# Patient Record
Sex: FEMALE | Race: WHITE | Employment: UNEMPLOYED | URBAN - METROPOLITAN AREA
[De-identification: names, ages, dates, MRNs, and addresses within clinical notes are randomized per-mention and may not be internally consistent; named-entity substitution may affect disease eponyms.]

---

## 2017-01-01 ENCOUNTER — ANESTHESIA EVENT (OUTPATIENT)
Dept: ENDOSCOPY | Age: 47
End: 2017-01-01

## 2017-01-01 ENCOUNTER — HOSPITAL ENCOUNTER (OUTPATIENT)
Dept: PET IMAGING | Age: 47
Discharge: HOME | DRG: 853 | End: 2017-02-09
Payer: MEDICARE

## 2017-01-01 ENCOUNTER — HOSPITAL ENCOUNTER (OUTPATIENT)
Dept: ULTRASOUND IMAGING | Age: 47
Discharge: HOME | End: 2017-08-25
Attending: INTERNAL MEDICINE
Payer: MEDICARE

## 2017-01-01 ENCOUNTER — PATIENT OUTREACH (OUTPATIENT)
Dept: CASE MANAGEMENT | Age: 47
End: 2017-01-01

## 2017-01-01 ENCOUNTER — ANESTHESIA EVENT (OUTPATIENT)
Dept: SURGERY | Age: 47
DRG: 853 | End: 2017-01-01
Payer: MEDICARE

## 2017-01-01 ENCOUNTER — HOME HEALTH ADMISSION (OUTPATIENT)
Dept: HOME HEALTH SERVICES | Facility: HOME HEALTH | Age: 47
End: 2017-01-01
Payer: MEDICARE

## 2017-01-01 ENCOUNTER — HOSPITAL ENCOUNTER (OUTPATIENT)
Dept: ULTRASOUND IMAGING | Age: 47
Discharge: HOME | End: 2017-09-25
Attending: INTERNAL MEDICINE
Payer: MEDICARE

## 2017-01-01 ENCOUNTER — ANESTHESIA (OUTPATIENT)
Dept: ENDOSCOPY | Age: 47
DRG: 073 | End: 2017-01-01
Payer: MEDICARE

## 2017-01-01 ENCOUNTER — HOSPITAL ENCOUNTER (OUTPATIENT)
Dept: WOUND CARE | Age: 47
Discharge: HOME | End: 2017-09-05
Attending: SURGERY
Payer: MEDICARE

## 2017-01-01 ENCOUNTER — HOSPITAL ENCOUNTER (OUTPATIENT)
Dept: ULTRASOUND IMAGING | Age: 47
Discharge: HOME | End: 2017-06-23
Attending: INTERNAL MEDICINE
Payer: MEDICARE

## 2017-01-01 ENCOUNTER — HOSPITAL ENCOUNTER (OUTPATIENT)
Dept: MEDSURG UNIT | Age: 47
End: 2017-01-01
Payer: MEDICARE

## 2017-01-01 ENCOUNTER — HOME CARE VISIT (OUTPATIENT)
Dept: HOME HEALTH SERVICES | Facility: HOME HEALTH | Age: 47
End: 2017-01-01
Payer: MEDICARE

## 2017-01-01 ENCOUNTER — HOSPITAL ENCOUNTER (EMERGENCY)
Age: 47
Discharge: OTHER INSTITUTION | DRG: 441 | End: 2017-05-08
Attending: EMERGENCY MEDICINE
Payer: MEDICARE

## 2017-01-01 ENCOUNTER — HOSPITAL ENCOUNTER (OUTPATIENT)
Dept: ULTRASOUND IMAGING | Age: 47
Discharge: HOME | DRG: 441 | End: 2017-01-03
Attending: INTERNAL MEDICINE
Payer: MEDICARE

## 2017-01-01 ENCOUNTER — HOSPITAL ENCOUNTER (OUTPATIENT)
Dept: ULTRASOUND IMAGING | Age: 47
Discharge: HOME | End: 2017-06-16
Attending: INTERNAL MEDICINE
Payer: MEDICARE

## 2017-01-01 ENCOUNTER — APPOINTMENT (OUTPATIENT)
Dept: ULTRASOUND IMAGING | Age: 47
DRG: 441 | End: 2017-01-01
Attending: INTERNAL MEDICINE
Payer: MEDICARE

## 2017-01-01 ENCOUNTER — HOSPITAL ENCOUNTER (OUTPATIENT)
Dept: LAB | Age: 47
Discharge: HOME | End: 2017-10-03

## 2017-01-01 ENCOUNTER — APPOINTMENT (OUTPATIENT)
Dept: ULTRASOUND IMAGING | Age: 47
DRG: 853 | End: 2017-01-01
Attending: INTERNAL MEDICINE
Payer: MEDICARE

## 2017-01-01 ENCOUNTER — HOSPITAL ENCOUNTER (OUTPATIENT)
Dept: ULTRASOUND IMAGING | Age: 47
Discharge: HOME | End: 2017-06-02
Attending: INTERNAL MEDICINE
Payer: MEDICARE

## 2017-01-01 ENCOUNTER — HOSPITAL ENCOUNTER (OUTPATIENT)
Dept: ULTRASOUND IMAGING | Age: 47
Discharge: HOME | End: 2017-02-27
Attending: INTERNAL MEDICINE
Payer: MEDICARE

## 2017-01-01 ENCOUNTER — HOME CARE VISIT (OUTPATIENT)
Dept: SCHEDULING | Facility: HOME HEALTH | Age: 47
End: 2017-01-01
Payer: MEDICARE

## 2017-01-01 ENCOUNTER — HOSPITAL ENCOUNTER (OUTPATIENT)
Dept: MEDSURG UNIT | Age: 47
Discharge: HOME | End: 2017-06-09
Payer: MEDICARE

## 2017-01-01 ENCOUNTER — HOSPITAL ENCOUNTER (OUTPATIENT)
Dept: WOUND CARE | Age: 47
Discharge: HOME | End: 2017-08-25
Attending: SURGERY
Payer: MEDICARE

## 2017-01-01 ENCOUNTER — HOSPITAL ENCOUNTER (OUTPATIENT)
Dept: ULTRASOUND IMAGING | Age: 47
Discharge: HOME | End: 2017-06-09
Attending: INTERNAL MEDICINE
Payer: MEDICARE

## 2017-01-01 ENCOUNTER — HOSPITAL ENCOUNTER (OUTPATIENT)
Dept: LAB | Age: 47
Discharge: HOME | End: 2017-10-06

## 2017-01-01 ENCOUNTER — APPOINTMENT (OUTPATIENT)
Dept: GENERAL RADIOLOGY | Age: 47
DRG: 073 | End: 2017-01-01
Attending: INTERNAL MEDICINE
Payer: MEDICARE

## 2017-01-01 ENCOUNTER — HOSPITAL ENCOUNTER (OUTPATIENT)
Dept: ULTRASOUND IMAGING | Age: 47
Discharge: HOME | End: 2017-01-10
Attending: INTERNAL MEDICINE
Payer: MEDICARE

## 2017-01-01 ENCOUNTER — HOSPITAL ENCOUNTER (OUTPATIENT)
Dept: MEDSURG UNIT | Age: 47
Discharge: HOME | End: 2017-01-24
Payer: MEDICARE

## 2017-01-01 ENCOUNTER — HOSPITAL ENCOUNTER (OUTPATIENT)
Dept: MEDSURG UNIT | Age: 47
Discharge: HOME | End: 2017-09-08
Payer: MEDICARE

## 2017-01-01 ENCOUNTER — HOSPITAL ENCOUNTER (OUTPATIENT)
Dept: ULTRASOUND IMAGING | Age: 47
Discharge: HOME | End: 2017-09-01
Attending: INTERNAL MEDICINE
Payer: MEDICARE

## 2017-01-01 ENCOUNTER — APPOINTMENT (OUTPATIENT)
Dept: ULTRASOUND IMAGING | Age: 47
DRG: 441 | End: 2017-01-01
Attending: NURSE PRACTITIONER
Payer: MEDICARE

## 2017-01-01 ENCOUNTER — HOSPITAL ENCOUNTER (OUTPATIENT)
Dept: MEDSURG UNIT | Age: 47
Discharge: HOME | End: 2017-02-06
Payer: MEDICARE

## 2017-01-01 ENCOUNTER — APPOINTMENT (OUTPATIENT)
Dept: GENERAL RADIOLOGY | Age: 47
DRG: 441 | End: 2017-01-01
Attending: INTERNAL MEDICINE
Payer: MEDICARE

## 2017-01-01 ENCOUNTER — HOSPITAL ENCOUNTER (OUTPATIENT)
Dept: ULTRASOUND IMAGING | Age: 47
Discharge: HOME | End: 2017-02-06
Attending: INTERNAL MEDICINE
Payer: MEDICARE

## 2017-01-01 ENCOUNTER — HOSPITAL ENCOUNTER (OUTPATIENT)
Dept: ULTRASOUND IMAGING | Age: 47
Discharge: HOME | End: 2017-07-07
Attending: INTERNAL MEDICINE
Payer: MEDICARE

## 2017-01-01 ENCOUNTER — HOSPICE ADMISSION (OUTPATIENT)
Dept: HOSPICE | Facility: HOSPICE | Age: 47
End: 2017-01-01
Payer: MEDICARE

## 2017-01-01 ENCOUNTER — HOSPITAL ENCOUNTER (OUTPATIENT)
Dept: ULTRASOUND IMAGING | Age: 47
Discharge: HOME | End: 2017-08-11
Attending: INTERNAL MEDICINE
Payer: MEDICARE

## 2017-01-01 ENCOUNTER — HOSPITAL ENCOUNTER (OUTPATIENT)
Dept: MEDSURG UNIT | Age: 47
Discharge: HOME | End: 2017-07-28
Payer: MEDICARE

## 2017-01-01 ENCOUNTER — APPOINTMENT (OUTPATIENT)
Dept: ULTRASOUND IMAGING | Age: 47
DRG: 441 | End: 2017-01-01
Attending: FAMILY MEDICINE
Payer: MEDICARE

## 2017-01-01 ENCOUNTER — HOSPITAL ENCOUNTER (OUTPATIENT)
Dept: ULTRASOUND IMAGING | Age: 47
Discharge: HOME | End: 2017-10-02
Attending: INTERNAL MEDICINE
Payer: MEDICARE

## 2017-01-01 ENCOUNTER — ANESTHESIA EVENT (OUTPATIENT)
Dept: ENDOSCOPY | Age: 47
DRG: 073 | End: 2017-01-01
Payer: MEDICARE

## 2017-01-01 ENCOUNTER — HOSPITAL ENCOUNTER (EMERGENCY)
Age: 47
Discharge: HOME | End: 2017-02-23
Attending: EMERGENCY MEDICINE
Payer: MEDICARE

## 2017-01-01 ENCOUNTER — APPOINTMENT (OUTPATIENT)
Dept: ULTRASOUND IMAGING | Age: 47
End: 2017-01-01
Payer: MEDICARE

## 2017-01-01 ENCOUNTER — HOSPITAL ENCOUNTER (OUTPATIENT)
Dept: ULTRASOUND IMAGING | Age: 47
Discharge: HOME | End: 2017-07-28
Attending: INTERNAL MEDICINE
Payer: MEDICARE

## 2017-01-01 ENCOUNTER — HOSPITAL ENCOUNTER (OUTPATIENT)
Dept: ULTRASOUND IMAGING | Age: 47
Discharge: HOME | End: 2017-05-22
Attending: INTERNAL MEDICINE
Payer: MEDICARE

## 2017-01-01 ENCOUNTER — HOSPITAL ENCOUNTER (OUTPATIENT)
Dept: MEDSURG UNIT | Age: 47
Discharge: HOME | End: 2017-04-06
Payer: MEDICARE

## 2017-01-01 ENCOUNTER — HOSPITAL ENCOUNTER (OUTPATIENT)
Dept: ULTRASOUND IMAGING | Age: 47
Discharge: HOME | End: 2017-01-24
Attending: INTERNAL MEDICINE
Payer: MEDICARE

## 2017-01-01 ENCOUNTER — HOSPITAL ENCOUNTER (INPATIENT)
Age: 47
LOS: 2 days | Discharge: HOME | DRG: 441 | End: 2017-01-05
Attending: EMERGENCY MEDICINE | Admitting: INTERNAL MEDICINE
Payer: MEDICARE

## 2017-01-01 ENCOUNTER — HOSPITAL ENCOUNTER (OUTPATIENT)
Dept: MEDSURG UNIT | Age: 47
Discharge: HOME | End: 2017-01-31
Payer: MEDICARE

## 2017-01-01 ENCOUNTER — APPOINTMENT (OUTPATIENT)
Dept: CT IMAGING | Age: 47
DRG: 441 | End: 2017-01-01
Attending: HOSPITALIST
Payer: MEDICARE

## 2017-01-01 ENCOUNTER — HOSPITAL ENCOUNTER (OUTPATIENT)
Dept: MEDSURG UNIT | Age: 47
Discharge: HOME | End: 2017-08-11
Payer: MEDICARE

## 2017-01-01 ENCOUNTER — HOSPITAL ENCOUNTER (OUTPATIENT)
Dept: MEDSURG UNIT | Age: 47
Discharge: HOME | End: 2017-06-30
Payer: MEDICARE

## 2017-01-01 ENCOUNTER — ANESTHESIA (OUTPATIENT)
Dept: SURGERY | Age: 47
DRG: 853 | End: 2017-01-01
Payer: MEDICARE

## 2017-01-01 ENCOUNTER — HOSPITAL ENCOUNTER (INPATIENT)
Age: 47
LOS: 9 days | Discharge: HOME | DRG: 441 | End: 2017-05-17
Attending: FAMILY MEDICINE | Admitting: INTERNAL MEDICINE
Payer: MEDICARE

## 2017-01-01 ENCOUNTER — HOSPITAL ENCOUNTER (OUTPATIENT)
Dept: MEDSURG UNIT | Age: 47
Discharge: HOME | End: 2017-07-14
Payer: MEDICARE

## 2017-01-01 ENCOUNTER — HOSPITAL ENCOUNTER (INPATIENT)
Age: 47
LOS: 11 days | End: 2017-10-29
Attending: INTERNAL MEDICINE | Admitting: INTERNAL MEDICINE

## 2017-01-01 ENCOUNTER — HOSPITAL ENCOUNTER (INPATIENT)
Age: 47
LOS: 10 days | Discharge: HOME | DRG: 853 | End: 2017-02-22
Attending: FAMILY MEDICINE | Admitting: INTERNAL MEDICINE
Payer: MEDICARE

## 2017-01-01 ENCOUNTER — APPOINTMENT (OUTPATIENT)
Dept: INTERVENTIONAL RADIOLOGY/VASCULAR | Age: 47
DRG: 441 | End: 2017-01-01
Attending: HOSPITALIST
Payer: MEDICARE

## 2017-01-01 ENCOUNTER — HOSPITAL ENCOUNTER (OUTPATIENT)
Dept: ULTRASOUND IMAGING | Age: 47
Discharge: HOME | End: 2017-07-14
Attending: INTERNAL MEDICINE
Payer: MEDICARE

## 2017-01-01 ENCOUNTER — HOSPITAL ENCOUNTER (OUTPATIENT)
Dept: ULTRASOUND IMAGING | Age: 47
Discharge: HOME | End: 2017-01-27
Attending: INTERNAL MEDICINE
Payer: MEDICARE

## 2017-01-01 ENCOUNTER — HOSPITAL ENCOUNTER (OUTPATIENT)
Dept: ULTRASOUND IMAGING | Age: 47
Discharge: HOME | DRG: 441 | End: 2017-09-08
Attending: INTERNAL MEDICINE
Payer: MEDICARE

## 2017-01-01 ENCOUNTER — APPOINTMENT (OUTPATIENT)
Dept: GENERAL RADIOLOGY | Age: 47
DRG: 073 | End: 2017-01-01
Payer: MEDICARE

## 2017-01-01 ENCOUNTER — HOSPITAL ENCOUNTER (OUTPATIENT)
Dept: MEDSURG UNIT | Age: 47
Discharge: HOME | End: 2017-03-06
Payer: MEDICARE

## 2017-01-01 ENCOUNTER — ANESTHESIA (OUTPATIENT)
Dept: ENDOSCOPY | Age: 47
DRG: 441 | End: 2017-01-01
Payer: MEDICARE

## 2017-01-01 ENCOUNTER — HOSPITAL ENCOUNTER (OUTPATIENT)
Dept: MEDSURG UNIT | Age: 47
Discharge: HOME | End: 2017-08-25
Payer: MEDICARE

## 2017-01-01 ENCOUNTER — HOSPITAL ENCOUNTER (OUTPATIENT)
Dept: ULTRASOUND IMAGING | Age: 47
Discharge: HOME | End: 2017-01-06
Attending: INTERNAL MEDICINE
Payer: MEDICARE

## 2017-01-01 ENCOUNTER — HOSPITAL ENCOUNTER (INPATIENT)
Age: 47
LOS: 4 days | Discharge: HOSPICE | DRG: 073 | End: 2017-10-18
Admitting: INTERNAL MEDICINE
Payer: MEDICARE

## 2017-01-01 ENCOUNTER — APPOINTMENT (OUTPATIENT)
Dept: GENERAL RADIOLOGY | Age: 47
DRG: 441 | End: 2017-01-01
Attending: EMERGENCY MEDICINE
Payer: MEDICARE

## 2017-01-01 ENCOUNTER — HOSPITAL ENCOUNTER (OUTPATIENT)
Dept: MEDSURG UNIT | Age: 47
Discharge: HOME | End: 2017-05-03

## 2017-01-01 ENCOUNTER — HOSPITAL ENCOUNTER (OUTPATIENT)
Dept: LAB | Age: 47
Discharge: HOME | End: 2017-10-11

## 2017-01-01 ENCOUNTER — HOSPITAL ENCOUNTER (OUTPATIENT)
Dept: MEDSURG UNIT | Age: 47
Discharge: HOME | End: 2017-09-01
Payer: MEDICARE

## 2017-01-01 ENCOUNTER — APPOINTMENT (OUTPATIENT)
Dept: MEDSURG UNIT | Age: 47
End: 2017-01-01

## 2017-01-01 ENCOUNTER — HOSPITAL ENCOUNTER (OUTPATIENT)
Dept: MEDSURG UNIT | Age: 47
Discharge: HOME | End: 2017-06-23
Payer: MEDICARE

## 2017-01-01 ENCOUNTER — HOSPITAL ENCOUNTER (OUTPATIENT)
Dept: ULTRASOUND IMAGING | Age: 47
Discharge: HOME | End: 2017-06-30
Attending: INTERNAL MEDICINE
Payer: MEDICARE

## 2017-01-01 ENCOUNTER — HOSPITAL ENCOUNTER (OUTPATIENT)
Dept: ULTRASOUND IMAGING | Age: 47
Discharge: HOME | End: 2017-07-21
Attending: INTERNAL MEDICINE
Payer: MEDICARE

## 2017-01-01 ENCOUNTER — HOSPITAL ENCOUNTER (OUTPATIENT)
Dept: MEDSURG UNIT | Age: 47
Discharge: HOME | End: 2017-03-23
Payer: MEDICARE

## 2017-01-01 ENCOUNTER — HOSPITAL ENCOUNTER (OUTPATIENT)
Dept: ULTRASOUND IMAGING | Age: 47
Discharge: HOME | End: 2017-03-06
Attending: INTERNAL MEDICINE
Payer: MEDICARE

## 2017-01-01 ENCOUNTER — HOSPITAL ENCOUNTER (OUTPATIENT)
Dept: MEDSURG UNIT | Age: 47
Discharge: HOME | End: 2017-08-04
Payer: MEDICARE

## 2017-01-01 ENCOUNTER — APPOINTMENT (OUTPATIENT)
Dept: ULTRASOUND IMAGING | Age: 47
DRG: 441 | End: 2017-01-01
Payer: MEDICARE

## 2017-01-01 ENCOUNTER — HOSPITAL ENCOUNTER (OUTPATIENT)
Dept: ULTRASOUND IMAGING | Age: 47
Discharge: HOME | End: 2017-01-17
Attending: INTERNAL MEDICINE
Payer: MEDICARE

## 2017-01-01 ENCOUNTER — HOSPITAL ENCOUNTER (OUTPATIENT)
Dept: LAB | Age: 47
Discharge: HOME | End: 2017-10-09

## 2017-01-01 ENCOUNTER — HOSPITAL ENCOUNTER (OUTPATIENT)
Dept: ULTRASOUND IMAGING | Age: 47
Discharge: HOME | DRG: 853 | End: 2017-02-10
Attending: INTERNAL MEDICINE
Payer: MEDICARE

## 2017-01-01 ENCOUNTER — APPOINTMENT (OUTPATIENT)
Dept: GENERAL RADIOLOGY | Age: 47
DRG: 853 | End: 2017-01-01
Attending: INTERNAL MEDICINE
Payer: MEDICARE

## 2017-01-01 ENCOUNTER — HOSPITAL ENCOUNTER (OUTPATIENT)
Dept: ULTRASOUND IMAGING | Age: 47
Discharge: HOME | End: 2017-08-18
Attending: INTERNAL MEDICINE
Payer: MEDICARE

## 2017-01-01 ENCOUNTER — APPOINTMENT (OUTPATIENT)
Dept: CT IMAGING | Age: 47
DRG: 853 | End: 2017-01-01
Attending: STUDENT IN AN ORGANIZED HEALTH CARE EDUCATION/TRAINING PROGRAM
Payer: MEDICARE

## 2017-01-01 ENCOUNTER — HOSPITAL ENCOUNTER (OUTPATIENT)
Dept: ULTRASOUND IMAGING | Age: 47
Discharge: HOME | DRG: 073 | End: 2017-10-11
Attending: INTERNAL MEDICINE
Payer: MEDICARE

## 2017-01-01 ENCOUNTER — HOSPITAL ENCOUNTER (OUTPATIENT)
Dept: MEDSURG UNIT | Age: 47
Discharge: HOME | End: 2017-06-16
Payer: MEDICARE

## 2017-01-01 ENCOUNTER — HOSPITAL ENCOUNTER (OUTPATIENT)
Dept: MEDSURG UNIT | Age: 47
Discharge: HOME | End: 2017-05-05
Payer: MEDICARE

## 2017-01-01 ENCOUNTER — HOSPITAL ENCOUNTER (OUTPATIENT)
Dept: MEDSURG UNIT | Age: 47
Discharge: HOME | End: 2017-01-02
Payer: MEDICARE

## 2017-01-01 ENCOUNTER — APPOINTMENT (OUTPATIENT)
Dept: INTERVENTIONAL RADIOLOGY/VASCULAR | Age: 47
DRG: 853 | End: 2017-01-01
Attending: NURSE PRACTITIONER
Payer: MEDICARE

## 2017-01-01 ENCOUNTER — HOME CARE VISIT (OUTPATIENT)
Dept: HOME HEALTH SERVICES | Facility: HOME HEALTH | Age: 47
End: 2017-01-01

## 2017-01-01 ENCOUNTER — HOSPITAL ENCOUNTER (OUTPATIENT)
Dept: MEDSURG UNIT | Age: 47
Discharge: HOME | End: 2017-06-02
Payer: MEDICARE

## 2017-01-01 ENCOUNTER — HOSPITAL ENCOUNTER (OUTPATIENT)
Dept: MEDSURG UNIT | Age: 47
Discharge: HOME | End: 2017-07-07
Payer: MEDICARE

## 2017-01-01 ENCOUNTER — HOME CARE VISIT (OUTPATIENT)
Dept: SCHEDULING | Facility: HOME HEALTH | Age: 47
End: 2017-01-01

## 2017-01-01 ENCOUNTER — HOSPITAL ENCOUNTER (OUTPATIENT)
Dept: LAB | Age: 47
Discharge: HOME | End: 2017-09-29

## 2017-01-01 ENCOUNTER — HOSPITAL ENCOUNTER (INPATIENT)
Age: 47
LOS: 10 days | Discharge: NURSING, REHAB OR LONG-TERM CARE | DRG: 441 | End: 2017-09-21
Attending: EMERGENCY MEDICINE | Admitting: INTERNAL MEDICINE
Payer: MEDICARE

## 2017-01-01 ENCOUNTER — HOSPITAL ENCOUNTER (OUTPATIENT)
Dept: MEDSURG UNIT | Age: 47
Discharge: HOME | End: 2017-01-10
Payer: MEDICARE

## 2017-01-01 ENCOUNTER — APPOINTMENT (OUTPATIENT)
Dept: ULTRASOUND IMAGING | Age: 47
DRG: 441 | End: 2017-01-01
Attending: HOSPITALIST
Payer: MEDICARE

## 2017-01-01 ENCOUNTER — HOSPITAL ENCOUNTER (OUTPATIENT)
Dept: MAMMOGRAPHY | Age: 47
Discharge: HOME | End: 2017-09-01
Attending: OBSTETRICS & GYNECOLOGY
Payer: MEDICARE

## 2017-01-01 ENCOUNTER — HOSPITAL ENCOUNTER (OUTPATIENT)
Dept: MEDSURG UNIT | Age: 47
Discharge: HOME | End: 2017-07-21
Payer: MEDICARE

## 2017-01-01 ENCOUNTER — APPOINTMENT (OUTPATIENT)
Dept: GENERAL RADIOLOGY | Age: 47
DRG: 853 | End: 2017-01-01
Attending: SURGERY
Payer: MEDICARE

## 2017-01-01 ENCOUNTER — TELEPHONE (OUTPATIENT)
Dept: GENERAL RADIOLOGY | Age: 47
End: 2017-01-01

## 2017-01-01 ENCOUNTER — HOSPITAL ENCOUNTER (OUTPATIENT)
Dept: ULTRASOUND IMAGING | Age: 47
Discharge: HOME | End: 2017-08-04
Attending: INTERNAL MEDICINE
Payer: MEDICARE

## 2017-01-01 ENCOUNTER — APPOINTMENT (OUTPATIENT)
Dept: ULTRASOUND IMAGING | Age: 47
DRG: 853 | End: 2017-01-01
Attending: FAMILY MEDICINE
Payer: MEDICARE

## 2017-01-01 ENCOUNTER — APPOINTMENT (OUTPATIENT)
Dept: INTERVENTIONAL RADIOLOGY/VASCULAR | Age: 47
DRG: 853 | End: 2017-01-01
Attending: SURGERY
Payer: MEDICARE

## 2017-01-01 ENCOUNTER — HOSPITAL ENCOUNTER (OUTPATIENT)
Dept: ULTRASOUND IMAGING | Age: 47
Discharge: HOME | End: 2017-01-31
Attending: INTERNAL MEDICINE
Payer: MEDICARE

## 2017-01-01 ENCOUNTER — ANESTHESIA EVENT (OUTPATIENT)
Dept: ENDOSCOPY | Age: 47
DRG: 441 | End: 2017-01-01
Payer: MEDICARE

## 2017-01-01 ENCOUNTER — HOSPITAL ENCOUNTER (OUTPATIENT)
Dept: MEDSURG UNIT | Age: 47
Discharge: HOME | End: 2017-02-27
Payer: MEDICARE

## 2017-01-01 ENCOUNTER — HOSPITAL ENCOUNTER (OUTPATIENT)
Dept: MEDSURG UNIT | Age: 47
Discharge: HOME | End: 2017-01-17
Payer: MEDICARE

## 2017-01-01 ENCOUNTER — HOSPITAL ENCOUNTER (OUTPATIENT)
Dept: MEDSURG UNIT | Age: 47
Discharge: HOME | End: 2017-08-18
Payer: MEDICARE

## 2017-01-01 ENCOUNTER — HOSPITAL ENCOUNTER (EMERGENCY)
Age: 47
Discharge: ANOTHER HOSPITAL | DRG: 853 | End: 2017-02-12
Attending: STUDENT IN AN ORGANIZED HEALTH CARE EDUCATION/TRAINING PROGRAM
Payer: MEDICARE

## 2017-01-01 ENCOUNTER — HOSPITAL ENCOUNTER (OUTPATIENT)
Dept: MAMMOGRAPHY | Age: 47
Discharge: HOME | End: 2017-08-31
Attending: OBSTETRICS & GYNECOLOGY

## 2017-01-01 ENCOUNTER — ANESTHESIA (OUTPATIENT)
Dept: ENDOSCOPY | Age: 47
End: 2017-01-01

## 2017-01-01 VITALS
DIASTOLIC BLOOD PRESSURE: 59 MMHG | BODY MASS INDEX: 16.9 KG/M2 | SYSTOLIC BLOOD PRESSURE: 102 MMHG | WEIGHT: 99 LBS | OXYGEN SATURATION: 98 % | HEIGHT: 64 IN | HEART RATE: 84 BPM | RESPIRATION RATE: 16 BRPM

## 2017-01-01 VITALS
RESPIRATION RATE: 19 BRPM | DIASTOLIC BLOOD PRESSURE: 61 MMHG | HEART RATE: 81 BPM | OXYGEN SATURATION: 95 % | SYSTOLIC BLOOD PRESSURE: 90 MMHG

## 2017-01-01 VITALS — HEART RATE: 70 BPM | SYSTOLIC BLOOD PRESSURE: 100 MMHG | DIASTOLIC BLOOD PRESSURE: 62 MMHG | RESPIRATION RATE: 17 BRPM

## 2017-01-01 VITALS
DIASTOLIC BLOOD PRESSURE: 60 MMHG | RESPIRATION RATE: 15 BRPM | SYSTOLIC BLOOD PRESSURE: 97 MMHG | OXYGEN SATURATION: 97 % | HEART RATE: 66 BPM

## 2017-01-01 VITALS
SYSTOLIC BLOOD PRESSURE: 95 MMHG | OXYGEN SATURATION: 96 % | DIASTOLIC BLOOD PRESSURE: 61 MMHG | TEMPERATURE: 97.8 F | RESPIRATION RATE: 16 BRPM | HEART RATE: 84 BPM

## 2017-01-01 VITALS
TEMPERATURE: 98.3 F | DIASTOLIC BLOOD PRESSURE: 56 MMHG | OXYGEN SATURATION: 97 % | WEIGHT: 104.2 LBS | HEART RATE: 85 BPM | SYSTOLIC BLOOD PRESSURE: 101 MMHG | RESPIRATION RATE: 19 BRPM | BODY MASS INDEX: 19.69 KG/M2

## 2017-01-01 VITALS
DIASTOLIC BLOOD PRESSURE: 52 MMHG | HEART RATE: 115 BPM | WEIGHT: 114 LBS | OXYGEN SATURATION: 92 % | TEMPERATURE: 97.8 F | SYSTOLIC BLOOD PRESSURE: 87 MMHG | HEIGHT: 61 IN | RESPIRATION RATE: 20 BRPM | BODY MASS INDEX: 21.52 KG/M2

## 2017-01-01 VITALS
TEMPERATURE: 98.2 F | BODY MASS INDEX: 20.22 KG/M2 | SYSTOLIC BLOOD PRESSURE: 106 MMHG | TEMPERATURE: 98.1 F | SYSTOLIC BLOOD PRESSURE: 99 MMHG | WEIGHT: 103 LBS | WEIGHT: 104.5 LBS | OXYGEN SATURATION: 97 % | BODY MASS INDEX: 20.41 KG/M2 | DIASTOLIC BLOOD PRESSURE: 63 MMHG | OXYGEN SATURATION: 100 % | DIASTOLIC BLOOD PRESSURE: 66 MMHG | HEART RATE: 86 BPM | HEIGHT: 60 IN | HEART RATE: 94 BPM | RESPIRATION RATE: 17 BRPM

## 2017-01-01 VITALS
TEMPERATURE: 98.1 F | HEART RATE: 78 BPM | RESPIRATION RATE: 20 BRPM | OXYGEN SATURATION: 94 % | DIASTOLIC BLOOD PRESSURE: 70 MMHG | SYSTOLIC BLOOD PRESSURE: 140 MMHG

## 2017-01-01 VITALS
RESPIRATION RATE: 16 BRPM | HEART RATE: 96 BPM | OXYGEN SATURATION: 96 % | DIASTOLIC BLOOD PRESSURE: 48 MMHG | SYSTOLIC BLOOD PRESSURE: 80 MMHG

## 2017-01-01 VITALS
BODY MASS INDEX: 16.99 KG/M2 | RESPIRATION RATE: 16 BRPM | HEIGHT: 61 IN | WEIGHT: 90 LBS | OXYGEN SATURATION: 97 % | HEART RATE: 75 BPM | SYSTOLIC BLOOD PRESSURE: 91 MMHG | DIASTOLIC BLOOD PRESSURE: 56 MMHG

## 2017-01-01 VITALS
RESPIRATION RATE: 18 BRPM | TEMPERATURE: 98.9 F | SYSTOLIC BLOOD PRESSURE: 94 MMHG | HEART RATE: 100 BPM | DIASTOLIC BLOOD PRESSURE: 53 MMHG

## 2017-01-01 VITALS
HEART RATE: 97 BPM | SYSTOLIC BLOOD PRESSURE: 103 MMHG | WEIGHT: 100 LBS | BODY MASS INDEX: 18.88 KG/M2 | HEIGHT: 61 IN | DIASTOLIC BLOOD PRESSURE: 62 MMHG | RESPIRATION RATE: 16 BRPM

## 2017-01-01 VITALS
SYSTOLIC BLOOD PRESSURE: 120 MMHG | DIASTOLIC BLOOD PRESSURE: 70 MMHG | RESPIRATION RATE: 19 BRPM | TEMPERATURE: 99.1 F | HEART RATE: 92 BPM

## 2017-01-01 VITALS
HEART RATE: 85 BPM | RESPIRATION RATE: 18 BRPM | SYSTOLIC BLOOD PRESSURE: 85 MMHG | OXYGEN SATURATION: 95 % | TEMPERATURE: 97.9 F | DIASTOLIC BLOOD PRESSURE: 56 MMHG

## 2017-01-01 VITALS
BODY MASS INDEX: 23.15 KG/M2 | RESPIRATION RATE: 17 BRPM | WEIGHT: 121.5 LBS | SYSTOLIC BLOOD PRESSURE: 96 MMHG | OXYGEN SATURATION: 97 % | HEART RATE: 106 BPM | TEMPERATURE: 98 F | DIASTOLIC BLOOD PRESSURE: 58 MMHG

## 2017-01-01 VITALS
TEMPERATURE: 98.9 F | SYSTOLIC BLOOD PRESSURE: 87 MMHG | OXYGEN SATURATION: 96 % | DIASTOLIC BLOOD PRESSURE: 63 MMHG | RESPIRATION RATE: 18 BRPM | HEART RATE: 96 BPM

## 2017-01-01 VITALS
HEART RATE: 61 BPM | SYSTOLIC BLOOD PRESSURE: 84 MMHG | RESPIRATION RATE: 17 BRPM | DIASTOLIC BLOOD PRESSURE: 55 MMHG | OXYGEN SATURATION: 96 %

## 2017-01-01 VITALS
SYSTOLIC BLOOD PRESSURE: 104 MMHG | HEART RATE: 98 BPM | RESPIRATION RATE: 22 BRPM | DIASTOLIC BLOOD PRESSURE: 60 MMHG | OXYGEN SATURATION: 95 % | TEMPERATURE: 97.9 F

## 2017-01-01 VITALS
HEART RATE: 86 BPM | SYSTOLIC BLOOD PRESSURE: 100 MMHG | RESPIRATION RATE: 16 BRPM | DIASTOLIC BLOOD PRESSURE: 62 MMHG | TEMPERATURE: 96 F | OXYGEN SATURATION: 98 %

## 2017-01-01 VITALS
BODY MASS INDEX: 17.52 KG/M2 | HEIGHT: 61 IN | OXYGEN SATURATION: 98 % | HEART RATE: 76 BPM | SYSTOLIC BLOOD PRESSURE: 86 MMHG | DIASTOLIC BLOOD PRESSURE: 59 MMHG | RESPIRATION RATE: 16 BRPM | WEIGHT: 92.8 LBS | TEMPERATURE: 97.4 F

## 2017-01-01 VITALS
OXYGEN SATURATION: 97 % | TEMPERATURE: 98.2 F | HEART RATE: 95 BPM | SYSTOLIC BLOOD PRESSURE: 90 MMHG | RESPIRATION RATE: 16 BRPM | DIASTOLIC BLOOD PRESSURE: 60 MMHG

## 2017-01-01 VITALS
OXYGEN SATURATION: 94 % | WEIGHT: 98.8 LBS | HEART RATE: 75 BPM | DIASTOLIC BLOOD PRESSURE: 57 MMHG | HEIGHT: 61 IN | TEMPERATURE: 97.9 F | SYSTOLIC BLOOD PRESSURE: 86 MMHG | BODY MASS INDEX: 18.65 KG/M2 | RESPIRATION RATE: 16 BRPM

## 2017-01-01 VITALS
RESPIRATION RATE: 10 BRPM | DIASTOLIC BLOOD PRESSURE: 50 MMHG | TEMPERATURE: 97.1 F | HEART RATE: 67 BPM | SYSTOLIC BLOOD PRESSURE: 68 MMHG

## 2017-01-01 VITALS
RESPIRATION RATE: 16 BRPM | HEIGHT: 60 IN | HEART RATE: 86 BPM | DIASTOLIC BLOOD PRESSURE: 63 MMHG | OXYGEN SATURATION: 100 % | TEMPERATURE: 98.2 F | SYSTOLIC BLOOD PRESSURE: 96 MMHG | WEIGHT: 103 LBS | BODY MASS INDEX: 20.22 KG/M2

## 2017-01-01 VITALS
DIASTOLIC BLOOD PRESSURE: 63 MMHG | OXYGEN SATURATION: 97 % | RESPIRATION RATE: 16 BRPM | SYSTOLIC BLOOD PRESSURE: 91 MMHG | HEART RATE: 77 BPM

## 2017-01-01 VITALS
OXYGEN SATURATION: 94 % | RESPIRATION RATE: 20 BRPM | SYSTOLIC BLOOD PRESSURE: 91 MMHG | DIASTOLIC BLOOD PRESSURE: 59 MMHG | HEART RATE: 95 BPM

## 2017-01-01 VITALS
BODY MASS INDEX: 18.12 KG/M2 | HEIGHT: 61 IN | DIASTOLIC BLOOD PRESSURE: 57 MMHG | HEART RATE: 83 BPM | OXYGEN SATURATION: 96 % | RESPIRATION RATE: 18 BRPM | SYSTOLIC BLOOD PRESSURE: 93 MMHG | WEIGHT: 96 LBS | TEMPERATURE: 97.6 F

## 2017-01-01 VITALS
SYSTOLIC BLOOD PRESSURE: 84 MMHG | RESPIRATION RATE: 19 BRPM | HEART RATE: 81 BPM | DIASTOLIC BLOOD PRESSURE: 52 MMHG | OXYGEN SATURATION: 96 %

## 2017-01-01 VITALS
DIASTOLIC BLOOD PRESSURE: 52 MMHG | HEART RATE: 78 BPM | TEMPERATURE: 96.9 F | SYSTOLIC BLOOD PRESSURE: 96 MMHG | OXYGEN SATURATION: 94 % | RESPIRATION RATE: 16 BRPM

## 2017-01-01 VITALS
DIASTOLIC BLOOD PRESSURE: 65 MMHG | HEART RATE: 99 BPM | SYSTOLIC BLOOD PRESSURE: 95 MMHG | BODY MASS INDEX: 19.63 KG/M2 | DIASTOLIC BLOOD PRESSURE: 64 MMHG | WEIGHT: 104 LBS | OXYGEN SATURATION: 98 % | HEIGHT: 61 IN | SYSTOLIC BLOOD PRESSURE: 106 MMHG | RESPIRATION RATE: 17 BRPM

## 2017-01-01 VITALS
HEART RATE: 80 BPM | SYSTOLIC BLOOD PRESSURE: 106 MMHG | RESPIRATION RATE: 16 BRPM | TEMPERATURE: 97.5 F | DIASTOLIC BLOOD PRESSURE: 68 MMHG

## 2017-01-01 VITALS
DIASTOLIC BLOOD PRESSURE: 55 MMHG | HEART RATE: 92 BPM | SYSTOLIC BLOOD PRESSURE: 85 MMHG | OXYGEN SATURATION: 95 % | RESPIRATION RATE: 20 BRPM

## 2017-01-01 VITALS
TEMPERATURE: 98 F | SYSTOLIC BLOOD PRESSURE: 100 MMHG | WEIGHT: 108 LBS | RESPIRATION RATE: 15 BRPM | DIASTOLIC BLOOD PRESSURE: 75 MMHG | HEIGHT: 60 IN | HEART RATE: 100 BPM | OXYGEN SATURATION: 97 % | BODY MASS INDEX: 21.2 KG/M2

## 2017-01-01 VITALS
RESPIRATION RATE: 16 BRPM | RESPIRATION RATE: 20 BRPM | HEART RATE: 98 BPM | SYSTOLIC BLOOD PRESSURE: 97 MMHG | SYSTOLIC BLOOD PRESSURE: 100 MMHG | OXYGEN SATURATION: 98 % | HEART RATE: 102 BPM | DIASTOLIC BLOOD PRESSURE: 57 MMHG | DIASTOLIC BLOOD PRESSURE: 56 MMHG | OXYGEN SATURATION: 100 %

## 2017-01-01 VITALS
SYSTOLIC BLOOD PRESSURE: 92 MMHG | WEIGHT: 92 LBS | DIASTOLIC BLOOD PRESSURE: 58 MMHG | OXYGEN SATURATION: 100 % | HEIGHT: 64 IN | TEMPERATURE: 97.8 F | RESPIRATION RATE: 16 BRPM | HEART RATE: 88 BPM | BODY MASS INDEX: 15.71 KG/M2

## 2017-01-01 VITALS
SYSTOLIC BLOOD PRESSURE: 97 MMHG | HEART RATE: 80 BPM | RESPIRATION RATE: 16 BRPM | DIASTOLIC BLOOD PRESSURE: 66 MMHG | OXYGEN SATURATION: 98 % | TEMPERATURE: 97.9 F

## 2017-01-01 VITALS
RESPIRATION RATE: 17 BRPM | OXYGEN SATURATION: 95 % | HEART RATE: 88 BPM | DIASTOLIC BLOOD PRESSURE: 65 MMHG | SYSTOLIC BLOOD PRESSURE: 101 MMHG

## 2017-01-01 VITALS
HEART RATE: 72 BPM | TEMPERATURE: 96.9 F | DIASTOLIC BLOOD PRESSURE: 60 MMHG | RESPIRATION RATE: 16 BRPM | SYSTOLIC BLOOD PRESSURE: 100 MMHG

## 2017-01-01 VITALS
TEMPERATURE: 98.1 F | RESPIRATION RATE: 18 BRPM | HEART RATE: 91 BPM | SYSTOLIC BLOOD PRESSURE: 88 MMHG | DIASTOLIC BLOOD PRESSURE: 58 MMHG | OXYGEN SATURATION: 96 %

## 2017-01-01 VITALS
DIASTOLIC BLOOD PRESSURE: 60 MMHG | HEART RATE: 118 BPM | SYSTOLIC BLOOD PRESSURE: 98 MMHG | TEMPERATURE: 100 F | RESPIRATION RATE: 19 BRPM

## 2017-01-01 VITALS
RESPIRATION RATE: 14 BRPM | HEART RATE: 100 BPM | OXYGEN SATURATION: 98 % | SYSTOLIC BLOOD PRESSURE: 106 MMHG | DIASTOLIC BLOOD PRESSURE: 62 MMHG

## 2017-01-01 VITALS — DIASTOLIC BLOOD PRESSURE: 68 MMHG | SYSTOLIC BLOOD PRESSURE: 106 MMHG

## 2017-01-01 DIAGNOSIS — R18.8 ASCITES: ICD-10-CM

## 2017-01-01 DIAGNOSIS — I95.9 HYPOTENSION, UNSPECIFIED HYPOTENSION TYPE: ICD-10-CM

## 2017-01-01 DIAGNOSIS — K76.9 LIVER DISEASE: ICD-10-CM

## 2017-01-01 DIAGNOSIS — I85.01 IDIOPATHIC ESOPHAGEAL VARICES WITH BLEEDING (HCC): ICD-10-CM

## 2017-01-01 DIAGNOSIS — N18.6 CKD (CHRONIC KIDNEY DISEASE) STAGE V REQUIRING CHRONIC DIALYSIS (HCC): Chronic | ICD-10-CM

## 2017-01-01 DIAGNOSIS — K76.82 HEPATIC ENCEPHALOPATHY: Primary | ICD-10-CM

## 2017-01-01 DIAGNOSIS — R18.8 CIRRHOSIS OF LIVER WITH ASCITES, UNSPECIFIED HEPATIC CIRRHOSIS TYPE (HCC): Chronic | ICD-10-CM

## 2017-01-01 DIAGNOSIS — K65.2 SBP (SPONTANEOUS BACTERIAL PERITONITIS) (HCC): Primary | ICD-10-CM

## 2017-01-01 DIAGNOSIS — D69.6 THROMBOCYTOPENIA (HCC): Chronic | ICD-10-CM

## 2017-01-01 DIAGNOSIS — K72.10 END STAGE LIVER DISEASE (HCC): ICD-10-CM

## 2017-01-01 DIAGNOSIS — Z94.4 HISTORY OF LIVER TRANSPLANT (HCC): ICD-10-CM

## 2017-01-01 DIAGNOSIS — K74.5 BILIARY CIRRHOSIS (HCC): ICD-10-CM

## 2017-01-01 DIAGNOSIS — K76.82 HEPATIC ENCEPHALOPATHY: ICD-10-CM

## 2017-01-01 DIAGNOSIS — Z99.2 ESRD ON DIALYSIS (HCC): ICD-10-CM

## 2017-01-01 DIAGNOSIS — G93.40 ENCEPHALOPATHY: ICD-10-CM

## 2017-01-01 DIAGNOSIS — R10.13 ABDOMINAL PAIN, EPIGASTRIC: ICD-10-CM

## 2017-01-01 DIAGNOSIS — D64.9 ANEMIA, UNSPECIFIED TYPE: ICD-10-CM

## 2017-01-01 DIAGNOSIS — R11.2 INTRACTABLE VOMITING WITH NAUSEA, UNSPECIFIED VOMITING TYPE: Primary | ICD-10-CM

## 2017-01-01 DIAGNOSIS — Z99.2 DEPENDENCE ON RENAL DIALYSIS (HCC): ICD-10-CM

## 2017-01-01 DIAGNOSIS — R18.8 OTHER ASCITES: ICD-10-CM

## 2017-01-01 DIAGNOSIS — K42.9 UMBILICAL HERNIA WITHOUT OBSTRUCTION AND WITHOUT GANGRENE: ICD-10-CM

## 2017-01-01 DIAGNOSIS — N18.6 ESRD ON DIALYSIS (HCC): ICD-10-CM

## 2017-01-01 DIAGNOSIS — Z12.31 VISIT FOR SCREENING MAMMOGRAM: ICD-10-CM

## 2017-01-01 DIAGNOSIS — Z94.4 LIVER TRANSPLANT RECIPIENT (HCC): Chronic | ICD-10-CM

## 2017-01-01 DIAGNOSIS — R63.6 UNDERWEIGHT: ICD-10-CM

## 2017-01-01 DIAGNOSIS — Z99.2 CKD (CHRONIC KIDNEY DISEASE) STAGE V REQUIRING CHRONIC DIALYSIS (HCC): Chronic | ICD-10-CM

## 2017-01-01 DIAGNOSIS — E16.2 HYPOGLYCEMIA: ICD-10-CM

## 2017-01-01 DIAGNOSIS — N18.5 CHRONIC RENAL FAILURE, STAGE 5 (HCC): ICD-10-CM

## 2017-01-01 DIAGNOSIS — E88.09 HYPOALBUMINEMIA: ICD-10-CM

## 2017-01-01 DIAGNOSIS — K72.90 LIVER FAILURE WITHOUT HEPATIC COMA, UNSPECIFIED CHRONICITY (HCC): ICD-10-CM

## 2017-01-01 DIAGNOSIS — Z22.39 VRE (VANCOMYCIN RESISTANT ENTEROCOCCUS) CULTURE POSITIVE: ICD-10-CM

## 2017-01-01 DIAGNOSIS — G90.9 AUTONOMIC DYSFUNCTION: ICD-10-CM

## 2017-01-01 DIAGNOSIS — R91.8 PULMONARY INFILTRATE: ICD-10-CM

## 2017-01-01 DIAGNOSIS — R11.2 NAUSEA AND VOMITING, INTRACTABILITY OF VOMITING NOT SPECIFIED, UNSPECIFIED VOMITING TYPE: Primary | ICD-10-CM

## 2017-01-01 DIAGNOSIS — K74.60 CIRRHOSIS OF LIVER WITH ASCITES, UNSPECIFIED HEPATIC CIRRHOSIS TYPE (HCC): Chronic | ICD-10-CM

## 2017-01-01 DIAGNOSIS — D68.9 COAGULOPATHY (HCC): Chronic | ICD-10-CM

## 2017-01-01 DIAGNOSIS — D61.818 PANCYTOPENIA (HCC): Chronic | ICD-10-CM

## 2017-01-01 DIAGNOSIS — R10.84 ABDOMINAL PAIN, GENERALIZED: Primary | ICD-10-CM

## 2017-01-01 DIAGNOSIS — J96.02 ACUTE RESPIRATORY FAILURE WITH HYPERCAPNIA (HCC): ICD-10-CM

## 2017-01-01 DIAGNOSIS — K22.10 ULCER OF ESOPHAGUS WITHOUT BLEEDING: ICD-10-CM

## 2017-01-01 DIAGNOSIS — D49.2 NEOPLASM OF UNSPECIFIED BEHAVIOR OF BONE, SOFT TISSUE, AND SKIN: ICD-10-CM

## 2017-01-01 LAB
ABO + RH BLD: NORMAL
AFP-TM SERPL-MCNC: 1.2 NG/ML
AFP-TM SERPL-MCNC: 1.3 NG/ML
AFP-TM SERPL-MCNC: 1.5 NG/ML
AFP-TM SERPL-MCNC: 1.5 NG/ML
AFP-TM SERPL-MCNC: 1.6 NG/ML
AFP-TM SERPL-MCNC: 1.6 NG/ML
ALBUMIN FLD-MCNC: 0.3 G/DL
ALBUMIN FLD-MCNC: 0.4 G/DL
ALBUMIN FLD-MCNC: 0.5 G/DL
ALBUMIN FLD-MCNC: 0.6 G/DL
ALBUMIN FLD-MCNC: 0.6 G/DL
ALBUMIN FLD-MCNC: 0.7 G/DL
ALBUMIN FLD-MCNC: <0.3 G/DL
ALBUMIN SERPL BCP-MCNC: 2 G/DL (ref 3.5–5)
ALBUMIN SERPL BCP-MCNC: 2.1 G/DL (ref 3.5–5)
ALBUMIN SERPL BCP-MCNC: 2.1 G/DL (ref 3.5–5)
ALBUMIN SERPL BCP-MCNC: 2.2 G/DL (ref 3.5–5)
ALBUMIN SERPL BCP-MCNC: 2.3 G/DL (ref 3.5–5)
ALBUMIN SERPL BCP-MCNC: 2.5 G/DL (ref 3.5–5)
ALBUMIN SERPL BCP-MCNC: 2.8 G/DL (ref 3.5–5)
ALBUMIN SERPL BCP-MCNC: 2.8 G/DL (ref 3.5–5)
ALBUMIN SERPL BCP-MCNC: 3.1 G/DL (ref 3.5–5)
ALBUMIN SERPL BCP-MCNC: 3.2 G/DL (ref 3.5–5)
ALBUMIN SERPL BCP-MCNC: 3.4 G/DL (ref 3.5–5)
ALBUMIN SERPL BCP-MCNC: 3.8 G/DL (ref 3.5–5)
ALBUMIN SERPL BCP-MCNC: 3.8 G/DL (ref 3.5–5)
ALBUMIN SERPL-MCNC: 1.6 G/DL (ref 3.5–5)
ALBUMIN SERPL-MCNC: 1.7 G/DL (ref 3.5–5)
ALBUMIN SERPL-MCNC: 2 G/DL (ref 3.5–5)
ALBUMIN SERPL-MCNC: 2.1 G/DL (ref 3.5–5)
ALBUMIN SERPL-MCNC: 2.3 G/DL (ref 3.5–5)
ALBUMIN SERPL-MCNC: 2.3 G/DL (ref 3.5–5)
ALBUMIN SERPL-MCNC: 2.4 G/DL (ref 3.5–5)
ALBUMIN SERPL-MCNC: 2.7 G/DL (ref 3.5–5)
ALBUMIN/GLOB SERPL: 0.4 {RATIO} (ref 1.2–3.5)
ALBUMIN/GLOB SERPL: 0.4 {RATIO} (ref 1.2–3.5)
ALBUMIN/GLOB SERPL: 0.5 {RATIO} (ref 1.2–3.5)
ALBUMIN/GLOB SERPL: 0.6 {RATIO} (ref 1.2–3.5)
ALBUMIN/GLOB SERPL: 0.7 {RATIO} (ref 1.2–3.5)
ALBUMIN/GLOB SERPL: 0.8 {RATIO} (ref 1.2–3.5)
ALBUMIN/GLOB SERPL: 0.9 {RATIO} (ref 1.2–3.5)
ALBUMIN/GLOB SERPL: 1 {RATIO} (ref 1.2–3.5)
ALBUMIN/GLOB SERPL: 1 {RATIO} (ref 1.2–3.5)
ALBUMIN/GLOB SERPL: 1.2 {RATIO} (ref 1.2–3.5)
ALBUMIN/GLOB SERPL: 1.2 {RATIO} (ref 1.2–3.5)
ALP SERPL-CCNC: 149 U/L (ref 50–136)
ALP SERPL-CCNC: 178 U/L (ref 50–136)
ALP SERPL-CCNC: 212 U/L (ref 50–136)
ALP SERPL-CCNC: 231 U/L (ref 50–136)
ALP SERPL-CCNC: 234 U/L (ref 50–136)
ALP SERPL-CCNC: 236 U/L (ref 50–136)
ALP SERPL-CCNC: 237 U/L (ref 50–136)
ALP SERPL-CCNC: 274 U/L (ref 50–136)
ALP SERPL-CCNC: 286 U/L (ref 50–136)
ALP SERPL-CCNC: 291 U/L (ref 50–136)
ALP SERPL-CCNC: 292 U/L (ref 50–136)
ALP SERPL-CCNC: 296 U/L (ref 50–136)
ALP SERPL-CCNC: 300 U/L (ref 50–136)
ALP SERPL-CCNC: 309 U/L (ref 50–136)
ALP SERPL-CCNC: 315 U/L (ref 50–136)
ALP SERPL-CCNC: 325 U/L (ref 50–136)
ALP SERPL-CCNC: 326 U/L (ref 50–136)
ALP SERPL-CCNC: 334 U/L (ref 50–136)
ALP SERPL-CCNC: 336 U/L (ref 50–136)
ALP SERPL-CCNC: 342 U/L (ref 50–136)
ALP SERPL-CCNC: 342 U/L (ref 50–136)
ALP SERPL-CCNC: 358 U/L (ref 50–136)
ALP SERPL-CCNC: 360 U/L (ref 50–136)
ALP SERPL-CCNC: 361 U/L (ref 50–136)
ALP SERPL-CCNC: 368 U/L (ref 50–136)
ALP SERPL-CCNC: 382 U/L (ref 50–136)
ALP SERPL-CCNC: 391 U/L (ref 50–136)
ALP SERPL-CCNC: 406 U/L (ref 50–136)
ALP SERPL-CCNC: 441 U/L (ref 50–136)
ALP SERPL-CCNC: 625 U/L (ref 50–136)
ALT SERPL-CCNC: 14 U/L (ref 12–65)
ALT SERPL-CCNC: 15 U/L (ref 12–65)
ALT SERPL-CCNC: 18 U/L (ref 12–65)
ALT SERPL-CCNC: 20 U/L (ref 12–65)
ALT SERPL-CCNC: 21 U/L (ref 12–65)
ALT SERPL-CCNC: 23 U/L (ref 12–65)
ALT SERPL-CCNC: 24 U/L (ref 12–65)
ALT SERPL-CCNC: 24 U/L (ref 12–65)
ALT SERPL-CCNC: 26 U/L (ref 12–65)
ALT SERPL-CCNC: 27 U/L (ref 12–65)
ALT SERPL-CCNC: 27 U/L (ref 12–65)
ALT SERPL-CCNC: 28 U/L (ref 12–65)
ALT SERPL-CCNC: 29 U/L (ref 12–65)
ALT SERPL-CCNC: 30 U/L (ref 12–65)
ALT SERPL-CCNC: 31 U/L (ref 12–65)
ALT SERPL-CCNC: 31 U/L (ref 12–65)
ALT SERPL-CCNC: 32 U/L (ref 12–65)
ALT SERPL-CCNC: 32 U/L (ref 12–65)
ALT SERPL-CCNC: 33 U/L (ref 12–65)
ALT SERPL-CCNC: 35 U/L (ref 12–65)
ALT SERPL-CCNC: 37 U/L (ref 12–65)
ALT SERPL-CCNC: 37 U/L (ref 12–65)
ALT SERPL-CCNC: 44 U/L (ref 12–65)
AMMONIA PLAS-SCNC: 10 UMOL/L (ref 11–32)
AMMONIA PLAS-SCNC: 101 UMOL/L (ref 11–32)
AMMONIA PLAS-SCNC: 105 UMOL/L (ref 11–32)
AMMONIA PLAS-SCNC: 12 UMOL/L (ref 11–32)
AMMONIA PLAS-SCNC: 121 UMOL/L (ref 11–32)
AMMONIA PLAS-SCNC: 13 UMOL/L (ref 11–32)
AMMONIA PLAS-SCNC: 130 UMOL/L (ref 11–32)
AMMONIA PLAS-SCNC: 162 UMOL/L (ref 11–32)
AMMONIA PLAS-SCNC: 21 UMOL/L (ref 11–32)
AMMONIA PLAS-SCNC: 257 UMOL/L (ref 11–32)
AMMONIA PLAS-SCNC: 30 UMOL/L (ref 11–32)
AMMONIA PLAS-SCNC: 34 UMOL/L (ref 11–32)
AMMONIA PLAS-SCNC: 38 UMOL/L (ref 11–32)
AMMONIA PLAS-SCNC: 39 UMOL/L (ref 11–32)
AMMONIA PLAS-SCNC: 40 UMOL/L (ref 11–32)
AMMONIA PLAS-SCNC: 46 UMOL/L (ref 11–32)
AMMONIA PLAS-SCNC: 50 UMOL/L (ref 11–32)
AMMONIA PLAS-SCNC: 51 UMOL/L (ref 11–32)
AMMONIA PLAS-SCNC: 53 UMOL/L (ref 11–32)
AMMONIA PLAS-SCNC: 54 UMOL/L (ref 11–32)
AMMONIA PLAS-SCNC: 54 UMOL/L (ref 11–32)
AMMONIA PLAS-SCNC: 55 UMOL/L (ref 11–32)
AMMONIA PLAS-SCNC: 69 UMOL/L (ref 11–32)
AMYLASE FLD-CCNC: 10 U/L
AMYLASE FLD-CCNC: 10 U/L
AMYLASE FLD-CCNC: 12 U/L
AMYLASE FLD-CCNC: 14 U/L
AMYLASE FLD-CCNC: 15 U/L
AMYLASE FLD-CCNC: 16 U/L
AMYLASE FLD-CCNC: 17 U/L
AMYLASE FLD-CCNC: 19 U/L
AMYLASE FLD-CCNC: 20 U/L
AMYLASE FLD-CCNC: 20 U/L
AMYLASE FLD-CCNC: 21 U/L
AMYLASE FLD-CCNC: 21 U/L
AMYLASE FLD-CCNC: 23 U/L
AMYLASE FLD-CCNC: 7 U/L
AMYLASE FLD-CCNC: 8 U/L
AMYLASE SERPL-CCNC: 47 U/L (ref 25–115)
ANION GAP BLD CALC-SCNC: 10 MMOL/L (ref 7–16)
ANION GAP BLD CALC-SCNC: 11 MMOL/L (ref 7–16)
ANION GAP BLD CALC-SCNC: 12 MMOL/L (ref 7–16)
ANION GAP BLD CALC-SCNC: 13 MMOL/L (ref 7–16)
ANION GAP BLD CALC-SCNC: 14 MMOL/L (ref 7–16)
ANION GAP BLD CALC-SCNC: 14 MMOL/L (ref 7–16)
ANION GAP BLD CALC-SCNC: 16 MMOL/L (ref 7–16)
ANION GAP BLD CALC-SCNC: 16 MMOL/L (ref 7–16)
ANION GAP BLD CALC-SCNC: 6 MMOL/L (ref 7–16)
ANION GAP BLD CALC-SCNC: 6 MMOL/L (ref 7–16)
ANION GAP BLD CALC-SCNC: 7 MMOL/L (ref 7–16)
ANION GAP BLD CALC-SCNC: 8 MMOL/L (ref 7–16)
ANION GAP BLD CALC-SCNC: 9 MMOL/L (ref 7–16)
ANION GAP SERPL CALC-SCNC: 10 MMOL/L (ref 7–16)
ANION GAP SERPL CALC-SCNC: 10 MMOL/L (ref 7–16)
ANION GAP SERPL CALC-SCNC: 12 MMOL/L (ref 7–16)
ANION GAP SERPL CALC-SCNC: 15 MMOL/L (ref 7–16)
ANION GAP SERPL CALC-SCNC: 16 MMOL/L (ref 7–16)
ANION GAP SERPL CALC-SCNC: 17 MMOL/L (ref 7–16)
ANION GAP SERPL CALC-SCNC: 17 MMOL/L (ref 7–16)
ANION GAP SERPL CALC-SCNC: 18 MMOL/L (ref 7–16)
ANION GAP SERPL CALC-SCNC: 19 MMOL/L (ref 7–16)
ANION GAP SERPL CALC-SCNC: 19 MMOL/L (ref 7–16)
ANION GAP SERPL CALC-SCNC: 21 MMOL/L (ref 7–16)
ANION GAP SERPL CALC-SCNC: 21 MMOL/L (ref 7–16)
ANION GAP SERPL CALC-SCNC: 22 MMOL/L (ref 7–16)
ANION GAP SERPL CALC-SCNC: 8 MMOL/L (ref 7–16)
ANION GAP SERPL CALC-SCNC: 8 MMOL/L (ref 7–16)
ANION GAP SERPL CALC-SCNC: 9 MMOL/L (ref 7–16)
ANION GAP SERPL CALC-SCNC: 9 MMOL/L (ref 7–16)
APPEARANCE FLD: CLEAR
APPEARANCE FLD: NORMAL
APPEARANCE FLD: YELLOW
APPEARANCE UR: ABNORMAL
APPEARANCE UR: ABNORMAL
APPEARANCE UR: CLEAR
APTT PPP: 28.3 SEC (ref 23.5–31.7)
APTT PPP: 30.4 SEC (ref 23.5–31.7)
APTT PPP: 37.5 SEC (ref 23.5–31.7)
APTT PPP: 42.9 SEC (ref 23.5–31.7)
ARTERIAL PATENCY WRIST A: ABNORMAL
AST SERPL W P-5'-P-CCNC: 12 U/L (ref 15–37)
AST SERPL W P-5'-P-CCNC: 13 U/L (ref 15–37)
AST SERPL W P-5'-P-CCNC: 16 U/L (ref 15–37)
AST SERPL W P-5'-P-CCNC: 17 U/L (ref 15–37)
AST SERPL W P-5'-P-CCNC: 22 U/L (ref 15–37)
AST SERPL W P-5'-P-CCNC: 23 U/L (ref 15–37)
AST SERPL W P-5'-P-CCNC: 24 U/L (ref 15–37)
AST SERPL W P-5'-P-CCNC: 24 U/L (ref 15–37)
AST SERPL W P-5'-P-CCNC: 26 U/L (ref 15–37)
AST SERPL W P-5'-P-CCNC: 26 U/L (ref 15–37)
AST SERPL W P-5'-P-CCNC: 27 U/L (ref 15–37)
AST SERPL W P-5'-P-CCNC: 28 U/L (ref 15–37)
AST SERPL W P-5'-P-CCNC: 28 U/L (ref 15–37)
AST SERPL W P-5'-P-CCNC: 29 U/L (ref 15–37)
AST SERPL W P-5'-P-CCNC: 35 U/L (ref 15–37)
AST SERPL W P-5'-P-CCNC: 37 U/L (ref 15–37)
AST SERPL W P-5'-P-CCNC: 41 U/L (ref 15–37)
AST SERPL W P-5'-P-CCNC: 44 U/L (ref 15–37)
AST SERPL-CCNC: 15 U/L (ref 15–37)
AST SERPL-CCNC: 16 U/L (ref 15–37)
AST SERPL-CCNC: 18 U/L (ref 15–37)
AST SERPL-CCNC: 20 U/L (ref 15–37)
AST SERPL-CCNC: 21 U/L (ref 15–37)
AST SERPL-CCNC: 22 U/L (ref 15–37)
AST SERPL-CCNC: 24 U/L (ref 15–37)
AST SERPL-CCNC: 25 U/L (ref 15–37)
AST SERPL-CCNC: 25 U/L (ref 15–37)
AST SERPL-CCNC: 31 U/L (ref 15–37)
AST SERPL-CCNC: 37 U/L (ref 15–37)
AST SERPL-CCNC: 41 U/L (ref 15–37)
ATRIAL RATE: 85 BPM
ATRIAL RATE: 87 BPM
ATRIAL RATE: 94 BPM
BACTERIA SPEC CULT: ABNORMAL
BACTERIA SPEC CULT: NORMAL
BACTERIA URNS QL MICRO: 0 /HPF
BASE DEFICIT BLDA-SCNC: 10.5 MMOL/L (ref 0–2)
BASE DEFICIT BLDA-SCNC: 3.3 MMOL/L (ref 0–2)
BASE DEFICIT BLDA-SCNC: 4.7 MMOL/L (ref 0–2)
BASE DEFICIT BLDA-SCNC: 6.4 MMOL/L (ref 0–2)
BASOPHILS # BLD AUTO: 0 K/UL (ref 0–0.2)
BASOPHILS # BLD: 0 % (ref 0–2)
BASOPHILS # BLD: 0 K/UL (ref 0–0.2)
BASOPHILS # BLD: 1 % (ref 0–2)
BASOPHILS NFR BLD: 0 % (ref 0–2)
BASOPHILS NFR BLD: 1 % (ref 0–2)
BDY SITE: ABNORMAL
BILIRUB SERPL-MCNC: 0.5 MG/DL (ref 0.2–1.1)
BILIRUB SERPL-MCNC: 0.6 MG/DL (ref 0.2–1.1)
BILIRUB SERPL-MCNC: 0.6 MG/DL (ref 0.2–1.1)
BILIRUB SERPL-MCNC: 0.7 MG/DL (ref 0.2–1.1)
BILIRUB SERPL-MCNC: 0.7 MG/DL (ref 0.2–1.1)
BILIRUB SERPL-MCNC: 0.8 MG/DL (ref 0.2–1.1)
BILIRUB SERPL-MCNC: 0.9 MG/DL (ref 0.2–1.1)
BILIRUB SERPL-MCNC: 1 MG/DL (ref 0.2–1.1)
BILIRUB SERPL-MCNC: 1.1 MG/DL (ref 0.2–1.1)
BILIRUB SERPL-MCNC: 1.1 MG/DL (ref 0.2–1.1)
BILIRUB SERPL-MCNC: 1.4 MG/DL (ref 0.2–1.1)
BILIRUB SERPL-MCNC: 1.4 MG/DL (ref 0.2–1.1)
BILIRUB SERPL-MCNC: 1.5 MG/DL (ref 0.2–1.1)
BILIRUB SERPL-MCNC: 2.4 MG/DL (ref 0.2–1.1)
BILIRUB UR QL: ABNORMAL
BILIRUB UR QL: NEGATIVE
BILIRUB UR QL: NEGATIVE
BLD PROD TYP BPU: NORMAL
BLOOD GROUP ANTIBODIES SERPL: NORMAL
BNP SERPL-MCNC: 500 PG/ML
BPU ID: NORMAL
BUN SERPL-MCNC: 11 MG/DL (ref 6–23)
BUN SERPL-MCNC: 14 MG/DL (ref 6–23)
BUN SERPL-MCNC: 14 MG/DL (ref 6–23)
BUN SERPL-MCNC: 15 MG/DL (ref 6–23)
BUN SERPL-MCNC: 15 MG/DL (ref 6–23)
BUN SERPL-MCNC: 16 MG/DL (ref 6–23)
BUN SERPL-MCNC: 16 MG/DL (ref 6–23)
BUN SERPL-MCNC: 17 MG/DL (ref 6–23)
BUN SERPL-MCNC: 18 MG/DL (ref 6–23)
BUN SERPL-MCNC: 18 MG/DL (ref 6–23)
BUN SERPL-MCNC: 20 MG/DL (ref 6–23)
BUN SERPL-MCNC: 20 MG/DL (ref 6–23)
BUN SERPL-MCNC: 21 MG/DL (ref 6–23)
BUN SERPL-MCNC: 24 MG/DL (ref 6–23)
BUN SERPL-MCNC: 25 MG/DL (ref 6–23)
BUN SERPL-MCNC: 26 MG/DL (ref 6–23)
BUN SERPL-MCNC: 27 MG/DL (ref 6–23)
BUN SERPL-MCNC: 28 MG/DL (ref 6–23)
BUN SERPL-MCNC: 29 MG/DL (ref 6–23)
BUN SERPL-MCNC: 29 MG/DL (ref 6–23)
BUN SERPL-MCNC: 31 MG/DL (ref 6–23)
BUN SERPL-MCNC: 32 MG/DL (ref 6–23)
BUN SERPL-MCNC: 33 MG/DL (ref 6–23)
BUN SERPL-MCNC: 34 MG/DL (ref 6–23)
BUN SERPL-MCNC: 35 MG/DL (ref 6–23)
BUN SERPL-MCNC: 36 MG/DL (ref 6–23)
BUN SERPL-MCNC: 37 MG/DL (ref 6–23)
BUN SERPL-MCNC: 39 MG/DL (ref 6–23)
BUN SERPL-MCNC: 40 MG/DL (ref 6–23)
BUN SERPL-MCNC: 43 MG/DL (ref 6–23)
BUN SERPL-MCNC: 45 MG/DL (ref 6–23)
BUN SERPL-MCNC: 46 MG/DL (ref 6–23)
BUN SERPL-MCNC: 52 MG/DL (ref 6–23)
BUN SERPL-MCNC: 54 MG/DL (ref 6–23)
BUN SERPL-MCNC: 55 MG/DL (ref 6–23)
BUN SERPL-MCNC: 68 MG/DL (ref 6–23)
BUN SERPL-MCNC: 71 MG/DL (ref 6–23)
BUN SERPL-MCNC: 71 MG/DL (ref 6–23)
BUN SERPL-MCNC: 73 MG/DL (ref 6–23)
BUN SERPL-MCNC: 73 MG/DL (ref 6–23)
BUN SERPL-MCNC: 74 MG/DL (ref 6–23)
BUN SERPL-MCNC: 75 MG/DL (ref 6–23)
BUN SERPL-MCNC: 81 MG/DL (ref 6–23)
BUN SERPL-MCNC: 81 MG/DL (ref 6–23)
BUN SERPL-MCNC: 9 MG/DL (ref 6–23)
CALCIUM SERPL-MCNC: 6.7 MG/DL (ref 8.3–10.4)
CALCIUM SERPL-MCNC: 6.7 MG/DL (ref 8.3–10.4)
CALCIUM SERPL-MCNC: 6.8 MG/DL (ref 8.3–10.4)
CALCIUM SERPL-MCNC: 6.9 MG/DL (ref 8.3–10.4)
CALCIUM SERPL-MCNC: 7 MG/DL (ref 8.3–10.4)
CALCIUM SERPL-MCNC: 7 MG/DL (ref 8.3–10.4)
CALCIUM SERPL-MCNC: 7.1 MG/DL (ref 8.3–10.4)
CALCIUM SERPL-MCNC: 7.1 MG/DL (ref 8.3–10.4)
CALCIUM SERPL-MCNC: 7.2 MG/DL (ref 8.3–10.4)
CALCIUM SERPL-MCNC: 7.3 MG/DL (ref 8.3–10.4)
CALCIUM SERPL-MCNC: 7.4 MG/DL (ref 8.3–10.4)
CALCIUM SERPL-MCNC: 7.4 MG/DL (ref 8.3–10.4)
CALCIUM SERPL-MCNC: 7.5 MG/DL (ref 8.3–10.4)
CALCIUM SERPL-MCNC: 7.6 MG/DL (ref 8.3–10.4)
CALCIUM SERPL-MCNC: 7.7 MG/DL (ref 8.3–10.4)
CALCIUM SERPL-MCNC: 7.8 MG/DL (ref 8.3–10.4)
CALCIUM SERPL-MCNC: 7.9 MG/DL (ref 8.3–10.4)
CALCIUM SERPL-MCNC: 7.9 MG/DL (ref 8.3–10.4)
CALCIUM SERPL-MCNC: 8.1 MG/DL (ref 8.3–10.4)
CALCIUM SERPL-MCNC: 8.2 MG/DL (ref 8.3–10.4)
CALCIUM SERPL-MCNC: 8.4 MG/DL (ref 8.3–10.4)
CALCIUM SERPL-MCNC: 8.5 MG/DL (ref 8.3–10.4)
CALCIUM SERPL-MCNC: 8.5 MG/DL (ref 8.3–10.4)
CALCIUM SERPL-MCNC: 8.6 MG/DL (ref 8.3–10.4)
CALCULATED P AXIS, ECG09: 20 DEGREES
CALCULATED P AXIS, ECG09: 80 DEGREES
CALCULATED P AXIS, ECG09: 83 DEGREES
CALCULATED R AXIS, ECG10: 13 DEGREES
CALCULATED R AXIS, ECG10: 37 DEGREES
CALCULATED R AXIS, ECG10: 47 DEGREES
CALCULATED T AXIS, ECG11: 47 DEGREES
CALCULATED T AXIS, ECG11: 68 DEGREES
CALCULATED T AXIS, ECG11: 72 DEGREES
CASTS URNS QL MICRO: ABNORMAL /LPF
CASTS URNS QL MICRO: NORMAL /LPF
CHLORIDE SERPL-SCNC: 100 MMOL/L (ref 98–107)
CHLORIDE SERPL-SCNC: 101 MMOL/L (ref 98–107)
CHLORIDE SERPL-SCNC: 101 MMOL/L (ref 98–107)
CHLORIDE SERPL-SCNC: 102 MMOL/L (ref 98–107)
CHLORIDE SERPL-SCNC: 103 MMOL/L (ref 98–107)
CHLORIDE SERPL-SCNC: 104 MMOL/L (ref 98–107)
CHLORIDE SERPL-SCNC: 105 MMOL/L (ref 98–107)
CHLORIDE SERPL-SCNC: 105 MMOL/L (ref 98–107)
CHLORIDE SERPL-SCNC: 106 MMOL/L (ref 98–107)
CHLORIDE SERPL-SCNC: 107 MMOL/L (ref 98–107)
CHLORIDE SERPL-SCNC: 107 MMOL/L (ref 98–107)
CHLORIDE SERPL-SCNC: 108 MMOL/L (ref 98–107)
CHLORIDE SERPL-SCNC: 109 MMOL/L (ref 98–107)
CHLORIDE SERPL-SCNC: 110 MMOL/L (ref 98–107)
CHLORIDE SERPL-SCNC: 113 MMOL/L (ref 98–107)
CHLORIDE SERPL-SCNC: 97 MMOL/L (ref 98–107)
CHLORIDE SERPL-SCNC: 99 MMOL/L (ref 98–107)
CHOLEST SERPL-MCNC: 126 MG/DL
CHOLEST SERPL-MCNC: 132 MG/DL
CK MB CFR SERPL CALC: 2.1 %
CK MB CFR SERPL CALC: 2.4 %
CK MB CFR SERPL CALC: ABNORMAL %
CK MB SERPL-MCNC: 0.6 NG/ML (ref 0.5–3.6)
CK MB SERPL-MCNC: 0.6 NG/ML (ref 0.5–3.6)
CK MB SERPL-MCNC: <0.5 NG/ML (ref 0.5–3.6)
CK SERPL-CCNC: 25 U/L (ref 21–215)
CK SERPL-CCNC: 28 U/L (ref 21–215)
CK SERPL-CCNC: 77 U/L (ref 21–215)
CMV DNA SERPL NAA+PROBE-ACNC: NEGATIVE IU/ML
CMV DNA SERPL NAA+PROBE-LOG IU: NORMAL LOG10 IU/ML
CMV IGG SERPL IA-ACNC: >10 U/ML (ref 0–0.59)
CO2 SERPL-SCNC: 12 MMOL/L (ref 21–32)
CO2 SERPL-SCNC: 13 MMOL/L (ref 21–32)
CO2 SERPL-SCNC: 15 MMOL/L (ref 21–32)
CO2 SERPL-SCNC: 16 MMOL/L (ref 21–32)
CO2 SERPL-SCNC: 17 MMOL/L (ref 21–32)
CO2 SERPL-SCNC: 17 MMOL/L (ref 21–32)
CO2 SERPL-SCNC: 18 MMOL/L (ref 21–32)
CO2 SERPL-SCNC: 19 MMOL/L (ref 21–32)
CO2 SERPL-SCNC: 20 MMOL/L (ref 21–32)
CO2 SERPL-SCNC: 21 MMOL/L (ref 21–32)
CO2 SERPL-SCNC: 23 MMOL/L (ref 21–32)
CO2 SERPL-SCNC: 23 MMOL/L (ref 21–32)
CO2 SERPL-SCNC: 24 MMOL/L (ref 21–32)
CO2 SERPL-SCNC: 25 MMOL/L (ref 21–32)
CO2 SERPL-SCNC: 25 MMOL/L (ref 21–32)
CO2 SERPL-SCNC: 26 MMOL/L (ref 21–32)
CO2 SERPL-SCNC: 27 MMOL/L (ref 21–32)
CO2 SERPL-SCNC: 28 MMOL/L (ref 21–32)
CO2 SERPL-SCNC: 29 MMOL/L (ref 21–32)
CO2 SERPL-SCNC: 30 MMOL/L (ref 21–32)
CO2 SERPL-SCNC: 31 MMOL/L (ref 21–32)
CO2 SERPL-SCNC: 32 MMOL/L (ref 21–32)
CO2 SERPL-SCNC: 32 MMOL/L (ref 21–32)
CO2 SERPL-SCNC: 33 MMOL/L (ref 21–32)
COHGB MFR BLD: 0.1 % (ref 0.5–1.5)
COHGB MFR BLD: 0.3 % (ref 0.5–1.5)
COLOR FLD: NORMAL
COLOR FLD: YELLOW
COLOR UR: ABNORMAL
COLOR UR: YELLOW
COLOR UR: YELLOW
CREAT SERPL-MCNC: 1.93 MG/DL (ref 0.6–1)
CREAT SERPL-MCNC: 2.38 MG/DL (ref 0.6–1)
CREAT SERPL-MCNC: 2.41 MG/DL (ref 0.6–1)
CREAT SERPL-MCNC: 2.51 MG/DL (ref 0.6–1)
CREAT SERPL-MCNC: 2.55 MG/DL (ref 0.6–1)
CREAT SERPL-MCNC: 2.63 MG/DL (ref 0.6–1)
CREAT SERPL-MCNC: 2.76 MG/DL (ref 0.6–1)
CREAT SERPL-MCNC: 2.89 MG/DL (ref 0.6–1)
CREAT SERPL-MCNC: 2.91 MG/DL (ref 0.6–1)
CREAT SERPL-MCNC: 3.15 MG/DL (ref 0.6–1)
CREAT SERPL-MCNC: 3.18 MG/DL (ref 0.6–1)
CREAT SERPL-MCNC: 3.23 MG/DL (ref 0.6–1)
CREAT SERPL-MCNC: 3.24 MG/DL (ref 0.6–1)
CREAT SERPL-MCNC: 3.27 MG/DL (ref 0.6–1)
CREAT SERPL-MCNC: 3.3 MG/DL (ref 0.6–1)
CREAT SERPL-MCNC: 3.42 MG/DL (ref 0.6–1)
CREAT SERPL-MCNC: 3.43 MG/DL (ref 0.6–1)
CREAT SERPL-MCNC: 3.46 MG/DL (ref 0.6–1)
CREAT SERPL-MCNC: 3.48 MG/DL (ref 0.6–1)
CREAT SERPL-MCNC: 3.5 MG/DL (ref 0.6–1)
CREAT SERPL-MCNC: 3.55 MG/DL (ref 0.6–1)
CREAT SERPL-MCNC: 3.76 MG/DL (ref 0.6–1)
CREAT SERPL-MCNC: 3.78 MG/DL (ref 0.6–1)
CREAT SERPL-MCNC: 3.88 MG/DL (ref 0.6–1)
CREAT SERPL-MCNC: 4 MG/DL (ref 0.6–1)
CREAT SERPL-MCNC: 4.04 MG/DL (ref 0.6–1)
CREAT SERPL-MCNC: 4.09 MG/DL (ref 0.6–1)
CREAT SERPL-MCNC: 4.16 MG/DL (ref 0.6–1)
CREAT SERPL-MCNC: 4.18 MG/DL (ref 0.6–1)
CREAT SERPL-MCNC: 4.19 MG/DL (ref 0.6–1)
CREAT SERPL-MCNC: 4.28 MG/DL (ref 0.6–1)
CREAT SERPL-MCNC: 4.29 MG/DL (ref 0.6–1)
CREAT SERPL-MCNC: 4.38 MG/DL (ref 0.6–1)
CREAT SERPL-MCNC: 4.48 MG/DL (ref 0.6–1)
CREAT SERPL-MCNC: 4.58 MG/DL (ref 0.6–1)
CREAT SERPL-MCNC: 5.25 MG/DL (ref 0.6–1)
CREAT SERPL-MCNC: 5.63 MG/DL (ref 0.6–1)
CREAT SERPL-MCNC: 5.8 MG/DL (ref 0.6–1)
CREAT SERPL-MCNC: 5.81 MG/DL (ref 0.6–1)
CREAT SERPL-MCNC: 5.91 MG/DL (ref 0.6–1)
CREAT SERPL-MCNC: 5.99 MG/DL (ref 0.6–1)
CREAT SERPL-MCNC: 6.04 MG/DL (ref 0.6–1)
CREAT SERPL-MCNC: 6.12 MG/DL (ref 0.6–1)
CREAT SERPL-MCNC: 6.37 MG/DL (ref 0.6–1)
CREAT SERPL-MCNC: 6.39 MG/DL (ref 0.6–1)
CREAT SERPL-MCNC: 6.54 MG/DL (ref 0.6–1)
CREAT SERPL-MCNC: 7.44 MG/DL (ref 0.6–1)
CROSSMATCH RESULT,%XM: NORMAL
CRYSTALS URNS QL MICRO: 0 /LPF
CYCLOSPORINE BLD-MCNC: 25 NG/ML
CYCLOSPORINE BLD-MCNC: <25 NG/ML
CYCLOSPORINE BLD-MCNC: <25 NG/ML
D DIMER PPP FEU-MCNC: 4.09 UG/ML(FEU)
D DIMER PPP FEU-MCNC: 7.05 UG/ML(FEU)
DIAGNOSIS, 93000: NORMAL
DIFFERENTIAL METHOD BLD: ABNORMAL
DO-HGB BLD-MCNC: 1 % (ref 0–5)
DO-HGB BLD-MCNC: 2 % (ref 0–5)
EOSINOPHIL # BLD: 0.1 K/UL (ref 0–0.8)
EOSINOPHIL # BLD: 0.2 K/UL (ref 0–0.8)
EOSINOPHIL # BLD: 0.3 K/UL (ref 0–0.8)
EOSINOPHIL # BLD: 0.4 K/UL (ref 0–0.8)
EOSINOPHIL # BLD: 0.5 K/UL (ref 0–0.8)
EOSINOPHIL # BLD: 0.6 K/UL (ref 0–0.8)
EOSINOPHIL # BLD: 0.7 K/UL (ref 0–0.8)
EOSINOPHIL NFR BLD: 1 % (ref 0.5–7.8)
EOSINOPHIL NFR BLD: 10 % (ref 0.5–7.8)
EOSINOPHIL NFR BLD: 12 % (ref 0.5–7.8)
EOSINOPHIL NFR BLD: 14 % (ref 0.5–7.8)
EOSINOPHIL NFR BLD: 15 % (ref 0.5–7.8)
EOSINOPHIL NFR BLD: 2 % (ref 0.5–7.8)
EOSINOPHIL NFR BLD: 3 % (ref 0.5–7.8)
EOSINOPHIL NFR BLD: 4 % (ref 0.5–7.8)
EOSINOPHIL NFR BLD: 4 % (ref 0.5–7.8)
EOSINOPHIL NFR BLD: 5 % (ref 0.5–7.8)
EOSINOPHIL NFR BLD: 5 % (ref 0.5–7.8)
EOSINOPHIL NFR BLD: 6 % (ref 0.5–7.8)
EOSINOPHIL NFR BLD: 6 % (ref 0.5–7.8)
EOSINOPHIL NFR BLD: 7 % (ref 0.5–7.8)
EOSINOPHIL NFR BLD: 7 % (ref 0.5–7.8)
EOSINOPHIL NFR BLD: 8 % (ref 0.5–7.8)
EOSINOPHIL NFR BLD: 9 % (ref 0.5–7.8)
EOSINOPHIL NFR BLD: 9 % (ref 0.5–7.8)
EPI CELLS #/AREA URNS HPF: ABNORMAL /HPF
EPI CELLS #/AREA URNS HPF: NORMAL /HPF
ERYTHROCYTE [DISTWIDTH] IN BLOOD BY AUTOMATED COUNT: 15.4 % (ref 11.9–14.6)
ERYTHROCYTE [DISTWIDTH] IN BLOOD BY AUTOMATED COUNT: 15.4 % (ref 11.9–14.6)
ERYTHROCYTE [DISTWIDTH] IN BLOOD BY AUTOMATED COUNT: 15.5 % (ref 11.9–14.6)
ERYTHROCYTE [DISTWIDTH] IN BLOOD BY AUTOMATED COUNT: 15.5 % (ref 11.9–14.6)
ERYTHROCYTE [DISTWIDTH] IN BLOOD BY AUTOMATED COUNT: 15.6 % (ref 11.9–14.6)
ERYTHROCYTE [DISTWIDTH] IN BLOOD BY AUTOMATED COUNT: 15.6 % (ref 11.9–14.6)
ERYTHROCYTE [DISTWIDTH] IN BLOOD BY AUTOMATED COUNT: 15.7 % (ref 11.9–14.6)
ERYTHROCYTE [DISTWIDTH] IN BLOOD BY AUTOMATED COUNT: 15.8 % (ref 11.9–14.6)
ERYTHROCYTE [DISTWIDTH] IN BLOOD BY AUTOMATED COUNT: 16 % (ref 11.9–14.6)
ERYTHROCYTE [DISTWIDTH] IN BLOOD BY AUTOMATED COUNT: 16.3 % (ref 11.9–14.6)
ERYTHROCYTE [DISTWIDTH] IN BLOOD BY AUTOMATED COUNT: 16.3 % (ref 11.9–14.6)
ERYTHROCYTE [DISTWIDTH] IN BLOOD BY AUTOMATED COUNT: 16.4 % (ref 11.9–14.6)
ERYTHROCYTE [DISTWIDTH] IN BLOOD BY AUTOMATED COUNT: 16.5 % (ref 11.9–14.6)
ERYTHROCYTE [DISTWIDTH] IN BLOOD BY AUTOMATED COUNT: 16.6 % (ref 11.9–14.6)
ERYTHROCYTE [DISTWIDTH] IN BLOOD BY AUTOMATED COUNT: 16.7 % (ref 11.9–14.6)
ERYTHROCYTE [DISTWIDTH] IN BLOOD BY AUTOMATED COUNT: 16.7 % (ref 11.9–14.6)
ERYTHROCYTE [DISTWIDTH] IN BLOOD BY AUTOMATED COUNT: 16.8 % (ref 11.9–14.6)
ERYTHROCYTE [DISTWIDTH] IN BLOOD BY AUTOMATED COUNT: 16.9 % (ref 11.9–14.6)
ERYTHROCYTE [DISTWIDTH] IN BLOOD BY AUTOMATED COUNT: 16.9 % (ref 11.9–14.6)
ERYTHROCYTE [DISTWIDTH] IN BLOOD BY AUTOMATED COUNT: 17.1 % (ref 11.9–14.6)
ERYTHROCYTE [DISTWIDTH] IN BLOOD BY AUTOMATED COUNT: 17.3 % (ref 11.9–14.6)
ERYTHROCYTE [DISTWIDTH] IN BLOOD BY AUTOMATED COUNT: 17.5 % (ref 11.9–14.6)
ERYTHROCYTE [DISTWIDTH] IN BLOOD BY AUTOMATED COUNT: 17.7 % (ref 11.9–14.6)
ERYTHROCYTE [DISTWIDTH] IN BLOOD BY AUTOMATED COUNT: 17.8 % (ref 11.9–14.6)
ERYTHROCYTE [DISTWIDTH] IN BLOOD BY AUTOMATED COUNT: 18.2 % (ref 11.9–14.6)
ERYTHROCYTE [DISTWIDTH] IN BLOOD BY AUTOMATED COUNT: 18.2 % (ref 11.9–14.6)
ERYTHROCYTE [DISTWIDTH] IN BLOOD BY AUTOMATED COUNT: 18.3 % (ref 11.9–14.6)
ERYTHROCYTE [DISTWIDTH] IN BLOOD BY AUTOMATED COUNT: 18.4 % (ref 11.9–14.6)
ERYTHROCYTE [DISTWIDTH] IN BLOOD BY AUTOMATED COUNT: 18.4 % (ref 11.9–14.6)
ERYTHROCYTE [DISTWIDTH] IN BLOOD BY AUTOMATED COUNT: 18.6 % (ref 11.9–14.6)
ERYTHROCYTE [DISTWIDTH] IN BLOOD BY AUTOMATED COUNT: 18.7 % (ref 11.9–14.6)
ERYTHROCYTE [DISTWIDTH] IN BLOOD BY AUTOMATED COUNT: 18.7 % (ref 11.9–14.6)
ERYTHROCYTE [DISTWIDTH] IN BLOOD BY AUTOMATED COUNT: 18.9 % (ref 11.9–14.6)
ERYTHROCYTE [DISTWIDTH] IN BLOOD BY AUTOMATED COUNT: 18.9 % (ref 11.9–14.6)
ERYTHROCYTE [DISTWIDTH] IN BLOOD BY AUTOMATED COUNT: 19 % (ref 11.9–14.6)
ERYTHROCYTE [DISTWIDTH] IN BLOOD BY AUTOMATED COUNT: 19.2 % (ref 11.9–14.6)
ERYTHROCYTE [DISTWIDTH] IN BLOOD BY AUTOMATED COUNT: 20.6 % (ref 11.9–14.6)
ERYTHROCYTE [DISTWIDTH] IN BLOOD BY AUTOMATED COUNT: 20.8 % (ref 11.9–14.6)
ERYTHROCYTE [DISTWIDTH] IN BLOOD BY AUTOMATED COUNT: 21.3 % (ref 11.9–14.6)
ERYTHROCYTE [DISTWIDTH] IN BLOOD BY AUTOMATED COUNT: 22 % (ref 11.9–14.6)
ERYTHROCYTE [DISTWIDTH] IN BLOOD BY AUTOMATED COUNT: 22.2 % (ref 11.9–14.6)
ERYTHROCYTE [DISTWIDTH] IN BLOOD BY AUTOMATED COUNT: 22.7 % (ref 11.9–14.6)
EST. AVERAGE GLUCOSE BLD GHB EST-MCNC: 126 MG/DL
EST. AVERAGE GLUCOSE BLD GHB EST-MCNC: ABNORMAL MG/DL
EST. AVERAGE GLUCOSE BLD GHB EST-MCNC: ABNORMAL MG/DL
FERRITIN SERPL-MCNC: 631 NG/ML (ref 8–388)
FIO2 ON VENT: 21 %
FLUID COMMENTS, FCOM: NORMAL
GGT SERPL-CCNC: 88 U/L (ref 5–55)
GGT SERPL-CCNC: 94 U/L (ref 5–55)
GLOBULIN SER CALC-MCNC: 3 G/DL (ref 2.3–3.5)
GLOBULIN SER CALC-MCNC: 3.1 G/DL (ref 2.3–3.5)
GLOBULIN SER CALC-MCNC: 3.1 G/DL (ref 2.3–3.5)
GLOBULIN SER CALC-MCNC: 3.2 G/DL (ref 2.3–3.5)
GLOBULIN SER CALC-MCNC: 3.3 G/DL (ref 2.3–3.5)
GLOBULIN SER CALC-MCNC: 3.3 G/DL (ref 2.3–3.5)
GLOBULIN SER CALC-MCNC: 3.4 G/DL (ref 2.3–3.5)
GLOBULIN SER CALC-MCNC: 3.4 G/DL (ref 2.3–3.5)
GLOBULIN SER CALC-MCNC: 3.5 G/DL (ref 2.3–3.5)
GLOBULIN SER CALC-MCNC: 3.6 G/DL (ref 2.3–3.5)
GLOBULIN SER CALC-MCNC: 3.7 G/DL (ref 2.3–3.5)
GLOBULIN SER CALC-MCNC: 3.8 G/DL (ref 2.3–3.5)
GLOBULIN SER CALC-MCNC: 3.8 G/DL (ref 2.3–3.5)
GLOBULIN SER CALC-MCNC: 3.9 G/DL (ref 2.3–3.5)
GLOBULIN SER CALC-MCNC: 4.1 G/DL (ref 2.3–3.5)
GLUCOSE BLD STRIP.AUTO-MCNC: 101 MG/DL (ref 65–100)
GLUCOSE BLD STRIP.AUTO-MCNC: 103 MG/DL (ref 65–100)
GLUCOSE BLD STRIP.AUTO-MCNC: 105 MG/DL (ref 65–100)
GLUCOSE BLD STRIP.AUTO-MCNC: 106 MG/DL (ref 65–100)
GLUCOSE BLD STRIP.AUTO-MCNC: 107 MG/DL (ref 65–100)
GLUCOSE BLD STRIP.AUTO-MCNC: 107 MG/DL (ref 65–100)
GLUCOSE BLD STRIP.AUTO-MCNC: 108 MG/DL (ref 65–100)
GLUCOSE BLD STRIP.AUTO-MCNC: 110 MG/DL (ref 65–100)
GLUCOSE BLD STRIP.AUTO-MCNC: 111 MG/DL (ref 65–100)
GLUCOSE BLD STRIP.AUTO-MCNC: 112 MG/DL (ref 65–100)
GLUCOSE BLD STRIP.AUTO-MCNC: 113 MG/DL (ref 65–100)
GLUCOSE BLD STRIP.AUTO-MCNC: 114 MG/DL (ref 65–100)
GLUCOSE BLD STRIP.AUTO-MCNC: 115 MG/DL (ref 65–100)
GLUCOSE BLD STRIP.AUTO-MCNC: 116 MG/DL (ref 65–100)
GLUCOSE BLD STRIP.AUTO-MCNC: 117 MG/DL (ref 65–100)
GLUCOSE BLD STRIP.AUTO-MCNC: 117 MG/DL (ref 65–100)
GLUCOSE BLD STRIP.AUTO-MCNC: 118 MG/DL (ref 65–100)
GLUCOSE BLD STRIP.AUTO-MCNC: 119 MG/DL (ref 65–100)
GLUCOSE BLD STRIP.AUTO-MCNC: 120 MG/DL (ref 65–100)
GLUCOSE BLD STRIP.AUTO-MCNC: 123 MG/DL (ref 65–100)
GLUCOSE BLD STRIP.AUTO-MCNC: 124 MG/DL (ref 65–100)
GLUCOSE BLD STRIP.AUTO-MCNC: 126 MG/DL (ref 65–100)
GLUCOSE BLD STRIP.AUTO-MCNC: 128 MG/DL (ref 65–100)
GLUCOSE BLD STRIP.AUTO-MCNC: 129 MG/DL (ref 65–100)
GLUCOSE BLD STRIP.AUTO-MCNC: 129 MG/DL (ref 65–100)
GLUCOSE BLD STRIP.AUTO-MCNC: 130 MG/DL (ref 65–100)
GLUCOSE BLD STRIP.AUTO-MCNC: 130 MG/DL (ref 65–100)
GLUCOSE BLD STRIP.AUTO-MCNC: 131 MG/DL (ref 65–100)
GLUCOSE BLD STRIP.AUTO-MCNC: 134 MG/DL (ref 65–100)
GLUCOSE BLD STRIP.AUTO-MCNC: 135 MG/DL (ref 65–100)
GLUCOSE BLD STRIP.AUTO-MCNC: 136 MG/DL (ref 65–100)
GLUCOSE BLD STRIP.AUTO-MCNC: 137 MG/DL (ref 65–100)
GLUCOSE BLD STRIP.AUTO-MCNC: 141 MG/DL (ref 65–100)
GLUCOSE BLD STRIP.AUTO-MCNC: 142 MG/DL (ref 65–100)
GLUCOSE BLD STRIP.AUTO-MCNC: 143 MG/DL (ref 65–100)
GLUCOSE BLD STRIP.AUTO-MCNC: 148 MG/DL (ref 65–100)
GLUCOSE BLD STRIP.AUTO-MCNC: 149 MG/DL (ref 65–100)
GLUCOSE BLD STRIP.AUTO-MCNC: 149 MG/DL (ref 65–100)
GLUCOSE BLD STRIP.AUTO-MCNC: 151 MG/DL (ref 65–100)
GLUCOSE BLD STRIP.AUTO-MCNC: 152 MG/DL (ref 65–100)
GLUCOSE BLD STRIP.AUTO-MCNC: 152 MG/DL (ref 65–100)
GLUCOSE BLD STRIP.AUTO-MCNC: 153 MG/DL (ref 65–100)
GLUCOSE BLD STRIP.AUTO-MCNC: 153 MG/DL (ref 65–100)
GLUCOSE BLD STRIP.AUTO-MCNC: 154 MG/DL (ref 65–100)
GLUCOSE BLD STRIP.AUTO-MCNC: 156 MG/DL (ref 65–100)
GLUCOSE BLD STRIP.AUTO-MCNC: 157 MG/DL (ref 65–100)
GLUCOSE BLD STRIP.AUTO-MCNC: 158 MG/DL (ref 65–100)
GLUCOSE BLD STRIP.AUTO-MCNC: 160 MG/DL (ref 65–100)
GLUCOSE BLD STRIP.AUTO-MCNC: 164 MG/DL (ref 65–100)
GLUCOSE BLD STRIP.AUTO-MCNC: 165 MG/DL (ref 65–100)
GLUCOSE BLD STRIP.AUTO-MCNC: 169 MG/DL (ref 65–100)
GLUCOSE BLD STRIP.AUTO-MCNC: 170 MG/DL (ref 65–100)
GLUCOSE BLD STRIP.AUTO-MCNC: 172 MG/DL (ref 65–100)
GLUCOSE BLD STRIP.AUTO-MCNC: 176 MG/DL (ref 65–100)
GLUCOSE BLD STRIP.AUTO-MCNC: 180 MG/DL (ref 65–100)
GLUCOSE BLD STRIP.AUTO-MCNC: 182 MG/DL (ref 65–100)
GLUCOSE BLD STRIP.AUTO-MCNC: 182 MG/DL (ref 65–100)
GLUCOSE BLD STRIP.AUTO-MCNC: 187 MG/DL (ref 65–100)
GLUCOSE BLD STRIP.AUTO-MCNC: 187 MG/DL (ref 65–100)
GLUCOSE BLD STRIP.AUTO-MCNC: 188 MG/DL (ref 65–100)
GLUCOSE BLD STRIP.AUTO-MCNC: 190 MG/DL (ref 65–100)
GLUCOSE BLD STRIP.AUTO-MCNC: 193 MG/DL (ref 65–100)
GLUCOSE BLD STRIP.AUTO-MCNC: 194 MG/DL (ref 65–100)
GLUCOSE BLD STRIP.AUTO-MCNC: 197 MG/DL (ref 65–100)
GLUCOSE BLD STRIP.AUTO-MCNC: 197 MG/DL (ref 65–100)
GLUCOSE BLD STRIP.AUTO-MCNC: 202 MG/DL (ref 65–100)
GLUCOSE BLD STRIP.AUTO-MCNC: 204 MG/DL (ref 65–100)
GLUCOSE BLD STRIP.AUTO-MCNC: 206 MG/DL (ref 65–100)
GLUCOSE BLD STRIP.AUTO-MCNC: 208 MG/DL (ref 65–100)
GLUCOSE BLD STRIP.AUTO-MCNC: 210 MG/DL (ref 65–100)
GLUCOSE BLD STRIP.AUTO-MCNC: 211 MG/DL (ref 65–100)
GLUCOSE BLD STRIP.AUTO-MCNC: 215 MG/DL (ref 65–100)
GLUCOSE BLD STRIP.AUTO-MCNC: 217 MG/DL (ref 65–100)
GLUCOSE BLD STRIP.AUTO-MCNC: 219 MG/DL (ref 65–100)
GLUCOSE BLD STRIP.AUTO-MCNC: 222 MG/DL (ref 65–100)
GLUCOSE BLD STRIP.AUTO-MCNC: 225 MG/DL (ref 65–100)
GLUCOSE BLD STRIP.AUTO-MCNC: 236 MG/DL (ref 65–100)
GLUCOSE BLD STRIP.AUTO-MCNC: 237 MG/DL (ref 65–100)
GLUCOSE BLD STRIP.AUTO-MCNC: 241 MG/DL (ref 65–100)
GLUCOSE BLD STRIP.AUTO-MCNC: 245 MG/DL (ref 65–100)
GLUCOSE BLD STRIP.AUTO-MCNC: 245 MG/DL (ref 65–100)
GLUCOSE BLD STRIP.AUTO-MCNC: 26 MG/DL (ref 65–100)
GLUCOSE BLD STRIP.AUTO-MCNC: 263 MG/DL (ref 65–100)
GLUCOSE BLD STRIP.AUTO-MCNC: 27 MG/DL (ref 65–100)
GLUCOSE BLD STRIP.AUTO-MCNC: 287 MG/DL (ref 65–100)
GLUCOSE BLD STRIP.AUTO-MCNC: 301 MG/DL (ref 65–100)
GLUCOSE BLD STRIP.AUTO-MCNC: 34 MG/DL (ref 65–100)
GLUCOSE BLD STRIP.AUTO-MCNC: 45 MG/DL (ref 65–100)
GLUCOSE BLD STRIP.AUTO-MCNC: 47 MG/DL (ref 65–100)
GLUCOSE BLD STRIP.AUTO-MCNC: 50 MG/DL (ref 65–100)
GLUCOSE BLD STRIP.AUTO-MCNC: 51 MG/DL (ref 65–100)
GLUCOSE BLD STRIP.AUTO-MCNC: 52 MG/DL (ref 65–100)
GLUCOSE BLD STRIP.AUTO-MCNC: 53 MG/DL (ref 65–100)
GLUCOSE BLD STRIP.AUTO-MCNC: 53 MG/DL (ref 65–100)
GLUCOSE BLD STRIP.AUTO-MCNC: 59 MG/DL (ref 65–100)
GLUCOSE BLD STRIP.AUTO-MCNC: 60 MG/DL (ref 65–100)
GLUCOSE BLD STRIP.AUTO-MCNC: 61 MG/DL (ref 65–100)
GLUCOSE BLD STRIP.AUTO-MCNC: 62 MG/DL (ref 65–100)
GLUCOSE BLD STRIP.AUTO-MCNC: 62 MG/DL (ref 65–100)
GLUCOSE BLD STRIP.AUTO-MCNC: 65 MG/DL (ref 65–100)
GLUCOSE BLD STRIP.AUTO-MCNC: 66 MG/DL (ref 65–100)
GLUCOSE BLD STRIP.AUTO-MCNC: 70 MG/DL (ref 65–100)
GLUCOSE BLD STRIP.AUTO-MCNC: 75 MG/DL (ref 65–100)
GLUCOSE BLD STRIP.AUTO-MCNC: 76 MG/DL (ref 65–100)
GLUCOSE BLD STRIP.AUTO-MCNC: 77 MG/DL (ref 65–100)
GLUCOSE BLD STRIP.AUTO-MCNC: 79 MG/DL (ref 65–100)
GLUCOSE BLD STRIP.AUTO-MCNC: 79 MG/DL (ref 65–100)
GLUCOSE BLD STRIP.AUTO-MCNC: 80 MG/DL (ref 65–100)
GLUCOSE BLD STRIP.AUTO-MCNC: 82 MG/DL (ref 65–100)
GLUCOSE BLD STRIP.AUTO-MCNC: 83 MG/DL (ref 65–100)
GLUCOSE BLD STRIP.AUTO-MCNC: 83 MG/DL (ref 65–100)
GLUCOSE BLD STRIP.AUTO-MCNC: 84 MG/DL (ref 65–100)
GLUCOSE BLD STRIP.AUTO-MCNC: 85 MG/DL (ref 65–100)
GLUCOSE BLD STRIP.AUTO-MCNC: 87 MG/DL (ref 65–100)
GLUCOSE BLD STRIP.AUTO-MCNC: 88 MG/DL (ref 65–100)
GLUCOSE BLD STRIP.AUTO-MCNC: 89 MG/DL (ref 65–100)
GLUCOSE BLD STRIP.AUTO-MCNC: 92 MG/DL (ref 65–100)
GLUCOSE BLD STRIP.AUTO-MCNC: 93 MG/DL (ref 65–100)
GLUCOSE BLD STRIP.AUTO-MCNC: 94 MG/DL (ref 65–100)
GLUCOSE BLD STRIP.AUTO-MCNC: 94 MG/DL (ref 65–100)
GLUCOSE BLD STRIP.AUTO-MCNC: 96 MG/DL (ref 65–100)
GLUCOSE BLD STRIP.AUTO-MCNC: 98 MG/DL (ref 65–100)
GLUCOSE FLD-MCNC: 110 MG/DL
GLUCOSE FLD-MCNC: 140 MG/DL
GLUCOSE FLD-MCNC: 156 MG/DL
GLUCOSE FLD-MCNC: 162 MG/DL
GLUCOSE SERPL-MCNC: 111 MG/DL (ref 65–100)
GLUCOSE SERPL-MCNC: 111 MG/DL (ref 65–100)
GLUCOSE SERPL-MCNC: 112 MG/DL (ref 65–100)
GLUCOSE SERPL-MCNC: 113 MG/DL (ref 65–100)
GLUCOSE SERPL-MCNC: 116 MG/DL (ref 65–100)
GLUCOSE SERPL-MCNC: 119 MG/DL (ref 65–100)
GLUCOSE SERPL-MCNC: 120 MG/DL (ref 65–100)
GLUCOSE SERPL-MCNC: 121 MG/DL (ref 65–100)
GLUCOSE SERPL-MCNC: 121 MG/DL (ref 65–100)
GLUCOSE SERPL-MCNC: 125 MG/DL (ref 65–100)
GLUCOSE SERPL-MCNC: 131 MG/DL (ref 65–100)
GLUCOSE SERPL-MCNC: 132 MG/DL (ref 65–100)
GLUCOSE SERPL-MCNC: 137 MG/DL (ref 65–100)
GLUCOSE SERPL-MCNC: 141 MG/DL (ref 65–100)
GLUCOSE SERPL-MCNC: 145 MG/DL (ref 65–100)
GLUCOSE SERPL-MCNC: 146 MG/DL (ref 65–100)
GLUCOSE SERPL-MCNC: 152 MG/DL (ref 65–100)
GLUCOSE SERPL-MCNC: 159 MG/DL (ref 65–100)
GLUCOSE SERPL-MCNC: 160 MG/DL (ref 65–100)
GLUCOSE SERPL-MCNC: 163 MG/DL (ref 65–100)
GLUCOSE SERPL-MCNC: 166 MG/DL (ref 65–100)
GLUCOSE SERPL-MCNC: 167 MG/DL (ref 65–100)
GLUCOSE SERPL-MCNC: 171 MG/DL (ref 65–100)
GLUCOSE SERPL-MCNC: 172 MG/DL (ref 65–100)
GLUCOSE SERPL-MCNC: 174 MG/DL (ref 65–100)
GLUCOSE SERPL-MCNC: 174 MG/DL (ref 65–100)
GLUCOSE SERPL-MCNC: 177 MG/DL (ref 65–100)
GLUCOSE SERPL-MCNC: 179 MG/DL (ref 65–100)
GLUCOSE SERPL-MCNC: 179 MG/DL (ref 65–100)
GLUCOSE SERPL-MCNC: 180 MG/DL (ref 65–100)
GLUCOSE SERPL-MCNC: 181 MG/DL (ref 65–100)
GLUCOSE SERPL-MCNC: 183 MG/DL (ref 65–100)
GLUCOSE SERPL-MCNC: 187 MG/DL (ref 65–100)
GLUCOSE SERPL-MCNC: 195 MG/DL (ref 65–100)
GLUCOSE SERPL-MCNC: 197 MG/DL (ref 65–100)
GLUCOSE SERPL-MCNC: 198 MG/DL (ref 65–100)
GLUCOSE SERPL-MCNC: 202 MG/DL (ref 65–100)
GLUCOSE SERPL-MCNC: 204 MG/DL (ref 65–100)
GLUCOSE SERPL-MCNC: 210 MG/DL (ref 65–100)
GLUCOSE SERPL-MCNC: 218 MG/DL (ref 65–100)
GLUCOSE SERPL-MCNC: 229 MG/DL (ref 65–100)
GLUCOSE SERPL-MCNC: 255 MG/DL (ref 65–100)
GLUCOSE SERPL-MCNC: 66 MG/DL (ref 65–100)
GLUCOSE SERPL-MCNC: 73 MG/DL (ref 65–100)
GLUCOSE SERPL-MCNC: 86 MG/DL (ref 65–100)
GLUCOSE SERPL-MCNC: 87 MG/DL (ref 65–100)
GLUCOSE SERPL-MCNC: 92 MG/DL (ref 65–100)
GLUCOSE SERPL-MCNC: 98 MG/DL (ref 65–100)
GLUCOSE UR STRIP.AUTO-MCNC: NEGATIVE MG/DL
GRAM STN SPEC: ABNORMAL
GRAM STN SPEC: NORMAL
HBA1C MFR BLD: 6 % (ref 4.8–6)
HBA1C MFR BLD: <3.5 % (ref 4.8–6)
HBA1C MFR BLD: <3.5 % (ref 4.8–6)
HBV SURFACE AG SERPL QL IA: NEGATIVE
HCO3 BLDA-SCNC: 13 MMOL/L (ref 22–26)
HCO3 BLDA-SCNC: 17 MMOL/L (ref 22–26)
HCO3 BLDA-SCNC: 17 MMOL/L (ref 22–26)
HCO3 BLDA-SCNC: 18 MMOL/L (ref 22–26)
HCT VFR BLD AUTO: 19.4 % (ref 35.8–46.3)
HCT VFR BLD AUTO: 20.8 % (ref 35.8–46.3)
HCT VFR BLD AUTO: 21.2 % (ref 35.8–46.3)
HCT VFR BLD AUTO: 22.2 % (ref 35.8–46.3)
HCT VFR BLD AUTO: 22.5 % (ref 35.8–46.3)
HCT VFR BLD AUTO: 22.6 % (ref 35.8–46.3)
HCT VFR BLD AUTO: 22.9 % (ref 35.8–46.3)
HCT VFR BLD AUTO: 23 % (ref 35.8–46.3)
HCT VFR BLD AUTO: 23.4 % (ref 35.8–46.3)
HCT VFR BLD AUTO: 24.3 % (ref 35.8–46.3)
HCT VFR BLD AUTO: 24.6 % (ref 35.8–46.3)
HCT VFR BLD AUTO: 24.6 % (ref 35.8–46.3)
HCT VFR BLD AUTO: 25 % (ref 35.8–46.3)
HCT VFR BLD AUTO: 25.3 % (ref 35.8–46.3)
HCT VFR BLD AUTO: 25.3 % (ref 35.8–46.3)
HCT VFR BLD AUTO: 25.4 % (ref 35.8–46.3)
HCT VFR BLD AUTO: 25.5 % (ref 35.8–46.3)
HCT VFR BLD AUTO: 26.2 % (ref 35.8–46.3)
HCT VFR BLD AUTO: 26.3 % (ref 35.8–46.3)
HCT VFR BLD AUTO: 26.4 % (ref 35.8–46.3)
HCT VFR BLD AUTO: 26.8 % (ref 35.8–46.3)
HCT VFR BLD AUTO: 27.1 % (ref 35.8–46.3)
HCT VFR BLD AUTO: 27.1 % (ref 35.8–46.3)
HCT VFR BLD AUTO: 27.5 % (ref 35.8–46.3)
HCT VFR BLD AUTO: 27.9 % (ref 35.8–46.3)
HCT VFR BLD AUTO: 28.2 % (ref 35.8–46.3)
HCT VFR BLD AUTO: 28.4 % (ref 35.8–46.3)
HCT VFR BLD AUTO: 28.6 % (ref 35.8–46.3)
HCT VFR BLD AUTO: 28.6 % (ref 35.8–46.3)
HCT VFR BLD AUTO: 28.7 % (ref 35.8–46.3)
HCT VFR BLD AUTO: 28.7 % (ref 35.8–46.3)
HCT VFR BLD AUTO: 30.1 % (ref 35.8–46.3)
HCT VFR BLD AUTO: 30.7 % (ref 35.8–46.3)
HCT VFR BLD AUTO: 31.7 % (ref 35.8–46.3)
HCT VFR BLD AUTO: 32 % (ref 35.8–46.3)
HCT VFR BLD AUTO: 32.3 % (ref 35.8–46.3)
HCT VFR BLD AUTO: 32.9 % (ref 35.8–46.3)
HCT VFR BLD AUTO: 33.1 % (ref 35.8–46.3)
HCT VFR BLD AUTO: 33.1 % (ref 35.8–46.3)
HCT VFR BLD AUTO: 35 % (ref 35.8–46.3)
HCT VFR BLD AUTO: 35.7 % (ref 35.8–46.3)
HCT VFR BLD AUTO: 35.8 % (ref 35.8–46.3)
HCT VFR BLD AUTO: 36.1 % (ref 35.8–46.3)
HCT VFR BLD AUTO: 36.1 % (ref 35.8–46.3)
HDLC SERPL-MCNC: 43 MG/DL (ref 40–60)
HDLC SERPL: 2.9 {RATIO}
HGB BLD-MCNC: 10.3 G/DL (ref 11.7–15.4)
HGB BLD-MCNC: 10.4 G/DL (ref 11.7–15.4)
HGB BLD-MCNC: 10.6 G/DL (ref 11.7–15.4)
HGB BLD-MCNC: 10.7 G/DL (ref 11.7–15.4)
HGB BLD-MCNC: 11.1 G/DL (ref 11.7–15.4)
HGB BLD-MCNC: 11.3 G/DL (ref 11.7–15.4)
HGB BLD-MCNC: 11.8 G/DL (ref 11.7–15.4)
HGB BLD-MCNC: 11.9 G/DL (ref 11.7–15.4)
HGB BLD-MCNC: 12.3 G/DL (ref 11.7–15.4)
HGB BLD-MCNC: 6.5 G/DL (ref 11.7–15.4)
HGB BLD-MCNC: 7 G/DL (ref 11.7–15.4)
HGB BLD-MCNC: 7.1 G/DL (ref 11.7–15.4)
HGB BLD-MCNC: 7.3 G/DL (ref 11.7–15.4)
HGB BLD-MCNC: 7.3 G/DL (ref 11.7–15.4)
HGB BLD-MCNC: 7.6 G/DL (ref 11.7–15.4)
HGB BLD-MCNC: 7.6 G/DL (ref 11.7–15.4)
HGB BLD-MCNC: 7.9 G/DL (ref 11.7–15.4)
HGB BLD-MCNC: 8.1 G/DL (ref 11.7–15.4)
HGB BLD-MCNC: 8.2 G/DL (ref 11.7–15.4)
HGB BLD-MCNC: 8.3 G/DL (ref 11.7–15.4)
HGB BLD-MCNC: 8.4 G/DL (ref 11.7–15.4)
HGB BLD-MCNC: 8.5 G/DL (ref 11.7–15.4)
HGB BLD-MCNC: 8.5 G/DL (ref 11.7–15.4)
HGB BLD-MCNC: 8.6 G/DL (ref 11.7–15.4)
HGB BLD-MCNC: 8.7 G/DL (ref 11.7–15.4)
HGB BLD-MCNC: 8.7 G/DL (ref 11.7–15.4)
HGB BLD-MCNC: 8.9 G/DL (ref 11.7–15.4)
HGB BLD-MCNC: 8.9 G/DL (ref 11.7–15.4)
HGB BLD-MCNC: 9 G/DL (ref 11.7–15.4)
HGB BLD-MCNC: 9.1 G/DL (ref 11.7–15.4)
HGB BLD-MCNC: 9.2 G/DL (ref 11.7–15.4)
HGB BLD-MCNC: 9.2 G/DL (ref 11.7–15.4)
HGB BLD-MCNC: 9.3 G/DL (ref 11.7–15.4)
HGB BLD-MCNC: 9.3 G/DL (ref 11.7–15.4)
HGB BLD-MCNC: 9.4 G/DL (ref 11.7–15.4)
HGB BLD-MCNC: 9.4 G/DL (ref 11.7–15.4)
HGB BLD-MCNC: 9.5 G/DL (ref 11.7–15.4)
HGB BLD-MCNC: 9.5 G/DL (ref 11.7–15.4)
HGB BLD-MCNC: 9.6 G/DL (ref 11.7–15.4)
HGB BLDMV-MCNC: 10.1 GM/DL (ref 11.7–15)
HGB BLDMV-MCNC: 10.1 GM/DL (ref 11.7–15)
HGB BLDMV-MCNC: 8.8 GM/DL (ref 11.7–15)
HGB BLDMV-MCNC: 9.8 GM/DL (ref 11.7–15)
HGB RETIC QN AUTO: 31 PG (ref 29–35)
HGB UR QL STRIP: ABNORMAL
HGB UR QL STRIP: NEGATIVE
HGB UR QL STRIP: NEGATIVE
HIV 1+2 AB+HIV1 P24 AG SERPL QL IA: NON REACTIVE
IMM GRANULOCYTES # BLD: 0 K/UL (ref 0–0.5)
IMM GRANULOCYTES # BLD: 0.1 K/UL (ref 0–0.5)
IMM GRANULOCYTES NFR BLD AUTO: 0 % (ref 0–5)
IMM GRANULOCYTES NFR BLD AUTO: 0.2 % (ref 0–5)
IMM GRANULOCYTES NFR BLD AUTO: 0.3 % (ref 0–5)
IMM GRANULOCYTES NFR BLD AUTO: 0.4 % (ref 0–5)
IMM GRANULOCYTES NFR BLD AUTO: 0.5 % (ref 0–5)
IMM GRANULOCYTES NFR BLD AUTO: 0.7 % (ref 0–5)
IMM GRANULOCYTES NFR BLD AUTO: 1 % (ref 0–5)
IMM GRANULOCYTES NFR BLD AUTO: 1.1 % (ref 0–5)
IMM GRANULOCYTES NFR BLD AUTO: 1.5 % (ref 0–5)
IMM GRANULOCYTES NFR BLD AUTO: 1.6 % (ref 0–5)
IMM GRANULOCYTES NFR BLD: 0 % (ref 0–5)
IMM GRANULOCYTES NFR BLD: 0.2 % (ref 0–5)
IMM GRANULOCYTES NFR BLD: 0.3 % (ref 0–5)
IMM GRANULOCYTES NFR BLD: 0.4 % (ref 0–5)
IMM GRANULOCYTES NFR BLD: 0.5 % (ref 0–5)
IMM GRANULOCYTES NFR BLD: 0.7 % (ref 0–5)
IMM RETICS NFR: 14.4 % (ref 3–15.9)
INR PPP: 1.1 (ref 0.9–1.2)
INR PPP: 1.2 (ref 0.9–1.2)
INR PPP: 1.3 (ref 0.9–1.2)
INR PPP: 1.4 (ref 0.9–1.2)
INR PPP: 1.5 (ref 0.9–1.2)
IRON SATN MFR SERPL: 63 %
IRON SERPL-MCNC: 44 UG/DL (ref 35–150)
IRON SERPL-MCNC: 65 UG/DL (ref 35–150)
KETONES UR QL STRIP.AUTO: ABNORMAL MG/DL
KETONES UR QL STRIP.AUTO: ABNORMAL MG/DL
KETONES UR QL STRIP.AUTO: NEGATIVE MG/DL
LACTATE BLD-SCNC: 1.5 MMOL/L (ref 0.5–1.9)
LACTATE BLD-SCNC: 1.7 MMOL/L (ref 0.5–1.9)
LACTATE BLD-SCNC: 2.8 MMOL/L (ref 0.5–1.9)
LACTATE SERPL-SCNC: 1.8 MMOL/L (ref 0.4–2)
LACTATE SERPL-SCNC: 2.7 MMOL/L (ref 0.4–2)
LACTATE SERPL-SCNC: 3.1 MMOL/L (ref 0.4–2)
LACTATE SERPL-SCNC: 3.5 MMOL/L (ref 0.4–2)
LDH FLD L TO P-CCNC: 181 U/L
LDH FLD L TO P-CCNC: 42 U/L
LDLC SERPL CALC-MCNC: 61 MG/DL
LEUKOCYTE ESTERASE UR QL STRIP.AUTO: ABNORMAL
LEUKOCYTE ESTERASE UR QL STRIP.AUTO: ABNORMAL
LEUKOCYTE ESTERASE UR QL STRIP.AUTO: NEGATIVE
LIPASE SERPL-CCNC: 146 U/L (ref 73–393)
LIPASE SERPL-CCNC: 57 U/L (ref 73–393)
LIPASE SERPL-CCNC: 69 U/L (ref 73–393)
LIPASE, LPSFLT: 6 U/L
LIPID PROFILE,FLP: NORMAL
LYMPHOCYTES # BLD AUTO: 10 % (ref 13–44)
LYMPHOCYTES # BLD AUTO: 11 % (ref 13–44)
LYMPHOCYTES # BLD AUTO: 15 % (ref 13–44)
LYMPHOCYTES # BLD AUTO: 18 % (ref 13–44)
LYMPHOCYTES # BLD AUTO: 22 % (ref 13–44)
LYMPHOCYTES # BLD AUTO: 22 % (ref 13–44)
LYMPHOCYTES # BLD AUTO: 24 % (ref 13–44)
LYMPHOCYTES # BLD AUTO: 25 % (ref 13–44)
LYMPHOCYTES # BLD AUTO: 26 % (ref 13–44)
LYMPHOCYTES # BLD AUTO: 28 % (ref 13–44)
LYMPHOCYTES # BLD AUTO: 28 % (ref 13–44)
LYMPHOCYTES # BLD AUTO: 32 % (ref 13–44)
LYMPHOCYTES # BLD AUTO: 39 % (ref 13–44)
LYMPHOCYTES # BLD AUTO: 7 % (ref 13–44)
LYMPHOCYTES # BLD: 0.5 K/UL (ref 0.5–4.6)
LYMPHOCYTES # BLD: 0.5 K/UL (ref 0.5–4.6)
LYMPHOCYTES # BLD: 0.6 K/UL (ref 0.5–4.6)
LYMPHOCYTES # BLD: 0.7 K/UL (ref 0.5–4.6)
LYMPHOCYTES # BLD: 0.8 K/UL (ref 0.5–4.6)
LYMPHOCYTES # BLD: 0.9 K/UL (ref 0.5–4.6)
LYMPHOCYTES # BLD: 1.1 K/UL (ref 0.5–4.6)
LYMPHOCYTES # BLD: 1.3 K/UL (ref 0.5–4.6)
LYMPHOCYTES NFR BLD: 11 % (ref 13–44)
LYMPHOCYTES NFR BLD: 13 % (ref 13–44)
LYMPHOCYTES NFR BLD: 14 % (ref 13–44)
LYMPHOCYTES NFR BLD: 15 % (ref 13–44)
LYMPHOCYTES NFR BLD: 16 % (ref 13–44)
LYMPHOCYTES NFR BLD: 19 % (ref 13–44)
LYMPHOCYTES NFR BLD: 21 % (ref 13–44)
LYMPHOCYTES NFR BLD: 6 % (ref 13–44)
LYMPHOCYTES NFR FLD: 3 %
LYMPHOCYTES NFR FLD: 5 %
LYMPHOCYTES NFR FLD: 85 %
LYMPHOCYTES NFR FLD: 95 %
MAGNESIUM SERPL-MCNC: 1.5 MG/DL (ref 1.8–2.4)
MAGNESIUM SERPL-MCNC: 1.6 MG/DL (ref 1.8–2.4)
MAGNESIUM SERPL-MCNC: 1.6 MG/DL (ref 1.8–2.4)
MAGNESIUM SERPL-MCNC: 1.7 MG/DL (ref 1.8–2.4)
MAGNESIUM SERPL-MCNC: 1.7 MG/DL (ref 1.8–2.4)
MAGNESIUM SERPL-MCNC: 1.8 MG/DL (ref 1.8–2.4)
MAGNESIUM SERPL-MCNC: 1.9 MG/DL (ref 1.8–2.4)
MAGNESIUM SERPL-MCNC: 1.9 MG/DL (ref 1.8–2.4)
MAGNESIUM SERPL-MCNC: 2.1 MG/DL (ref 1.8–2.4)
MAGNESIUM SERPL-MCNC: 2.3 MG/DL (ref 1.8–2.4)
MAGNESIUM SERPL-MCNC: 2.5 MG/DL (ref 1.8–2.4)
MAGNESIUM SERPL-MCNC: 2.7 MG/DL (ref 1.8–2.4)
MAGNESIUM SERPL-MCNC: 2.8 MG/DL (ref 1.8–2.4)
MAGNESIUM SERPL-MCNC: 2.8 MG/DL (ref 1.8–2.4)
MCH RBC QN AUTO: 28.8 PG (ref 26.1–32.9)
MCH RBC QN AUTO: 28.8 PG (ref 26.1–32.9)
MCH RBC QN AUTO: 29 PG (ref 26.1–32.9)
MCH RBC QN AUTO: 29.1 PG (ref 26.1–32.9)
MCH RBC QN AUTO: 29.2 PG (ref 26.1–32.9)
MCH RBC QN AUTO: 29.4 PG (ref 26.1–32.9)
MCH RBC QN AUTO: 29.5 PG (ref 26.1–32.9)
MCH RBC QN AUTO: 29.5 PG (ref 26.1–32.9)
MCH RBC QN AUTO: 29.6 PG (ref 26.1–32.9)
MCH RBC QN AUTO: 29.7 PG (ref 26.1–32.9)
MCH RBC QN AUTO: 29.7 PG (ref 26.1–32.9)
MCH RBC QN AUTO: 30 PG (ref 26.1–32.9)
MCH RBC QN AUTO: 30.2 PG (ref 26.1–32.9)
MCH RBC QN AUTO: 30.3 PG (ref 26.1–32.9)
MCH RBC QN AUTO: 30.4 PG (ref 26.1–32.9)
MCH RBC QN AUTO: 30.5 PG (ref 26.1–32.9)
MCH RBC QN AUTO: 30.6 PG (ref 26.1–32.9)
MCH RBC QN AUTO: 30.7 PG (ref 26.1–32.9)
MCH RBC QN AUTO: 30.8 PG (ref 26.1–32.9)
MCH RBC QN AUTO: 30.8 PG (ref 26.1–32.9)
MCH RBC QN AUTO: 30.9 PG (ref 26.1–32.9)
MCH RBC QN AUTO: 30.9 PG (ref 26.1–32.9)
MCH RBC QN AUTO: 31 PG (ref 26.1–32.9)
MCH RBC QN AUTO: 31 PG (ref 26.1–32.9)
MCH RBC QN AUTO: 31.4 PG (ref 26.1–32.9)
MCH RBC QN AUTO: 31.7 PG (ref 26.1–32.9)
MCH RBC QN AUTO: 31.9 PG (ref 26.1–32.9)
MCH RBC QN AUTO: 32.1 PG (ref 26.1–32.9)
MCH RBC QN AUTO: 32.1 PG (ref 26.1–32.9)
MCH RBC QN AUTO: 32.2 PG (ref 26.1–32.9)
MCH RBC QN AUTO: 32.2 PG (ref 26.1–32.9)
MCH RBC QN AUTO: 32.3 PG (ref 26.1–32.9)
MCH RBC QN AUTO: 32.4 PG (ref 26.1–32.9)
MCH RBC QN AUTO: 32.5 PG (ref 26.1–32.9)
MCH RBC QN AUTO: 32.7 PG (ref 26.1–32.9)
MCH RBC QN AUTO: 32.7 PG (ref 26.1–32.9)
MCH RBC QN AUTO: 32.9 PG (ref 26.1–32.9)
MCH RBC QN AUTO: 32.9 PG (ref 26.1–32.9)
MCH RBC QN AUTO: 33.2 PG (ref 26.1–32.9)
MCH RBC QN AUTO: 33.5 PG (ref 26.1–32.9)
MCH RBC QN AUTO: 34.1 PG (ref 26.1–32.9)
MCH RBC QN AUTO: 35.1 PG (ref 26.1–32.9)
MCHC RBC AUTO-ENTMCNC: 31.6 G/DL (ref 31.4–35)
MCHC RBC AUTO-ENTMCNC: 32 G/DL (ref 31.4–35)
MCHC RBC AUTO-ENTMCNC: 32.3 G/DL (ref 31.4–35)
MCHC RBC AUTO-ENTMCNC: 32.3 G/DL (ref 31.4–35)
MCHC RBC AUTO-ENTMCNC: 32.4 G/DL (ref 31.4–35)
MCHC RBC AUTO-ENTMCNC: 32.5 G/DL (ref 31.4–35)
MCHC RBC AUTO-ENTMCNC: 32.6 G/DL (ref 31.4–35)
MCHC RBC AUTO-ENTMCNC: 32.7 G/DL (ref 31.4–35)
MCHC RBC AUTO-ENTMCNC: 32.9 G/DL (ref 31.4–35)
MCHC RBC AUTO-ENTMCNC: 32.9 G/DL (ref 31.4–35)
MCHC RBC AUTO-ENTMCNC: 33 G/DL (ref 31.4–35)
MCHC RBC AUTO-ENTMCNC: 33.1 G/DL (ref 31.4–35)
MCHC RBC AUTO-ENTMCNC: 33.2 G/DL (ref 31.4–35)
MCHC RBC AUTO-ENTMCNC: 33.3 G/DL (ref 31.4–35)
MCHC RBC AUTO-ENTMCNC: 33.5 G/DL (ref 31.4–35)
MCHC RBC AUTO-ENTMCNC: 33.5 G/DL (ref 31.4–35)
MCHC RBC AUTO-ENTMCNC: 33.6 G/DL (ref 31.4–35)
MCHC RBC AUTO-ENTMCNC: 33.7 G/DL (ref 31.4–35)
MCHC RBC AUTO-ENTMCNC: 33.7 G/DL (ref 31.4–35)
MCHC RBC AUTO-ENTMCNC: 33.8 G/DL (ref 31.4–35)
MCHC RBC AUTO-ENTMCNC: 33.9 G/DL (ref 31.4–35)
MCHC RBC AUTO-ENTMCNC: 34 G/DL (ref 31.4–35)
MCHC RBC AUTO-ENTMCNC: 34 G/DL (ref 31.4–35)
MCHC RBC AUTO-ENTMCNC: 34.1 G/DL (ref 31.4–35)
MCHC RBC AUTO-ENTMCNC: 34.2 G/DL (ref 31.4–35)
MCHC RBC AUTO-ENTMCNC: 34.3 G/DL (ref 31.4–35)
MCHC RBC AUTO-ENTMCNC: 34.6 G/DL (ref 31.4–35)
MCHC RBC AUTO-ENTMCNC: 34.6 G/DL (ref 31.4–35)
MCHC RBC AUTO-ENTMCNC: 35.1 G/DL (ref 31.4–35)
MCV RBC AUTO: 103.2 FL (ref 79.6–97.8)
MCV RBC AUTO: 103.6 FL (ref 79.6–97.8)
MCV RBC AUTO: 108.7 FL (ref 79.6–97.8)
MCV RBC AUTO: 85.7 FL (ref 79.6–97.8)
MCV RBC AUTO: 86.7 FL (ref 79.6–97.8)
MCV RBC AUTO: 86.7 FL (ref 79.6–97.8)
MCV RBC AUTO: 87.1 FL (ref 79.6–97.8)
MCV RBC AUTO: 87.2 FL (ref 79.6–97.8)
MCV RBC AUTO: 87.2 FL (ref 79.6–97.8)
MCV RBC AUTO: 87.5 FL (ref 79.6–97.8)
MCV RBC AUTO: 87.8 FL (ref 79.6–97.8)
MCV RBC AUTO: 87.8 FL (ref 79.6–97.8)
MCV RBC AUTO: 87.9 FL (ref 79.6–97.8)
MCV RBC AUTO: 88.6 FL (ref 79.6–97.8)
MCV RBC AUTO: 88.8 FL (ref 79.6–97.8)
MCV RBC AUTO: 88.8 FL (ref 79.6–97.8)
MCV RBC AUTO: 89.2 FL (ref 79.6–97.8)
MCV RBC AUTO: 89.5 FL (ref 79.6–97.8)
MCV RBC AUTO: 89.8 FL (ref 79.6–97.8)
MCV RBC AUTO: 89.9 FL (ref 79.6–97.8)
MCV RBC AUTO: 90.7 FL (ref 79.6–97.8)
MCV RBC AUTO: 91.6 FL (ref 79.6–97.8)
MCV RBC AUTO: 91.8 FL (ref 79.6–97.8)
MCV RBC AUTO: 92.2 FL (ref 79.6–97.8)
MCV RBC AUTO: 92.8 FL (ref 79.6–97.8)
MCV RBC AUTO: 92.9 FL (ref 79.6–97.8)
MCV RBC AUTO: 93.1 FL (ref 79.6–97.8)
MCV RBC AUTO: 93.3 FL (ref 79.6–97.8)
MCV RBC AUTO: 93.4 FL (ref 79.6–97.8)
MCV RBC AUTO: 93.5 FL (ref 79.6–97.8)
MCV RBC AUTO: 93.5 FL (ref 79.6–97.8)
MCV RBC AUTO: 93.7 FL (ref 79.6–97.8)
MCV RBC AUTO: 93.9 FL (ref 79.6–97.8)
MCV RBC AUTO: 94 FL (ref 79.6–97.8)
MCV RBC AUTO: 94.6 FL (ref 79.6–97.8)
MCV RBC AUTO: 94.8 FL (ref 79.6–97.8)
MCV RBC AUTO: 94.9 FL (ref 79.6–97.8)
MCV RBC AUTO: 95.1 FL (ref 79.6–97.8)
MCV RBC AUTO: 95.8 FL (ref 79.6–97.8)
MCV RBC AUTO: 95.9 FL (ref 79.6–97.8)
MCV RBC AUTO: 95.9 FL (ref 79.6–97.8)
MCV RBC AUTO: 96 FL (ref 79.6–97.8)
MCV RBC AUTO: 96.1 FL (ref 79.6–97.8)
MCV RBC AUTO: 96.8 FL (ref 79.6–97.8)
MCV RBC AUTO: 97.9 FL (ref 79.6–97.8)
MCV RBC AUTO: 98.1 FL (ref 79.6–97.8)
MCV RBC AUTO: 98.9 FL (ref 79.6–97.8)
MCV RBC AUTO: 99.4 FL (ref 79.6–97.8)
METHGB MFR BLD: 0.6 % (ref 0–1.5)
METHGB MFR BLD: 0.8 % (ref 0–1.5)
METHGB MFR BLD: 0.9 % (ref 0–1.5)
METHGB MFR BLD: 1.2 % (ref 0–1.5)
MM INDURATION POC: 0 MM (ref 0–5)
MM INDURATION POC: NORMAL MM (ref 0–5)
MONOCYTES # BLD: 0 K/UL (ref 0.1–1.3)
MONOCYTES # BLD: 0.1 K/UL (ref 0.1–1.3)
MONOCYTES # BLD: 0.2 K/UL (ref 0.1–1.3)
MONOCYTES # BLD: 0.3 K/UL (ref 0.1–1.3)
MONOCYTES # BLD: 0.4 K/UL (ref 0.1–1.3)
MONOCYTES # BLD: 0.5 K/UL (ref 0.1–1.3)
MONOCYTES # BLD: 0.5 K/UL (ref 0.1–1.3)
MONOCYTES # BLD: 0.6 K/UL (ref 0.1–1.3)
MONOCYTES # BLD: 0.7 K/UL (ref 0.1–1.3)
MONOCYTES # BLD: 0.8 K/UL (ref 0.1–1.3)
MONOCYTES # BLD: 0.9 K/UL (ref 0.1–1.3)
MONOCYTES # BLD: 1 K/UL (ref 0.1–1.3)
MONOCYTES # BLD: 1.1 K/UL (ref 0.1–1.3)
MONOCYTES NFR BLD AUTO: 1 % (ref 4–12)
MONOCYTES NFR BLD AUTO: 10 % (ref 4–12)
MONOCYTES NFR BLD AUTO: 10 % (ref 4–12)
MONOCYTES NFR BLD AUTO: 11 % (ref 4–12)
MONOCYTES NFR BLD AUTO: 12 % (ref 4–12)
MONOCYTES NFR BLD AUTO: 15 % (ref 4–12)
MONOCYTES NFR BLD AUTO: 17 % (ref 4–12)
MONOCYTES NFR BLD AUTO: 17 % (ref 4–12)
MONOCYTES NFR BLD AUTO: 2 % (ref 4–12)
MONOCYTES NFR BLD AUTO: 22 % (ref 4–12)
MONOCYTES NFR BLD AUTO: 22 % (ref 4–12)
MONOCYTES NFR BLD AUTO: 4 % (ref 4–12)
MONOCYTES NFR BLD AUTO: 6 % (ref 4–12)
MONOCYTES NFR BLD AUTO: 7 % (ref 4–12)
MONOCYTES NFR BLD AUTO: 7 % (ref 4–12)
MONOCYTES NFR BLD AUTO: 8 % (ref 4–12)
MONOCYTES NFR BLD AUTO: 8 % (ref 4–12)
MONOCYTES NFR BLD AUTO: 9 % (ref 4–12)
MONOCYTES NFR BLD: 10 % (ref 4–12)
MONOCYTES NFR BLD: 11 % (ref 4–12)
MONOCYTES NFR BLD: 13 % (ref 4–12)
MONOCYTES NFR BLD: 16 % (ref 4–12)
MONOCYTES NFR BLD: 17 % (ref 4–12)
MONOCYTES NFR BLD: 6 % (ref 4–12)
MONOCYTES NFR BLD: 7 % (ref 4–12)
MONOCYTES NFR BLD: 7 % (ref 4–12)
MONOCYTES NFR BLD: 9 % (ref 4–12)
MONOCYTES NFR BLD: 9 % (ref 4–12)
MONOS+MACROS NFR FLD: 1 %
MONOS+MACROS NFR FLD: 15 %
MONOS+MACROS NFR FLD: 3 %
MONOS+MACROS NFR FLD: 3 %
MUCOUS THREADS URNS QL MICRO: 0 /LPF
NEUTROPHILS NFR FLD: 14 %
NEUTS SEG # BLD: 0.7 K/UL (ref 1.7–8.2)
NEUTS SEG # BLD: 1.3 K/UL (ref 1.7–8.2)
NEUTS SEG # BLD: 1.4 K/UL (ref 1.7–8.2)
NEUTS SEG # BLD: 1.5 K/UL (ref 1.7–8.2)
NEUTS SEG # BLD: 1.5 K/UL (ref 1.7–8.2)
NEUTS SEG # BLD: 1.7 K/UL (ref 1.7–8.2)
NEUTS SEG # BLD: 1.8 K/UL (ref 1.7–8.2)
NEUTS SEG # BLD: 1.8 K/UL (ref 1.7–8.2)
NEUTS SEG # BLD: 1.9 K/UL (ref 1.7–8.2)
NEUTS SEG # BLD: 10.8 K/UL (ref 1.7–8.2)
NEUTS SEG # BLD: 2 K/UL (ref 1.7–8.2)
NEUTS SEG # BLD: 2.1 K/UL (ref 1.7–8.2)
NEUTS SEG # BLD: 2.2 K/UL (ref 1.7–8.2)
NEUTS SEG # BLD: 2.4 K/UL (ref 1.7–8.2)
NEUTS SEG # BLD: 2.4 K/UL (ref 1.7–8.2)
NEUTS SEG # BLD: 2.6 K/UL (ref 1.7–8.2)
NEUTS SEG # BLD: 2.7 K/UL (ref 1.7–8.2)
NEUTS SEG # BLD: 3.2 K/UL (ref 1.7–8.2)
NEUTS SEG # BLD: 3.3 K/UL (ref 1.7–8.2)
NEUTS SEG # BLD: 3.4 K/UL (ref 1.7–8.2)
NEUTS SEG # BLD: 3.7 K/UL (ref 1.7–8.2)
NEUTS SEG # BLD: 3.9 K/UL (ref 1.7–8.2)
NEUTS SEG # BLD: 4.2 K/UL (ref 1.7–8.2)
NEUTS SEG # BLD: 4.5 K/UL (ref 1.7–8.2)
NEUTS SEG # BLD: 5 K/UL (ref 1.7–8.2)
NEUTS SEG # BLD: 5.7 K/UL (ref 1.7–8.2)
NEUTS SEG # BLD: 6.7 K/UL (ref 1.7–8.2)
NEUTS SEG # BLD: 9.5 K/UL (ref 1.7–8.2)
NEUTS SEG NFR BLD AUTO: 33 % (ref 43–78)
NEUTS SEG NFR BLD AUTO: 49 % (ref 43–78)
NEUTS SEG NFR BLD AUTO: 52 % (ref 43–78)
NEUTS SEG NFR BLD AUTO: 53 % (ref 43–78)
NEUTS SEG NFR BLD AUTO: 54 % (ref 43–78)
NEUTS SEG NFR BLD AUTO: 54 % (ref 43–78)
NEUTS SEG NFR BLD AUTO: 55 % (ref 43–78)
NEUTS SEG NFR BLD AUTO: 56 % (ref 43–78)
NEUTS SEG NFR BLD AUTO: 60 % (ref 43–78)
NEUTS SEG NFR BLD AUTO: 60 % (ref 43–78)
NEUTS SEG NFR BLD AUTO: 61 % (ref 43–78)
NEUTS SEG NFR BLD AUTO: 63 % (ref 43–78)
NEUTS SEG NFR BLD AUTO: 63 % (ref 43–78)
NEUTS SEG NFR BLD AUTO: 73 % (ref 43–78)
NEUTS SEG NFR BLD AUTO: 75 % (ref 43–78)
NEUTS SEG NFR BLD AUTO: 85 % (ref 43–78)
NEUTS SEG NFR BLD: 55 % (ref 43–78)
NEUTS SEG NFR BLD: 55 % (ref 43–78)
NEUTS SEG NFR BLD: 59 % (ref 43–78)
NEUTS SEG NFR BLD: 64 % (ref 43–78)
NEUTS SEG NFR BLD: 67 % (ref 43–78)
NEUTS SEG NFR BLD: 68 % (ref 43–78)
NEUTS SEG NFR BLD: 76 % (ref 43–78)
NEUTS SEG NFR BLD: 76 % (ref 43–78)
NEUTS SEG NFR BLD: 77 % (ref 43–78)
NEUTS SEG NFR BLD: 84 % (ref 43–78)
NEUTS SEG NFR FLD: 2 %
NEUTS SEG NFR FLD: 80 %
NEUTS SEG NFR FLD: 94 %
NITRITE UR QL STRIP.AUTO: NEGATIVE
NUC CELL # FLD: 10 /CU MM
NUC CELL # FLD: 11 /CU MM
NUC CELL # FLD: 19 /CU MM
NUC CELL # FLD: 22 /CU MM
NUC CELL # FLD: 22 /CU MM
NUC CELL # FLD: 34 /CU MM
NUC CELL # FLD: 3808 /CU MM
NUC CELL # FLD: 5 /CU MM
NUC CELL # FLD: 5 /CU MM
NUC CELL # FLD: 500 /CU MM
NUC CELL # FLD: 773 /CU MM
NUC CELL # FLD: 883 /CU MM
NUC CELL # FLD: <100 /CU MM
OTHER OBSERVATIONS,UCOM: NORMAL
OXYHGB MFR BLDA: 96.6 % (ref 94–97)
OXYHGB MFR BLDA: 97.2 % (ref 94–97)
OXYHGB MFR BLDA: 97.5 % (ref 94–97)
OXYHGB MFR BLDA: 98 % (ref 94–97)
P-R INTERVAL, ECG05: 124 MS
P-R INTERVAL, ECG05: 136 MS
P-R INTERVAL, ECG05: 140 MS
PCO2 BLDA: 21 MMHG (ref 35–45)
PCO2 BLDA: 22 MMHG (ref 35–45)
PCO2 BLDA: 23 MMHG (ref 35–45)
PCO2 BLDA: 25 MMHG (ref 35–45)
PEEP RESPIRATORY: 8 CM[H2O]
PH BLDA: 7.38 [PH] (ref 7.35–7.45)
PH BLDA: 7.44 [PH] (ref 7.35–7.45)
PH BLDA: 7.51 [PH] (ref 7.35–7.45)
PH BLDA: 7.55 [PH] (ref 7.35–7.45)
PH UR STRIP: 5 [PH] (ref 5–9)
PH UR STRIP: 5 [PH] (ref 5–9)
PH UR STRIP: 6.5 [PH] (ref 5–9)
PHOSPHATE SERPL-MCNC: 2 MG/DL (ref 2.5–4.5)
PHOSPHATE SERPL-MCNC: 2.1 MG/DL (ref 2.5–4.5)
PHOSPHATE SERPL-MCNC: 2.8 MG/DL (ref 2.5–4.5)
PHOSPHATE SERPL-MCNC: 3.3 MG/DL (ref 2.5–4.5)
PHOSPHATE SERPL-MCNC: 3.7 MG/DL (ref 2.5–4.5)
PHOSPHATE SERPL-MCNC: 5.2 MG/DL (ref 2.5–4.5)
PHOSPHATE SERPL-MCNC: 5.4 MG/DL (ref 2.5–4.5)
PHOSPHATE SERPL-MCNC: 6.1 MG/DL (ref 2.5–4.5)
PHOSPHATE SERPL-MCNC: 6.3 MG/DL (ref 2.5–4.5)
PHOSPHATE SERPL-MCNC: 7.2 MG/DL (ref 2.5–4.5)
PLATELET # BLD AUTO: 103 K/UL (ref 150–450)
PLATELET # BLD AUTO: 34 K/UL (ref 150–450)
PLATELET # BLD AUTO: 37 K/UL (ref 150–450)
PLATELET # BLD AUTO: 39 K/UL (ref 150–450)
PLATELET # BLD AUTO: 43 K/UL (ref 150–450)
PLATELET # BLD AUTO: 45 K/UL (ref 150–450)
PLATELET # BLD AUTO: 46 K/UL (ref 150–450)
PLATELET # BLD AUTO: 46 K/UL (ref 150–450)
PLATELET # BLD AUTO: 48 K/UL (ref 150–450)
PLATELET # BLD AUTO: 49 K/UL (ref 150–450)
PLATELET # BLD AUTO: 50 K/UL (ref 150–450)
PLATELET # BLD AUTO: 50 K/UL (ref 150–450)
PLATELET # BLD AUTO: 51 K/UL (ref 150–450)
PLATELET # BLD AUTO: 52 K/UL (ref 150–450)
PLATELET # BLD AUTO: 53 K/UL (ref 150–450)
PLATELET # BLD AUTO: 54 K/UL (ref 150–450)
PLATELET # BLD AUTO: 56 K/UL (ref 150–450)
PLATELET # BLD AUTO: 57 K/UL (ref 150–450)
PLATELET # BLD AUTO: 58 K/UL (ref 150–450)
PLATELET # BLD AUTO: 59 K/UL (ref 150–450)
PLATELET # BLD AUTO: 61 K/UL (ref 150–450)
PLATELET # BLD AUTO: 62 K/UL (ref 150–450)
PLATELET # BLD AUTO: 64 K/UL (ref 150–450)
PLATELET # BLD AUTO: 66 K/UL (ref 150–450)
PLATELET # BLD AUTO: 71 K/UL (ref 150–450)
PLATELET # BLD AUTO: 81 K/UL (ref 150–450)
PLATELET # BLD AUTO: 82 K/UL (ref 150–450)
PLATELET # BLD AUTO: 83 K/UL (ref 150–450)
PLATELET # BLD AUTO: 85 K/UL (ref 150–450)
PLATELET # BLD AUTO: 86 K/UL (ref 150–450)
PLATELET # BLD AUTO: 89 K/UL (ref 150–450)
PLATELET # BLD AUTO: 93 K/UL (ref 150–450)
PLATELET # BLD AUTO: 94 K/UL (ref 150–450)
PLATELET # BLD AUTO: 95 K/UL (ref 150–450)
PLATELET # BLD AUTO: 97 K/UL (ref 150–450)
PMV BLD AUTO: 10 FL (ref 10.8–14.1)
PMV BLD AUTO: 10.1 FL (ref 10.8–14.1)
PMV BLD AUTO: 10.4 FL (ref 10.8–14.1)
PMV BLD AUTO: 10.6 FL (ref 10.8–14.1)
PMV BLD AUTO: 9 FL (ref 10.8–14.1)
PMV BLD AUTO: 9.3 FL (ref 10.8–14.1)
PMV BLD AUTO: 9.4 FL (ref 10.8–14.1)
PMV BLD AUTO: 9.4 FL (ref 10.8–14.1)
PMV BLD AUTO: 9.5 FL (ref 10.8–14.1)
PMV BLD AUTO: 9.6 FL (ref 10.8–14.1)
PMV BLD AUTO: 9.6 FL (ref 10.8–14.1)
PMV BLD AUTO: 9.7 FL (ref 10.8–14.1)
PMV BLD AUTO: 9.8 FL (ref 10.8–14.1)
PMV BLD AUTO: 9.9 FL (ref 10.8–14.1)
PO2 BLDA: 116 MMHG (ref 80–105)
PO2 BLDA: 190 MMHG (ref 80–105)
PO2 BLDA: 204 MMHG (ref 80–105)
PO2 BLDA: 220 MMHG (ref 80–105)
POTASSIUM BLD-SCNC: 3.5 MMOL/L (ref 3.5–5.1)
POTASSIUM BLD-SCNC: 3.7 MMOL/L (ref 3.5–5.1)
POTASSIUM SERPL-SCNC: 2.7 MMOL/L (ref 3.5–5.1)
POTASSIUM SERPL-SCNC: 2.7 MMOL/L (ref 3.5–5.1)
POTASSIUM SERPL-SCNC: 2.9 MMOL/L (ref 3.5–5.1)
POTASSIUM SERPL-SCNC: 3 MMOL/L (ref 3.5–5.1)
POTASSIUM SERPL-SCNC: 3 MMOL/L (ref 3.5–5.1)
POTASSIUM SERPL-SCNC: 3.1 MMOL/L (ref 3.5–5.1)
POTASSIUM SERPL-SCNC: 3.2 MMOL/L (ref 3.5–5.1)
POTASSIUM SERPL-SCNC: 3.2 MMOL/L (ref 3.5–5.1)
POTASSIUM SERPL-SCNC: 3.3 MMOL/L (ref 3.5–5.1)
POTASSIUM SERPL-SCNC: 3.4 MMOL/L (ref 3.5–5.1)
POTASSIUM SERPL-SCNC: 3.5 MMOL/L (ref 3.5–5.1)
POTASSIUM SERPL-SCNC: 3.6 MMOL/L (ref 3.5–5.1)
POTASSIUM SERPL-SCNC: 3.7 MMOL/L (ref 3.5–5.1)
POTASSIUM SERPL-SCNC: 3.8 MMOL/L (ref 3.5–5.1)
POTASSIUM SERPL-SCNC: 3.9 MMOL/L (ref 3.5–5.1)
POTASSIUM SERPL-SCNC: 4 MMOL/L (ref 3.5–5.1)
POTASSIUM SERPL-SCNC: 4.1 MMOL/L (ref 3.5–5.1)
POTASSIUM SERPL-SCNC: 4.2 MMOL/L (ref 3.5–5.1)
POTASSIUM SERPL-SCNC: 4.4 MMOL/L (ref 3.5–5.1)
POTASSIUM SERPL-SCNC: 4.9 MMOL/L (ref 3.5–5.1)
PPD POC: NORMAL NEGATIVE
PPD POC: NORMAL NEGATIVE
PROCALCITONIN SERPL-MCNC: 1.6 NG/ML
PROCALCITONIN SERPL-MCNC: 1.7 NG/ML
PROT FLD-MCNC: 0.8 G/DL
PROT FLD-MCNC: 0.9 G/DL
PROT FLD-MCNC: 1 G/DL
PROT FLD-MCNC: 1.1 G/DL
PROT FLD-MCNC: 1.3 G/DL
PROT FLD-MCNC: <0.8 G/DL
PROT SERPL-MCNC: 5.2 G/DL (ref 6.3–8.2)
PROT SERPL-MCNC: 5.3 G/DL (ref 6.3–8.2)
PROT SERPL-MCNC: 5.3 G/DL (ref 6.3–8.2)
PROT SERPL-MCNC: 5.4 G/DL (ref 6.3–8.2)
PROT SERPL-MCNC: 5.5 G/DL (ref 6.3–8.2)
PROT SERPL-MCNC: 5.6 G/DL (ref 6.3–8.2)
PROT SERPL-MCNC: 5.6 G/DL (ref 6.3–8.2)
PROT SERPL-MCNC: 5.7 G/DL (ref 6.3–8.2)
PROT SERPL-MCNC: 5.7 G/DL (ref 6.3–8.2)
PROT SERPL-MCNC: 5.8 G/DL (ref 6.3–8.2)
PROT SERPL-MCNC: 5.9 G/DL (ref 6.3–8.2)
PROT SERPL-MCNC: 6 G/DL (ref 6.3–8.2)
PROT SERPL-MCNC: 6.1 G/DL (ref 6.3–8.2)
PROT SERPL-MCNC: 6.1 G/DL (ref 6.3–8.2)
PROT SERPL-MCNC: 6.3 G/DL (ref 6.3–8.2)
PROT SERPL-MCNC: 6.4 G/DL (ref 6.3–8.2)
PROT SERPL-MCNC: 6.7 G/DL (ref 6.3–8.2)
PROT SERPL-MCNC: 6.9 G/DL (ref 6.3–8.2)
PROT SERPL-MCNC: 7 G/DL (ref 6.3–8.2)
PROT SERPL-MCNC: 7.1 G/DL (ref 6.3–8.2)
PROT UR STRIP-MCNC: 100 MG/DL
PROT UR STRIP-MCNC: 30 MG/DL
PROT UR STRIP-MCNC: NEGATIVE MG/DL
PROTHROMBIN TIME: 12.4 SEC (ref 9.6–12)
PROTHROMBIN TIME: 12.7 SEC (ref 9.6–12)
PROTHROMBIN TIME: 12.9 SEC (ref 9.6–12)
PROTHROMBIN TIME: 13 SEC (ref 9.6–12)
PROTHROMBIN TIME: 13.7 SEC (ref 9.6–12)
PROTHROMBIN TIME: 14 SEC (ref 9.6–12)
PROTHROMBIN TIME: 14.1 SEC (ref 9.6–12)
PROTHROMBIN TIME: 14.6 SEC (ref 9.6–12)
PROTHROMBIN TIME: 14.6 SEC (ref 9.6–12)
PROTHROMBIN TIME: 14.8 SEC (ref 9.6–12)
PROTHROMBIN TIME: 15 SEC (ref 9.6–12)
PROTHROMBIN TIME: 15.2 SEC (ref 9.6–12)
PROTHROMBIN TIME: 16.1 SEC (ref 9.6–12)
PROTHROMBIN TIME: 16.1 SEC (ref 9.6–12)
PROTHROMBIN TIME: 16.4 SEC (ref 9.6–12)
Q-T INTERVAL, ECG07: 346 MS
Q-T INTERVAL, ECG07: 374 MS
Q-T INTERVAL, ECG07: 382 MS
QRS DURATION, ECG06: 72 MS
QRS DURATION, ECG06: 78 MS
QRS DURATION, ECG06: 84 MS
QTC CALCULATION (BEZET), ECG08: 432 MS
QTC CALCULATION (BEZET), ECG08: 445 MS
QTC CALCULATION (BEZET), ECG08: 459 MS
RBC # BLD AUTO: 2.08 M/UL (ref 4.05–5.25)
RBC # BLD AUTO: 2.14 M/UL (ref 4.05–5.25)
RBC # BLD AUTO: 2.27 M/UL (ref 4.05–5.25)
RBC # BLD AUTO: 2.31 M/UL (ref 4.05–5.25)
RBC # BLD AUTO: 2.44 M/UL (ref 4.05–5.25)
RBC # BLD AUTO: 2.44 M/UL (ref 4.05–5.25)
RBC # BLD AUTO: 2.45 M/UL (ref 4.05–5.25)
RBC # BLD AUTO: 2.49 M/UL (ref 4.05–5.25)
RBC # BLD AUTO: 2.56 M/UL (ref 4.05–5.25)
RBC # BLD AUTO: 2.61 M/UL (ref 4.05–5.25)
RBC # BLD AUTO: 2.65 M/UL (ref 4.05–5.25)
RBC # BLD AUTO: 2.66 M/UL (ref 4.05–5.25)
RBC # BLD AUTO: 2.71 M/UL (ref 4.05–5.25)
RBC # BLD AUTO: 2.75 M/UL (ref 4.05–5.25)
RBC # BLD AUTO: 2.76 M/UL (ref 4.05–5.25)
RBC # BLD AUTO: 2.76 M/UL (ref 4.05–5.25)
RBC # BLD AUTO: 2.78 M/UL (ref 4.05–5.25)
RBC # BLD AUTO: 2.79 M/UL (ref 4.05–5.25)
RBC # BLD AUTO: 2.81 M/UL (ref 4.05–5.25)
RBC # BLD AUTO: 2.82 M/UL (ref 4.05–5.25)
RBC # BLD AUTO: 2.83 M/UL (ref 4.05–5.25)
RBC # BLD AUTO: 2.86 M/UL (ref 4.05–5.25)
RBC # BLD AUTO: 2.86 M/UL (ref 4.05–5.25)
RBC # BLD AUTO: 2.9 M/UL (ref 4.05–5.25)
RBC # BLD AUTO: 2.9 M/UL (ref 4.05–5.25)
RBC # BLD AUTO: 2.93 M/UL (ref 4.05–5.25)
RBC # BLD AUTO: 2.98 M/UL (ref 4.05–5.25)
RBC # BLD AUTO: 3.06 M/UL (ref 4.05–5.25)
RBC # BLD AUTO: 3.25 M/UL (ref 4.05–5.25)
RBC # BLD AUTO: 3.27 M/UL (ref 4.05–5.25)
RBC # BLD AUTO: 3.29 M/UL (ref 4.05–5.25)
RBC # BLD AUTO: 3.3 M/UL (ref 4.05–5.25)
RBC # BLD AUTO: 3.42 M/UL (ref 4.05–5.25)
RBC # BLD AUTO: 3.47 M/UL (ref 4.05–5.25)
RBC # BLD AUTO: 3.52 M/UL (ref 4.05–5.25)
RBC # BLD AUTO: 3.54 M/UL (ref 4.05–5.25)
RBC # BLD AUTO: 3.54 M/UL (ref 4.05–5.25)
RBC # BLD AUTO: 3.64 M/UL (ref 4.05–5.25)
RBC # BLD AUTO: 3.64 M/UL (ref 4.05–5.25)
RBC # BLD AUTO: 3.77 M/UL (ref 4.05–5.25)
RBC # BLD AUTO: 3.86 M/UL (ref 4.05–5.25)
RBC # BLD AUTO: 4.02 M/UL (ref 4.05–5.25)
RBC # BLD AUTO: 4.03 M/UL (ref 4.05–5.25)
RBC # BLD AUTO: 4.14 M/UL (ref 4.05–5.25)
RBC # FLD: NORMAL /CU MM
RBC #/AREA URNS HPF: ABNORMAL /HPF
RBC #/AREA URNS HPF: NORMAL /HPF
RESP RATE: 12
RESP RATE: 13
RESP RATE: 16
RETICS # AUTO: 0.07 M/UL (ref 0.02–0.08)
RETICS/RBC NFR AUTO: 1.7 % (ref 0.3–2)
SAO2 % BLD: 98 % (ref 92–98.5)
SAO2 % BLD: 99 % (ref 92–98.5)
SERVICE CMNT-IMP: ABNORMAL
SERVICE CMNT-IMP: NORMAL
SODIUM SERPL-SCNC: 135 MMOL/L (ref 136–145)
SODIUM SERPL-SCNC: 136 MMOL/L (ref 136–145)
SODIUM SERPL-SCNC: 137 MMOL/L (ref 136–145)
SODIUM SERPL-SCNC: 138 MMOL/L (ref 136–145)
SODIUM SERPL-SCNC: 138 MMOL/L (ref 136–145)
SODIUM SERPL-SCNC: 139 MMOL/L (ref 136–145)
SODIUM SERPL-SCNC: 140 MMOL/L (ref 136–145)
SODIUM SERPL-SCNC: 141 MMOL/L (ref 136–145)
SODIUM SERPL-SCNC: 142 MMOL/L (ref 136–145)
SODIUM SERPL-SCNC: 143 MMOL/L (ref 136–145)
SODIUM SERPL-SCNC: 144 MMOL/L (ref 136–145)
SODIUM SERPL-SCNC: 145 MMOL/L (ref 136–145)
SODIUM SERPL-SCNC: 146 MMOL/L (ref 136–145)
SODIUM SERPL-SCNC: 149 MMOL/L (ref 136–145)
SP GR UR REFRACTOMETRY: 1.01
SP GR UR REFRACTOMETRY: 1.01 (ref 1–1.02)
SP GR UR REFRACTOMETRY: 1.02 (ref 1–1.02)
SPECIMEN EXP DATE BLD: NORMAL
SPECIMEN SOURCE FLD: NORMAL
SPECIMEN SOURCE: NORMAL
STATUS OF UNIT,%ST: NORMAL
TIBC SERPL-MCNC: 70 UG/DL (ref 250–450)
TRIGL SERPL-MCNC: 110 MG/DL (ref 35–150)
TROPONIN I BLD-MCNC: 0 NG/ML (ref 0.02–0.05)
TROPONIN I SERPL-MCNC: <0.02 NG/ML (ref 0.02–0.05)
TSH SERPL DL<=0.005 MIU/L-ACNC: 0.77 UIU/ML (ref 0.36–3.74)
UNIT DIVISION, %UDIV: 0
UROBILINOGEN UR QL STRIP.AUTO: 0.2 EU/DL (ref 0.2–1)
VANCOMYCIN SERPL-MCNC: 11 UG/ML
VANCOMYCIN SERPL-MCNC: 11.7 UG/ML
VANCOMYCIN SERPL-MCNC: 16.6 UG/ML
VANCOMYCIN SERPL-MCNC: 20.4 UG/ML
VANCOMYCIN SERPL-MCNC: 21.3 UG/ML
VANCOMYCIN SERPL-MCNC: 31.5 UG/ML
VENTILATION MODE VENT: ABNORMAL
VENTRICULAR RATE, ECG03: 85 BPM
VENTRICULAR RATE, ECG03: 87 BPM
VENTRICULAR RATE, ECG03: 94 BPM
VLDLC SERPL CALC-MCNC: 22 MG/DL (ref 6–23)
VT SETTING VENT: 384 ML
VT SETTING VENT: 400 ML
VT SETTING VENT: 400 ML
WBC # BLD AUTO: 1.6 K/UL (ref 4.3–11.1)
WBC # BLD AUTO: 10.1 K/UL (ref 4.3–11.1)
WBC # BLD AUTO: 11.3 K/UL (ref 4.3–11.1)
WBC # BLD AUTO: 12.8 K/UL (ref 4.3–11.1)
WBC # BLD AUTO: 2.1 K/UL (ref 4.3–11.1)
WBC # BLD AUTO: 2.1 K/UL (ref 4.3–11.1)
WBC # BLD AUTO: 2.2 K/UL (ref 4.3–11.1)
WBC # BLD AUTO: 2.3 K/UL (ref 4.3–11.1)
WBC # BLD AUTO: 2.8 K/UL (ref 4.3–11.1)
WBC # BLD AUTO: 2.9 K/UL (ref 4.3–11.1)
WBC # BLD AUTO: 3.1 K/UL (ref 4.3–11.1)
WBC # BLD AUTO: 3.2 K/UL (ref 4.3–11.1)
WBC # BLD AUTO: 3.4 K/UL (ref 4.3–11.1)
WBC # BLD AUTO: 3.5 K/UL (ref 4.3–11.1)
WBC # BLD AUTO: 3.5 K/UL (ref 4.3–11.1)
WBC # BLD AUTO: 3.6 K/UL (ref 4.3–11.1)
WBC # BLD AUTO: 3.7 K/UL (ref 4.3–11.1)
WBC # BLD AUTO: 3.7 K/UL (ref 4.3–11.1)
WBC # BLD AUTO: 3.8 K/UL (ref 4.3–11.1)
WBC # BLD AUTO: 4.1 K/UL (ref 4.3–11.1)
WBC # BLD AUTO: 4.2 K/UL (ref 4.3–11.1)
WBC # BLD AUTO: 4.3 K/UL (ref 4.3–11.1)
WBC # BLD AUTO: 4.4 K/UL (ref 4.3–11.1)
WBC # BLD AUTO: 4.5 K/UL (ref 4.3–11.1)
WBC # BLD AUTO: 4.9 K/UL (ref 4.3–11.1)
WBC # BLD AUTO: 5.5 K/UL (ref 4.3–11.1)
WBC # BLD AUTO: 5.8 K/UL (ref 4.3–11.1)
WBC # BLD AUTO: 6 K/UL (ref 4.3–11.1)
WBC # BLD AUTO: 6.1 K/UL (ref 4.3–11.1)
WBC # BLD AUTO: 6.3 K/UL (ref 4.3–11.1)
WBC # BLD AUTO: 6.5 K/UL (ref 4.3–11.1)
WBC # BLD AUTO: 6.5 K/UL (ref 4.3–11.1)
WBC # BLD AUTO: 6.7 K/UL (ref 4.3–11.1)
WBC # BLD AUTO: 6.7 K/UL (ref 4.3–11.1)
WBC # BLD AUTO: 7.5 K/UL (ref 4.3–11.1)
WBC # BLD AUTO: 7.6 K/UL (ref 4.3–11.1)
WBC # BLD AUTO: 7.7 K/UL (ref 4.3–11.1)
WBC # BLD AUTO: 8.3 K/UL (ref 4.3–11.1)
WBC # BLD AUTO: 9 K/UL (ref 4.3–11.1)
WBC # BLD AUTO: 9.1 K/UL (ref 4.3–11.1)
WBC # BLD AUTO: 9.6 K/UL (ref 4.3–11.1)
WBC URNS QL MICRO: 0 /HPF
WBC URNS QL MICRO: ABNORMAL /HPF
WBC URNS QL MICRO: ABNORMAL /HPF
YEAST URNS QL MICRO: ABNORMAL

## 2017-01-01 PROCEDURE — 74011250637 HC RX REV CODE- 250/637: Performed by: FAMILY MEDICINE

## 2017-01-01 PROCEDURE — 3331090002 HH PPS REVENUE DEBIT

## 2017-01-01 PROCEDURE — 77030002916 HC SUT ETHLN J&J -A

## 2017-01-01 PROCEDURE — 77030011256 HC DRSG MEPILEX <16IN NO BORD MOLN -A

## 2017-01-01 PROCEDURE — 3331090001 HH PPS REVENUE CREDIT

## 2017-01-01 PROCEDURE — 74011250637 HC RX REV CODE- 250/637: Performed by: INTERNAL MEDICINE

## 2017-01-01 PROCEDURE — 36591 DRAW BLOOD OFF VENOUS DEVICE: CPT

## 2017-01-01 PROCEDURE — 80053 COMPREHEN METABOLIC PANEL: CPT | Performed by: INTERNAL MEDICINE

## 2017-01-01 PROCEDURE — 49083 ABD PARACENTESIS W/IMAGING: CPT

## 2017-01-01 PROCEDURE — 0656 HSPC GENERAL INPATIENT

## 2017-01-01 PROCEDURE — 85025 COMPLETE CBC W/AUTO DIFF WBC: CPT | Performed by: INTERNAL MEDICINE

## 2017-01-01 PROCEDURE — 74011250636 HC RX REV CODE- 250/636: Performed by: INTERNAL MEDICINE

## 2017-01-01 PROCEDURE — 84132 ASSAY OF SERUM POTASSIUM: CPT

## 2017-01-01 PROCEDURE — 74011250637 HC RX REV CODE- 250/637: Performed by: HOSPITALIST

## 2017-01-01 PROCEDURE — C9113 INJ PANTOPRAZOLE SODIUM, VIA: HCPCS | Performed by: INTERNAL MEDICINE

## 2017-01-01 PROCEDURE — 85027 COMPLETE CBC AUTOMATED: CPT | Performed by: INTERNAL MEDICINE

## 2017-01-01 PROCEDURE — 82962 GLUCOSE BLOOD TEST: CPT

## 2017-01-01 PROCEDURE — 85610 PROTHROMBIN TIME: CPT | Performed by: EMERGENCY MEDICINE

## 2017-01-01 PROCEDURE — 82140 ASSAY OF AMMONIA: CPT | Performed by: INTERNAL MEDICINE

## 2017-01-01 PROCEDURE — 65660000000 HC RM CCU STEPDOWN

## 2017-01-01 PROCEDURE — 76060000031 HC ANESTHESIA FIRST 0.5 HR: Performed by: INTERNAL MEDICINE

## 2017-01-01 PROCEDURE — 74011250636 HC RX REV CODE- 250/636: Performed by: FAMILY MEDICINE

## 2017-01-01 PROCEDURE — 82042 OTHER SOURCE ALBUMIN QUAN EA: CPT | Performed by: INTERNAL MEDICINE

## 2017-01-01 PROCEDURE — 94003 VENT MGMT INPAT SUBQ DAY: CPT

## 2017-01-01 PROCEDURE — 96523 IRRIG DRUG DELIVERY DEVICE: CPT

## 2017-01-01 PROCEDURE — 74011250637 HC RX REV CODE- 250/637: Performed by: PHYSICIAN ASSISTANT

## 2017-01-01 PROCEDURE — 85025 COMPLETE CBC W/AUTO DIFF WBC: CPT | Performed by: FAMILY MEDICINE

## 2017-01-01 PROCEDURE — 99213 OFFICE O/P EST LOW 20 MIN: CPT

## 2017-01-01 PROCEDURE — 89050 BODY FLUID CELL COUNT: CPT | Performed by: FAMILY MEDICINE

## 2017-01-01 PROCEDURE — 80053 COMPREHEN METABOLIC PANEL: CPT | Performed by: EMERGENCY MEDICINE

## 2017-01-01 PROCEDURE — 74011000250 HC RX REV CODE- 250: Performed by: NURSE PRACTITIONER

## 2017-01-01 PROCEDURE — 80158 DRUG ASSAY CYCLOSPORINE: CPT | Performed by: INTERNAL MEDICINE

## 2017-01-01 PROCEDURE — 74011250636 HC RX REV CODE- 250/636: Performed by: EMERGENCY MEDICINE

## 2017-01-01 PROCEDURE — 89050 BODY FLUID CELL COUNT: CPT | Performed by: INTERNAL MEDICINE

## 2017-01-01 PROCEDURE — 99231 SBSQ HOSP IP/OBS SF/LOW 25: CPT | Performed by: INTERNAL MEDICINE

## 2017-01-01 PROCEDURE — 82105 ALPHA-FETOPROTEIN SERUM: CPT | Performed by: INTERNAL MEDICINE

## 2017-01-01 PROCEDURE — 77030031131 HC SUT MXN P COVD -B

## 2017-01-01 PROCEDURE — 77030013131 HC IV BLD ST ICUM -A

## 2017-01-01 PROCEDURE — 400013 HH SOC

## 2017-01-01 PROCEDURE — 96375 TX/PRO/DX INJ NEW DRUG ADDON: CPT | Performed by: EMERGENCY MEDICINE

## 2017-01-01 PROCEDURE — 93306 TTE W/DOPPLER COMPLETE: CPT

## 2017-01-01 PROCEDURE — 90935 HEMODIALYSIS ONE EVALUATION: CPT

## 2017-01-01 PROCEDURE — 0DJ08ZZ INSPECTION OF UPPER INTESTINAL TRACT, VIA NATURAL OR ARTIFICIAL OPENING ENDOSCOPIC: ICD-10-PCS | Performed by: INTERNAL MEDICINE

## 2017-01-01 PROCEDURE — 85610 PROTHROMBIN TIME: CPT | Performed by: INTERNAL MEDICINE

## 2017-01-01 PROCEDURE — 74011250636 HC RX REV CODE- 250/636

## 2017-01-01 PROCEDURE — 02H633Z INSERTION OF INFUSION DEVICE INTO RIGHT ATRIUM, PERCUTANEOUS APPROACH: ICD-10-PCS | Performed by: RADIOLOGY

## 2017-01-01 PROCEDURE — 74011250636 HC RX REV CODE- 250/636: Performed by: HOSPITALIST

## 2017-01-01 PROCEDURE — 85730 THROMBOPLASTIN TIME PARTIAL: CPT | Performed by: HOSPITALIST

## 2017-01-01 PROCEDURE — 80048 BASIC METABOLIC PNL TOTAL CA: CPT | Performed by: INTERNAL MEDICINE

## 2017-01-01 PROCEDURE — 70450 CT HEAD/BRAIN W/O DYE: CPT

## 2017-01-01 PROCEDURE — 36592 COLLECT BLOOD FROM PICC: CPT

## 2017-01-01 PROCEDURE — 74011250636 HC RX REV CODE- 250/636: Performed by: NURSE PRACTITIONER

## 2017-01-01 PROCEDURE — 65270000029 HC RM PRIVATE

## 2017-01-01 PROCEDURE — 77030018719 HC DRSG PTCH ANTIMIC J&J -A

## 2017-01-01 PROCEDURE — 74011000250 HC RX REV CODE- 250: Performed by: EMERGENCY MEDICINE

## 2017-01-01 PROCEDURE — 74011250636 HC RX REV CODE- 250/636: Performed by: SURGERY

## 2017-01-01 PROCEDURE — P9047 ALBUMIN (HUMAN), 25%, 50ML: HCPCS | Performed by: PHYSICIAN ASSISTANT

## 2017-01-01 PROCEDURE — 83605 ASSAY OF LACTIC ACID: CPT | Performed by: HOSPITALIST

## 2017-01-01 PROCEDURE — 88184 FLOWCYTOMETRY/ TC 1 MARKER: CPT | Performed by: FAMILY MEDICINE

## 2017-01-01 PROCEDURE — 77030020291 HC FLEXSEAL FMS BMS -C

## 2017-01-01 PROCEDURE — 74011000250 HC RX REV CODE- 250

## 2017-01-01 PROCEDURE — 82150 ASSAY OF AMYLASE: CPT | Performed by: FAMILY MEDICINE

## 2017-01-01 PROCEDURE — P9047 ALBUMIN (HUMAN), 25%, 50ML: HCPCS | Performed by: INTERNAL MEDICINE

## 2017-01-01 PROCEDURE — 82140 ASSAY OF AMMONIA: CPT | Performed by: HOSPITALIST

## 2017-01-01 PROCEDURE — 85610 PROTHROMBIN TIME: CPT

## 2017-01-01 PROCEDURE — 36430 TRANSFUSION BLD/BLD COMPNT: CPT

## 2017-01-01 PROCEDURE — 74011250637 HC RX REV CODE- 250/637: Performed by: NURSE PRACTITIONER

## 2017-01-01 PROCEDURE — C1894 INTRO/SHEATH, NON-LASER: HCPCS

## 2017-01-01 PROCEDURE — 80158 DRUG ASSAY CYCLOSPORINE: CPT | Performed by: HOSPITALIST

## 2017-01-01 PROCEDURE — 87641 MR-STAPH DNA AMP PROBE: CPT | Performed by: INTERNAL MEDICINE

## 2017-01-01 PROCEDURE — 84100 ASSAY OF PHOSPHORUS: CPT | Performed by: INTERNAL MEDICINE

## 2017-01-01 PROCEDURE — 86923 COMPATIBILITY TEST ELECTRIC: CPT | Performed by: INTERNAL MEDICINE

## 2017-01-01 PROCEDURE — 74011000250 HC RX REV CODE- 250: Performed by: FAMILY MEDICINE

## 2017-01-01 PROCEDURE — 74011000250 HC RX REV CODE- 250: Performed by: INTERNAL MEDICINE

## 2017-01-01 PROCEDURE — G0299 HHS/HOSPICE OF RN EA 15 MIN: HCPCS

## 2017-01-01 PROCEDURE — 87205 SMEAR GRAM STAIN: CPT | Performed by: INTERNAL MEDICINE

## 2017-01-01 PROCEDURE — 82150 ASSAY OF AMYLASE: CPT | Performed by: INTERNAL MEDICINE

## 2017-01-01 PROCEDURE — 067Y0DZ DILATION OF LOWER VEIN WITH INTRALUMINAL DEVICE, OPEN APPROACH: ICD-10-PCS | Performed by: SURGERY

## 2017-01-01 PROCEDURE — 84100 ASSAY OF PHOSPHORUS: CPT | Performed by: EMERGENCY MEDICINE

## 2017-01-01 PROCEDURE — 85610 PROTHROMBIN TIME: CPT | Performed by: FAMILY MEDICINE

## 2017-01-01 PROCEDURE — 86900 BLOOD TYPING SEROLOGIC ABO: CPT | Performed by: INTERNAL MEDICINE

## 2017-01-01 PROCEDURE — 74011000302 HC RX REV CODE- 302: Performed by: PHYSICIAN ASSISTANT

## 2017-01-01 PROCEDURE — 83605 ASSAY OF LACTIC ACID: CPT | Performed by: FAMILY MEDICINE

## 2017-01-01 PROCEDURE — 77030010507 HC ADH SKN DERMBND J&J -B

## 2017-01-01 PROCEDURE — 74000 XR ABD (KUB): CPT

## 2017-01-01 PROCEDURE — 74011636320 HC RX REV CODE- 636/320: Performed by: SURGERY

## 2017-01-01 PROCEDURE — 84484 ASSAY OF TROPONIN QUANT: CPT

## 2017-01-01 PROCEDURE — 77030032490 HC SLV COMPR SCD KNE COVD -B

## 2017-01-01 PROCEDURE — 84157 ASSAY OF PROTEIN OTHER: CPT | Performed by: INTERNAL MEDICINE

## 2017-01-01 PROCEDURE — 3E04317 INTRODUCTION OF OTHER THROMBOLYTIC INTO CENTRAL VEIN, PERCUTANEOUS APPROACH: ICD-10-PCS | Performed by: INTERNAL MEDICINE

## 2017-01-01 PROCEDURE — 84157 ASSAY OF PROTEIN OTHER: CPT | Performed by: PHYSICIAN ASSISTANT

## 2017-01-01 PROCEDURE — 71010 XR CHEST PORT: CPT

## 2017-01-01 PROCEDURE — 87186 SC STD MICRODIL/AGAR DIL: CPT | Performed by: INTERNAL MEDICINE

## 2017-01-01 PROCEDURE — 83690 ASSAY OF LIPASE: CPT | Performed by: INTERNAL MEDICINE

## 2017-01-01 PROCEDURE — 74011000258 HC RX REV CODE- 258: Performed by: INTERNAL MEDICINE

## 2017-01-01 PROCEDURE — 84100 ASSAY OF PHOSPHORUS: CPT | Performed by: FAMILY MEDICINE

## 2017-01-01 PROCEDURE — 77030019605

## 2017-01-01 PROCEDURE — 36415 COLL VENOUS BLD VENIPUNCTURE: CPT | Performed by: EMERGENCY MEDICINE

## 2017-01-01 PROCEDURE — 86923 COMPATIBILITY TEST ELECTRIC: CPT | Performed by: FAMILY MEDICINE

## 2017-01-01 PROCEDURE — 85379 FIBRIN DEGRADATION QUANT: CPT | Performed by: FAMILY MEDICINE

## 2017-01-01 PROCEDURE — C1751 CATH, INF, PER/CENT/MIDLINE: HCPCS

## 2017-01-01 PROCEDURE — 99203 OFFICE O/P NEW LOW 30 MIN: CPT

## 2017-01-01 PROCEDURE — 76857 US EXAM PELVIC LIMITED: CPT

## 2017-01-01 PROCEDURE — C1769 GUIDE WIRE: HCPCS

## 2017-01-01 PROCEDURE — 74011000258 HC RX REV CODE- 258: Performed by: HOSPITALIST

## 2017-01-01 PROCEDURE — C1894 INTRO/SHEATH, NON-LASER: HCPCS | Performed by: SURGERY

## 2017-01-01 PROCEDURE — 0BH18EZ INSERTION OF ENDOTRACHEAL AIRWAY INTO TRACHEA, VIA NATURAL OR ARTIFICIAL OPENING ENDOSCOPIC: ICD-10-PCS | Performed by: INTERNAL MEDICINE

## 2017-01-01 PROCEDURE — 74011636637 HC RX REV CODE- 636/637: Performed by: INTERNAL MEDICINE

## 2017-01-01 PROCEDURE — G0157 HHC PT ASSISTANT EA 15: HCPCS

## 2017-01-01 PROCEDURE — P9016 RBC LEUKOCYTES REDUCED: HCPCS | Performed by: FAMILY MEDICINE

## 2017-01-01 PROCEDURE — 5A1945Z RESPIRATORY VENTILATION, 24-96 CONSECUTIVE HOURS: ICD-10-PCS | Performed by: INTERNAL MEDICINE

## 2017-01-01 PROCEDURE — 76040000025: Performed by: INTERNAL MEDICINE

## 2017-01-01 PROCEDURE — 87040 BLOOD CULTURE FOR BACTERIA: CPT | Performed by: EMERGENCY MEDICINE

## 2017-01-01 PROCEDURE — 5A1D60Z PERFORMANCE OF URINARY FILTRATION, MULTIPLE: ICD-10-PCS | Performed by: INTERNAL MEDICINE

## 2017-01-01 PROCEDURE — 77001 FLUOROGUIDE FOR VEIN DEVICE: CPT

## 2017-01-01 PROCEDURE — 77030002916 HC SUT ETHLN J&J -A: Performed by: SURGERY

## 2017-01-01 PROCEDURE — 93005 ELECTROCARDIOGRAM TRACING: CPT | Performed by: EMERGENCY MEDICINE

## 2017-01-01 PROCEDURE — 77030031476 HC EXCH HEAT MOISTW FLTR HALY -A

## 2017-01-01 PROCEDURE — 97166 OT EVAL MOD COMPLEX 45 MIN: CPT

## 2017-01-01 PROCEDURE — 99233 SBSQ HOSP IP/OBS HIGH 50: CPT | Performed by: INTERNAL MEDICINE

## 2017-01-01 PROCEDURE — 83735 ASSAY OF MAGNESIUM: CPT | Performed by: NURSE PRACTITIONER

## 2017-01-01 PROCEDURE — 83735 ASSAY OF MAGNESIUM: CPT | Performed by: INTERNAL MEDICINE

## 2017-01-01 PROCEDURE — 82947 ASSAY GLUCOSE BLOOD QUANT: CPT

## 2017-01-01 PROCEDURE — 85025 COMPLETE CBC W/AUTO DIFF WBC: CPT | Performed by: HOSPITALIST

## 2017-01-01 PROCEDURE — 31500 INSERT EMERGENCY AIRWAY: CPT | Performed by: INTERNAL MEDICINE

## 2017-01-01 PROCEDURE — 74011250636 HC RX REV CODE- 250/636: Performed by: ANESTHESIOLOGY

## 2017-01-01 PROCEDURE — 65620000000 HC RM CCU GENERAL

## 2017-01-01 PROCEDURE — 94002 VENT MGMT INPAT INIT DAY: CPT

## 2017-01-01 PROCEDURE — 76705 ECHO EXAM OF ABDOMEN: CPT

## 2017-01-01 PROCEDURE — 76001 XR FLUOROSCOPY OVER 60 MINUTES: CPT

## 2017-01-01 PROCEDURE — 77030013058 HC DEV INFL ANGIO BSC -B: Performed by: SURGERY

## 2017-01-01 PROCEDURE — 88305 TISSUE EXAM BY PATHOLOGIST: CPT | Performed by: INTERNAL MEDICINE

## 2017-01-01 PROCEDURE — 97530 THERAPEUTIC ACTIVITIES: CPT

## 2017-01-01 PROCEDURE — 83735 ASSAY OF MAGNESIUM: CPT

## 2017-01-01 PROCEDURE — 06CY0ZZ EXTIRPATION OF MATTER FROM LOWER VEIN, OPEN APPROACH: ICD-10-PCS | Performed by: SURGERY

## 2017-01-01 PROCEDURE — 81015 MICROSCOPIC EXAM OF URINE: CPT | Performed by: EMERGENCY MEDICINE

## 2017-01-01 PROCEDURE — 77030004950 HC CATH ENTRL NG COVD -A

## 2017-01-01 PROCEDURE — 85025 COMPLETE CBC W/AUTO DIFF WBC: CPT | Performed by: PHYSICIAN ASSISTANT

## 2017-01-01 PROCEDURE — 88112 CYTOPATH CELL ENHANCE TECH: CPT | Performed by: INTERNAL MEDICINE

## 2017-01-01 PROCEDURE — 74011250636 HC RX REV CODE- 250/636: Performed by: PHYSICIAN ASSISTANT

## 2017-01-01 PROCEDURE — 93005 ELECTROCARDIOGRAM TRACING: CPT

## 2017-01-01 PROCEDURE — 85025 COMPLETE CBC W/AUTO DIFF WBC: CPT | Performed by: NURSE PRACTITIONER

## 2017-01-01 PROCEDURE — 82150 ASSAY OF AMYLASE: CPT | Performed by: PHYSICIAN ASSISTANT

## 2017-01-01 PROCEDURE — 83540 ASSAY OF IRON: CPT | Performed by: INTERNAL MEDICINE

## 2017-01-01 PROCEDURE — 85379 FIBRIN DEGRADATION QUANT: CPT

## 2017-01-01 PROCEDURE — 85025 COMPLETE CBC W/AUTO DIFF WBC: CPT

## 2017-01-01 PROCEDURE — 0W9G3ZZ DRAINAGE OF PERITONEAL CAVITY, PERCUTANEOUS APPROACH: ICD-10-PCS | Performed by: RADIOLOGY

## 2017-01-01 PROCEDURE — 87641 MR-STAPH DNA AMP PROBE: CPT | Performed by: HOSPITALIST

## 2017-01-01 PROCEDURE — 87040 BLOOD CULTURE FOR BACTERIA: CPT | Performed by: HOSPITALIST

## 2017-01-01 PROCEDURE — 76210000063 HC OR PH I REC FIRST 0.5 HR: Performed by: SURGERY

## 2017-01-01 PROCEDURE — 84100 ASSAY OF PHOSPHORUS: CPT

## 2017-01-01 PROCEDURE — 30233R1 TRANSFUSION OF NONAUTOLOGOUS PLATELETS INTO PERIPHERAL VEIN, PERCUTANEOUS APPROACH: ICD-10-PCS | Performed by: HOSPITALIST

## 2017-01-01 PROCEDURE — 80202 ASSAY OF VANCOMYCIN: CPT | Performed by: INTERNAL MEDICINE

## 2017-01-01 PROCEDURE — 77030020782 HC GWN BAIR PAWS FLX 3M -B: Performed by: ANESTHESIOLOGY

## 2017-01-01 PROCEDURE — 87340 HEPATITIS B SURFACE AG IA: CPT | Performed by: NURSE PRACTITIONER

## 2017-01-01 PROCEDURE — 80053 COMPREHEN METABOLIC PANEL: CPT | Performed by: HOSPITALIST

## 2017-01-01 PROCEDURE — 3336500001 HSPC ELECTION

## 2017-01-01 PROCEDURE — 36415 COLL VENOUS BLD VENIPUNCTURE: CPT | Performed by: INTERNAL MEDICINE

## 2017-01-01 PROCEDURE — 83036 HEMOGLOBIN GLYCOSYLATED A1C: CPT | Performed by: INTERNAL MEDICINE

## 2017-01-01 PROCEDURE — 85025 COMPLETE CBC W/AUTO DIFF WBC: CPT | Performed by: EMERGENCY MEDICINE

## 2017-01-01 PROCEDURE — 77030002987 HC SUT PROL J&J -B: Performed by: SURGERY

## 2017-01-01 PROCEDURE — P9035 PLATELET PHERES LEUKOREDUCED: HCPCS | Performed by: HOSPITALIST

## 2017-01-01 PROCEDURE — 77030027138 HC INCENT SPIROMETER -A

## 2017-01-01 PROCEDURE — 82042 OTHER SOURCE ALBUMIN QUAN EA: CPT | Performed by: FAMILY MEDICINE

## 2017-01-01 PROCEDURE — 36600 WITHDRAWAL OF ARTERIAL BLOOD: CPT

## 2017-01-01 PROCEDURE — 99232 SBSQ HOSP IP/OBS MODERATE 35: CPT | Performed by: INTERNAL MEDICINE

## 2017-01-01 PROCEDURE — 74011250636 HC RX REV CODE- 250/636: Performed by: RADIOLOGY

## 2017-01-01 PROCEDURE — 87040 BLOOD CULTURE FOR BACTERIA: CPT | Performed by: INTERNAL MEDICINE

## 2017-01-01 PROCEDURE — 81001 URINALYSIS AUTO W/SCOPE: CPT | Performed by: EMERGENCY MEDICINE

## 2017-01-01 PROCEDURE — C1729 CATH, DRAINAGE: HCPCS

## 2017-01-01 PROCEDURE — 82150 ASSAY OF AMYLASE: CPT | Performed by: NURSE PRACTITIONER

## 2017-01-01 PROCEDURE — C1876 STENT, NON-COA/NON-COV W/DEL: HCPCS | Performed by: SURGERY

## 2017-01-01 PROCEDURE — 86580 TB INTRADERMAL TEST: CPT | Performed by: PHYSICIAN ASSISTANT

## 2017-01-01 PROCEDURE — 71010 XR CHEST SNGL V: CPT

## 2017-01-01 PROCEDURE — 36415 COLL VENOUS BLD VENIPUNCTURE: CPT | Performed by: FAMILY MEDICINE

## 2017-01-01 PROCEDURE — 77030011640 HC PAD GRND REM COVD -A: Performed by: SURGERY

## 2017-01-01 PROCEDURE — 86580 TB INTRADERMAL TEST: CPT | Performed by: HOSPITALIST

## 2017-01-01 PROCEDURE — 99153 MOD SED SAME PHYS/QHP EA: CPT

## 2017-01-01 PROCEDURE — 86644 CMV ANTIBODY: CPT

## 2017-01-01 PROCEDURE — 82140 ASSAY OF AMMONIA: CPT | Performed by: EMERGENCY MEDICINE

## 2017-01-01 PROCEDURE — 82042 OTHER SOURCE ALBUMIN QUAN EA: CPT | Performed by: PHYSICIAN ASSISTANT

## 2017-01-01 PROCEDURE — 82977 ASSAY OF GGT: CPT

## 2017-01-01 PROCEDURE — 85730 THROMBOPLASTIN TIME PARTIAL: CPT | Performed by: FAMILY MEDICINE

## 2017-01-01 PROCEDURE — 99285 EMERGENCY DEPT VISIT HI MDM: CPT

## 2017-01-01 PROCEDURE — 99283 EMERGENCY DEPT VISIT LOW MDM: CPT | Performed by: EMERGENCY MEDICINE

## 2017-01-01 PROCEDURE — 87205 SMEAR GRAM STAIN: CPT | Performed by: PHYSICIAN ASSISTANT

## 2017-01-01 PROCEDURE — P9047 ALBUMIN (HUMAN), 25%, 50ML: HCPCS | Performed by: FAMILY MEDICINE

## 2017-01-01 PROCEDURE — 83880 ASSAY OF NATRIURETIC PEPTIDE: CPT

## 2017-01-01 PROCEDURE — 85730 THROMBOPLASTIN TIME PARTIAL: CPT

## 2017-01-01 PROCEDURE — 83735 ASSAY OF MAGNESIUM: CPT | Performed by: FAMILY MEDICINE

## 2017-01-01 PROCEDURE — 77030018798 HC PMP KT ENTRL FED COVD -A

## 2017-01-01 PROCEDURE — 77030034850

## 2017-01-01 PROCEDURE — 74011000250 HC RX REV CODE- 250: Performed by: PHYSICIAN ASSISTANT

## 2017-01-01 PROCEDURE — 85610 PROTHROMBIN TIME: CPT | Performed by: HOSPITALIST

## 2017-01-01 PROCEDURE — 89050 BODY FLUID CELL COUNT: CPT | Performed by: PHYSICIAN ASSISTANT

## 2017-01-01 PROCEDURE — 87077 CULTURE AEROBIC IDENTIFY: CPT | Performed by: HOSPITALIST

## 2017-01-01 PROCEDURE — 5A1D70Z PERFORMANCE OF URINARY FILTRATION, INTERMITTENT, LESS THAN 6 HOURS PER DAY: ICD-10-PCS | Performed by: INTERNAL MEDICINE

## 2017-01-01 PROCEDURE — 30233N1 TRANSFUSION OF NONAUTOLOGOUS RED BLOOD CELLS INTO PERIPHERAL VEIN, PERCUTANEOUS APPROACH: ICD-10-PCS | Performed by: HOSPITALIST

## 2017-01-01 PROCEDURE — 77030031139 HC SUT VCRL2 J&J -A: Performed by: SURGERY

## 2017-01-01 PROCEDURE — 80061 LIPID PANEL: CPT

## 2017-01-01 PROCEDURE — 77030008477 HC STYL SATN SLP COVD -A: Performed by: ANESTHESIOLOGY

## 2017-01-01 PROCEDURE — 80053 COMPREHEN METABOLIC PANEL: CPT

## 2017-01-01 PROCEDURE — 82140 ASSAY OF AMMONIA: CPT

## 2017-01-01 PROCEDURE — 80053 COMPREHEN METABOLIC PANEL: CPT | Performed by: FAMILY MEDICINE

## 2017-01-01 PROCEDURE — 87184 SC STD DISK METHOD PER PLATE: CPT | Performed by: INTERNAL MEDICINE

## 2017-01-01 PROCEDURE — P9035 PLATELET PHERES LEUKOREDUCED: HCPCS | Performed by: RADIOLOGY

## 2017-01-01 PROCEDURE — 83036 HEMOGLOBIN GLYCOSYLATED A1C: CPT | Performed by: FAMILY MEDICINE

## 2017-01-01 PROCEDURE — C1757 CATH, THROMBECTOMY/EMBOLECT: HCPCS | Performed by: SURGERY

## 2017-01-01 PROCEDURE — 82803 BLOOD GASES ANY COMBINATION: CPT

## 2017-01-01 PROCEDURE — 77030010507 HC ADH SKN DERMBND J&J -B: Performed by: SURGERY

## 2017-01-01 PROCEDURE — 85027 COMPLETE CBC AUTOMATED: CPT | Performed by: FAMILY MEDICINE

## 2017-01-01 PROCEDURE — 80048 BASIC METABOLIC PNL TOTAL CA: CPT | Performed by: NURSE PRACTITIONER

## 2017-01-01 PROCEDURE — 87205 SMEAR GRAM STAIN: CPT | Performed by: HOSPITALIST

## 2017-01-01 PROCEDURE — 86900 BLOOD TYPING SEROLOGIC ABO: CPT | Performed by: FAMILY MEDICINE

## 2017-01-01 PROCEDURE — 83605 ASSAY OF LACTIC ACID: CPT | Performed by: INTERNAL MEDICINE

## 2017-01-01 PROCEDURE — 99152 MOD SED SAME PHYS/QHP 5/>YRS: CPT

## 2017-01-01 PROCEDURE — 83605 ASSAY OF LACTIC ACID: CPT

## 2017-01-01 PROCEDURE — 80048 BASIC METABOLIC PNL TOTAL CA: CPT | Performed by: FAMILY MEDICINE

## 2017-01-01 PROCEDURE — G0151 HHCP-SERV OF PT,EA 15 MIN: HCPCS

## 2017-01-01 PROCEDURE — 84443 ASSAY THYROID STIM HORMONE: CPT | Performed by: HOSPITALIST

## 2017-01-01 PROCEDURE — P9016 RBC LEUKOCYTES REDUCED: HCPCS | Performed by: INTERNAL MEDICINE

## 2017-01-01 PROCEDURE — 30243N1 TRANSFUSION OF NONAUTOLOGOUS RED BLOOD CELLS INTO CENTRAL VEIN, PERCUTANEOUS APPROACH: ICD-10-PCS | Performed by: INTERNAL MEDICINE

## 2017-01-01 PROCEDURE — 76060000033 HC ANESTHESIA 1 TO 1.5 HR: Performed by: SURGERY

## 2017-01-01 PROCEDURE — 84157 ASSAY OF PROTEIN OTHER: CPT | Performed by: FAMILY MEDICINE

## 2017-01-01 PROCEDURE — 87070 CULTURE OTHR SPECIMN AEROBIC: CPT | Performed by: RADIOLOGY

## 2017-01-01 PROCEDURE — 77030032994 HC SOL INJ SOD CL 0.9% LFCR 500ML: Performed by: SURGERY

## 2017-01-01 PROCEDURE — 82465 ASSAY BLD/SERUM CHOLESTEROL: CPT | Performed by: INTERNAL MEDICINE

## 2017-01-01 PROCEDURE — B244ZZZ ULTRASONOGRAPHY OF RIGHT HEART: ICD-10-PCS | Performed by: RADIOLOGY

## 2017-01-01 PROCEDURE — 74011000258 HC RX REV CODE- 258: Performed by: FAMILY MEDICINE

## 2017-01-01 PROCEDURE — 82140 ASSAY OF AMMONIA: CPT | Performed by: NURSE PRACTITIONER

## 2017-01-01 PROCEDURE — 81003 URINALYSIS AUTO W/O SCOPE: CPT | Performed by: EMERGENCY MEDICINE

## 2017-01-01 PROCEDURE — 80158 DRUG ASSAY CYCLOSPORINE: CPT

## 2017-01-01 PROCEDURE — 84100 ASSAY OF PHOSPHORUS: CPT | Performed by: HOSPITALIST

## 2017-01-01 PROCEDURE — 99223 1ST HOSP IP/OBS HIGH 75: CPT | Performed by: INTERNAL MEDICINE

## 2017-01-01 PROCEDURE — 3331090003 HH PPS REVENUE ADJ

## 2017-01-01 PROCEDURE — 96374 THER/PROPH/DIAG INJ IV PUSH: CPT | Performed by: EMERGENCY MEDICINE

## 2017-01-01 PROCEDURE — 83735 ASSAY OF MAGNESIUM: CPT | Performed by: HOSPITALIST

## 2017-01-01 PROCEDURE — 96361 HYDRATE IV INFUSION ADD-ON: CPT | Performed by: EMERGENCY MEDICINE

## 2017-01-01 PROCEDURE — 82728 ASSAY OF FERRITIN: CPT | Performed by: INTERNAL MEDICINE

## 2017-01-01 PROCEDURE — 77067 SCR MAMMO BI INCL CAD: CPT

## 2017-01-01 PROCEDURE — 74011000250 HC RX REV CODE- 250: Performed by: RADIOLOGY

## 2017-01-01 PROCEDURE — 87205 SMEAR GRAM STAIN: CPT | Performed by: FAMILY MEDICINE

## 2017-01-01 PROCEDURE — C1750 CATH, HEMODIALYSIS,LONG-TERM: HCPCS

## 2017-01-01 PROCEDURE — 76010000149 HC OR TIME 1 TO 1.5 HR: Performed by: SURGERY

## 2017-01-01 PROCEDURE — 97162 PT EVAL MOD COMPLEX 30 MIN: CPT

## 2017-01-01 PROCEDURE — 82977 ASSAY OF GGT: CPT | Performed by: INTERNAL MEDICINE

## 2017-01-01 PROCEDURE — 74011250637 HC RX REV CODE- 250/637: Performed by: EMERGENCY MEDICINE

## 2017-01-01 PROCEDURE — 83690 ASSAY OF LIPASE: CPT | Performed by: NURSE PRACTITIONER

## 2017-01-01 PROCEDURE — 84145 PROCALCITONIN (PCT): CPT | Performed by: INTERNAL MEDICINE

## 2017-01-01 PROCEDURE — P9040 RBC LEUKOREDUCED IRRADIATED: HCPCS | Performed by: INTERNAL MEDICINE

## 2017-01-01 PROCEDURE — 85046 RETICYTE/HGB CONCENTRATE: CPT | Performed by: INTERNAL MEDICINE

## 2017-01-01 PROCEDURE — 74011000302 HC RX REV CODE- 302: Performed by: HOSPITALIST

## 2017-01-01 PROCEDURE — 87077 CULTURE AEROBIC IDENTIFY: CPT | Performed by: INTERNAL MEDICINE

## 2017-01-01 PROCEDURE — 85610 PROTHROMBIN TIME: CPT | Performed by: PHYSICIAN ASSISTANT

## 2017-01-01 PROCEDURE — 77030014007 HC SPNG HEMSTAT J&J -B

## 2017-01-01 PROCEDURE — 80202 ASSAY OF VANCOMYCIN: CPT | Performed by: SURGERY

## 2017-01-01 PROCEDURE — 77030034888 HC SUT PROL 2 J&J -B: Performed by: SURGERY

## 2017-01-01 PROCEDURE — 36415 COLL VENOUS BLD VENIPUNCTURE: CPT | Performed by: HOSPITALIST

## 2017-01-01 PROCEDURE — 80053 COMPREHEN METABOLIC PANEL: CPT | Performed by: PHYSICIAN ASSISTANT

## 2017-01-01 PROCEDURE — 84484 ASSAY OF TROPONIN QUANT: CPT | Performed by: FAMILY MEDICINE

## 2017-01-01 PROCEDURE — 76450000000

## 2017-01-01 PROCEDURE — 87389 HIV-1 AG W/HIV-1&-2 AB AG IA: CPT | Performed by: INTERNAL MEDICINE

## 2017-01-01 PROCEDURE — 83735 ASSAY OF MAGNESIUM: CPT | Performed by: EMERGENCY MEDICINE

## 2017-01-01 PROCEDURE — C1725 CATH, TRANSLUMIN NON-LASER: HCPCS | Performed by: SURGERY

## 2017-01-01 PROCEDURE — 85730 THROMBOPLASTIN TIME PARTIAL: CPT | Performed by: EMERGENCY MEDICINE

## 2017-01-01 PROCEDURE — 77030008703 HC TU ET UNCUF COVD -A: Performed by: ANESTHESIOLOGY

## 2017-01-01 PROCEDURE — 83615 LACTATE (LD) (LDH) ENZYME: CPT | Performed by: FAMILY MEDICINE

## 2017-01-01 PROCEDURE — 77030018673: Performed by: SURGERY

## 2017-01-01 PROCEDURE — 84145 PROCALCITONIN (PCT): CPT | Performed by: EMERGENCY MEDICINE

## 2017-01-01 PROCEDURE — 82550 ASSAY OF CK (CPK): CPT | Performed by: FAMILY MEDICINE

## 2017-01-01 PROCEDURE — C1769 GUIDE WIRE: HCPCS | Performed by: SURGERY

## 2017-01-01 PROCEDURE — 0W9G3ZZ DRAINAGE OF PERITONEAL CAVITY, PERCUTANEOUS APPROACH: ICD-10-PCS | Performed by: STUDENT IN AN ORGANIZED HEALTH CARE EDUCATION/TRAINING PROGRAM

## 2017-01-01 PROCEDURE — P9035 PLATELET PHERES LEUKOREDUCED: HCPCS | Performed by: INTERNAL MEDICINE

## 2017-01-01 PROCEDURE — 36581 REPLACE TUNNELED CV CATH: CPT

## 2017-01-01 PROCEDURE — 06PY33Z REMOVAL OF INFUSION DEVICE FROM LOWER VEIN, PERCUTANEOUS APPROACH: ICD-10-PCS | Performed by: RADIOLOGY

## 2017-01-01 PROCEDURE — 88185 FLOWCYTOMETRY/TC ADD-ON: CPT | Performed by: FAMILY MEDICINE

## 2017-01-01 PROCEDURE — 99284 EMERGENCY DEPT VISIT MOD MDM: CPT | Performed by: EMERGENCY MEDICINE

## 2017-01-01 PROCEDURE — 81001 URINALYSIS AUTO W/SCOPE: CPT | Performed by: INTERNAL MEDICINE

## 2017-01-01 PROCEDURE — 85027 COMPLETE CBC AUTOMATED: CPT | Performed by: NURSE PRACTITIONER

## 2017-01-01 DEVICE — IMPLANTABLE DEVICE: Type: IMPLANTABLE DEVICE | Site: LEG | Status: FUNCTIONAL

## 2017-01-01 RX ORDER — HEPARIN 100 UNIT/ML
500 SYRINGE INTRAVENOUS ONCE
Status: COMPLETED | OUTPATIENT
Start: 2017-01-01 | End: 2017-01-01

## 2017-01-01 RX ORDER — SODIUM CHLORIDE 0.9 % (FLUSH) 0.9 %
20 SYRINGE (ML) INJECTION AS NEEDED
Status: DISCONTINUED | OUTPATIENT
Start: 2017-01-01 | End: 2017-01-01 | Stop reason: HOSPADM

## 2017-01-01 RX ORDER — SODIUM CHLORIDE 0.9 % (FLUSH) 0.9 %
10 SYRINGE (ML) INJECTION EVERY 12 HOURS
Status: DISCONTINUED | OUTPATIENT
Start: 2017-01-01 | End: 2017-10-30 | Stop reason: HOSPADM

## 2017-01-01 RX ORDER — HEPARIN 100 UNIT/ML
300 SYRINGE INTRAVENOUS ONCE
Status: COMPLETED | OUTPATIENT
Start: 2017-01-01 | End: 2017-01-01

## 2017-01-01 RX ORDER — KETOROLAC TROMETHAMINE 30 MG/ML
30 INJECTION, SOLUTION INTRAMUSCULAR; INTRAVENOUS
Status: DISCONTINUED | OUTPATIENT
Start: 2017-01-01 | End: 2017-01-01

## 2017-01-01 RX ORDER — HEPARIN 100 UNIT/ML
500 SYRINGE INTRAVENOUS EVERY 8 HOURS
Status: DISCONTINUED | OUTPATIENT
Start: 2017-01-01 | End: 2017-01-01 | Stop reason: HOSPADM

## 2017-01-01 RX ORDER — DEXTROSE 50 % IN WATER (D50W) INTRAVENOUS SYRINGE
25 AS NEEDED
Status: DISCONTINUED | OUTPATIENT
Start: 2017-01-01 | End: 2017-01-01 | Stop reason: HOSPADM

## 2017-01-01 RX ORDER — PANTOPRAZOLE SODIUM 40 MG/1
40 TABLET, DELAYED RELEASE ORAL DAILY
COMMUNITY

## 2017-01-01 RX ORDER — ALBUMIN HUMAN 250 G/1000ML
25 SOLUTION INTRAVENOUS ONCE
Status: COMPLETED | OUTPATIENT
Start: 2017-01-01 | End: 2017-01-01

## 2017-01-01 RX ORDER — LIDOCAINE HYDROCHLORIDE 20 MG/ML
40-120 INJECTION, SOLUTION INFILTRATION; PERINEURAL ONCE
Status: COMPLETED | OUTPATIENT
Start: 2017-01-01 | End: 2017-01-01

## 2017-01-01 RX ORDER — PROTAMINE SULFATE 10 MG/ML
INJECTION, SOLUTION INTRAVENOUS AS NEEDED
Status: DISCONTINUED | OUTPATIENT
Start: 2017-01-01 | End: 2017-01-01 | Stop reason: HOSPADM

## 2017-01-01 RX ORDER — SODIUM CHLORIDE 0.9 % (FLUSH) 0.9 %
10 SYRINGE (ML) INJECTION AS NEEDED
Status: DISCONTINUED | OUTPATIENT
Start: 2017-01-01 | End: 2017-01-01 | Stop reason: HOSPADM

## 2017-01-01 RX ORDER — METOCLOPRAMIDE 10 MG/1
10 TABLET ORAL
COMMUNITY
End: 2017-01-01

## 2017-01-01 RX ORDER — SODIUM CHLORIDE 9 MG/ML
100 INJECTION, SOLUTION INTRAVENOUS CONTINUOUS
Status: DISCONTINUED | OUTPATIENT
Start: 2017-01-01 | End: 2017-01-01

## 2017-01-01 RX ORDER — SODIUM CHLORIDE 0.9 % (FLUSH) 0.9 %
5-10 SYRINGE (ML) INJECTION EVERY 8 HOURS
Status: DISCONTINUED | OUTPATIENT
Start: 2017-01-01 | End: 2017-01-01 | Stop reason: HOSPADM

## 2017-01-01 RX ORDER — NALOXONE HYDROCHLORIDE 0.4 MG/ML
0.1 INJECTION, SOLUTION INTRAMUSCULAR; INTRAVENOUS; SUBCUTANEOUS AS NEEDED
Status: DISCONTINUED | OUTPATIENT
Start: 2017-01-01 | End: 2017-01-01 | Stop reason: HOSPADM

## 2017-01-01 RX ORDER — SODIUM CHLORIDE 0.9 % (FLUSH) 0.9 %
20 SYRINGE (ML) INJECTION EVERY 8 HOURS
Status: DISCONTINUED | OUTPATIENT
Start: 2017-01-01 | End: 2017-01-01 | Stop reason: HOSPADM

## 2017-01-01 RX ORDER — ALBUMIN HUMAN 250 G/1000ML
12.5 SOLUTION INTRAVENOUS ONCE
Status: COMPLETED | OUTPATIENT
Start: 2017-01-01 | End: 2017-01-01

## 2017-01-01 RX ORDER — ONDANSETRON 8 MG/1
8 TABLET, ORALLY DISINTEGRATING ORAL 3 TIMES DAILY
Qty: 90 TAB | Refills: 1 | Status: SHIPPED | OUTPATIENT
Start: 2017-01-01 | End: 2017-01-01 | Stop reason: CLARIF

## 2017-01-01 RX ORDER — SODIUM CHLORIDE 9 MG/ML
1000 INJECTION, SOLUTION INTRAVENOUS CONTINUOUS
Status: DISCONTINUED | OUTPATIENT
Start: 2017-01-01 | End: 2017-01-01 | Stop reason: HOSPADM

## 2017-01-01 RX ORDER — CEFAZOLIN SODIUM IN 0.9 % NACL 2 G/50 ML
2 INTRAVENOUS SOLUTION, PIGGYBACK (ML) INTRAVENOUS
Status: COMPLETED | OUTPATIENT
Start: 2017-01-01 | End: 2017-01-01

## 2017-01-01 RX ORDER — MIDODRINE HYDROCHLORIDE 5 MG/1
5 TABLET ORAL EVERY 8 HOURS
Status: DISCONTINUED | OUTPATIENT
Start: 2017-01-01 | End: 2017-01-01 | Stop reason: HOSPADM

## 2017-01-01 RX ORDER — ONDANSETRON 2 MG/ML
INJECTION INTRAMUSCULAR; INTRAVENOUS AS NEEDED
Status: DISCONTINUED | OUTPATIENT
Start: 2017-01-01 | End: 2017-01-01 | Stop reason: HOSPADM

## 2017-01-01 RX ORDER — HEPARIN SODIUM 5000 [USP'U]/ML
5000 INJECTION, SOLUTION INTRAVENOUS; SUBCUTANEOUS EVERY 12 HOURS
Status: DISCONTINUED | OUTPATIENT
Start: 2017-01-01 | End: 2017-01-01 | Stop reason: HOSPADM

## 2017-01-01 RX ORDER — SODIUM CHLORIDE 0.9 % (FLUSH) 0.9 %
10 SYRINGE (ML) INJECTION EVERY 8 HOURS
Status: DISCONTINUED | OUTPATIENT
Start: 2017-01-01 | End: 2017-01-01 | Stop reason: HOSPADM

## 2017-01-01 RX ORDER — SODIUM CHLORIDE 9 MG/ML
500 INJECTION, SOLUTION INTRAVENOUS ONCE
Status: COMPLETED | OUTPATIENT
Start: 2017-01-01 | End: 2017-01-01

## 2017-01-01 RX ORDER — MORPHINE SULFATE 2 MG/ML
2 INJECTION, SOLUTION INTRAMUSCULAR; INTRAVENOUS
Status: DISCONTINUED | OUTPATIENT
Start: 2017-01-01 | End: 2017-01-01 | Stop reason: HOSPADM

## 2017-01-01 RX ORDER — LIDOCAINE HYDROCHLORIDE AND EPINEPHRINE 20; 10 MG/ML; UG/ML
20-200 INJECTION, SOLUTION INFILTRATION; PERINEURAL ONCE
Status: ACTIVE | OUTPATIENT
Start: 2017-01-01 | End: 2017-01-01

## 2017-01-01 RX ORDER — FAMOTIDINE 20 MG/1
20 TABLET, FILM COATED ORAL ONCE
Status: DISCONTINUED | OUTPATIENT
Start: 2017-01-01 | End: 2017-01-01 | Stop reason: HOSPADM

## 2017-01-01 RX ORDER — HEPARIN 100 UNIT/ML
300 SYRINGE INTRAVENOUS EVERY 8 HOURS
Status: DISCONTINUED | OUTPATIENT
Start: 2017-01-01 | End: 2017-01-01 | Stop reason: HOSPADM

## 2017-01-01 RX ORDER — FENTANYL 25 UG/1
1 PATCH TRANSDERMAL
Status: DISCONTINUED | OUTPATIENT
Start: 2017-01-01 | End: 2017-10-30 | Stop reason: HOSPADM

## 2017-01-01 RX ORDER — HEPARIN SODIUM (PORCINE) LOCK FLUSH IV SOLN 100 UNIT/ML 100 UNIT/ML
500 SOLUTION INTRAVENOUS ONCE
Status: ACTIVE | OUTPATIENT
Start: 2017-01-01 | End: 2017-01-01

## 2017-01-01 RX ORDER — METOCLOPRAMIDE 10 MG/1
10 TABLET ORAL
COMMUNITY

## 2017-01-01 RX ORDER — SODIUM,POTASSIUM PHOSPHATES 280-250MG
1 POWDER IN PACKET (EA) ORAL AS NEEDED
Status: DISCONTINUED | OUTPATIENT
Start: 2017-01-01 | End: 2017-01-01

## 2017-01-01 RX ORDER — POTASSIUM CHLORIDE 14.9 MG/ML
20 INJECTION INTRAVENOUS ONCE
Status: COMPLETED | OUTPATIENT
Start: 2017-01-01 | End: 2017-01-01

## 2017-01-01 RX ORDER — HALOPERIDOL 5 MG/ML
2 INJECTION INTRAMUSCULAR
Status: DISCONTINUED | OUTPATIENT
Start: 2017-01-01 | End: 2017-10-30 | Stop reason: HOSPADM

## 2017-01-01 RX ORDER — POTASSIUM CHLORIDE 14.9 MG/ML
20 INJECTION INTRAVENOUS
Status: COMPLETED | OUTPATIENT
Start: 2017-01-01 | End: 2017-01-01

## 2017-01-01 RX ORDER — METOCLOPRAMIDE 10 MG/1
10 TABLET ORAL
Status: DISCONTINUED | OUTPATIENT
Start: 2017-01-01 | End: 2017-01-01

## 2017-01-01 RX ORDER — MORPHINE SULFATE 15 MG/1
15 TABLET, FILM COATED, EXTENDED RELEASE ORAL EVERY 12 HOURS
Status: DISCONTINUED | OUTPATIENT
Start: 2017-01-01 | End: 2017-01-01 | Stop reason: HOSPADM

## 2017-01-01 RX ORDER — HEPARIN 100 UNIT/ML
600 SYRINGE INTRAVENOUS AS NEEDED
Status: DISCONTINUED | OUTPATIENT
Start: 2017-01-01 | End: 2017-01-01 | Stop reason: HOSPADM

## 2017-01-01 RX ORDER — ETOMIDATE 2 MG/ML
20 INJECTION INTRAVENOUS ONCE
Status: COMPLETED | OUTPATIENT
Start: 2017-01-01 | End: 2017-01-01

## 2017-01-01 RX ORDER — SODIUM CHLORIDE 0.9 % (FLUSH) 0.9 %
10 SYRINGE (ML) INJECTION
Status: COMPLETED | OUTPATIENT
Start: 2017-01-01 | End: 2017-01-01

## 2017-01-01 RX ORDER — HYDROMORPHONE HYDROCHLORIDE 1 MG/ML
0.5 INJECTION, SOLUTION INTRAMUSCULAR; INTRAVENOUS; SUBCUTANEOUS EVERY 12 HOURS
Status: DISCONTINUED | OUTPATIENT
Start: 2017-01-01 | End: 2017-01-01

## 2017-01-01 RX ORDER — HYDROMORPHONE HYDROCHLORIDE 1 MG/ML
0.5 INJECTION, SOLUTION INTRAMUSCULAR; INTRAVENOUS; SUBCUTANEOUS EVERY 6 HOURS
Status: DISCONTINUED | OUTPATIENT
Start: 2017-01-01 | End: 2017-01-01

## 2017-01-01 RX ORDER — METOCLOPRAMIDE HYDROCHLORIDE 5 MG/ML
5 INJECTION INTRAMUSCULAR; INTRAVENOUS
Status: DISCONTINUED | OUTPATIENT
Start: 2017-01-01 | End: 2017-01-01

## 2017-01-01 RX ORDER — HEPARIN 100 UNIT/ML
300 SYRINGE INTRAVENOUS AS NEEDED
Status: DISPENSED | OUTPATIENT
Start: 2017-01-01 | End: 2017-01-01

## 2017-01-01 RX ORDER — LORAZEPAM 2 MG/ML
0.5 INJECTION INTRAMUSCULAR
Status: DISCONTINUED | OUTPATIENT
Start: 2017-01-01 | End: 2017-01-01

## 2017-01-01 RX ORDER — ACETAMINOPHEN 650 MG/1
650 SUPPOSITORY RECTAL
Status: DISCONTINUED | OUTPATIENT
Start: 2017-01-01 | End: 2017-10-30 | Stop reason: HOSPADM

## 2017-01-01 RX ORDER — SODIUM CHLORIDE 9 MG/ML
25 INJECTION, SOLUTION INTRAVENOUS ONCE
Status: ACTIVE | OUTPATIENT
Start: 2017-01-01 | End: 2017-01-01

## 2017-01-01 RX ORDER — POTASSIUM CHLORIDE 20 MEQ/1
20 TABLET, EXTENDED RELEASE ORAL
Status: COMPLETED | OUTPATIENT
Start: 2017-01-01 | End: 2017-01-01

## 2017-01-01 RX ORDER — PROPOFOL 10 MG/ML
INJECTION, EMULSION INTRAVENOUS AS NEEDED
Status: DISCONTINUED | OUTPATIENT
Start: 2017-01-01 | End: 2017-01-01 | Stop reason: HOSPADM

## 2017-01-01 RX ORDER — METOCLOPRAMIDE 10 MG/1
10 TABLET ORAL
Status: DISCONTINUED | OUTPATIENT
Start: 2017-01-01 | End: 2017-01-01 | Stop reason: SDUPTHER

## 2017-01-01 RX ORDER — DEXTROSE 40 %
1 GEL (GRAM) ORAL AS NEEDED
Status: DISCONTINUED | OUTPATIENT
Start: 2017-01-01 | End: 2017-01-01 | Stop reason: HOSPADM

## 2017-01-01 RX ORDER — LIDOCAINE HYDROCHLORIDE AND EPINEPHRINE 20; 10 MG/ML; UG/ML
2-20 INJECTION, SOLUTION INFILTRATION; PERINEURAL ONCE
Status: ACTIVE | OUTPATIENT
Start: 2017-01-01 | End: 2017-01-01

## 2017-01-01 RX ORDER — MAGNESIUM SULFATE HEPTAHYDRATE 40 MG/ML
4 INJECTION, SOLUTION INTRAVENOUS AS NEEDED
Status: DISCONTINUED | OUTPATIENT
Start: 2017-01-01 | End: 2017-01-01

## 2017-01-01 RX ORDER — SODIUM CHLORIDE, SODIUM LACTATE, POTASSIUM CHLORIDE, CALCIUM CHLORIDE 600; 310; 30; 20 MG/100ML; MG/100ML; MG/100ML; MG/100ML
100 INJECTION, SOLUTION INTRAVENOUS CONTINUOUS
Status: CANCELLED | OUTPATIENT
Start: 2017-01-01

## 2017-01-01 RX ORDER — LACTULOSE 10 G/15ML
200 SOLUTION ORAL; RECTAL EVERY 6 HOURS
Status: DISCONTINUED | OUTPATIENT
Start: 2017-01-01 | End: 2017-01-01

## 2017-01-01 RX ORDER — MUPIROCIN 20 MG/G
1 OINTMENT TOPICAL DAILY
COMMUNITY

## 2017-01-01 RX ORDER — SODIUM CHLORIDE 0.9 % (FLUSH) 0.9 %
5-10 SYRINGE (ML) INJECTION EVERY 8 HOURS
Status: DISCONTINUED | OUTPATIENT
Start: 2017-01-01 | End: 2017-01-01

## 2017-01-01 RX ORDER — SODIUM CHLORIDE 9 MG/ML
250 INJECTION, SOLUTION INTRAVENOUS AS NEEDED
Status: DISCONTINUED | OUTPATIENT
Start: 2017-01-01 | End: 2017-01-01 | Stop reason: SDUPTHER

## 2017-01-01 RX ORDER — LIDOCAINE HYDROCHLORIDE 20 MG/ML
2-20 INJECTION, SOLUTION INFILTRATION; PERINEURAL
Status: DISCONTINUED | OUTPATIENT
Start: 2017-01-01 | End: 2017-01-01

## 2017-01-01 RX ORDER — INSULIN GLARGINE 100 [IU]/ML
25 INJECTION, SOLUTION SUBCUTANEOUS
Status: DISCONTINUED | OUTPATIENT
Start: 2017-01-01 | End: 2017-01-01

## 2017-01-01 RX ORDER — MIDODRINE HYDROCHLORIDE 5 MG/1
10 TABLET ORAL EVERY 8 HOURS
Status: DISCONTINUED | OUTPATIENT
Start: 2017-01-01 | End: 2017-01-01 | Stop reason: HOSPADM

## 2017-01-01 RX ORDER — INSULIN LISPRO 100 [IU]/ML
INJECTION, SOLUTION INTRAVENOUS; SUBCUTANEOUS
Status: DISCONTINUED | OUTPATIENT
Start: 2017-01-01 | End: 2017-01-01 | Stop reason: HOSPADM

## 2017-01-01 RX ORDER — HEPARIN 100 UNIT/ML
500 SYRINGE INTRAVENOUS AS NEEDED
Status: DISCONTINUED | OUTPATIENT
Start: 2017-01-01 | End: 2017-01-01 | Stop reason: HOSPADM

## 2017-01-01 RX ORDER — LACTULOSE 10 G/15ML
30 SOLUTION ORAL; RECTAL 3 TIMES DAILY
Status: DISCONTINUED | OUTPATIENT
Start: 2017-01-01 | End: 2017-01-01 | Stop reason: SDUPTHER

## 2017-01-01 RX ORDER — MORPHINE SULFATE 15 MG/1
15 TABLET, FILM COATED, EXTENDED RELEASE ORAL EVERY 12 HOURS
Status: DISCONTINUED | OUTPATIENT
Start: 2017-01-01 | End: 2017-01-01

## 2017-01-01 RX ORDER — FENTANYL CITRATE 50 UG/ML
50 INJECTION, SOLUTION INTRAMUSCULAR; INTRAVENOUS
Status: COMPLETED | OUTPATIENT
Start: 2017-01-01 | End: 2017-01-01

## 2017-01-01 RX ORDER — CIPROFLOXACIN 2 MG/ML
400 INJECTION, SOLUTION INTRAVENOUS EVERY 24 HOURS
Status: DISCONTINUED | OUTPATIENT
Start: 2017-01-01 | End: 2017-01-01

## 2017-01-01 RX ORDER — LIDOCAINE HYDROCHLORIDE 20 MG/ML
INJECTION, SOLUTION EPIDURAL; INFILTRATION; INTRACAUDAL; PERINEURAL AS NEEDED
Status: DISCONTINUED | OUTPATIENT
Start: 2017-01-01 | End: 2017-01-01 | Stop reason: HOSPADM

## 2017-01-01 RX ORDER — LIDOCAINE AND PRILOCAINE 25; 25 MG/G; MG/G
CREAM TOPICAL AS NEEDED
Status: DISCONTINUED | OUTPATIENT
Start: 2017-01-01 | End: 2017-01-01 | Stop reason: HOSPADM

## 2017-01-01 RX ORDER — HEPARIN 100 UNIT/ML
600 SYRINGE INTRAVENOUS EVERY 8 HOURS
Status: DISCONTINUED | OUTPATIENT
Start: 2017-01-01 | End: 2017-01-01 | Stop reason: HOSPADM

## 2017-01-01 RX ORDER — ONDANSETRON 2 MG/ML
4 INJECTION INTRAMUSCULAR; INTRAVENOUS
Status: DISCONTINUED | OUTPATIENT
Start: 2017-01-01 | End: 2017-01-01 | Stop reason: HOSPADM

## 2017-01-01 RX ORDER — HEPARIN 100 UNIT/ML
300 SYRINGE INTRAVENOUS AS NEEDED
Status: DISCONTINUED | OUTPATIENT
Start: 2017-01-01 | End: 2017-01-01 | Stop reason: HOSPADM

## 2017-01-01 RX ORDER — NALOXONE HYDROCHLORIDE 0.4 MG/ML
0.4 INJECTION, SOLUTION INTRAMUSCULAR; INTRAVENOUS; SUBCUTANEOUS AS NEEDED
Status: DISCONTINUED | OUTPATIENT
Start: 2017-01-01 | End: 2017-01-01 | Stop reason: HOSPADM

## 2017-01-01 RX ORDER — NALOXONE HYDROCHLORIDE 0.4 MG/ML
0.4 INJECTION, SOLUTION INTRAMUSCULAR; INTRAVENOUS; SUBCUTANEOUS ONCE
Status: COMPLETED | OUTPATIENT
Start: 2017-01-01 | End: 2017-01-01

## 2017-01-01 RX ORDER — SODIUM CHLORIDE 9 MG/ML
250 INJECTION, SOLUTION INTRAVENOUS AS NEEDED
Status: DISCONTINUED | OUTPATIENT
Start: 2017-01-01 | End: 2017-01-01 | Stop reason: HOSPADM

## 2017-01-01 RX ORDER — SODIUM CHLORIDE 9 MG/ML
250 INJECTION, SOLUTION INTRAVENOUS AS NEEDED
Status: DISCONTINUED | OUTPATIENT
Start: 2017-01-01 | End: 2017-01-01

## 2017-01-01 RX ORDER — LIDOCAINE HYDROCHLORIDE AND EPINEPHRINE 20; 10 MG/ML; UG/ML
2-20 INJECTION, SOLUTION INFILTRATION; PERINEURAL ONCE
Status: COMPLETED | OUTPATIENT
Start: 2017-01-01 | End: 2017-01-01

## 2017-01-01 RX ORDER — CIPROFLOXACIN 2 MG/ML
400 INJECTION, SOLUTION INTRAVENOUS EVERY 12 HOURS
Status: DISCONTINUED | OUTPATIENT
Start: 2017-01-01 | End: 2017-01-01 | Stop reason: SDUPTHER

## 2017-01-01 RX ORDER — MIDODRINE HYDROCHLORIDE 5 MG/1
5 TABLET ORAL ONCE
Status: COMPLETED | OUTPATIENT
Start: 2017-01-01 | End: 2017-01-01

## 2017-01-01 RX ORDER — LIDOCAINE HYDROCHLORIDE 20 MG/ML
15 SOLUTION OROPHARYNGEAL EVERY 12 HOURS
Status: DISCONTINUED | OUTPATIENT
Start: 2017-01-01 | End: 2017-10-30 | Stop reason: HOSPADM

## 2017-01-01 RX ORDER — ACETAMINOPHEN 325 MG/1
650 TABLET ORAL
Status: DISCONTINUED | OUTPATIENT
Start: 2017-01-01 | End: 2017-01-01 | Stop reason: HOSPADM

## 2017-01-01 RX ORDER — FENTANYL CITRATE 50 UG/ML
INJECTION, SOLUTION INTRAMUSCULAR; INTRAVENOUS AS NEEDED
Status: DISCONTINUED | OUTPATIENT
Start: 2017-01-01 | End: 2017-01-01 | Stop reason: HOSPADM

## 2017-01-01 RX ORDER — ROCURONIUM BROMIDE 10 MG/ML
INJECTION, SOLUTION INTRAVENOUS AS NEEDED
Status: DISCONTINUED | OUTPATIENT
Start: 2017-01-01 | End: 2017-01-01 | Stop reason: HOSPADM

## 2017-01-01 RX ORDER — SODIUM CHLORIDE 0.9 % (FLUSH) 0.9 %
5-10 SYRINGE (ML) INJECTION AS NEEDED
Status: DISCONTINUED | OUTPATIENT
Start: 2017-01-01 | End: 2017-01-01

## 2017-01-01 RX ORDER — MORPHINE SULFATE 4 MG/ML
4 INJECTION, SOLUTION INTRAMUSCULAR; INTRAVENOUS
Status: COMPLETED | OUTPATIENT
Start: 2017-01-01 | End: 2017-01-01

## 2017-01-01 RX ORDER — HYDROMORPHONE HYDROCHLORIDE 1 MG/ML
0.5 INJECTION, SOLUTION INTRAMUSCULAR; INTRAVENOUS; SUBCUTANEOUS
Status: DISCONTINUED | OUTPATIENT
Start: 2017-01-01 | End: 2017-01-01

## 2017-01-01 RX ORDER — LIDOCAINE HYDROCHLORIDE 20 MG/ML
20-400 INJECTION, SOLUTION INFILTRATION; PERINEURAL ONCE
Status: DISCONTINUED | OUTPATIENT
Start: 2017-01-01 | End: 2017-01-01 | Stop reason: HOSPADM

## 2017-01-01 RX ORDER — MORPHINE SULFATE 15 MG/1
15 TABLET, FILM COATED, EXTENDED RELEASE ORAL EVERY 12 HOURS
COMMUNITY

## 2017-01-01 RX ORDER — HEPARIN 100 UNIT/ML
500 SYRINGE INTRAVENOUS AS NEEDED
Status: COMPLETED | OUTPATIENT
Start: 2017-01-01 | End: 2017-01-01

## 2017-01-01 RX ORDER — MIDODRINE HYDROCHLORIDE 5 MG/1
10 TABLET ORAL
Status: DISCONTINUED | OUTPATIENT
Start: 2017-01-01 | End: 2017-01-01 | Stop reason: HOSPADM

## 2017-01-01 RX ORDER — DEXTROSE 50 % IN WATER (D50W) INTRAVENOUS SYRINGE
Status: COMPLETED
Start: 2017-01-01 | End: 2017-01-01

## 2017-01-01 RX ORDER — RIFAXIMIN 550 MG/1
550 TABLET ORAL 2 TIMES DAILY
Qty: 60 TAB | Refills: 2 | Status: SHIPPED | OUTPATIENT
Start: 2017-01-01 | End: 2017-01-01

## 2017-01-01 RX ORDER — HEPARIN SODIUM 1000 [USP'U]/ML
1000-10000 INJECTION, SOLUTION INTRAVENOUS; SUBCUTANEOUS
Status: ACTIVE | OUTPATIENT
Start: 2017-01-01 | End: 2017-01-01

## 2017-01-01 RX ORDER — FENTANYL CITRATE 50 UG/ML
100 INJECTION, SOLUTION INTRAMUSCULAR; INTRAVENOUS ONCE
Status: DISCONTINUED | OUTPATIENT
Start: 2017-01-01 | End: 2017-01-01 | Stop reason: HOSPADM

## 2017-01-01 RX ORDER — HEPARIN SODIUM 5000 [USP'U]/ML
2000 INJECTION, SOLUTION INTRAVENOUS; SUBCUTANEOUS ONCE
Status: ACTIVE | OUTPATIENT
Start: 2017-01-01 | End: 2017-01-01

## 2017-01-01 RX ORDER — MAGNESIUM SULFATE HEPTAHYDRATE 40 MG/ML
2 INJECTION, SOLUTION INTRAVENOUS ONCE
Status: COMPLETED | OUTPATIENT
Start: 2017-01-01 | End: 2017-01-01

## 2017-01-01 RX ORDER — PANTOPRAZOLE SODIUM 40 MG/1
40 TABLET, DELAYED RELEASE ORAL
Status: DISCONTINUED | OUTPATIENT
Start: 2017-01-01 | End: 2017-01-01 | Stop reason: HOSPADM

## 2017-01-01 RX ORDER — FENTANYL 12.5 UG/1
1 PATCH TRANSDERMAL
Status: DISCONTINUED | OUTPATIENT
Start: 2017-01-01 | End: 2017-01-01

## 2017-01-01 RX ORDER — DIPHENHYDRAMINE HYDROCHLORIDE 50 MG/ML
25-50 INJECTION, SOLUTION INTRAMUSCULAR; INTRAVENOUS ONCE
Status: ACTIVE | OUTPATIENT
Start: 2017-01-01 | End: 2017-01-01

## 2017-01-01 RX ORDER — AZITHROMYCIN 250 MG/1
500 TABLET, FILM COATED ORAL ONCE
Status: COMPLETED | OUTPATIENT
Start: 2017-01-01 | End: 2017-01-01

## 2017-01-01 RX ORDER — ALBUMIN HUMAN 250 G/1000ML
25 SOLUTION INTRAVENOUS ONCE
Status: ACTIVE | OUTPATIENT
Start: 2017-01-01 | End: 2017-01-01

## 2017-01-01 RX ORDER — DEXTROSE 50 % IN WATER (D50W) INTRAVENOUS SYRINGE
Status: ACTIVE
Start: 2017-01-01 | End: 2017-01-01

## 2017-01-01 RX ORDER — MORPHINE SULFATE 15 MG/1
15 TABLET, FILM COATED, EXTENDED RELEASE ORAL EVERY 12 HOURS
Qty: 20 TAB | Refills: 0 | Status: ON HOLD | OUTPATIENT
Start: 2017-01-01 | End: 2017-01-01

## 2017-01-01 RX ORDER — HYDROMORPHONE HYDROCHLORIDE 1 MG/ML
0.25 INJECTION, SOLUTION INTRAMUSCULAR; INTRAVENOUS; SUBCUTANEOUS
Status: DISCONTINUED | OUTPATIENT
Start: 2017-01-01 | End: 2017-10-30 | Stop reason: HOSPADM

## 2017-01-01 RX ORDER — LIDOCAINE HYDROCHLORIDE 20 MG/ML
2-20 INJECTION, SOLUTION INFILTRATION; PERINEURAL
Status: COMPLETED | OUTPATIENT
Start: 2017-01-01 | End: 2017-01-01

## 2017-01-01 RX ORDER — AMOXICILLIN 500 MG/1
500 CAPSULE ORAL EVERY 24 HOURS
Status: COMPLETED | OUTPATIENT
Start: 2017-01-01 | End: 2017-01-01

## 2017-01-01 RX ORDER — CIPROFLOXACIN 2 MG/ML
400 INJECTION, SOLUTION INTRAVENOUS EVERY 12 HOURS
Status: DISCONTINUED | OUTPATIENT
Start: 2017-01-01 | End: 2017-01-01

## 2017-01-01 RX ORDER — METOCLOPRAMIDE 10 MG/1
10 TABLET ORAL
Qty: 40 TAB | Refills: 1 | Status: SHIPPED | OUTPATIENT
Start: 2017-01-01 | End: 2017-01-01

## 2017-01-01 RX ORDER — MIDODRINE HYDROCHLORIDE 5 MG/1
5 TABLET ORAL EVERY 8 HOURS
Status: DISCONTINUED | OUTPATIENT
Start: 2017-01-01 | End: 2017-01-01

## 2017-01-01 RX ORDER — LIDOCAINE HYDROCHLORIDE 10 MG/ML
0.1 INJECTION INFILTRATION; PERINEURAL AS NEEDED
Status: DISCONTINUED | OUTPATIENT
Start: 2017-01-01 | End: 2017-01-01 | Stop reason: HOSPADM

## 2017-01-01 RX ORDER — SODIUM CHLORIDE 0.9 % (FLUSH) 0.9 %
20 SYRINGE (ML) INJECTION ONCE
Status: COMPLETED | OUTPATIENT
Start: 2017-01-01 | End: 2017-01-01

## 2017-01-01 RX ORDER — CYCLOSPORINE 25 MG/1
50 CAPSULE, LIQUID FILLED ORAL EVERY 12 HOURS
Status: DISCONTINUED | OUTPATIENT
Start: 2017-01-01 | End: 2017-01-01 | Stop reason: HOSPADM

## 2017-01-01 RX ORDER — SODIUM CHLORIDE 0.9 % (FLUSH) 0.9 %
5-10 SYRINGE (ML) INJECTION AS NEEDED
Status: DISCONTINUED | OUTPATIENT
Start: 2017-01-01 | End: 2017-01-01 | Stop reason: HOSPADM

## 2017-01-01 RX ORDER — POTASSIUM CHLORIDE 14.9 MG/ML
20 INJECTION INTRAVENOUS AS NEEDED
Status: DISCONTINUED | OUTPATIENT
Start: 2017-01-01 | End: 2017-01-01

## 2017-01-01 RX ORDER — SODIUM CHLORIDE 0.9 % (FLUSH) 0.9 %
10 SYRINGE (ML) INJECTION AS NEEDED
Status: DISCONTINUED | OUTPATIENT
Start: 2017-01-01 | End: 2017-10-30 | Stop reason: HOSPADM

## 2017-01-01 RX ORDER — FENTANYL CITRATE 50 UG/ML
12.5-1 INJECTION, SOLUTION INTRAMUSCULAR; INTRAVENOUS
Status: DISCONTINUED | OUTPATIENT
Start: 2017-01-01 | End: 2017-01-01 | Stop reason: HOSPADM

## 2017-01-01 RX ORDER — INSULIN GLARGINE 100 [IU]/ML
8 INJECTION, SOLUTION SUBCUTANEOUS
Status: DISCONTINUED | OUTPATIENT
Start: 2017-01-01 | End: 2017-01-01

## 2017-01-01 RX ORDER — LACTULOSE 10 G/15ML
200 SOLUTION ORAL; RECTAL 4 TIMES DAILY
Status: DISCONTINUED | OUTPATIENT
Start: 2017-01-01 | End: 2017-01-01

## 2017-01-01 RX ORDER — LACTULOSE 10 G/15ML
30 SOLUTION ORAL; RECTAL 3 TIMES DAILY
Qty: 480 ML | Refills: 0 | Status: ON HOLD
Start: 2017-01-01 | End: 2017-01-01

## 2017-01-01 RX ORDER — FENTANYL CITRATE 50 UG/ML
50 INJECTION, SOLUTION INTRAMUSCULAR; INTRAVENOUS ONCE
Status: COMPLETED | OUTPATIENT
Start: 2017-01-01 | End: 2017-01-01

## 2017-01-01 RX ORDER — LANOLIN ALCOHOL/MO/W.PET/CERES
400 CREAM (GRAM) TOPICAL 2 TIMES DAILY
Status: DISCONTINUED | OUTPATIENT
Start: 2017-01-01 | End: 2017-01-01 | Stop reason: HOSPADM

## 2017-01-01 RX ORDER — METOCLOPRAMIDE HYDROCHLORIDE 5 MG/ML
5 INJECTION INTRAMUSCULAR; INTRAVENOUS EVERY 6 HOURS
Status: DISCONTINUED | OUTPATIENT
Start: 2017-01-01 | End: 2017-01-01

## 2017-01-01 RX ORDER — LIDOCAINE HYDROCHLORIDE 20 MG/ML
15 SOLUTION OROPHARYNGEAL
Status: DISCONTINUED | OUTPATIENT
Start: 2017-01-01 | End: 2017-01-01

## 2017-01-01 RX ORDER — LORAZEPAM 2 MG/ML
0.5 INJECTION INTRAMUSCULAR
Status: DISCONTINUED | OUTPATIENT
Start: 2017-01-01 | End: 2017-10-30 | Stop reason: HOSPADM

## 2017-01-01 RX ORDER — DIPHENHYDRAMINE HYDROCHLORIDE 50 MG/ML
12.5 INJECTION, SOLUTION INTRAMUSCULAR; INTRAVENOUS
Status: DISCONTINUED | OUTPATIENT
Start: 2017-01-01 | End: 2017-01-01 | Stop reason: HOSPADM

## 2017-01-01 RX ORDER — MAGNESIUM SULFATE HEPTAHYDRATE 40 MG/ML
2 INJECTION, SOLUTION INTRAVENOUS AS NEEDED
Status: DISCONTINUED | OUTPATIENT
Start: 2017-01-01 | End: 2017-01-01

## 2017-01-01 RX ORDER — INSULIN GLARGINE 100 [IU]/ML
12 INJECTION, SOLUTION SUBCUTANEOUS
Status: DISCONTINUED | OUTPATIENT
Start: 2017-01-01 | End: 2017-01-01

## 2017-01-01 RX ORDER — PROPOFOL 10 MG/ML
INJECTION, EMULSION INTRAVENOUS
Status: DISCONTINUED | OUTPATIENT
Start: 2017-01-01 | End: 2017-01-01 | Stop reason: HOSPADM

## 2017-01-01 RX ORDER — OXYCODONE HYDROCHLORIDE 5 MG/1
5 TABLET ORAL
Status: DISCONTINUED | OUTPATIENT
Start: 2017-01-01 | End: 2017-01-01

## 2017-01-01 RX ORDER — SODIUM CHLORIDE 0.9 % (FLUSH) 0.9 %
10 SYRINGE (ML) INJECTION AS NEEDED
Status: DISCONTINUED | OUTPATIENT
Start: 2017-01-01 | End: 2017-01-01

## 2017-01-01 RX ORDER — HYDROMORPHONE HYDROCHLORIDE 2 MG/ML
0.5 INJECTION, SOLUTION INTRAMUSCULAR; INTRAVENOUS; SUBCUTANEOUS
Status: DISCONTINUED | OUTPATIENT
Start: 2017-01-01 | End: 2017-01-01 | Stop reason: HOSPADM

## 2017-01-01 RX ORDER — CYCLOSPORINE 25 MG/1
25 CAPSULE, GELATIN COATED ORAL 2 TIMES DAILY
Status: DISCONTINUED | OUTPATIENT
Start: 2017-01-01 | End: 2017-01-01 | Stop reason: HOSPADM

## 2017-01-01 RX ORDER — SODIUM CHLORIDE 9 MG/ML
25 INJECTION, SOLUTION INTRAVENOUS CONTINUOUS
Status: DISCONTINUED | OUTPATIENT
Start: 2017-01-01 | End: 2017-01-01 | Stop reason: HOSPADM

## 2017-01-01 RX ORDER — COLCHICINE 0.6 MG/1
0.6 TABLET ORAL DAILY
Status: DISCONTINUED | OUTPATIENT
Start: 2017-01-01 | End: 2017-01-01

## 2017-01-01 RX ORDER — DEXTROSE 50 % IN WATER (D50W) INTRAVENOUS SYRINGE
25
Status: COMPLETED | OUTPATIENT
Start: 2017-01-01 | End: 2017-01-01

## 2017-01-01 RX ORDER — HEPARIN SODIUM 5000 [USP'U]/ML
5000 INJECTION, SOLUTION INTRAVENOUS; SUBCUTANEOUS EVERY 8 HOURS
Status: DISCONTINUED | OUTPATIENT
Start: 2017-01-01 | End: 2017-01-01

## 2017-01-01 RX ORDER — HEPARIN 100 UNIT/ML
300 SYRINGE INTRAVENOUS EVERY 12 HOURS
Status: DISCONTINUED | OUTPATIENT
Start: 2017-01-01 | End: 2017-10-30 | Stop reason: HOSPADM

## 2017-01-01 RX ORDER — KETOROLAC TROMETHAMINE 30 MG/ML
30 INJECTION, SOLUTION INTRAMUSCULAR; INTRAVENOUS
Status: DISCONTINUED | OUTPATIENT
Start: 2017-01-01 | End: 2017-01-01 | Stop reason: SDUPTHER

## 2017-01-01 RX ORDER — FENTANYL CITRATE 50 UG/ML
12.5-1 INJECTION, SOLUTION INTRAMUSCULAR; INTRAVENOUS
Status: DISCONTINUED | OUTPATIENT
Start: 2017-01-01 | End: 2017-01-01

## 2017-01-01 RX ORDER — HYDROMORPHONE HYDROCHLORIDE 1 MG/ML
0.5 INJECTION, SOLUTION INTRAMUSCULAR; INTRAVENOUS; SUBCUTANEOUS
Status: COMPLETED | OUTPATIENT
Start: 2017-01-01 | End: 2017-01-01

## 2017-01-01 RX ORDER — ONDANSETRON 8 MG/1
8 TABLET, ORALLY DISINTEGRATING ORAL 3 TIMES DAILY
Status: DISCONTINUED | OUTPATIENT
Start: 2017-01-01 | End: 2017-01-01 | Stop reason: HOSPADM

## 2017-01-01 RX ORDER — INSULIN GLARGINE 100 [IU]/ML
18 INJECTION, SOLUTION SUBCUTANEOUS
Status: DISCONTINUED | OUTPATIENT
Start: 2017-01-01 | End: 2017-01-01

## 2017-01-01 RX ORDER — HEPARIN SODIUM 5000 [USP'U]/ML
INJECTION, SOLUTION INTRAVENOUS; SUBCUTANEOUS AS NEEDED
Status: DISCONTINUED | OUTPATIENT
Start: 2017-01-01 | End: 2017-01-01 | Stop reason: HOSPADM

## 2017-01-01 RX ORDER — PROMETHAZINE HYDROCHLORIDE 25 MG/1
25 TABLET ORAL
COMMUNITY

## 2017-01-01 RX ORDER — NALOXONE HYDROCHLORIDE 0.4 MG/ML
INJECTION, SOLUTION INTRAMUSCULAR; INTRAVENOUS; SUBCUTANEOUS
Status: COMPLETED
Start: 2017-01-01 | End: 2017-01-01

## 2017-01-01 RX ORDER — MORPHINE SULFATE 2 MG/ML
4 INJECTION, SOLUTION INTRAMUSCULAR; INTRAVENOUS
Status: DISCONTINUED | OUTPATIENT
Start: 2017-01-01 | End: 2017-01-01

## 2017-01-01 RX ORDER — ONDANSETRON 2 MG/ML
4 INJECTION INTRAMUSCULAR; INTRAVENOUS
Status: COMPLETED | OUTPATIENT
Start: 2017-01-01 | End: 2017-01-01

## 2017-01-01 RX ORDER — HEPARIN 100 UNIT/ML
500 SYRINGE INTRAVENOUS ONCE
Status: ACTIVE | OUTPATIENT
Start: 2017-01-01 | End: 2017-01-01

## 2017-01-01 RX ORDER — METOCLOPRAMIDE HYDROCHLORIDE 5 MG/ML
10 INJECTION INTRAMUSCULAR; INTRAVENOUS EVERY 6 HOURS
Status: DISCONTINUED | OUTPATIENT
Start: 2017-01-01 | End: 2017-01-01 | Stop reason: SDUPTHER

## 2017-01-01 RX ORDER — POTASSIUM CHLORIDE 20MEQ/15ML
40 LIQUID (ML) ORAL AS NEEDED
Status: DISCONTINUED | OUTPATIENT
Start: 2017-01-01 | End: 2017-01-01

## 2017-01-01 RX ORDER — CYCLOSPORINE 25 MG/1
25 CAPSULE, LIQUID FILLED ORAL 2 TIMES DAILY
Status: DISCONTINUED | OUTPATIENT
Start: 2017-01-01 | End: 2017-01-01

## 2017-01-01 RX ORDER — MIDAZOLAM HYDROCHLORIDE 1 MG/ML
.5-2 INJECTION, SOLUTION INTRAMUSCULAR; INTRAVENOUS
Status: DISCONTINUED | OUTPATIENT
Start: 2017-01-01 | End: 2017-01-01

## 2017-01-01 RX ORDER — LACTULOSE 10 G/15ML
200 SOLUTION ORAL; RECTAL 3 TIMES DAILY
Status: DISCONTINUED | OUTPATIENT
Start: 2017-01-01 | End: 2017-01-01

## 2017-01-01 RX ORDER — HEPARIN 100 UNIT/ML
300 SYRINGE INTRAVENOUS AS NEEDED
Status: DISCONTINUED | OUTPATIENT
Start: 2017-01-01 | End: 2017-10-30 | Stop reason: HOSPADM

## 2017-01-01 RX ORDER — MIDODRINE HYDROCHLORIDE 10 MG/1
10 TABLET ORAL EVERY 8 HOURS
Qty: 90 TAB | Refills: 1 | Status: SHIPPED | OUTPATIENT
Start: 2017-01-01 | End: 2017-01-01

## 2017-01-01 RX ORDER — HEPARIN SODIUM (PORCINE) LOCK FLUSH IV SOLN 100 UNIT/ML 100 UNIT/ML
300 SOLUTION INTRAVENOUS ONCE
Status: COMPLETED | OUTPATIENT
Start: 2017-01-01 | End: 2017-01-01

## 2017-01-01 RX ORDER — HEPARIN 100 UNIT/ML
600 SYRINGE INTRAVENOUS AS NEEDED
Status: DISCONTINUED | OUTPATIENT
Start: 2017-01-01 | End: 2017-01-01

## 2017-01-01 RX ORDER — OXYCODONE HYDROCHLORIDE 5 MG/1
5 TABLET ORAL
Status: DISCONTINUED | OUTPATIENT
Start: 2017-01-01 | End: 2017-01-01 | Stop reason: HOSPADM

## 2017-01-01 RX ORDER — GLYCOPYRROLATE 0.2 MG/ML
0.2 INJECTION INTRAMUSCULAR; INTRAVENOUS
Status: DISCONTINUED | OUTPATIENT
Start: 2017-01-01 | End: 2017-10-30 | Stop reason: HOSPADM

## 2017-01-01 RX ORDER — INSULIN LISPRO 100 [IU]/ML
INJECTION, SOLUTION INTRAVENOUS; SUBCUTANEOUS EVERY 6 HOURS
Status: DISCONTINUED | OUTPATIENT
Start: 2017-01-01 | End: 2017-01-01 | Stop reason: HOSPADM

## 2017-01-01 RX ORDER — INSULIN GLARGINE 100 [IU]/ML
10 INJECTION, SOLUTION SUBCUTANEOUS
Status: DISCONTINUED | OUTPATIENT
Start: 2017-01-01 | End: 2017-01-01 | Stop reason: HOSPADM

## 2017-01-01 RX ORDER — HYDROMORPHONE HCL IN 0.9% NACL 50 MG/50ML
1 PLASTIC BAG, INJECTION (ML) INJECTION
Status: COMPLETED | OUTPATIENT
Start: 2017-01-01 | End: 2017-01-01

## 2017-01-01 RX ORDER — MIDAZOLAM HYDROCHLORIDE 1 MG/ML
2 INJECTION, SOLUTION INTRAMUSCULAR; INTRAVENOUS
Status: DISCONTINUED | OUTPATIENT
Start: 2017-01-01 | End: 2017-01-01 | Stop reason: HOSPADM

## 2017-01-01 RX ORDER — SODIUM CHLORIDE 9 MG/ML
50 INJECTION, SOLUTION INTRAVENOUS CONTINUOUS
Status: DISPENSED | OUTPATIENT
Start: 2017-01-01 | End: 2017-01-01

## 2017-01-01 RX ORDER — MORPHINE SULFATE 2 MG/ML
1 INJECTION, SOLUTION INTRAMUSCULAR; INTRAVENOUS
Status: DISCONTINUED | OUTPATIENT
Start: 2017-01-01 | End: 2017-01-01 | Stop reason: HOSPADM

## 2017-01-01 RX ORDER — MAGNESIUM SULFATE 1 G/100ML
1 INJECTION INTRAVENOUS
Status: DISCONTINUED | OUTPATIENT
Start: 2017-01-01 | End: 2017-01-01 | Stop reason: CLARIF

## 2017-01-01 RX ORDER — SODIUM CHLORIDE 9 MG/ML
50 INJECTION, SOLUTION INTRAVENOUS CONTINUOUS
Status: DISCONTINUED | OUTPATIENT
Start: 2017-01-01 | End: 2017-01-01

## 2017-01-01 RX ORDER — MORPHINE SULFATE 2 MG/ML
1-2 INJECTION, SOLUTION INTRAMUSCULAR; INTRAVENOUS
Status: DISCONTINUED | OUTPATIENT
Start: 2017-01-01 | End: 2017-01-01 | Stop reason: SDUPTHER

## 2017-01-01 RX ORDER — MIDAZOLAM HYDROCHLORIDE 1 MG/ML
.25-2 INJECTION, SOLUTION INTRAMUSCULAR; INTRAVENOUS
Status: DISCONTINUED | OUTPATIENT
Start: 2017-01-01 | End: 2017-01-01 | Stop reason: HOSPADM

## 2017-01-01 RX ORDER — AMOXICILLIN 500 MG/1
500 CAPSULE ORAL EVERY 24 HOURS
Status: DISCONTINUED | OUTPATIENT
Start: 2017-01-01 | End: 2017-01-01

## 2017-01-01 RX ORDER — MORPHINE SULFATE 2 MG/ML
2 INJECTION, SOLUTION INTRAMUSCULAR; INTRAVENOUS
Status: DISCONTINUED | OUTPATIENT
Start: 2017-01-01 | End: 2017-01-01

## 2017-01-01 RX ORDER — MIDODRINE HYDROCHLORIDE 5 MG/1
10 TABLET ORAL EVERY 8 HOURS
Status: DISCONTINUED | OUTPATIENT
Start: 2017-01-01 | End: 2017-01-01

## 2017-01-01 RX ORDER — MORPHINE SULFATE 15 MG/1
15 TABLET, FILM COATED, EXTENDED RELEASE ORAL EVERY 12 HOURS
Qty: 10 TAB | Refills: 0 | Status: SHIPPED | OUTPATIENT
Start: 2017-01-01 | End: 2017-01-01

## 2017-01-01 RX ORDER — HYDROMORPHONE HYDROCHLORIDE 1 MG/ML
0.2 INJECTION, SOLUTION INTRAMUSCULAR; INTRAVENOUS; SUBCUTANEOUS
Status: DISCONTINUED | OUTPATIENT
Start: 2017-01-01 | End: 2017-01-01 | Stop reason: HOSPADM

## 2017-01-01 RX ORDER — METOCLOPRAMIDE 10 MG/1
10 TABLET ORAL EVERY 6 HOURS
Status: DISCONTINUED | OUTPATIENT
Start: 2017-01-01 | End: 2017-01-01 | Stop reason: HOSPADM

## 2017-01-01 RX ORDER — ONDANSETRON 2 MG/ML
4 INJECTION INTRAMUSCULAR; INTRAVENOUS
Status: DISCONTINUED | OUTPATIENT
Start: 2017-01-01 | End: 2017-01-01

## 2017-01-01 RX ORDER — HEPARIN SODIUM 1000 [USP'U]/ML
INJECTION, SOLUTION INTRAVENOUS; SUBCUTANEOUS AS NEEDED
Status: DISCONTINUED | OUTPATIENT
Start: 2017-01-01 | End: 2017-01-01 | Stop reason: HOSPADM

## 2017-01-01 RX ORDER — HEPARIN SODIUM (PORCINE) LOCK FLUSH IV SOLN 100 UNIT/ML 100 UNIT/ML
300 SOLUTION INTRAVENOUS AS NEEDED
Status: DISCONTINUED | OUTPATIENT
Start: 2017-01-01 | End: 2017-01-01 | Stop reason: HOSPADM

## 2017-01-01 RX ORDER — SODIUM CHLORIDE, SODIUM LACTATE, POTASSIUM CHLORIDE, CALCIUM CHLORIDE 600; 310; 30; 20 MG/100ML; MG/100ML; MG/100ML; MG/100ML
100 INJECTION, SOLUTION INTRAVENOUS CONTINUOUS
Status: DISCONTINUED | OUTPATIENT
Start: 2017-01-01 | End: 2017-01-01

## 2017-01-01 RX ORDER — SODIUM CHLORIDE 0.9 % (FLUSH) 0.9 %
5-10 SYRINGE (ML) INJECTION AS NEEDED
Status: CANCELLED | OUTPATIENT
Start: 2017-01-01

## 2017-01-01 RX ORDER — FENTANYL CITRATE 50 UG/ML
50 INJECTION, SOLUTION INTRAMUSCULAR; INTRAVENOUS
Status: DISCONTINUED | OUTPATIENT
Start: 2017-01-01 | End: 2017-01-01 | Stop reason: HOSPADM

## 2017-01-01 RX ORDER — POTASSIUM CHLORIDE 20MEQ/15ML
40 LIQUID (ML) ORAL DAILY
Status: COMPLETED | OUTPATIENT
Start: 2017-01-01 | End: 2017-01-01

## 2017-01-01 RX ORDER — MORPHINE SULFATE 10 MG/ML
10 INJECTION, SOLUTION INTRAMUSCULAR; INTRAVENOUS
Status: DISCONTINUED | OUTPATIENT
Start: 2017-01-01 | End: 2017-01-01

## 2017-01-01 RX ORDER — SUCCINYLCHOLINE CHLORIDE 20 MG/ML
INJECTION INTRAMUSCULAR; INTRAVENOUS AS NEEDED
Status: DISCONTINUED | OUTPATIENT
Start: 2017-01-01 | End: 2017-01-01 | Stop reason: HOSPADM

## 2017-01-01 RX ORDER — SODIUM CHLORIDE 9 MG/ML
10 INJECTION, SOLUTION INTRAVENOUS ONCE
Status: DISCONTINUED | OUTPATIENT
Start: 2017-01-01 | End: 2017-01-01 | Stop reason: ALTCHOICE

## 2017-01-01 RX ORDER — ETOMIDATE 2 MG/ML
INJECTION INTRAVENOUS
Status: COMPLETED
Start: 2017-01-01 | End: 2017-01-01

## 2017-01-01 RX ORDER — DEXTROSE 50 % IN WATER (D50W) INTRAVENOUS SYRINGE
25 ONCE
Status: ACTIVE | OUTPATIENT
Start: 2017-01-01 | End: 2017-01-01

## 2017-01-01 RX ORDER — LORAZEPAM 2 MG/ML
1 INJECTION INTRAMUSCULAR
Status: DISCONTINUED | OUTPATIENT
Start: 2017-01-01 | End: 2017-01-01

## 2017-01-01 RX ORDER — MORPHINE SULFATE 15 MG/1
30 TABLET, FILM COATED, EXTENDED RELEASE ORAL EVERY 12 HOURS
Status: DISCONTINUED | OUTPATIENT
Start: 2017-01-01 | End: 2017-01-01

## 2017-01-01 RX ORDER — MIDODRINE HYDROCHLORIDE 5 MG/1
5 TABLET ORAL
Status: DISCONTINUED | OUTPATIENT
Start: 2017-01-01 | End: 2017-01-01

## 2017-01-01 RX ORDER — LIDOCAINE HYDROCHLORIDE 20 MG/ML
20-200 INJECTION, SOLUTION INFILTRATION; PERINEURAL
Status: DISCONTINUED | OUTPATIENT
Start: 2017-01-01 | End: 2017-01-01 | Stop reason: HOSPADM

## 2017-01-01 RX ORDER — SODIUM CHLORIDE 9 MG/ML
25 INJECTION, SOLUTION INTRAVENOUS
Status: DISCONTINUED | OUTPATIENT
Start: 2017-01-01 | End: 2017-01-01

## 2017-01-01 RX ORDER — CYCLOSPORINE 25 MG/1
2.5 CAPSULE, LIQUID FILLED ORAL
Status: DISCONTINUED | OUTPATIENT
Start: 2017-01-01 | End: 2017-01-01 | Stop reason: HOSPADM

## 2017-01-01 RX ORDER — LIDOCAINE HYDROCHLORIDE 20 MG/ML
1-20 INJECTION, SOLUTION INFILTRATION; PERINEURAL ONCE
Status: COMPLETED | OUTPATIENT
Start: 2017-01-01 | End: 2017-01-01

## 2017-01-01 RX ORDER — POTASSIUM CHLORIDE 750 MG/1
40 TABLET, EXTENDED RELEASE ORAL EVERY 4 HOURS
Status: COMPLETED | OUTPATIENT
Start: 2017-01-01 | End: 2017-01-01

## 2017-01-01 RX ORDER — INSULIN LISPRO 100 [IU]/ML
INJECTION, SOLUTION INTRAVENOUS; SUBCUTANEOUS
Status: DISCONTINUED | OUTPATIENT
Start: 2017-01-01 | End: 2017-01-01

## 2017-01-01 RX ORDER — MORPHINE SULFATE 15 MG/1
15 TABLET, FILM COATED, EXTENDED RELEASE ORAL EVERY 8 HOURS
Status: DISCONTINUED | OUTPATIENT
Start: 2017-01-01 | End: 2017-01-01

## 2017-01-01 RX ORDER — LIDOCAINE HYDROCHLORIDE 20 MG/ML
.5-2 INJECTION, SOLUTION INFILTRATION; PERINEURAL ONCE
Status: DISCONTINUED | OUTPATIENT
Start: 2017-01-01 | End: 2017-01-01

## 2017-01-01 RX ORDER — HEPARIN 100 UNIT/ML
500 SYRINGE INTRAVENOUS AS NEEDED
Status: DISCONTINUED | OUTPATIENT
Start: 2017-01-01 | End: 2017-01-01

## 2017-01-01 RX ORDER — HALOPERIDOL 5 MG/ML
2 INJECTION INTRAMUSCULAR EVERY 12 HOURS
Status: DISCONTINUED | OUTPATIENT
Start: 2017-01-01 | End: 2017-01-01

## 2017-01-01 RX ORDER — PROMETHAZINE HYDROCHLORIDE 25 MG/ML
12.5 INJECTION, SOLUTION INTRAMUSCULAR; INTRAVENOUS
Status: DISCONTINUED | OUTPATIENT
Start: 2017-01-01 | End: 2017-01-01

## 2017-01-01 RX ORDER — POTASSIUM CHLORIDE 20 MEQ/1
20 TABLET, EXTENDED RELEASE ORAL 2 TIMES DAILY
Status: DISCONTINUED | OUTPATIENT
Start: 2017-01-01 | End: 2017-01-01 | Stop reason: HOSPADM

## 2017-01-01 RX ORDER — SODIUM CHLORIDE, SODIUM LACTATE, POTASSIUM CHLORIDE, CALCIUM CHLORIDE 600; 310; 30; 20 MG/100ML; MG/100ML; MG/100ML; MG/100ML
100 INJECTION, SOLUTION INTRAVENOUS CONTINUOUS
Status: DISCONTINUED | OUTPATIENT
Start: 2017-01-01 | End: 2017-01-01 | Stop reason: HOSPADM

## 2017-01-01 RX ORDER — HEPARIN SODIUM 200 [USP'U]/100ML
1000 INJECTION, SOLUTION INTRAVENOUS
Status: DISCONTINUED | OUTPATIENT
Start: 2017-01-01 | End: 2017-01-01 | Stop reason: HOSPADM

## 2017-01-01 RX ORDER — POTASSIUM CHLORIDE 20 MEQ/1
20 TABLET, EXTENDED RELEASE ORAL DAILY
Qty: 20 TAB | Refills: 0 | Status: SHIPPED | OUTPATIENT
Start: 2017-01-01 | End: 2017-01-01

## 2017-01-01 RX ORDER — METOCLOPRAMIDE 10 MG/1
10 TABLET ORAL
Status: DISCONTINUED | OUTPATIENT
Start: 2017-01-01 | End: 2017-01-01 | Stop reason: HOSPADM

## 2017-01-01 RX ORDER — CALCIUM ACETATE 667 MG/1
1 CAPSULE ORAL
Status: DISCONTINUED | OUTPATIENT
Start: 2017-01-01 | End: 2017-01-01

## 2017-01-01 RX ORDER — MIDODRINE HYDROCHLORIDE 5 MG/1
5 TABLET ORAL EVERY 8 HOURS
COMMUNITY

## 2017-01-01 RX ORDER — LORAZEPAM 2 MG/ML
1 INJECTION INTRAMUSCULAR
Status: DISCONTINUED | OUTPATIENT
Start: 2017-01-01 | End: 2017-01-01 | Stop reason: HOSPADM

## 2017-01-01 RX ORDER — HYDROCODONE BITARTRATE AND ACETAMINOPHEN 7.5; 325 MG/1; MG/1
1 TABLET ORAL AS NEEDED
Status: DISCONTINUED | OUTPATIENT
Start: 2017-01-01 | End: 2017-01-01 | Stop reason: HOSPADM

## 2017-01-01 RX ORDER — DIPHENHYDRAMINE HYDROCHLORIDE 50 MG/ML
25 INJECTION, SOLUTION INTRAMUSCULAR; INTRAVENOUS
Status: DISCONTINUED | OUTPATIENT
Start: 2017-01-01 | End: 2017-10-30 | Stop reason: HOSPADM

## 2017-01-01 RX ORDER — ACETAMINOPHEN 650 MG/1
650 SUPPOSITORY RECTAL
Status: DISCONTINUED | OUTPATIENT
Start: 2017-01-01 | End: 2017-01-01 | Stop reason: HOSPADM

## 2017-01-01 RX ORDER — LIDOCAINE HYDROCHLORIDE 20 MG/ML
40-120 INJECTION, SOLUTION INFILTRATION; PERINEURAL ONCE
Status: ACTIVE | OUTPATIENT
Start: 2017-01-01 | End: 2017-01-01

## 2017-01-01 RX ORDER — FACIAL-BODY WIPES
10 EACH TOPICAL AS NEEDED
Status: DISCONTINUED | OUTPATIENT
Start: 2017-01-01 | End: 2017-10-30 | Stop reason: HOSPADM

## 2017-01-01 RX ORDER — FENTANYL CITRATE 50 UG/ML
INJECTION, SOLUTION INTRAMUSCULAR; INTRAVENOUS
Status: COMPLETED
Start: 2017-01-01 | End: 2017-01-01

## 2017-01-01 RX ORDER — CHLORHEXIDINE GLUCONATE 1.2 MG/ML
15 RINSE ORAL 2 TIMES DAILY
Status: DISCONTINUED | OUTPATIENT
Start: 2017-01-01 | End: 2017-01-01

## 2017-01-01 RX ORDER — ONDANSETRON 8 MG/1
8 TABLET, ORALLY DISINTEGRATING ORAL
Status: DISCONTINUED | OUTPATIENT
Start: 2017-01-01 | End: 2017-01-01 | Stop reason: HOSPADM

## 2017-01-01 RX ORDER — LACTULOSE 10 G/15ML
20 SOLUTION ORAL; RECTAL 3 TIMES DAILY
Qty: 480 ML | Refills: 0 | Status: SHIPPED | OUTPATIENT
Start: 2017-01-01

## 2017-01-01 RX ORDER — LIDOCAINE HYDROCHLORIDE 20 MG/ML
15 SOLUTION OROPHARYNGEAL AS NEEDED
Status: DISCONTINUED | OUTPATIENT
Start: 2017-01-01 | End: 2017-10-30 | Stop reason: HOSPADM

## 2017-01-01 RX ADMIN — LACTULOSE 20 G: 20 SOLUTION ORAL at 15:18

## 2017-01-01 RX ADMIN — CYCLOSPORINE 125 MG: 25 CAPSULE, LIQUID FILLED ORAL at 06:02

## 2017-01-01 RX ADMIN — Medication 10 ML: at 09:23

## 2017-01-01 RX ADMIN — POTASSIUM CHLORIDE 40 MEQ: 750 TABLET, EXTENDED RELEASE ORAL at 10:25

## 2017-01-01 RX ADMIN — ALBUMIN (HUMAN) 25 G: 0.25 INJECTION, SOLUTION INTRAVENOUS at 12:10

## 2017-01-01 RX ADMIN — Medication 10 ML: at 05:49

## 2017-01-01 RX ADMIN — SODIUM CHLORIDE: 900 INJECTION, SOLUTION INTRAVENOUS at 16:36

## 2017-01-01 RX ADMIN — MIDODRINE HYDROCHLORIDE 5 MG: 5 TABLET ORAL at 16:00

## 2017-01-01 RX ADMIN — INSULIN LISPRO 2 UNITS: 100 INJECTION, SOLUTION INTRAVENOUS; SUBCUTANEOUS at 17:59

## 2017-01-01 RX ADMIN — METOCLOPRAMIDE HYDROCHLORIDE 10 MG: 10 TABLET ORAL at 22:31

## 2017-01-01 RX ADMIN — Medication 300 UNITS: at 08:29

## 2017-01-01 RX ADMIN — Medication 10 ML: at 14:40

## 2017-01-01 RX ADMIN — POTASSIUM CHLORIDE 20 MEQ: 1500 TABLET, EXTENDED RELEASE ORAL at 01:00

## 2017-01-01 RX ADMIN — INSULIN LISPRO 4 UNITS: 100 INJECTION, SOLUTION INTRAVENOUS; SUBCUTANEOUS at 16:57

## 2017-01-01 RX ADMIN — CYCLOSPORINE 25 MG: 25 CAPSULE, LIQUID FILLED ORAL at 15:18

## 2017-01-01 RX ADMIN — POTASSIUM CHLORIDE 20 MEQ: 20 TABLET, EXTENDED RELEASE ORAL at 18:18

## 2017-01-01 RX ADMIN — PANTOPRAZOLE SODIUM 40 MG: 40 TABLET, DELAYED RELEASE ORAL at 07:14

## 2017-01-01 RX ADMIN — POTASSIUM CHLORIDE 40 MEQ: 20 SOLUTION ORAL at 11:25

## 2017-01-01 RX ADMIN — Medication 10 ML: at 12:41

## 2017-01-01 RX ADMIN — MORPHINE SULFATE 15 MG: 15 TABLET, EXTENDED RELEASE ORAL at 22:51

## 2017-01-01 RX ADMIN — LIDOCAINE HYDROCHLORIDE 60 MG: 20 INJECTION, SOLUTION INFILTRATION; PERINEURAL at 08:58

## 2017-01-01 RX ADMIN — RIFAXIMIN 550 MG: 550 TABLET ORAL at 17:28

## 2017-01-01 RX ADMIN — METOCLOPRAMIDE HYDROCHLORIDE 10 MG: 10 TABLET ORAL at 16:52

## 2017-01-01 RX ADMIN — PANTOPRAZOLE SODIUM 40 MG: 40 TABLET, DELAYED RELEASE ORAL at 18:52

## 2017-01-01 RX ADMIN — Medication 10 ML: at 02:44

## 2017-01-01 RX ADMIN — Medication 5 ML: at 22:14

## 2017-01-01 RX ADMIN — OXYCODONE HYDROCHLORIDE 5 MG: 5 TABLET ORAL at 09:36

## 2017-01-01 RX ADMIN — Medication 10 ML: at 17:29

## 2017-01-01 RX ADMIN — SODIUM CHLORIDE, PRESERVATIVE FREE 10 ML: 5 INJECTION INTRAVENOUS at 16:56

## 2017-01-01 RX ADMIN — POTASSIUM CHLORIDE 20 MEQ: 14.9 INJECTION, SOLUTION INTRAVENOUS at 15:34

## 2017-01-01 RX ADMIN — SODIUM CHLORIDE, PRESERVATIVE FREE 500 UNITS: 5 INJECTION INTRAVENOUS at 10:06

## 2017-01-01 RX ADMIN — MIDODRINE HYDROCHLORIDE 5 MG: 5 TABLET ORAL at 21:29

## 2017-01-01 RX ADMIN — HYDROMORPHONE HYDROCHLORIDE 0.25 MG: 1 INJECTION, SOLUTION INTRAMUSCULAR; INTRAVENOUS; SUBCUTANEOUS at 18:22

## 2017-01-01 RX ADMIN — CHLORASEPTIC 1 SPRAY: 1.5 LIQUID ORAL at 16:48

## 2017-01-01 RX ADMIN — MORPHINE SULFATE 10 MG: 10 INJECTION INTRAMUSCULAR; INTRAVENOUS; SUBCUTANEOUS at 17:50

## 2017-01-01 RX ADMIN — MIDODRINE HYDROCHLORIDE 10 MG: 5 TABLET ORAL at 16:53

## 2017-01-01 RX ADMIN — MIDODRINE HYDROCHLORIDE 5 MG: 5 TABLET ORAL at 22:51

## 2017-01-01 RX ADMIN — Medication 600 UNITS: at 07:13

## 2017-01-01 RX ADMIN — MORPHINE SULFATE 1 MG: 2 INJECTION, SOLUTION INTRAMUSCULAR; INTRAVENOUS at 09:35

## 2017-01-01 RX ADMIN — ALTEPLASE 2 MG: 2.2 INJECTION, POWDER, LYOPHILIZED, FOR SOLUTION INTRAVENOUS at 14:00

## 2017-01-01 RX ADMIN — LACTULOSE 20 G: 20 SOLUTION ORAL at 16:54

## 2017-01-01 RX ADMIN — HYDROMORPHONE HYDROCHLORIDE 0.25 MG: 1 INJECTION, SOLUTION INTRAMUSCULAR; INTRAVENOUS; SUBCUTANEOUS at 11:47

## 2017-01-01 RX ADMIN — HYDROMORPHONE HYDROCHLORIDE 0.25 MG: 1 INJECTION, SOLUTION INTRAMUSCULAR; INTRAVENOUS; SUBCUTANEOUS at 22:53

## 2017-01-01 RX ADMIN — Medication 10 ML: at 06:03

## 2017-01-01 RX ADMIN — MIDODRINE HYDROCHLORIDE 5 MG: 5 TABLET ORAL at 11:25

## 2017-01-01 RX ADMIN — Medication 400 MG: at 09:05

## 2017-01-01 RX ADMIN — Medication 400 MG: at 23:36

## 2017-01-01 RX ADMIN — HYDROMORPHONE HYDROCHLORIDE 0.25 MG: 1 INJECTION, SOLUTION INTRAMUSCULAR; INTRAVENOUS; SUBCUTANEOUS at 22:58

## 2017-01-01 RX ADMIN — LACTULOSE 200 G: 10 SOLUTION ORAL; RECTAL at 11:12

## 2017-01-01 RX ADMIN — LIDOCAINE HYDROCHLORIDE 15 ML: 20 SOLUTION OROPHARYNGEAL at 09:00

## 2017-01-01 RX ADMIN — LACTULOSE 20 G: 20 SOLUTION ORAL at 15:35

## 2017-01-01 RX ADMIN — HYDROMORPHONE HYDROCHLORIDE 0.5 MG: 1 INJECTION, SOLUTION INTRAMUSCULAR; INTRAVENOUS; SUBCUTANEOUS at 14:40

## 2017-01-01 RX ADMIN — MIDAZOLAM HYDROCHLORIDE 1 MG: 1 INJECTION, SOLUTION INTRAMUSCULAR; INTRAVENOUS at 16:10

## 2017-01-01 RX ADMIN — METOCLOPRAMIDE HYDROCHLORIDE 10 MG: 10 TABLET ORAL at 12:09

## 2017-01-01 RX ADMIN — MIDODRINE HYDROCHLORIDE 5 MG: 5 TABLET ORAL at 15:00

## 2017-01-01 RX ADMIN — Medication 600 UNITS: at 12:02

## 2017-01-01 RX ADMIN — HALOPERIDOL LACTATE 2 MG: 5 INJECTION INTRAMUSCULAR at 16:59

## 2017-01-01 RX ADMIN — ONDANSETRON 8 MG: 8 TABLET, ORALLY DISINTEGRATING ORAL at 20:30

## 2017-01-01 RX ADMIN — SODIUM CHLORIDE, PRESERVATIVE FREE 300 UNITS: 5 INJECTION INTRAVENOUS at 00:02

## 2017-01-01 RX ADMIN — SODIUM CHLORIDE 100 ML/HR: 900 INJECTION, SOLUTION INTRAVENOUS at 22:48

## 2017-01-01 RX ADMIN — SODIUM CHLORIDE 25 MG: 9 INJECTION INTRAMUSCULAR; INTRAVENOUS; SUBCUTANEOUS at 08:23

## 2017-01-01 RX ADMIN — LACTULOSE 20 G: 20 SOLUTION ORAL at 06:46

## 2017-01-01 RX ADMIN — RIFAXIMIN 550 MG: 550 TABLET ORAL at 08:07

## 2017-01-01 RX ADMIN — HYDROMORPHONE HYDROCHLORIDE 0.25 MG: 1 INJECTION, SOLUTION INTRAMUSCULAR; INTRAVENOUS; SUBCUTANEOUS at 02:12

## 2017-01-01 RX ADMIN — LIDOCAINE HYDROCHLORIDE 60 MG: 20 INJECTION, SOLUTION EPIDURAL; INFILTRATION; INTRACAUDAL; PERINEURAL at 09:53

## 2017-01-01 RX ADMIN — SODIUM CHLORIDE 50 ML/HR: 900 INJECTION, SOLUTION INTRAVENOUS at 13:45

## 2017-01-01 RX ADMIN — Medication 5 ML: at 05:35

## 2017-01-01 RX ADMIN — RIFAXIMIN 550 MG: 550 TABLET ORAL at 18:35

## 2017-01-01 RX ADMIN — PANTOPRAZOLE SODIUM 40 MG: 40 TABLET, DELAYED RELEASE ORAL at 06:46

## 2017-01-01 RX ADMIN — ONDANSETRON 4 MG: 2 INJECTION, SOLUTION INTRAMUSCULAR; INTRAVENOUS at 16:13

## 2017-01-01 RX ADMIN — PIPERACILLIN SODIUM,TAZOBACTAM SODIUM 4.5 G: 4; .5 INJECTION, POWDER, FOR SOLUTION INTRAVENOUS at 12:55

## 2017-01-01 RX ADMIN — HYDROMORPHONE HYDROCHLORIDE 0.25 MG: 1 INJECTION, SOLUTION INTRAMUSCULAR; INTRAVENOUS; SUBCUTANEOUS at 09:31

## 2017-01-01 RX ADMIN — INSULIN LISPRO 4 UNITS: 100 INJECTION, SOLUTION INTRAVENOUS; SUBCUTANEOUS at 22:52

## 2017-01-01 RX ADMIN — LACTULOSE 30 G: 10 SOLUTION ORAL at 08:23

## 2017-01-01 RX ADMIN — Medication 10 ML: at 08:18

## 2017-01-01 RX ADMIN — LIDOCAINE HYDROCHLORIDE 15 ML: 20 SOLUTION OROPHARYNGEAL at 20:57

## 2017-01-01 RX ADMIN — Medication 600 UNITS: at 12:08

## 2017-01-01 RX ADMIN — LACTULOSE 20 G: 20 SOLUTION ORAL at 17:16

## 2017-01-01 RX ADMIN — CYCLOSPORINE 25 MG: 25 CAPSULE, LIQUID FILLED ORAL at 17:28

## 2017-01-01 RX ADMIN — POTASSIUM CHLORIDE 20 MEQ: 20 TABLET, EXTENDED RELEASE ORAL at 08:23

## 2017-01-01 RX ADMIN — Medication 5 ML: at 06:48

## 2017-01-01 RX ADMIN — HYDROMORPHONE HYDROCHLORIDE 0.5 MG: 1 INJECTION, SOLUTION INTRAMUSCULAR; INTRAVENOUS; SUBCUTANEOUS at 20:43

## 2017-01-01 RX ADMIN — PANTOPRAZOLE SODIUM 40 MG: 40 TABLET, DELAYED RELEASE ORAL at 15:21

## 2017-01-01 RX ADMIN — ONDANSETRON 8 MG: 8 TABLET, ORALLY DISINTEGRATING ORAL at 15:36

## 2017-01-01 RX ADMIN — METOCLOPRAMIDE HYDROCHLORIDE 10 MG: 10 TABLET ORAL at 22:28

## 2017-01-01 RX ADMIN — ALBUMIN (HUMAN) 25 G: 0.25 INJECTION, SOLUTION INTRAVENOUS at 09:02

## 2017-01-01 RX ADMIN — HYDROMORPHONE HYDROCHLORIDE 0.25 MG: 1 INJECTION, SOLUTION INTRAMUSCULAR; INTRAVENOUS; SUBCUTANEOUS at 12:33

## 2017-01-01 RX ADMIN — Medication 10 ML: at 21:03

## 2017-01-01 RX ADMIN — RIFAXIMIN 550 MG: 550 TABLET ORAL at 16:06

## 2017-01-01 RX ADMIN — Medication 10 ML: at 09:52

## 2017-01-01 RX ADMIN — Medication 10 ML: at 20:59

## 2017-01-01 RX ADMIN — SUCCINYLCHOLINE CHLORIDE 160 MG: 20 INJECTION INTRAMUSCULAR; INTRAVENOUS at 17:02

## 2017-01-01 RX ADMIN — LACTULOSE 20 G: 10 SOLUTION ORAL at 08:23

## 2017-01-01 RX ADMIN — DEXTROSE MONOHYDRATE 25 G: 25 INJECTION, SOLUTION INTRAVENOUS at 09:13

## 2017-01-01 RX ADMIN — INSULIN GLARGINE 25 UNITS: 100 INJECTION, SOLUTION SUBCUTANEOUS at 22:00

## 2017-01-01 RX ADMIN — MIDODRINE HYDROCHLORIDE 5 MG: 5 TABLET ORAL at 07:05

## 2017-01-01 RX ADMIN — PANTOPRAZOLE SODIUM 40 MG: 40 TABLET, DELAYED RELEASE ORAL at 06:13

## 2017-01-01 RX ADMIN — ALBUMIN (HUMAN) 25 G: 0.25 INJECTION, SOLUTION INTRAVENOUS at 09:07

## 2017-01-01 RX ADMIN — TUBERCULIN PURIFIED PROTEIN DERIVATIVE 5 UNITS: 5 INJECTION INTRADERMAL at 16:46

## 2017-01-01 RX ADMIN — ONDANSETRON 8 MG: 8 TABLET, ORALLY DISINTEGRATING ORAL at 21:41

## 2017-01-01 RX ADMIN — Medication 10 ML: at 17:16

## 2017-01-01 RX ADMIN — POTASSIUM CHLORIDE 20 MEQ: 14.9 INJECTION, SOLUTION INTRAVENOUS at 17:52

## 2017-01-01 RX ADMIN — RIFAXIMIN 550 MG: 550 TABLET ORAL at 18:00

## 2017-01-01 RX ADMIN — HALOPERIDOL LACTATE 2 MG: 5 INJECTION INTRAMUSCULAR at 13:17

## 2017-01-01 RX ADMIN — SODIUM CHLORIDE, PRESERVATIVE FREE 10 ML: 5 INJECTION INTRAVENOUS at 15:10

## 2017-01-01 RX ADMIN — HALOPERIDOL LACTATE 2 MG: 5 INJECTION INTRAMUSCULAR at 08:37

## 2017-01-01 RX ADMIN — RIFAXIMIN 550 MG: 550 TABLET ORAL at 08:40

## 2017-01-01 RX ADMIN — SODIUM CHLORIDE 25 MG: 9 INJECTION INTRAMUSCULAR; INTRAVENOUS; SUBCUTANEOUS at 10:43

## 2017-01-01 RX ADMIN — HYDROMORPHONE HYDROCHLORIDE 0.5 MG: 1 INJECTION, SOLUTION INTRAMUSCULAR; INTRAVENOUS; SUBCUTANEOUS at 22:17

## 2017-01-01 RX ADMIN — MIDODRINE HYDROCHLORIDE 5 MG: 5 TABLET ORAL at 07:36

## 2017-01-01 RX ADMIN — SODIUM BICARBONATE 1 ML: 0.2 INJECTION, SOLUTION INTRAVENOUS at 10:14

## 2017-01-01 RX ADMIN — SODIUM CHLORIDE, PRESERVATIVE FREE 600 UNITS: 5 INJECTION INTRAVENOUS at 09:45

## 2017-01-01 RX ADMIN — HALOPERIDOL LACTATE 2 MG: 5 INJECTION INTRAMUSCULAR at 18:22

## 2017-01-01 RX ADMIN — MORPHINE SULFATE 10 MG: 10 INJECTION INTRAMUSCULAR; INTRAVENOUS; SUBCUTANEOUS at 22:48

## 2017-01-01 RX ADMIN — MORPHINE SULFATE 15 MG: 15 TABLET, EXTENDED RELEASE ORAL at 09:39

## 2017-01-01 RX ADMIN — MIDODRINE HYDROCHLORIDE 5 MG: 5 TABLET ORAL at 05:11

## 2017-01-01 RX ADMIN — MORPHINE SULFATE 15 MG: 15 TABLET, EXTENDED RELEASE ORAL at 06:42

## 2017-01-01 RX ADMIN — ONDANSETRON 4 MG: 2 INJECTION INTRAMUSCULAR; INTRAVENOUS at 00:21

## 2017-01-01 RX ADMIN — ONDANSETRON 4 MG: 2 INJECTION INTRAMUSCULAR; INTRAVENOUS at 11:41

## 2017-01-01 RX ADMIN — INSULIN LISPRO 2 UNITS: 100 INJECTION, SOLUTION INTRAVENOUS; SUBCUTANEOUS at 00:40

## 2017-01-01 RX ADMIN — Medication 5 ML: at 21:29

## 2017-01-01 RX ADMIN — HALOPERIDOL LACTATE 2 MG: 5 INJECTION INTRAMUSCULAR at 17:05

## 2017-01-01 RX ADMIN — LACTULOSE 20 G: 10 SOLUTION ORAL at 11:14

## 2017-01-01 RX ADMIN — CYCLOSPORINE 50 MG: 25 CAPSULE, LIQUID FILLED ORAL at 09:59

## 2017-01-01 RX ADMIN — Medication 10 ML: at 09:32

## 2017-01-01 RX ADMIN — Medication 300 UNITS: at 21:19

## 2017-01-01 RX ADMIN — CIPROFLOXACIN 400 MG: 2 INJECTION, SOLUTION INTRAVENOUS at 12:00

## 2017-01-01 RX ADMIN — PIPERACILLIN SODIUM,TAZOBACTAM SODIUM 4.5 G: 4; .5 INJECTION, POWDER, FOR SOLUTION INTRAVENOUS at 09:39

## 2017-01-01 RX ADMIN — SODIUM CHLORIDE 40 MG: 9 INJECTION INTRAMUSCULAR; INTRAVENOUS; SUBCUTANEOUS at 09:45

## 2017-01-01 RX ADMIN — LACTULOSE 20 G: 10 SOLUTION ORAL at 23:14

## 2017-01-01 RX ADMIN — ALBUMIN (HUMAN) 25 G: 0.25 INJECTION, SOLUTION INTRAVENOUS at 11:17

## 2017-01-01 RX ADMIN — LIDOCAINE HYDROCHLORIDE 15 ML: 20 SOLUTION OROPHARYNGEAL at 11:02

## 2017-01-01 RX ADMIN — Medication 10 ML: at 22:02

## 2017-01-01 RX ADMIN — Medication 10 ML: at 22:05

## 2017-01-01 RX ADMIN — CYCLOSPORINE 25 MG: 25 CAPSULE, LIQUID FILLED ORAL at 18:32

## 2017-01-01 RX ADMIN — RIFAXIMIN 550 MG: 550 TABLET ORAL at 09:11

## 2017-01-01 RX ADMIN — CYCLOSPORINE 50 MG: 25 CAPSULE, LIQUID FILLED ORAL at 21:00

## 2017-01-01 RX ADMIN — HYDROMORPHONE HYDROCHLORIDE 0.25 MG: 1 INJECTION, SOLUTION INTRAMUSCULAR; INTRAVENOUS; SUBCUTANEOUS at 09:12

## 2017-01-01 RX ADMIN — MIDODRINE HYDROCHLORIDE 10 MG: 5 TABLET ORAL at 07:16

## 2017-01-01 RX ADMIN — RIFAXIMIN 550 MG: 550 TABLET ORAL at 22:17

## 2017-01-01 RX ADMIN — Medication 20 ML: at 09:40

## 2017-01-01 RX ADMIN — PROMETHAZINE HYDROCHLORIDE 12.5 MG: 25 INJECTION INTRAMUSCULAR; INTRAVENOUS at 21:17

## 2017-01-01 RX ADMIN — CYCLOSPORINE 25 MG: 100 SOLUTION ORAL at 17:41

## 2017-01-01 RX ADMIN — Medication 300 UNITS: at 08:38

## 2017-01-01 RX ADMIN — SODIUM CHLORIDE, PRESERVATIVE FREE 600 UNITS: 5 INJECTION INTRAVENOUS at 11:20

## 2017-01-01 RX ADMIN — ONDANSETRON 4 MG: 2 INJECTION INTRAMUSCULAR; INTRAVENOUS at 07:20

## 2017-01-01 RX ADMIN — OXYCODONE HYDROCHLORIDE 5 MG: 5 TABLET ORAL at 19:45

## 2017-01-01 RX ADMIN — POTASSIUM CHLORIDE 40 MEQ: 750 TABLET, EXTENDED RELEASE ORAL at 22:17

## 2017-01-01 RX ADMIN — LIDOCAINE HYDROCHLORIDE 15 ML: 20 SOLUTION OROPHARYNGEAL at 20:32

## 2017-01-01 RX ADMIN — Medication 300 UNITS: at 23:12

## 2017-01-01 RX ADMIN — ONDANSETRON 4 MG: 2 INJECTION INTRAMUSCULAR; INTRAVENOUS at 21:13

## 2017-01-01 RX ADMIN — FENTANYL CITRATE 50 MCG: 50 INJECTION, SOLUTION INTRAMUSCULAR; INTRAVENOUS at 15:55

## 2017-01-01 RX ADMIN — PANTOPRAZOLE SODIUM 40 MG: 40 TABLET, DELAYED RELEASE ORAL at 05:22

## 2017-01-01 RX ADMIN — SODIUM CHLORIDE, PRESERVATIVE FREE 300 UNITS: 5 INJECTION INTRAVENOUS at 15:55

## 2017-01-01 RX ADMIN — HYDROMORPHONE HYDROCHLORIDE 0.25 MG: 1 INJECTION, SOLUTION INTRAMUSCULAR; INTRAVENOUS; SUBCUTANEOUS at 14:14

## 2017-01-01 RX ADMIN — CYCLOSPORINE 125 MG: 25 CAPSULE, LIQUID FILLED ORAL at 08:35

## 2017-01-01 RX ADMIN — RIFAXIMIN 550 MG: 550 TABLET ORAL at 09:35

## 2017-01-01 RX ADMIN — HYDROMORPHONE HYDROCHLORIDE 0.25 MG: 1 INJECTION, SOLUTION INTRAMUSCULAR; INTRAVENOUS; SUBCUTANEOUS at 16:55

## 2017-01-01 RX ADMIN — ACETAMINOPHEN 650 MG: 325 TABLET, FILM COATED ORAL at 01:03

## 2017-01-01 RX ADMIN — Medication 10 ML: at 08:38

## 2017-01-01 RX ADMIN — RIFAXIMIN 550 MG: 550 TABLET ORAL at 08:32

## 2017-01-01 RX ADMIN — Medication 10 ML: at 21:30

## 2017-01-01 RX ADMIN — MORPHINE SULFATE 15 MG: 15 TABLET, FILM COATED, EXTENDED RELEASE ORAL at 09:31

## 2017-01-01 RX ADMIN — Medication 10 ML: at 23:24

## 2017-01-01 RX ADMIN — Medication 10 ML: at 06:29

## 2017-01-01 RX ADMIN — Medication 10 ML: at 20:49

## 2017-01-01 RX ADMIN — LACTULOSE 20 G: 20 SOLUTION ORAL at 22:33

## 2017-01-01 RX ADMIN — LACTULOSE 20 G: 10 SOLUTION ORAL at 13:23

## 2017-01-01 RX ADMIN — RIFAXIMIN 550 MG: 550 TABLET ORAL at 18:19

## 2017-01-01 RX ADMIN — HALOPERIDOL LACTATE 2 MG: 5 INJECTION INTRAMUSCULAR at 13:21

## 2017-01-01 RX ADMIN — INSULIN LISPRO 2 UNITS: 100 INJECTION, SOLUTION INTRAVENOUS; SUBCUTANEOUS at 08:23

## 2017-01-01 RX ADMIN — ONDANSETRON 4 MG: 2 INJECTION, SOLUTION INTRAMUSCULAR; INTRAVENOUS at 04:50

## 2017-01-01 RX ADMIN — MIDODRINE HYDROCHLORIDE 5 MG: 5 TABLET ORAL at 07:44

## 2017-01-01 RX ADMIN — INSULIN LISPRO 2 UNITS: 100 INJECTION, SOLUTION INTRAVENOUS; SUBCUTANEOUS at 21:13

## 2017-01-01 RX ADMIN — Medication 20 ML: at 09:56

## 2017-01-01 RX ADMIN — Medication 10 ML: at 21:25

## 2017-01-01 RX ADMIN — METOCLOPRAMIDE HYDROCHLORIDE 10 MG: 10 TABLET ORAL at 07:44

## 2017-01-01 RX ADMIN — LACTULOSE 20 G: 10 SOLUTION ORAL at 08:40

## 2017-01-01 RX ADMIN — LACTULOSE 30 G: 20 SOLUTION ORAL at 04:40

## 2017-01-01 RX ADMIN — Medication 10 ML: at 14:00

## 2017-01-01 RX ADMIN — HALOPERIDOL LACTATE 2 MG: 5 INJECTION INTRAMUSCULAR at 19:49

## 2017-01-01 RX ADMIN — MIDODRINE HYDROCHLORIDE 10 MG: 5 TABLET ORAL at 09:16

## 2017-01-01 RX ADMIN — HALOPERIDOL LACTATE 2 MG: 5 INJECTION INTRAMUSCULAR at 10:07

## 2017-01-01 RX ADMIN — CYCLOSPORINE 50 MG: 25 CAPSULE, LIQUID FILLED ORAL at 10:34

## 2017-01-01 RX ADMIN — SODIUM CHLORIDE 500 ML: 900 INJECTION, SOLUTION INTRAVENOUS at 15:56

## 2017-01-01 RX ADMIN — PANTOPRAZOLE SODIUM 40 MG: 40 TABLET, DELAYED RELEASE ORAL at 05:49

## 2017-01-01 RX ADMIN — LACTULOSE 20 G: 20 SOLUTION ORAL at 04:34

## 2017-01-01 RX ADMIN — SODIUM CHLORIDE, PRESERVATIVE FREE 600 UNITS: 5 INJECTION INTRAVENOUS at 10:28

## 2017-01-01 RX ADMIN — Medication 10 ML: at 05:24

## 2017-01-01 RX ADMIN — HYDROMORPHONE HYDROCHLORIDE 0.25 MG: 1 INJECTION, SOLUTION INTRAMUSCULAR; INTRAVENOUS; SUBCUTANEOUS at 04:31

## 2017-01-01 RX ADMIN — HALOPERIDOL LACTATE 2 MG: 5 INJECTION INTRAMUSCULAR at 05:55

## 2017-01-01 RX ADMIN — HYDROMORPHONE HYDROCHLORIDE 0.25 MG: 1 INJECTION, SOLUTION INTRAMUSCULAR; INTRAVENOUS; SUBCUTANEOUS at 07:45

## 2017-01-01 RX ADMIN — POTASSIUM CHLORIDE 20 MEQ: 20 TABLET, EXTENDED RELEASE ORAL at 15:36

## 2017-01-01 RX ADMIN — Medication 20 ML: at 14:32

## 2017-01-01 RX ADMIN — LACTULOSE 20 G: 10 SOLUTION ORAL at 21:03

## 2017-01-01 RX ADMIN — HALOPERIDOL LACTATE 2 MG: 5 INJECTION INTRAMUSCULAR at 12:36

## 2017-01-01 RX ADMIN — HYDROMORPHONE HYDROCHLORIDE 0.25 MG: 1 INJECTION, SOLUTION INTRAMUSCULAR; INTRAVENOUS; SUBCUTANEOUS at 19:39

## 2017-01-01 RX ADMIN — LIDOCAINE HYDROCHLORIDE 15 ML: 20 SOLUTION OROPHARYNGEAL at 09:44

## 2017-01-01 RX ADMIN — MIDODRINE HYDROCHLORIDE 5 MG: 5 TABLET ORAL at 05:24

## 2017-01-01 RX ADMIN — Medication 300 UNITS: at 09:00

## 2017-01-01 RX ADMIN — SODIUM CHLORIDE, PRESERVATIVE FREE 300 UNITS: 5 INJECTION INTRAVENOUS at 07:10

## 2017-01-01 RX ADMIN — Medication 20 ML: at 11:44

## 2017-01-01 RX ADMIN — PIPERACILLIN SODIUM,TAZOBACTAM SODIUM 4.5 G: 4; .5 INJECTION, POWDER, FOR SOLUTION INTRAVENOUS at 22:28

## 2017-01-01 RX ADMIN — METOCLOPRAMIDE HYDROCHLORIDE 10 MG: 10 TABLET ORAL at 16:57

## 2017-01-01 RX ADMIN — PANTOPRAZOLE SODIUM 40 MG: 40 TABLET, DELAYED RELEASE ORAL at 16:54

## 2017-01-01 RX ADMIN — HALOPERIDOL LACTATE 2 MG: 5 INJECTION INTRAMUSCULAR at 04:32

## 2017-01-01 RX ADMIN — Medication 10 ML: at 21:19

## 2017-01-01 RX ADMIN — SODIUM CHLORIDE, PRESERVATIVE FREE 300 UNITS: 5 INJECTION INTRAVENOUS at 09:23

## 2017-01-01 RX ADMIN — SODIUM CHLORIDE, PRESERVATIVE FREE 300 UNITS: 5 INJECTION INTRAVENOUS at 12:58

## 2017-01-01 RX ADMIN — Medication 10 ML: at 20:44

## 2017-01-01 RX ADMIN — Medication 300 UNITS: at 16:05

## 2017-01-01 RX ADMIN — Medication 10 ML: at 21:43

## 2017-01-01 RX ADMIN — SODIUM CHLORIDE 12.5 MG: 9 INJECTION INTRAMUSCULAR; INTRAVENOUS; SUBCUTANEOUS at 01:40

## 2017-01-01 RX ADMIN — SODIUM CHLORIDE, PRESERVATIVE FREE 600 UNITS: 5 INJECTION INTRAVENOUS at 14:32

## 2017-01-01 RX ADMIN — LACTULOSE 20 G: 10 SOLUTION ORAL at 17:30

## 2017-01-01 RX ADMIN — PANTOPRAZOLE SODIUM 40 MG: 40 TABLET, DELAYED RELEASE ORAL at 06:07

## 2017-01-01 RX ADMIN — Medication 5 ML: at 13:02

## 2017-01-01 RX ADMIN — LIDOCAINE HYDROCHLORIDE 15 ML: 20 SOLUTION OROPHARYNGEAL at 21:19

## 2017-01-01 RX ADMIN — ALBUMIN (HUMAN) 25 G: 0.25 INJECTION, SOLUTION INTRAVENOUS at 10:47

## 2017-01-01 RX ADMIN — LACTULOSE 20 G: 20 SOLUTION ORAL at 21:41

## 2017-01-01 RX ADMIN — ONDANSETRON 4 MG: 2 INJECTION INTRAMUSCULAR; INTRAVENOUS at 11:37

## 2017-01-01 RX ADMIN — Medication 5 ML: at 05:41

## 2017-01-01 RX ADMIN — LIDOCAINE HYDROCHLORIDE 15 ML: 20 SOLUTION ORAL; TOPICAL at 18:12

## 2017-01-01 RX ADMIN — DEXTROSE MONOHYDRATE 25 G: 500 INJECTION PARENTERAL at 18:00

## 2017-01-01 RX ADMIN — Medication 5 ML: at 21:24

## 2017-01-01 RX ADMIN — ALBUMIN (HUMAN) 25 G: 0.25 INJECTION, SOLUTION INTRAVENOUS at 10:01

## 2017-01-01 RX ADMIN — HYDROMORPHONE HYDROCHLORIDE 0.25 MG: 1 INJECTION, SOLUTION INTRAMUSCULAR; INTRAVENOUS; SUBCUTANEOUS at 07:09

## 2017-01-01 RX ADMIN — HYDROMORPHONE HYDROCHLORIDE 0.25 MG: 1 INJECTION, SOLUTION INTRAMUSCULAR; INTRAVENOUS; SUBCUTANEOUS at 00:41

## 2017-01-01 RX ADMIN — DEXTROSE MONOHYDRATE 12.5 G: 25 INJECTION, SOLUTION INTRAVENOUS at 05:41

## 2017-01-01 RX ADMIN — LACTULOSE 10 G: 20 SOLUTION ORAL at 09:12

## 2017-01-01 RX ADMIN — DEXTROSE MONOHYDRATE 25 G: 25 INJECTION, SOLUTION INTRAVENOUS at 13:05

## 2017-01-01 RX ADMIN — ALBUMIN (HUMAN) 25 G: 0.25 INJECTION, SOLUTION INTRAVENOUS at 15:32

## 2017-01-01 RX ADMIN — HYDROMORPHONE HYDROCHLORIDE 0.25 MG: 1 INJECTION, SOLUTION INTRAMUSCULAR; INTRAVENOUS; SUBCUTANEOUS at 14:58

## 2017-01-01 RX ADMIN — Medication 300 UNITS: at 09:33

## 2017-01-01 RX ADMIN — HALOPERIDOL LACTATE 2 MG: 5 INJECTION INTRAMUSCULAR at 20:30

## 2017-01-01 RX ADMIN — MIDODRINE HYDROCHLORIDE 10 MG: 5 TABLET ORAL at 23:11

## 2017-01-01 RX ADMIN — LACTULOSE 20 G: 10 SOLUTION ORAL at 17:28

## 2017-01-01 RX ADMIN — MIDODRINE HYDROCHLORIDE 5 MG: 5 TABLET ORAL at 17:15

## 2017-01-01 RX ADMIN — MIDODRINE HYDROCHLORIDE 5 MG: 5 TABLET ORAL at 08:32

## 2017-01-01 RX ADMIN — PROMETHAZINE HYDROCHLORIDE 12.5 MG: 25 INJECTION INTRAMUSCULAR; INTRAVENOUS at 06:17

## 2017-01-01 RX ADMIN — LACTULOSE 20 G: 20 SOLUTION ORAL at 06:07

## 2017-01-01 RX ADMIN — MIDODRINE HYDROCHLORIDE 5 MG: 5 TABLET ORAL at 17:32

## 2017-01-01 RX ADMIN — SODIUM CHLORIDE 250 ML: 900 INJECTION, SOLUTION INTRAVENOUS at 20:00

## 2017-01-01 RX ADMIN — CYCLOSPORINE 25 MG: 25 CAPSULE, LIQUID FILLED ORAL at 11:12

## 2017-01-01 RX ADMIN — INSULIN GLARGINE 25 UNITS: 100 INJECTION, SOLUTION SUBCUTANEOUS at 00:41

## 2017-01-01 RX ADMIN — ONDANSETRON 8 MG: 8 TABLET, ORALLY DISINTEGRATING ORAL at 08:40

## 2017-01-01 RX ADMIN — LACTULOSE 20 G: 20 SOLUTION ORAL at 05:22

## 2017-01-01 RX ADMIN — POTASSIUM CHLORIDE 40 MEQ: 750 TABLET, EXTENDED RELEASE ORAL at 16:41

## 2017-01-01 RX ADMIN — Medication 300 UNITS: at 22:13

## 2017-01-01 RX ADMIN — INSULIN LISPRO 4 UNITS: 100 INJECTION, SOLUTION INTRAVENOUS; SUBCUTANEOUS at 23:59

## 2017-01-01 RX ADMIN — LACTULOSE 200 G: 10 SOLUTION ORAL; RECTAL at 14:02

## 2017-01-01 RX ADMIN — METOCLOPRAMIDE HYDROCHLORIDE 10 MG: 10 TABLET ORAL at 12:25

## 2017-01-01 RX ADMIN — MIDODRINE HYDROCHLORIDE 5 MG: 5 TABLET ORAL at 06:32

## 2017-01-01 RX ADMIN — SODIUM CHLORIDE 12.5 MG: 9 INJECTION INTRAMUSCULAR; INTRAVENOUS; SUBCUTANEOUS at 17:14

## 2017-01-01 RX ADMIN — LIDOCAINE HYDROCHLORIDE 15 ML: 20 SOLUTION OROPHARYNGEAL at 09:35

## 2017-01-01 RX ADMIN — METOCLOPRAMIDE HYDROCHLORIDE 10 MG: 10 TABLET ORAL at 10:30

## 2017-01-01 RX ADMIN — MIDODRINE HYDROCHLORIDE 5 MG: 5 TABLET ORAL at 21:11

## 2017-01-01 RX ADMIN — Medication 10 ML: at 13:44

## 2017-01-01 RX ADMIN — SODIUM CHLORIDE, PRESERVATIVE FREE 600 UNITS: 5 INJECTION INTRAVENOUS at 09:37

## 2017-01-01 RX ADMIN — SODIUM CHLORIDE 40 MG: 9 INJECTION INTRAMUSCULAR; INTRAVENOUS; SUBCUTANEOUS at 21:23

## 2017-01-01 RX ADMIN — MIDODRINE HYDROCHLORIDE 5 MG: 5 TABLET ORAL at 12:26

## 2017-01-01 RX ADMIN — Medication 5 ML: at 14:05

## 2017-01-01 RX ADMIN — ONDANSETRON 4 MG: 2 INJECTION INTRAMUSCULAR; INTRAVENOUS at 00:33

## 2017-01-01 RX ADMIN — POTASSIUM CHLORIDE 20 MEQ: 14.9 INJECTION, SOLUTION INTRAVENOUS at 22:25

## 2017-01-01 RX ADMIN — SODIUM CHLORIDE, PRESERVATIVE FREE 300 UNITS: 5 INJECTION INTRAVENOUS at 02:25

## 2017-01-01 RX ADMIN — Medication 10 ML: at 06:35

## 2017-01-01 RX ADMIN — Medication 400 MG: at 08:23

## 2017-01-01 RX ADMIN — Medication 400 MG: at 17:00

## 2017-01-01 RX ADMIN — Medication 10 ML: at 17:03

## 2017-01-01 RX ADMIN — LACTULOSE 20 G: 10 SOLUTION ORAL at 07:15

## 2017-01-01 RX ADMIN — MIDODRINE HYDROCHLORIDE 10 MG: 5 TABLET ORAL at 07:33

## 2017-01-01 RX ADMIN — SODIUM CHLORIDE 25 MG: 9 INJECTION INTRAMUSCULAR; INTRAVENOUS; SUBCUTANEOUS at 13:43

## 2017-01-01 RX ADMIN — Medication 600 UNITS: at 04:00

## 2017-01-01 RX ADMIN — Medication 10 ML: at 19:05

## 2017-01-01 RX ADMIN — DIPHENHYDRAMINE HYDROCHLORIDE 12.5 MG: 50 INJECTION, SOLUTION INTRAMUSCULAR; INTRAVENOUS at 22:36

## 2017-01-01 RX ADMIN — PIPERACILLIN SODIUM,TAZOBACTAM SODIUM 4.5 G: 4; .5 INJECTION, POWDER, FOR SOLUTION INTRAVENOUS at 21:24

## 2017-01-01 RX ADMIN — SODIUM CHLORIDE 500 ML: 900 INJECTION, SOLUTION INTRAVENOUS at 13:31

## 2017-01-01 RX ADMIN — HYDROMORPHONE HYDROCHLORIDE 0.25 MG: 1 INJECTION, SOLUTION INTRAMUSCULAR; INTRAVENOUS; SUBCUTANEOUS at 11:15

## 2017-01-01 RX ADMIN — HYDROMORPHONE HYDROCHLORIDE 0.25 MG: 1 INJECTION, SOLUTION INTRAMUSCULAR; INTRAVENOUS; SUBCUTANEOUS at 15:08

## 2017-01-01 RX ADMIN — RIFAXIMIN 550 MG: 550 TABLET ORAL at 15:17

## 2017-01-01 RX ADMIN — SODIUM CHLORIDE 40 MG: 9 INJECTION INTRAMUSCULAR; INTRAVENOUS; SUBCUTANEOUS at 09:05

## 2017-01-01 RX ADMIN — HYDROMORPHONE HYDROCHLORIDE 0.25 MG: 1 INJECTION, SOLUTION INTRAMUSCULAR; INTRAVENOUS; SUBCUTANEOUS at 11:02

## 2017-01-01 RX ADMIN — CYCLOSPORINE 25 MG: 25 CAPSULE, LIQUID FILLED ORAL at 16:26

## 2017-01-01 RX ADMIN — CYCLOSPORINE 50 MG: 25 CAPSULE, LIQUID FILLED ORAL at 09:00

## 2017-01-01 RX ADMIN — HYDROMORPHONE HYDROCHLORIDE 0.25 MG: 1 INJECTION, SOLUTION INTRAMUSCULAR; INTRAVENOUS; SUBCUTANEOUS at 21:35

## 2017-01-01 RX ADMIN — SODIUM CHLORIDE 12.5 MG: 9 INJECTION INTRAMUSCULAR; INTRAVENOUS; SUBCUTANEOUS at 21:16

## 2017-01-01 RX ADMIN — MORPHINE SULFATE 2 MG: 2 INJECTION, SOLUTION INTRAMUSCULAR; INTRAVENOUS at 02:30

## 2017-01-01 RX ADMIN — CYCLOSPORINE 25 MG: 25 CAPSULE, LIQUID FILLED ORAL at 07:33

## 2017-01-01 RX ADMIN — Medication 10 ML: at 20:41

## 2017-01-01 RX ADMIN — Medication 10 ML: at 13:49

## 2017-01-01 RX ADMIN — SODIUM CHLORIDE, PRESERVATIVE FREE 600 UNITS: 5 INJECTION INTRAVENOUS at 10:10

## 2017-01-01 RX ADMIN — Medication 1 TUBE: at 08:02

## 2017-01-01 RX ADMIN — HYDROMORPHONE HYDROCHLORIDE 0.25 MG: 1 INJECTION, SOLUTION INTRAMUSCULAR; INTRAVENOUS; SUBCUTANEOUS at 05:51

## 2017-01-01 RX ADMIN — MIDODRINE HYDROCHLORIDE 10 MG: 5 TABLET ORAL at 00:33

## 2017-01-01 RX ADMIN — ALBUMIN (HUMAN) 25 G: 0.25 INJECTION, SOLUTION INTRAVENOUS at 08:59

## 2017-01-01 RX ADMIN — ALBUMIN (HUMAN) 25 G: 0.25 INJECTION, SOLUTION INTRAVENOUS at 09:28

## 2017-01-01 RX ADMIN — HYDROMORPHONE HYDROCHLORIDE 0.25 MG: 1 INJECTION, SOLUTION INTRAMUSCULAR; INTRAVENOUS; SUBCUTANEOUS at 04:29

## 2017-01-01 RX ADMIN — Medication 1 TUBE: at 08:25

## 2017-01-01 RX ADMIN — HALOPERIDOL LACTATE 2 MG: 5 INJECTION INTRAMUSCULAR at 13:53

## 2017-01-01 RX ADMIN — PIPERACILLIN SODIUM,TAZOBACTAM SODIUM 3.38 G: 3; .375 INJECTION, POWDER, FOR SOLUTION INTRAVENOUS at 18:11

## 2017-01-01 RX ADMIN — HYDROMORPHONE HYDROCHLORIDE 0.5 MG: 1 INJECTION, SOLUTION INTRAMUSCULAR; INTRAVENOUS; SUBCUTANEOUS at 18:25

## 2017-01-01 RX ADMIN — Medication 10 ML: at 21:56

## 2017-01-01 RX ADMIN — MORPHINE SULFATE 15 MG: 15 TABLET, EXTENDED RELEASE ORAL at 05:49

## 2017-01-01 RX ADMIN — SODIUM CHLORIDE 40 MG: 9 INJECTION INTRAMUSCULAR; INTRAVENOUS; SUBCUTANEOUS at 10:20

## 2017-01-01 RX ADMIN — LIDOCAINE HYDROCHLORIDE 100 MG: 20 INJECTION, SOLUTION INFILTRATION; PERINEURAL at 16:11

## 2017-01-01 RX ADMIN — Medication 10 ML: at 23:00

## 2017-01-01 RX ADMIN — MORPHINE SULFATE 15 MG: 15 TABLET, FILM COATED, EXTENDED RELEASE ORAL at 21:29

## 2017-01-01 RX ADMIN — SODIUM CHLORIDE 75 ML/HR: 900 INJECTION, SOLUTION INTRAVENOUS at 05:50

## 2017-01-01 RX ADMIN — Medication 10 ML: at 23:09

## 2017-01-01 RX ADMIN — MIDODRINE HYDROCHLORIDE 5 MG: 5 TABLET ORAL at 23:14

## 2017-01-01 RX ADMIN — SODIUM CHLORIDE, PRESERVATIVE FREE 10 ML: 5 INJECTION INTRAVENOUS at 07:10

## 2017-01-01 RX ADMIN — PANTOPRAZOLE SODIUM 40 MG: 40 TABLET, DELAYED RELEASE ORAL at 18:20

## 2017-01-01 RX ADMIN — SODIUM CHLORIDE 25 MG: 9 INJECTION INTRAMUSCULAR; INTRAVENOUS; SUBCUTANEOUS at 18:03

## 2017-01-01 RX ADMIN — Medication 10 ML: at 12:13

## 2017-01-01 RX ADMIN — RIFAXIMIN 550 MG: 550 TABLET ORAL at 17:07

## 2017-01-01 RX ADMIN — Medication 10 ML: at 21:36

## 2017-01-01 RX ADMIN — LACTULOSE 20 G: 20 SOLUTION ORAL at 23:59

## 2017-01-01 RX ADMIN — RIFAXIMIN 550 MG: 550 TABLET ORAL at 08:23

## 2017-01-01 RX ADMIN — ALBUMIN (HUMAN) 25 G: 0.25 INJECTION, SOLUTION INTRAVENOUS at 14:50

## 2017-01-01 RX ADMIN — RIFAXIMIN 550 MG: 550 TABLET ORAL at 09:05

## 2017-01-01 RX ADMIN — Medication 10 ML: at 17:52

## 2017-01-01 RX ADMIN — LACTULOSE 200 G: 10 SOLUTION ORAL; RECTAL at 23:14

## 2017-01-01 RX ADMIN — ALBUMIN (HUMAN) 25 G: 0.25 INJECTION, SOLUTION INTRAVENOUS at 16:05

## 2017-01-01 RX ADMIN — Medication 10 ML: at 15:34

## 2017-01-01 RX ADMIN — LACTULOSE 10 G: 20 SOLUTION ORAL at 16:50

## 2017-01-01 RX ADMIN — HALOPERIDOL LACTATE 2 MG: 5 INJECTION INTRAMUSCULAR at 18:02

## 2017-01-01 RX ADMIN — Medication 300 UNITS: at 06:29

## 2017-01-01 RX ADMIN — LACTULOSE 30 G: 10 SOLUTION ORAL at 21:05

## 2017-01-01 RX ADMIN — SODIUM CHLORIDE 12.5 MG: 9 INJECTION INTRAMUSCULAR; INTRAVENOUS; SUBCUTANEOUS at 05:25

## 2017-01-01 RX ADMIN — HYDROMORPHONE HYDROCHLORIDE 0.25 MG: 1 INJECTION, SOLUTION INTRAMUSCULAR; INTRAVENOUS; SUBCUTANEOUS at 08:37

## 2017-01-01 RX ADMIN — SODIUM CHLORIDE 40 MG: 9 INJECTION INTRAMUSCULAR; INTRAVENOUS; SUBCUTANEOUS at 15:35

## 2017-01-01 RX ADMIN — METOCLOPRAMIDE 5 MG: 5 INJECTION, SOLUTION INTRAMUSCULAR; INTRAVENOUS at 16:55

## 2017-01-01 RX ADMIN — MIDODRINE HYDROCHLORIDE 10 MG: 5 TABLET ORAL at 00:10

## 2017-01-01 RX ADMIN — HALOPERIDOL LACTATE 2 MG: 5 INJECTION INTRAMUSCULAR at 02:11

## 2017-01-01 RX ADMIN — Medication 20 ML: at 09:01

## 2017-01-01 RX ADMIN — LACTULOSE 20 G: 10 SOLUTION ORAL at 22:31

## 2017-01-01 RX ADMIN — METOCLOPRAMIDE HYDROCHLORIDE 10 MG: 10 TABLET ORAL at 07:36

## 2017-01-01 RX ADMIN — ONDANSETRON 8 MG: 8 TABLET, ORALLY DISINTEGRATING ORAL at 10:20

## 2017-01-01 RX ADMIN — LACTULOSE 20 G: 10 SOLUTION ORAL at 12:54

## 2017-01-01 RX ADMIN — MIDODRINE HYDROCHLORIDE 5 MG: 5 TABLET ORAL at 06:35

## 2017-01-01 RX ADMIN — HEPARIN SODIUM 5000 UNITS: 5000 INJECTION, SOLUTION INTRAVENOUS; SUBCUTANEOUS at 05:11

## 2017-01-01 RX ADMIN — DEXTROSE MONOHYDRATE 25 G: 25 INJECTION, SOLUTION INTRAVENOUS at 15:52

## 2017-01-01 RX ADMIN — HYDROMORPHONE HYDROCHLORIDE 0.5 MG: 1 INJECTION, SOLUTION INTRAMUSCULAR; INTRAVENOUS; SUBCUTANEOUS at 01:03

## 2017-01-01 RX ADMIN — Medication 10 ML: at 06:13

## 2017-01-01 RX ADMIN — HYDROMORPHONE HYDROCHLORIDE 0.25 MG: 1 INJECTION, SOLUTION INTRAMUSCULAR; INTRAVENOUS; SUBCUTANEOUS at 11:19

## 2017-01-01 RX ADMIN — METOCLOPRAMIDE HYDROCHLORIDE 10 MG: 10 TABLET ORAL at 06:07

## 2017-01-01 RX ADMIN — CYCLOSPORINE 25 MG: 25 CAPSULE, LIQUID FILLED ORAL at 22:50

## 2017-01-01 RX ADMIN — LACTULOSE 20 G: 20 SOLUTION ORAL at 06:09

## 2017-01-01 RX ADMIN — HALOPERIDOL LACTATE 2 MG: 5 INJECTION INTRAMUSCULAR at 23:10

## 2017-01-01 RX ADMIN — MIDAZOLAM HYDROCHLORIDE 1 MG: 1 INJECTION, SOLUTION INTRAMUSCULAR; INTRAVENOUS at 16:14

## 2017-01-01 RX ADMIN — Medication 600 UNITS: at 21:08

## 2017-01-01 RX ADMIN — PROPOFOL 50 MG: 10 INJECTION, EMULSION INTRAVENOUS at 16:49

## 2017-01-01 RX ADMIN — RIFAXIMIN 550 MG: 550 TABLET ORAL at 09:39

## 2017-01-01 RX ADMIN — MORPHINE SULFATE 15 MG: 15 TABLET, EXTENDED RELEASE ORAL at 07:16

## 2017-01-01 RX ADMIN — ALBUMIN (HUMAN) 25 G: 0.25 INJECTION, SOLUTION INTRAVENOUS at 10:57

## 2017-01-01 RX ADMIN — HYDROMORPHONE HYDROCHLORIDE 0.5 MG: 1 INJECTION, SOLUTION INTRAMUSCULAR; INTRAVENOUS; SUBCUTANEOUS at 11:34

## 2017-01-01 RX ADMIN — ONDANSETRON 4 MG: 2 INJECTION INTRAMUSCULAR; INTRAVENOUS at 08:51

## 2017-01-01 RX ADMIN — HYDROMORPHONE HYDROCHLORIDE 0.25 MG: 1 INJECTION, SOLUTION INTRAMUSCULAR; INTRAVENOUS; SUBCUTANEOUS at 23:58

## 2017-01-01 RX ADMIN — MORPHINE SULFATE 15 MG: 15 TABLET, EXTENDED RELEASE ORAL at 16:04

## 2017-01-01 RX ADMIN — HYDROMORPHONE HYDROCHLORIDE 0.25 MG: 1 INJECTION, SOLUTION INTRAMUSCULAR; INTRAVENOUS; SUBCUTANEOUS at 22:10

## 2017-01-01 RX ADMIN — ALBUMIN (HUMAN) 25 G: 0.25 INJECTION, SOLUTION INTRAVENOUS at 16:08

## 2017-01-01 RX ADMIN — DIATRIZOATE MEGLUMINE AND DIATRIZOATE SODIUM 10 ML: 660; 100 LIQUID ORAL; RECTAL at 16:01

## 2017-01-01 RX ADMIN — MORPHINE SULFATE 15 MG: 15 TABLET, EXTENDED RELEASE ORAL at 18:06

## 2017-01-01 RX ADMIN — ONDANSETRON 4 MG: 2 INJECTION INTRAMUSCULAR; INTRAVENOUS at 17:15

## 2017-01-01 RX ADMIN — MAGNESIUM SULFATE HEPTAHYDRATE 2 G: 40 INJECTION, SOLUTION INTRAVENOUS at 08:35

## 2017-01-01 RX ADMIN — MIDODRINE HYDROCHLORIDE 5 MG: 5 TABLET ORAL at 12:54

## 2017-01-01 RX ADMIN — Medication 300 UNITS: at 20:44

## 2017-01-01 RX ADMIN — LACTULOSE 20 G: 20 SOLUTION ORAL at 06:42

## 2017-01-01 RX ADMIN — FENTANYL CITRATE 50 MCG: 50 INJECTION, SOLUTION INTRAMUSCULAR; INTRAVENOUS at 06:46

## 2017-01-01 RX ADMIN — ETOMIDATE: 2 INJECTION, SOLUTION INTRAVENOUS at 21:58

## 2017-01-01 RX ADMIN — MIDODRINE HYDROCHLORIDE 5 MG: 5 TABLET ORAL at 22:21

## 2017-01-01 RX ADMIN — MIDODRINE HYDROCHLORIDE 5 MG: 5 TABLET ORAL at 13:49

## 2017-01-01 RX ADMIN — METOCLOPRAMIDE HYDROCHLORIDE 10 MG: 10 TABLET ORAL at 10:43

## 2017-01-01 RX ADMIN — AMOXICILLIN 500 MG: 500 CAPSULE ORAL at 17:58

## 2017-01-01 RX ADMIN — MIDODRINE HYDROCHLORIDE 10 MG: 5 TABLET ORAL at 18:19

## 2017-01-01 RX ADMIN — SODIUM CHLORIDE 12.5 MG: 9 INJECTION INTRAMUSCULAR; INTRAVENOUS; SUBCUTANEOUS at 03:49

## 2017-01-01 RX ADMIN — Medication 10 ML: at 10:00

## 2017-01-01 RX ADMIN — PIPERACILLIN SODIUM,TAZOBACTAM SODIUM 4.5 G: 4; .5 INJECTION, POWDER, FOR SOLUTION INTRAVENOUS at 22:32

## 2017-01-01 RX ADMIN — Medication 10 ML: at 22:34

## 2017-01-01 RX ADMIN — LACTULOSE 20 G: 20 SOLUTION ORAL at 18:20

## 2017-01-01 RX ADMIN — BENZOCAINE AND MENTHOL 1 LOZENGE: 15; 2.6 LOZENGE ORAL at 16:47

## 2017-01-01 RX ADMIN — SODIUM CHLORIDE 40 MG: 9 INJECTION INTRAMUSCULAR; INTRAVENOUS; SUBCUTANEOUS at 23:30

## 2017-01-01 RX ADMIN — LORAZEPAM 1 MG: 2 INJECTION INTRAMUSCULAR; INTRAVENOUS at 00:29

## 2017-01-01 RX ADMIN — PANTOPRAZOLE SODIUM 40 MG: 40 TABLET, DELAYED RELEASE ORAL at 06:42

## 2017-01-01 RX ADMIN — SODIUM CHLORIDE, PRESERVATIVE FREE 300 UNITS: 5 INJECTION INTRAVENOUS at 13:47

## 2017-01-01 RX ADMIN — PANTOPRAZOLE SODIUM 40 MG: 40 TABLET, DELAYED RELEASE ORAL at 09:59

## 2017-01-01 RX ADMIN — Medication 300 UNITS: at 09:07

## 2017-01-01 RX ADMIN — PIPERACILLIN SODIUM,TAZOBACTAM SODIUM 4.5 G: 4; .5 INJECTION, POWDER, FOR SOLUTION INTRAVENOUS at 10:43

## 2017-01-01 RX ADMIN — Medication 10 ML: at 22:00

## 2017-01-01 RX ADMIN — CYCLOSPORINE 25 MG: 25 CAPSULE, LIQUID FILLED ORAL at 10:03

## 2017-01-01 RX ADMIN — MIDODRINE HYDROCHLORIDE 10 MG: 5 TABLET ORAL at 16:26

## 2017-01-01 RX ADMIN — MIDODRINE HYDROCHLORIDE 10 MG: 5 TABLET ORAL at 10:20

## 2017-01-01 RX ADMIN — LACTULOSE 30 G: 10 SOLUTION ORAL at 20:23

## 2017-01-01 RX ADMIN — MIDODRINE HYDROCHLORIDE 10 MG: 5 TABLET ORAL at 08:23

## 2017-01-01 RX ADMIN — SODIUM CHLORIDE 40 MG: 9 INJECTION INTRAMUSCULAR; INTRAVENOUS; SUBCUTANEOUS at 21:55

## 2017-01-01 RX ADMIN — PANTOPRAZOLE SODIUM 40 MG: 40 TABLET, DELAYED RELEASE ORAL at 17:27

## 2017-01-01 RX ADMIN — HALOPERIDOL LACTATE 2 MG: 5 INJECTION INTRAMUSCULAR at 15:36

## 2017-01-01 RX ADMIN — MORPHINE SULFATE 15 MG: 15 TABLET, EXTENDED RELEASE ORAL at 03:43

## 2017-01-01 RX ADMIN — Medication 400 MG: at 17:54

## 2017-01-01 RX ADMIN — Medication 600 UNITS: at 10:40

## 2017-01-01 RX ADMIN — LIDOCAINE HYDROCHLORIDE 15 ML: 20 SOLUTION OROPHARYNGEAL at 08:32

## 2017-01-01 RX ADMIN — LACTULOSE 20 G: 10 SOLUTION ORAL at 09:04

## 2017-01-01 RX ADMIN — CIPROFLOXACIN 400 MG: 2 INJECTION, SOLUTION INTRAVENOUS at 11:35

## 2017-01-01 RX ADMIN — LACTULOSE 30 G: 20 SOLUTION ORAL at 19:53

## 2017-01-01 RX ADMIN — ALBUMIN (HUMAN) 25 G: 0.25 INJECTION, SOLUTION INTRAVENOUS at 14:33

## 2017-01-01 RX ADMIN — HALOPERIDOL LACTATE 2 MG: 5 INJECTION INTRAMUSCULAR at 14:13

## 2017-01-01 RX ADMIN — MORPHINE SULFATE 15 MG: 15 TABLET, EXTENDED RELEASE ORAL at 13:16

## 2017-01-01 RX ADMIN — NALOXONE HYDROCHLORIDE 0.4 MG: 0.4 INJECTION, SOLUTION INTRAMUSCULAR; INTRAVENOUS; SUBCUTANEOUS at 19:00

## 2017-01-01 RX ADMIN — HYDROMORPHONE HYDROCHLORIDE 0.25 MG: 1 INJECTION, SOLUTION INTRAMUSCULAR; INTRAVENOUS; SUBCUTANEOUS at 15:36

## 2017-01-01 RX ADMIN — LACTULOSE 30 G: 10 SOLUTION ORAL at 19:20

## 2017-01-01 RX ADMIN — ONDANSETRON 4 MG: 2 INJECTION INTRAMUSCULAR; INTRAVENOUS at 12:54

## 2017-01-01 RX ADMIN — Medication 10 ML: at 15:14

## 2017-01-01 RX ADMIN — Medication 10 ML: at 08:29

## 2017-01-01 RX ADMIN — LACTULOSE 200 G: 10 SOLUTION ORAL; RECTAL at 15:01

## 2017-01-01 RX ADMIN — HYDROMORPHONE HYDROCHLORIDE 0.25 MG: 1 INJECTION, SOLUTION INTRAMUSCULAR; INTRAVENOUS; SUBCUTANEOUS at 05:34

## 2017-01-01 RX ADMIN — RIFAXIMIN 550 MG: 550 TABLET ORAL at 08:35

## 2017-01-01 RX ADMIN — MIDODRINE HYDROCHLORIDE 10 MG: 5 TABLET ORAL at 17:23

## 2017-01-01 RX ADMIN — LORAZEPAM 0.5 MG: 2 INJECTION INTRAMUSCULAR; INTRAVENOUS at 15:22

## 2017-01-01 RX ADMIN — ACETAMINOPHEN 650 MG: 325 TABLET, FILM COATED ORAL at 15:34

## 2017-01-01 RX ADMIN — SODIUM CHLORIDE, PRESERVATIVE FREE 10 ML: 5 INJECTION INTRAVENOUS at 13:47

## 2017-01-01 RX ADMIN — LIDOCAINE HYDROCHLORIDE 15 ML: 20 SOLUTION OROPHARYNGEAL at 20:43

## 2017-01-01 RX ADMIN — MIDODRINE HYDROCHLORIDE 5 MG: 5 TABLET ORAL at 05:48

## 2017-01-01 RX ADMIN — PANTOPRAZOLE SODIUM 40 MG: 40 TABLET, DELAYED RELEASE ORAL at 08:07

## 2017-01-01 RX ADMIN — MIDODRINE HYDROCHLORIDE 5 MG: 5 TABLET ORAL at 16:57

## 2017-01-01 RX ADMIN — SODIUM CHLORIDE, PRESERVATIVE FREE 300 UNITS: 5 INJECTION INTRAVENOUS at 09:29

## 2017-01-01 RX ADMIN — RIFAXIMIN 550 MG: 550 TABLET ORAL at 09:31

## 2017-01-01 RX ADMIN — SODIUM CHLORIDE, PRESERVATIVE FREE 300 UNITS: 5 INJECTION INTRAVENOUS at 11:45

## 2017-01-01 RX ADMIN — MIDODRINE HYDROCHLORIDE 5 MG: 5 TABLET ORAL at 16:06

## 2017-01-01 RX ADMIN — RIFAXIMIN 550 MG: 550 TABLET ORAL at 17:02

## 2017-01-01 RX ADMIN — Medication 10 ML: at 11:04

## 2017-01-01 RX ADMIN — METOCLOPRAMIDE 5 MG: 5 INJECTION, SOLUTION INTRAMUSCULAR; INTRAVENOUS at 23:51

## 2017-01-01 RX ADMIN — HYDROMORPHONE HYDROCHLORIDE 0.25 MG: 1 INJECTION, SOLUTION INTRAMUSCULAR; INTRAVENOUS; SUBCUTANEOUS at 05:32

## 2017-01-01 RX ADMIN — RIFAXIMIN 550 MG: 550 TABLET ORAL at 18:20

## 2017-01-01 RX ADMIN — Medication 400 MG: at 16:49

## 2017-01-01 RX ADMIN — HALOPERIDOL LACTATE 2 MG: 5 INJECTION INTRAMUSCULAR at 23:58

## 2017-01-01 RX ADMIN — PIPERACILLIN SODIUM,TAZOBACTAM SODIUM 4.5 G: 4; .5 INJECTION, POWDER, FOR SOLUTION INTRAVENOUS at 21:39

## 2017-01-01 RX ADMIN — VANCOMYCIN HYDROCHLORIDE 1000 MG: 1 INJECTION, POWDER, LYOPHILIZED, FOR SOLUTION INTRAVENOUS at 20:00

## 2017-01-01 RX ADMIN — OXYCODONE HYDROCHLORIDE 5 MG: 5 TABLET ORAL at 05:01

## 2017-01-01 RX ADMIN — HALOPERIDOL LACTATE 2 MG: 5 INJECTION INTRAMUSCULAR at 13:46

## 2017-01-01 RX ADMIN — SODIUM CHLORIDE 500 ML: 900 INJECTION, SOLUTION INTRAVENOUS at 12:00

## 2017-01-01 RX ADMIN — LACTULOSE 20 G: 10 SOLUTION ORAL at 16:48

## 2017-01-01 RX ADMIN — METOCLOPRAMIDE HYDROCHLORIDE 10 MG: 10 TABLET ORAL at 02:00

## 2017-01-01 RX ADMIN — MORPHINE SULFATE 1 MG: 2 INJECTION, SOLUTION INTRAMUSCULAR; INTRAVENOUS at 14:31

## 2017-01-01 RX ADMIN — LACTULOSE 30 G: 20 SOLUTION ORAL at 07:05

## 2017-01-01 RX ADMIN — HYDROMORPHONE HYDROCHLORIDE 0.25 MG: 1 INJECTION, SOLUTION INTRAMUSCULAR; INTRAVENOUS; SUBCUTANEOUS at 00:36

## 2017-01-01 RX ADMIN — RIFAXIMIN 550 MG: 550 TABLET ORAL at 17:33

## 2017-01-01 RX ADMIN — Medication 300 UNITS: at 21:43

## 2017-01-01 RX ADMIN — Medication 10 ML: at 07:30

## 2017-01-01 RX ADMIN — MIDODRINE HYDROCHLORIDE 5 MG: 5 TABLET ORAL at 11:10

## 2017-01-01 RX ADMIN — HALOPERIDOL LACTATE 2 MG: 5 INJECTION INTRAMUSCULAR at 07:09

## 2017-01-01 RX ADMIN — LACTULOSE 30 G: 10 SOLUTION ORAL at 22:20

## 2017-01-01 RX ADMIN — Medication 10 ML: at 22:42

## 2017-01-01 RX ADMIN — VANCOMYCIN HYDROCHLORIDE 1000 MG: 1 INJECTION, POWDER, LYOPHILIZED, FOR SOLUTION INTRAVENOUS at 16:57

## 2017-01-01 RX ADMIN — MIDODRINE HYDROCHLORIDE 5 MG: 5 TABLET ORAL at 15:13

## 2017-01-01 RX ADMIN — Medication 300 UNITS: at 10:15

## 2017-01-01 RX ADMIN — HYDROMORPHONE HYDROCHLORIDE 0.5 MG: 1 INJECTION, SOLUTION INTRAMUSCULAR; INTRAVENOUS; SUBCUTANEOUS at 23:37

## 2017-01-01 RX ADMIN — VANCOMYCIN HYDROCHLORIDE 1000 MG: 1 INJECTION, POWDER, LYOPHILIZED, FOR SOLUTION INTRAVENOUS at 10:13

## 2017-01-01 RX ADMIN — METOCLOPRAMIDE HYDROCHLORIDE 10 MG: 10 TABLET ORAL at 21:12

## 2017-01-01 RX ADMIN — CYCLOSPORINE 50 MG: 25 CAPSULE, LIQUID FILLED ORAL at 09:17

## 2017-01-01 RX ADMIN — SODIUM CHLORIDE, PRESERVATIVE FREE 500 UNITS: 5 INJECTION INTRAVENOUS at 17:05

## 2017-01-01 RX ADMIN — RIFAXIMIN 550 MG: 550 TABLET ORAL at 11:12

## 2017-01-01 RX ADMIN — Medication 10 ML: at 09:07

## 2017-01-01 RX ADMIN — LIDOCAINE HYDROCHLORIDE 15 ML: 20 SOLUTION OROPHARYNGEAL at 09:53

## 2017-01-01 RX ADMIN — LACTULOSE 20 G: 10 SOLUTION ORAL at 18:18

## 2017-01-01 RX ADMIN — CYCLOSPORINE 125 MG: 25 CAPSULE, LIQUID FILLED ORAL at 06:07

## 2017-01-01 RX ADMIN — INSULIN LISPRO 2 UNITS: 100 INJECTION, SOLUTION INTRAVENOUS; SUBCUTANEOUS at 17:04

## 2017-01-01 RX ADMIN — MIDODRINE HYDROCHLORIDE 10 MG: 5 TABLET ORAL at 22:08

## 2017-01-01 RX ADMIN — SODIUM CHLORIDE 12.5 MG: 9 INJECTION INTRAMUSCULAR; INTRAVENOUS; SUBCUTANEOUS at 19:37

## 2017-01-01 RX ADMIN — Medication 600 UNITS: at 11:12

## 2017-01-01 RX ADMIN — Medication 5 ML: at 14:00

## 2017-01-01 RX ADMIN — SODIUM CHLORIDE, PRESERVATIVE FREE 10 ML: 5 INJECTION INTRAVENOUS at 07:46

## 2017-01-01 RX ADMIN — LIDOCAINE HYDROCHLORIDE 15 ML: 20 SOLUTION OROPHARYNGEAL at 20:49

## 2017-01-01 RX ADMIN — CYCLOSPORINE 25 MG: 100 SOLUTION ORAL at 17:50

## 2017-01-01 RX ADMIN — HALOPERIDOL LACTATE 2 MG: 5 INJECTION INTRAMUSCULAR at 23:13

## 2017-01-01 RX ADMIN — MIDODRINE HYDROCHLORIDE 10 MG: 5 TABLET ORAL at 15:13

## 2017-01-01 RX ADMIN — ONDANSETRON 4 MG: 2 INJECTION INTRAMUSCULAR; INTRAVENOUS at 13:21

## 2017-01-01 RX ADMIN — Medication 300 UNITS: at 11:00

## 2017-01-01 RX ADMIN — SODIUM CHLORIDE, PRESERVATIVE FREE 600 UNITS: 5 INJECTION INTRAVENOUS at 09:39

## 2017-01-01 RX ADMIN — SODIUM CHLORIDE, PRESERVATIVE FREE 300 UNITS: 5 INJECTION INTRAVENOUS at 15:09

## 2017-01-01 RX ADMIN — Medication 10 ML: at 15:55

## 2017-01-01 RX ADMIN — FENTANYL CITRATE 50 MCG: 50 INJECTION, SOLUTION INTRAMUSCULAR; INTRAVENOUS at 02:04

## 2017-01-01 RX ADMIN — ALBUMIN (HUMAN) 25 G: 0.25 INJECTION, SOLUTION INTRAVENOUS at 11:27

## 2017-01-01 RX ADMIN — METOCLOPRAMIDE 5 MG: 5 INJECTION, SOLUTION INTRAMUSCULAR; INTRAVENOUS at 11:20

## 2017-01-01 RX ADMIN — Medication 10 ML: at 21:52

## 2017-01-01 RX ADMIN — PROPOFOL 50 MG: 10 INJECTION, EMULSION INTRAVENOUS at 16:41

## 2017-01-01 RX ADMIN — SODIUM CHLORIDE 12.5 MG: 9 INJECTION INTRAMUSCULAR; INTRAVENOUS; SUBCUTANEOUS at 16:35

## 2017-01-01 RX ADMIN — METOCLOPRAMIDE HYDROCHLORIDE 10 MG: 10 TABLET ORAL at 06:02

## 2017-01-01 RX ADMIN — LACTULOSE 20 G: 20 SOLUTION ORAL at 11:25

## 2017-01-01 RX ADMIN — HEPARIN SODIUM 2000 UNITS: 200 INJECTION, SOLUTION INTRAVENOUS at 16:11

## 2017-01-01 RX ADMIN — MORPHINE SULFATE 30 MG: 15 TABLET, EXTENDED RELEASE ORAL at 21:28

## 2017-01-01 RX ADMIN — SODIUM BICARBONATE 1 ML: 0.2 INJECTION, SOLUTION INTRAVENOUS at 08:58

## 2017-01-01 RX ADMIN — HALOPERIDOL LACTATE 2 MG: 5 INJECTION INTRAMUSCULAR at 00:37

## 2017-01-01 RX ADMIN — RIFAXIMIN 550 MG: 550 TABLET ORAL at 17:15

## 2017-01-01 RX ADMIN — HALOPERIDOL LACTATE 2 MG: 5 INJECTION INTRAMUSCULAR at 14:58

## 2017-01-01 RX ADMIN — Medication 300 UNITS: at 20:16

## 2017-01-01 RX ADMIN — MORPHINE SULFATE 15 MG: 15 TABLET, FILM COATED, EXTENDED RELEASE ORAL at 21:13

## 2017-01-01 RX ADMIN — HEPARIN SODIUM 5000 UNITS: 5000 INJECTION, SOLUTION INTRAVENOUS; SUBCUTANEOUS at 05:47

## 2017-01-01 RX ADMIN — FENTANYL CITRATE 25 MCG: 50 INJECTION, SOLUTION INTRAMUSCULAR; INTRAVENOUS at 17:38

## 2017-01-01 RX ADMIN — Medication 600 UNITS: at 19:34

## 2017-01-01 RX ADMIN — Medication 10 ML: at 06:49

## 2017-01-01 RX ADMIN — SODIUM CHLORIDE 50 ML/HR: 900 INJECTION, SOLUTION INTRAVENOUS at 09:08

## 2017-01-01 RX ADMIN — SODIUM CHLORIDE 1000 ML: 900 INJECTION, SOLUTION INTRAVENOUS at 16:08

## 2017-01-01 RX ADMIN — LIDOCAINE HYDROCHLORIDE 50 MG: 20 INJECTION, SOLUTION INFILTRATION; PERINEURAL at 10:52

## 2017-01-01 RX ADMIN — Medication 10 ML: at 04:18

## 2017-01-01 RX ADMIN — Medication 10 ML: at 17:09

## 2017-01-01 RX ADMIN — Medication 10 ML: at 23:15

## 2017-01-01 RX ADMIN — SODIUM CHLORIDE 250 ML: 900 INJECTION, SOLUTION INTRAVENOUS at 17:31

## 2017-01-01 RX ADMIN — LIDOCAINE HYDROCHLORIDE,EPINEPHRINE BITARTRATE 200 MG: 20; .01 INJECTION, SOLUTION INFILTRATION; PERINEURAL at 17:10

## 2017-01-01 RX ADMIN — Medication 600 UNITS: at 04:18

## 2017-01-01 RX ADMIN — ONDANSETRON 4 MG: 2 INJECTION INTRAMUSCULAR; INTRAVENOUS at 19:34

## 2017-01-01 RX ADMIN — HYDROMORPHONE HYDROCHLORIDE 0.25 MG: 1 INJECTION, SOLUTION INTRAMUSCULAR; INTRAVENOUS; SUBCUTANEOUS at 20:58

## 2017-01-01 RX ADMIN — ONDANSETRON 8 MG: 8 TABLET, ORALLY DISINTEGRATING ORAL at 22:14

## 2017-01-01 RX ADMIN — DAPTOMYCIN 300 MG: 500 INJECTION, POWDER, LYOPHILIZED, FOR SOLUTION INTRAVENOUS at 19:00

## 2017-01-01 RX ADMIN — AMOXICILLIN 500 MG: 500 CAPSULE ORAL at 21:02

## 2017-01-01 RX ADMIN — SODIUM CHLORIDE 250 ML: 900 INJECTION, SOLUTION INTRAVENOUS at 16:10

## 2017-01-01 RX ADMIN — FENTANYL CITRATE 50 MCG: 50 INJECTION, SOLUTION INTRAMUSCULAR; INTRAVENOUS at 16:14

## 2017-01-01 RX ADMIN — Medication 400 MG: at 18:19

## 2017-01-01 RX ADMIN — METOCLOPRAMIDE HYDROCHLORIDE 10 MG: 10 TABLET ORAL at 08:12

## 2017-01-01 RX ADMIN — Medication 600 UNITS: at 21:03

## 2017-01-01 RX ADMIN — PANTOPRAZOLE SODIUM 40 MG: 40 TABLET, DELAYED RELEASE ORAL at 06:09

## 2017-01-01 RX ADMIN — Medication 5 ML: at 21:41

## 2017-01-01 RX ADMIN — METOCLOPRAMIDE HYDROCHLORIDE 10 MG: 10 TABLET ORAL at 05:24

## 2017-01-01 RX ADMIN — HALOPERIDOL LACTATE 2 MG: 5 INJECTION INTRAMUSCULAR at 16:52

## 2017-01-01 RX ADMIN — Medication 10 ML: at 12:44

## 2017-01-01 RX ADMIN — SODIUM CHLORIDE 40 MG: 9 INJECTION INTRAMUSCULAR; INTRAVENOUS; SUBCUTANEOUS at 22:14

## 2017-01-01 RX ADMIN — HYDROMORPHONE HYDROCHLORIDE 0.25 MG: 1 INJECTION, SOLUTION INTRAMUSCULAR; INTRAVENOUS; SUBCUTANEOUS at 18:25

## 2017-01-01 RX ADMIN — Medication 300 UNITS: at 09:44

## 2017-01-01 RX ADMIN — SODIUM CHLORIDE, PRESERVATIVE FREE 300 UNITS: 5 INJECTION INTRAVENOUS at 16:43

## 2017-01-01 RX ADMIN — Medication 10 ML: at 06:00

## 2017-01-01 RX ADMIN — SODIUM CHLORIDE 12.5 MG: 9 INJECTION INTRAMUSCULAR; INTRAVENOUS; SUBCUTANEOUS at 10:44

## 2017-01-01 RX ADMIN — RIFAXIMIN 550 MG: 550 TABLET ORAL at 17:09

## 2017-01-01 RX ADMIN — LIDOCAINE HYDROCHLORIDE 15 ML: 20 SOLUTION ORAL; TOPICAL at 11:47

## 2017-01-01 RX ADMIN — FENTANYL CITRATE 50 MCG: 50 INJECTION, SOLUTION INTRAMUSCULAR; INTRAVENOUS at 16:12

## 2017-01-01 RX ADMIN — HALOPERIDOL LACTATE 2 MG: 5 INJECTION INTRAMUSCULAR at 08:10

## 2017-01-01 RX ADMIN — Medication 400 MG: at 06:32

## 2017-01-01 RX ADMIN — DEXTROSE MONOHYDRATE 25 G: 25 INJECTION, SOLUTION INTRAVENOUS at 23:21

## 2017-01-01 RX ADMIN — LIDOCAINE HYDROCHLORIDE 200 MG: 20 INJECTION, SOLUTION INFILTRATION; PERINEURAL at 17:08

## 2017-01-01 RX ADMIN — AZITHROMYCIN 500 MG: 250 TABLET, FILM COATED ORAL at 21:02

## 2017-01-01 RX ADMIN — ALBUMIN (HUMAN) 25 G: 0.25 INJECTION, SOLUTION INTRAVENOUS at 09:34

## 2017-01-01 RX ADMIN — METOCLOPRAMIDE 5 MG: 5 INJECTION, SOLUTION INTRAMUSCULAR; INTRAVENOUS at 22:33

## 2017-01-01 RX ADMIN — HALOPERIDOL LACTATE 2 MG: 5 INJECTION INTRAMUSCULAR at 08:59

## 2017-01-01 RX ADMIN — HYDROMORPHONE HYDROCHLORIDE 0.25 MG: 1 INJECTION, SOLUTION INTRAMUSCULAR; INTRAVENOUS; SUBCUTANEOUS at 16:42

## 2017-01-01 RX ADMIN — MIDODRINE HYDROCHLORIDE 5 MG: 5 TABLET ORAL at 06:07

## 2017-01-01 RX ADMIN — PANTOPRAZOLE SODIUM 40 MG: 40 TABLET, DELAYED RELEASE ORAL at 05:24

## 2017-01-01 RX ADMIN — SODIUM CHLORIDE 12.5 MG: 9 INJECTION INTRAMUSCULAR; INTRAVENOUS; SUBCUTANEOUS at 06:32

## 2017-01-01 RX ADMIN — HALOPERIDOL LACTATE 2 MG: 5 INJECTION INTRAMUSCULAR at 01:24

## 2017-01-01 RX ADMIN — HALOPERIDOL LACTATE 2 MG: 5 INJECTION INTRAMUSCULAR at 00:49

## 2017-01-01 RX ADMIN — LACTULOSE 200 G: 10 SOLUTION ORAL; RECTAL at 00:26

## 2017-01-01 RX ADMIN — MIDODRINE HYDROCHLORIDE 5 MG: 5 TABLET ORAL at 16:52

## 2017-01-01 RX ADMIN — MIDODRINE HYDROCHLORIDE 5 MG: 5 TABLET ORAL at 08:12

## 2017-01-01 RX ADMIN — Medication 10 ML: at 06:09

## 2017-01-01 RX ADMIN — LACTULOSE 20 G: 10 SOLUTION ORAL at 18:33

## 2017-01-01 RX ADMIN — SODIUM CHLORIDE 12.5 MG: 9 INJECTION INTRAMUSCULAR; INTRAVENOUS; SUBCUTANEOUS at 00:25

## 2017-01-01 RX ADMIN — METOCLOPRAMIDE 5 MG: 5 INJECTION, SOLUTION INTRAMUSCULAR; INTRAVENOUS at 02:19

## 2017-01-01 RX ADMIN — Medication 600 UNITS: at 20:56

## 2017-01-01 RX ADMIN — Medication 10 ML: at 21:29

## 2017-01-01 RX ADMIN — SODIUM CHLORIDE, PRESERVATIVE FREE 500 UNITS: 5 INJECTION INTRAVENOUS at 17:07

## 2017-01-01 RX ADMIN — Medication 10 ML: at 05:53

## 2017-01-01 RX ADMIN — MIDODRINE HYDROCHLORIDE 10 MG: 5 TABLET ORAL at 23:36

## 2017-01-01 RX ADMIN — HYDROMORPHONE HYDROCHLORIDE 0.25 MG: 1 INJECTION, SOLUTION INTRAMUSCULAR; INTRAVENOUS; SUBCUTANEOUS at 16:59

## 2017-01-01 RX ADMIN — RIFAXIMIN 550 MG: 550 TABLET ORAL at 09:59

## 2017-01-01 RX ADMIN — SODIUM CHLORIDE, PRESERVATIVE FREE 10 ML: 5 INJECTION INTRAVENOUS at 02:25

## 2017-01-01 RX ADMIN — MIDODRINE HYDROCHLORIDE 10 MG: 5 TABLET ORAL at 11:13

## 2017-01-01 RX ADMIN — Medication 10 ML: at 23:20

## 2017-01-01 RX ADMIN — Medication 5 ML: at 05:48

## 2017-01-01 RX ADMIN — Medication 300 UNITS: at 21:22

## 2017-01-01 RX ADMIN — SODIUM CHLORIDE, PRESERVATIVE FREE 300 UNITS: 5 INJECTION INTRAVENOUS at 16:44

## 2017-01-01 RX ADMIN — MIDODRINE HYDROCHLORIDE 5 MG: 5 TABLET ORAL at 22:24

## 2017-01-01 RX ADMIN — MORPHINE SULFATE 15 MG: 15 TABLET, FILM COATED, EXTENDED RELEASE ORAL at 08:36

## 2017-01-01 RX ADMIN — RIFAXIMIN 550 MG: 550 TABLET ORAL at 07:33

## 2017-01-01 RX ADMIN — Medication 600 UNITS: at 19:53

## 2017-01-01 RX ADMIN — CEFTRIAXONE 2 G: 2 INJECTION, POWDER, FOR SOLUTION INTRAMUSCULAR; INTRAVENOUS at 17:59

## 2017-01-01 RX ADMIN — LACTULOSE 20 G: 20 SOLUTION ORAL at 10:12

## 2017-01-01 RX ADMIN — MIDODRINE HYDROCHLORIDE 10 MG: 5 TABLET ORAL at 15:36

## 2017-01-01 RX ADMIN — VANCOMYCIN HYDROCHLORIDE 750 MG: 10 INJECTION, POWDER, LYOPHILIZED, FOR SOLUTION INTRAVENOUS at 20:25

## 2017-01-01 RX ADMIN — MORPHINE SULFATE 15 MG: 15 TABLET, EXTENDED RELEASE ORAL at 11:47

## 2017-01-01 RX ADMIN — LIDOCAINE HYDROCHLORIDE 15 ML: 20 SOLUTION OROPHARYNGEAL at 21:25

## 2017-01-01 RX ADMIN — SODIUM CHLORIDE 12.5 MG: 9 INJECTION INTRAMUSCULAR; INTRAVENOUS; SUBCUTANEOUS at 11:11

## 2017-01-01 RX ADMIN — PANTOPRAZOLE SODIUM 40 MG: 40 TABLET, DELAYED RELEASE ORAL at 18:55

## 2017-01-01 RX ADMIN — HYDROMORPHONE HYDROCHLORIDE 0.5 MG: 1 INJECTION, SOLUTION INTRAMUSCULAR; INTRAVENOUS; SUBCUTANEOUS at 04:02

## 2017-01-01 RX ADMIN — Medication 20 ML: at 09:46

## 2017-01-01 RX ADMIN — Medication 10 ML: at 06:07

## 2017-01-01 RX ADMIN — LIDOCAINE HYDROCHLORIDE 15 ML: 20 SOLUTION OROPHARYNGEAL at 08:38

## 2017-01-01 RX ADMIN — RIFAXIMIN 550 MG: 550 TABLET ORAL at 17:03

## 2017-01-01 RX ADMIN — Medication 10 ML: at 09:45

## 2017-01-01 RX ADMIN — Medication 10 ML: at 21:46

## 2017-01-01 RX ADMIN — Medication 600 UNITS: at 12:29

## 2017-01-01 RX ADMIN — HYDROMORPHONE HYDROCHLORIDE 0.25 MG: 1 INJECTION, SOLUTION INTRAMUSCULAR; INTRAVENOUS; SUBCUTANEOUS at 13:19

## 2017-01-01 RX ADMIN — LACTULOSE 20 G: 10 SOLUTION ORAL at 21:25

## 2017-01-01 RX ADMIN — FENTANYL CITRATE 50 MCG: 50 INJECTION, SOLUTION INTRAMUSCULAR; INTRAVENOUS at 16:35

## 2017-01-01 RX ADMIN — SODIUM CHLORIDE, PRESERVATIVE FREE 300 UNITS: 5 INJECTION INTRAVENOUS at 09:40

## 2017-01-01 RX ADMIN — Medication 300 UNITS: at 22:31

## 2017-01-01 RX ADMIN — OXYCODONE HYDROCHLORIDE 5 MG: 5 TABLET ORAL at 00:24

## 2017-01-01 RX ADMIN — LACTULOSE 30 G: 10 SOLUTION ORAL at 16:43

## 2017-01-01 RX ADMIN — RIFAXIMIN 550 MG: 550 TABLET ORAL at 17:54

## 2017-01-01 RX ADMIN — Medication 10 ML: at 15:13

## 2017-01-01 RX ADMIN — SODIUM CHLORIDE 12.5 MG: 9 INJECTION INTRAMUSCULAR; INTRAVENOUS; SUBCUTANEOUS at 09:36

## 2017-01-01 RX ADMIN — LACTULOSE 20 G: 20 SOLUTION ORAL at 10:29

## 2017-01-01 RX ADMIN — HYDROMORPHONE HYDROCHLORIDE 0.5 MG: 1 INJECTION, SOLUTION INTRAMUSCULAR; INTRAVENOUS; SUBCUTANEOUS at 07:04

## 2017-01-01 RX ADMIN — METOCLOPRAMIDE HYDROCHLORIDE 10 MG: 10 TABLET ORAL at 15:31

## 2017-01-01 RX ADMIN — MORPHINE SULFATE 15 MG: 15 TABLET, EXTENDED RELEASE ORAL at 12:41

## 2017-01-01 RX ADMIN — RIFAXIMIN 550 MG: 550 TABLET ORAL at 08:12

## 2017-01-01 RX ADMIN — LACTULOSE 30 G: 20 SOLUTION ORAL at 17:00

## 2017-01-01 RX ADMIN — SODIUM CHLORIDE 40 MG: 9 INJECTION INTRAMUSCULAR; INTRAVENOUS; SUBCUTANEOUS at 23:16

## 2017-01-01 RX ADMIN — Medication 10 ML: at 20:57

## 2017-01-01 RX ADMIN — INSULIN GLARGINE 25 UNITS: 100 INJECTION, SOLUTION SUBCUTANEOUS at 22:35

## 2017-01-01 RX ADMIN — VANCOMYCIN HYDROCHLORIDE 1000 MG: 1 INJECTION, POWDER, LYOPHILIZED, FOR SOLUTION INTRAVENOUS at 00:00

## 2017-01-01 RX ADMIN — SODIUM CHLORIDE, PRESERVATIVE FREE 300 UNITS: 5 INJECTION INTRAVENOUS at 11:40

## 2017-01-01 RX ADMIN — LACTULOSE 20 G: 10 SOLUTION ORAL at 21:23

## 2017-01-01 RX ADMIN — ALBUMIN (HUMAN) 25 G: 0.25 INJECTION, SOLUTION INTRAVENOUS at 13:07

## 2017-01-01 RX ADMIN — ALBUMIN (HUMAN) 25 G: 0.25 INJECTION, SOLUTION INTRAVENOUS at 13:55

## 2017-01-01 RX ADMIN — Medication 600 UNITS: at 04:40

## 2017-01-01 RX ADMIN — HALOPERIDOL LACTATE 2 MG: 5 INJECTION INTRAMUSCULAR at 16:41

## 2017-01-01 RX ADMIN — IOVERSOL 100 ML: 741 INJECTION INTRA-ARTERIAL; INTRAVENOUS at 17:09

## 2017-01-01 RX ADMIN — INSULIN GLARGINE 25 UNITS: 100 INJECTION, SOLUTION SUBCUTANEOUS at 22:32

## 2017-01-01 RX ADMIN — PROPOFOL 100 MCG/KG/MIN: 10 INJECTION, EMULSION INTRAVENOUS at 09:53

## 2017-01-01 RX ADMIN — HYDROMORPHONE HYDROCHLORIDE 0.25 MG: 1 INJECTION, SOLUTION INTRAMUSCULAR; INTRAVENOUS; SUBCUTANEOUS at 08:10

## 2017-01-01 RX ADMIN — HYDROMORPHONE HYDROCHLORIDE 0.25 MG: 1 INJECTION, SOLUTION INTRAMUSCULAR; INTRAVENOUS; SUBCUTANEOUS at 00:30

## 2017-01-01 RX ADMIN — RIFAXIMIN 550 MG: 550 TABLET ORAL at 22:51

## 2017-01-01 RX ADMIN — Medication 5 ML: at 17:56

## 2017-01-01 RX ADMIN — RIFAXIMIN 550 MG: 550 TABLET ORAL at 09:16

## 2017-01-01 RX ADMIN — ALBUMIN (HUMAN) 25 G: 0.25 INJECTION, SOLUTION INTRAVENOUS at 13:35

## 2017-01-01 RX ADMIN — SODIUM CHLORIDE, PRESERVATIVE FREE 600 UNITS: 5 INJECTION INTRAVENOUS at 09:01

## 2017-01-01 RX ADMIN — SODIUM CHLORIDE, PRESERVATIVE FREE 600 UNITS: 5 INJECTION INTRAVENOUS at 08:55

## 2017-01-01 RX ADMIN — HEPARIN SODIUM 5000 UNITS: 5000 INJECTION, SOLUTION INTRAVENOUS; SUBCUTANEOUS at 23:14

## 2017-01-01 RX ADMIN — MORPHINE SULFATE 30 MG: 15 TABLET, EXTENDED RELEASE ORAL at 09:59

## 2017-01-01 RX ADMIN — MIDODRINE HYDROCHLORIDE 10 MG: 5 TABLET ORAL at 02:39

## 2017-01-01 RX ADMIN — ONDANSETRON 4 MG: 2 INJECTION INTRAMUSCULAR; INTRAVENOUS at 21:28

## 2017-01-01 RX ADMIN — Medication 300 UNITS: at 09:52

## 2017-01-01 RX ADMIN — OXYCODONE HYDROCHLORIDE 5 MG: 5 TABLET ORAL at 06:07

## 2017-01-01 RX ADMIN — LACTULOSE 20 G: 20 SOLUTION ORAL at 17:27

## 2017-01-01 RX ADMIN — Medication 300 UNITS: at 11:03

## 2017-01-01 RX ADMIN — MIDODRINE HYDROCHLORIDE 10 MG: 5 TABLET ORAL at 11:11

## 2017-01-01 RX ADMIN — SODIUM CHLORIDE, PRESERVATIVE FREE 10 ML: 5 INJECTION INTRAVENOUS at 14:15

## 2017-01-01 RX ADMIN — LACTULOSE 20 G: 10 SOLUTION ORAL at 09:44

## 2017-01-01 RX ADMIN — DEXTROSE MONOHYDRATE: 5 INJECTION, SOLUTION INTRAVENOUS at 13:16

## 2017-01-01 RX ADMIN — PANTOPRAZOLE SODIUM 40 MG: 40 TABLET, DELAYED RELEASE ORAL at 07:30

## 2017-01-01 RX ADMIN — POTASSIUM CHLORIDE 20 MEQ: 14.9 INJECTION, SOLUTION INTRAVENOUS at 09:45

## 2017-01-01 RX ADMIN — HEPARIN SODIUM 3000 UNITS: 1000 INJECTION, SOLUTION INTRAVENOUS; SUBCUTANEOUS at 17:14

## 2017-01-01 RX ADMIN — SODIUM CHLORIDE 12.5 MG: 9 INJECTION INTRAMUSCULAR; INTRAVENOUS; SUBCUTANEOUS at 19:46

## 2017-01-01 RX ADMIN — METOCLOPRAMIDE HYDROCHLORIDE 10 MG: 10 TABLET ORAL at 14:59

## 2017-01-01 RX ADMIN — Medication 10 ML: at 23:16

## 2017-01-01 RX ADMIN — Medication 10 ML: at 11:40

## 2017-01-01 RX ADMIN — PIPERACILLIN SODIUM,TAZOBACTAM SODIUM 3.38 G: 3; .375 INJECTION, POWDER, FOR SOLUTION INTRAVENOUS at 05:41

## 2017-01-01 RX ADMIN — ALBUMIN (HUMAN) 25 G: 0.25 INJECTION, SOLUTION INTRAVENOUS at 16:01

## 2017-01-01 RX ADMIN — ALBUMIN (HUMAN) 25 G: 0.25 INJECTION, SOLUTION INTRAVENOUS at 11:44

## 2017-01-01 RX ADMIN — Medication 400 MG: at 10:20

## 2017-01-01 RX ADMIN — Medication 600 UNITS: at 05:00

## 2017-01-01 RX ADMIN — MIDODRINE HYDROCHLORIDE 5 MG: 5 TABLET ORAL at 06:02

## 2017-01-01 RX ADMIN — FENTANYL CITRATE 50 MCG: 50 INJECTION, SOLUTION INTRAMUSCULAR; INTRAVENOUS at 05:45

## 2017-01-01 RX ADMIN — RIFAXIMIN 550 MG: 550 TABLET ORAL at 18:33

## 2017-01-01 RX ADMIN — HEPARIN SODIUM (PORCINE) LOCK FLUSH IV SOLN 100 UNIT/ML 300 UNITS: 100 SOLUTION at 12:18

## 2017-01-01 RX ADMIN — HYDROMORPHONE HYDROCHLORIDE 0.25 MG: 1 INJECTION, SOLUTION INTRAMUSCULAR; INTRAVENOUS; SUBCUTANEOUS at 06:24

## 2017-01-01 RX ADMIN — LACTULOSE 20 G: 10 SOLUTION ORAL at 17:23

## 2017-01-01 RX ADMIN — LACTULOSE 10 G: 20 SOLUTION ORAL at 08:52

## 2017-01-01 RX ADMIN — LACTULOSE 10 G: 20 SOLUTION ORAL at 17:09

## 2017-01-01 RX ADMIN — Medication 20 ML: at 09:58

## 2017-01-01 RX ADMIN — CYCLOSPORINE 25 MG: 100 SOLUTION ORAL at 11:11

## 2017-01-01 RX ADMIN — LACTULOSE 30 G: 20 SOLUTION ORAL at 11:11

## 2017-01-01 RX ADMIN — VANCOMYCIN HYDROCHLORIDE 1000 MG: 1 INJECTION, POWDER, LYOPHILIZED, FOR SOLUTION INTRAVENOUS at 22:34

## 2017-01-01 RX ADMIN — ALBUMIN (HUMAN) 25 G: 0.25 INJECTION, SOLUTION INTRAVENOUS at 11:25

## 2017-01-01 RX ADMIN — Medication 10 ML: at 13:11

## 2017-01-01 RX ADMIN — Medication 20 ML: at 10:14

## 2017-01-01 RX ADMIN — Medication 400 MG: at 17:50

## 2017-01-01 RX ADMIN — LORAZEPAM 0.5 MG: 2 INJECTION INTRAMUSCULAR; INTRAVENOUS at 09:44

## 2017-01-01 RX ADMIN — SODIUM CHLORIDE, PRESERVATIVE FREE 600 UNITS: 5 INJECTION INTRAVENOUS at 10:14

## 2017-01-01 RX ADMIN — Medication 5 ML: at 22:11

## 2017-01-01 RX ADMIN — Medication 5 ML: at 06:33

## 2017-01-01 RX ADMIN — CEFTRIAXONE SODIUM 1 G: 1 INJECTION, POWDER, FOR SOLUTION INTRAMUSCULAR; INTRAVENOUS at 16:12

## 2017-01-01 RX ADMIN — HALOPERIDOL LACTATE 2 MG: 5 INJECTION INTRAMUSCULAR at 05:34

## 2017-01-01 RX ADMIN — ONDANSETRON 8 MG: 8 TABLET, ORALLY DISINTEGRATING ORAL at 15:13

## 2017-01-01 RX ADMIN — ERYTHROPOIETIN 10000 UNITS: 10000 INJECTION, SOLUTION INTRAVENOUS; SUBCUTANEOUS at 11:14

## 2017-01-01 RX ADMIN — RIFAXIMIN 550 MG: 550 TABLET ORAL at 10:20

## 2017-01-01 RX ADMIN — SODIUM CHLORIDE, PRESERVATIVE FREE 10 ML: 5 INJECTION INTRAVENOUS at 00:43

## 2017-01-01 RX ADMIN — ROCURONIUM BROMIDE 5 MG: 10 INJECTION, SOLUTION INTRAVENOUS at 17:02

## 2017-01-01 RX ADMIN — PANTOPRAZOLE SODIUM 40 MG: 40 TABLET, DELAYED RELEASE ORAL at 06:02

## 2017-01-01 RX ADMIN — HYDROMORPHONE HYDROCHLORIDE 0.25 MG: 1 INJECTION, SOLUTION INTRAMUSCULAR; INTRAVENOUS; SUBCUTANEOUS at 17:58

## 2017-01-01 RX ADMIN — METOCLOPRAMIDE HYDROCHLORIDE 10 MG: 10 TABLET ORAL at 22:51

## 2017-01-01 RX ADMIN — CYCLOSPORINE 25 MG: 100 SOLUTION ORAL at 09:31

## 2017-01-01 RX ADMIN — CHLORHEXIDINE GLUCONATE 15 ML: 1.2 RINSE ORAL at 08:31

## 2017-01-01 RX ADMIN — MIDODRINE HYDROCHLORIDE 10 MG: 5 TABLET ORAL at 10:40

## 2017-01-01 RX ADMIN — SODIUM CHLORIDE, PRESERVATIVE FREE 600 UNITS: 5 INJECTION INTRAVENOUS at 08:33

## 2017-01-01 RX ADMIN — Medication 10 ML: at 16:26

## 2017-01-01 RX ADMIN — SODIUM CHLORIDE, PRESERVATIVE FREE 300 UNITS: 5 INJECTION INTRAVENOUS at 09:59

## 2017-01-01 RX ADMIN — ALBUMIN (HUMAN) 25 G: 0.25 INJECTION, SOLUTION INTRAVENOUS at 08:51

## 2017-01-01 RX ADMIN — ERYTHROPOIETIN 20000 UNITS: 20000 INJECTION, SOLUTION INTRAVENOUS; SUBCUTANEOUS at 17:02

## 2017-01-01 RX ADMIN — METOCLOPRAMIDE HYDROCHLORIDE 10 MG: 10 TABLET ORAL at 11:25

## 2017-01-01 RX ADMIN — HYDROMORPHONE HYDROCHLORIDE 0.25 MG: 1 INJECTION, SOLUTION INTRAMUSCULAR; INTRAVENOUS; SUBCUTANEOUS at 00:50

## 2017-01-01 RX ADMIN — HYDROMORPHONE HYDROCHLORIDE 0.25 MG: 1 INJECTION, SOLUTION INTRAMUSCULAR; INTRAVENOUS; SUBCUTANEOUS at 17:05

## 2017-01-01 RX ADMIN — MORPHINE SULFATE 15 MG: 15 TABLET, EXTENDED RELEASE ORAL at 20:03

## 2017-01-01 RX ADMIN — METOCLOPRAMIDE HYDROCHLORIDE 10 MG: 10 TABLET ORAL at 17:09

## 2017-01-01 RX ADMIN — NALOXONE HYDROCHLORIDE 0.4 MG: 0.4 INJECTION, SOLUTION INTRAMUSCULAR; INTRAVENOUS; SUBCUTANEOUS at 18:55

## 2017-01-01 RX ADMIN — RIFAXIMIN 550 MG: 550 TABLET ORAL at 16:54

## 2017-01-01 RX ADMIN — SODIUM CHLORIDE 40 MG: 9 INJECTION INTRAMUSCULAR; INTRAVENOUS; SUBCUTANEOUS at 10:43

## 2017-01-01 RX ADMIN — SODIUM CHLORIDE, PRESERVATIVE FREE 600 UNITS: 5 INJECTION INTRAVENOUS at 09:58

## 2017-01-01 RX ADMIN — SODIUM CHLORIDE, PRESERVATIVE FREE 300 UNITS: 5 INJECTION INTRAVENOUS at 07:50

## 2017-01-01 RX ADMIN — INSULIN GLARGINE 25 UNITS: 100 INJECTION, SOLUTION SUBCUTANEOUS at 01:22

## 2017-01-01 RX ADMIN — Medication 10 ML: at 09:29

## 2017-01-01 RX ADMIN — SODIUM CHLORIDE 12.5 MG: 9 INJECTION INTRAMUSCULAR; INTRAVENOUS; SUBCUTANEOUS at 00:42

## 2017-01-01 RX ADMIN — CYCLOSPORINE 25 MG: 25 CAPSULE, LIQUID FILLED ORAL at 08:32

## 2017-01-01 RX ADMIN — METOCLOPRAMIDE HYDROCHLORIDE 10 MG: 10 TABLET ORAL at 17:15

## 2017-01-01 RX ADMIN — SODIUM CHLORIDE 12.5 MG: 9 INJECTION INTRAMUSCULAR; INTRAVENOUS; SUBCUTANEOUS at 02:30

## 2017-01-01 RX ADMIN — Medication 10 ML: at 22:28

## 2017-01-01 RX ADMIN — ALBUMIN (HUMAN) 25 G: 0.25 INJECTION, SOLUTION INTRAVENOUS at 13:00

## 2017-01-01 RX ADMIN — HYDROMORPHONE HYDROCHLORIDE 0.25 MG: 1 INJECTION, SOLUTION INTRAMUSCULAR; INTRAVENOUS; SUBCUTANEOUS at 10:00

## 2017-01-01 RX ADMIN — Medication 10 ML: at 22:22

## 2017-01-01 RX ADMIN — HALOPERIDOL LACTATE 2 MG: 5 INJECTION INTRAMUSCULAR at 15:31

## 2017-01-01 RX ADMIN — POTASSIUM CHLORIDE 20 MEQ: 14.9 INJECTION, SOLUTION INTRAVENOUS at 23:23

## 2017-01-01 RX ADMIN — Medication 10 ML: at 10:06

## 2017-01-01 RX ADMIN — Medication 600 UNITS: at 04:14

## 2017-01-01 RX ADMIN — LIDOCAINE HYDROCHLORIDE 60 MG: 20 INJECTION, SOLUTION EPIDURAL; INFILTRATION; INTRACAUDAL; PERINEURAL at 17:02

## 2017-01-01 RX ADMIN — LACTULOSE 30 G: 20 SOLUTION ORAL at 05:39

## 2017-01-01 RX ADMIN — Medication 10 ML: at 10:15

## 2017-01-01 RX ADMIN — LACTULOSE 200 G: 10 SOLUTION ORAL; RECTAL at 06:28

## 2017-01-01 RX ADMIN — PANTOPRAZOLE SODIUM 40 MG: 40 TABLET, DELAYED RELEASE ORAL at 06:24

## 2017-01-01 RX ADMIN — RIFAXIMIN 550 MG: 550 TABLET ORAL at 09:17

## 2017-01-01 RX ADMIN — ALBUMIN (HUMAN) 25 G: 0.25 INJECTION, SOLUTION INTRAVENOUS at 16:10

## 2017-01-01 RX ADMIN — LACTULOSE 20 G: 10 SOLUTION ORAL at 17:50

## 2017-01-01 RX ADMIN — LIDOCAINE HYDROCHLORIDE 15 ML: 20 SOLUTION OROPHARYNGEAL at 21:11

## 2017-01-01 RX ADMIN — RIFAXIMIN 550 MG: 550 TABLET ORAL at 18:55

## 2017-01-01 RX ADMIN — Medication 10 ML: at 21:45

## 2017-01-01 RX ADMIN — SODIUM CHLORIDE, PRESERVATIVE FREE 300 UNITS: 5 INJECTION INTRAVENOUS at 10:52

## 2017-01-01 RX ADMIN — Medication 400 MG: at 08:40

## 2017-01-01 RX ADMIN — POTASSIUM CHLORIDE 20 MEQ: 14.9 INJECTION, SOLUTION INTRAVENOUS at 07:05

## 2017-01-01 RX ADMIN — Medication 1 TUBE: at 11:21

## 2017-01-01 RX ADMIN — SODIUM CHLORIDE, PRESERVATIVE FREE 300 UNITS: 5 INJECTION INTRAVENOUS at 13:19

## 2017-01-01 RX ADMIN — Medication 10 ML: at 04:35

## 2017-01-01 RX ADMIN — SODIUM CHLORIDE 12.5 MG: 9 INJECTION INTRAMUSCULAR; INTRAVENOUS; SUBCUTANEOUS at 10:09

## 2017-01-01 RX ADMIN — SODIUM CHLORIDE 25 MG: 9 INJECTION INTRAMUSCULAR; INTRAVENOUS; SUBCUTANEOUS at 22:23

## 2017-01-01 RX ADMIN — Medication 600 UNITS: at 13:48

## 2017-01-01 RX ADMIN — LACTULOSE 30 G: 10 SOLUTION ORAL at 23:17

## 2017-01-01 RX ADMIN — CYCLOSPORINE 125 MG: 25 CAPSULE, LIQUID FILLED ORAL at 06:31

## 2017-01-01 RX ADMIN — KETOROLAC TROMETHAMINE 30 MG: 30 INJECTION, SOLUTION INTRAMUSCULAR at 00:32

## 2017-01-01 RX ADMIN — SODIUM CHLORIDE, PRESERVATIVE FREE 300 UNITS: 5 INJECTION INTRAVENOUS at 14:00

## 2017-01-01 RX ADMIN — ALBUMIN (HUMAN) 25 G: 0.25 INJECTION, SOLUTION INTRAVENOUS at 12:58

## 2017-01-01 RX ADMIN — ALBUMIN (HUMAN) 25 G: 0.25 INJECTION, SOLUTION INTRAVENOUS at 10:56

## 2017-01-01 RX ADMIN — INSULIN LISPRO 6 UNITS: 100 INJECTION, SOLUTION INTRAVENOUS; SUBCUTANEOUS at 08:07

## 2017-01-01 RX ADMIN — METOCLOPRAMIDE HYDROCHLORIDE 10 MG: 10 TABLET ORAL at 21:29

## 2017-01-01 RX ADMIN — MORPHINE SULFATE 4 MG: 4 INJECTION, SOLUTION INTRAMUSCULAR; INTRAVENOUS at 04:56

## 2017-01-01 RX ADMIN — SODIUM CHLORIDE, PRESERVATIVE FREE 300 UNITS: 5 INJECTION INTRAVENOUS at 11:15

## 2017-01-01 RX ADMIN — MIDODRINE HYDROCHLORIDE 5 MG: 5 TABLET ORAL at 21:23

## 2017-01-01 RX ADMIN — CYCLOSPORINE 25 MG: 25 CAPSULE, LIQUID FILLED ORAL at 17:32

## 2017-01-01 RX ADMIN — SODIUM CHLORIDE 40 MG: 9 INJECTION INTRAMUSCULAR; INTRAVENOUS; SUBCUTANEOUS at 20:37

## 2017-01-01 RX ADMIN — FENTANYL CITRATE 50 MCG: 50 INJECTION, SOLUTION INTRAMUSCULAR; INTRAVENOUS at 17:02

## 2017-01-01 RX ADMIN — MIDODRINE HYDROCHLORIDE 5 MG: 5 TABLET ORAL at 12:09

## 2017-01-01 RX ADMIN — HALOPERIDOL LACTATE 2 MG: 5 INJECTION INTRAMUSCULAR at 11:18

## 2017-01-01 RX ADMIN — SODIUM CHLORIDE 40 MG: 9 INJECTION INTRAMUSCULAR; INTRAVENOUS; SUBCUTANEOUS at 22:11

## 2017-01-01 RX ADMIN — DEXTROSE 50 % IN WATER (D50W) INTRAVENOUS SYRINGE 25 G: at 18:00

## 2017-01-01 RX ADMIN — SODIUM CHLORIDE 40 MG: 9 INJECTION INTRAMUSCULAR; INTRAVENOUS; SUBCUTANEOUS at 22:24

## 2017-01-01 RX ADMIN — SODIUM CHLORIDE, PRESERVATIVE FREE 300 UNITS: 5 INJECTION INTRAVENOUS at 15:13

## 2017-01-01 RX ADMIN — SODIUM CHLORIDE, PRESERVATIVE FREE 10 ML: 5 INJECTION INTRAVENOUS at 05:35

## 2017-01-01 RX ADMIN — HALOPERIDOL LACTATE 2 MG: 5 INJECTION INTRAMUSCULAR at 11:20

## 2017-01-01 RX ADMIN — Medication 10 ML: at 20:32

## 2017-01-01 RX ADMIN — LACTULOSE 20 G: 10 SOLUTION ORAL at 13:10

## 2017-01-01 RX ADMIN — CYCLOSPORINE 25 MG: 25 CAPSULE, LIQUID FILLED ORAL at 08:34

## 2017-01-01 RX ADMIN — MAGNESIUM SULFATE HEPTAHYDRATE 2 G: 40 INJECTION, SOLUTION INTRAVENOUS at 23:24

## 2017-01-01 RX ADMIN — RIFAXIMIN 550 MG: 550 TABLET ORAL at 16:26

## 2017-01-01 RX ADMIN — SODIUM CHLORIDE, PRESERVATIVE FREE 10 ML: 5 INJECTION INTRAVENOUS at 00:36

## 2017-01-01 RX ADMIN — RIFAXIMIN 550 MG: 550 TABLET ORAL at 08:51

## 2017-01-01 RX ADMIN — LACTULOSE 30 G: 20 SOLUTION ORAL at 23:50

## 2017-01-01 RX ADMIN — HYDROMORPHONE HYDROCHLORIDE 0.5 MG: 1 INJECTION, SOLUTION INTRAMUSCULAR; INTRAVENOUS; SUBCUTANEOUS at 18:54

## 2017-01-01 RX ADMIN — LIDOCAINE HYDROCHLORIDE 15 ML: 20 SOLUTION OROPHARYNGEAL at 08:58

## 2017-01-01 RX ADMIN — ONDANSETRON 8 MG: 8 TABLET, ORALLY DISINTEGRATING ORAL at 21:23

## 2017-01-01 RX ADMIN — DIPHENHYDRAMINE HYDROCHLORIDE 25 MG: 50 INJECTION, SOLUTION INTRAMUSCULAR; INTRAVENOUS at 18:26

## 2017-01-01 RX ADMIN — LACTULOSE 20 G: 20 SOLUTION ORAL at 18:52

## 2017-01-01 RX ADMIN — Medication 10 ML: at 20:16

## 2017-01-01 RX ADMIN — Medication 20 ML: at 10:27

## 2017-01-01 RX ADMIN — SODIUM BICARBONATE 2 ML: 0.2 INJECTION, SOLUTION INTRAVENOUS at 17:08

## 2017-01-01 RX ADMIN — HALOPERIDOL LACTATE 2 MG: 5 INJECTION INTRAMUSCULAR at 21:36

## 2017-01-01 RX ADMIN — LORAZEPAM 0.5 MG: 2 INJECTION INTRAMUSCULAR; INTRAVENOUS at 15:08

## 2017-01-01 RX ADMIN — SODIUM CHLORIDE 1000 ML: 900 INJECTION, SOLUTION INTRAVENOUS at 16:14

## 2017-01-01 RX ADMIN — HALOPERIDOL LACTATE 2 MG: 5 INJECTION INTRAMUSCULAR at 00:45

## 2017-01-01 RX ADMIN — Medication 20 ML: at 08:33

## 2017-01-01 RX ADMIN — SODIUM CHLORIDE 500 ML: 900 INJECTION, SOLUTION INTRAVENOUS at 07:37

## 2017-01-01 RX ADMIN — ONDANSETRON 8 MG: 8 TABLET, ORALLY DISINTEGRATING ORAL at 09:05

## 2017-01-01 RX ADMIN — LACTULOSE 30 G: 10 SOLUTION ORAL at 05:11

## 2017-01-01 RX ADMIN — CYCLOSPORINE 25 MG: 25 CAPSULE, LIQUID FILLED ORAL at 18:01

## 2017-01-01 RX ADMIN — MORPHINE SULFATE 2 MG: 2 INJECTION, SOLUTION INTRAMUSCULAR; INTRAVENOUS at 22:17

## 2017-01-01 RX ADMIN — SODIUM CHLORIDE, PRESERVATIVE FREE 600 UNITS: 5 INJECTION INTRAVENOUS at 09:56

## 2017-01-01 RX ADMIN — SODIUM CHLORIDE 40 MG: 9 INJECTION INTRAMUSCULAR; INTRAVENOUS; SUBCUTANEOUS at 08:23

## 2017-01-01 RX ADMIN — ONDANSETRON 4 MG: 2 INJECTION INTRAMUSCULAR; INTRAVENOUS at 08:42

## 2017-01-01 RX ADMIN — FENTANYL CITRATE 25 MCG: 50 INJECTION, SOLUTION INTRAMUSCULAR; INTRAVENOUS at 22:00

## 2017-01-01 RX ADMIN — LIDOCAINE HYDROCHLORIDE 15 ML: 20 SOLUTION OROPHARYNGEAL at 22:27

## 2017-01-01 RX ADMIN — SODIUM CHLORIDE, PRESERVATIVE FREE 300 UNITS: 5 INJECTION INTRAVENOUS at 00:36

## 2017-01-01 RX ADMIN — INSULIN GLARGINE 12 UNITS: 100 INJECTION, SOLUTION SUBCUTANEOUS at 22:52

## 2017-01-01 RX ADMIN — LACTULOSE 30 G: 20 SOLUTION ORAL at 00:26

## 2017-01-01 RX ADMIN — HYDROMORPHONE HYDROCHLORIDE 0.5 MG: 1 INJECTION, SOLUTION INTRAMUSCULAR; INTRAVENOUS; SUBCUTANEOUS at 16:53

## 2017-01-01 RX ADMIN — LACTULOSE 20 G: 20 SOLUTION ORAL at 09:16

## 2017-01-01 RX ADMIN — MIDODRINE HYDROCHLORIDE 5 MG: 5 TABLET ORAL at 21:45

## 2017-01-01 RX ADMIN — INSULIN GLARGINE 25 UNITS: 100 INJECTION, SOLUTION SUBCUTANEOUS at 00:01

## 2017-01-01 RX ADMIN — HYDROMORPHONE HYDROCHLORIDE 0.25 MG: 1 INJECTION, SOLUTION INTRAMUSCULAR; INTRAVENOUS; SUBCUTANEOUS at 12:36

## 2017-01-01 RX ADMIN — POTASSIUM CHLORIDE 20 MEQ: 20 TABLET, EXTENDED RELEASE ORAL at 17:23

## 2017-01-01 RX ADMIN — HYDROMORPHONE HYDROCHLORIDE 0.25 MG: 1 INJECTION, SOLUTION INTRAMUSCULAR; INTRAVENOUS; SUBCUTANEOUS at 11:44

## 2017-01-01 RX ADMIN — LIDOCAINE HYDROCHLORIDE 100 MG: 20 INJECTION, SOLUTION INFILTRATION; PERINEURAL at 09:42

## 2017-01-01 RX ADMIN — METOCLOPRAMIDE HYDROCHLORIDE 10 MG: 10 TABLET ORAL at 23:50

## 2017-01-01 RX ADMIN — SODIUM CHLORIDE, PRESERVATIVE FREE 300 UNITS: 5 INJECTION INTRAVENOUS at 16:56

## 2017-01-01 RX ADMIN — Medication 2 MG: at 21:29

## 2017-01-01 RX ADMIN — HALOPERIDOL LACTATE 2 MG: 5 INJECTION INTRAMUSCULAR at 09:31

## 2017-01-01 RX ADMIN — HYDROMORPHONE HYDROCHLORIDE 0.5 MG: 1 INJECTION, SOLUTION INTRAMUSCULAR; INTRAVENOUS; SUBCUTANEOUS at 04:14

## 2017-01-01 RX ADMIN — LACTULOSE 20 G: 10 SOLUTION ORAL at 17:54

## 2017-01-01 RX ADMIN — POTASSIUM CHLORIDE 20 MEQ: 14.9 INJECTION, SOLUTION INTRAVENOUS at 11:45

## 2017-01-01 RX ADMIN — INSULIN GLARGINE 18 UNITS: 100 INJECTION, SOLUTION SUBCUTANEOUS at 21:34

## 2017-01-01 RX ADMIN — SODIUM CHLORIDE, PRESERVATIVE FREE 300 UNITS: 5 INJECTION INTRAVENOUS at 00:43

## 2017-01-01 RX ADMIN — RIFAXIMIN 550 MG: 550 TABLET ORAL at 10:12

## 2017-01-01 RX ADMIN — LIDOCAINE HYDROCHLORIDE 15 ML: 20 SOLUTION OROPHARYNGEAL at 09:29

## 2017-01-01 RX ADMIN — Medication 10 ML: at 13:23

## 2017-01-01 RX ADMIN — CYCLOSPORINE 50 MG: 25 CAPSULE, LIQUID FILLED ORAL at 10:35

## 2017-01-01 RX ADMIN — SODIUM CHLORIDE 12.5 MG: 9 INJECTION INTRAMUSCULAR; INTRAVENOUS; SUBCUTANEOUS at 23:09

## 2017-01-01 RX ADMIN — LACTULOSE 20 G: 20 SOLUTION ORAL at 19:05

## 2017-01-01 RX ADMIN — HYDROMORPHONE HYDROCHLORIDE 0.25 MG: 1 INJECTION, SOLUTION INTRAMUSCULAR; INTRAVENOUS; SUBCUTANEOUS at 01:23

## 2017-01-01 RX ADMIN — ONDANSETRON 4 MG: 2 INJECTION INTRAMUSCULAR; INTRAVENOUS at 17:56

## 2017-01-01 RX ADMIN — HYDROMORPHONE HYDROCHLORIDE 0.25 MG: 1 INJECTION, SOLUTION INTRAMUSCULAR; INTRAVENOUS; SUBCUTANEOUS at 04:56

## 2017-01-01 RX ADMIN — CEFAZOLIN 2 G: 1 INJECTION, POWDER, FOR SOLUTION INTRAMUSCULAR; INTRAVENOUS; PARENTERAL at 17:03

## 2017-01-01 RX ADMIN — Medication 600 UNITS: at 20:26

## 2017-01-01 RX ADMIN — FENTANYL CITRATE 50 MCG: 50 INJECTION, SOLUTION INTRAMUSCULAR; INTRAVENOUS at 21:58

## 2017-01-01 RX ADMIN — Medication 600 UNITS: at 04:29

## 2017-01-01 RX ADMIN — Medication 10 ML: at 21:22

## 2017-01-01 RX ADMIN — Medication 10 ML: at 04:30

## 2017-01-01 RX ADMIN — MIDODRINE HYDROCHLORIDE 10 MG: 5 TABLET ORAL at 00:19

## 2017-01-01 RX ADMIN — SODIUM CHLORIDE 40 MG: 9 INJECTION INTRAMUSCULAR; INTRAVENOUS; SUBCUTANEOUS at 09:16

## 2017-01-01 RX ADMIN — SODIUM CHLORIDE, PRESERVATIVE FREE 300 UNITS: 5 INJECTION INTRAVENOUS at 11:37

## 2017-01-01 RX ADMIN — LIDOCAINE HYDROCHLORIDE 15 ML: 20 SOLUTION OROPHARYNGEAL at 20:15

## 2017-01-01 RX ADMIN — HYDROMORPHONE HYDROCHLORIDE 0.25 MG: 1 INJECTION, SOLUTION INTRAMUSCULAR; INTRAVENOUS; SUBCUTANEOUS at 09:20

## 2017-01-01 RX ADMIN — HALOPERIDOL LACTATE 2 MG: 5 INJECTION INTRAMUSCULAR at 05:33

## 2017-01-01 RX ADMIN — METOCLOPRAMIDE HYDROCHLORIDE 10 MG: 10 TABLET ORAL at 17:32

## 2017-01-01 RX ADMIN — LACTULOSE 20 G: 10 SOLUTION ORAL at 13:48

## 2017-01-01 RX ADMIN — LIDOCAINE HYDROCHLORIDE 15 ML: 20 SOLUTION OROPHARYNGEAL at 22:14

## 2017-01-01 RX ADMIN — FENTANYL CITRATE 50 MCG: 50 INJECTION, SOLUTION INTRAMUSCULAR; INTRAVENOUS at 09:56

## 2017-01-01 RX ADMIN — Medication 600 UNITS: at 04:10

## 2017-01-01 RX ADMIN — MIDODRINE HYDROCHLORIDE 5 MG: 5 TABLET ORAL at 05:28

## 2017-01-01 RX ADMIN — FENTANYL CITRATE 50 MCG: 50 INJECTION, SOLUTION INTRAMUSCULAR; INTRAVENOUS at 00:21

## 2017-01-01 RX ADMIN — Medication 300 UNITS: at 08:18

## 2017-01-01 RX ADMIN — Medication 400 MG: at 17:23

## 2017-01-01 RX ADMIN — CEFTRIAXONE 2 G: 2 INJECTION, POWDER, FOR SOLUTION INTRAMUSCULAR; INTRAVENOUS at 18:55

## 2017-01-01 RX ADMIN — SODIUM CHLORIDE, PRESERVATIVE FREE 300 UNITS: 5 INJECTION INTRAVENOUS at 11:32

## 2017-01-01 RX ADMIN — LIDOCAINE HYDROCHLORIDE 15 ML: 20 SOLUTION OROPHARYNGEAL at 21:53

## 2017-01-01 RX ADMIN — HYDROMORPHONE HYDROCHLORIDE 0.25 MG: 1 INJECTION, SOLUTION INTRAMUSCULAR; INTRAVENOUS; SUBCUTANEOUS at 11:10

## 2017-01-01 RX ADMIN — LIDOCAINE HYDROCHLORIDE 15 ML: 20 SOLUTION OROPHARYNGEAL at 08:11

## 2017-01-01 RX ADMIN — HYDROMORPHONE HYDROCHLORIDE 0.25 MG: 1 INJECTION, SOLUTION INTRAMUSCULAR; INTRAVENOUS; SUBCUTANEOUS at 15:31

## 2017-01-01 RX ADMIN — Medication 20 ML: at 10:50

## 2017-01-01 RX ADMIN — HYDROMORPHONE HYDROCHLORIDE 0.5 MG: 1 INJECTION, SOLUTION INTRAMUSCULAR; INTRAVENOUS; SUBCUTANEOUS at 22:37

## 2017-01-01 RX ADMIN — METOCLOPRAMIDE HYDROCHLORIDE 10 MG: 10 TABLET ORAL at 07:05

## 2017-01-01 RX ADMIN — INSULIN GLARGINE 25 UNITS: 100 INJECTION, SOLUTION SUBCUTANEOUS at 21:20

## 2017-01-01 RX ADMIN — HYDROMORPHONE HYDROCHLORIDE 0.25 MG: 1 INJECTION, SOLUTION INTRAMUSCULAR; INTRAVENOUS; SUBCUTANEOUS at 13:47

## 2017-01-01 RX ADMIN — SODIUM CHLORIDE 100 ML/HR: 900 INJECTION, SOLUTION INTRAVENOUS at 09:15

## 2017-01-01 RX ADMIN — MIDODRINE HYDROCHLORIDE 5 MG: 5 TABLET ORAL at 17:50

## 2017-01-01 RX ADMIN — SODIUM CHLORIDE, PRESERVATIVE FREE 10 ML: 5 INJECTION INTRAVENOUS at 18:03

## 2017-01-01 RX ADMIN — CYCLOSPORINE 25 MG: 25 CAPSULE, LIQUID FILLED ORAL at 17:30

## 2017-01-01 RX ADMIN — LACTULOSE 200 G: 10 SOLUTION ORAL; RECTAL at 09:16

## 2017-01-01 RX ADMIN — SODIUM CHLORIDE 50 ML/HR: 900 INJECTION, SOLUTION INTRAVENOUS at 00:22

## 2017-01-01 RX ADMIN — HYDROMORPHONE HYDROCHLORIDE 0.5 MG: 1 INJECTION, SOLUTION INTRAMUSCULAR; INTRAVENOUS; SUBCUTANEOUS at 13:20

## 2017-01-01 RX ADMIN — MORPHINE SULFATE 15 MG: 15 TABLET, EXTENDED RELEASE ORAL at 21:08

## 2017-01-01 RX ADMIN — HYDROMORPHONE HYDROCHLORIDE 0.5 MG: 1 INJECTION, SOLUTION INTRAMUSCULAR; INTRAVENOUS; SUBCUTANEOUS at 19:31

## 2017-01-01 RX ADMIN — CHLORHEXIDINE GLUCONATE 15 ML: 1.2 RINSE ORAL at 17:16

## 2017-01-01 RX ADMIN — CHLORHEXIDINE GLUCONATE 15 ML: 1.2 RINSE ORAL at 09:08

## 2017-01-01 RX ADMIN — ALBUMIN (HUMAN) 25 G: 0.25 INJECTION, SOLUTION INTRAVENOUS at 08:13

## 2017-01-01 RX ADMIN — RIFAXIMIN 550 MG: 550 TABLET ORAL at 17:41

## 2017-01-01 RX ADMIN — MIDODRINE HYDROCHLORIDE 10 MG: 5 TABLET ORAL at 17:54

## 2017-01-01 RX ADMIN — ETOMIDATE: 2 INJECTION INTRAVENOUS at 21:58

## 2017-01-01 RX ADMIN — Medication 400 MG: at 09:16

## 2017-01-01 RX ADMIN — SODIUM CHLORIDE, PRESERVATIVE FREE 300 UNITS: 5 INJECTION INTRAVENOUS at 13:49

## 2017-01-01 RX ADMIN — LACTULOSE 20 G: 10 SOLUTION ORAL at 08:33

## 2017-01-01 RX ADMIN — HYDROMORPHONE HYDROCHLORIDE 0.25 MG: 1 INJECTION, SOLUTION INTRAMUSCULAR; INTRAVENOUS; SUBCUTANEOUS at 07:01

## 2017-01-01 RX ADMIN — Medication 10 ML: at 05:29

## 2017-01-01 RX ADMIN — SODIUM CHLORIDE 40 MG: 9 INJECTION INTRAMUSCULAR; INTRAVENOUS; SUBCUTANEOUS at 22:33

## 2017-01-01 RX ADMIN — Medication 400 MG: at 15:36

## 2017-01-01 RX ADMIN — HYDROMORPHONE HYDROCHLORIDE 0.25 MG: 1 INJECTION, SOLUTION INTRAMUSCULAR; INTRAVENOUS; SUBCUTANEOUS at 11:21

## 2017-01-01 RX ADMIN — SODIUM CHLORIDE, PRESERVATIVE FREE 300 UNITS: 5 INJECTION INTRAVENOUS at 13:25

## 2017-01-01 RX ADMIN — CEFTRIAXONE 2 G: 2 INJECTION, POWDER, FOR SOLUTION INTRAMUSCULAR; INTRAVENOUS at 22:17

## 2017-01-01 RX ADMIN — Medication 10 ML: at 11:00

## 2017-01-01 RX ADMIN — SODIUM CHLORIDE 40 MG: 9 INJECTION INTRAMUSCULAR; INTRAVENOUS; SUBCUTANEOUS at 08:39

## 2017-01-01 RX ADMIN — RIFAXIMIN 550 MG: 550 TABLET ORAL at 07:16

## 2017-01-01 RX ADMIN — Medication 5 ML: at 22:25

## 2017-01-01 RX ADMIN — SODIUM CHLORIDE 100 ML: 900 INJECTION, SOLUTION INTRAVENOUS at 17:09

## 2017-01-01 RX ADMIN — CYCLOSPORINE 125 MG: 25 CAPSULE, LIQUID FILLED ORAL at 08:07

## 2017-01-01 RX ADMIN — HYDROMORPHONE HYDROCHLORIDE 0.25 MG: 1 INJECTION, SOLUTION INTRAMUSCULAR; INTRAVENOUS; SUBCUTANEOUS at 14:18

## 2017-01-01 RX ADMIN — SODIUM CHLORIDE, PRESERVATIVE FREE 600 UNITS: 5 INJECTION INTRAVENOUS at 11:43

## 2017-01-01 RX ADMIN — LIDOCAINE HYDROCHLORIDE 200 MG: 20 INJECTION, SOLUTION INFILTRATION; PERINEURAL at 12:50

## 2017-01-01 RX ADMIN — Medication 300 UNITS: at 20:50

## 2017-01-01 RX ADMIN — HYDROMORPHONE HYDROCHLORIDE 0.25 MG: 1 INJECTION, SOLUTION INTRAMUSCULAR; INTRAVENOUS; SUBCUTANEOUS at 00:00

## 2017-01-01 RX ADMIN — HALOPERIDOL LACTATE 2 MG: 5 INJECTION INTRAMUSCULAR at 04:59

## 2017-01-01 RX ADMIN — RIFAXIMIN 550 MG: 550 TABLET ORAL at 21:46

## 2017-01-01 RX ADMIN — PROPOFOL 50 MG: 10 INJECTION, EMULSION INTRAVENOUS at 16:45

## 2017-01-01 RX ADMIN — HEPARIN SODIUM 5000 UNITS: 5000 INJECTION, SOLUTION INTRAVENOUS; SUBCUTANEOUS at 17:09

## 2017-01-01 RX ADMIN — Medication 300 UNITS: at 21:25

## 2017-01-01 RX ADMIN — Medication 10 ML: at 16:56

## 2017-01-01 RX ADMIN — RIFAXIMIN 550 MG: 550 TABLET ORAL at 16:49

## 2017-01-01 RX ADMIN — FENTANYL CITRATE 25 MCG: 50 INJECTION, SOLUTION INTRAMUSCULAR; INTRAVENOUS at 17:00

## 2017-01-01 RX ADMIN — Medication 10 ML: at 17:12

## 2017-01-01 RX ADMIN — LACTULOSE 30 G: 10 SOLUTION ORAL at 08:06

## 2017-01-01 RX ADMIN — OXYCODONE HYDROCHLORIDE 5 MG: 5 TABLET ORAL at 00:42

## 2017-01-01 RX ADMIN — ALBUMIN (HUMAN) 25 G: 0.25 INJECTION, SOLUTION INTRAVENOUS at 13:08

## 2017-01-01 RX ADMIN — POTASSIUM CHLORIDE 20 MEQ: 20 TABLET, EXTENDED RELEASE ORAL at 17:54

## 2017-01-01 RX ADMIN — FENTANYL CITRATE 50 MCG: 50 INJECTION, SOLUTION INTRAMUSCULAR; INTRAVENOUS at 16:10

## 2017-01-01 RX ADMIN — RIFAXIMIN 550 MG: 550 TABLET ORAL at 08:34

## 2017-01-01 RX ADMIN — INSULIN LISPRO 2 UNITS: 100 INJECTION, SOLUTION INTRAVENOUS; SUBCUTANEOUS at 00:00

## 2017-01-01 RX ADMIN — CEFTRIAXONE 1 G: 1 INJECTION, POWDER, FOR SOLUTION INTRAMUSCULAR; INTRAVENOUS at 00:53

## 2017-01-01 RX ADMIN — PIPERACILLIN SODIUM,TAZOBACTAM SODIUM 4.5 G: 4; .5 INJECTION, POWDER, FOR SOLUTION INTRAVENOUS at 09:31

## 2017-01-01 RX ADMIN — MORPHINE SULFATE 10 MG: 10 INJECTION INTRAMUSCULAR; INTRAVENOUS; SUBCUTANEOUS at 06:35

## 2017-01-01 RX ADMIN — DIPHENHYDRAMINE HYDROCHLORIDE 12.5 MG: 50 INJECTION, SOLUTION INTRAMUSCULAR; INTRAVENOUS at 03:45

## 2017-01-01 RX ADMIN — MORPHINE SULFATE 15 MG: 15 TABLET, EXTENDED RELEASE ORAL at 21:19

## 2017-01-01 RX ADMIN — SODIUM CHLORIDE, PRESERVATIVE FREE 500 UNITS: 5 INJECTION INTRAVENOUS at 14:12

## 2017-01-01 RX ADMIN — Medication 300 UNITS: at 09:20

## 2017-01-01 RX ADMIN — TUBERCULIN PURIFIED PROTEIN DERIVATIVE 5 UNITS: 5 INJECTION INTRADERMAL at 15:02

## 2017-01-01 RX ADMIN — SODIUM CHLORIDE 1000 ML: 900 INJECTION, SOLUTION INTRAVENOUS at 04:50

## 2017-01-01 RX ADMIN — SODIUM CHLORIDE 75 ML/HR: 900 INJECTION, SOLUTION INTRAVENOUS at 16:21

## 2017-01-01 RX ADMIN — VANCOMYCIN HYDROCHLORIDE 500 MG: 1 INJECTION, POWDER, LYOPHILIZED, FOR SOLUTION INTRAVENOUS at 10:47

## 2017-01-01 RX ADMIN — RIFAXIMIN 550 MG: 550 TABLET ORAL at 18:52

## 2017-01-01 RX ADMIN — SODIUM CHLORIDE: 900 INJECTION, SOLUTION INTRAVENOUS at 16:57

## 2017-01-01 RX ADMIN — HALOPERIDOL LACTATE 2 MG: 5 INJECTION INTRAMUSCULAR at 07:00

## 2017-01-01 RX ADMIN — Medication 1 MG: at 03:07

## 2017-01-01 RX ADMIN — LACTULOSE 20 G: 20 SOLUTION ORAL at 10:33

## 2017-01-01 RX ADMIN — LACTULOSE 10 G: 20 SOLUTION ORAL at 08:36

## 2017-01-01 RX ADMIN — SODIUM CHLORIDE 12.5 MG: 9 INJECTION INTRAMUSCULAR; INTRAVENOUS; SUBCUTANEOUS at 12:58

## 2017-01-01 RX ADMIN — ERYTHROPOIETIN 10000 UNITS: 10000 INJECTION, SOLUTION INTRAVENOUS; SUBCUTANEOUS at 09:12

## 2017-01-01 RX ADMIN — SODIUM CHLORIDE 25 MG: 9 INJECTION INTRAMUSCULAR; INTRAVENOUS; SUBCUTANEOUS at 05:54

## 2017-01-01 RX ADMIN — HALOPERIDOL LACTATE 2 MG: 5 INJECTION INTRAMUSCULAR at 09:18

## 2017-01-01 RX ADMIN — Medication 10 ML: at 13:08

## 2017-01-01 RX ADMIN — LACTULOSE 20 G: 10 SOLUTION ORAL at 10:39

## 2017-01-01 RX ADMIN — Medication 5 ML: at 05:39

## 2017-01-01 RX ADMIN — SODIUM CHLORIDE 12.5 MG: 9 INJECTION INTRAMUSCULAR; INTRAVENOUS; SUBCUTANEOUS at 04:55

## 2017-01-01 RX ADMIN — RIFAXIMIN 550 MG: 550 TABLET ORAL at 22:22

## 2017-01-01 RX ADMIN — HYDROMORPHONE HYDROCHLORIDE 0.5 MG: 1 INJECTION, SOLUTION INTRAMUSCULAR; INTRAVENOUS; SUBCUTANEOUS at 08:57

## 2017-01-01 RX ADMIN — Medication 10 ML: at 23:12

## 2017-01-01 RX ADMIN — OXYCODONE HYDROCHLORIDE 5 MG: 5 TABLET ORAL at 02:30

## 2017-01-01 RX ADMIN — RIFAXIMIN 550 MG: 550 TABLET ORAL at 16:52

## 2017-01-01 RX ADMIN — HALOPERIDOL LACTATE 2 MG: 5 INJECTION INTRAMUSCULAR at 22:12

## 2017-01-01 RX ADMIN — SODIUM CHLORIDE, PRESERVATIVE FREE 300 UNITS: 5 INJECTION INTRAVENOUS at 11:55

## 2017-01-01 RX ADMIN — INSULIN LISPRO 2 UNITS: 100 INJECTION, SOLUTION INTRAVENOUS; SUBCUTANEOUS at 05:54

## 2017-01-01 RX ADMIN — ONDANSETRON 8 MG: 8 TABLET, ORALLY DISINTEGRATING ORAL at 21:56

## 2017-01-01 RX ADMIN — PIPERACILLIN SODIUM,TAZOBACTAM SODIUM 4.5 G: 4; .5 INJECTION, POWDER, FOR SOLUTION INTRAVENOUS at 10:13

## 2017-01-01 RX ADMIN — SODIUM CHLORIDE 12.5 MG: 9 INJECTION INTRAMUSCULAR; INTRAVENOUS; SUBCUTANEOUS at 16:55

## 2017-01-01 RX ADMIN — RIFAXIMIN 550 MG: 550 TABLET ORAL at 08:57

## 2017-01-01 RX ADMIN — Medication 10 ML: at 15:03

## 2017-01-01 RX ADMIN — SODIUM CHLORIDE 12.5 MG: 9 INJECTION INTRAMUSCULAR; INTRAVENOUS; SUBCUTANEOUS at 23:21

## 2017-01-01 RX ADMIN — ONDANSETRON 4 MG: 2 INJECTION INTRAMUSCULAR; INTRAVENOUS at 11:28

## 2017-01-01 RX ADMIN — SODIUM CHLORIDE 12.5 MG: 9 INJECTION INTRAMUSCULAR; INTRAVENOUS; SUBCUTANEOUS at 22:49

## 2017-01-01 RX ADMIN — HYDROMORPHONE HYDROCHLORIDE 0.25 MG: 1 INJECTION, SOLUTION INTRAMUSCULAR; INTRAVENOUS; SUBCUTANEOUS at 02:25

## 2017-01-01 RX ADMIN — SODIUM CHLORIDE, PRESERVATIVE FREE 300 UNITS: 5 INJECTION INTRAVENOUS at 12:14

## 2017-01-01 RX ADMIN — SODIUM CHLORIDE 1000 ML: 900 INJECTION, SOLUTION INTRAVENOUS at 14:02

## 2017-01-01 RX ADMIN — LACTULOSE 20 G: 20 SOLUTION ORAL at 05:49

## 2017-01-01 RX ADMIN — HYDROMORPHONE HYDROCHLORIDE 0.25 MG: 1 INJECTION, SOLUTION INTRAMUSCULAR; INTRAVENOUS; SUBCUTANEOUS at 13:17

## 2017-01-01 RX ADMIN — POTASSIUM CHLORIDE 20 MEQ: 20 TABLET, EXTENDED RELEASE ORAL at 10:20

## 2017-01-01 RX ADMIN — MIDODRINE HYDROCHLORIDE 10 MG: 5 TABLET ORAL at 18:00

## 2017-01-01 RX ADMIN — DEXTROSE MONOHYDRATE 25 G: 25 INJECTION, SOLUTION INTRAVENOUS at 11:37

## 2017-01-01 RX ADMIN — LORAZEPAM 1 MG: 2 INJECTION, SOLUTION INTRAMUSCULAR; INTRAVENOUS at 03:10

## 2017-01-01 RX ADMIN — RIFAXIMIN 550 MG: 550 TABLET ORAL at 18:16

## 2017-01-01 RX ADMIN — METOCLOPRAMIDE 5 MG: 5 INJECTION, SOLUTION INTRAMUSCULAR; INTRAVENOUS at 10:20

## 2017-01-01 RX ADMIN — METOCLOPRAMIDE HYDROCHLORIDE 10 MG: 10 TABLET ORAL at 23:14

## 2017-01-01 RX ADMIN — SODIUM CHLORIDE 25 MG: 9 INJECTION INTRAMUSCULAR; INTRAVENOUS; SUBCUTANEOUS at 02:38

## 2017-01-01 RX ADMIN — HALOPERIDOL LACTATE 2 MG: 5 INJECTION INTRAMUSCULAR at 11:01

## 2017-01-01 RX ADMIN — LACTULOSE 200 G: 10 SOLUTION ORAL; RECTAL at 09:00

## 2017-01-01 RX ADMIN — RIFAXIMIN 550 MG: 550 TABLET ORAL at 10:33

## 2017-01-01 RX ADMIN — ONDANSETRON 4 MG: 2 INJECTION INTRAMUSCULAR; INTRAVENOUS at 08:39

## 2017-01-01 RX ADMIN — ACETAMINOPHEN 650 MG: 325 TABLET, FILM COATED ORAL at 23:26

## 2017-01-01 RX ADMIN — SODIUM CHLORIDE, PRESERVATIVE FREE 600 UNITS: 5 INJECTION INTRAVENOUS at 10:50

## 2017-01-01 RX ADMIN — Medication 400 MG: at 17:33

## 2017-01-01 RX ADMIN — ALBUMIN (HUMAN) 25 G: 0.25 INJECTION, SOLUTION INTRAVENOUS at 11:08

## 2017-01-01 RX ADMIN — ONDANSETRON 4 MG: 2 INJECTION INTRAMUSCULAR; INTRAVENOUS at 17:54

## 2017-01-01 RX ADMIN — Medication 400 MG: at 09:46

## 2017-01-01 RX ADMIN — LORAZEPAM 0.5 MG: 2 INJECTION INTRAMUSCULAR; INTRAVENOUS at 22:53

## 2017-01-01 RX ADMIN — Medication 300 UNITS: at 20:32

## 2017-01-01 RX ADMIN — SODIUM CHLORIDE, PRESERVATIVE FREE 300 UNITS: 5 INJECTION INTRAVENOUS at 14:14

## 2017-01-01 RX ADMIN — SODIUM CHLORIDE, PRESERVATIVE FREE 10 ML: 5 INJECTION INTRAVENOUS at 17:59

## 2017-01-01 RX ADMIN — RIFAXIMIN 550 MG: 550 TABLET ORAL at 15:39

## 2017-01-01 RX ADMIN — HYDROMORPHONE HYDROCHLORIDE 0.25 MG: 1 INJECTION, SOLUTION INTRAMUSCULAR; INTRAVENOUS; SUBCUTANEOUS at 22:11

## 2017-01-01 RX ADMIN — LACTULOSE 20 G: 10 SOLUTION ORAL at 16:26

## 2017-01-01 RX ADMIN — Medication 20 ML: at 09:38

## 2017-01-01 RX ADMIN — PROTAMINE SULFATE 20 MG: 10 INJECTION, SOLUTION INTRAVENOUS at 18:01

## 2017-01-01 RX ADMIN — LACTULOSE 20 G: 10 SOLUTION ORAL at 17:33

## 2017-01-01 RX ADMIN — SODIUM CHLORIDE, PRESERVATIVE FREE 300 UNITS: 5 INJECTION INTRAVENOUS at 14:13

## 2017-01-01 RX ADMIN — Medication 20 ML: at 11:20

## 2017-01-01 RX ADMIN — ALBUMIN (HUMAN) 25 G: 0.25 INJECTION, SOLUTION INTRAVENOUS at 13:23

## 2017-01-01 RX ADMIN — MIDODRINE HYDROCHLORIDE 10 MG: 5 TABLET ORAL at 12:29

## 2017-01-01 RX ADMIN — VANCOMYCIN HYDROCHLORIDE 1000 MG: 1 INJECTION, POWDER, LYOPHILIZED, FOR SOLUTION INTRAVENOUS at 16:48

## 2017-01-01 RX ADMIN — LACTULOSE 30 G: 20 SOLUTION ORAL at 11:47

## 2017-01-01 RX ADMIN — Medication 10 ML: at 09:20

## 2017-01-01 RX ADMIN — PANTOPRAZOLE SODIUM 40 MG: 40 TABLET, DELAYED RELEASE ORAL at 16:41

## 2017-01-01 RX ADMIN — Medication 600 UNITS: at 12:44

## 2017-01-01 RX ADMIN — Medication 10 ML: at 09:00

## 2017-01-01 RX ADMIN — LACTULOSE 30 G: 10 SOLUTION ORAL at 16:52

## 2017-01-01 RX ADMIN — MIDODRINE HYDROCHLORIDE 5 MG: 5 TABLET ORAL at 17:28

## 2017-01-01 RX ADMIN — HYDROMORPHONE HYDROCHLORIDE 0.5 MG: 1 INJECTION, SOLUTION INTRAMUSCULAR; INTRAVENOUS; SUBCUTANEOUS at 02:36

## 2017-01-01 RX ADMIN — CYCLOSPORINE 25 MG: 25 CAPSULE, LIQUID FILLED ORAL at 07:15

## 2017-01-01 RX ADMIN — HEPARIN SODIUM 5000 UNITS: 5000 INJECTION, SOLUTION INTRAVENOUS; SUBCUTANEOUS at 05:50

## 2017-01-01 RX ADMIN — PANTOPRAZOLE SODIUM 40 MG: 40 TABLET, DELAYED RELEASE ORAL at 09:17

## 2017-01-01 RX ADMIN — SODIUM CHLORIDE 25 MG: 9 INJECTION INTRAMUSCULAR; INTRAVENOUS; SUBCUTANEOUS at 20:49

## 2017-01-01 RX ADMIN — PANTOPRAZOLE SODIUM 40 MG: 40 TABLET, DELAYED RELEASE ORAL at 17:33

## 2017-01-01 RX ADMIN — HYDROMORPHONE HYDROCHLORIDE 0.25 MG: 1 INJECTION, SOLUTION INTRAMUSCULAR; INTRAVENOUS; SUBCUTANEOUS at 09:42

## 2017-01-01 RX ADMIN — Medication 20 ML: at 10:10

## 2017-01-01 RX ADMIN — HYDROMORPHONE HYDROCHLORIDE 0.5 MG: 1 INJECTION, SOLUTION INTRAMUSCULAR; INTRAVENOUS; SUBCUTANEOUS at 03:10

## 2017-01-01 RX ADMIN — DIPHENHYDRAMINE HYDROCHLORIDE 12.5 MG: 50 INJECTION, SOLUTION INTRAMUSCULAR; INTRAVENOUS at 21:18

## 2017-01-01 RX ADMIN — SODIUM CHLORIDE, PRESERVATIVE FREE 10 ML: 5 INJECTION INTRAVENOUS at 16:43

## 2017-01-01 RX ADMIN — INSULIN LISPRO 4 UNITS: 100 INJECTION, SOLUTION INTRAVENOUS; SUBCUTANEOUS at 22:00

## 2017-01-01 RX ADMIN — Medication 10 ML: at 12:07

## 2017-01-01 RX ADMIN — SODIUM CHLORIDE 25 ML/HR: 900 INJECTION, SOLUTION INTRAVENOUS at 09:36

## 2017-01-01 RX ADMIN — CHLORHEXIDINE GLUCONATE 15 ML: 1.2 RINSE ORAL at 17:41

## 2017-01-01 RX ADMIN — SODIUM CHLORIDE, PRESERVATIVE FREE 300 UNITS: 5 INJECTION INTRAVENOUS at 17:29

## 2017-01-01 RX ADMIN — MORPHINE SULFATE 15 MG: 15 TABLET, EXTENDED RELEASE ORAL at 15:54

## 2017-01-01 RX ADMIN — CEFTRIAXONE 2 G: 2 INJECTION, POWDER, FOR SOLUTION INTRAMUSCULAR; INTRAVENOUS at 18:16

## 2017-01-01 RX ADMIN — SODIUM CHLORIDE 50 ML/HR: 900 INJECTION, SOLUTION INTRAVENOUS at 21:05

## 2017-01-01 RX ADMIN — SODIUM CHLORIDE 25 MG: 9 INJECTION INTRAMUSCULAR; INTRAVENOUS; SUBCUTANEOUS at 01:22

## 2017-01-01 RX ADMIN — ONDANSETRON 4 MG: 2 INJECTION INTRAMUSCULAR; INTRAVENOUS at 07:36

## 2017-01-01 RX ADMIN — Medication 20 ML: at 08:55

## 2017-01-01 RX ADMIN — Medication 5 ML: at 05:20

## 2017-01-01 RX ADMIN — PANTOPRAZOLE SODIUM 40 MG: 40 TABLET, DELAYED RELEASE ORAL at 05:53

## 2017-01-01 RX ADMIN — Medication 600 UNITS: at 21:29

## 2017-01-01 RX ADMIN — POTASSIUM CHLORIDE 20 MEQ: 14.9 INJECTION, SOLUTION INTRAVENOUS at 05:47

## 2017-01-01 RX ADMIN — LACTULOSE 20 G: 10 SOLUTION ORAL at 18:00

## 2017-01-01 RX ADMIN — HYDROMORPHONE HYDROCHLORIDE 0.25 MG: 1 INJECTION, SOLUTION INTRAMUSCULAR; INTRAVENOUS; SUBCUTANEOUS at 18:03

## 2017-01-01 RX ADMIN — PROPOFOL 120 MG: 10 INJECTION, EMULSION INTRAVENOUS at 17:02

## 2017-01-01 RX ADMIN — Medication 10 ML: at 10:40

## 2017-01-01 RX ADMIN — Medication 10 ML: at 06:24

## 2017-01-01 RX ADMIN — METOCLOPRAMIDE 5 MG: 5 INJECTION, SOLUTION INTRAMUSCULAR; INTRAVENOUS at 17:31

## 2017-01-01 RX ADMIN — Medication 10 ML: at 05:18

## 2017-01-01 RX ADMIN — Medication 10 ML: at 21:24

## 2017-01-01 RX ADMIN — Medication 300 UNITS: at 21:53

## 2017-01-01 RX ADMIN — HALOPERIDOL LACTATE 2 MG: 5 INJECTION INTRAMUSCULAR at 09:42

## 2017-01-01 RX ADMIN — PANTOPRAZOLE SODIUM 40 MG: 40 TABLET, DELAYED RELEASE ORAL at 08:23

## 2017-01-01 RX ADMIN — ONDANSETRON 8 MG: 8 TABLET, ORALLY DISINTEGRATING ORAL at 09:16

## 2017-01-01 RX ADMIN — HALOPERIDOL LACTATE 2 MG: 5 INJECTION INTRAMUSCULAR at 12:34

## 2017-01-01 RX ADMIN — HALOPERIDOL LACTATE 2 MG: 5 INJECTION INTRAMUSCULAR at 17:01

## 2017-01-01 RX ADMIN — Medication 300 UNITS: at 20:58

## 2017-01-01 RX ADMIN — LACTULOSE 20 G: 20 SOLUTION ORAL at 08:57

## 2017-01-01 RX ADMIN — RIFAXIMIN 550 MG: 550 TABLET ORAL at 22:33

## 2017-01-01 RX ADMIN — HYDROMORPHONE HYDROCHLORIDE 0.25 MG: 1 INJECTION, SOLUTION INTRAMUSCULAR; INTRAVENOUS; SUBCUTANEOUS at 23:10

## 2017-01-01 RX ADMIN — Medication 10 ML: at 21:12

## 2017-01-01 RX ADMIN — PANTOPRAZOLE SODIUM 40 MG: 40 TABLET, DELAYED RELEASE ORAL at 16:30

## 2017-01-01 RX ADMIN — HYDROMORPHONE HYDROCHLORIDE 0.5 MG: 1 INJECTION, SOLUTION INTRAMUSCULAR; INTRAVENOUS; SUBCUTANEOUS at 15:01

## 2017-01-01 RX ADMIN — RIFAXIMIN 550 MG: 550 TABLET ORAL at 17:23

## 2017-01-01 RX ADMIN — METOCLOPRAMIDE HYDROCHLORIDE 10 MG: 10 TABLET ORAL at 21:40

## 2017-01-01 RX ADMIN — AMOXICILLIN 500 MG: 500 CAPSULE ORAL at 16:26

## 2017-01-01 RX ADMIN — HYDROMORPHONE HYDROCHLORIDE 0.25 MG: 1 INJECTION, SOLUTION INTRAMUSCULAR; INTRAVENOUS; SUBCUTANEOUS at 00:42

## 2017-01-01 RX ADMIN — HYDROMORPHONE HYDROCHLORIDE 0.25 MG: 1 INJECTION, SOLUTION INTRAMUSCULAR; INTRAVENOUS; SUBCUTANEOUS at 23:14

## 2017-01-03 NOTE — ED TRIAGE NOTES
Pt had a paracentesis this am, was called to return for further treatment. Per family, the pt has increased confusion.

## 2017-01-03 NOTE — PROCEDURES
Interventional Radiology Brief Procedure Note    Patient: Yudith Ramos MRN: [id number]  SSN: [id number]    YOB: 1970  Age: 55 y.o. Sex: female      Date of Procedure: 1/3/2017    Pre-Procedure Diagnosis: Recurrent ascites. Presumably occluded TIPS.       Post-Procedure Diagnosis: SAME    Procedure(s): Paracentesis    Brief Description of Procedure: as above    Performed By: Meet Jiménez MD     Assistants: None    Anesthesia: None    Estimated Blood Loss: Less than 10ml    Specimens: ascites    Implants: None    Findings: ascites    Complications: None    Recommendations: none     Follow Up: none    Signed By: Meet Jiménez MD     January 3, 2017

## 2017-01-03 NOTE — ED PROVIDER NOTES
HPI Comments: 80-year-old lady with a history of liver disease status post 2 liver transplants who is also on dialysis who presents with concerns about encephalopathy and a paracentesis culture result that was positive for VRE. Patient's sister says that she has become progressively more altered as the day has gone on. The sister says that the patient is supposed to be on 2 different medicines for her ammonia level. One is lactulose and one is a pill that is supposed to help the lactulose. The sister says that pill costs $2000 a month and the family has not been able to afford it. Elements of this note were created using speech recognition software. As such, errors of speech recognition may be present. Patient is a 55 y.o. female presenting with altered mental status. The history is provided by a relative.    Altered mental status           Past Medical History:   Diagnosis Date    SEAN (acute kidney injury) ([facility] [address] 6/26/2016    Arthritis      kath feet    Ascites     Benign neoplasm of skin of trunk, except scrotum      pt denies    Cellulitis and abscess of unspecified digit      hx of    Chronic kidney disease      [facility] on Mon/Wed/Fri    Chronic pain      abd area    Congenital hepatic fibrosis      Liver transplant X2    Esophageal varices (HCC)     GERD (gastroesophageal reflux disease)     Hemodialysis access, AV graft ([facility] [address] 11/22/2016    Hepatic encephalopathy ([facility] [address] 10/2016     recently hospitalized for hepatic encephalopathy    Hepatitis C      hx of hepatitis C-- dx after first liver transplant from a transfusion   (first transplant age 13)   Mar History of gastric ulcer     Insomnia 07/13/2015     no current meds    Liver transplant status ([address] 1986 and 1999     X2- congential hepatic fibrosis    Pain in joint, ankle and foot     PICC (peripherally inserted central catheter) in place     PONV (postoperative nausea and vomiting)      some N/V, pt states she does well with Phenergan    Port-a-cath in place      R chest for HD    Raynaud disease     Spleen enlarged     Tachycardia     Thrombocytopenia (HCC)     Type 2 diabetes mellitus (HCC)      insulin only/AVG /s.s of hypoglycemia 30's/Last A1c 5.6    Vasculitis ([address]      resolved       Past Surgical History:   Procedure Laterality Date    Pr lap,tubal cautery      Hx colonoscopy      Hx transplant  5648,92     2 liver - first one for congenital hepatic fibrosis, 2nd for Hep C cirrhosis    Hx cholecystectomy  1984    Hx tubal ligation      Hx other surgical       biliary reconstructive surgery    Hx other surgical  2013     EGD    Hx other surgical  03/2016     tips procedure    Hx other surgical       paracentesis    Vascular surgery procedure unlist Left 11/02/2016     thigh AVG insertion in thigh         Family History:   Problem Relation Age of Onset    Diabetes Mother     Heart Disease Mother     Hypertension Mother     Heart Disease Father     Stroke Father     Cancer Maternal Grandfather      liver cancer, alcoholism    No Known Problems Sister     Cancer Maternal Aunt      cervical cancer    Breast Cancer Paternal Grandmother      early 45s    Colon Cancer Paternal Grandmother      late 76s    Cancer Other      maternal niece- cervical cancer    Bipolar Disorder Brother     Heart Disease Brother        Social History     Social History    Marital status:      Spouse name: N/A    Number of children: N/A    Years of education: N/A     Occupational History    Not on file. Social History Main Topics    Smoking status: Never Smoker    Smokeless tobacco: Never Used    Alcohol use No    Drug use: No    Sexual activity: Yes     Partners: Male     Birth control/ protection: Surgical      Comment: Tubal Ligation     Other Topics Concern    Not on file     Social History Narrative         ALLERGIES: Asa-acetaminophen-caff-potass; Cephalosporins; Codeine;  Compazine [prochlorperazine edisylate]; and Pcn [penicillins]    Review of Systems   Unable to perform ROS: Mental status change       Vitals:    01/03/17 1649   BP: 109/65   Pulse: (!) 118   Resp: 16   Temp: 97.7 °F (36.5 °C)   SpO2: 100%            Physical Exam   Constitutional:   Frail lady   HENT:   Head: Normocephalic and atraumatic. Eyes: Scleral icterus is present. Neck: No JVD present. No tracheal deviation present. Cardiovascular: Normal rate and normal heart sounds. Pulmonary/Chest: No respiratory distress. She has no wheezes. She has no rales. Abdominal: She exhibits distension. There is no tenderness. There is no rebound. Distended with ascites   Neurological:   Patient is alert but she is confused. She does not know where she has her what time it is. Skin:   Significant jaundice   Nursing note and vitals reviewed. MDM  Number of Diagnoses or Management Options  Diagnosis management comments: I spoke with Dr. Hasmukh Olivia from infectious disease who advised 6 mg/kg of daptomycin given every 48 hours.     ED Course       Procedures

## 2017-01-03 NOTE — DISCHARGE INSTRUCTIONS
[address]  Department of Interventional Radiology  [address] [place] [address] [address] [facility]  [phone] Office  [phone] Fax    PARACENTESIS DISCHARGE INSTRUCTIONS    General Information:  During this procedure, the doctor will insert a needle into the abdomen to drain fluid. After the procedure, you will be able to take a deep breath much easier. The site of the puncture may ooze the first day. This will decrease and eventually stop. Paracentesis (draining fluid from the abdomen) sometimes makes patients hypotensive (low blood pressure). Your doctor may order for you to receive fluids or albumin (a volume booster) during the procedure through an IV site. Home Care Instructions:  Keep the puncture site clean and dry. No tub baths or swimming until puncture site heals. Showering is acceptable. Resume your normal diet, and resume your normal activity slowly and as you tolerate. If you are short of breath, rest. If shortness of breath does not ease, please call your ordering doctor. Fluid can re-accumulate in the chest and/or in the abdomen. If this should occur, your doctor needs to know as you may need to have the procedure done again. Call If:     You should call your Physician and/or the Radiology Nurse if you notice any signs of infection, like pus draining, or if it is swollen or reddened. Also call if you have a fever, or if you are bleeding from the puncture site more than a small amount on the dressing. Call if the puncture site keeps draining fluid. Some oozing is to be expected, but should slow and then stop. Call if you feel like you have pressure in your abdomen. SEEK IMMEDIATE CARE OR CALL 911 IF YOU SUDDENLY HAVE TROUBLE BREATHING, OR IF YOUR LIPS TURN BLUE, OR IF YOU NOTICE BLOOD IN YOUR SPUTUM. Follow-Up Instructions: Please see your ordering doctor as he/she has requested. To Reach Us:       If you have any questions about your procedure, please call the Interventional Radiology department at [phone]. After business hours (5pm) and weekends, call the answering service at [phone] and ask for the Radiologist on call to be paged. dolores Epps. Interventional Radiology General Nurse Discharge    After general anesthesia or intravenous sedation, for 24 hours or while taking prescription Narcotics:  · Limit your activities  · Do not drive and operate hazardous machinery  · Do not make important personal or business decisions  · Do  not drink alcoholic beverages  · If you have not urinated within 8 hours after discharge, please contact your surgeon on call. * Please give a list of your current medications to your Primary Care Provider. * Please update this list whenever your medications are discontinued, doses are     changed, or new medications (including over-the-counter products) are added. * Please carry medication information at all times in case of emergency situations. These are general instructions for a healthy lifestyle:    No smoking/ No tobacco products/ Avoid exposure to second hand smoke  Surgeon General's Warning:  Quitting smoking now greatly reduces serious risk to your health. Obesity, smoking, and sedentary lifestyle greatly increases your risk for illness  A healthy diet, regular physical exercise & weight monitoring are important for maintaining a healthy lifestyle    You may be retaining fluid if you have a history of heart failure or if you experience any of the following symptoms:  Weight gain of 3 pounds or more overnight or 5 pounds in a week, increased swelling in our hands or feet or shortness of breath while lying flat in bed. Please call your doctor as soon as you notice any of these symptoms; do not wait until your next office visit.     Recognize signs and symptoms of STROKE:  F-face looks uneven    A-arms unable to move or move unevenly    S-speech slurred or non-existent    T-time-call 911 as soon as signs and symptoms begin-DO NOT go       Back to bed or wait to see if you get better-TIME IS BRAIN. Date: 1/3/2017  Discharging Nurse:  Michael Walker RN

## 2017-01-03 NOTE — IP AVS SNAPSHOT
Demian Shed 
 
 
 [address] [facility] 
[phone] Patient: Samia Crews MRN: [id number] HAD:[id number] You are allergic to the following Allergen Reactions Asa-Acetaminophen-Caff-Potass Other (comments) Only aspirin----\" not to take due to stomach issues Cephalosporins Unknown (comments) Pt denies allergy, states she has taken cephalosporins and did well. Codeine Nausea Only Compazine (Prochlorperazine Edisylate) Unknown (comments) Causes Lock Jaw Pcn (Penicillins) Other (comments) \"drops wbc\" Recent Documentation Breastfeeding? OB Status Smoking Status No Postmenopausal Never Smoker Emergency Contacts Name Discharge Info Relation Home Work Mobile SheylaTriptrottinglucie [address] CAREGIVER [3] Spouse [3] [phone] About your hospitalization You were admitted on:  January 3, 2017 You last received care in the:  [facility] RADIOLOGY ULTRASOUND You were discharged on:  January 3, 2017 Unit phone number:  [phone] Why you were hospitalized Your primary diagnosis was:  Not on File Providers Seen During Your Hospitalizations Provider Role Specialty Primary office phone Mark Daniel MD Attending Provider Gastroenterology [phone] Your Primary Care Physician (PCP) Primary Care Physician Office Phone Office Fax [facility] [address] [phone] Follow-up Information None Your Appointments Friday January 06, 2017  2:30 PM EST  
US GUIDED PARACENTISIS INIT with [facility] US LOGIC 7 UNIT 2, [facility] NURSING, [facility] IR RADIOLOGIST RESOURCE  
[facility] RADIOLOGY ULTRASOUND ([address] [place] [address]) [address] [facility]  
[phone]  IODINE/CONTRAST ALLERGY - Must be Pre-medicated - Fax Allergy protocol to MD office(not patient) - Schedule appointment at least 48 hours after call - All medication holds must be approved (cleared) by the physician who ordered the medication. This medication hold is recommended for all invasive procedures except vascular arteriograms - It is recommended to hold all blood thinners (antiplatelet) medication such as Aspirin, Plavix, Brilinta, and Coumadin for 5 days prior to procedure - Effient  should be held for 7 days prior to the procedure - Hold Metformin the day of the procedure and 48 hours post procedure if receiving contrast - Insulin dosage: If Patient NPO, they should administer only 1/2 of their normal dose the morning of the procedure  H&P - Fax current H&P (within 30 days) to IR Scheduling  Protocols - For patients requiring Sedation: Patient must be NPO - Lab work: To be completed within 14 days of procedure for all Invasive procedures o CBC o BMP o PTT o PT/INR Tuesday January 10, 2017  9:30 AM EST  
US GUIDED PARACENTISIS INIT with [facility] US LOGIC 7 UNIT 2, SFD NURSING, D IR RADIOLOGIST RESOURCE  
D RADIOLOGY ULTRASOUND ([address] [place] [address]) [address] [facility] 322 [facility]  
[phone] IODINE/CONTRAST ALLERGY - Must be Pre-medicated - Fax Allergy protocol to MD office(not patient) - Schedule appointment at least 48 hours after call - All medication holds must be approved (cleared) by the physician who ordered the medication.  This medication hold is recommended for all invasive procedures except vascular arteriograms - It is recommended to hold all blood thinners (antiplatelet) medication such as Aspirin, Plavix, Brilinta, and Coumadin for 5 days prior to procedure - Effient  should be held for 7 days prior to the procedure - Hold Metformin the day of the procedure and 48 hours post procedure if receiving contrast - Insulin dosage: If Patient NPO, they should administer only 1/2 of their normal dose the morning of the procedure  H&P - Fax current H&P (within 30 days) to IR Scheduling  Protocols - For patients requiring Sedation: Patient must be NPO - Lab work: To be completed within 14 days of procedure for all Invasive procedures o CBC o BMP o PTT o PT/INR Friday January 13, 2017  1:00 PM EST  
US GUIDED PARACENTISIS INIT with [facility] [employer] LOGIC 7 UNIT 2, D NURSING, D IR RADIOLOGIST RESOURCE  
[facility] RADIOLOGY ULTRASOUND ([address] [place] [address]  
[phone] IODINE/CONTRAST ALLERGY - Must be Pre-medicated - Fax Allergy protocol to MD office(not patient) - Schedule appointment at least 48 hours after call - All medication holds must be approved (cleared) by the physician who ordered the medication. This medication hold is recommended for all invasive procedures except vascular arteriograms - It is recommended to hold all blood thinners (antiplatelet) medication such as Aspirin, Plavix, Brilinta, and Coumadin for 5 days prior to procedure - Effient  should be held for 7 days prior to the procedure - Hold Metformin the day of the procedure and 48 hours post procedure if receiving contrast - Insulin dosage: If Patient NPO, they should administer only 1/2 of their normal dose the morning of the procedure  H&P - Fax current H&P (within 30 days) to IR Scheduling  Protocols - For patients requiring Sedation: Patient must be NPO - Lab work: To be completed within 14 days of procedure for all Invasive procedures o CBC o BMP o PTT o PT/INR Tuesday January 17, 2017  8:30 AM EST  
US GUIDED PARACENTISIS INIT with [facility] [employer] 7 UNIT 2, D NURSING, D IR RADIOLOGIST RESOURCE  
[facility] RADIOLOGY ULTRASOUND ([address] [place] [address]) [address] [place] [address]  
[phone]  IODINE/CONTRAST ALLERGY - Must be Pre-medicated - Fax Allergy protocol to MD office(not patient) - Schedule appointment at least 48 hours after call - All medication holds must be approved (cleared) by the physician who ordered the medication. This medication hold is recommended for all invasive procedures except vascular arteriograms - It is recommended to hold all blood thinners (antiplatelet) medication such as Aspirin, Plavix, Brilinta, and Coumadin for 5 days prior to procedure - Effient  should be held for 7 days prior to the procedure - Hold Metformin the day of the procedure and 48 hours post procedure if receiving contrast - Insulin dosage: If Patient NPO, they should administer only 1/2 of their normal dose the morning of the procedure  H&P - Fax current H&P (within 30 days) to IR Scheduling  Protocols - For patients requiring Sedation: Patient must be NPO - Lab work: To be completed within 14 days of procedure for all Invasive procedures o CBC o BMP o PTT o PT/INR Friday January 20, 2017  8:30 AM EST  
US GUIDED PARACENTISIS INIT with [facility] US LOGIC 7 UNIT 2, [facility] NURSING, D IR RADIOLOGIST RESOURCE  
D RADIOLOGY ULTRASOUND ([address] [place] [address]) [address] [facility] [address] [facility]  
[phone] IODINE/CONTRAST ALLERGY - Must be Pre-medicated - Fax Allergy protocol to MD office(not patient) - Schedule appointment at least 48 hours after call - All medication holds must be approved (cleared) by the physician who ordered the medication.  This medication hold is recommended for all invasive procedures except vascular arteriograms - It is recommended to hold all blood thinners (antiplatelet) medication such as Aspirin, Plavix, Brilinta, and Coumadin for 5 days prior to procedure - Effient  should be held for 7 days prior to the procedure - Hold Metformin the day of the procedure and 48 hours post procedure if receiving contrast - Insulin dosage: If Patient NPO, they should administer only 1/2 of their normal dose the morning of the procedure  H&P - Fax current H&P (within 30 days) to IR Scheduling  Protocols - For patients requiring Sedation: Patient must be NPO - Lab work: To be completed within 14 days of procedure for all Invasive procedures o CBC o BMP o PTT o PT/INR Tuesday January 24, 2017  8:30 AM EST  
US GUIDED PARACENTISIS INIT with [facility] [employer] LOGIC 7 UNIT 2, D NURSING, D IR RADIOLOGIST RESOURCE  
[facility] RADIOLOGY ULTRASOUND ([address] [place] [address] [address]  
[phone] IODINE/CONTRAST ALLERGY - Must be Pre-medicated - Fax Allergy protocol to MD office(not patient) - Schedule appointment at least 48 hours after call - All medication holds must be approved (cleared) by the physician who ordered the medication. This medication hold is recommended for all invasive procedures except vascular arteriograms - It is recommended to hold all blood thinners (antiplatelet) medication such as Aspirin, Plavix, Brilinta, and Coumadin for 5 days prior to procedure - Effient  should be held for 7 days prior to the procedure - Hold Metformin the day of the procedure and 48 hours post procedure if receiving contrast - Insulin dosage: If Patient NPO, they should administer only 1/2 of their normal dose the morning of the procedure  H&P - Fax current H&P (within 30 days) to IR Scheduling  Protocols - For patients requiring Sedation: Patient must be NPO - Lab work: To be completed within 14 days of procedure for all Invasive procedures o CBC o BMP o PTT o PT/INR Friday January 27, 2017  8:30 AM EST  
US GUIDED PARACENTISIS INIT with [facility] [employer] 7 UNIT 2, D NURSING, D IR RADIOLOGIST RESOURCE  
[facility] RADIOLOGY ULTRASOUND ([address] [place] [address]) [address]  
[phone]  IODINE/CONTRAST ALLERGY - Must be Pre-medicated - Fax Allergy protocol to MD office(not patient) - Schedule appointment at least 48 hours after call - All medication holds must be approved (cleared) by the physician who ordered the medication. This medication hold is recommended for all invasive procedures except vascular arteriograms - It is recommended to hold all blood thinners (antiplatelet) medication such as Aspirin, Plavix, Brilinta, and Coumadin for 5 days prior to procedure - Effient  should be held for 7 days prior to the procedure - Hold Metformin the day of the procedure and 48 hours post procedure if receiving contrast - Insulin dosage: If Patient NPO, they should administer only 1/2 of their normal dose the morning of the procedure  H&P - Fax current H&P (within 30 days) to IR Scheduling  Protocols - For patients requiring Sedation: Patient must be NPO - Lab work: To be completed within 14 days of procedure for all Invasive procedures o CBC o BMP o PTT o PT/INR Tuesday January 31, 2017  8:30 AM EST  
US GUIDED PARACENTISIS INIT with [facility] US LOGIC 7 UNIT 2, [facility] NURSING, D IR RADIOLOGIST RESOURCE  
D RADIOLOGY ULTRASOUND ([address] [place] [address]) [address] [place] [address]  
[phone] IODINE/CONTRAST ALLERGY - Must be Pre-medicated - Fax Allergy protocol to MD office(not patient) - Schedule appointment at least 48 hours after call - All medication holds must be approved (cleared) by the physician who ordered the medication.  This medication hold is recommended for all invasive procedures except vascular arteriograms - It is recommended to hold all blood thinners (antiplatelet) medication such as Aspirin, Plavix, Brilinta, and Coumadin for 5 days prior to procedure - Effient  should be held for 7 days prior to the procedure - Hold Metformin the day of the procedure and 48 hours post procedure if receiving contrast - Insulin dosage: If Patient NPO, they should administer only 1/2 of their normal dose the morning of the procedure  H&P - Fax current H&P (within 30 days) to IR Scheduling  Protocols - For patients requiring Sedation: Patient must be NPO - Lab work: To be completed within 14 days of procedure for all Invasive procedures o CBC o BMP o PTT o PT/INR Current Discharge Medication List  
  
ASK your doctor about these medications Dose & Instructions Dispensing Information Comments Morning Noon Evening Bedtime  
 baclofen 10 mg tablet Commonly known as:  LIORESAL Your next dose is: Today, Tomorrow Other:  _________ Dose:  10 mg Take 10 mg by mouth as needed. Refills:  0  
     
   
   
   
  
 CIPRO 250 mg tablet Generic drug:  ciprofloxacin HCl Your next dose is: Today, Tomorrow Other:  _________ Dose:  250 mg Take 250 mg by mouth every morning. Refills:  0 GENGRAF 25 mg capsule Generic drug:  cycloSPORINE modified Your next dose is: Today, Tomorrow Other:  _________ Dose:  2.5 mg/kg/day Take 2.5 mg/kg/day by mouth every morning. Indications: Takes in the AM  
 Refills:  0 LACTULOSE PO Your next dose is: Today, Tomorrow Other:  _________ Dose:  30 mL Take 30 mL by mouth two (2) times a day. Refills:  0  
     
   
   
   
  
 LANTUS SOLOSTAR 100 unit/mL (3 mL) pen Generic drug:  insulin glargine Your next dose is: Today, Tomorrow Other:  _________ Dose:  25 Units 25 Units by SubCUTAneous route nightly. Refills:  0  
     
   
   
   
  
 magnesium oxide 400 mg tablet Commonly known as:  MAG-OX Your next dose is: Today, Tomorrow Other:  _________ Dose:  400 mg Take 400 mg by mouth two (2) times a day. Refills:  0  
     
   
   
   
  
 midodrine 5 mg tablet Commonly known as:  Pao Lim Your next dose is: Today, Tomorrow Other:  _________ Dose:  5 mg Take 5 mg by mouth every eight (8) hours. 2 q8h Refills:  0 NovoLOG 100 unit/mL injection Generic drug:  insulin aspart Your next dose is: Today, Tomorrow Other:  _________  
   
   
 by SubCUTAneous route. Sliding scale Refills:  0  
     
   
   
   
  
 ondansetron hcl 4 mg tablet Commonly known as:  Lurlean Precise Your next dose is: Today, Tomorrow Other:  _________ Dose:  4 mg Take 1 Tab by mouth every eight (8) hours as needed for Nausea. Quantity:  30 Tab Refills:  0 PROTONIX 40 mg tablet Generic drug:  pantoprazole Your next dose is: Today, Tomorrow Other:  _________ Dose:  40 mg Take 40 mg by mouth four (4) times daily. 2tabs bid Refills:  0  
     
   
   
   
  
 traMADol 50 mg tablet Commonly known as:  ULTRAM  
   
Your next dose is: Today, Tomorrow Other:  _________ Dose:  50 mg Take 1 Tab by mouth every six (6) hours as needed. Max Daily Amount: 200 mg. Indications: PAIN Quantity:  20 Tab Refills:  0  
     
   
   
   
  
 XIFAXAN 550 mg tablet Generic drug:  rifAXIMin Your next dose is: Today, Tomorrow Other:  _________ Dose:  550 mg Take 550 mg by mouth three (3) times daily. Refills:  0  
     
   
   
   
  
 zinc sulfate 220 (50) mg capsule Commonly known as:  ZINCATE Your next dose is: Today, Tomorrow Other:  _________ Dose:  220 mg Take 220 mg by mouth every morning. Refills:  5 Discharge Instructions 110[address] [facility] [address] Department of Interventional Radiology [facility] 
[phone] Office 
[phone] Fax PARACENTESIS DISCHARGE INSTRUCTIONS General Information: During this procedure, the doctor will insert a needle into the abdomen to drain fluid. After the procedure, you will be able to take a deep breath much easier. The site of the puncture may ooze the first day. This will decrease and eventually stop. Paracentesis (draining fluid from the abdomen) sometimes makes patients hypotensive (low blood pressure). Your doctor may order for you to receive fluids or albumin (a volume booster) during the procedure through an IV site. Home Care Instructions: 
Keep the puncture site clean and dry. No tub baths or swimming until puncture site heals. Showering is acceptable. Resume your normal diet, and resume your normal activity slowly and as you tolerate. If you are short of breath, rest. If shortness of breath does not ease, please call your ordering doctor. Fluid can re-accumulate in the chest and/or in the abdomen. If this should occur, your doctor needs to know as you may need to have the procedure done again. Call If: 
   You should call your Physician and/or the Radiology Nurse if you notice any signs of infection, like pus draining, or if it is swollen or reddened. Also call if you have a fever, or if you are bleeding from the puncture site more than a small amount on the dressing. Call if the puncture site keeps draining fluid. Some oozing is to be expected, but should slow and then stop. Call if you feel like you have pressure in your abdomen. SEEK IMMEDIATE CARE OR CALL 911 IF YOU SUDDENLY HAVE TROUBLE BREATHING, OR IF YOUR LIPS TURN BLUE, OR IF YOU NOTICE BLOOD IN YOUR SPUTUM. Follow-Up Instructions: Please see your ordering doctor as he/she has requested. To Reach Us: If you have any questions about your procedure, please call the Interventional Radiology department at [phone]. After business hours (5pm) and weekends, call the answering service at [phone] and ask for the Radiologist on call to be paged. go ernesto Lunsford. Interventional Radiology General Nurse Discharge After general anesthesia or intravenous sedation, for 24 hours or while taking prescription Narcotics: · Limit your activities · Do not drive and operate hazardous machinery · Do not make important personal or business decisions · Do  not drink alcoholic beverages · If you have not urinated within 8 hours after discharge, please contact your surgeon on call. * Please give a list of your current medications to your Primary Care Provider. * Please update this list whenever your medications are discontinued, doses are 
   changed, or new medications (including over-the-counter products) are added. * Please carry medication information at all times in case of emergency situations. These are general instructions for a healthy lifestyle: No smoking/ No tobacco products/ Avoid exposure to second hand smoke Surgeon General's Warning:  Quitting smoking now greatly reduces serious risk to your health. Obesity, smoking, and sedentary lifestyle greatly increases your risk for illness A healthy diet, regular physical exercise & weight monitoring are important for maintaining a healthy lifestyle You may be retaining fluid if you have a history of heart failure or if you experience any of the following symptoms:  Weight gain of 3 pounds or more overnight or 5 pounds in a week, increased swelling in our hands or feet or shortness of breath while lying flat in bed. Please call your doctor as soon as you notice any of these symptoms; do not wait until your next office visit. Recognize signs and symptoms of STROKE: 
F-face looks uneven A-arms unable to move or move unevenly S-speech slurred or non-existent T-time-call 911 as soon as signs and symptoms begin-DO NOT go Back to bed or wait to see if you get better-TIME IS BRAIN. Date: 1/3/2017 Discharging Nurse: Penny Warren RN Discharge Orders None Introducing [facility]! Dear Fiordaliaz Offer: 
Thank you for requesting a [employer] account. Our records indicate that you already have an active [employer] account. You can access your account anytime at [email or web address] Did you know that you can access your hospital and ER discharge instructions at any time in [employer]? You can also review all of your test results from your hospital stay or ER visit. Additional Information If you have questions, please visit the Frequently Asked Questions section of the [employer] website at [email or web address] Remember, [employer] is NOT to be used for urgent needs. For medical emergencies, dial 911. Now available from your iPhone and Android! General Information Please provide this summary of care documentation to your next provider. Patient Signature:  ____________________________________________________________ Date:  ____________________________________________________________  
  
[facility] Gene Provider Signature:  ____________________________________________________________ Date:  ____________________________________________________________

## 2017-01-03 NOTE — IP AVS SNAPSHOT
Danita James 
 
 
 [address] [facility] [address] [facility] 
[phone] Patient: James Zapien MRN: [id number] You are allergic to the following Allergen Reactions Asa-Acetaminophen-Caff-Potass Other (comments) Only aspirin----\" not to take due to stomach issues Cephalosporins Unknown (comments) Pt denies allergy, states she has taken cephalosporins and did well. Codeine Nausea Only Compazine (Prochlorperazine Edisylate) Unknown (comments) Causes Lock Jaw Pcn (Penicillins) Other (comments) \"drops wbc\" Recent Documentation Weight BMI OB Status Smoking Status 47.3 kg 19.69 kg/m2 Postmenopausal Never Smoker Unresulted Labs Order Current Status CULTURE, BLOOD Preliminary result CULTURE, BLOOD Preliminary result Emergency Contacts Name Discharge Info Relation Home Work Mobile [address] CAREGIVER [3] Spouse [3] [phone] About your hospitalization You were admitted on:  January 3, 2017 You last received care in the:  [facility] 2 SURGICAL You were discharged on:  January 5, 2017 Unit phone number:  [phone] Why you were hospitalized Your primary diagnosis was:  Hepatic Encephalopathy (Hcc) Your diagnoses also included:  Dm (Diabetes Mellitus) (Hcc), Hepatitis C Providers Seen During Your Hospitalizations Provider Role Specialty Primary office phone June Yin MD Attending Provider Emergency Medicine [phone] Angus Cleary DO Attending Provider Internal Medicine [phone] Your Primary Care Physician (PCP) Primary Care Physician Office Phone Office Fax 6852 [address] [phone] Follow-up Information Follow up With Details Comments Contact Info Lyndon Murray MD   [address] [facility] [address] [phone] Bret Leos MD On 1/5/2017 Office will call home for appointment [address] [facility] [address] [phone] Your Appointments Friday January 06, 2017  2:30 PM EST  
US GUIDED PARACENTISIS INIT with [facility] US LOGIC 7 UNIT 2, SFD NURSING, SFD IR RADIOLOGIST RESOURCE  
[facility] RADIOLOGY ULTRASOUND ([address] [place] [address]) [address]  
[phone] IODINE/CONTRAST ALLERGY - Must be Pre-medicated - Fax Allergy protocol to MD office(not patient) - Schedule appointment at least 48 hours after call - All medication holds must be approved (cleared) by the physician who ordered the medication. This medication hold is recommended for all invasive procedures except vascular arteriograms - It is recommended to hold all blood thinners (antiplatelet) medication such as Aspirin, Plavix, Brilinta, and Coumadin for 5 days prior to procedure - Effient  should be held for 7 days prior to the procedure - Hold Metformin the day of the procedure and 48 hours post procedure if receiving contrast - Insulin dosage: If Patient NPO, they should administer only 1/2 of their normal dose the morning of the procedure  H&P - Fax current H&P (within 30 days) to IR Scheduling  Protocols - For patients requiring Sedation: Patient must be NPO - Lab work: To be completed within 14 days of procedure for all Invasive procedures o CBC o BMP o PTT o PT/INR Tuesday January 10, 2017  9:30 AM EST  
US GUIDED PARACENTISIS INIT with D US LOGIC 7 UNIT 2, SFD NURSING, SFD IR RADIOLOGIST RESOURCE  
[facility] RADIOLOGY ULTRASOUND ([address] [place] [address]) [address]  
[phone]  IODINE/CONTRAST ALLERGY - Must be Pre-medicated - Fax Allergy protocol to MD office(not patient) - Schedule appointment at least 48 hours after call - All medication holds must be approved (cleared) by the physician who ordered the medication. This medication hold is recommended for all invasive procedures except vascular arteriograms - It is recommended to hold all blood thinners (antiplatelet) medication such as Aspirin, Plavix, Brilinta, and Coumadin for 5 days prior to procedure - Effient  should be held for 7 days prior to the procedure - Hold Metformin the day of the procedure and 48 hours post procedure if receiving contrast - Insulin dosage: If Patient NPO, they should administer only 1/2 of their normal dose the morning of the procedure  H&P - Fax current H&P (within 30 days) to IR Scheduling  Protocols - For patients requiring Sedation: Patient must be NPO - Lab work: To be completed within 14 days of procedure for all Invasive procedures o CBC o BMP o PTT o PT/INR Friday January 13, 2017  1:00 PM EST  
US GUIDED PARACENTISIS INIT with [facility] US LOGIC 7 UNIT 2, SFD NURSING, SFD IR RADIOLOGIST RESOURCE  
D RADIOLOGY ULTRASOUND ([address] [place] [address]) [address] [facility] [address] [facility]  
[phone] IODINE/CONTRAST ALLERGY - Must be Pre-medicated - Fax Allergy protocol to MD office(not patient) - Schedule appointment at least 48 hours after call - All medication holds must be approved (cleared) by the physician who ordered the medication.  This medication hold is recommended for all invasive procedures except vascular arteriograms - It is recommended to hold all blood thinners (antiplatelet) medication such as Aspirin, Plavix, Brilinta, and Coumadin for 5 days prior to procedure - Effient  should be held for 7 days prior to the procedure - Hold Metformin the day of the procedure and 48 hours post procedure if receiving contrast - Insulin dosage: If Patient NPO, they should administer only 1/2 of their normal dose the morning of the procedure  H&P - Fax current H&P (within 30 days) to IR Scheduling  Protocols - For patients requiring Sedation: Patient must be NPO - Lab work: To be completed within 14 days of procedure for all Invasive procedures o CBC o BMP o PTT o PT/INR Tuesday January 17, 2017  8:30 AM EST  
US GUIDED PARACENTISIS INIT with [facility] US LOGIC 7 UNIT 2, SFD NURSING, SFD IR RADIOLOGIST RESOURCE  
[facility] RADIOLOGY ULTRASOUND ([address] [place] [address]) [address] [place] [address]  
[phone] IODINE/CONTRAST ALLERGY - Must be Pre-medicated - Fax Allergy protocol to MD office(not patient) - Schedule appointment at least 48 hours after call - All medication holds must be approved (cleared) by the physician who ordered the medication. This medication hold is recommended for all invasive procedures except vascular arteriograms - It is recommended to hold all blood thinners (antiplatelet) medication such as Aspirin, Plavix, Brilinta, and Coumadin for 5 days prior to procedure - Effient  should be held for 7 days prior to the procedure - Hold Metformin the day of the procedure and 48 hours post procedure if receiving contrast - Insulin dosage: If Patient NPO, they should administer only 1/2 of their normal dose the morning of the procedure  H&P - Fax current H&P (within 30 days) to IR Scheduling  Protocols - For patients requiring Sedation: Patient must be NPO - Lab work: To be completed within 14 days of procedure for all Invasive procedures o CBC o BMP o PTT o PT/INR Friday January 20, 2017  8:30 AM EST  
US GUIDED PARACENTISIS INIT with [facility] US LOGIC 7 UNIT 2, SFD NURSING, D IR RADIOLOGIST RESOURCE  
[facility] RADIOLOGY ULTRASOUND ([address] [place], [place] [address] [address]  
[phone]  IODINE/CONTRAST ALLERGY - Must be Pre-medicated - Fax Allergy protocol to MD office(not patient) - Schedule appointment at least 48 hours after call - All medication holds must be approved (cleared) by the physician who ordered the medication. This medication hold is recommended for all invasive procedures except vascular arteriograms - It is recommended to hold all blood thinners (antiplatelet) medication such as Aspirin, Plavix, Brilinta, and Coumadin for 5 days prior to procedure - Effient  should be held for 7 days prior to the procedure - Hold Metformin the day of the procedure and 48 hours post procedure if receiving contrast - Insulin dosage: If Patient NPO, they should administer only 1/2 of their normal dose the morning of the procedure  H&P - Fax current H&P (within 30 days) to IR Scheduling  Protocols - For patients requiring Sedation: Patient must be NPO - Lab work: To be completed within 14 days of procedure for all Invasive procedures o CBC o BMP o PTT o PT/INR Tuesday January 24, 2017  8:30 AM EST  
US GUIDED PARACENTISIS INIT with D US LOGIC 7 UNIT 2, SFD NURSING, SFD IR RADIOLOGIST RESOURCE  
D RADIOLOGY ULTRASOUND ([address] [place] [address]) [address] [facility] [address] [facility]  
[phone] IODINE/CONTRAST ALLERGY - Must be Pre-medicated - Fax Allergy protocol to MD office(not patient) - Schedule appointment at least 48 hours after call - All medication holds must be approved (cleared) by the physician who ordered the medication.  This medication hold is recommended for all invasive procedures except vascular arteriograms - It is recommended to hold all blood thinners (antiplatelet) medication such as Aspirin, Plavix, Brilinta, and Coumadin for 5 days prior to procedure - Effient  should be held for 7 days prior to the procedure - Hold Metformin the day of the procedure and 48 hours post procedure if receiving contrast - Insulin dosage: If Patient NPO, they should administer only 1/2 of their normal dose the morning of the procedure  H&P - Fax current H&P (within 30 days) to IR Scheduling  Protocols - For patients requiring Sedation: Patient must be NPO - Lab work: To be completed within 14 days of procedure for all Invasive procedures o CBC o BMP o PTT o PT/INR Friday January 27, 2017  8:30 AM EST  
US GUIDED PARACENTISIS INIT with [facility] US LOGIC 7 UNIT 2, SFD NURSING, D IR RADIOLOGIST RESOURCE  
[facility] RADIOLOGY ULTRASOUND ([address] [place] [address]) [address]  
[phone] IODINE/CONTRAST ALLERGY - Must be Pre-medicated - Fax Allergy protocol to MD office(not patient) - Schedule appointment at least 48 hours after call - All medication holds must be approved (cleared) by the physician who ordered the medication. This medication hold is recommended for all invasive procedures except vascular arteriograms - It is recommended to hold all blood thinners (antiplatelet) medication such as Aspirin, Plavix, Brilinta, and Coumadin for 5 days prior to procedure - Effient  should be held for 7 days prior to the procedure - Hold Metformin the day of the procedure and 48 hours post procedure if receiving contrast - Insulin dosage: If Patient NPO, they should administer only 1/2 of their normal dose the morning of the procedure  H&P - Fax current H&P (within 30 days) to IR Scheduling  Protocols - For patients requiring Sedation: Patient must be NPO - Lab work: To be completed within 14 days of procedure for all Invasive procedures o CBC o BMP o PTT o PT/INR Tuesday January 31, 2017  8:30 AM EST  
US GUIDED PARACENTISIS INIT with [facility] US LOGIC 7 UNIT 2, D NURSING, D IR RADIOLOGIST RESOURCE  
[facility] RADIOLOGY ULTRASOUND ([address] [place] [address]) [address]  
[phone]  IODINE/CONTRAST ALLERGY - Must be Pre-medicated - Fax Allergy protocol to MD office(not patient) - Schedule appointment at least 48 hours after call - All medication holds must be approved (cleared) by the physician who ordered the medication. This medication hold is recommended for all invasive procedures except vascular arteriograms - It is recommended to hold all blood thinners (antiplatelet) medication such as Aspirin, Plavix, Brilinta, and Coumadin for 5 days prior to procedure - Effient  should be held for 7 days prior to the procedure - Hold Metformin the day of the procedure and 48 hours post procedure if receiving contrast - Insulin dosage: If Patient NPO, they should administer only 1/2 of their normal dose the morning of the procedure  H&P - Fax current H&P (within 30 days) to IR Scheduling  Protocols - For patients requiring Sedation: Patient must be NPO - Lab work: To be completed within 14 days of procedure for all Invasive procedures o CBC o BMP o PTT o PT/INR Current Discharge Medication List  
  
CONTINUE these medications which have CHANGED Dose & Instructions Dispensing Information Comments Morning Noon Evening Bedtime  
 lactulose 10 gram/15 mL solution Commonly known as:  Tianna Hart What changed:   
- medication strength 
- how much to take - when to take this Your next dose is: Today, Tomorrow Other:  _________ Dose:  30 g Take 45 mL by mouth three (3) times daily. Quantity:  480 mL Refills:  0 CONTINUE these medications which have NOT CHANGED Dose & Instructions Dispensing Information Comments Morning Noon Evening Bedtime  
 baclofen 10 mg tablet Commonly known as:  LIORESAL Your next dose is: Today, Tomorrow Other:  _________ Dose:  10 mg Take 10 mg by mouth as needed. Refills:  0 GENGRAF 25 mg capsule Generic drug:  cycloSPORINE modified Your next dose is: Today, Tomorrow Other:  _________ Dose:  2.5 mg/kg/day Take 2.5 mg/kg/day by mouth every morning. Indications: Takes in the AM  
 Refills:  0  
     
   
   
   
  
 LANTUS SOLOSTAR 100 unit/mL (3 mL) pen Generic drug:  insulin glargine Your next dose is: Today, Tomorrow Other:  _________ Dose:  25 Units 25 Units by SubCUTAneous route nightly. Refills:  0  
     
   
   
   
  
 magnesium oxide 400 mg tablet Commonly known as:  MAG-OX Your next dose is: Today, Tomorrow Other:  _________ Dose:  400 mg Take 400 mg by mouth two (2) times a day. Refills:  0  
     
   
   
   
  
 midodrine 5 mg tablet Commonly known as:  Tonna Fergusson Your next dose is: Today, Tomorrow Other:  _________ Dose:  5 mg Take 5 mg by mouth every eight (8) hours. 2 q8h Refills:  0 NovoLOG 100 unit/mL injection Generic drug:  insulin aspart Your next dose is: Today, Tomorrow Other:  _________  
   
   
 by SubCUTAneous route. Sliding scale Refills:  0  
     
   
   
   
  
 ondansetron hcl 4 mg tablet Commonly known as:  [place] Your next dose is: Today, Tomorrow Other:  _________ Dose:  4 mg Take 1 Tab by mouth every eight (8) hours as needed for Nausea. Quantity:  30 Tab Refills:  0 PROTONIX 40 mg tablet Generic drug:  pantoprazole Your next dose is: Today, Tomorrow Other:  _________ Dose:  40 mg Take 40 mg by mouth four (4) times daily. 2tabs bid Refills:  0  
     
   
   
   
  
 traMADol 50 mg tablet Commonly known as:  ULTRAM  
   
Your next dose is: Today, Tomorrow Other:  _________ Dose:  50 mg Take 1 Tab by mouth every six (6) hours as needed. Max Daily Amount: 200 mg. Indications: PAIN Quantity:  20 Tab Refills:  0  
     
   
   
   
  
 XIFAXAN 550 mg tablet Generic drug:  rifAXIMin Your next dose is: Today, Tomorrow Other:  _________ Dose:  550 mg Take 550 mg by mouth three (3) times daily. Refills:  0  
     
   
   
   
  
 zinc sulfate 220 (50) mg capsule Commonly known as:  ZINCATE Your next dose is: Today, Tomorrow Other:  _________ Dose:  220 mg Take 220 mg by mouth every morning. Refills:  5 STOP taking these medications CIPRO 250 mg tablet Generic drug:  ciprofloxacin HCl Where to Get Your Medications Information on where to get these meds will be given to you by the nurse or doctor. ! Ask your nurse or doctor about these medications  
  lactulose 10 gram/15 mL solution Discharge Instructions DISCHARGE SUMMARY from Nurse The following personal items are in your possession at time of discharge: 
 
Dental Appliances: None Home Medications: None Jewelry: None Clothing: With patient, Undergarments, Socks, Shirt, Pants, Footwear Other Valuables: Cell Phone PATIENT INSTRUCTIONS: 
 
After general anesthesia or intravenous sedation, for 24 hours or while taking prescription Narcotics: · Limit your activities · Do not drive and operate hazardous machinery · Do not make important personal or business decisions · Do  not drink alcoholic beverages · If you have not urinated within 8 hours after discharge, please contact your surgeon on call. Report the following to your surgeon: 
· Excessive pain, swelling, redness or odor of or around the surgical area · Temperature over 100.5 · Nausea and vomiting lasting longer than 4 hours or if unable to take medications · Any signs of decreased circulation or nerve impairment to extremity: change in color, persistent  numbness, tingling, coldness or increase pain · Any questions What to do at Home: 
Recommended activity: Activity as tolerated and per MD instructions If you experience any of the following symptoms fever > 101, nausea, vomiting, abdominal pain, chest pain, shortness of breath please follow up with MD. 
 
 
*  Please give a list of your current medications to your Primary Care Provider. *  Please update this list whenever your medications are discontinued, doses are 
    changed, or new medications (including over-the-counter products) are added. *  Please carry medication information at all times in case of emergency situations. These are general instructions for a healthy lifestyle: No smoking/ No tobacco products/ Avoid exposure to second hand smoke Surgeon General's Warning:  Quitting smoking now greatly reduces serious risk to your health. Obesity, smoking, and sedentary lifestyle greatly increases your risk for illness A healthy diet, regular physical exercise & weight monitoring are important for maintaining a healthy lifestyle You may be retaining fluid if you have a history of heart failure or if you experience any of the following symptoms:  Weight gain of 3 pounds or more overnight or 5 pounds in a week, increased swelling in our hands or feet or shortness of breath while lying flat in bed. Please call your doctor as soon as you notice any of these symptoms; do not wait until your next office visit. Recognize signs and symptoms of STROKE: 
 
F-face looks uneven A-arms unable to move or move unevenly S-speech slurred or non-existent T-time-call 911 as soon as signs and symptoms begin-DO NOT go Back to bed or wait to see if you get better-TIME IS BRAIN. Warning Signs of HEART ATTACK Call 911 if you have these symptoms: 
? Chest discomfort. Most heart attacks involve discomfort in the center of the chest that lasts more than a few minutes, or that goes away and comes back. It can feel like uncomfortable pressure, squeezing, fullness, or pain. ? Discomfort in other areas of the upper body. Symptoms can include pain or discomfort in one or both arms, the back, neck, jaw, or stomach. ? Shortness of breath with or without chest discomfort. ? Other signs may include breaking out in a cold sweat, nausea, or lightheadedness. Don't wait more than five minutes to call [address]! Fast action can save your life. Calling 911 is almost always the fastest way to get lifesaving treatment. Emergency Medical Services staff can begin treatment when they arrive  up to an hour sooner than if someone gets to the hospital by car. The discharge information has been reviewed with the patient. The patient verbalized understanding. Discharge medications reviewed with the patient and appropriate educational materials and side effects teaching were provided. Learning About Metabolic Encephalopathy What is metabolic encephalopathy? Metabolic encephalopathy is a problem in the brain. It is caused by a chemical imbalance in the blood. The imbalance is caused by an illness or organs that are not working as well as they should. It is not caused by a head injury. When the imbalance affects the brain, it can lead to personality changes. It can also make it harder to think clearly and remember things. The problems may only last a short time if you get treatment right away. But this depends on the cause. If the imbalance has been building up because you've been sick for a long time, the mental changes may be more severe. They may also last longer. What happens when you have this problem? When things are working right, your body has many ways to keep the chemicals in your blood in balance. For example, your liver and kidneys remove waste from your blood. The kidneys also help keep fluids and sodium in balance. And your pancreas makes insulin. It is a hormone that helps control the amount of sugar in your blood. But the chemicals in your blood can get out of balance and damage parts of your body because of a medical problem. This may be kidney or liver failure. Or it could be diabetes that isn't controlled well. When the imbalance affects the brain, normal thinking and behavior can change. What are the symptoms? Symptoms may include: 
· Confusion. · Problems with thinking and remembering. · Being grouchy and depressed. · Feeling drowsy. · Not being able to sleep. · Passing out (fainting) now and then. How is it treated? The doctor will try to find the illness that's causing the problem. He or she may ask questions about your past health. The doctor will also do tests to find what is causing the chemical imbalance and to see how severe it is. The doctor may treat the organ system that's causing the problem. For example, if it's a kidney problem, you may have treatment to help your kidneys work better. If you have an infection, you may need antibiotics. If the doctor can't treat the cause of the problem, he or she will treat the symptoms. The doctor will carefully watch your blood chemicals to make sure that your treatment is being done safely. Follow-up care is a key part of your treatment and safety. Be sure to make and go to all appointments, and call your doctor if you are having problems. It's also a good idea to know your test results and keep a list of the medicines you take. Where can you learn more? Go to http://julia-raimundo.info/. Enter C644 in the search box to learn more about \"Learning About Metabolic Encephalopathy. \" Current as of: August 9, 2016 Content Version: 11.1 © 6054-7977 [employer]. Care instructions adapted under license by [employer] (which disclaims liability or warranty for this information).  If you have questions about a medical condition or this instruction, always ask your healthcare professional. [employer] disclaims any warranty or liability for your use of this information. Discharge Orders None Nextinit Announcement We are excited to announce that we are making your provider's discharge notes available to you in Nextinit. You will see these notes when they are completed and signed by the physician that discharged you from your recent hospital stay. If you have any questions or concerns about any information you see in Nextinit, please call the Health Information Department where you were seen or reach out to your Primary Care Provider for more information about your plan of care. Introducing [facility]! Dear [facility]: 
Thank you for requesting a Nextinit account. Our records indicate that you already have an active Nextinit account. You can access your account anytime at [email or web address] Did you know that you can access your hospital and ER discharge instructions at any time in Nextinit? You can also review all of your test results from your hospital stay or ER visit. Additional Information If you have questions, please visit the Frequently Asked Questions section of the Nextinit website at [email or web address] Remember, Nextinit is NOT to be used for urgent needs. For medical emergencies, dial 911. Now available from your iPhone and Android! General Information Please provide this summary of care documentation to your next provider. Patient Signature:  ____________________________________________________________ Date:  ____________________________________________________________  
  
Giovanny Stahl Provider Signature:  ____________________________________________________________ Date:  ____________________________________________________________

## 2017-01-03 NOTE — PROGRESS NOTES
Paracentesis fluid collected by Dr Tanmay Youssef and sent to lab for culture, albumin, cytology, cell count with diff, and amylase

## 2017-01-04 NOTE — PROGRESS NOTES
Patient arrived from the ED via stretcher. The patient is drowsy,but oriented to name. Duel skin assessment was performed with another nurse, Jamee Neri. The patient has an old transverse abdominal scar. The patient has a perturbing umbilical hernia. She has a port on her left upper chest. She has a fistula on her left inner thigh for dialysis. The patient's heels and sacral area are free from an break down. The patient was oriented to the room, placed on a bed alarm, and the call light is within reach.

## 2017-01-04 NOTE — CONSULTS
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 In addition repeat paracentesis done 12/30 has no growth and ascitic fluid from 01/03 tap is negative to date as well  · Hepatic Encephalopathy  · S/p Orthotopic liver transplantation x 2 (1985, 1999): followed at [facility]. · Congenital LIver Fibrosis  · Hepatitis C: per previous notes acquired from blood transfusion after first transplant; however all HCV PCRs done here since 2014 have been negative  · ESRD on HD: Left thigh AV graft placed 11/02/16    Plan:   ·  Would discontinue Daptomycin. No indication for any antimicrobial therapy. · Please call again if there are further questions or concerns. Anti-infectives:     Inpat Anti-Infectives     Start     Ordered Stop    01/04/17 1600  DAPTOmycin (CUBICIN) 300 mg in sterile water (preservative free) 6 mL IV syringe RF formulation  6 mg/kg,   IntraVENous,   3 TIMES A WEEK (MON,WED & FRI)      01/04/17 1102 --    01/03/17 2200  rifAXIMin (XIFAXAN) tablet 550 mg  550 mg,   Oral,   3 TIMES DAILY      01/03/17 2021 --          Subjective:   Date of Consultation:  January 4, 2017  Referring Physician: Niya Kwong    Patient is a 55 y.o. female with end stage cirrhosis, s/p liver transplantation 1985 and 1999, HCV (per chart), ESRD on HD, recurrent encephalopathy and refractory ascites requiring paracentesis q3-4 days admitted 01/03 with 1-2 day history of increasing confusion c/w recurrent hepatic encephalopathy. Patient had a paracentesis done 12/27 and culture had grown VRE and a Coag neg Staph in broth only and so patient placed on Daptomycin and ID consulted. Patient seen on HD, is oriented x 2, history obtained primarily from chart review.   Patient with end stage cirrhosis, recurrent encephalopathy and refractory ascites due to possibly occluded TIPS shunt. She has repeatedly been admitted in past for encephalopathy related to nonadherence with her lactulose and rifaximin. She has repeated paracentesis done every 3-4 days and had one done 12/27; Cell count was <100WBC however culture grew VRE and CNS in broth only. Patient had a subsequent paracentesis on 12/30 and cultures from this fluid is negative; another paracentesis was done yesterday, 01/03, and cultures are NGTD.      Patient Active Problem List   Diagnosis Code    DM (diabetes mellitus) ([facility] E11.9    Insomnia G47.00    Hepatic encephalopathy ([facility] K72.90    Anemia D64.9    Thrombocytopenia ([facility] D69.6    Ascites R18.8    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    Raynaud's disease I73.00    Hepatitis C B19.20    Chronic kidney disease N18.9    GERD (gastroesophageal reflux disease) K21.9    Congenital hepatic fibrosis P78.81    Acute on chronic renal failure (HCC) N17.9, N18.9    Bacteremia due to Staphylococcus R78.81    Hypotension I95.9    Esophageal varices (HCC) I85.00    Encounter for weaning from ventilator ([facility] Z99.11    Upper GI bleed K92.2    SBO (small bowel obstruction) (HCC) K56.69    ESRD (end stage renal disease) (HCC) N18.6    End-stage renal disease (HCC) N18.6    ESRD (end stage renal disease) on dialysis ([facility] N18.6, Z99.2    Hemodialysis access, AV graft ([facility] Z99.2     Past Medical History   Diagnosis Date    SEAN (acute kidney injury) ([facility] 6/26/2016    Arthritis      kath feet    Ascites     Benign neoplasm of skin of trunk, except scrotum      pt denies    Cellulitis and abscess of unspecified digit      hx of    Chronic kidney disease      [facility] on Mon/Wed/Fri    Chronic pain      abd area    Congenital hepatic fibrosis      Liver transplant X2    Esophageal varices (HCC)     GERD (gastroesophageal reflux disease)     Hemodialysis access, AV graft ([facility] 11/22/2016    Hepatic encephalopathy ([facility] 10/2016     recently hospitalized for hepatic encephalopathy    Hepatitis C      hx of hepatitis C-- dx after first liver transplant from a transfusion   (first transplant age 13)   [facility] History of gastric ulcer     Insomnia 07/13/2015     no current meds    Liver transplant status ([facility] 1986 and 1999     X2- congential hepatic fibrosis    Pain in joint, ankle and foot     PICC (peripherally inserted central catheter) in place     PONV (postoperative nausea and vomiting)      some N/V, pt states she does well with Phenergan    Port-a-cath in place      R chest for HD    Raynaud disease     Spleen enlarged     Tachycardia     Thrombocytopenia (HCC)     Type 2 diabetes mellitus (HCC)      insulin only/AVG /s.s of hypoglycemia 30's/Last A1c 5.6    Vasculitis ([facility]      resolved      Family History   Problem Relation Age of Onset    Diabetes Mother     Heart Disease Mother     Hypertension Mother     Heart Disease Father     Stroke Father     Cancer Maternal Grandfather      liver cancer, alcoholism    No Known Problems Sister     Cancer Maternal Aunt      cervical cancer    Breast Cancer Paternal Grandmother      early 45s    Colon Cancer Paternal Grandmother      late 76s    Cancer Other      maternal niece- cervical cancer    Bipolar Disorder Brother     Heart Disease Brother       Social History   Substance Use Topics    Smoking status: Never Smoker    Smokeless tobacco: Never Used    Alcohol use No     Past Surgical History   Procedure Laterality Date    Pr lap,tubal cautery      Hx colonoscopy      Hx transplant  [id number]     2 liver - first one for congenital hepatic fibrosis, 2nd for Hep C cirrhosis    Hx cholecystectomy  1984    Hx tubal ligation      Hx other surgical       biliary reconstructive surgery    Hx other surgical  2013     EGD    Hx other surgical  03/2016     tips procedure    Hx other surgical paracentesis    Vascular surgery procedure unlist Left 11/02/2016     thigh AVG insertion in thigh      Prior to Admission medications    Medication Sig Start Date End Date Taking? Authorizing Provider   traMADol (ULTRAM) 50 mg tablet Take 1 Tab by mouth every six (6) hours as needed. Max Daily Amount: 200 mg. Indications: PAIN 11/3/16   Rosi Segura MD   ciprofloxacin HCl (CIPRO) 250 mg tablet Take 250 mg by mouth every morning. Historical Provider   insulin glargine (LANTUS SOLOSTAR) 100 unit/mL (3 mL) pen 25 Units by SubCUTAneous route nightly. Historical Provider   LACTULOSE PO Take 30 mL by mouth two (2) times a day. Historical Provider   magnesium oxide (MAG-OX) 400 mg tablet Take 400 mg by mouth two (2) times a day. Historical Provider   cycloSPORINE modified (GENGRAF) 25 mg capsule Take 2.5 mg/kg/day by mouth every morning. Indications: Takes in the AM    Historical Provider   midodrine (PROAMITINE) 5 mg tablet Take 5 mg by mouth every eight (8) hours. 2 q8h     Historical Provider   baclofen (LIORESAL) 10 mg tablet Take 10 mg by mouth as needed. Historical Provider   insulin aspart (NOVOLOG) 100 unit/mL injection by SubCUTAneous route. Sliding scale     Historical Provider   ondansetron hcl (ZOFRAN) 4 mg tablet Take 1 Tab by mouth every eight (8) hours as needed for Nausea. 6/28/16   Marvel Beaulieu MD   zinc sulfate (ZINCATE) 220 (50) mg capsule Take 220 mg by mouth every morning. 4/7/16   Historical Provider   XIFAXAN 550 mg tablet Take 550 mg by mouth three (3) times daily. 3/22/16   Historical Provider   pantoprazole (PROTONIX) 40 mg tablet Take 40 mg by mouth four (4) times daily. 2tabs bid    Historical Provider       Allergies   Allergen Reactions    Asa-Acetaminophen-Caff-Potass Other (comments)     Only aspirin----\" not to take due to stomach issues    Cephalosporins Unknown (comments)     Pt denies allergy, states she has taken cephalosporins and did well.     Codeine Nausea Only    Compazine [Prochlorperazine Edisylate] Unknown (comments)     Causes Lock Jaw    Pcn [Penicillins] Other (comments)     \"drops wbc\"        Review of Systems:  A comprehensive review of systems was negative except for that written in the History of Present Illness. Objective:     Visit Vitals    BP 94/56    Pulse 86    Temp 97.9 °F (36.6 °C)    Resp 16    Wt 50 kg (110 lb 3.7 oz)    SpO2 100%    BMI 20.83 kg/m2     Temp (24hrs), Av.8 °F (36.6 °C), Min:97.7 °F (36.5 °C), Max:98 °F (36.7 °C)       Lines:  Peripheral IV:            Physical Exam:    General:  Lethargic, but arousable, oriented x 2, following simple commands   Eyes:  Sclera anicteric. Pupils equally round and reactive to light.    Mouth/Throat: Mucous membranes dry, oral pharynx clear   Neck: Supple   Lungs:   Clear to auscultation bilaterally, good effort   CV:  Regular rate and rhythm,no murmur, click, rub or gallop   Abdomen:   Umbilical hernia freely reducable, distended, soft, fluid wave, mild diffuse tenderness, no rebound or guarding   Extremities: LLE AV graft with thrill, bruit   Skin: Skin color, texture, turgor normal. no acute rash or lesions   Lymph nodes: Cervical and supraclavicular normal   Musculoskeletal: No swelling or deformity   Lines/Devices:  Intact, no erythema, drainage or tenderness   Psych: Lethargic, oriented x 2       Data Review:     CBC:  Recent Labs      17   0555  17   1715   WBC  3.4*  4.5   GRANS   --   75   MONOS   --   1*   EOS   --   6   ANEU   --   3.4   ABL   --   0.8   HGB  8.1*  8.7*   HCT  25.0*  26.3*   PLT  59*  57*       BMP:  Recent Labs      17   0555  17   1715   CREA  6.12*  5.80*   BUN  71*  71*   NA  143  143   K  3.7  3.7   CL  103  103   CO2  24  24   AGAP  16  16   GLU  177*  179*       LFTS:  Recent Labs      17   1715   TBILI  1.0   ALT  30   SGOT  23   AP  237*   TP  7.1   ALB  3.8       Microbiology:     All Micro Results     Procedure Component Value Units Date/Time    CULTURE, BLOOD [[id number]] Collected:  01/03/17 1730    Order Status:  Completed Specimen:  Whole Blood from Blood Updated:  01/04/17 1103     Special Requests: PICC        Culture result: NO GROWTH AFTER 16 HOURS       CULTURE, BLOOD [[id number]] Collected:  01/03/17 1715    Order Status:  Completed Specimen:  Whole Blood from Blood Updated:  01/04/17 1103     Special Requests: PICC        Culture result: NO GROWTH AFTER 16 HOURS             Imaging:   No recent imaging    Signed By: Skylar Em MD     January 4, 2017

## 2017-01-04 NOTE — DIALYSIS
HD treatment completed at 2.5 hours due to patient behaving unsafely. VS stable, NAD. Needles removed and pressure dressing applied to bilateral site. Transport contacted for return to room.

## 2017-01-04 NOTE — PROGRESS NOTES
Pt's family at bedside and expressing multiple concerns regarding coordination of pt's care. Family is asking to speak with a physician at this time and requesting that GI take over as primary. Dr Chilo Chiang paged and notified of family's concerns.   Will page GI as suggested by Dr Chilo Chiang

## 2017-01-04 NOTE — CONSULTS
[facility] Consult Note       Primary GI Physician: Dr. Obinna Pearl    Referring Physician:  Dr. Haim Cooper    Consult Date:  1/4/2017    Admit Date:  1/3/2017    Chief Complaint:  cirrhosis    Subjective:     History of Present Illness:  Patient is a 55 y.o. female with PMH of , who is seen in consultation at the request of Dr. Donaldo Quiñones for cirrhosis with HE. Pt presented to the ER with acute encephalopathy x 1-2 days. Pt has Hep C cirrhosis and follows with Dr. Obinna Pearl, requiring weekly paracentesis. She is s/p liver transplantation x2 and is on the list for 3rd transplantation according to office note from 9/2016. She has dialysis dependent CKD thought to be related to HRS and is dialyzing today. She has recurrent HE and currently has ammonia level 121 with asterixis present. Pt cannot provide a reliable hx but denies any abdominal pain, diarrhea, N/V. She appears groggy and is very confused to place, time,  And situation. Can follow commands loosely. She had IR paracentesis 1/3 with 4200 ml fluid removed, <100 PMN and culture neg to date. Peritoneal fluid culture from 12/29 resulted with VRE growth although 12/30 culture without any growth so abx initiated in ER. Ammonia found to be 121. According to H&P, family reports non-compliance to Rifaximin due to cost and sometimes does not take Lactulose as directed. She lives at home with her  who has had a stroke and is likely a poor caregiver. She remains Hemodynamically stable, but is currently refusing dialysis due to pain of access. Labs appear relatively stable for her with stable anemia, thrombocytopenia, normal potassium, elevated BUN/Creat. Has hx of PHG and esophageal varices. EGD 7/2016, Laureen Leija: Findings: OROPHARYNX: The oropharynx was briefly viewed on entry and withdrawal withvery brief evaluation of the cords, arytenoids and pyriform sinuses.  No abnormalities found.  ESOPHAGUS: The esophagus was had a small, proximal variceal column that flattened and a larger distal column that did not andhad some red spots but no stigmata of recent bleeding. After 30 minutes clearing old and fresh blood and clots as well as food from the stomach the site of bleeding was located at the [facility] on the gastric side of the Z-line. Itstopped briefly and there was a definite fibrin clot in place. I placed a band directly over the site and a second band on the larger of the two variceal columns int he esophagus.  STOMACH: The gastric pouch inflated and deflatednormally. PHG present and a large amount of blood. The pylorus was patent to passage of the endoscope without difficulty.  DUODENUM: The endoscope was easily passed into the third section of the duodenum. Thevillous mucosa was normal without blunting or scalloped folds.  There were no mucosal erosions, ulcerations, raised lesions or vascular ectasias.          PMH:  Past Medical History   Diagnosis Date    SEAN (acute kidney injury) ([facility] 6/26/2016    Arthritis      kath feet    Ascites     Benign neoplasm of skin of trunk, except scrotum      pt denies    Cellulitis and abscess of unspecified digit      hx of    Chronic kidney disease      [facility] on Mon/Wed/Fri    Chronic pain      abd area    Congenital hepatic fibrosis      Liver transplant X2    Esophageal varices (HCC)     GERD (gastroesophageal reflux disease)     Hemodialysis access, AV graft ([address] 11/22/2016    Hepatic encephalopathy ([address] 10/2016     recently hospitalized for hepatic encephalopathy    Hepatitis C      hx of hepatitis C-- dx after first liver transplant from a transfusion   (first transplant age 13)   [employer] History of gastric ulcer     Insomnia 07/13/2015     no current meds    Liver transplant status ([address] 1986 and 1999     X2- congential hepatic fibrosis    Pain in joint, ankle and foot     PICC (peripherally inserted central catheter) in place     PONV (postoperative nausea and vomiting)      some N/V, pt states she does well with Phenergan    Port-a-cath in place      R chest for HD    Raynaud disease     Spleen enlarged     Tachycardia     Thrombocytopenia (HCC)     Type 2 diabetes mellitus (HCC)      insulin only/AVG /s.s of hypoglycemia 30's/Last A1c 5.6    Vasculitis ([address]      resolved       PSH:  Past Surgical History   Procedure Laterality Date    Pr lap,tubal cautery      Hx colonoscopy      Hx transplant  0698,0204     2 liver - first one for congenital hepatic fibrosis, 2nd for Hep C cirrhosis    Hx cholecystectomy  1984    Hx tubal ligation      Hx other surgical       biliary reconstructive surgery    Hx other surgical  2013     EGD    Hx other surgical  03/2016     tips procedure    Hx other surgical       paracentesis    Vascular surgery procedure unlist Left 11/02/2016     thigh AVG insertion in thigh       Allergies: Allergies   Allergen Reactions    Asa-Acetaminophen-Caff-Potass Other (comments)     Only aspirin----\" not to take due to stomach issues    Cephalosporins Unknown (comments)     Pt denies allergy, states she has taken cephalosporins and did well.  Codeine Nausea Only    Compazine [Prochlorperazine Edisylate] Unknown (comments)     Causes Lock Jaw    Pcn [Penicillins] Other (comments)     \"drops wbc\"       Home Medications:  Prior to Admission medications    Medication Sig Start Date End Date Taking? Authorizing Provider   traMADol (ULTRAM) 50 mg tablet Take 1 Tab by mouth every six (6) hours as needed. Max Daily Amount: 200 mg. Indications: PAIN 11/3/16   Balod Angel MD   ciprofloxacin HCl (CIPRO) 250 mg tablet Take 250 mg by mouth every morning. Historical Provider   insulin glargine (LANTUS SOLOSTAR) 100 unit/mL (3 mL) pen 25 Units by SubCUTAneous route nightly. Historical Provider   LACTULOSE PO Take 30 mL by mouth two (2) times a day. Historical Provider   magnesium oxide (MAG-OX) 400 mg tablet Take 400 mg by mouth two (2) times a day. Historical Provider   cycloSPORINE modified (GENGRAF) 25 mg capsule Take 2.5 mg/kg/day by mouth every morning. Indications: Takes in the AM    Historical Provider   midodrine (PROAMITINE) 5 mg tablet Take 5 mg by mouth every eight (8) hours. 2 q8h     Historical Provider   baclofen (LIORESAL) 10 mg tablet Take 10 mg by mouth as needed. Historical Provider   insulin aspart (NOVOLOG) 100 unit/mL injection by SubCUTAneous route. Sliding scale     Historical Provider   ondansetron hcl (ZOFRAN) 4 mg tablet Take 1 Tab by mouth every eight (8) hours as needed for Nausea. 6/28/16   Gerardo Art MD   zinc sulfate (ZINCATE) 220 (50) mg capsule Take 220 mg by mouth every morning. 4/7/16   Historical Provider   XIFAXAN 550 mg tablet Take 550 mg by mouth three (3) times daily. 3/22/16   Historical Provider   pantoprazole (PROTONIX) 40 mg tablet Take 40 mg by mouth four (4) times daily.  2tabs bid    Historical Provider       Hospital Medications:  Current Facility-Administered Medications   Medication Dose Route Frequency    sodium chloride (NS) flush 5-10 mL  5-10 mL IntraVENous Q8H    sodium chloride (NS) flush 5-10 mL  5-10 mL IntraVENous PRN    DAPTOmycin (CUBICIN) 300 mg in sterile water (preservative free) 6 mL IV syringe RF formulation  6 mg/kg IntraVENous Q48H    cycloSPORINE modified (GENGRAF, NEORAL) capsule 125 mg  2.5 mg/kg/day Oral 7am    lactulose (CHRONULAC) solution 30 g  30 g Oral BID    midodrine (PROAMITINE) tablet 5 mg  5 mg Oral Q8H    pantoprazole (PROTONIX) tablet 40 mg  40 mg Oral ACB    rifAXIMin (XIFAXAN) tablet 550 mg  550 mg Oral TID    sodium chloride (NS) flush 5-10 mL  5-10 mL IntraVENous Q8H    sodium chloride (NS) flush 5-10 mL  5-10 mL IntraVENous PRN    heparin (porcine) injection 5,000 Units  5,000 Units SubCUTAneous Q12H    insulin lispro (HUMALOG) injection   SubCUTAneous TIDAC     Facility-Administered Medications Ordered in Other Encounters   Medication Dose Route Frequency  heparin (porcine) pf 500 Units  500 Units InterCATHeter PRN    sodium chloride (NS) flush 10 mL  10 mL InterCATHeter Q8H    heparin (porcine) pf 500 Units  500 Units InterCATHeter Q8H    sodium chloride (NS) flush 10 mL  10 mL InterCATHeter PRN    heparin (porcine) pf 500 Units  500 Units InterCATHeter PRN       Social History:  Social History   Substance Use Topics    Smoking status: Never Smoker    Smokeless tobacco: Never Used    Alcohol use No           Family History:  Family History   Problem Relation Age of Onset    Diabetes Mother     Heart Disease Mother     Hypertension Mother     Heart Disease Father     Stroke Father     Cancer Maternal Grandfather      liver cancer, alcoholism    No Known Problems Sister     Cancer Maternal Aunt      cervical cancer    Breast Cancer Paternal Grandmother      early 45s    Colon Cancer Paternal Grandmother      late 76s    Cancer Other      maternal niece- cervical cancer    Bipolar Disorder Brother     Heart Disease Brother        Review of Systems:  A detailed 10 system ROS is NOT obtained due to AMS    Diet:  Regular diet    Objective:     Physical Exam:  Vitals:  Visit Vitals    /52    Pulse (!) 112    Temp 97.9 °F (36.6 °C)    Resp 16    Wt 50 kg (110 lb 3.7 oz)    LMP 01/02/2016    SpO2 100%    BMI 20.83 kg/m2     Gen:  Pt is alert, but very confused to place, time, situation  Skin:  Extremities and face reveal no rashes. HEENT: Sclerae anicteric. Extra-occular muscles are intact. No oral ulcers. No abnormal pigmentation of the lips. The neck is supple. Cardiovascular: Regular rate and rhythm. No murmurs, gallops, or rubs. Respiratory:  Comfortable breathing with no accessory muscle use. Clear breath sounds anteriorly with no wheezes, rales, or rhonchi. GI:  Abdomen nondistended, soft, and nontender. Normal active bowel sounds. No enlargement of the liver or spleen. No masses palpable.  Large easily reducible umbilical hernia -nontender  Rectal:  Deferred  Musculoskeletal:  No pitting edema of the lower legs. Neurological:   very confused to place, time, situation. + asterixis  . Laboratory:    Recent Labs      01/04/17   0555  01/03/17   1715   WBC  3.4*  4.5   HGB  8.1*  8.7*   HCT  25.0*  26.3*   PLT  59*  57*   MCV  94.0  93.3   NA  143  143   K  3.7  3.7   CL  103  103   CO2  24  24   BUN  71*  71*   CREA  6.12*  5.80*   CA  7.9*  8.4   MG  2.7*  2.8*   GLU  177*  179*   AP   --   237*   SGOT   --   23   ALT   --   30   TBILI   --   1.0   ALB   --   3.8   TP   --   7.1   PTP   --   12.7*   INR   --   1.2   APTT   --   28.3          Assessment:     Principal Problem:    Hepatic encephalopathy (HCC) (7/4/2016)    Active Problems:    DM (diabetes mellitus) ([facility] (7/4/2016)      Hepatitis C (7/4/2016)      End-stage renal disease ([facility] (11/2/2016)        Plan:     56 yo female with Hep C cirrhosis, hx EV and PHG, recurrent ascites with weekly dialysis, and dialysis dependent CKD admitted with HE likely due to medication non-compliance. MELD os of 1/3/17 is 22. According to office note from September she is on the waiting list for repeat transplantation. - CKD: HD today (if pt does not refuse  -  HE: continue treatment with both lactulose 30g BID (titrate to maintain 2-4 stools daily) and xifaxan 550mg Bid as inpatient. - ?VRE: continue precautionary treatment  - Ascites: LVP prn but currently abd soft and non-tender.  - Continue to monitor labs     OWEN Brooks      Patient is seen and examined in collaboration with Dr. Keshawn Leija. Assessment and plan as per Dr. Keshawn Leija. I have seen and examined. Admitted for encephalopathy in the setting of ESRD and not dialyzing. BUN elevated.  Will treat for HE and she is getting dialysis now

## 2017-01-04 NOTE — ROUTINE PROCESS
TRANSFER - OUT REPORT:    Verbal report given to [facility] RN (name) on Marc Gonzalez  being transferred to 203 (unit) for progression of care. Report consisted of patients Situation, Background, Assessment and   Recommendations(SBAR). Information from the following report(s) ED summary  was reviewed with the receiving nurse. Opportunity for questions and clarification was provided. Patient transported with:   IV and hospital transporter.

## 2017-01-04 NOTE — H&P
HOSPITALIST H&P/CONSULT  NAME:  Robyn Ho   Age:  55 y.o.  :   1970   MRN:   [id number]  PCP: Tadeo Childs MD  Consulting MD:  Treatment Team: Attending Provider: Henri Olivarez DO; Primary Nurse: Peter Delgado RN  HPI:     Pt is a 54 yo female who presented to the ER this evening with acute encephalopathy x 1-2 days. Pt has Hep C cirrhosis and follows with Dr. Nabor Garcia, requiring weekly paracentesis. She was seen today in IR had 4200 ml fluid removed. Peritoneal fluid culture from  resulted with VRE growth although  culture without any growth so abx initiated in ER. Ammonia found to be 121. Pt is drowsy and confused, does not give reliable HPI or ROS during my assessment. Carrie Solomon, pt's sister, states pt has been increasingly confused since yesterday. She admits that pt does not take Rifaximin due to cost and sometimes does not take Lactulose as directed. She lives at home with her  who has had a stroke and is likely a poor caregiver. On my exam pt has some scleral ictus, is drowsy and somewhat cooperative. She is not able to answer my questions regarding daily BM and meds. No abdominal tenderness on exam. Hemodynamically stable.       Emergency contact Guerita Fuchs (sister) [phone], [phone]    Complete ROS done and is as stated in HPI or otherwise negative  Past Medical History   Diagnosis Date    SEAN (acute kidney injury) ([facility] 2016    Arthritis      kath feet    Ascites     Benign neoplasm of skin of trunk, except scrotum      pt denies    Cellulitis and abscess of unspecified digit      hx of    Chronic kidney disease      [facility] on Mon/Wed/Fri    Chronic pain      abd area    Congenital hepatic fibrosis      Liver transplant X2    Esophageal varices (HCC)     GERD (gastroesophageal reflux disease)     Hemodialysis access, AV graft ([facility] 2016    Hepatic encephalopathy ([facility] [facility] 10/2016     recently hospitalized for hepatic encephalopathy    Hepatitis C      hx of hepatitis C-- dx after first liver transplant from a transfusion   (first transplant age 13)   [facility] History of gastric ulcer     Insomnia 07/13/2015     no current meds    Liver transplant status ([facility] [facility] 1986 and 1999     X2- congential hepatic fibrosis    Pain in joint, ankle and foot     PICC (peripherally inserted central catheter) in place     PONV (postoperative nausea and vomiting)      some N/V, pt states she does well with Phenergan    Port-a-cath in place      R chest for HD    Raynaud disease     Spleen enlarged     Tachycardia     Thrombocytopenia (HCC)     Type 2 diabetes mellitus (HCC)      insulin only/AVG /s.s of hypoglycemia 30's/Last A1c 5.6    Vasculitis ([facility] [facility]      resolved      Past Surgical History   Procedure Laterality Date    Pr lap,tubal cautery      Hx colonoscopy      Hx transplant  [id number]     2 liver - first one for congenital hepatic fibrosis, 2nd for Hep C cirrhosis    Hx cholecystectomy  1984    Hx tubal ligation      Hx other surgical       biliary reconstructive surgery    Hx other surgical  2013     EGD    Hx other surgical  03/2016     tips procedure    Hx other surgical       paracentesis    Vascular surgery procedure unlist Left 11/02/2016     thigh AVG insertion in thigh      Prior to Admission Medications   Prescriptions Last Dose Informant Patient Reported? Taking? LACTULOSE PO   Yes No   Sig: Take 30 mL by mouth two (2) times a day. XIFAXAN 550 mg tablet   Yes No   Sig: Take 550 mg by mouth three (3) times daily. baclofen (LIORESAL) 10 mg tablet   Yes No   Sig: Take 10 mg by mouth as needed. ciprofloxacin HCl (CIPRO) 250 mg tablet   Yes No   Sig: Take 250 mg by mouth every morning. cycloSPORINE modified (GENGRAF) 25 mg capsule   Yes No   Sig: Take 2.5 mg/kg/day by mouth every morning.  Indications: Takes in the AM   insulin aspart (NOVOLOG) 100 unit/mL injection   Yes No Sig: by SubCUTAneous route. Sliding scale    insulin glargine (LANTUS SOLOSTAR) 100 unit/mL (3 mL) pen   Yes No   Si Units by SubCUTAneous route nightly.   magnesium oxide (MAG-OX) 400 mg tablet   Yes No   Sig: Take 400 mg by mouth two (2) times a day. midodrine (PROAMITINE) 5 mg tablet   Yes No   Sig: Take 5 mg by mouth every eight (8) hours. 2 q8h    ondansetron hcl (ZOFRAN) 4 mg tablet   No No   Sig: Take 1 Tab by mouth every eight (8) hours as needed for Nausea. pantoprazole (PROTONIX) 40 mg tablet   Yes No   Sig: Take 40 mg by mouth four (4) times daily. 2tabs bid   traMADol (ULTRAM) 50 mg tablet   No No   Sig: Take 1 Tab by mouth every six (6) hours as needed. Max Daily Amount: 200 mg. Indications: PAIN   zinc sulfate (ZINCATE) 220 (50) mg capsule   Yes No   Sig: Take 220 mg by mouth every morning. Facility-Administered Medications: None     Allergies   Allergen Reactions    Asa-Acetaminophen-Caff-Potass Other (comments)     Only aspirin----\" not to take due to stomach issues    Cephalosporins Unknown (comments)     Pt denies allergy, states she has taken cephalosporins and did well.     Codeine Nausea Only    Compazine [Prochlorperazine Edisylate] Unknown (comments)     Causes Lock Jaw    Pcn [Penicillins] Other (comments)     \"drops wbc\"      Social History   Substance Use Topics    Smoking status: Never Smoker    Smokeless tobacco: Never Used    Alcohol use No      Family History   Problem Relation Age of Onset    Diabetes Mother     Heart Disease Mother     Hypertension Mother     Heart Disease Father     Stroke Father     Cancer Maternal Grandfather      liver cancer, alcoholism    No Known Problems Sister     Cancer Maternal Aunt      cervical cancer    Breast Cancer Paternal Grandmother      early 45s    Colon Cancer Paternal Grandmother      late 76s    Cancer Other      maternal niece- cervical cancer    Bipolar Disorder Brother     Heart Disease Brother Objective:     Visit Vitals    /65    Pulse (!) 118    Temp 97.7 °F (36.5 °C)    Resp 16    Wt 50 kg (110 lb 3.7 oz)    LMP 2016    SpO2 100%    BMI 20.83 kg/m2      Temp (24hrs), Av.7 °F (36.5 °C), Min:97.7 °F (36.5 °C), Max:97.7 °F (36.5 °C)    Oxygen Therapy  O2 Sat (%): 100 % (17)  O2 Device: Room air (17)    Physical Exam:  General:    Drowsy, minimally cooperative, no distress, appears stated age. Head:   Normocephalic, without obvious abnormality, atraumatic. Nose:  Nares normal. No drainage or sinus tenderness. Lungs:   Clear to auscultation bilaterally. No Wheezing or Rhonchi. No rales. Heart:   Regular rate and rhythm,  no murmur, rub or gallop. Abdomen:   Soft, non-tender. Not distended. Bowel sounds normal.   Extremities: No cyanosis. No edema. No clubbing  Skin:     Texture, turgor normal. No rashes or lesions. Not Jaundiced  Neurologic: Drowsy, oriented to self and some situation, no focal deficits     Data Review:   Recent Results (from the past 24 hour(s))   CELL COUNT, BODY FLUID    Collection Time: 17  1:45 PM   Result Value Ref Range    BODY FLUID TYPE ASCITIC FLUID       FLUID COLOR YELLOW      FLUID APPEARANCE CLEAR      FLUID RBC COUNT <62633 /cu mm    FLD NUCLEATED CELLS <100 /cu mm   CBC WITH AUTOMATED DIFF    Collection Time: 17  5:15 PM   Result Value Ref Range    WBC 4.5 4.3 - 11.1 K/uL    RBC 2.82 (L) 4.05 - 5.25 M/uL    HGB 8.7 (L) 11.7 - 15.4 g/dL    HCT 26.3 (L) 35.8 - 46.3 %    MCV 93.3 79.6 - 97.8 FL    MCH 30.9 26.1 - 32.9 PG    MCHC 33.1 31.4 - 35.0 g/dL    RDW 15.5 (H) 11.9 - 14.6 %    PLATELET 57 (L) 843 - 450 K/uL    MPV 10.1 (L) 10.8 - 14.1 FL    DF AUTOMATED      NEUTROPHILS 75 43 - 78 %    LYMPHOCYTES 18 13 - 44 %    MONOCYTES 1 (L) 4.0 - 12.0 %    EOSINOPHILS 6 0.5 - 7.8 %    BASOPHILS 0 0.0 - 2.0 %    IMMATURE GRANULOCYTES 0.2 0.0 - 5.0 %    ABS. NEUTROPHILS 3.4 1.7 - 8.2 K/UL    ABS.  LYMPHOCYTES 0.8 0.5 - 4.6 K/UL    ABS. MONOCYTES 0.0 (L) 0.1 - 1.3 K/UL    ABS. EOSINOPHILS 0.3 0.0 - 0.8 K/UL    ABS. BASOPHILS 0.0 0.0 - 0.2 K/UL    ABS. IMM. GRANS. 0.0 0.0 - 0.5 K/UL   METABOLIC PANEL, COMPREHENSIVE    Collection Time: 01/03/17  5:15 PM   Result Value Ref Range    Sodium 143 136 - 145 mmol/L    Potassium 3.7 3.5 - 5.1 mmol/L    Chloride 103 98 - 107 mmol/L    CO2 24 21 - 32 mmol/L    Anion gap 16 7 - 16 mmol/L    Glucose 179 (H) 65 - 100 mg/dL    BUN 71 (H) 6 - 23 MG/DL    Creatinine 5.80 (H) 0.6 - 1.0 MG/DL    GFR est AA 10 (L) >60 ml/min/1.73m2    GFR est non-AA 8 (L) >60 ml/min/1.73m2    Calcium 8.4 8.3 - 10.4 MG/DL    Bilirubin, total 1.0 0.2 - 1.1 MG/DL    ALT 30 12 - 65 U/L    AST 23 15 - 37 U/L    Alk.  phosphatase 237 (H) 50 - 136 U/L    Protein, total 7.1 6.3 - 8.2 g/dL    Albumin 3.8 3.5 - 5.0 g/dL    Globulin 3.3 2.3 - 3.5 g/dL    A-G Ratio 1.2 1.2 - 3.5     PROTHROMBIN TIME + INR    Collection Time: 01/03/17  5:15 PM   Result Value Ref Range    Prothrombin time 12.7 (H) 9.6 - 12.0 sec    INR 1.2 0.9 - 1.2     PTT    Collection Time: 01/03/17  5:15 PM   Result Value Ref Range    aPTT 28.3 23.5 - 31.7 SEC   MAGNESIUM    Collection Time: 01/03/17  5:15 PM   Result Value Ref Range    Magnesium 2.8 (H) 1.8 - 2.4 mg/dL   PHOSPHORUS    Collection Time: 01/03/17  5:15 PM   Result Value Ref Range    Phosphorus 6.3 (H) 2.5 - 4.5 MG/DL   CULTURE, BLOOD    Collection Time: 01/03/17  5:15 PM   Result Value Ref Range    Special Requests: PICC      Culture result: PENDING    AMMONIA    Collection Time: 01/03/17  5:30 PM   Result Value Ref Range    Ammonia 121 (H) 11 - 32 UMOL/L   URINALYSIS W/ RFLX MICROSCOPIC    Collection Time: 01/03/17  5:30 PM   Result Value Ref Range    Color YELLOW      Appearance CLEAR      Specific gravity 1.010      pH (UA) 6.5 5.0 - 9.0      Protein 30 (A) NEG mg/dL    Glucose NEGATIVE  mg/dL    Ketone TRACE (A) NEG mg/dL    Bilirubin NEGATIVE  NEG      Blood TRACE (A) NEG Urobilinogen 0.2 0.2 - 1.0 EU/dL    Nitrites NEGATIVE  NEG      Leukocyte Esterase NEGATIVE  NEG     CULTURE, BLOOD    Collection Time: 01/03/17  5:30 PM   Result Value Ref Range    Special Requests: PICC      Culture result: PENDING    URINE MICROSCOPIC    Collection Time: 01/03/17  5:30 PM   Result Value Ref Range    WBC 0 0 /hpf    RBC 0-3 0 /hpf    Epithelial cells 0-3 0 /hpf    Bacteria 0 0 /hpf    Casts 0-3 0 /lpf    Crystals 0 0 /LPF    Mucus 0 0 /lpf    Other observations MICROSCOPIC PERFORMED ON UNSPUN URINE SAMPLE. Imaging /Procedures /Studies     Assessment and Plan: Active Hospital Problems    Diagnosis Date Noted    Hepatic encephalopathy ([facility] [address] 07/04/2016       A/P:    Hepatic encephalopathy- Ammonia 120, trend daily. Resume prescribed regimen Lactulose 30 BID and Rifaximin 550 mg TID. Pt non compliant with both meds at home. Consult GI    Bacterial peritonitis- Follow repeat cultures and cont Daptomycin q 48 hours. ESRD- Consult nephrology for HD MWF. DC planning- [facility] in 2-3 days. Consult SW as pt is high risk for non compliance.      DVT Prophylaxis:  Heparin  Code Status: Full code  Anticipated discharge: 48-72 hours    Signed By: Payton Barnes NP     January 3, 2017

## 2017-01-04 NOTE — PROGRESS NOTES
On HD via L thigh AVG by BARBARA Broussard Rn using 16 ga needles x2. Vitals signs prior to beginning treatment as follows: 100/62, hr 89. Pt noted alert and oriented x person. Will continue to monitor throughout treatment.

## 2017-01-04 NOTE — PROGRESS NOTES
Hospitalist Progress Note    2017  Admit Date: 1/3/2017  4:51 PM   NAME: James Zapien   :  1970   MRN:  [id number]   Attending: Angus Cleary DO  PCP:  Lyndon Murray MD    SUBJECTIVE:   Pt is a 54 yo F w/ pmhx of congenital liver fibrosis s/p 2 liver transplants with contraction of hep C from first liver tx at age 13 with recurrent hepatic encephalopathy and ?compliance with meds presents with increasing AMS x 1-2 days. Ammonia on admission 121. Requires q 3-4d paracentesis. Last para on 1-3 with IR - removed 4200cc. Placed on IV daptomycin for  peritoneal cx's grew VRE thought to be contaminant per ID and atbx dc'd. She  Pt with hx of She was seen today in IR had 4200 ml fluid removed. Peritoneal fluid culture from  resulted with VRE growth although  culture without any growth so abx initiated in ER. Ammonia found to be 121. Lives at home with  who has had a stroke. Hx of ESRD HD dependent (d/t hx of hepatorenal syndrome). Of note, hospitalization in July for HE requiring intubation/ICU stay. GI, nephrology following. - pt can follow commands. Slow to answer but in no distress. Oriented to person only. Review of Systems negative with exception of pertinent positives noted above  PHYSICAL EXAM     Visit Vitals    BP 94/61    Pulse 85    Temp 97.8 °F (36.6 °C)    Resp 16    Wt 50 kg (110 lb 3.7 oz)    LMP 2016    SpO2 92%    BMI 20.83 kg/m2      Temp (24hrs), Av.8 °F (36.6 °C), Min:97.7 °F (36.5 °C), Max:98 °F (36.7 °C)    Oxygen Therapy  O2 Sat (%): 92 % (17 1625)  Pulse via Oximetry: 109 beats per minute (17 1950)  O2 Device: Room air (17 1649)    Intake/Output Summary (Last 24 hours) at 17 1700  Last data filed at 17 1350   Gross per 24 hour   Intake                0 ml   Output              400 ml   Net             -400 ml      General: No acute distress    Lungs:  CTA Bilaterally.    Heart:  Regular rate and rhythm,  No murmur, rub, or gallop  Abdomen: Soft, Non distended, Non tender, Positive bowel sounds  Extremities: No cyanosis, clubbing or edema  Neurologic:  No focal deficits    ASSESSMENT      Active Hospital Problems    Diagnosis Date Noted    End-stage renal disease ([facility] 11/02/2016    [facility] 07/04/2016    DM (diabetes mellitus) ([facility] 07/04/2016    Hepatitis C 07/04/2016   A/P  - Hepatic Encephalopathy - increase scheduled lactulose (no stool yet today). Cont rifaximin. Will request CM look into [employer] for rifaximin cost (likely reason for non compliance)  - ESRD - HD per nephro- last run 1-4  - DM2 - a1c, start SSI. On lantus 25units qhs at home - resume when reasonable.   - Hep C- GI following    DVT Prophylaxis: hep sq    Signed By: Belinda Gutierrez DO     January 4, 2017

## 2017-01-04 NOTE — CONSULTS
[address]       Name:  Jez Wasserman   MR#:  [id number]   :  1970   Account #:  [de-identified]   Date of Adm:  2017       REASON FOR CONSULTATION: End-stage renal disease. The patient   was admitted with confusion, altered mental status. HISTORY: This patient is a 44-year-old female with history of   end-stage renal disease, history of liver cirrhosis, and   hepatitis C. She was admitted because of the altered mental   status type confusion. The patient had weekly paracentesis,   followed by Dr. Angi Bravo. She had paracentesis done yesterday   with 4200 mL of fluid removal. According to the note, the   patient had a culture of her peritoneal fluid on , which   grew out VRE. The one from  had no growth. Her ammonia   level was 121. The patient was not taking her meds due to the cost.   She was started on this medication here in the hospital.     It is not clear when the patient had her last hemodialysis. She   goes to [facility] REVIEW OF SYSTEMS: The patient is very confused. She does not   answer questions currently. The review of systems is limited. PREVIOUS MEDICAL HISTORY   1. History of end-stage renal disease, on hemodialysis. 2. History of hepatitis C.   3. Cirrhosis. 4. History of congenital hepatic fibrosis. 5. Liver transplant x2.   6. Esophageal varices. 7. GERD. 8. Hepatitis C.   9. Gastric ulcer. 10. Raynaud disease. 11. Type 2 diabetes mellitus. SURGICAL HISTORY: History of a liver transplant x2. Hepatitis C   cirrhosis. Cholecystectomy, tubal ligation. SOCIAL HISTORY: The patient never smoked. Alcohol: None. FAMILY HISTORY: Diabetes, heart disease, hypertension in her   mother, cancer in her mother and father and grandmother and   grandfather. CURRENT MEDICATIONS   1. Cyclosporine 125 mg daily. 2. She was started on Cubicin 300 mg IV.    3. Heparin 5000 units subcutaneous q.12.   4. Humalog with sliding scale.   5. Lactulose. 6. Midodrine 5 mg q.8 hours. 7. Protonix 40 mg once a day. 8. Xifaxan 550 mg 3 times a day. PHYSICAL EXAMINATION   VITAL SIGNS: Temperature is 97.9, heart rate 112, blood pressure   is 101/52. GENERAL: The patient is very confused. She is very obtunded. NECK: No lymph nodes. No thyromegaly. LUNGS: There are some markedly decreased breath sounds   bilaterally due to poor inspiration. HEART, systolic murmur 2/6 at left sternal border. No   rub. The patient has a PermCath on the left side of the chest.   No discharge. ABDOMEN: Distended. No rebound. EXTREMITIES: There is trace edema. IMPRESSION   1. Encephalopathy. 2. Liver Disease. May be infection. 3. End-stage renal disease. 4. Thrombocytopenia. 5. Type 2 diabetes mellitus. 6. Anemia. Her hemoglobin today is 8.1. It has been chronically   low. RECOMMENDATIONS: Continue current treatment. The patient will be   dialyzed as scheduled.         MD JUWAN Malik / ARIES   D:  01/04/2017   09:44   T:  01/04/2017   10:13   Job #:  [id number]

## 2017-01-04 NOTE — PROGRESS NOTES
END OF SHIFT NOTE:    INTAKE/OUTPUT  01/03 0701 - 01/04 0700  In: 0   Out: 300 [Urine:300]  Voiding: YES  Catheter: NO  Drain:              Flatus: Patient does have flatus present. Stool:  2 occurrences. Characteristics:  Stool Assessment  Stool Color: Green  Stool Appearance: Loose  Stool Amount: Small  Stool Source/Status: Rectum    Emesis: 0 occurrences. Characteristics:        VITAL SIGNS  Patient Vitals for the past 12 hrs:   Temp Pulse Resp BP SpO2   01/04/17 0300 98 °F (36.7 °C) (!) 110 16 97/58 98 %   01/03/17 2300 97.8 °F (36.6 °C) (!) 114 17 92/53 99 %   01/03/17 2031 97.7 °F (36.5 °C) (!) 112 16 97/65 100 %   01/03/17 1950 - (!) 109 - - 99 %   01/03/17 1908 - (!) 108 - - 98 %   01/03/17 1901 - (!) 111 - - 98 %       Pain Assessment  Pain Intensity 1: 0 (01/04/17 0200)             Ambulating  YES    Shift report given to oncoming nurse at the bedside.     Gayle Holt, RN

## 2017-01-04 NOTE — CDMP QUERY
Please clarify if this patient is being treated/managed for:    **Pancytopenia in the setting of Hepatic Failure and Encephalopathy, on chronic Cyclosporin, ESRD and chronic HD and CBC on 1/04/16 with WBC-3.4, RBC-2.66, Hgb-8.1, and Platelets-59     =>Other Explanation of clinical findings  =>Unable to Determine (no explanation of clinical findings)    The medical record reflects the following:    Risk Factors:  Chronic liver failure (s/p transplant times two), ESRD    Clinical Indicators: WBC-3.4, RBC-2.66, Hgb-8.1, Platelets-59    Treatment: Daily CBC monitoring    Please clarify and document your clinical opinion in the progress notes and discharge summary including the definitive and/or presumptive diagnosis, (suspected or probable), related to the above clinical findings. Please include clinical findings supporting your diagnosis.     Thanks  Verónica Lebron RN, CDS  Compliant Documentation Management Program  [phone]

## 2017-01-05 NOTE — PROGRESS NOTES
[facility]        Subjective: Feels better    Review of Systems -   General ROS: negative for - chills, fatigue or fever  Respiratory ROS: no cough, shortness of breath, or wheezing  Cardiovascular ROS: no chest pain or dyspnea on exertion  Gastrointestinal ROS: no abdominal pain, change in bowel habits, or black or bloody stools  Genito-Urinary ROS: no dysuria, trouble voiding, or hematuria  Neurological ROS: no TIA or stroke symptoms        Objective:    Vitals:    01/04/17 2300 01/05/17 0300 01/05/17 0516 01/05/17 0723   BP: 90/56 99/57  95/58   Pulse: 85 98  88   Resp: 16 17 19   Temp: 98 °F (36.7 °C) 98.1 °F (36.7 °C)  98.3 °F (36.8 °C)   SpO2: 93% 96%  97%   Weight:   47.3 kg (104 lb 3.2 oz)        PE  Gen: in no acute distress  CV: reg rate  Chest: clear  Abd: soft  Ext/Access: left thigh av graft ok       . LAB  Recent Labs      01/05/17 0452 01/04/17   0555  01/03/17   1715   WBC  2.8*  3.4*  4.5   HGB  8.5*  8.1*  8.7*   HCT  26.2*  25.0*  26.3*   PLT  57*  59*  57*   INR   --    --   1.2     Recent Labs      01/05/17   0453  01/05/17   0452  01/04/17   0555  01/03/17   1715   NA   --   145  143  143   K   --   3.1*  3.7  3.7   CL   --   103  103  103   CO2   --   29  24  24   GLU   --   210*  177*  179*   BUN   --   35*  71*  71*   CREA   --   4.00*  6.12*  5.80*   MG  2.5*   --   2.7*  2.8*   PHOS   --    --    --   6.3*   CA   --   8.2*  7.9*  8.4   ALB   --   3.2*   --   3.8   TBILI   --   0.7   --   1.0   ALT   --   27   --   30   SGOT   --   22   --   23           Radiology    A/P:   Patient Active Problem List   Diagnosis Code    DM (diabetes mellitus) ([facility] E11.9    Insomnia G47.00    Hepatic encephalopathy (HCC) K72.90    Anemia D64.9    Thrombocytopenia (HCC) D69.6    Ascites R18.8    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    Raynaud's disease I73.00    Hepatitis C B19.20    Chronic kidney disease N18.9    GERD (gastroesophageal reflux disease) K21.9    Congenital hepatic fibrosis P78.81    Acute on chronic renal failure (HCC) N17.9, N18.9    Bacteremia due to Staphylococcus R78.81    Hypotension I95.9    Esophageal varices (HCC) I85.00    Encounter for weaning from ventilator ([address] Z99.11    Upper GI bleed K92.2    SBO (small bowel obstruction) ([address] K56.69    ESRD (end stage renal disease) ([address] N18.6    End-stage renal disease (HCC) N18.6    ESRD (end stage renal disease) on dialysis ([address] N18.6, Z99.2    Hemodialysis access, AV graft ([facility] Z99.2       She would like to run today as she has a paracentesis tomorrow as an outpatient. For dialysis later today, before dishcharge. Pt seen on dialysis at 2:12pm  On dialysis for clearance.     Nathaniel Nair MD

## 2017-01-05 NOTE — PROGRESS NOTES
Late entry for yesterday afternoon:  Spoke to Ms. Ortiz's mother (Ms. Georgiana Shrestha, cell [phone]) and father in room 203. Ms. Karly Hughes asleep the entire conversation. All information form mother. Ms. Karly Hughes lives with her , and typically drives herself to HD at [facility] and for paracentesis TT; however, she can, per mother, have sudden onset \"encephalopathy\", including when she is driving. Mother states that her daughter Ms. Karly Hughes will be \"fine\", then suddenly become confused. I pointed out this is not safe, needs to be addressed, and that she needs to be encouraged not to drive if that is truly the case. Mother upset that physicians here are only giving Lactulose bid, and that it should be given q 1 hour per mother. Mother sates that at [facility], Lactulose would be given as requested. Called Dr. Nakita Marcos and Lactulose changed to q 4 hours. Also inquired as to why Ms. Karly Hughes brought to [facility] (5 admissions in last 6 months), and not to [facility]. Mother states that they called GI, and was instructed to bring her to [facility] ED. \"  Otherwise, they go to [facility] about every month to continue assessment for kidney and liver transplant list.    Also discussed non-compliance with Xifaxan (currenlty being given 550 mg tid here at [facility]). Mother states it is very expensive, and that her daughter lost her job in October 2016, and now has Medicare Part A insurance, but no medication coverage. Provided pharmaceutical company application paperwork to mother for her to complete. Overall, plan is medical stability, return home with . Again, Ms. Karly Hughes needs to be encouraged to be as independent as possible, but not drive is she has episodes of sudden onset confusion.

## 2017-01-05 NOTE — PROGRESS NOTES
END OF SHIFT NOTE:    INTAKE/OUTPUT  01/04 0701 - 01/05 0700  In: 0   Out: 100 [Urine:100]  Voiding: YES  Catheter: NO  Drain:              Flatus: Patient does have flatus present. Stool:  6 occurrences. Not seen by this nurse;she is self-reporting the stool occurrences. Characteristics:      Emesis: 0 occurrences. Characteristics:        VITAL SIGNS  Patient Vitals for the past 12 hrs:   Temp Pulse Resp BP SpO2   01/04/17 2300 98 °F (36.7 °C) 85 16 90/56 93 %   01/04/17 1900 97.6 °F (36.4 °C) 76 16 (!) 86/51 92 %   01/04/17 1625 - - - - 92 %       Pain Assessment  Pain Intensity 1: 0 (01/04/17 0732)        Patient Stated Pain Goal: 0    Ambulating  YES  Much more alert/conversant. Shift report given to oncoming nurse at the bedside.     Maverick Hernández RN

## 2017-01-05 NOTE — PROGRESS NOTES
GI DAILY PROGRESS NOTE    Admit Date:  1/3/2017    Today's Date:  1/5/2017    CC:  HE and ascites with cirrhosis    Subjective:     Patient is awake, alert and orient to time and place today. She reports nausea after taking lactulose which is currently being administered q4. Also c/o frequent loose stools. She has no other complaints. Anemia is stable without overt bleed. Creatinine improved to 4 after HD on 1/4. Medications:   Current Facility-Administered Medications   Medication Dose Route Frequency    lactulose (CHRONULAC) solution 30 g  30 g Oral Q4H    cycloSPORINE modified (GENGRAF, NEORAL) capsule 125 mg  2.5 mg/kg/day Oral 7am    midodrine (PROAMITINE) tablet 5 mg  5 mg Oral Q8H    pantoprazole (PROTONIX) tablet 40 mg  40 mg Oral ACB    rifAXIMin (XIFAXAN) tablet 550 mg  550 mg Oral TID    sodium chloride (NS) flush 5-10 mL  5-10 mL IntraVENous Q8H    sodium chloride (NS) flush 5-10 mL  5-10 mL IntraVENous PRN    heparin (porcine) injection 5,000 Units  5,000 Units SubCUTAneous Q12H    insulin lispro (HUMALOG) injection   SubCUTAneous TIDAC     Facility-Administered Medications Ordered in Other Encounters   Medication Dose Route Frequency    heparin (porcine) pf 500 Units  500 Units InterCATHeter PRN    sodium chloride (NS) flush 10 mL  10 mL InterCATHeter Q8H    heparin (porcine) pf 500 Units  500 Units InterCATHeter Q8H    sodium chloride (NS) flush 10 mL  10 mL InterCATHeter PRN    heparin (porcine) pf 500 Units  500 Units InterCATHeter PRN       Review of Systems:  A review of system was obtained, with pertinent positives as listed above. All others are negative.     Objective:   Vitals:  Visit Vitals    BP 95/58    Pulse 88    Temp 98.3 °F (36.8 °C)    Resp 19    Wt 47.3 kg (104 lb 3.2 oz)    LMP 01/02/2016    SpO2 97%    BMI 19.69 kg/m2     Intake/Output:  01/05 0701 - 01/05 1900  In: 120 [P.O.:120]  Out: -   01/03 1901 - 01/05 0700  In: 0   Out: 1300 [Urine:400]  Exam:  General appearance: alert, cooperative, no distress  Lungs: clear to auscultation bilaterally anteriorly  Heart: regular rate and rhythm  Abdomen: soft, mildly distended with ascites, non-tender. Large umbilical hernia. Bowel sounds normal. No masses,  no organomegaly  Extremities: extremities normal, atraumatic, no cyanosis or edema  Neuro:  Alert and oriented without obvious neurological deficits. Data Review (Labs):    Recent Labs      01/05/17   0452  01/04/17   0555  01/03/17   1715   WBC  2.8*  3.4*  4.5   HGB  8.5*  8.1*  8.7*   HCT  26.2*  25.0*  26.3*   PLT  57*  59*  57*   MCV  94.9  94.0  93.3   NA  145  143  143   K  3.1*  3.7  3.7   CL  103  103  103   CO2  29  24  24   BUN  35*  71*  71*   CREA  4.00*  6.12*  5.80*   CA  8.2*  7.9*  8.4   GLU  210*  177*  179*   AP  231*   --   237*   SGOT  22   --   23   ALT  27   --   30   TBILI  0.7   --   1.0   PTP   --    --   12.7*   INR   --    --   1.2   APTT   --    --   28.3       Assessment:     Principal Problem:    Hepatic encephalopathy (HCC) (7/4/2016)    Active Problems:    DM (diabetes mellitus) ([facility] (7/4/2016)      Hepatitis C (7/4/2016)      End-stage renal disease ([facility] (11/2/2016)        Plan:     56 yo female with Hep C cirrhosis, hx EV and PHG, recurrent ascites with weekly dialysis, and dialysis dependent CKD admitted with HE likely due to medication non-compliance. MELD os of 1/3/17 is 22. According to office note from September she is on the waiting list for repeat transplantation. - CKD: HD per nephrology  - HE: continue treatment with both lactulose and xifaxan as inpatient. Hospitalist attempting to get pt voucher for xifaxan as home noncompliance was related to cost of medication. Lactulose can be decreased to TID (was QID) as pt is now alert and oriented- titrate to maintain 2-3 stools per day  - ? VRE: detected in ascites 12/27 but no further detection and cultures have been negative.   - Ascites: LVP prn but currently abd soft and non-tender. Slightly more distended than yesterday. - Continue to monitor labs   - Discharge once stable is appropriate as pt can take HE meds and dialyze as outpt.  Also scheduled for weekly LVP through [facility]

## 2017-01-05 NOTE — PROGRESS NOTES
Pt's  called floor requesting to speak with hospitalists. 2030 Call to Dr. Delores Dakins and given 's number. They spoke per pt.

## 2017-01-05 NOTE — DISCHARGE SUMMARY
Hospitalist Discharge Summary     Patient ID:  Felicia Willson  [id number]  49 y.o.  1970  Admit date: 1/3/2017  4:51 PM  Discharge date and time: 1/5/2017  Attending: Gaby Higgins DO  PCP:  Veronica Ambriz MD  Treatment Team: Attending Provider: Gaby Higgins DO; Consulting Provider: Brandon Delgadillo MD; Consulting Provider: Yasmine Pulido MD; Care Manager: Zelda Varela RN    Principal Diagnosis Hepatic encephalopathy [facility])   Principal Problem:    Hepatic encephalopathy ([facility] (7/4/2016)    Active Problems:    DM (diabetes mellitus) ([facility] (7/4/2016)      Hepatitis C (7/4/2016)      End-stage renal disease ([facility] (11/2/2016)             Hospital Course:  Please refer to the admission H&P for details of presentation. 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 The patient stated that the last thing she remembered was driving then she was in a hospital room. She was given higher doses of lactulose and had a session of dialysis and she quickly improved with her ammonia level going from 121 to 53. She was told to increase her dose of Lactulose to 30g three times daily. She was given a form to help trying to get her Rifaximin since she cannot afford it at this time. It was discussed by multiple people, including physicians and the  that she should not be driving since she has been admitted 5 times in the past 6 months for encephalopathy, including 2 while she was driving. She was discharged in stable condition with her mental status back at baseline. She will follow up at dialysis as scheduled and with Dr. Misty Alejandro in 3-5 days. She is a high risk for readmission due to medical non-compliance.     Significant Diagnostic Studies:       Labs: Results:       Chemistry Recent Labs      01/05/17   0452  01/04/17   0555  01/03/17   1715   GLU 210*  177*  179*   NA  145  143  143   K  3.1*  3.7  3.7   CL  103  103  103   CO2  29  24  24   BUN  35*  71*  71*   CREA  4.00*  6.12*  5.80*   CA  8.2*  7.9*  8.4   AGAP  13  16  16   AP  231*   --   237*   TP  6.3   --   7.1   ALB  3.2*   --   3.8   GLOB  3.1   --   3.3   AGRAT  1.0*   --   1.2      CBC w/Diff Recent Labs      01/05/17   0452  01/04/17   0555  01/03/17   1715   WBC  2.8*  3.4*  4.5   RBC  2.76*  2.66*  2.82*   HGB  8.5*  8.1*  8.7*   HCT  26.2*  25.0*  26.3*   PLT  57*  59*  57*   GRANS   --    --   75   LYMPH   --    --   18   EOS   --    --   6      Cardiac Enzymes No results for input(s): CPK, CKND1, RADHA in the last 72 hours. No lab exists for component: CKRMB, TROIP   Coagulation Recent Labs      01/03/17   1715   PTP  12.7*   INR  1.2   APTT  28.3       Lipid Panel Lab Results   Component Value Date/Time    Cholesterol, total 133 12/12/2016 10:10 AM    HDL Cholesterol 34 11/28/2016 10:18 AM    LDL, calculated 78.6 11/28/2016 10:18 AM    VLDL, calculated 26.4 11/28/2016 10:18 AM    Triglyceride 132 11/28/2016 10:18 AM    CHOL/HDL Ratio 4.1 11/28/2016 10:18 AM      BNP No results for input(s): BNPP in the last 72 hours. Liver Enzymes Recent Labs      01/05/17   0452   TP  6.3   ALB  3.2*   AP  231*   SGOT  22      Thyroid Studies No results found for: T4, T3U, TSH, TSHEXT         Discharge Exam:  Visit Vitals    BP 95/58    Pulse 88    Temp 98.3 °F (36.8 °C)    Resp 19    Wt 47.3 kg (104 lb 3.2 oz)    LMP 01/02/2016    SpO2 97%    BMI 19.69 kg/m2     General appearance: alert, cooperative, no distress, appears stated age  Lungs: clear to auscultation bilaterally  Heart: regular rate and rhythm, S1, S2 normal, no murmur, click, rub or gallop  Abdomen: soft, non-tender.  Bowel sounds normal. No masses,  no organomegaly  Extremities: no cyanosis or edema  Neurologic: Grossly normal    Disposition: home  Discharge Condition: stable  Patient Instructions:   Current Discharge Medication List      CONTINUE these medications which have CHANGED    Details   lactulose (CHRONULAC) 10 gram/15 mL solution Take 45 mL by mouth three (3) times daily. Qty: 480 mL, Refills: 0         CONTINUE these medications which have NOT CHANGED    Details   traMADol (ULTRAM) 50 mg tablet Take 1 Tab by mouth every six (6) hours as needed. Max Daily Amount: 200 mg. Indications: PAIN  Qty: 20 Tab, Refills: 0      insulin glargine (LANTUS SOLOSTAR) 100 unit/mL (3 mL) pen 25 Units by SubCUTAneous route nightly.      magnesium oxide (MAG-OX) 400 mg tablet Take 400 mg by mouth two (2) times a day. cycloSPORINE modified (GENGRAF) 25 mg capsule Take 2.5 mg/kg/day by mouth every morning. Indications: Takes in the AM      midodrine (PROAMITINE) 5 mg tablet Take 5 mg by mouth every eight (8) hours. 2 q8h       baclofen (LIORESAL) 10 mg tablet Take 10 mg by mouth as needed. insulin aspart (NOVOLOG) 100 unit/mL injection by SubCUTAneous route. Sliding scale       ondansetron hcl (ZOFRAN) 4 mg tablet Take 1 Tab by mouth every eight (8) hours as needed for Nausea. Qty: 30 Tab, Refills: 0      zinc sulfate (ZINCATE) 220 (50) mg capsule Take 220 mg by mouth every morning. Refills: 5      XIFAXAN 550 mg tablet Take 550 mg by mouth three (3) times daily. Refills: 0      pantoprazole (PROTONIX) 40 mg tablet Take 40 mg by mouth four (4) times daily.  2tabs bid         STOP taking these medications       ciprofloxacin HCl (CIPRO) 250 mg tablet Comments:   Reason for Stopping:               Activity: Activity as tolerated  Diet: Regular Diet  Wound Care: None needed    Follow-up  ·   Dr. Ly Cfiuentes in 3-5 days  · [place] within the next month  · Dr. Jolene Crabtree in one week  Time spent to discharge patient 35 minutes  Signed:  Shamar Jones DO  1/5/2017  9:29 AM

## 2017-01-05 NOTE — DIALYSIS
Dialysis treatment completed at [phone]. Pt tolerated well. No fluid removed. Two 15 ga needles removed from access and manual pressure held until hemostasis complete and pressure dressing applied. Pt noted alert and oriented; Vital signs HR=85, VE=749/56. Pt to room after dialysis completed.

## 2017-01-05 NOTE — PROGRESS NOTES
Discharge instructions and prescriptions given and reviewed with pt, verbalizes understanding, medication side effect sheet reviewed with pt, pt discharged home with family. Patient to have dialysis before discharge.

## 2017-01-05 NOTE — DISCHARGE INSTRUCTIONS
DISCHARGE SUMMARY from Nurse    The following personal items are in your possession at time of discharge:    Dental Appliances: None        Home Medications: None  Jewelry: None  Clothing: With patient, Undergarments, Socks, Shirt, Pants, Footwear  Other Valuables: Cell Phone             PATIENT INSTRUCTIONS:    After general anesthesia or intravenous sedation, for 24 hours or while taking prescription Narcotics:  · Limit your activities  · Do not drive and operate hazardous machinery  · Do not make important personal or business decisions  · Do  not drink alcoholic beverages  · If you have not urinated within 8 hours after discharge, please contact your surgeon on call. Report the following to your surgeon:  · Excessive pain, swelling, redness or odor of or around the surgical area  · Temperature over 100.5  · Nausea and vomiting lasting longer than 4 hours or if unable to take medications  · Any signs of decreased circulation or nerve impairment to extremity: change in color, persistent  numbness, tingling, coldness or increase pain  · Any questions        What to do at Home:  Recommended activity: Activity as tolerated and per MD instructions    If you experience any of the following symptoms fever > 101, nausea, vomiting, abdominal pain, chest pain, shortness of breath please follow up with MD.      *  Please give a list of your current medications to your Primary Care Provider. *  Please update this list whenever your medications are discontinued, doses are      changed, or new medications (including over-the-counter products) are added. *  Please carry medication information at all times in case of emergency situations. These are general instructions for a healthy lifestyle:    No smoking/ No tobacco products/ Avoid exposure to second hand smoke    Surgeon General's Warning:  Quitting smoking now greatly reduces serious risk to your health.     Obesity, smoking, and sedentary lifestyle greatly increases your risk for illness    A healthy diet, regular physical exercise & weight monitoring are important for maintaining a healthy lifestyle    You may be retaining fluid if you have a history of heart failure or if you experience any of the following symptoms:  Weight gain of 3 pounds or more overnight or 5 pounds in a week, increased swelling in our hands or feet or shortness of breath while lying flat in bed. Please call your doctor as soon as you notice any of these symptoms; do not wait until your next office visit. Recognize signs and symptoms of STROKE:    F-face looks uneven    A-arms unable to move or move unevenly    S-speech slurred or non-existent    T-time-call 911 as soon as signs and symptoms begin-DO NOT go       Back to bed or wait to see if you get better-TIME IS BRAIN. Warning Signs of HEART ATTACK     Call 911 if you have these symptoms:   Chest discomfort. Most heart attacks involve discomfort in the center of the chest that lasts more than a few minutes, or that goes away and comes back. It can feel like uncomfortable pressure, squeezing, fullness, or pain.  Discomfort in other areas of the upper body. Symptoms can include pain or discomfort in one or both arms, the back, neck, jaw, or stomach.  Shortness of breath with or without chest discomfort.  Other signs may include breaking out in a cold sweat, nausea, or lightheadedness. Don't wait more than five minutes to call 911 - MINUTES MATTER! Fast action can save your life. Calling 911 is almost always the fastest way to get lifesaving treatment. Emergency Medical Services staff can begin treatment when they arrive -- up to an hour sooner than if someone gets to the hospital by car. The discharge information has been reviewed with the patient. The patient verbalized understanding.     Discharge medications reviewed with the patient and appropriate educational materials and side effects teaching were provided. Learning About Metabolic Encephalopathy  What is metabolic encephalopathy? Metabolic encephalopathy is a problem in the brain. It is caused by a chemical imbalance in the blood. The imbalance is caused by an illness or organs that are not working as well as they should. It is not caused by a head injury. When the imbalance affects the brain, it can lead to personality changes. It can also make it harder to think clearly and remember things. The problems may only last a short time if you get treatment right away. But this depends on the cause. If the imbalance has been building up because you've been sick for a long time, the mental changes may be more severe. They may also last longer. What happens when you have this problem? When things are working right, your body has many ways to keep the chemicals in your blood in balance. For example, your liver and kidneys remove waste from your blood. The kidneys also help keep fluids and sodium in balance. And your pancreas makes insulin. It is a hormone that helps control the amount of sugar in your blood. But the chemicals in your blood can get out of balance and damage parts of your body because of a medical problem. This may be kidney or liver failure. Or it could be diabetes that isn't controlled well. When the imbalance affects the brain, normal thinking and behavior can change. What are the symptoms? Symptoms may include:  · Confusion. · Problems with thinking and remembering. · Being grouchy and depressed. · Feeling drowsy. · Not being able to sleep. · Passing out (fainting) now and then. How is it treated? The doctor will try to find the illness that's causing the problem. He or she may ask questions about your past health. The doctor will also do tests to find what is causing the chemical imbalance and to see how severe it is. The doctor may treat the organ system that's causing the problem.  For example, if it's a kidney problem, you may have treatment to help your kidneys work better. If you have an infection, you may need antibiotics. If the doctor can't treat the cause of the problem, he or she will treat the symptoms. The doctor will carefully watch your blood chemicals to make sure that your treatment is being done safely. Follow-up care is a key part of your treatment and safety. Be sure to make and go to all appointments, and call your doctor if you are having problems. It's also a good idea to know your test results and keep a list of the medicines you take. Where can you learn more? Go to http://julia-raimundo.info/. Enter G537 in the search box to learn more about \"Learning About Metabolic Encephalopathy. \"  Current as of: August 9, 2016  Content Version: 11.1  © 1706-5353 [employer]. Care instructions adapted under license by [employer] (which disclaims liability or warranty for this information). If you have questions about a medical condition or this instruction, always ask your healthcare professional. [address] any warranty or liability for your use of this information.

## 2017-01-10 NOTE — PROGRESS NOTES
picc line dressing, end caps, stat lock, and bio patch changed using sterile procedure. Blood work sent to lab. Ports flush well with good blood return. No s/s of infection.

## 2017-01-10 NOTE — DISCHARGE INSTRUCTIONS
[address]  Department of Interventional Radiology  [facility]  [phone] Office  [phone] Fax    PARACENTESIS DISCHARGE INSTRUCTIONS    General Information:  During this procedure, the doctor will insert a needle into the abdomen to drain fluid. After the procedure, you will be able to take a deep breath much easier. The site of the puncture may ooze the first day. This will decrease and eventually stop. Paracentesis (draining fluid from the abdomen) sometimes makes patients hypotensive (low blood pressure). Your doctor may order for you to receive fluids or albumin (a volume booster) during the procedure through an IV site. Home Care Instructions:  Keep the puncture site clean and dry. No tub baths or swimming until puncture site heals. Showering is acceptable. Resume your normal diet, and resume your normal activity slowly and as you tolerate. If you are short of breath, rest. If shortness of breath does not ease, please call your ordering doctor. Fluid can re-accumulate in the chest and/or in the abdomen. If this should occur, your doctor needs to know as you may need to have the procedure done again. Call If:     You should call your Physician and/or the Radiology Nurse if you notice any signs of infection, like pus draining, or if it is swollen or reddened. Also call if you have a fever, or if you are bleeding from the puncture site more than a small amount on the dressing. Call if the puncture site keeps draining fluid. Some oozing is to be expected, but should slow and then stop. Call if you feel like you have pressure in your abdomen. SEEK IMMEDIATE CARE OR CALL 911 IF YOU SUDDENLY HAVE TROUBLE BREATHING, OR IF YOUR LIPS TURN BLUE, OR IF YOU NOTICE BLOOD IN YOUR SPUTUM. Follow-Up Instructions: Please see your ordering doctor as he/she has requested.      To Reach Us:        Date: 1/10/2017  If you have any questions about your procedure, please call the Interventional Radiology department at [phone]. After business hours (5pm) and weekends, call the answering service at [phone] and ask for the Radiologist on call to be paged. Si tiene Preguntas acerca del procedimiento, por favor llame al departamento de Radiología Intervencional al [phone]. Después de horas de oficina (5 pm) y los fines de [place], llamar al Ramos Bathe de llamadas al [phone] y pregunte por el Radiologo de Sushma Vallejohijessica. Interventional Radiology General Nurse Discharge    After general anesthesia or intravenous sedation, for 24 hours or while taking prescription Narcotics:  · Limit your activities  · Do not drive and operate hazardous machinery  · Do not make important personal or business decisions  · Do  not drink alcoholic beverages  · If you have not urinated within 8 hours after discharge, please contact your surgeon on call. * Please give a list of your current medications to your Primary Care Provider. * Please update this list whenever your medications are discontinued, doses are     changed, or new medications (including over-the-counter products) are added. * Please carry medication information at all times in case of emergency situations. These are general instructions for a healthy lifestyle:    No smoking/ No tobacco products/ Avoid exposure to second hand smoke  Surgeon General's Warning:  Quitting smoking now greatly reduces serious risk to your health. Obesity, smoking, and sedentary lifestyle greatly increases your risk for illness  A healthy diet, regular physical exercise & weight monitoring are important for maintaining a healthy lifestyle    You may be retaining fluid if you have a history of heart failure or if you experience any of the following symptoms:  Weight gain of 3 pounds or more overnight or 5 pounds in a week, increased swelling in our hands or feet or shortness of breath while lying flat in bed.   Please call your doctor as soon as you notice any of these symptoms; do not wait until your next office visit. Recognize signs and symptoms of STROKE:  F-face looks uneven    A-arms unable to move or move unevenly    S-speech slurred or non-existent    T-time-call 911 as soon as signs and symptoms begin-DO NOT go       Back to bed or wait to see if you get better-TIME IS BRAIN.

## 2017-01-10 NOTE — PROGRESS NOTES
Interventional Radiology Post Paracentesis/Thoracentesis Note    1/10/2017    Procedure(s): Ultrasound guided Diagnostic Paracentesis Performed with 8 Vietnamese catheter total volume 4500 ml. Samples sent to lab. Preliminary Findings: moderate clear and Tea. Complications: None    Plan:  Observation, check labs if drawn.           Chest X-Ray:  no    Full dictated report to follow    Signed By: Mike Celestin RN

## 2017-01-10 NOTE — IP AVS SNAPSHOT
[address] [facility] 
 
 
 [address] [place] [address] 
[phone] Patient: Ben Cadet MRN: [id number] ZVE:[id number] You are allergic to the following Allergen Reactions Asa-Acetaminophen-Caff-Potass Other (comments) Only aspirin----\" not to take due to stomach issues Cephalosporins Unknown (comments) Pt denies allergy, states she has taken cephalosporins and did well. Codeine Nausea Only Compazine (Prochlorperazine Edisylate) Unknown (comments) Causes Lock Jaw Pcn (Penicillins) Other (comments) \"drops wbc\" Recent Documentation Breastfeeding? OB Status Smoking Status No Postmenopausal Never Smoker Emergency Contacts Name Discharge Info Relation Home Work Mobile [employer] CAREGIVER [3] Spouse [3] [phone] About your hospitalization You were admitted on:  January 10, 2017 You last received care in the:  [facility] You were discharged on:  January 10, 2017 Unit phone number:  [phone] Why you were hospitalized Your primary diagnosis was:  Not on File Providers Seen During Your Hospitalizations Provider Role Specialty Primary office phone Rin Meehan MD Attending Provider Gastroenterology [phone] Your Primary Care Physician (PCP) Primary Care Physician Office Phone Office Fax [address] [phone] Follow-up Information None Your Appointments Friday January 13, 2017  1:00 PM EST  
US GUIDED PARACENTISIS INIT with SFD US LOGIC 7 UNIT 2, SFD NURSING, SFD IR RADIOLOGIST RESOURCE  
D RADIOLOGY ULTRASOUND ([address] [place] [address]) [address] [place] [address]  
[phone]  IODINE/CONTRAST ALLERGY - Must be Pre-medicated - Fax Allergy protocol to MD office(not patient) - Schedule appointment at least 48 hours after call - All medication holds must be approved (cleared) by the physician who ordered the medication. This medication hold is recommended for all invasive procedures except vascular arteriograms - It is recommended to hold all blood thinners (antiplatelet) medication such as Aspirin, Plavix, Brilinta, and Coumadin for 5 days prior to procedure - Effient  should be held for 7 days prior to the procedure - Hold Metformin the day of the procedure and 48 hours post procedure if receiving contrast - Insulin dosage: If Patient NPO, they should administer only 1/2 of their normal dose the morning of the procedure  H&P - Fax current H&P (within 30 days) to IR Scheduling  Protocols - For patients requiring Sedation: Patient must be NPO - Lab work: To be completed within 14 days of procedure for all Invasive procedures o CBC o BMP o PTT o PT/INR Tuesday January 17, 2017  8:30 AM EST  
US GUIDED PARACENTISIS INIT with [facility] US LOGIC 7 UNIT 2, SFD NURSING, D IR RADIOLOGIST RESOURCE  
D RADIOLOGY ULTRASOUND ([address] [place] [address]) [address]  
[phone] IODINE/CONTRAST ALLERGY - Must be Pre-medicated - Fax Allergy protocol to MD office(not patient) - Schedule appointment at least 48 hours after call - All medication holds must be approved (cleared) by the physician who ordered the medication.  This medication hold is recommended for all invasive procedures except vascular arteriograms - It is recommended to hold all blood thinners (antiplatelet) medication such as Aspirin, Plavix, Brilinta, and Coumadin for 5 days prior to procedure - Effient  should be held for 7 days prior to the procedure - Hold Metformin the day of the procedure and 48 hours post procedure if receiving contrast - Insulin dosage: If Patient NPO, they should administer only 1/2 of their normal dose the morning of the procedure  H&P - Fax current H&P (within 30 days) to IR Scheduling  Protocols - For patients requiring Sedation: Patient must be NPO - Lab work: To be completed within 14 days of procedure for all Invasive procedures o CBC o BMP o PTT o PT/INR Friday January 20, 2017  8:30 AM EST  
US GUIDED PARACENTISIS INIT with [facility] US LOGIC 7 UNIT 2, D NURSING, D IR RADIOLOGIST RESOURCE  
[facility] RADIOLOGY ULTRASOUND ([address] [place] [address]  
[phone] IODINE/CONTRAST ALLERGY - Must be Pre-medicated - Fax Allergy protocol to MD office(not patient) - Schedule appointment at least 48 hours after call - All medication holds must be approved (cleared) by the physician who ordered the medication. This medication hold is recommended for all invasive procedures except vascular arteriograms - It is recommended to hold all blood thinners (antiplatelet) medication such as Aspirin, Plavix, Brilinta, and Coumadin for 5 days prior to procedure - Effient  should be held for 7 days prior to the procedure - Hold Metformin the day of the procedure and 48 hours post procedure if receiving contrast - Insulin dosage: If Patient NPO, they should administer only 1/2 of their normal dose the morning of the procedure  H&P - Fax current H&P (within 30 days) to IR Scheduling  Protocols - For patients requiring Sedation: Patient must be NPO - Lab work: To be completed within 14 days of procedure for all Invasive procedures o CBC o BMP o PTT o PT/INR Tuesday January 24, 2017  8:30 AM EST  
US GUIDED PARACENTISIS INIT with [facility] 7 UNIT 2, D NURSING, D IR RADIOLOGIST RESOURCE  
[facility] RADI[facility]Y ULTR[facility]OUND ([address] [place] [address]) [address] [place] [address]  
[phone]  IODINE/CONTRAST ALLERGY - Must be Pre-medicated - Fax Allergy protocol to MD office(not patient) - Schedule appointment at least 48 hours after call - All medication holds must be approved (cleared) by the physician who ordered the medication. This medication hold is recommended for all invasive procedures except vascular arteriograms - It is recommended to hold all blood thinners (antiplatelet) medication such as Aspirin, Plavix, Brilinta, and Coumadin for 5 days prior to procedure - Effient  should be held for 7 days prior to the procedure - Hold Metformin the day of the procedure and 48 hours post procedure if receiving contrast - Insulin dosage: If Patient NPO, they should administer only 1/2 of their normal dose the morning of the procedure  H&P - Fax current H&P (within 30 days) to IR Scheduling  Protocols - For patients requiring Sedation: Patient must be NPO - Lab work: To be completed within 14 days of procedure for all Invasive procedures o CBC o BMP o PTT o PT/INR Friday January 27, 2017  8:30 AM EST  
US GUIDED PARACENTISIS INIT with [facility] US LOGIC 7 UNIT 2, SFD NURSING, D IR RADIOLOGIST RESOURCE  
D RADIOLOGY ULTRASOUND ([address] [place] [address]) [address] [facility] [address] [facility]  
[phone] IODINE/CONTRAST ALLERGY - Must be Pre-medicated - Fax Allergy protocol to MD office(not patient) - Schedule appointment at least 48 hours after call - All medication holds must be approved (cleared) by the physician who ordered the medication.  This medication hold is recommended for all invasive procedures except vascular arteriograms - It is recommended to hold all blood thinners (antiplatelet) medication such as Aspirin, Plavix, Brilinta, and Coumadin for 5 days prior to procedure - Effient  should be held for 7 days prior to the procedure - Hold Metformin the day of the procedure and 48 hours post procedure if receiving contrast - Insulin dosage: If Patient NPO, they should administer only 1/2 of their normal dose the morning of the procedure  H&P - Fax current H&P (within 30 days) to IR Scheduling  Protocols - For patients requiring Sedation: Patient must be NPO - Lab work: To be completed within 14 days of procedure for all Invasive procedures o CBC o BMP o PTT o PT/INR Tuesday January 31, 2017  8:30 AM EST  
US GUIDED PARACENTISIS INIT with [facility] US LOGIC 7 UNIT 2, SFD NURSING, SFD IR RADIOLOGIST RESOURCE  
D RADIOLOGY ULTRASOUND ([address] [place] [address]) [address] [place] [address]  
[phone] IODINE/CONTRAST ALLERGY - Must be Pre-medicated - Fax Allergy protocol to MD office(not patient) - Schedule appointment at least 48 hours after call - All medication holds must be approved (cleared) by the physician who ordered the medication. This medication hold is recommended for all invasive procedures except vascular arteriograms - It is recommended to hold all blood thinners (antiplatelet) medication such as Aspirin, Plavix, Brilinta, and Coumadin for 5 days prior to procedure - Effient  should be held for 7 days prior to the procedure - Hold Metformin the day of the procedure and 48 hours post procedure if receiving contrast - Insulin dosage: If Patient NPO, they should administer only 1/2 of their normal dose the morning of the procedure  H&P - Fax current H&P (within 30 days) to IR Scheduling  Protocols - For patients requiring Sedation: Patient must be NPO - Lab work: To be completed within 14 days of procedure for all Invasive procedures o CBC o BMP o PTT o PT/INR Current Discharge Medication List  
  
ASK your doctor about these medications Dose & Instructions Dispensing Information Comments Morning Noon Evening Bedtime  
 baclofen 10 mg tablet Commonly known as:  LIORESAL Your next dose is: Today, Tomorrow Other:  _________ Dose:  10 mg Take 10 mg by mouth as needed. Refills:  0 GENGRAF 25 mg capsule Generic drug:  cycloSPORINE modified Your next dose is: Today, Tomorrow Other:  _________ Dose:  2.5 mg/kg/day Take 2.5 mg/kg/day by mouth every morning. Indications: Takes in the AM  
 Refills:  0  
     
   
   
   
  
 lactulose 10 gram/15 mL solution Commonly known as:  Aloma Parent Your next dose is: Today, Tomorrow Other:  _________ Dose:  30 g Take 45 mL by mouth three (3) times daily. Quantity:  480 mL Refills:  0  
     
   
   
   
  
 LANTUS SOLOSTAR 100 unit/mL (3 mL) pen Generic drug:  insulin glargine Your next dose is: Today, Tomorrow Other:  _________ Dose:  25 Units 25 Units by SubCUTAneous route nightly. Refills:  0  
     
   
   
   
  
 magnesium oxide 400 mg tablet Commonly known as:  MAG-OX Your next dose is: Today, Tomorrow Other:  _________ Dose:  400 mg Take 400 mg by mouth two (2) times a day. Refills:  0  
     
   
   
   
  
 midodrine 5 mg tablet Commonly known as:  Keagan Croon Your next dose is: Today, Tomorrow Other:  _________ Dose:  5 mg Take 5 mg by mouth every eight (8) hours. 2 q8h Refills:  0 NovoLOG 100 unit/mL injection Generic drug:  insulin aspart Your next dose is: Today, Tomorrow Other:  _________  
   
   
 by SubCUTAneous route. Sliding scale Refills:  0  
     
   
   
   
  
 ondansetron hcl 4 mg tablet Commonly known as:  Allegra [place] Your next dose is: Today, Tomorrow Other:  _________ Dose:  4 mg Take 1 Tab by mouth every eight (8) hours as needed for Nausea. Quantity:  30 Tab Refills:  0 PROTONIX 40 mg tablet Generic drug:  pantoprazole Your next dose is: Today, Tomorrow Other:  _________ Dose:  40 mg Take 40 mg by mouth four (4) times daily. 2tabs bid Refills:  0 traMADol 50 mg tablet Commonly known as:  ULTRAM  
   
Your next dose is: Today, Tomorrow Other:  _________ Dose:  50 mg Take 1 Tab by mouth every six (6) hours as needed. Max Daily Amount: 200 mg. Indications: PAIN Quantity:  20 Tab Refills:  0  
     
   
   
   
  
 XIFAXAN 550 mg tablet Generic drug:  rifAXIMin Your next dose is: Today, Tomorrow Other:  _________ Dose:  550 mg Take 550 mg by mouth three (3) times daily. Refills:  0  
     
   
   
   
  
 zinc sulfate 220 (50) mg capsule Commonly known as:  ZINCATE Your next dose is: Today, Tomorrow Other:  _________ Dose:  220 mg Take 220 mg by mouth every morning. Refills:  5 Discharge Instructions [address] Department of Interventional Radiology [facility] 
[phone] Office 
[phone] Fax PARACENTESIS DISCHARGE INSTRUCTIONS General Information: 
During this procedure, the doctor will insert a needle into the abdomen to drain fluid. After the procedure, you will be able to take a deep breath much easier. The site of the puncture may ooze the first day. This will decrease and eventually stop. Paracentesis (draining fluid from the abdomen) sometimes makes patients hypotensive (low blood pressure). Your doctor may order for you to receive fluids or albumin (a volume booster) during the procedure through an IV site. Home Care Instructions: 
Keep the puncture site clean and dry. No tub baths or swimming until puncture site heals. Showering is acceptable. Resume your normal diet, and resume your normal activity slowly and as you tolerate. If you are short of breath, rest. If shortness of breath does not ease, please call your ordering doctor.  Fluid can re-accumulate in the chest and/or in the abdomen. If this should occur, your doctor needs to know as you may need to have the procedure done again. Call If: 
   You should call your Physician and/or the Radiology Nurse if you notice any signs of infection, like pus draining, or if it is swollen or reddened. Also call if you have a fever, or if you are bleeding from the puncture site more than a small amount on the dressing. Call if the puncture site keeps draining fluid. Some oozing is to be expected, but should slow and then stop. Call if you feel like you have pressure in your abdomen. SEEK IMMEDIATE CARE OR CALL 911 IF YOU SUDDENLY HAVE TROUBLE BREATHING, OR IF YOUR LIPS TURN BLUE, OR IF YOU NOTICE BLOOD IN YOUR SPUTUM. Follow-Up Instructions: Please see your ordering doctor as he/she has requested. To Reach Us:   
 
 
Date: 1/10/2017 If you have any questions about your procedure, please call the Interventional Radiology department at [phone]. After business hours (5pm) and weekends, call the answering service at [phone] and ask for the Radiologist on call to be paged. Si tiene Preguntas acerca del procedimiento, por favor llame al departamento de Radiología Intervencional al [phone]. Después de horas de oficina (5 pm) y los fines de [place], llamar al Wonda Mage de llamadas al [phone] y pregunte por el Radiologo de [facility]. Interventional Radiology General Nurse Discharge After general anesthesia or intravenous sedation, for 24 hours or while taking prescription Narcotics: · Limit your activities · Do not drive and operate hazardous machinery · Do not make important personal or business decisions · Do  not drink alcoholic beverages · If you have not urinated within 8 hours after discharge, please contact your surgeon on call. * Please give a list of your current medications to your Primary Care Provider.  
* Please update this list whenever your medications are discontinued, doses are 
 changed, or new medications (including over-the-counter products) are added. * Please carry medication information at all times in case of emergency situations. These are general instructions for a healthy lifestyle: No smoking/ No tobacco products/ Avoid exposure to second hand smoke Surgeon General's Warning:  Quitting smoking now greatly reduces serious risk to your health. Obesity, smoking, and sedentary lifestyle greatly increases your risk for illness A healthy diet, regular physical exercise & weight monitoring are important for maintaining a healthy lifestyle You may be retaining fluid if you have a history of heart failure or if you experience any of the following symptoms:  Weight gain of 3 pounds or more overnight or 5 pounds in a week, increased swelling in our hands or feet or shortness of breath while lying flat in bed. Please call your doctor as soon as you notice any of these symptoms; do not wait until your next office visit. Recognize signs and symptoms of STROKE: 
F-face looks uneven A-arms unable to move or move unevenly S-speech slurred or non-existent T-time-call 911 as soon as signs and symptoms begin-DO NOT go Back to bed or wait to see if you get better-TIME IS BRAIN. Discharge Orders None Introducing [facility] SERVICES! Dear Jaison Stevenson: 
Thank you for requesting a [employer] account. Our records indicate that you already have an active [employer] account. You can access your account anytime at [email or web address] Did you know that you can access your hospital and ER discharge instructions at any time in [employer]? You can also review all of your test results from your hospital stay or ER visit. Additional Information If you have questions, please visit the Frequently Asked Questions section of the [employer] website at [email or web address] Remember, MyChart is NOT to be used for urgent needs. For medical emergencies, dial 911. Now available from your iPhone and Android! General Information Please provide this summary of care documentation to your next provider. Patient Signature:  ____________________________________________________________ Date:  ____________________________________________________________  
  
Simran Judy Provider Signature:  ____________________________________________________________ Date:  ____________________________________________________________

## 2017-01-17 NOTE — PROGRESS NOTES
Patient to [address] [facility] room for procedure. Patient awake and alert, verbalized name, , and procedure to be performed. Patient moved to procedure table independently.

## 2017-01-17 NOTE — PROGRESS NOTES
Recovery period without difficulty. Pt alert and oriented and denies pain. Dressing is clean, dry, and intact. Reviewed discharge instructions with patient verbalized understanding. Pt escorted to lobby discharge area via wheelchair. Vital signs and Shakira score completed.

## 2017-01-17 NOTE — DISCHARGE INSTRUCTIONS
[address]  Department of Interventional Radiology  [facility]  [phone] Office  [phone] Fax    PARACENTESIS DISCHARGE INSTRUCTIONS    General Information:  During this procedure, the doctor will insert a needle into the abdomen to drain fluid. After the procedure, you will be able to take a deep breath much easier. The site of the puncture may ooze the first day. This will decrease and eventually stop. Paracentesis (draining fluid from the abdomen) sometimes makes patients hypotensive (low blood pressure). Your doctor may order for you to receive fluids or albumin (a volume booster) during the procedure through an IV site. Home Care Instructions:  Keep the puncture site clean and dry. No tub baths or swimming until puncture site heals. Showering is acceptable. Resume your normal diet, and resume your normal activity slowly and as you tolerate. If you are short of breath, rest. If shortness of breath does not ease, please call your ordering doctor. Fluid can re-accumulate in the chest and/or in the abdomen. If this should occur, your doctor needs to know as you may need to have the procedure done again. Call If:     You should call your Physician and/or the Radiology Nurse if you notice any signs of infection, like pus draining, or if it is swollen or reddened. Also call if you have a fever, or if you are bleeding from the puncture site more than a small amount on the dressing. Call if the puncture site keeps draining fluid. Some oozing is to be expected, but should slow and then stop. Call if you feel like you have pressure in your abdomen. SEEK IMMEDIATE CARE OR CALL 911 IF YOU SUDDENLY HAVE TROUBLE BREATHING, OR IF YOUR LIPS TURN BLUE, OR IF YOU NOTICE BLOOD IN YOUR SPUTUM. Follow-Up Instructions: Please see your ordering doctor as he/she has requested. To Reach Us:        Date: 1/17/2017  Discharging Nurse:  Paxton Farooq Candis Majors

## 2017-01-17 NOTE — PROGRESS NOTES
Patient here for routine CVC dressing change to tunnelled catheter by Eligio Rendon RN. Site pink and healthy. Flushed and aspirated without difficulty. Packed with Heparin. Redressed. Appt made for next week.   Lynnette Mancilla RN, VAT

## 2017-01-17 NOTE — PROGRESS NOTES
5,150ml yellow ascitic fluid drawn off during paracentesis. Pt tolerated well. Specimen sent to lab via [phone].

## 2017-01-24 NOTE — IP AVS SNAPSHOT
Inder Ego 
 
 
 [address] [facility] 
[phone] Patient: Robyn Ho MRN: [id number] OBF:[id number] You are allergic to the following Allergen Reactions Asa-Acetaminophen-Caff-Potass Other (comments) Only aspirin----\" not to take due to stomach issues Cephalosporins Unknown (comments) Pt denies allergy, states she has taken cephalosporins and did well. Codeine Nausea Only Compazine (Prochlorperazine Edisylate) Unknown (comments) Causes Lock Jaw Pcn (Penicillins) Other (comments) \"drops wbc\" Recent Documentation OB Status Smoking Status Postmenopausal Never Smoker Emergency Contacts Name Discharge Info Relation Home Work Mobile Reed [address] CAREGIVER [3] Spouse [3] [phone] About your hospitalization You were admitted on:  January 24, 2017 You last received care in the:  [facility]TRASOUND You were discharged on:  January 24, 2017 Unit phone number:  [phone] Why you were hospitalized Your primary diagnosis was:  Not on File Providers Seen During Your Hospitalizations Provider Role Specialty Primary office phone Kaitlyn Morfin MD Attending Provider Gastroenterology [phone] Your Primary Care Physician (PCP) Primary Care Physician Office Phone Office Fax [address] [phone] Follow-up Information None Your Appointments Tuesday January 24, 2017 10:30 AM EST  
CVC DRESSING/FLUSH with PICC ROOM  
D VASCULAR ACCESS ([address] [place] [address]) [address] [place] [address]  
7th Floor [facility] [address]  
[phone] Tuesday January 24, 2017  2:15 PM EST  
ESTABLISHED PATIENT with Francine Or Edita, NP  
[facility] ([facility]) 2069 \[facility] [place] [facility]\"" [address] [place] [id number]  
[phone] Friday January 27, 2017  8:30 AM EST  
US GUIDED PARACENTISIS INIT with [facility] US LOGIC 7 UNIT 2, SFD NURSING, SFD IR RADIOLOGIST RESOURCE  
[facility] RADIOLOGY ULTRASOUND ([address] [place] [address]) [address] [place] [address]  
[phone] IODINE/CONTRAST ALLERGY - Must be Pre-medicated - Fax Allergy protocol to MD office(not patient) - Schedule appointment at least 48 hours after call - All medication holds must be approved (cleared) by the physician who ordered the medication. This medication hold is recommended for all invasive procedures except vascular arteriograms - It is recommended to hold all blood thinners (antiplatelet) medication such as Aspirin, Plavix, Brilinta, and Coumadin for 5 days prior to procedure - Effient  should be held for 7 days prior to the procedure - Hold Metformin the day of the procedure and 48 hours post procedure if receiving contrast - Insulin dosage: If Patient NPO, they should administer only 1/2 of their normal dose the morning of the procedure  H&P - Fax current H&P (within 30 days) to IR Scheduling  Protocols - For patients requiring Sedation: Patient must be NPO - Lab work: To be completed within 14 days of procedure for all Invasive procedures o CBC o BMP o PTT o PT/INR Tuesday January 31, 2017  8:30 AM EST  
US GUIDED PARACENTISIS INIT with [facility] US LOGIC 7 UNIT 2, SFD NURSING, SFD IR RADIOLOGIST RESOURCE  
[facility] RADIOLOGY ULTRASOUND ([address] [place] [address]) [address] [place] [address]  
[phone] IODINE/CONTRAST ALLERGY - Must be Pre-medicated - Fax Allergy protocol to MD office(not patient) - Schedule appointment at least 48 hours after call - All medication holds must be approved (cleared) by the physician who ordered the medication.  This medication hold is recommended for all invasive procedures except vascular arteriograms - It is recommended to hold all blood thinners (antiplatelet) medication such as Aspirin, Plavix, Brilinta, and Coumadin for 5 days prior to procedure - Effient  should be held for 7 days prior to the procedure - Hold Metformin the day of the procedure and 48 hours post procedure if receiving contrast - Insulin dosage: If Patient NPO, they should administer only 1/2 of their normal dose the morning of the procedure  H&P - Fax current H&P (within 30 days) to IR Scheduling  Protocols - For patients requiring Sedation: Patient must be NPO - Lab work: To be completed within 14 days of procedure for all Invasive procedures o CBC o BMP o PTT o PT/INR Current Discharge Medication List  
  
ASK your doctor about these medications Dose & Instructions Dispensing Information Comments Morning Noon Evening Bedtime  
 baclofen 10 mg tablet Commonly known as:  LIORESAL Your next dose is: Today, Tomorrow Other:  _________ Dose:  10 mg Take 10 mg by mouth as needed. Refills:  0 GENGRAF 25 mg capsule Generic drug:  cycloSPORINE modified Your next dose is: Today, Tomorrow Other:  _________ Dose:  2.5 mg/kg/day Take 2.5 mg/kg/day by mouth every morning. Indications: Takes in the AM  
 Refills:  0  
     
   
   
   
  
 lactulose 10 gram/15 mL solution Commonly known as:  Nyssa Barrio Your next dose is: Today, Tomorrow Other:  _________ Dose:  30 g Take 45 mL by mouth three (3) times daily. Quantity:  480 mL Refills:  0  
     
   
   
   
  
 LANTUS SOLOSTAR 100 unit/mL (3 mL) pen Generic drug:  insulin glargine Your next dose is: Today, Tomorrow Other:  _________ Dose:  25 Units 25 Units by SubCUTAneous route nightly. Refills:  0  
     
   
   
   
  
 magnesium oxide 400 mg tablet Commonly known as:  MAG-OX Your next dose is: Today, Tomorrow Other:  _________ Dose:  400 mg Take 400 mg by mouth two (2) times a day. Refills:  0  
     
   
   
   
  
 midodrine 5 mg tablet Commonly known as:  Serena Handy Your next dose is: Today, Tomorrow Other:  _________ Dose:  5 mg Take 5 mg by mouth every eight (8) hours. 2 q8h Refills:  0 NovoLOG 100 unit/mL injection Generic drug:  insulin aspart Your next dose is: Today, Tomorrow Other:  _________  
   
   
 by SubCUTAneous route. Sliding scale Refills:  0  
     
   
   
   
  
 ondansetron hcl 4 mg tablet Commonly known as:  Catrina Jules Your next dose is: Today, Tomorrow Other:  _________ Dose:  4 mg Take 1 Tab by mouth every eight (8) hours as needed for Nausea. Quantity:  30 Tab Refills:  0 PROTONIX 40 mg tablet Generic drug:  pantoprazole Your next dose is: Today, Tomorrow Other:  _________ Dose:  40 mg Take 40 mg by mouth four (4) times daily. 2tabs bid Refills:  0  
     
   
   
   
  
 traMADol 50 mg tablet Commonly known as:  ULTRAM  
   
Your next dose is: Today, Tomorrow Other:  _________ Dose:  50 mg Take 1 Tab by mouth every six (6) hours as needed. Max Daily Amount: 200 mg. Indications: PAIN Quantity:  20 Tab Refills:  0  
     
   
   
   
  
 XIFAXAN 550 mg tablet Generic drug:  rifAXIMin Your next dose is: Today, Tomorrow Other:  _________ Dose:  550 mg Take 550 mg by mouth three (3) times daily. Refills:  0  
     
   
   
   
  
 zinc sulfate 220 (50) mg capsule Commonly known as:  ZINCATE Your next dose is: Today, Tomorrow Other:  _________ Dose:  220 mg Take 220 mg by mouth every morning. Refills:  5 Discharge Instructions [address] [address] Department of Interventional Radiology [facility] 
[phone] Office 
[phone] Fax PARACENTESIS DISCHARGE INSTRUCTIONS General Information: 
During this procedure, the doctor will insert a needle into the abdomen to drain fluid. After the procedure, you will be able to take a deep breath much easier. The site of the puncture may ooze the first day. This will decrease and eventually stop. Paracentesis (draining fluid from the abdomen) sometimes makes patients hypotensive (low blood pressure). Your doctor may order for you to receive fluids or albumin (a volume booster) during the procedure through an IV site. Home Care Instructions: 
Keep the puncture site clean and dry. No tub baths or swimming until puncture site heals. Showering is acceptable. Resume your normal diet, and resume your normal activity slowly and as you tolerate. If you are short of breath, rest. If shortness of breath does not ease, please call your ordering doctor. Fluid can re-accumulate in the chest and/or in the abdomen. If this should occur, your doctor needs to know as you may need to have the procedure done again. Call If: 
   You should call your Physician and/or the Radiology Nurse if you notice any signs of infection, like pus draining, or if it is swollen or reddened. Also call if you have a fever, or if you are bleeding from the puncture site more than a small amount on the dressing. Call if the puncture site keeps draining fluid. Some oozing is to be expected, but should slow and then stop. Call if you feel like you have pressure in your abdomen. SEEK IMMEDIATE CARE OR CALL 911 IF YOU SUDDENLY HAVE TROUBLE BREATHING, OR IF YOUR LIPS TURN BLUE, OR IF YOU NOTICE BLOOD IN YOUR SPUTUM. Follow-Up Instructions: Please see your ordering doctor as he/she has requested. To Reach Us:   If you have any questions about your procedure, please call the Interventional Radiology department at [phone]. After business hours (5pm) and weekends, call the answering service at [phone] and ask for the Radiologist on call to be paged. Date: 1/24/2017 Discharging Nurse: Nelida Davies RN Interventional Radiology General Nurse Discharge After general anesthesia or intravenous sedation, for 24 hours or while taking prescription Narcotics: · Limit your activities · Do not drive and operate hazardous machinery · Do not make important personal or business decisions · Do  not drink alcoholic beverages · If you have not urinated within 8 hours after discharge, please contact your surgeon on call. * Please give a list of your current medications to your Primary Care Provider. * Please update this list whenever your medications are discontinued, doses are 
   changed, or new medications (including over-the-counter products) are added. * Please carry medication information at all times in case of emergency situations. These are general instructions for a healthy lifestyle: No smoking/ No tobacco products/ Avoid exposure to second hand smoke Surgeon General's Warning:  Quitting smoking now greatly reduces serious risk to your health. Obesity, smoking, and sedentary lifestyle greatly increases your risk for illness A healthy diet, regular physical exercise & weight monitoring are important for maintaining a healthy lifestyle You may be retaining fluid if you have a history of heart failure or if you experience any of the following symptoms:  Weight gain of 3 pounds or more overnight or 5 pounds in a week, increased swelling in our hands or feet or shortness of breath while lying flat in bed. Please call your doctor as soon as you notice any of these symptoms; do not wait until your next office visit. Recognize signs and symptoms of STROKE: 
F-face looks uneven A-arms unable to move or move unevenly S-speech slurred or non-existent T-time-call 911 as soon as signs and symptoms begin-DO NOT go Back to bed or wait to see if you get better-TIME IS BRAIN. Discharge Orders None Introducing [facility]! Dear Sole Mari: 
Thank you for requesting a Service[employer] account. Our records indicate that you already have an active ServiceMax account. You can access your account anytime at [email or web address] Did you know that you can access your hospital and ER discharge instructions at any time in ServiceMax? You can also review all of your test results from your hospital stay or ER visit. Additional Information If you have questions, please visit the Frequently Asked Questions section of the ServiceMax website at [email or web address] Remember, ServiceMax is NOT to be used for urgent needs. For medical emergencies, dial 911. Now available from your iPhone and Android! General Information Please provide this summary of care documentation to your next provider. Patient Signature:  ____________________________________________________________ Date:  ____________________________________________________________  
  
Evon Main Provider Signature:  ____________________________________________________________ Date:  ____________________________________________________________

## 2017-01-24 NOTE — PROGRESS NOTES
Interventional Radiology Post Paracentesis/Thoracentesis Note    1/24/2017    Procedure(s): Ultrasound guided Diagnostic Paracentesis Performed with 8 Kyrgyz catheter total volume 5600 ml. Samples sent to lab. Preliminary Findings: medium clear. Complications: None    Plan:  Observation, check labs if drawn.           Chest X-Ray:  no    Full dictated report to follow    Signed By: Teofilo Canela RN

## 2017-01-24 NOTE — DISCHARGE INSTRUCTIONS
[address]  Department of Interventional Radiology  [facility]  [phone] Office  [phone] Fax    PARACENTESIS DISCHARGE INSTRUCTIONS    General Information:  During this procedure, the doctor will insert a needle into the abdomen to drain fluid. After the procedure, you will be able to take a deep breath much easier. The site of the puncture may ooze the first day. This will decrease and eventually stop. Paracentesis (draining fluid from the abdomen) sometimes makes patients hypotensive (low blood pressure). Your doctor may order for you to receive fluids or albumin (a volume booster) during the procedure through an IV site. Home Care Instructions:  Keep the puncture site clean and dry. No tub baths or swimming until puncture site heals. Showering is acceptable. Resume your normal diet, and resume your normal activity slowly and as you tolerate. If you are short of breath, rest. If shortness of breath does not ease, please call your ordering doctor. Fluid can re-accumulate in the chest and/or in the abdomen. If this should occur, your doctor needs to know as you may need to have the procedure done again. Call If:     You should call your Physician and/or the Radiology Nurse if you notice any signs of infection, like pus draining, or if it is swollen or reddened. Also call if you have a fever, or if you are bleeding from the puncture site more than a small amount on the dressing. Call if the puncture site keeps draining fluid. Some oozing is to be expected, but should slow and then stop. Call if you feel like you have pressure in your abdomen. SEEK IMMEDIATE CARE OR CALL 911 IF YOU SUDDENLY HAVE TROUBLE BREATHING, OR IF YOUR LIPS TURN BLUE, OR IF YOU NOTICE BLOOD IN YOUR SPUTUM. Follow-Up Instructions: Please see your ordering doctor as he/she has requested. To Reach Us:   If you have any questions about your procedure, please call the Interventional Radiology department at [phone]. After business hours (5pm) and weekends, call the answering service at [phone] and ask for the Radiologist on call to be paged. Date: 1/24/2017  Discharging Nurse: Chavez Rincon RN      Interventional Radiology General Nurse Discharge    After general anesthesia or intravenous sedation, for 24 hours or while taking prescription Narcotics:  · Limit your activities  · Do not drive and operate hazardous machinery  · Do not make important personal or business decisions  · Do  not drink alcoholic beverages  · If you have not urinated within 8 hours after discharge, please contact your surgeon on call. * Please give a list of your current medications to your Primary Care Provider. * Please update this list whenever your medications are discontinued, doses are     changed, or new medications (including over-the-counter products) are added. * Please carry medication information at all times in case of emergency situations. These are general instructions for a healthy lifestyle:    No smoking/ No tobacco products/ Avoid exposure to second hand smoke  Surgeon General's Warning:  Quitting smoking now greatly reduces serious risk to your health. Obesity, smoking, and sedentary lifestyle greatly increases your risk for illness  A healthy diet, regular physical exercise & weight monitoring are important for maintaining a healthy lifestyle    You may be retaining fluid if you have a history of heart failure or if you experience any of the following symptoms:  Weight gain of 3 pounds or more overnight or 5 pounds in a week, increased swelling in our hands or feet or shortness of breath while lying flat in bed. Please call your doctor as soon as you notice any of these symptoms; do not wait until your next office visit.     Recognize signs and symptoms of STROKE:  F-face looks uneven    A-arms unable to move or move unevenly    S-speech slurred or non-existent    T-time-call 911 as soon as signs and symptoms begin-DO NOT go       Back to bed or wait to see if you get better-TIME IS BRAIN.

## 2017-01-24 NOTE — IP AVS SNAPSHOT
Current Discharge Medication List  
  
ASK your doctor about these medications Dose & Instructions Dispensing Information Comments Morning Noon Evening Bedtime  
 baclofen 10 mg tablet Commonly known as:  LIORESAL Your next dose is: Today, Tomorrow Other:  ____________ Dose:  10 mg Take 10 mg by mouth as needed. Refills:  0 GENGRAF 25 mg capsule Generic drug:  cycloSPORINE modified Your next dose is: Today, Tomorrow Other:  ____________ Dose:  2.5 mg/kg/day Take 2.5 mg/kg/day by mouth every morning. Indications: Takes in the AM  
 Refills:  0  
     
   
   
   
  
 lactulose 10 gram/15 mL solution Commonly known as:  Sherkolby Fonder Your next dose is: Today, Tomorrow Other:  ____________ Dose:  30 g Take 45 mL by mouth three (3) times daily. Quantity:  480 mL Refills:  0  
     
   
   
   
  
 LANTUS SOLOSTAR 100 unit/mL (3 mL) pen Generic drug:  insulin glargine Your next dose is: Today, Tomorrow Other:  ____________ Dose:  25 Units 25 Units by SubCUTAneous route nightly. Refills:  0  
     
   
   
   
  
 magnesium oxide 400 mg tablet Commonly known as:  MAG-OX Your next dose is: Today, Tomorrow Other:  ____________ Dose:  400 mg Take 400 mg by mouth two (2) times a day. Refills:  0  
     
   
   
   
  
 midodrine 5 mg tablet Commonly known as:  Thereasa Rile Your next dose is: Today, Tomorrow Other:  ____________ Dose:  5 mg Take 5 mg by mouth every eight (8) hours. 2 q8h Refills:  0 NovoLOG 100 unit/mL injection Generic drug:  insulin aspart Your next dose is: Today, Tomorrow Other:  ____________  
   
   
 by SubCUTAneous route. Sliding scale Refills:  0  
     
   
   
   
  
 ondansetron hcl 4 mg tablet Commonly known as:  Anatoly Irvin  
   
 Your next dose is: Today, Tomorrow Other:  ____________ Dose:  4 mg Take 1 Tab by mouth every eight (8) hours as needed for Nausea. Quantity:  30 Tab Refills:  0 PROTONIX 40 mg tablet Generic drug:  pantoprazole Your next dose is: Today, Tomorrow Other:  ____________ Dose:  40 mg Take 40 mg by mouth four (4) times daily. 2tabs bid Refills:  0  
     
   
   
   
  
 traMADol 50 mg tablet Commonly known as:  ULTRAM  
   
Your next dose is: Today, Tomorrow Other:  ____________ Dose:  50 mg Take 1 Tab by mouth every six (6) hours as needed. Max Daily Amount: 200 mg. Indications: PAIN Quantity:  20 Tab Refills:  0  
     
   
   
   
  
 XIFAXAN 550 mg tablet Generic drug:  rifAXIMin Your next dose is: Today, Tomorrow Other:  ____________ Dose:  550 mg Take 550 mg by mouth three (3) times daily. Refills:  0  
     
   
   
   
  
 zinc sulfate 220 (50) mg capsule Commonly known as:  ZINCATE Your next dose is: Today, Tomorrow Other:  ____________ Dose:  220 mg Take 220 mg by mouth every morning. Refills:  5

## 2017-01-24 NOTE — PROGRESS NOTES
Recovery period without difficulty. Pt alert and oriented and denies pain. Dressing is clean, dry, and intact. Reviewed discharge instructions with patient  verbalized understanding. Pt escorted to lobby discharge area. Vital signs   completed.

## 2017-01-27 NOTE — PROGRESS NOTES
Interventional Radiology Post Paracentesis Note    1/27/2017    Procedure(s): Ultrasound guided Diagnostic Paracentesis Performed with 8 Tuvaluan catheter total volume 4200 ml. Samples sent to lab. Preliminary Findings: moderate clear and yellow.     Complications: None    Plan:  Observation, discharge home          Chest X-Ray:  no    Full dictated report to follow    Signed By: Sybil Manning RN

## 2017-01-27 NOTE — PROGRESS NOTES
Procedure complete. Pt tolerated well. Pt denies need for discharge instructions. Pt dressed and discharged to home.

## 2017-01-31 NOTE — PROGRESS NOTES
Interventional Radiology Post Paracentesis/Thoracentesis Note    1/31/2017    Procedure(s): Ultrasound guided Diagnostic Paracentesis Performed with 8 Azeri catheter total volume 5100 ml. Samples sent to lab. Preliminary Findings: moderate clear and yellow. Complications: None    Plan:  Observation, check labs if drawn.           Chest X-Ray:  no    Full dictated report to follow    Signed By: Kelly Bowling RN

## 2017-01-31 NOTE — DISCHARGE INSTRUCTIONS
Tiigi [address]  Department of Interventional Radiology  [facility]  [phone] Office  [phone] Fax    PARACENTESIS DISCHARGE INSTRUCTIONS    General Information:  During this procedure, the doctor will insert a needle into the abdomen to drain fluid. After the procedure, you will be able to take a deep breath much easier. The site of the puncture may ooze the first day. This will decrease and eventually stop. Paracentesis (draining fluid from the abdomen) sometimes makes patients hypotensive (low blood pressure). Your doctor may order for you to receive fluids or albumin (a volume booster) during the procedure through an IV site. Home Care Instructions:  Keep the puncture site clean and dry. No tub baths or swimming until puncture site heals. Showering is acceptable. Resume your normal diet, and resume your normal activity slowly and as you tolerate. If you are short of breath, rest. If shortness of breath does not ease, please call your ordering doctor. Fluid can re-accumulate in the chest and/or in the abdomen. If this should occur, your doctor needs to know as you may need to have the procedure done again. Call If:     You should call your Physician and/or the Radiology Nurse if you notice any signs of infection, like pus draining, or if it is swollen or reddened. Also call if you have a fever, or if you are bleeding from the puncture site more than a small amount on the dressing. Call if the puncture site keeps draining fluid. Some oozing is to be expected, but should slow and then stop. Call if you feel like you have pressure in your abdomen. SEEK IMMEDIATE CARE OR CALL 911 IF YOU SUDDENLY HAVE TROUBLE BREATHING, OR IF YOUR LIPS TURN BLUE, OR IF YOU NOTICE BLOOD IN YOUR SPUTUM. Follow-Up Instructions: Please see your ordering doctor as he/she has requested. To Reach Us:  If you have any questions about your procedure, please call the Interventional Radiology department at [phone]. After business hours (5pm) and weekends, call the answering service at [phone] and ask for the Radiologist on call to be paged. Si tiene Preguntas acerca del procedimiento, por favor llame al departamento de Radiología Intervencional al [phone]. Después de horas de oficina (5 pm) y los fines de [place], llamar al Geetha Eagles de llamadas al [phone] y pregunte por el Radiologo de Sushma Lunsford. Interventional Radiology General Nurse Discharge    After general anesthesia or intravenous sedation, for 24 hours or while taking prescription Narcotics:  · Limit your activities  · Do not drive and operate hazardous machinery  · Do not make important personal or business decisions  · Do  not drink alcoholic beverages  · If you have not urinated within 8 hours after discharge, please contact your surgeon on call. * Please give a list of your current medications to your Primary Care Provider. * Please update this list whenever your medications are discontinued, doses are     changed, or new medications (including over-the-counter products) are added. * Please carry medication information at all times in case of emergency situations. These are general instructions for a healthy lifestyle:    No smoking/ No tobacco products/ Avoid exposure to second hand smoke  Surgeon General's Warning:  Quitting smoking now greatly reduces serious risk to your health. Obesity, smoking, and sedentary lifestyle greatly increases your risk for illness  A healthy diet, regular physical exercise & weight monitoring are important for maintaining a healthy lifestyle    You may be retaining fluid if you have a history of heart failure or if you experience any of the following symptoms:  Weight gain of 3 pounds or more overnight or 5 pounds in a week, increased swelling in our hands or feet or shortness of breath while lying flat in bed.   Please call your doctor as soon as you notice any of these symptoms; do not wait until your next office visit. Recognize signs and symptoms of STROKE:  F-face looks uneven    A-arms unable to move or move unevenly    S-speech slurred or non-existent    T-time-call 911 as soon as signs and symptoms begin-DO NOT go       Back to bed or wait to see if you get better-TIME IS BRAIN.       Date: 1/31/2017  Discharging Nurse: Tulio Kee RN

## 2017-02-06 NOTE — PROGRESS NOTES
Patient here for routine CVC dressing change, flush and labs. Site pink and healthy. BB0162 reapplied after cleaning area per policy, by Luc Rodríguez RN. Blood drawn and sent to lab. CVC packed with heparin and pt discharged. Appt made for next week.   Emma Crystal RN, VAT

## 2017-02-06 NOTE — DISCHARGE INSTRUCTIONS
Julio Césarigi 34 [address]  Department of Interventional Radiology  [facility]  [phone] Office  [phone] Fax    PARACENTESIS DISCHARGE INSTRUCTIONS    General Information:  During this procedure, the doctor will insert a needle into the abdomen to drain fluid. After the procedure, you will be able to take a deep breath much easier. The site of the puncture may ooze the first day. This will decrease and eventually stop. Paracentesis (draining fluid from the abdomen) sometimes makes patients hypotensive (low blood pressure). Your doctor may order for you to receive fluids or albumin (a volume booster) during the procedure through an IV site. Home Care Instructions:  Keep the puncture site clean and dry. No tub baths or swimming until puncture site heals. Showering is acceptable. Resume your normal diet, and resume your normal activity slowly and as you tolerate. If you are short of breath, rest. If shortness of breath does not ease, please call your ordering doctor. Fluid can re-accumulate in the chest and/or in the abdomen. If this should occur, your doctor needs to know as you may need to have the procedure done again. Call If:     You should call your Physician and/or the Radiology Nurse if you notice any signs of infection, like pus draining, or if it is swollen or reddened. Also call if you have a fever, or if you are bleeding from the puncture site more than a small amount on the dressing. Call if the puncture site keeps draining fluid. Some oozing is to be expected, but should slow and then stop. Call if you feel like you have pressure in your abdomen. SEEK IMMEDIATE CARE OR CALL 911 IF YOU SUDDENLY HAVE TROUBLE BREATHING, OR IF YOUR LIPS TURN BLUE, OR IF YOU NOTICE BLOOD IN YOUR SPUTUM. Follow-Up Instructions: Please see your ordering doctor as he/she has requested. To Reach Us:   If you have any questions about your procedure, please call the Interventional Radiology department at [phone]. After business hours (5pm) and weekends, call the answering service at [phone] and ask for the Radiologist on call to be paged. Interventional Radiology General Nurse Discharge    After general anesthesia or intravenous sedation, for 24 hours or while taking prescription Narcotics:  · Limit your activities  · Do not drive and operate hazardous machinery  · Do not make important personal or business decisions  · Do  not drink alcoholic beverages  · If you have not urinated within 8 hours after discharge, please contact your surgeon on call. * Please give a list of your current medications to your Primary Care Provider. * Please update this list whenever your medications are discontinued, doses are     changed, or new medications (including over-the-counter products) are added. * Please carry medication information at all times in case of emergency situations. These are general instructions for a healthy lifestyle:    No smoking/ No tobacco products/ Avoid exposure to second hand smoke  Surgeon General's Warning:  Quitting smoking now greatly reduces serious risk to your health. Obesity, smoking, and sedentary lifestyle greatly increases your risk for illness  A healthy diet, regular physical exercise & weight monitoring are important for maintaining a healthy lifestyle    You may be retaining fluid if you have a history of heart failure or if you experience any of the following symptoms:  Weight gain of 3 pounds or more overnight or 5 pounds in a week, increased swelling in our hands or feet or shortness of breath while lying flat in bed. Please call your doctor as soon as you notice any of these symptoms; do not wait until your next office visit.     Recognize signs and symptoms of STROKE:  F-face looks uneven    A-arms unable to move or move unevenly    S-speech slurred or non-existent    T-time-call 911 as soon as signs and symptoms begin-DO NOT go       Back to bed or wait to see if you get better-TIME IS BRAIN.         Date: 2/6/2017  Discharging Nurse: Osmany Edmondson RN

## 2017-02-06 NOTE — PROGRESS NOTES
Interventional Radiology Post Paracentesis/Thoracentesis Note    2/6/2017    Procedure(s): Ultrasound guided Diagnostic Paracentesis Performed with 8 Romanian catheter total volume 5400 ml. Samples sent to lab. Preliminary Findings: large clear and Tea colored. Complications: None    Plan:  Observation, check labs if drawn.           Chest X-Ray:  no    Full dictated report to follow    Signed By: Osmany Edmondson RN

## 2017-02-10 NOTE — DISCHARGE INSTRUCTIONS
Siobhani [address]  Department of Interventional Radiology  [address] [place] [address] [address] [facility]  [phone] Office  [phone] Fax    PARACENTESIS DISCHARGE INSTRUCTIONS    General Information:  During this procedure, the doctor will insert a needle into the abdomen to drain fluid. After the procedure, you will be able to take a deep breath much easier. The site of the puncture may ooze the first day. This will decrease and eventually stop. Paracentesis (draining fluid from the abdomen) sometimes makes patients hypotensive (low blood pressure). Your doctor may order for you to receive fluids or albumin (a volume booster) during the procedure through an IV site. Home Care Instructions:  Keep the puncture site clean and dry. No tub baths or swimming until puncture site heals. Showering is acceptable. Resume your normal diet, and resume your normal activity slowly and as you tolerate. If you are short of breath, rest. If shortness of breath does not ease, please call your ordering doctor. Fluid can re-accumulate in the chest and/or in the abdomen. If this should occur, your doctor needs to know as you may need to have the procedure done again. Call If:     You should call your Physician and/or the Radiology Nurse if you notice any signs of infection, like pus draining, or if it is swollen or reddened. Also call if you have a fever, or if you are bleeding from the puncture site more than a small amount on the dressing. Call if the puncture site keeps draining fluid. Some oozing is to be expected, but should slow and then stop. Call if you feel like you have pressure in your abdomen. SEEK IMMEDIATE CARE OR CALL 911 IF YOU SUDDENLY HAVE TROUBLE BREATHING, OR IF YOUR LIPS TURN BLUE, OR IF YOU NOTICE BLOOD IN YOUR SPUTUM. Follow-Up Instructions: Please see your ordering doctor as he/she has requested. To Reach Us: If you have any questions about your procedure, please call the Interventional Radiology department at [phone]. After business hours (5pm) and weekends, call the answering service at [phone] and ask for the Radiologist on call to be paged.              Date: 2/10/2017  Discharging Nurse: Maico De La Rosa

## 2017-02-10 NOTE — IP AVS SNAPSHOT
[address] 
[phone] Patient: Lamin Lemon MRN: [id number] UNR:[id number] You are allergic to the following Allergen Reactions Asa-Acetaminophen-Caff-Potass Other (comments) Only aspirin----\" not to take due to stomach issues Cephalosporins Unknown (comments) Pt denies allergy, states she has taken cephalosporins and did well. Codeine Nausea Only Compazine (Prochlorperazine Edisylate) Unknown (comments) Causes Lock Jaw Pcn (Penicillins) Other (comments) \"drops wbc\" Recent Documentation OB Status Smoking Status Postmenopausal Never Smoker Emergency Contacts Name Discharge Info Relation Home Work Mobile Saimawe 89 CAREGIVER [3] Spouse [3] [phone] About your hospitalization You were admitted on:  February 10, 2017 You last received care in the:  [facility] You were discharged on:  February 10, 2017 Unit phone number:  [phone] Why you were hospitalized Your primary diagnosis was:  Not on File Providers Seen During Your Hospitalizations Provider Role Specialty Primary office phone Davon Burnham MD Attending Provider Gastroenterology [phone] Your Primary Care Physician (PCP) Primary Care Physician Office Phone Office Fax [address] [phone] Follow-up Information None Your Appointments Tuesday February 14, 2017  1:30 PM EST New Patient with Irineo Ormond, MD  
[facility] ([facility]) [address] [place] [address]  
[phone] Wednesday February 15, 2017  8:00 AM EST  
US GUIDED PARACENTISIS INIT with D US LOGIC 7 UNIT 1, SFD NURSING, SFD IR RADIOLOGIST RESOURCE  
D RADIOLOGY ULTRASOUND ([address] [place] [address]) [address]  
[phone] IODINE/CONTRAST ALLERGY - Must be Pre-medicated - Fax Allergy protocol to MD office(not patient) - Schedule appointment at least 48 hours after call - All medication holds must be approved (cleared) by the physician who ordered the medication. This medication hold is recommended for all invasive procedures except vascular arteriograms - It is recommended to hold all blood thinners (antiplatelet) medication such as Aspirin, Plavix, Brilinta, and Coumadin for 5 days prior to procedure - Effient  should be held for 7 days prior to the procedure - Hold Metformin the day of the procedure and 48 hours post procedure if receiving contrast - Insulin dosage: If Patient NPO, they should administer only 1/2 of their normal dose the morning of the procedure  H&P - Fax current H&P (within 30 days) to IR Scheduling  Protocols - For patients requiring Sedation: Patient must be NPO - Lab work: To be completed within 14 days of procedure for all Invasive procedures o CBC o BMP o PTT o PT/INR Monday February 20, 2017  8:00 AM EST  
US GUIDED PARACENTISIS INIT with SFD US LOGIC 7 UNIT 2, SFD NURSING, SFD IR RADIOLOGIST RESOURCE  
SFD RADIOLOGY ULTRASOUND ([facility]) [facility]  
[phone] IODINE/CONTRAST ALLERGY - Must be Pre-medicated - Fax Allergy protocol to MD office(not patient) - Schedule appointment at least 48 hours after call - All medication holds must be approved (cleared) by the physician who ordered the medication.  This medication hold is recommended for all invasive procedures except vascular arteriograms - It is recommended to hold all blood thinners (antiplatelet) medication such as Aspirin, Plavix, Brilinta, and Coumadin for 5 days prior to procedure - Effient  should be held for 7 days prior to the procedure - Hold Metformin the day of the procedure and 48 hours post procedure if receiving contrast - Insulin dosage: If Patient NPO, they should administer only 1/2 of their normal dose the morning of the procedure  H&P - Fax current H&P (within 30 days) to IR Scheduling  Protocols - For patients requiring Sedation: Patient must be NPO - Lab work: To be completed within 14 days of procedure for all Invasive procedures o CBC o BMP o PTT o PT/INR Friday February 24, 2017  8:00 AM EST  
US GUIDED PARACENTISIS INIT with [facility] US LOGIC 7 UNIT 2, D NURSING, D IR RADIOLOGIST RESOURCE  
[facility] RADIOLOGY [facility]TR[facility]OUND ([address] [place] [address]) [address] [facility] [address] [facility]  
[phone] IODINE/CONTRAST ALLERGY - Must be Pre-medicated - Fax Allergy protocol to MD office(not patient) - Schedule appointment at least 48 hours after call - All medication holds must be approved (cleared) by the physician who ordered the medication. This medication hold is recommended for all invasive procedures except vascular arteriograms - It is recommended to hold all blood thinners (antiplatelet) medication such as Aspirin, Plavix, Brilinta, and Coumadin for 5 days prior to procedure - Effient  should be held for 7 days prior to the procedure - Hold Metformin the day of the procedure and 48 hours post procedure if receiving contrast - Insulin dosage: If Patient NPO, they should administer only 1/2 of their normal dose the morning of the procedure  H&P - Fax current H&P (within 30 days) to IR Scheduling  Protocols - For patients requiring Sedation: Patient must be NPO - Lab work: To be completed within 14 days of procedure for all Invasive procedures o CBC o BMP o PTT o PT/INR Current Discharge Medication List  
  
ASK your doctor about these medications Dose & Instructions Dispensing Information Comments Morning Noon Evening Bedtime  
 baclofen 10 mg tablet Commonly known as:  LIORESAL  
   
 Your next dose is: Today, Tomorrow Other:  _________ Dose:  10 mg Take 10 mg by mouth as needed. Refills:  0 GENGRAF 25 mg capsule Generic drug:  cycloSPORINE modified Your next dose is: Today, Tomorrow Other:  _________ Dose:  2.5 mg/kg/day Take 2.5 mg/kg/day by mouth every morning. Indications: Takes in the AM  
 Refills:  0  
     
   
   
   
  
 lactulose 10 gram/15 mL solution Commonly known as:  Eual Alice Your next dose is: Today, Tomorrow Other:  _________ Dose:  30 g Take 45 mL by mouth three (3) times daily. Quantity:  480 mL Refills:  0  
     
   
   
   
  
 LANTUS SOLOSTAR 100 unit/mL (3 mL) pen Generic drug:  insulin glargine Your next dose is: Today, Tomorrow Other:  _________ Dose:  25 Units 25 Units by SubCUTAneous route nightly. Refills:  0  
     
   
   
   
  
 magnesium oxide 400 mg tablet Commonly known as:  MAG-OX Your next dose is: Today, Tomorrow Other:  _________ Dose:  400 mg Take 400 mg by mouth two (2) times a day. Refills:  0  
     
   
   
   
  
 midodrine 5 mg tablet Commonly known as:  [place] Your next dose is: Today, Tomorrow Other:  _________ Dose:  5 mg Take 5 mg by mouth every eight (8) hours. 2 q8h Refills:  0 MITIGARE 0.6 mg capsule Generic drug:  colchicine Your next dose is: Today, Tomorrow Other:  _________ Dose:  0.6 mg Take 0.6 mg by mouth daily. Refills:  0 NovoLOG 100 unit/mL injection Generic drug:  insulin aspart Your next dose is: Today, Tomorrow Other:  _________  
   
   
 by SubCUTAneous route. Sliding scale Refills:  0  
     
   
   
   
  
 ondansetron hcl 4 mg tablet Commonly known as:  Chidi Carlos Your next dose is: Today, Tomorrow Other:  _________ Dose:  4 mg Take 1 Tab by mouth every eight (8) hours as needed for Nausea. Quantity:  30 Tab Refills:  0 PROTONIX 40 mg tablet Generic drug:  pantoprazole Your next dose is: Today, Tomorrow Other:  _________ Dose:  40 mg Take 40 mg by mouth four (4) times daily. 2tabs bid Refills:  0  
     
   
   
   
  
 traMADol 50 mg tablet Commonly known as:  ULTRAM  
   
Your next dose is: Today, Tomorrow Other:  _________ Dose:  50 mg Take 1 Tab by mouth every six (6) hours as needed. Max Daily Amount: 200 mg. Indications: PAIN Quantity:  20 Tab Refills:  0  
     
   
   
   
  
 XIFAXAN 550 mg tablet Generic drug:  rifAXIMin Your next dose is: Today, Tomorrow Other:  _________ Dose:  550 mg Take 550 mg by mouth three (3) times daily. Refills:  0  
     
   
   
   
  
 zinc sulfate 220 (50) mg capsule Commonly known as:  ZINCATE Your next dose is: Today, Tomorrow Other:  _________ Dose:  220 mg Take 220 mg by mouth every morning. Refills:  5 Discharge Instructions [facility] 34 [address] Department of Interventional Radiology [facility] 
[phone] Office 
[phone] Fax PARACENTESIS DISCHARGE INSTRUCTIONS General Information: 
During this procedure, the doctor will insert a needle into the abdomen to drain fluid. After the procedure, you will be able to take a deep breath much easier. The site of the puncture may ooze the first day. This will decrease and eventually stop. Paracentesis (draining fluid from the abdomen) sometimes makes patients hypotensive (low blood pressure). Your doctor may order for you to receive fluids or albumin (a volume booster) during the procedure through an IV site. Home Care Instructions: Keep the puncture site clean and dry. No tub baths or swimming until puncture site heals. Showering is acceptable. Resume your normal diet, and resume your normal activity slowly and as you tolerate. If you are short of breath, rest. If shortness of breath does not ease, please call your ordering doctor. Fluid can re-accumulate in the chest and/or in the abdomen. If this should occur, your doctor needs to know as you may need to have the procedure done again. Call If: 
   You should call your Physician and/or the Radiology Nurse if you notice any signs of infection, like pus draining, or if it is swollen or reddened. Also call if you have a fever, or if you are bleeding from the puncture site more than a small amount on the dressing. Call if the puncture site keeps draining fluid. Some oozing is to be expected, but should slow and then stop. Call if you feel like you have pressure in your abdomen. SEEK IMMEDIATE CARE OR CALL 911 IF YOU SUDDENLY HAVE TROUBLE BREATHING, OR IF YOUR LIPS TURN BLUE, OR IF YOU NOTICE BLOOD IN YOUR SPUTUM. Follow-Up Instructions: Please see your ordering doctor as he/she has requested. To Reach Us: If you have any questions about your procedure, please call the Interventional Radiology department at [phone]. After business hours (5pm) and weekends, call the answering service at [phone] and ask for the Radiologist on call to be paged. Date: 2/10/2017 Discharging Nurse: Robert Del Cid Discharge Orders None Introducing [facility]! Dear Torsten Garcia: 
Thank you for requesting a [employer] account. Our records indicate that you already have an active [employer] account. You can access your account anytime at [email or web address] Did you know that you can access your hospital and ER discharge instructions at any time in [employer]? You can also review all of your test results from your hospital stay or ER visit. Additional Information If you have questions, please visit the Frequently Asked Questions section of the MyChart website at [email or web address] Remember, MyChart is NOT to be used for urgent needs. For medical emergencies, dial 911. Now available from your iPhone and Android! General Information Please provide this summary of care documentation to your next provider. Patient Signature:  ____________________________________________________________ Date:  ____________________________________________________________  
  
Chantale Joao Provider Signature:  ____________________________________________________________ Date:  ____________________________________________________________

## 2017-02-10 NOTE — IP AVS SNAPSHOT
Current Discharge Medication List  
  
ASK your doctor about these medications Dose & Instructions Dispensing Information Comments Morning Noon Evening Bedtime  
 baclofen 10 mg tablet Commonly known as:  LIORESAL Your next dose is: Today, Tomorrow Other:  ____________ Dose:  10 mg Take 10 mg by mouth as needed. Refills:  0 GENGRAF 25 mg capsule Generic drug:  cycloSPORINE modified Your next dose is: Today, Tomorrow Other:  ____________ Dose:  2.5 mg/kg/day Take 2.5 mg/kg/day by mouth every morning. Indications: Takes in the AM  
 Refills:  0  
     
   
   
   
  
 lactulose 10 gram/15 mL solution Commonly known as:  Michelle Sandoval Your next dose is: Today, Tomorrow Other:  ____________ Dose:  30 g Take 45 mL by mouth three (3) times daily. Quantity:  480 mL Refills:  0  
     
   
   
   
  
 LANTUS SOLOSTAR 100 unit/mL (3 mL) pen Generic drug:  insulin glargine Your next dose is: Today, Tomorrow Other:  ____________ Dose:  25 Units 25 Units by SubCUTAneous route nightly. Refills:  0  
     
   
   
   
  
 magnesium oxide 400 mg tablet Commonly known as:  MAG-OX Your next dose is: Today, Tomorrow Other:  ____________ Dose:  400 mg Take 400 mg by mouth two (2) times a day. Refills:  0  
     
   
   
   
  
 midodrine 5 mg tablet Commonly known as:  Urvashi Tara Your next dose is: Today, Tomorrow Other:  ____________ Dose:  5 mg Take 5 mg by mouth every eight (8) hours. 2 q8h Refills:  0 MITIGARE 0.6 mg capsule Generic drug:  colchicine Your next dose is: Today, Tomorrow Other:  ____________ Dose:  0.6 mg Take 0.6 mg by mouth daily. Refills:  0 NovoLOG 100 unit/mL injection Generic drug:  insulin aspart Your next dose is: Today, Tomorrow Other:  ____________  
   
   
 by SubCUTAneous route. Sliding scale Refills:  0  
     
   
   
   
  
 ondansetron hcl 4 mg tablet Commonly known as:  Lurlean Precise Your next dose is: Today, Tomorrow Other:  ____________ Dose:  4 mg Take 1 Tab by mouth every eight (8) hours as needed for Nausea. Quantity:  30 Tab Refills:  0 PROTONIX 40 mg tablet Generic drug:  pantoprazole Your next dose is: Today, Tomorrow Other:  ____________ Dose:  40 mg Take 40 mg by mouth four (4) times daily. 2tabs bid Refills:  0  
     
   
   
   
  
 traMADol 50 mg tablet Commonly known as:  ULTRAM  
   
Your next dose is: Today, Tomorrow Other:  ____________ Dose:  50 mg Take 1 Tab by mouth every six (6) hours as needed. Max Daily Amount: 200 mg. Indications: PAIN Quantity:  20 Tab Refills:  0  
     
   
   
   
  
 XIFAXAN 550 mg tablet Generic drug:  rifAXIMin Your next dose is: Today, Tomorrow Other:  ____________ Dose:  550 mg Take 550 mg by mouth three (3) times daily. Refills:  0  
     
   
   
   
  
 zinc sulfate 220 (50) mg capsule Commonly known as:  ZINCATE Your next dose is: Today, Tomorrow Other:  ____________ Dose:  220 mg Take 220 mg by mouth every morning. Refills:  5

## 2017-02-10 NOTE — PROGRESS NOTES
Recovery period without difficulty. Pt alert and oriented and denies pain. Dressing is clean, dry, and intact. Reviewed discharge instructions with patient, verbalized understanding. Pt escorted to lobby discharge area. Vital signs and Shakira score completed.

## 2017-02-10 NOTE — PROGRESS NOTES
Patient tolerated paracentesis well. 5800 mL's removed of thin yellow fluid. Bottle sent to lab for testing.

## 2017-02-12 PROBLEM — K65.2 SBP (SPONTANEOUS BACTERIAL PERITONITIS) (HCC): Status: ACTIVE | Noted: 2017-01-01

## 2017-02-12 NOTE — IP AVS SNAPSHOT
Current Discharge Medication List  
  
Take these medications at their scheduled times Dose & Instructions Dispensing Information Comments Morning Noon Evening Bedtime GENGRAF 25 mg capsule Generic drug:  cycloSPORINE modified Your next dose is:  Tomorrow Dose:  2.5 mg/kg/day Take 2.5 mg/kg/day by mouth every morning. Indications: Takes in the AM  
 Refills:  0  
     
  
   
   
   
  
 lactulose 10 gram/15 mL solution Commonly known as:  Eryn Joshua Dose:  20 g Take 30 mL by mouth three (3) times daily. Quantity:  480 mL Refills:  0  
     
   
   
   
  
 LANTUS SOLOSTAR 100 unit/mL (3 mL) pen Generic drug:  insulin glargine Your next dose is: Today Dose:  25 Units 25 Units by SubCUTAneous route nightly. Refills:  0  
     
   
   
   
  
  
 magnesium oxide 400 mg tablet Commonly known as:  MAG-OX Your next dose is:  Tomorrow Dose:  400 mg Take 400 mg by mouth two (2) times a day. Refills:  0  
     
  
   
   
   
  
 midodrine 5 mg tablet Commonly known as:  Pao Lim Notes to Patient:  Per home routine. Dose:  5 mg Take 5 mg by mouth every eight (8) hours. 2 q8h Refills:  0 MITIGARE 0.6 mg capsule Generic drug:  colchicine Your next dose is:  Tomorrow Dose:  0.6 mg Take 0.6 mg by mouth daily. Refills:  0  
     
  
   
   
   
  
 morphine CR 15 mg CR tablet Commonly known as:  MS CONTIN Dose:  15 mg Take 1 Tab by mouth every twelve (12) hours. Max Daily Amount: 30 mg.  
 Quantity:  20 Tab Refills:  0 PROTONIX 40 mg tablet Generic drug:  pantoprazole Your next dose is:  Tomorrow Dose:  40 mg Take 40 mg by mouth four (4) times daily. 2tabs bid Refills:  0  
     
  
   
   
   
  
 vancomycin 500 mg 500 mg IVPB Dose:  500 mg  
500 mg by IntraVENous route DIALYSIS MON, WED & FRI for 2 days. Quantity:  1 Dose Refills:  0  
     
   
   
   
  
 XIFAXAN 550 mg tablet Generic drug:  rifAXIMin Your next dose is:  Tomorrow Dose:  550 mg Take 550 mg by mouth three (3) times daily. Refills:  0  
     
  
   
   
   
  
 zinc sulfate 220 (50) mg capsule Commonly known as:  ZINCATE Your next dose is:  Tomorrow Dose:  220 mg Take 220 mg by mouth every morning. Refills:  5 Take these medications as needed Dose & Instructions Dispensing Information Comments Morning Noon Evening Bedtime  
 baclofen 10 mg tablet Commonly known as:  LIORESAL Your next dose is:  Tomorrow Dose:  10 mg Take 10 mg by mouth as needed. Refills:  0  
     
  
   
   
   
  
 ondansetron hcl 4 mg tablet Commonly known as:  Baldemar Rodríguez Notes to Patient:  Per home routine Dose:  4 mg Take 1 Tab by mouth every eight (8) hours as needed for Nausea. Quantity:  30 Tab Refills:  0  
     
   
   
   
  
 traMADol 50 mg tablet Commonly known as:  ULTRAM  
   
 Dose:  50 mg Take 1 Tab by mouth every six (6) hours as needed. Max Daily Amount: 200 mg. Indications: PAIN Quantity:  20 Tab Refills:  0 Take these medications as directed Dose & Instructions Dispensing Information Comments Morning Noon Evening Bedtime NovoLOG 100 unit/mL injection Generic drug:  insulin aspart  
   
 by SubCUTAneous route. Sliding scale Refills:  0 Where to Get Your Medications These medications were sent to [facility], [address] [place] [address]  [address] Phone:  [phone]  
  lactulose 10 gram/15 mL solution Information about where to get these medications is not yet available ! Ask your nurse or doctor about these medications  
  morphine CR 15 mg CR tablet vancomycin 500 mg 500 mg IVPB

## 2017-02-12 NOTE — ED PROVIDER NOTES
HPI Comments: 45-year-old female patient presents to emergency department with severe diffuse abdominal pain worsening over the past 2 days. Patient is a history of liver cirrhosis and is undergoing liver transplant ×2 in the past.  She has biweekly paracentesis as well for accumulating ascites. Patient states she had her last paracentesis on Friday after which time she notices her pain developed. She denies any nausea, vomiting but reports some yellowish-appearing diarrhea at home. She states this is normal for her as she is on chronic lactulose. Patient does report intermittent fevers but states the highest fever recorded at home was 100.3. Patient denies any chest pain, shortness of breath or changes in her urinary habits. She has a History of renal failure for which she undergoes hemodialysis on Monday Wednesday and Friday. She last had her dialysis 2 days prior and underwent a normal appointment at that time. Patient is a 55 y.o. female presenting with abdominal pain. The history is provided by the patient. No  was used. Abdominal Pain    This is a new problem. The current episode started yesterday. The problem occurs constantly. The problem has been gradually worsening. The pain is located in the generalized abdominal region. The quality of the pain is sharp and cramping. The pain is at a severity of 10/10. The pain is severe. Associated symptoms include diarrhea and nausea. Pertinent negatives include no anorexia, no fever, no belching, no flatus, no hematochezia, no melena, no vomiting, no constipation, no dysuria, no frequency, no hematuria, no headaches, no arthralgias, no myalgias, no trauma, no chest pain, no testicular pain and no back pain. The pain is worsened by palpation and certain positions. The pain is relieved by nothing. Past workup includes CT scan, ultrasound, surgery.   Past workup includes no esophagogastroduodenoscopy, no UGI, no colonoscopy and no barium enema. Her past medical history is significant for small bowel obstruction. Her past medical history does not include PUD, gallstones, GERD, ulcerative colitis, Crohn's disease, irritable bowel syndrome, cancer, UTI, pancreatitis, ectopic pregnancy, ovarian cysts, diverticulitis, atrial fibrillation, DM or kidney stones.         Past Medical History:   Diagnosis Date    SEAN (acute kidney injury) ([address] 6/26/2016    Arthritis      kath feet    Ascites     Benign neoplasm of skin of trunk, except scrotum      pt denies    Cellulitis and abscess of unspecified digit      hx of    Chronic kidney disease      [facility] on Mon/Wed/Fri    Chronic pain      abd area    Congenital hepatic fibrosis      Liver transplant X2    Esophageal varices (HCC)     GERD (gastroesophageal reflux disease)     Hemodialysis access, AV graft ([address] 11/22/2016    Hepatic encephalopathy ([address] 10/2016     recently hospitalized for hepatic encephalopathy    Hepatitis C      hx of hepatitis C-- dx after first liver transplant from a transfusion   (first transplant age 13)   [facility] History of gastric ulcer     Insomnia 07/13/2015     no current meds    Liver transplant status ([address] 1986 and 1999     X2- congential hepatic fibrosis    Pain in joint, ankle and foot     PICC (peripherally inserted central catheter) in place     PONV (postoperative nausea and vomiting)      some N/V, pt states she does well with Phenergan    Port-a-cath in place      R chest for HD    Raynaud disease     Spleen enlarged     Tachycardia     Thrombocytopenia (HCC)     Type 2 diabetes mellitus (HCC)      insulin only/AVG /s.s of hypoglycemia 30's/Last A1c 5.6    Vasculitis ([address]      resolved       Past Surgical History:   Procedure Laterality Date    Pr lap,tubal cautery      Hx colonoscopy      Hx cholecystectomy  1984    Hx tubal ligation      Hx other surgical       biliary reconstructive surgery    Hx other surgical  2013 EGD    Hx other surgical  03/2016     tips procedure    Hx other surgical       paracentesis    Vascular surgery procedure unlist Left 11/02/2016     thigh AVG insertion in thigh    Hx transplant  [id number]     2 liver - first one for congenital hepatic fibrosis, 2nd for Hep C cirrhosis         Family History:   Problem Relation Age of Onset    Diabetes Mother     Heart Disease Mother     Hypertension Mother     Heart Disease Father     Stroke Father     Cancer Maternal Grandfather      liver cancer, alcoholism    No Known Problems Sister     Cancer Maternal Aunt      cervical cancer    Breast Cancer Paternal Grandmother      early 45s    Colon Cancer Paternal Grandmother      late 76s    Cancer Other      maternal niece- cervical cancer    Bipolar Disorder Brother     Heart Disease Brother        Social History     Social History    Marital status:      Spouse name: N/A    Number of children: N/A    Years of education: N/A     Occupational History    Not on file. Social History Main Topics    Smoking status: Never Smoker    Smokeless tobacco: Never Used    Alcohol use No    Drug use: No    Sexual activity: Yes     Partners: Male     Birth control/ protection: Surgical      Comment: Tubal Ligation     Other Topics Concern    Not on file     Social History Narrative         ALLERGIES: Asa-acetaminophen-caff-potass; Cephalosporins; Codeine; Compazine [prochlorperazine edisylate]; and Pcn [penicillins]    Review of Systems   Constitutional: Negative for chills, diaphoresis and fever. HENT: Negative for congestion, sneezing and sore throat. Eyes: Negative for visual disturbance. Respiratory: Negative for cough, chest tightness, shortness of breath and wheezing. Cardiovascular: Negative for chest pain and leg swelling. Gastrointestinal: Positive for abdominal pain, diarrhea and nausea.  Negative for anorexia, blood in stool, constipation, flatus, hematochezia, melena and vomiting. Endocrine: Negative for polyuria. Genitourinary: Negative for difficulty urinating, dysuria, flank pain, frequency, hematuria, testicular pain and urgency. Musculoskeletal: Negative for arthralgias, back pain, myalgias, neck pain and neck stiffness. Skin: Negative for color change and rash. Neurological: Negative for dizziness, syncope, speech difficulty, weakness, light-headedness, numbness and headaches. Psychiatric/Behavioral: Negative for behavioral problems. All other systems reviewed and are negative. Vitals:    02/12/17 1600 02/12/17 1605 02/12/17 1611 02/12/17 1615   BP: 93/56  95/57 96/59   Pulse:       Resp:       Temp:       SpO2: 96% 98% 97% 99%   Weight:       Height:                Physical Exam   Constitutional: She is oriented to person, place, and time. She appears well-developed and well-nourished. No distress. Alert and oriented ×3. Moderate distress. Patient appears uncomfortable. HENT:   Head: Normocephalic and atraumatic. Right Ear: External ear normal.   Left Ear: External ear normal.   Nose: Nose normal.   Mouth/Throat: Oropharynx is clear and moist.   Eyes: Conjunctivae and EOM are normal. Pupils are equal, round, and reactive to light. Neck: Normal range of motion. Neck supple. No JVD present. No rigidity. No tracheal deviation, no edema, no erythema and normal range of motion present. Cardiovascular: Normal rate, regular rhythm, S1 normal, S2 normal, normal heart sounds and intact distal pulses. Exam reveals no gallop, no distant heart sounds and no friction rub. No murmur heard. Pulmonary/Chest: Effort normal and breath sounds normal. No accessory muscle usage or stridor. No tachypnea and no bradypnea. No respiratory distress. She has no decreased breath sounds. She has no wheezes. She has no rhonchi. She has no rales. She exhibits no tenderness. Abdominal: Soft. She exhibits ascites. She exhibits no distension and no mass.  There is no hepatosplenomegaly, splenomegaly or hepatomegaly. There is generalized tenderness. There is guarding. There is no rigidity, no rebound, no CVA tenderness, no tenderness at McBurney's point and negative Moran's sign. Large periumbilical hernia. Patient states is his normal appearing for her. There is obvious ascites present. Patient states this is her normal amount of swelling in her abdomen. She is diffusely, severely tender with light palpation of the abdomen in all quadrants. Gauze in place to the right lower quadrant secondary to reported paracentesis performed on Friday. No other findings on abdominal exam.   Musculoskeletal: Normal range of motion. She exhibits no edema, tenderness or deformity. Neurological: She is alert and oriented to person, place, and time. No cranial nerve deficit. Skin: Skin is warm and dry. No rash noted. She is not diaphoretic. Psychiatric: She has a normal mood and affect. Her behavior is normal.   Nursing note and vitals reviewed. MDM  Number of Diagnoses or Management Options  ESRD on dialysis [facility]): new and requires workup  Liver failure without hepatic coma, unspecified chronicity ([address]): new and requires workup  SBP (spontaneous bacterial peritonitis) [facility]): new and requires workup  Diagnosis management comments: Ascites fluid withdrawn under ultrasound guidance. This fluid will be sent to the lab for further evaluation to out SBP. Given patient's recent paracentesis and history of cirrhosis and ascites fluid will treat prophylactically for SBP while we await the results of fluid analysis. Rocephin hanging at this time. Swollen bowel visible on ultrasound imaging at bedside. CT abdomen and pelvis ordered with IV contrast as patient is currently a hemodialysis patient. Pt reports much improve abdominal pain. CT shows non specific dilation of the bowels, no definite obstruction.    Per , fluid analysis conducted at downtown facility; currently running with approx. 40 mins left until resulted. 7:12 PM    Spoke with on-call hospitalist who agrees to evaluate the patient for admission. Due to lack of beds patient will need to be transferred to downtown facility. Discussed plan of care with patient and family at bedside voice understanding and agreement. Amount and/or Complexity of Data Reviewed  Clinical lab tests: ordered and reviewed  Tests in the radiology section of CPT®: ordered and reviewed  Tests in the medicine section of CPT®: ordered and reviewed  Independent visualization of images, tracings, or specimens: yes    Risk of Complications, Morbidity, and/or Mortality  Presenting problems: moderate  Diagnostic procedures: low  Management options: moderate    Critical Care  Total time providing critical care: 30-74 minutes    Patient Progress  Patient progress: stable    ED Course       Other Procedure  Date/Time: 2/21/2017 10:47 PM  Performed by: Bozena Hopkins  Authorized by: Bozena RAGSDALE     Consent:     Consent obtained:  Verbal    Consent given by:  Patient    Risks discussed:  Bleeding, infection, pain and incomplete drainage    Alternatives discussed:  No treatment and delayed treatment  Indications:     Indications:  Ascites and abdominal pain, r/o SBP  Pre-procedure details:     Skin preparation:  Betadine    Preparation: Patient was prepped and draped in the usual sterile fashion    Anesthesia (see MAR for exact dosages): Anesthesia method:  Local infiltration    Local anesthetic:  Lidocaine 1% w/o epi  Post-procedure details:     Patient tolerance of procedure: Tolerated well, no immediate complications  Comments:      Ascites fluid sample collected with bedside For laboratory evaluation. Patient tolerated this procedure well. 60 cc of , clear fluid obtained during the procedure and sent to lab for evaluation.

## 2017-02-12 NOTE — IP AVS SNAPSHOT
[address] 
[phone] Patient: Augustine Lora MRN: [id number] BAV:[id number] You are allergic to the following Allergen Reactions Aspirin Nausea Only Codeine Nausea Only Compazine (Prochlorperazine Edisylate) Unknown (comments) Causes Lock Jaw Pcn (Penicillins) Other (comments) \"drops wbc\" Recent Documentation Weight 55.1 kg Emergency Contacts Name Discharge Info Relation Home Work Mobile Reed Gauthier CAREGIVER [3] Spouse [3] [phone] About your hospitalization You were admitted on:  February 12, 2017 You last received care in the:  [facility] 6 MED SURG You were discharged on:  February 22, 2017 Unit phone number:  [phone] Why you were hospitalized Your primary diagnosis was:  Sbp (Spontaneous Bacterial Peritonitis) (Hcc) Your diagnoses also included:  Ascites, Type 2 Diabetes Mellitus With Hyperglycemia (Hcc), Thrombocytopenia (Hcc), Esrd (End Stage Renal Disease) On Dialysis (Hcc), Liver Transplant Recipient (Hcc), Sepsis (Hcc) Providers Seen During Your Hospitalizations Provider Role Specialty Primary office phone Dejan Fernández MD Attending Provider [facility] [phone] Rob Iqbal MD Attending Provider Internal Medicine [phone] Your Primary Care Physician (PCP) Primary Care Physician Office Phone Office Fax 8262 [address] [phone] Follow-up Information Follow up With Details Comments Contact Info Jamari Wilcox MD   [address] [facility] [address] 
[phone] Emil Jimenez MD In 2 weeks For wound re-check-post-op declot of left thigh AVG [place] [address] [facility] [address] 
[phone] [address] [facility] will call you to schedule a visit time. [address] [facility] [address] [facility] [address] 
[phone] Your Appointments Monday February 27, 2017  3:00 PM EST  
US GUIDED PARACENTISIS INIT with [facility] US LOGIC 7 UNIT 2, [facility] NURSING, [facility] IR PA RESOURCE 2  
[facility] RADIOLOGY ULTRASOUND ([address] [place] [address]) [address] [facility]  
[phone] IODINE/CONTRAST ALLERGY - Must be Pre-medicated - Fax Allergy protocol to MD office(not patient) - Schedule appointment at least 48 hours after call - All medication holds must be approved (cleared) by the physician who ordered the medication. This medication hold is recommended for all invasive procedures except vascular arteriograms - It is recommended to hold all blood thinners (antiplatelet) medication such as Aspirin, Plavix, Brilinta, and Coumadin for 5 days prior to procedure - Effient  should be held for 7 days prior to the procedure - Hold Metformin the day of the procedure and 48 hours post procedure if receiving contrast - Insulin dosage: If Patient NPO, they should administer only 1/2 of their normal dose the morning of the procedure  H&P - Fax current H&P (within 30 days) to IR Scheduling  Protocols - For patients requiring Sedation: Patient must be NPO - Lab work: To be completed within 14 days of procedure for all Invasive procedures o CBC o BMP o PTT o PT/INR Thursday March 02, 2017  2:00 PM EST Office Visit with Isabela Byers MD  
[address] [facility] ([address] [facility]) [address] [place] [id number]  
[phone] Current Discharge Medication List  
  
START taking these medications Dose & Instructions Dispensing Information Comments Morning Noon Evening Bedtime  
 morphine CR 15 mg CR tablet Commonly known as:  MS CONTIN  Dose:  15 mg  
 Take 1 Tab by mouth every twelve (12) hours. Max Daily Amount: 30 mg.  
 Quantity:  20 Tab Refills:  0  
     
   
   
   
  
 vancomycin 500 mg 500 mg IVPB Dose:  500 mg  
500 mg by IntraVENous route DIALYSIS MON, WED & FRI for 2 days. Quantity:  1 Dose Refills:  0 CONTINUE these medications which have CHANGED Dose & Instructions Dispensing Information Comments Morning Noon Evening Bedtime  
 lactulose 10 gram/15 mL solution Commonly known as:  Tianna Hart What changed:  how much to take Dose:  20 g Take 30 mL by mouth three (3) times daily. Quantity:  480 mL Refills:  0 CONTINUE these medications which have NOT CHANGED Dose & Instructions Dispensing Information Comments Morning Noon Evening Bedtime  
 baclofen 10 mg tablet Commonly known as:  LIORESAL Your next dose is:  Tomorrow Dose:  10 mg Take 10 mg by mouth as needed. Refills:  0 GENGRAF 25 mg capsule Generic drug:  cycloSPORINE modified Your next dose is:  Tomorrow Dose:  2.5 mg/kg/day Take 2.5 mg/kg/day by mouth every morning. Indications: Takes in the AM  
 Refills:  0  
     
  
   
   
   
  
 LANTUS SOLOSTAR 100 unit/mL (3 mL) pen Generic drug:  insulin glargine Your next dose is: Today Dose:  25 Units 25 Units by SubCUTAneous route nightly. Refills:  0  
     
   
   
   
  
  
 magnesium oxide 400 mg tablet Commonly known as:  MAG-OX Your next dose is:  Tomorrow Dose:  400 mg Take 400 mg by mouth two (2) times a day. Refills:  0  
     
  
   
   
   
  
 midodrine 5 mg tablet Commonly known as:  Flaca Dillon Notes to Patient:  Per home routine. Dose:  5 mg Take 5 mg by mouth every eight (8) hours. 2 q8h Refills:  0 MITIGARE 0.6 mg capsule Generic drug:  colchicine Your next dose is:  Tomorrow  Dose:  0.6 mg  
 Take 0.6 mg by mouth daily. Refills:  0 NovoLOG 100 unit/mL injection Generic drug:  insulin aspart  
   
 by SubCUTAneous route. Sliding scale Refills:  0  
     
   
   
   
  
 ondansetron hcl 4 mg tablet Commonly known as:  Baldemar Rodríguez Notes to Patient:  Per home routine Dose:  4 mg Take 1 Tab by mouth every eight (8) hours as needed for Nausea. Quantity:  30 Tab Refills:  0 PROTONIX 40 mg tablet Generic drug:  pantoprazole Your next dose is:  Tomorrow Dose:  40 mg Take 40 mg by mouth four (4) times daily. 2tabs bid Refills:  0  
     
  
   
   
   
  
 traMADol 50 mg tablet Commonly known as:  ULTRAM  
   
 Dose:  50 mg Take 1 Tab by mouth every six (6) hours as needed. Max Daily Amount: 200 mg. Indications: PAIN Quantity:  20 Tab Refills:  0  
     
   
   
   
  
 XIFAXAN 550 mg tablet Generic drug:  rifAXIMin Your next dose is:  Tomorrow Dose:  550 mg Take 550 mg by mouth three (3) times daily. Refills:  0  
     
  
   
   
   
  
 zinc sulfate 220 (50) mg capsule Commonly known as:  ZINCATE Your next dose is:  Tomorrow Dose:  220 mg Take 220 mg by mouth every morning. Refills:  5 Where to Get Your Medications These medications were sent to [address] [place] [address] Phone:  [phone]  
  lactulose 10 gram/15 mL solution Information on where to get these meds will be given to you by the nurse or doctor. ! Ask your nurse or doctor about these medications  
  morphine CR 15 mg CR tablet  
 vancomycin 500 mg 500 mg IVPB Discharge Instructions DISCHARGE SUMMARY from Nurse The following personal items are in your possession at time of discharge: 
 
Dental Appliances: None Visual Aid: Glasses, With patient Home Medications: None Jewelry: Ring, Watch (given to sister) Clothing: None Other Valuables: None PATIENT INSTRUCTIONS: 
 
After general anesthesia or intravenous sedation, for 24 hours or while taking prescription Narcotics: · Limit your activities · Do not drive and operate hazardous machinery · Do not make important personal or business decisions · Do  not drink alcoholic beverages · If you have not urinated within 8 hours after discharge, please contact your surgeon on call. Report the following to your surgeon: 
· Excessive pain, swelling, redness or odor of or around the surgical area · Temperature over 100.5 · Nausea and vomiting lasting longer than 4 hours or if unable to take medications · Any signs of decreased circulation or nerve impairment to extremity: change in color, persistent  numbness, tingling, coldness or increase pain · Any questions What to do at Home: 
Recommended activity: Activity as tolerated, If you experience any of the following symptoms fever greater than 100.5, persistent nausea or vomiting, new or unrelieved pain, dizziness, chest pain or shortness of breath, please follow up with MD. 
 
 
*  Please give a list of your current medications to your Primary Care Provider. *  Please update this list whenever your medications are discontinued, doses are 
    changed, or new medications (including over-the-counter products) are added. *  Please carry medication information at all times in case of emergency situations. These are general instructions for a healthy lifestyle: No smoking/ No tobacco products/ Avoid exposure to second hand smoke Surgeon General's Warning:  Quitting smoking now greatly reduces serious risk to your health. Obesity, smoking, and sedentary lifestyle greatly increases your risk for illness A healthy diet, regular physical exercise & weight monitoring are important for maintaining a healthy lifestyle You may be retaining fluid if you have a history of heart failure or if you experience any of the following symptoms:  Weight gain of 3 pounds or more overnight or 5 pounds in a week, increased swelling in our hands or feet or shortness of breath while lying flat in bed. Please call your doctor as soon as you notice any of these symptoms; do not wait until your next office visit. Recognize signs and symptoms of STROKE: 
 
F-face looks uneven A-arms unable to move or move unevenly S-speech slurred or non-existent T-time-call 911 as soon as signs and symptoms begin-DO NOT go Back to bed or wait to see if you get better-TIME IS BRAIN. Warning Signs of HEART ATTACK Call 911 if you have these symptoms: 
? Chest discomfort. Most heart attacks involve discomfort in the center of the chest that lasts more than a few minutes, or that goes away and comes back. It can feel like uncomfortable pressure, squeezing, fullness, or pain. ? Discomfort in other areas of the upper body. Symptoms can include pain or discomfort in one or both arms, the back, neck, jaw, or stomach. ? Shortness of breath with or without chest discomfort. ? Other signs may include breaking out in a cold sweat, nausea, or lightheadedness. Don't wait more than five minutes to call [address]! Fast action can save your life. Calling 911 is almost always the fastest way to get lifesaving treatment. Emergency Medical Services staff can begin treatment when they arrive  up to an hour sooner than if someone gets to the hospital by car. The discharge information has been reviewed with the patient. The patient verbalized understanding. Discharge medications reviewed with the patient and appropriate educational materials and side effects teaching were provided. Discharge Orders None [facility] Announcement We are excited to announce that we are making your provider's discharge notes available to you in [facility]. You will see these notes when they are completed and signed by the physician that discharged you from your recent hospital stay. If you have any questions or concerns about any information you see in [facility], please call the Health Information Department where you were seen or reach out to your Primary Care Provider for more information about your plan of care. Introducing [facility]! Dear Jose Angel Kennedy: 
Thank you for requesting a [facility] account. Our records indicate that you already have an active [employer] account. You can access your account anytime at [email or web address] Did you know that you can access your hospital and ER discharge instructions at any time in [facility]? You can also review all of your test results from your hospital stay or ER visit. Additional Information If you have questions, please visit the Frequently Asked Questions section of the [employer] website at [email or web address] Remember, [facility] is NOT to be used for urgent needs. For medical emergencies, dial 911. Now available from your iPhone and Android! General Information Please provide this summary of care documentation to your next provider. Patient Signature:  ____________________________________________________________ Date:  ____________________________________________________________  
  
Chantale Shepherd Provider Signature:  ____________________________________________________________ Date:  ____________________________________________________________

## 2017-02-13 PROBLEM — Z94.4 LIVER TRANSPLANT RECIPIENT (HCC): Status: ACTIVE | Noted: 2017-01-01

## 2017-02-13 NOTE — ROUTINE PROCESS
Report called to Samia Fitzpatrick on 6th floor [facility] DT. Left upper chest permacath intact without redness or swelling.  Transported via [place] [facility] to Rm 604 via stretcher

## 2017-02-13 NOTE — DIALYSIS
HD treatment initiated via left upper thigh AVG without difficulty. Cannulated by Rosenda Urena RN using 15 gauge needles. VS stable, will continue to monitor during tx. No Heparin. Machine tests passed and alarms intact. NAD.

## 2017-02-13 NOTE — PROGRESS NOTES
Talked with nurse in dialysis about pt coming there. Weighed pt for 118.0 lb with standing scale. Pt has put her own emla cream to left upper thigh access.

## 2017-02-13 NOTE — CONSULTS
Nephrology consult    Admission Date:  2/12/2017    Admission Diagnosis  Bacterial peritonitis / Dialysis   SBP (spontaneous bacterial peritonitis) ([facility]    We are asked by Hospitalist    History of Present Illness:this pt is well known to us and has had congenital liver disease requiring liver transplants. She developed severe abdominal pain Saturday pm and it got progressively worst. She was admitted with SBP and is on broad spectrum antibiotics. She had a left thigh AVG declotted Friday 2/10/17. She is seen on HD and is c/o severe abdominal pain.      Past Medical History   Diagnosis Date    SEAN (acute kidney injury) ([facility] 6/26/2016    Arthritis      kath feet    Ascites     Benign neoplasm of skin of trunk, except scrotum      pt denies    Cellulitis and abscess of unspecified digit      hx of    Chronic kidney disease      [facility] on Mon/Wed/Fri    Chronic pain      abd area    Congenital hepatic fibrosis      Liver transplant X2    Esophageal varices (HCC)     GERD (gastroesophageal reflux disease)     Hemodialysis access, AV graft ([facility] 11/22/2016    Hepatic encephalopathy ([facility] 10/2016     recently hospitalized for hepatic encephalopathy    Hepatitis C      hx of hepatitis C-- dx after first liver transplant from a transfusion   (first transplant age 13)   [facility] History of gastric ulcer     Insomnia 07/13/2015     no current meds    Liver transplant status ([facility] 1986 and 1999     X2- congential hepatic fibrosis    Pain in joint, ankle and foot     PICC (peripherally inserted central catheter) in place     PONV (postoperative nausea and vomiting)      some N/V, pt states she does well with Phenergan    Port-a-cath in place      R chest for HD    Raynaud disease     Spleen enlarged     Tachycardia     Thrombocytopenia (HCC)     Type 2 diabetes mellitus (HCC)      insulin only/AVG /s.s of hypoglycemia 30's/Last A1c 5.6    Vasculitis ([address]      resolved      Past Surgical History   Procedure Laterality Date    Pr lap,tubal cautery      Hx colonoscopy      Hx cholecystectomy  1984    Hx tubal ligation      Hx other surgical       biliary reconstructive surgery    Hx other surgical  2013     EGD    Hx other surgical  03/2016     tips procedure    Hx other surgical       paracentesis    Vascular surgery procedure unlist Left 11/02/2016     thigh AVG insertion in thigh    Hx transplant  4739,8113     2 liver - first one for congenital hepatic fibrosis, 2nd for Hep C cirrhosis      Current Facility-Administered Medications   Medication Dose Route Frequency    sodium chloride (NS) flush 5-10 mL  5-10 mL IntraVENous Q8H    sodium chloride (NS) flush 5-10 mL  5-10 mL IntraVENous PRN    oxyCODONE IR (ROXICODONE) tablet 5 mg  5 mg Oral Q4H PRN    HYDROmorphone (PF) (DILAUDID) injection 0.5 mg  0.5 mg IntraVENous Q3H PRN    naloxone (NARCAN) injection 0.4 mg  0.4 mg IntraVENous PRN    diphenhydrAMINE (BENADRYL) injection 12.5 mg  12.5 mg IntraVENous Q4H PRN    promethazine (PHENERGAN) with saline injection 12.5 mg  12.5 mg IntraVENous Q6H PRN    cefTRIAXone (ROCEPHIN) 2 g in 0.9% sodium chloride (MBP/ADV) 50 mL  2 g IntraVENous Q24H    cycloSPORINE modified (GENGRAF, NEORAL) capsule 50 mg (patient supplied)  50 mg Oral Q12H    pantoprazole (PROTONIX) tablet 40 mg  40 mg Oral ACB&D    rifAXIMin (XIFAXAN) tablet 550 mg  550 mg Oral BID    insulin glargine (LANTUS) injection 25 Units  25 Units SubCUTAneous QHS    lactulose (CHRONULAC) solution 20 g  30 mL Oral TID    insulin lispro (HUMALOG) injection   SubCUTAneous AC&HS     Allergies   Allergen Reactions    Asa-Acetaminophen-Caff-Potass Other (comments)     Only aspirin----\" not to take due to stomach issues    Codeine Nausea Only    Compazine [Prochlorperazine Edisylate] Unknown (comments)     Causes Lock Jaw    Pcn [Penicillins] Other (comments)     \"drops wbc\"      Social History   Substance Use Topics    Smoking status: Never Smoker    Smokeless tobacco: Never Used    Alcohol use No      Family History   Problem Relation Age of Onset    Diabetes Mother     Heart Disease Mother     Hypertension Mother     Heart Disease Father     Stroke Father     Cancer Maternal Grandfather      liver cancer, alcoholism    No Known Problems Sister     Cancer Maternal Aunt      cervical cancer    Breast Cancer Paternal Grandmother      early 45s    Colon Cancer Paternal Grandmother      late 76s    Cancer Other      maternal niece- cervical cancer    Bipolar Disorder Brother     Heart Disease Brother         Review of Systems  Gen - no fever, no chills, appetite okay  HEENT - no sore throat, no decreased vision, no hearing loss  Neck - no neck mass  CV - no chest pain, no palpitation, no orthopnea  Lung - no shortness of breath, no cough, no hemoptysis  Abd - no tenderness, no nausea/vomiting, no bloody stool  Ext - no edema, no clubbing, no cyanosis  Musculoskeletal - no joint pain, no back pain  Neurologic - no headaches, no dizziness, no seizures  Psychiatric - no anxiety, no depression  Skin - no rashes, no pupura  Genitourinary - no decreased urine output, no hematuria, no foamy urine    Objective:     Vitals:    02/12/17 2322 02/13/17 0434 02/13/17 0711   BP: 98/60 102/67 96/54   Pulse: (!) 109 (!) 105 (!) 109   Resp: 16 16 16   Temp: 98.7 °F (37.1 °C) 98.5 °F (36.9 °C) 98.9 °F (37.2 °C)   SpO2: 98% 97% 92%     No intake or output data in the 24 hours ending 02/13/17 0901    Physical Exam  GEN :in no distress, alert and orientedx4  HEENT: anicteric sclerae, eomi. Oropharynx without lesions. Mucous membranes are moist.  Neck - supple without JVD, no thyromegaly. No lymphadenopathy.   CV - regular rate and rhythm, no murmur, no rub  Lung - clear bilaterally, lungs expand symmetrically  Chest wall - normal appearance  Abd - soft, diffuse tenderness, bowel sounds present, no hepatosplenomegaly, large umbilical hernia  Ext - no clubbing, no cyanosis, no edema  Neurologic - nonfocal  Genitourinary - bladder nonpalpable  Skin - no rashes, no purpura, no ecchymoses  Psychiatric: Normal mood and affect. Data Review:   Recent Labs      02/13/17   0715  02/12/17   1333   WBC  7.7  9.6   HGB  8.1*  8.9*   HCT  25.3*  27.5*   PLT  97*  86*     Recent Labs      02/13/17   0715  02/12/17   1333   NA  139  137   K  3.6  3.6   CL  100  99   CO2  27  31   BUN  39*  31*   CREA  4.58*  3.88*   GLU  120*  195*   CA  7.5*  8.5     No results for input(s): PH, PCO2, PO2, PCO2 in the last 72 hours. Problem List:     Patient Active Problem List    Diagnosis Date Noted    SBP (spontaneous bacterial peritonitis) ([facility] 02/12/2017    Hemodialysis access, AV graft ([facility] 11/22/2016    ESRD (end stage renal disease) on dialysis ([facility] 11/02/2016    Esophageal varices ([facility] 07/27/2016    Hypotension 07/06/2016    Chronic kidney disease     GERD (gastroesophageal reflux disease)     Congenital hepatic fibrosis     Type II diabetes mellitus ([address] 07/04/2016    Hepatic encephalopathy ([facility] 07/04/2016    Anemia 07/04/2016    Thrombocytopenia ([address] 07/04/2016    Ascites 07/04/2016    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 ESRD HD MWF  2. Liver transplant  3. SBP abdominal pain  4. Dialysis Access LT thigh AVG  5.  Chronic hypotension  Pt seen and examined on HD  Thank you for allowing me toevaluate this pt

## 2017-02-13 NOTE — PROGRESS NOTES
TRANSFER - IN REPORT:    Verbal report received from Martina Marroquin RN(name) on Tio Hernandez  being received from [facility](unit) for routine progression of care      Report consisted of patients Situation, Background, Assessment and   Recommendations(SBAR). Information from the following report(s) SBAR, Kardex, ED Summary and MAR was reviewed with the receiving nurse. Opportunity for questions and clarification was provided. Assessment completed upon patients arrival to unit and care assumed.

## 2017-02-13 NOTE — PROGRESS NOTES
Pt arrived to room, pt is alert and oriented x4. SCDs connected. Hospitalist notified that pt is here. Report given to NEO Weber.

## 2017-02-13 NOTE — PROGRESS NOTES
Pt resting in bed. Reports generalized abdominal pain sharp \"10\". Dilaudid 0.5mg IV given per pt request. Lungs clear but diminished. Left chest picc line flushes easily.

## 2017-02-13 NOTE — PROGRESS NOTES
Hospitalist Progress Note     Admit Date:  2017 10:42 PM   Name:  Annika Jurado   Age:  55 y.o.  :  1970   MRN:  [id number]   PCP:  Massimo Arora MD  Treatment Team: Attending Provider: Adebayo Garcia MD; Consulting Provider: Carson Foote MD; Utilization Review: Anne Marie Mcdowell RN    Subjective:   Patient is a 56 y/o F with h/o liver transplants with chronic ascites and ESRD on HD who presented with progressive abdominal pain and fever suspicious for SBP.     - pt says pain about the same as yesterday but meds helping. abd pain still diffuse, worse with palpation. No vomiting. Objective:     Patient Vitals for the past 24 hrs:   Temp Pulse Resp BP SpO2   17 1234 - (!) 103 - 104/64 -   17 1156 - 96 - 96/69 -   17 1133 - (!) 103 - 125/77 -   17 1059 - (!) 101 - 114/68 -   17 1031 - (!) 103 - 105/67 -   17 0903 - (!) 106 20 - -   17 0711 98.9 °F (37.2 °C) (!) 109 16 96/54 92 %   17 0434 98.5 °F (36.9 °C) (!) 105 16 102/67 97 %   17 2322 98.7 °F (37.1 °C) (!) 109 16 98/60 98 %     Oxygen Therapy  O2 Sat (%): 92 % (17 0711)  O2 Device: Room air (17 0903)  No intake or output data in the 24 hours ending 17 1242    General:    Well nourished. Alert. CV:   Tachy, regular. No murmur, rub, or gallop. Lungs:   Clear to auscultation bilaterally. No wheezing, rhonchi, or rales. Abdomen:   Soft, mildly distended. TTP diffusely even with 1-2 inch depression  Extremities: Warm and dry. No cyanosis or edema. Skin:     No rashes or jaundice.      Current Meds:  Current Facility-Administered Medications   Medication Dose Route Frequency    heparin (porcine) injection 2,000 Units  2,000 Units IntraVENous ONCE    sodium chloride (NS) flush 5-10 mL  5-10 mL IntraVENous Q8H    sodium chloride (NS) flush 5-10 mL  5-10 mL IntraVENous PRN    oxyCODONE IR (ROXICODONE) tablet 5 mg  5 mg Oral Q4H PRN  HYDROmorphone (PF) (DILAUDID) injection 0.5 mg  0.5 mg IntraVENous Q3H PRN    naloxone (NARCAN) injection 0.4 mg  0.4 mg IntraVENous PRN    diphenhydrAMINE (BENADRYL) injection 12.5 mg  12.5 mg IntraVENous Q4H PRN    promethazine (PHENERGAN) with saline injection 12.5 mg  12.5 mg IntraVENous Q6H PRN    cefTRIAXone (ROCEPHIN) 2 g in 0.9% sodium chloride (MBP/ADV) 50 mL  2 g IntraVENous Q24H    cycloSPORINE modified (GENGRAF, NEORAL) capsule 50 mg (patient supplied)  50 mg Oral Q12H    pantoprazole (PROTONIX) tablet 40 mg  40 mg Oral ACB&D    rifAXIMin (XIFAXAN) tablet 550 mg  550 mg Oral BID    insulin glargine (LANTUS) injection 25 Units  25 Units SubCUTAneous QHS    lactulose (CHRONULAC) solution 20 g  30 mL Oral TID    insulin lispro (HUMALOG) injection   SubCUTAneous AC&HS       Labs and Studies:  I have reviewed all labs, meds, telemetry events, and studies from the last 24 hours. Recent Results (from the past 24 hour(s))   CBC W/O DIFF    Collection Time: 02/12/17  1:33 PM   Result Value Ref Range    WBC 9.6 4.3 - 11.1 K/uL    RBC 2.90 (L) 4.05 - 5.25 M/uL    HGB 8.9 (L) 11.7 - 15.4 g/dL    HCT 27.5 (L) 35.8 - 46.3 %    MCV 94.8 79.6 - 97.8 FL    MCH 30.7 26.1 - 32.9 PG    MCHC 32.4 31.4 - 35.0 g/dL    RDW 16.7 (H) 11.9 - 14.6 %    PLATELET 86 (L) 835 - 450 K/uL    MPV 10.4 (L) 10.8 - 73.7 FL   METABOLIC PANEL, COMPREHENSIVE    Collection Time: 02/12/17  1:33 PM   Result Value Ref Range    Sodium 137 136 - 145 mmol/L    Potassium 3.6 3.5 - 5.1 mmol/L    Chloride 99 98 - 107 mmol/L    CO2 31 21 - 32 mmol/L    Anion gap 7 7 - 16 mmol/L    Glucose 195 (H) 65 - 100 mg/dL    BUN 31 (H) 6 - 23 MG/DL    Creatinine 3.88 (H) 0.6 - 1.0 MG/DL    GFR est AA 16 (L) >60 ml/min/1.73m2    GFR est non-AA 13 (L) >60 ml/min/1.73m2    Calcium 8.5 8.3 - 10.4 MG/DL    Bilirubin, total 1.4 (H) 0.2 - 1.1 MG/DL    ALT (SGPT) 23 12 - 65 U/L    AST (SGOT) 17 15 - 37 U/L    Alk.  phosphatase 234 (H) 50 - 136 U/L    Protein, total 6.3 6.3 - 8.2 g/dL    Albumin 2.8 (L) 3.5 - 5.0 g/dL    Globulin 3.5 2.3 - 3.5 g/dL    A-G Ratio 0.8 (L) 1.2 - 3.5     LIPASE    Collection Time: 02/12/17  1:33 PM   Result Value Ref Range    Lipase 69 (L) 73 - 393 U/L   PROTHROMBIN TIME + INR    Collection Time: 02/12/17  1:33 PM   Result Value Ref Range    Prothrombin time 14.1 (H) 9.6 - 12.0 sec    INR 1.3 (H) 0.9 - 1.2     AMMONIA    Collection Time: 02/12/17  1:57 PM   Result Value Ref Range    Ammonia 30 11 - 32 UMOL/L   CULTURE, BLOOD    Collection Time: 02/12/17  3:40 PM   Result Value Ref Range    Special Requests: PORT  RED        GRAM STAIN GRAM POSITIVE COCCI      GRAM STAIN ANAEROBIC BOTTLE POSITIVE      GRAM STAIN Pinkie Copping RN AT 6268 ON 2/13/17 ZF       Culture result: CULTURE IN PROGRESS,FURTHER UPDATES TO FOLLOW     CULTURE, BLOOD    Collection Time: 02/12/17  4:00 PM   Result Value Ref Range    Special Requests: PORT      Culture result: NO GROWTH AFTER 17 HOURS     CELL COUNT, BODY FLUID    Collection Time: 02/12/17  4:00 PM   Result Value Ref Range    BODY FLUID TYPE ASCITIC FLUID       FLUID COLOR YELLOW      FLUID APPEARANCE TURBID      FLUID RBC COUNT <42608 /cu mm    FLD NUCLEATED CELLS 3808 /cu mm    FLD SEGS 94 %    FLD LYMPHS 3 %    FLD MONO/MACROPHAGE 3 %   CULTURE, BODY FLUID W GRAM STAIN    Collection Time: 02/12/17  4:00 PM   Result Value Ref Range    Special Requests: NO SPECIAL REQUESTS      GRAM STAIN NO DEFINITE ORGANISM SEEN      GRAM STAIN 10 TO 30  WBCS SEEN  PER OIF        Culture result:        NO GROWTH AFTER SHORT PERIOD OF INCUBATION. FURTHER RESULTS TO FOLLOW AFTER OVERNIGHT INCUBATION. GLUCOSE, FLUID    Collection Time: 02/12/17  4:00 PM   Result Value Ref Range    Fluid Type: ASCITIC FLUID       Glucose, body fld. 162 MG/DL   PROTEIN TOTAL, FLUID    Collection Time: 02/12/17  4:00 PM   Result Value Ref Range    Fluid Type: ASCITIC FLUID       Protein total, body fld.  1.3 g/dL   LD, FLUID    Collection Time: 02/12/17  4:00 PM   Result Value Ref Range    Fluid Type: ASCITIC FLUID       LD, body fld. 181 U/L   POC LACTIC ACID    Collection Time: 02/12/17  6:35 PM   Result Value Ref Range    Lactic Acid (POC) 1.5 0.5 - 1.9 mmol/L   GLUCOSE, POC    Collection Time: 02/12/17 11:02 PM   Result Value Ref Range    Glucose (POC) 120 (H) 65 - 100 mg/dL   GLUCOSE, POC    Collection Time: 02/13/17  7:08 AM   Result Value Ref Range    Glucose (POC) 130 (H) 65 - 100 mg/dL   CBC WITH AUTOMATED DIFF    Collection Time: 02/13/17  7:15 AM   Result Value Ref Range    WBC 7.7 4.3 - 11.1 K/uL    RBC 2.66 (L) 4.05 - 5.25 M/uL    HGB 8.1 (L) 11.7 - 15.4 g/dL    HCT 25.3 (L) 35.8 - 46.3 %    MCV 95.1 79.6 - 97.8 FL    MCH 30.5 26.1 - 32.9 PG    MCHC 32.0 31.4 - 35.0 g/dL    RDW 16.4 (H) 11.9 - 14.6 %    PLATELET 97 (L) 856 - 450 K/uL    MPV 9.9 (L) 10.8 - 14.1 FL    DF AUTOMATED      NEUTROPHILS 75 43 - 78 %    LYMPHOCYTES 11 (L) 13 - 44 %    MONOCYTES 9 4.0 - 12.0 %    EOSINOPHILS 5 0.5 - 7.8 %    BASOPHILS 0 0.0 - 2.0 %    IMMATURE GRANULOCYTES 0.5 0.0 - 5.0 %    ABS. NEUTROPHILS 5.7 1.7 - 8.2 K/UL    ABS. LYMPHOCYTES 0.9 0.5 - 4.6 K/UL    ABS. MONOCYTES 0.7 0.1 - 1.3 K/UL    ABS. EOSINOPHILS 0.4 0.0 - 0.8 K/UL    ABS. BASOPHILS 0.0 0.0 - 0.2 K/UL    ABS. IMM. GRANS. 0.0 0.0 - 0.5 K/UL   METABOLIC PANEL, COMPREHENSIVE    Collection Time: 02/13/17  7:15 AM   Result Value Ref Range    Sodium 139 136 - 145 mmol/L    Potassium 3.6 3.5 - 5.1 mmol/L    Chloride 100 98 - 107 mmol/L    CO2 27 21 - 32 mmol/L    Anion gap 12 7 - 16 mmol/L    Glucose 120 (H) 65 - 100 mg/dL    BUN 39 (H) 6 - 23 MG/DL    Creatinine 4.58 (H) 0.6 - 1.0 MG/DL    GFR est AA 13 (L) >60 ml/min/1.73m2    GFR est non-AA 11 (L) >60 ml/min/1.73m2    Calcium 7.5 (L) 8.3 - 10.4 MG/DL    Bilirubin, total 0.6 0.2 - 1.1 MG/DL    ALT (SGPT) 21 12 - 65 U/L    AST (SGOT) 12 (L) 15 - 37 U/L    Alk.  phosphatase 212 (H) 50 - 136 U/L    Protein, total 5.6 (L) 6.3 - 8.2 g/dL    Albumin 2.3 (L) 3.5 - 5.0 g/dL    Globulin 3.3 2.3 - 3.5 g/dL    A-G Ratio 0.7 (L) 1.2 - 3.5     GLUCOSE, POC    Collection Time: 02/13/17 10:49 AM   Result Value Ref Range    Glucose (POC) 165 (H) 65 - 100 mg/dL        All Micro Results     None          Recent Imaging:  CXR Results  (Last 48 hours)    None        CT Results  (Last 48 hours)               02/12/17 1718  CT ABD PELV W CONT Final result    Impression:  IMPRESSION:     1. Extensive ascites. 2. Nonspecific small bowel dilatation, overall bowel gas pattern nonobstructive. 2. Evidence of portal hypertension, ascites, and occluded appearing TIPS similar   to the comparison study. Narrative:  CT ABDOMEN AND PELVIS WITH IV CONTRAST 2/12/2017       HISTORY:  Diffuse abdominal pain and diarrhea. Chronic renal dialysis patient       COMPARISON:  CT abdomen 12/27/2016       TECHNIQUE:  Helical scans through the abdomen and pelvis following only IV   contrast, Optiray 3 5100 cc  IV to improve conspicuity of infection and   neoplasia. Radiation dose reduction techniques were used for this study: All   CT scans performed at this facility use one or all of the following: Automated   exposure control, adjustment of the mA and/or kVp according to patient's size,   iterative reconstruction. CT ABDOMEN:  Compressive atelectasis left lung base. Extensive ascites. Nonspecific dilatation of small bowel. Gas and stool throughout the colon. TIPS   noted and it be occluded. Partial fusion of the portal vein again seen. Herberth Broach Splenomegaly with multiple upper abdominal varicosities. Atrophic right left   kidney. Right kidney negative urinary structure several cysts. Retroperitoneal   soft tissues appear unchanged. Pancreas appears unremarkable. Ascitic fluid   within a small ventral hernia. CT PELVIS:  Extensive ascites. Multiple hemorrhoids.                  Assessment and Plan:     Hospital Problems as of 2/13/2017  Date Reviewed: 1/24/2017 Codes Class Noted - Resolved POA    Liver transplant recipient [facility]) ICD-10-CM: Z94.4  ICD-9-CM: V42.7  2/13/2017 - Present Yes        * (Principal)SBP (spontaneous bacterial peritonitis) ([facility] ICD-10-CM: P33.4  ICD-9-CM: 567.23  2/12/2017 - Present Yes        ESRD (end stage renal disease) on dialysis [facility]) (Chronic) ICD-10-CM: N18.6, Z99.2  ICD-9-CM: 585.6, V45.11  11/2/2016 - Present Yes        Type 2 diabetes mellitus with hyperglycemia ([facility] ICD-10-CM: E11.65  ICD-9-CM: 250.00  7/4/2016 - Present Yes        Thrombocytopenia (HCC) (Chronic) ICD-10-CM: D69.6  ICD-9-CM: 287.5  7/4/2016 - Present Yes        Ascites ICD-10-CM: R18.8  ICD-9-CM: 789.59  7/4/2016 - Present Yes                PLAN:    · Continue rocephin. Follow cultures. Cont pain control  · Cont lactulose, rifaximin  · DM controlled; cont current. · Cont cyclosporine for transplant    DC planning:  Pending.   Likely home when ready  DVT ppx:  SCDs  Discussed plan with:  pt    Signed:  Scott Alba MD

## 2017-02-13 NOTE — PROGRESS NOTES
Primary Nurse Adriana Meneses and Zane Dennis RN performed a dual skin assessment on this patient No impairment noted  Terrance score is 20. Three small incisions on left upper leg. Stiches well approximated   LLQ hernia with two abrasions.

## 2017-02-13 NOTE — ED NOTES
(LE) pt demanding something for pain. Friends at the bedside and state that the pt is in extreme pain. Has been evaluated and assessed per the previous shift RN.   Pt texting and talking on the phone at the present time

## 2017-02-13 NOTE — PROGRESS NOTES
Patient completed HD without problems. removed . 9 kgs. Needles pulled, pressure held, and clean dressing applied. VS stable. Patient sent back to room.

## 2017-02-13 NOTE — H&P
HOSPITALIST H&P    NAME:  Augustine Parent   Age:  55 y.o.  :   1970   MRN:   [id number]  PCP: Jamari Wilcox MD  Chief complaint: abdominal pain    Subjective:     Patient is a 55 y.o. female who presents with abdominal pain. Pt has h/o congential hepatic fibrosis and had her first liver transplant complicated by hepatitis C, which she got from the transplant. She had another transplant due to cirrhosis from the hepatitis C. She takes lactulose at home for h/o hepatic encephalopathy. Pt had paracentesis about 2 days ago and states she's had worsening abdominal pain since that time. Pain is diffuse and worse with movement or palpation. She had a temperature of 100.3F at home. She denies chest pain, dyspnea, cough, N/V, sore throat, or skin rashes, but admits to chronic loose stools. She denies h/o prior peritonitis.     Past Medical History   Diagnosis Date    SEAN (acute kidney injury) ([facility] 2016    Arthritis      kath feet    Ascites     Benign neoplasm of skin of trunk, except scrotum      pt denies    Cellulitis and abscess of unspecified digit      hx of    Chronic kidney disease      [facility] on Mon/Wed/Fri    Chronic pain      abd area    Congenital hepatic fibrosis      Liver transplant X2    Esophageal varices (HCC)     GERD (gastroesophageal reflux disease)     Hemodialysis access, AV graft ([facility] 2016    Hepatic encephalopathy ([facility] 10/2016     recently hospitalized for hepatic encephalopathy    Hepatitis C      hx of hepatitis C-- dx after first liver transplant from a transfusion   (first transplant age 13)   [employer] History of gastric ulcer     Insomnia 2015     no current meds    Liver transplant status ([address]  and 1999     X2- congential hepatic fibrosis    Pain in joint, ankle and foot     PICC (peripherally inserted central catheter) in place     PONV (postoperative nausea and vomiting)      some N/V, pt states she does well with Phenergan    Port-a-cath in place      R chest for HD    Raynaud disease     Spleen enlarged     Tachycardia     Thrombocytopenia (HCC)     Type 2 diabetes mellitus (HCC)      insulin only/AVG /s.s of hypoglycemia 30's/Last A1c 5.6    Vasculitis ([address]      resolved       Past Surgical History   Procedure Laterality Date    Pr lap,tubal cautery      Hx colonoscopy      Hx cholecystectomy  1984    Hx tubal ligation      Hx other surgical       biliary reconstructive surgery    Hx other surgical  2013     EGD    Hx other surgical  03/2016     tips procedure    Hx other surgical       paracentesis    Vascular surgery procedure unlist Left 11/02/2016     thigh AVG insertion in thigh    Hx transplant  [id number],4242     2 liver - first one for congenital hepatic fibrosis, 2nd for Hep C cirrhosis       Current Facility-Administered Medications on File Prior to Encounter   Medication Dose Route Frequency Provider Last Rate Last Dose    [COMPLETED] sodium chloride 0.9 % bolus infusion 1,000 mL  1,000 mL IntraVENous Kam Cervantes MD   Stopped at 02/12/17 1613    [COMPLETED] fentaNYL citrate (PF) injection 50 mcg  50 mcg IntraVENous ONCE Puma Jarrell, DO   50 mcg at 02/12/17 1555    [COMPLETED] ondansetron (ZOFRAN) injection 4 mg  4 mg IntraVENous NOW Louana Section Rosebrock, DO   4 mg at 02/12/17 1613    [COMPLETED] sodium chloride 0.9 % bolus infusion 1,000 mL  1,000 mL IntraVENous ONCE Louana Section Lisack, DO   Stopped at 02/12/17 1840    [COMPLETED] cefTRIAXone (ROCEPHIN) 1 g in 0.9% sodium chloride (MBP/ADV) 50 mL  1 g IntraVENous NOW Louana Section Samanthabrock, DO   Stopped at 02/12/17 1840    diatrizoate meglumine-d.sodium (MD-GASTROVIEW,GASTROGRAFIN) 66-10 % contrast solution 30 mL  30 mL Oral RAD ONCE Puma Jarrell DO        [COMPLETED] fentaNYL citrate (PF) injection 50 mcg  50 mcg IntraVENous NOW Louana Section Rosebrock, DO   50 mcg at 02/12/17 1612    [COMPLETED] saline peripheral flush soln 10 mL  10 mL InterCATHeter RAD ONCE Puma RAGSDALE Wesly, DO   10 mL at 02/12/17 1709    [COMPLETED] sodium chloride 0.9 % bolus infusion 100 mL  100 mL IntraVENous RAD ONCE Puma RAGSDALE Wesly, DO   Stopped at 02/12/17 1840    [COMPLETED] ioversol (OPTIRAY) 350 mg iodine/mL contrast solution 100 mL  100 mL IntraVENous RAD ONCE Puma RAGSDALE Wesly, DO   100 mL at 02/12/17 1709    [COMPLETED] HYDROmorphone (PF) (DILAUDID) injection 0.5 mg  0.5 mg IntraVENous NOW ArHenry [facility] Hospitalbertha Singho[place], DO   0.5 mg at 02/12/17 1931     Current Outpatient Prescriptions on File Prior to Encounter   Medication Sig Dispense Refill    colchicine (MITIGARE) 0.6 mg capsule Take 0.6 mg by mouth daily.  lactulose (CHRONULAC) 10 gram/15 mL solution Take 45 mL by mouth three (3) times daily. 480 mL 0    traMADol (ULTRAM) 50 mg tablet Take 1 Tab by mouth every six (6) hours as needed. Max Daily Amount: 200 mg. Indications: PAIN 20 Tab 0    insulin glargine (LANTUS SOLOSTAR) 100 unit/mL (3 mL) pen 25 Units by SubCUTAneous route nightly.  magnesium oxide (MAG-OX) 400 mg tablet Take 400 mg by mouth two (2) times a day.  cycloSPORINE modified (GENGRAF) 25 mg capsule Take 2.5 mg/kg/day by mouth every morning. Indications: Takes in the AM      midodrine (PROAMITINE) 5 mg tablet Take 5 mg by mouth every eight (8) hours. 2 q8h       baclofen (LIORESAL) 10 mg tablet Take 10 mg by mouth as needed.  insulin aspart (NOVOLOG) 100 unit/mL injection by SubCUTAneous route. Sliding scale       ondansetron hcl (ZOFRAN) 4 mg tablet Take 1 Tab by mouth every eight (8) hours as needed for Nausea. 30 Tab 0    zinc sulfate (ZINCATE) 220 (50) mg capsule Take 220 mg by mouth every morning. 5    XIFAXAN 550 mg tablet Take 550 mg by mouth three (3) times daily. 0    pantoprazole (PROTONIX) 40 mg tablet Take 40 mg by mouth four (4) times daily.  2tabs bid         Allergies   Allergen Reactions    Asa-Acetaminophen-Caff-Potass Other (comments)     Only aspirin----\" not to take due to stomach issues    Cephalosporins Unknown (comments)     Pt denies allergy, states she has taken cephalosporins and did well.  Codeine Nausea Only    Compazine [Prochlorperazine Edisylate] Unknown (comments)     Causes Lock Jaw    Pcn [Penicillins] Other (comments)     \"drops wbc\"       Social History   Substance Use Topics    Smoking status: Never Smoker    Smokeless tobacco: Never Used    Alcohol use No       Family History   Problem Relation Age of Onset    Diabetes Mother     Heart Disease Mother     Hypertension Mother     Heart Disease Father     Stroke Father     Cancer Maternal Grandfather      liver cancer, alcoholism    No Known Problems Sister     Cancer Maternal Aunt      cervical cancer    Breast Cancer Paternal Grandmother      early 45s    Colon Cancer Paternal Grandmother      late 76s    Cancer Other      maternal niece- cervical cancer    Bipolar Disorder Brother     Heart Disease Brother        I have personally reviewed and reconciled patients history. Review of Systems  A comprehensive 12 point review of systems is negative other than what is listed above. Objective:     Visit Vitals    BP 96/59    Pulse (!) 115    Temp 97.8 °F (36.6 °C)    Resp 16    Ht 5' 0.75\" (1.543 m)    Wt 51.7 kg (114 lb)    SpO2 99%    BMI 21.72 kg/m2       Exam:  General: awake, alert, no apparent distress  Eyes: PERRL, anicteric  Neck: Supple, trachea midline  Lungs: Clear to auscultation bilaterally. No rales, wheezes, or rhonchi  Heart: Regular rate and rhythm. No appreciable murmur. Abdomen: Soft, diffuse severe discomfort to light palpation, mildly distended. Bowel sounds normal.  Large periumbilical hernia.  + rebound tenderness. + ascites. Extremities:  No LE edema. Skin: Warm/dry. No rashes or lesions. Neurologic: CN II-XII grossly intact bilaterally. Psych: AOx3.   Normal mood and affect. Data Review (Labs):   Recent Results (from the past 24 hour(s))   CBC W/O DIFF    Collection Time: 02/12/17  1:33 PM   Result Value Ref Range    WBC 9.6 4.3 - 11.1 K/uL    RBC 2.90 (L) 4.05 - 5.25 M/uL    HGB 8.9 (L) 11.7 - 15.4 g/dL    HCT 27.5 (L) 35.8 - 46.3 %    MCV 94.8 79.6 - 97.8 FL    MCH 30.7 26.1 - 32.9 PG    MCHC 32.4 31.4 - 35.0 g/dL    RDW 16.7 (H) 11.9 - 14.6 %    PLATELET 86 (L) 905 - 450 K/uL    MPV 10.4 (L) 10.8 - 29.8 FL   METABOLIC PANEL, COMPREHENSIVE    Collection Time: 02/12/17  1:33 PM   Result Value Ref Range    Sodium 137 136 - 145 mmol/L    Potassium 3.6 3.5 - 5.1 mmol/L    Chloride 99 98 - 107 mmol/L    CO2 31 21 - 32 mmol/L    Anion gap 7 7 - 16 mmol/L    Glucose 195 (H) 65 - 100 mg/dL    BUN 31 (H) 6 - 23 MG/DL    Creatinine 3.88 (H) 0.6 - 1.0 MG/DL    GFR est AA 16 (L) >60 ml/min/1.73m2    GFR est non-AA 13 (L) >60 ml/min/1.73m2    Calcium 8.5 8.3 - 10.4 MG/DL    Bilirubin, total 1.4 (H) 0.2 - 1.1 MG/DL    ALT (SGPT) 23 12 - 65 U/L    AST (SGOT) 17 15 - 37 U/L    Alk.  phosphatase 234 (H) 50 - 136 U/L    Protein, total 6.3 6.3 - 8.2 g/dL    Albumin 2.8 (L) 3.5 - 5.0 g/dL    Globulin 3.5 2.3 - 3.5 g/dL    A-G Ratio 0.8 (L) 1.2 - 3.5     LIPASE    Collection Time: 02/12/17  1:33 PM   Result Value Ref Range    Lipase 69 (L) 73 - 393 U/L   PROTHROMBIN TIME + INR    Collection Time: 02/12/17  1:33 PM   Result Value Ref Range    Prothrombin time 14.1 (H) 9.6 - 12.0 sec    INR 1.3 (H) 0.9 - 1.2     AMMONIA    Collection Time: 02/12/17  1:57 PM   Result Value Ref Range    Ammonia 30 11 - 32 UMOL/L   CELL COUNT, BODY FLUID    Collection Time: 02/12/17  4:00 PM   Result Value Ref Range    BODY FLUID TYPE ASCITIC FLUID       FLUID COLOR YELLOW      FLUID APPEARANCE TURBID      FLUID RBC COUNT <24489 /cu mm    FLD NUCLEATED CELLS 3808 /cu mm    FLD SEGS 94 %    FLD LYMPHS 3 %    FLD MONO/MACROPHAGE 3 %   GLUCOSE, FLUID    Collection Time: 02/12/17  4:00 PM   Result Value Ref Range Fluid Type: ASCITIC FLUID       Glucose, body fld. 162 MG/DL   PROTEIN TOTAL, FLUID    Collection Time: 02/12/17  4:00 PM   Result Value Ref Range    Fluid Type: ASCITIC FLUID       Protein total, body fld.  1.3 g/dL   POC LACTIC ACID    Collection Time: 02/12/17  6:35 PM   Result Value Ref Range    Lactic Acid (POC) 1.5 0.5 - 1.9 mmol/L       Assessment:   Principal Problem:    SBP (spontaneous bacterial peritonitis) ([facility] (2/12/2017)    Active Problems:    Type II diabetes mellitus ([facility] (7/4/2016)      Thrombocytopenia ([facility] (7/4/2016)      Ascites (7/4/2016)      ESRD (end stage renal disease) on dialysis [facility]) (11/2/2016)        Plan:     Admit to medical floor SFDT  Empiric Rocephin  Blood cultures x 2  Ascitic fluid culture  Consult Nephrology for dialysis    DVT prophylaxis: SCD's  Code Status: FULL    Plan of care discussed with: patient/family  Time spent on patient care: 30 minutes  Anticipated date of discharge: 3 days    Myles Walton DO

## 2017-02-14 NOTE — CONSULTS
Gastroenterology Consultation Note          Date of consultation:  2/14/2017     Consulting physician:  Laisha Smith. Dondra Canavan, M.D. Chief complaint:  Abdominal pain    History of Present Illness:  Patient is a 55 y.o. female patient of Dr. 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 Four days ago she underwent large-volume paracentesis with 5.5 L of fluid removed. Since that time she has had progressively worsening diffuse aching pain throughout her abdomen with radiation around to the back. This is worse with positional changes. There is been no associated nausea, vomiting, visible GI bleeding, recent change in mental status or fevers. Laboratory analysis reveals gram-positive cocci in blood cultures. Of note, she has history of esophageal varices, hepatic encephalopathy and renal failure for which she undergoes dialysis.       PMH:  Past Medical History   Diagnosis Date    SEAN (acute kidney injury) ([address] 6/26/2016    Arthritis      kath feet    Ascites     Benign neoplasm of skin of trunk, except scrotum      pt denies    Cellulitis and abscess of unspecified digit      hx of    Chronic kidney disease      [facility] in [place] on Mon/Wed/Fri    Chronic pain      abd area    Congenital hepatic fibrosis      Liver transplant X2    Esophageal varices (HCC)     GERD (gastroesophageal reflux disease)     Hemodialysis access, AV graft ([address] 11/22/2016    Hepatic encephalopathy ([address] 10/2016     recently hospitalized for hepatic encephalopathy    Hepatitis C      hx of hepatitis C-- dx after first liver transplant from a transfusion   (first transplant age 13)    History of gastric ulcer     Insomnia 07/13/2015     no current meds    Liver transplant status ([address] 1986 and 1999     X2- congential hepatic fibrosis    Pain in joint, ankle and foot     PICC (peripherally inserted central catheter) in place     PONV (postoperative nausea and vomiting)      some N/V, pt states she does well with Phenergan    Port-a-cath in place      R chest for HD    Raynaud disease     Spleen enlarged     Tachycardia     Thrombocytopenia (HCC)     Type 2 diabetes mellitus (HCC)      insulin only/AVG /s.s of hypoglycemia 30's/Last A1c 5.6    Vasculitis ([address]      resolved       PSH:  Past Surgical History   Procedure Laterality Date    Pr lap,tubal cautery      Hx colonoscopy      Hx cholecystectomy  1984    Hx tubal ligation      Hx other surgical       biliary reconstructive surgery    Hx other surgical  2013     EGD    Hx other surgical  03/2016     tips procedure    Hx other surgical       paracentesis    Vascular surgery procedure unlist Left 11/02/2016     thigh AVG insertion in thigh    Hx transplant  4337,8275     2 liver - first one for congenital hepatic fibrosis, 2nd for Hep C cirrhosis       Allergies: Allergies   Allergen Reactions    Codeine Nausea Only    Compazine [Prochlorperazine Edisylate] Unknown (comments)     Causes Lock Jaw    Pcn [Penicillins] Other (comments)     \"drops wbc\"       Home Medications:  Prior to Admission medications    Medication Sig Start Date End Date Taking? Authorizing Provider   colchicine (MITIGARE) 0.6 mg capsule Take 0.6 mg by mouth daily. Historical Provider   lactulose (CHRONULAC) 10 gram/15 mL solution Take 45 mL by mouth three (3) times daily. 1/5/17   Darell Bose DO   traMADol (ULTRAM) 50 mg tablet Take 1 Tab by mouth every six (6) hours as needed. Max Daily Amount: 200 mg. Indications: PAIN 11/3/16   Army Sanjana MD   insulin glargine (LANTUS SOLOSTAR) 100 unit/mL (3 mL) pen 25 Units by SubCUTAneous route nightly. Historical Provider   magnesium oxide (MAG-OX) 400 mg tablet Take 400 mg by mouth two (2) times a day.     Historical Provider   cycloSPORINE modified (GENGRAF) 25 mg capsule Take 2.5 mg/kg/day by mouth every morning. Indications: Takes in the AM    Historical Provider   midodrine (PROAMITINE) 5 mg tablet Take 5 mg by mouth every eight (8) hours. 2 q8h     Historical Provider   baclofen (LIORESAL) 10 mg tablet Take 10 mg by mouth as needed. Historical Provider   insulin aspart (NOVOLOG) 100 unit/mL injection by SubCUTAneous route. Sliding scale     Historical Provider   ondansetron hcl (ZOFRAN) 4 mg tablet Take 1 Tab by mouth every eight (8) hours as needed for Nausea. 6/28/16   Dedrick Garay MD   zinc sulfate (ZINCATE) 220 (50) mg capsule Take 220 mg by mouth every morning. 4/7/16   Historical Provider   XIFAXAN 550 mg tablet Take 550 mg by mouth three (3) times daily. 3/22/16   Historical Provider   pantoprazole (PROTONIX) 40 mg tablet Take 40 mg by mouth four (4) times daily.  2tabs bid    Historical Provider       Hospital Medications:  Current Facility-Administered Medications   Medication Dose Route Frequency    vancomycin (VANCOCIN) - pharmacy dosing  500 mg IntraVENous Rx Dosing/Monitoring    dextrose 40% (GLUTOSE) oral gel 1 Tube  1 Tube Oral PRN    dextrose (D50W) injection syrg 25 g  25 g IntraVENous PRN    sodium chloride (NS) flush 5-10 mL  5-10 mL IntraVENous Q8H    sodium chloride (NS) flush 5-10 mL  5-10 mL IntraVENous PRN    oxyCODONE IR (ROXICODONE) tablet 5 mg  5 mg Oral Q4H PRN    HYDROmorphone (PF) (DILAUDID) injection 0.5 mg  0.5 mg IntraVENous Q3H PRN    naloxone (NARCAN) injection 0.4 mg  0.4 mg IntraVENous PRN    diphenhydrAMINE (BENADRYL) injection 12.5 mg  12.5 mg IntraVENous Q4H PRN    promethazine (PHENERGAN) with saline injection 12.5 mg  12.5 mg IntraVENous Q6H PRN    cefTRIAXone (ROCEPHIN) 2 g in 0.9% sodium chloride (MBP/ADV) 50 mL  2 g IntraVENous Q24H    cycloSPORINE modified (GENGRAF, NEORAL) capsule 50 mg (patient supplied)  50 mg Oral Q12H    pantoprazole (PROTONIX) tablet 40 mg  40 mg Oral ACB&D    rifAXIMin (XIFAXAN) tablet 550 mg  550 mg Oral BID    insulin glargine (LANTUS) injection 25 Units  25 Units SubCUTAneous QHS    lactulose (CHRONULAC) solution 20 g  30 mL Oral TID    insulin lispro (HUMALOG) injection   SubCUTAneous AC&HS       Social History:  Social History   Substance Use Topics    Smoking status: Never Smoker    Smokeless tobacco: Never Used    Alcohol use No     Pt denies any history of IV drug use, blood transfusions, tattoos, prophylactic antibiotics prior to dental work, cardiac stents, pacemaker placement, or vaccinations for Hep A or B. Family History:  Family History   Problem Relation Age of Onset    Diabetes Mother     Heart Disease Mother     Hypertension Mother     Heart Disease Father     Stroke Father     Cancer Maternal Grandfather      liver cancer, alcoholism    No Known Problems Sister     Cancer Maternal Aunt      cervical cancer    Breast Cancer Paternal Grandmother      early 45s    Colon Cancer Paternal Grandmother      late 76s    Cancer Other      maternal niece- cervical cancer    Bipolar Disorder Brother     Heart Disease Brother        Review of Systems:  A detailed 10 system ROS is obtained, with pertinent positives as listed above. All others are negative.     Physical Exam:   Vitals:  Visit Vitals    /66    Pulse (!) 105    Temp 98.2 °F (36.8 °C)    Resp 20    Wt 53.5 kg (118 lb)    LMP 01/02/2016    SpO2 94%    Breastfeeding No    BMI 22.48 kg/m2       HEENT:  Mild scleral icterus, no oral ulcers    Heart:  Regular rate and rhythm, no murmurs  Lungs:  Clear to auscultation bilaterally, no accessory muscle use  Abdomen:  Distended, tense, diffuse mild tenderness, large umbilical hernia  Rectal:  Deferred  Extremities:  Raynaud's involving several upper extremity digits, no lower extremity edema  Skin:  No rashes, no spider angiomas over the anterior chest wall  Neuro:  Awake, alert and oriented to person, place, and time; no asterixis  Lymphatic: No cervical or supraclavicular adenopathy    Data:    Recent Results (from the past 24 hour(s))   GLUCOSE, POC    Collection Time: 02/13/17 10:49 AM   Result Value Ref Range    Glucose (POC) 165 (H) 65 - 100 mg/dL   GLUCOSE, POC    Collection Time: 02/13/17  4:17 PM   Result Value Ref Range    Glucose (POC) 60 (L) 65 - 100 mg/dL   GLUCOSE, POC    Collection Time: 02/13/17  6:10 PM   Result Value Ref Range    Glucose (POC) 131 (H) 65 - 729 mg/dL   METABOLIC PANEL, BASIC    Collection Time: 02/13/17 11:59 PM   Result Value Ref Range    Sodium 136 136 - 145 mmol/L    Potassium 3.4 (L) 3.5 - 5.1 mmol/L    Chloride 97 (L) 98 - 107 mmol/L    CO2 28 21 - 32 mmol/L    Anion gap 11 7 - 16 mmol/L    Glucose 198 (H) 65 - 100 mg/dL    BUN 25 (H) 6 - 23 MG/DL    Creatinine 3.46 (H) 0.6 - 1.0 MG/DL    GFR est AA 18 (L) >60 ml/min/1.73m2    GFR est non-AA 15 (L) >60 ml/min/1.73m2    Calcium 7.6 (L) 8.3 - 10.4 MG/DL   GLUCOSE, POC    Collection Time: 02/14/17  7:56 AM   Result Value Ref Range    Glucose (POC) 26 (LL) 65 - 100 mg/dL   GLUCOSE, POC    Collection Time: 02/14/17  9:04 AM   Result Value Ref Range    Glucose (POC) 94 65 - 100 mg/dL       impression/Plan:       55year old female with history of 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 The differential diagnosis includes mucosal disease (e.g. peptic ulcer disease, inflammatory bowel disease), motility disturbance (e.g. gastroparesis), mechanical obstruction (such as intermittent SBO), biliary tract disease (e.g. gallstones), pancreatic disease, vascular disease (mesenteric ischemia), or a functional disorder (IBS). Based on recent paracentesis, typical symptoms and positive blood cultures, this is clinically very suspicious for spontaneous bacterial peritonitis. 1. Continue Vancomycin and Rocephin. Follow up blood culture data. 2. Dilaudid as needed for pain.   3. Continue lactulose. Will modify dosing regimen. 4. As patient is already dialyzing, will repeat large-volume paracentesis.     Signed:  Madalyn Jordan MD  2/14/2017  9:27 AM

## 2017-02-14 NOTE — PROGRESS NOTES
Blood sugar 26, patient asymptomatic. Glutose gel given PO, along with 2 orange juices. Pt sitting up on side of bed eating breakfast, will monitor.

## 2017-02-14 NOTE — PROGRESS NOTES
Hospitalist Progress Note    2017  Admit Date: 2017 10:42 PM   NAME: Yanni Bell   :  1970   MRN:  [id number]   Attending: Rubia Mayfield MD  PCP:  Isabela Byers MD    SUBJECTIVE:   Patient state ha dabd pain last pm started with the urge to use rest room but persisted after isaac use . Pt feels shakey this am BS was 44 per pt. Nursed tested BS 32. H/o DM treated with lantus      Review of Systems negative with exception of pertinent positives noted above  PHYSICAL EXAM     Visit Vitals    /66    Pulse (!) 105    Temp 98.2 °F (36.8 °C)    Resp 20    Wt 53.5 kg (118 lb)    LMP 2016    SpO2 94%    Breastfeeding No    BMI 22.48 kg/m2      Temp (24hrs), Av.6 °F (37 °C), Min:98.2 °F (36.8 °C), Max:99.1 °F (37.3 °C)    Oxygen Therapy  O2 Sat (%): 94 % (17 0721)  Pulse via Oximetry: 100 beats per minute (17 0453)  O2 Device: Room air (17 1447)    Intake/Output Summary (Last 24 hours) at 17 0752  Last data filed at 17 1332   Gross per 24 hour   Intake                0 ml   Output              900 ml   Net             -900 ml      General: No acute distress    Lungs:  CTA Bilaterally.    Heart:  Regular rate and rhythm,  No murmur, rub, or gallop  Abdomen: Soft, Non distended, Non tender, Positive bowel sounds  Extremities: No cyanosis, clubbing or edema  Neurologic:  No focal deficits    ASSESSMENT      Active Hospital Problems    Diagnosis Date Noted    Liver transplant recipient [facility]) 2017    SBP (spontaneous bacterial peritonitis) ([address] [address] [address] 2017    ESRD (end stage renal disease) on dialysis ([facility] 2016    Ascites 2016    Type 2 diabetes mellitus with hyperglycemia ([facility] [address] 2016    Thrombocytopenia ([facility] [address] 2016     Plan:  · Hypoglycemia]  · Decrease lantus to 10 unit sq qhs  · Glucose gel stat  · Regular diet x1  · Continue rocephin  · CMP, NH4+, CBC, in am    DVT Prophylaxis:     Signed By: Patricia Brown Nuno Osborn MD     February 14, 2017

## 2017-02-14 NOTE — PROGRESS NOTES
TRANSFER - IN REPORT:    Verbal report received from Scott, Rn(name) on Gabbie Newman  being received from IR(unit) for routine post - op      Report consisted of patients Situation, Background, Assessment and   Recommendations(SBAR). Information from the following report(s) SBAR, Kardex, Procedure Summary, Intake/Output and Recent Results was reviewed with the receiving nurse. Opportunity for questions and clarification was provided. Assessment completed upon patients arrival to unit and care assumed.

## 2017-02-14 NOTE — PROGRESS NOTES
Bedside shift change report given to oncoming nurse, Idalia Donato. Pt resting in bed. Pt reports pain decreased after IV dilaudid dose. Pt PICC line intact flushed with saline. Pt has left upper thigh AV access and went to dialysis today. Skin has no redness.

## 2017-02-14 NOTE — PROGRESS NOTES
[facility]    Follow-Up on: ESRD    HPI: Pt complains of abdominal pain. HD yesterday. ROS:  Denies CP, SOB.     Current Facility-Administered Medications   Medication Dose Route Frequency    vancomycin (VANCOCIN) - pharmacy dosing  500 mg IntraVENous Rx Dosing/Monitoring    dextrose 40% (GLUTOSE) oral gel 1 Tube  1 Tube Oral PRN    dextrose (D50W) injection syrg 25 g  25 g IntraVENous PRN    sodium chloride (NS) flush 5-10 mL  5-10 mL IntraVENous Q8H    sodium chloride (NS) flush 5-10 mL  5-10 mL IntraVENous PRN    oxyCODONE IR (ROXICODONE) tablet 5 mg  5 mg Oral Q4H PRN    HYDROmorphone (PF) (DILAUDID) injection 0.5 mg  0.5 mg IntraVENous Q3H PRN    naloxone (NARCAN) injection 0.4 mg  0.4 mg IntraVENous PRN    diphenhydrAMINE (BENADRYL) injection 12.5 mg  12.5 mg IntraVENous Q4H PRN    promethazine (PHENERGAN) with saline injection 12.5 mg  12.5 mg IntraVENous Q6H PRN    cefTRIAXone (ROCEPHIN) 2 g in 0.9% sodium chloride (MBP/ADV) 50 mL  2 g IntraVENous Q24H    cycloSPORINE modified (GENGRAF, NEORAL) capsule 50 mg (patient supplied)  50 mg Oral Q12H    pantoprazole (PROTONIX) tablet 40 mg  40 mg Oral ACB&D    rifAXIMin (XIFAXAN) tablet 550 mg  550 mg Oral BID    insulin glargine (LANTUS) injection 25 Units  25 Units SubCUTAneous QHS    lactulose (CHRONULAC) solution 20 g  30 mL Oral TID    insulin lispro (HUMALOG) injection   SubCUTAneous AC&HS       Exam:  Vitals:    02/14/17 0137 02/14/17 0453 02/14/17 0721 02/14/17 1151   BP: 95/69 103/67 115/66 103/68   Pulse: 82 (!) 102 (!) 105 (!) 105   Resp: 18 18 20 15   Temp: 99.1 °F (37.3 °C) 98.7 °F (37.1 °C) 98.2 °F (36.8 °C) 97.7 °F (36.5 °C)   SpO2:  100% 94%    Weight:             Intake/Output Summary (Last 24 hours) at 02/14/17 1305  Last data filed at 02/14/17 0831   Gross per 24 hour   Intake              480 ml   Output              900 ml   Net             -420 ml     PE:  GEN - in no distress  CV - regular, no murmur, no rub  Lung - clear bilaterally  Abd - soft, tender  Ext - no edema  Left thigh AVG with bruit    Labs  Recent Labs      02/13/17   0715  02/12/17   1333   WBC  7.7  9.6   HGB  8.1*  8.9*   HCT  25.3*  27.5*   PLT  97*  86*     Recent Labs      02/13/17   2359  02/13/17   0715  02/12/17   1333   NA  136  139  137   K  3.4*  3.6  3.6   CL  97*  100  99   CO2  28  27  31   BUN  25*  39*  31*   CREA  3.46*  4.58*  3.88*   GLU  198*  120*  195*   CA  7.6*  7.5*  8.5     No results for input(s): PH, PCO2, PO2, PCO2 in the last 72 hours. Problem List:  Patient Active Problem List    Diagnosis Date Noted    Liver transplant recipient [facility]) 02/13/2017    SBP (spontaneous bacterial peritonitis) ([address] 02/12/2017    Hemodialysis access, AV graft ([address] 11/22/2016    ESRD (end stage renal disease) on dialysis ([address] 11/02/2016    Esophageal varices ([address] 07/27/2016    Hypotension 07/06/2016    Chronic kidney disease     GERD (gastroesophageal reflux disease)     Congenital hepatic fibrosis     Type 2 diabetes mellitus with hyperglycemia ([address] 07/04/2016    Hepatic encephalopathy ([address] 07/04/2016    Anemia 07/04/2016    Thrombocytopenia ([address] 07/04/2016    Ascites 07/04/2016    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 ESRD  2. Abdominal pain  3.  Hypoglycemia    Plan:  -HD tomorrow with UF as tolerated  -Lantus dose decreased by hospitalist

## 2017-02-14 NOTE — PROGRESS NOTES
Pt resting in bed, no distress noted. Dilaudid . 5mg given IVP x 2 this shift. Blood culture with gram positive cocci, vancomycin ordered. Pharmacy to dose.

## 2017-02-14 NOTE — PROGRESS NOTES
Pt c/o leg pains, instructed nurse to call [facility] order received. BMP ordered, potassium 3.4, ordered p.o. Pt resting in bed. No distress noted.

## 2017-02-14 NOTE — PROGRESS NOTES
Pharmacokinetic Consult to Pharmacist    Annika Jurado is a 55 y.o. female being treated for bloodstream infection (gram positive cocci detected in blood culture) with vancomycin and ceftriaxone. Weight: 53.5 kg (118 lb)  Lab Results   Component Value Date/Time    BUN 39 02/13/2017 07:15 AM    Creatinine 4.58 02/13/2017 07:15 AM    WBC 7.7 02/13/2017 07:15 AM    Procalcitonin 0.6 07/28/2016 05:15 AM      Estimated Creatinine Clearance: 11.4 mL/min (based on Cr of 4.58). CULTURES:  02/12   BC x 2   GPC detected in 2/2, pending     Ascitic fluid cx  WBCs detected, pending    Day 1 of vancomycin. Goal trough is 15-20. Vancomycin 1 g x 1 dose; will schedule future doses based on intermittent/post-HD levels. Will continue to follow patient.       Thank you,  Luis Fernando Lynch, PharmD

## 2017-02-14 NOTE — PROGRESS NOTES
TRANSFER - OUT REPORT:    Verbal report given to Cinda(name) on Gabbie Goss  being transferred to 604(unit) for ordered procedure       Report consisted of patients Situation, Background, Assessment and   Recommendations(SBAR). Information from the following report(s) Procedure Summary and MAR was reviewed with the receiving nurse. Lines:   PICC Single Lumen 09/22/16 (Active)   Central Line Being Utilized Yes 2/14/2017  2:09 PM   Criteria for Appropriate Use Irritant/vesicant medication 2/14/2017  2:09 PM   Site Assessment Clean, dry, & intact 2/14/2017  2:09 PM   Phlebitis Assessment 0 2/14/2017  2:09 PM   Infiltration Assessment 0 2/14/2017  2:09 PM   Date of Last Dressing Change 02/13/17 2/14/2017  2:09 PM   Dressing Status Clean, dry, & intact 2/14/2017  2:09 PM   Dressing Type Disk with Chlorhexadine Gluconate (CHG) 2/14/2017  9:00 AM   Hub Color/Line Status Purple 2/13/2017  8:41 PM   Positive Blood Return (Site #1) Yes 2/13/2017  8:41 PM   Alcohol Cap Used No 2/13/2017  8:41 PM        Opportunity for questions and clarification was provided.       Patient transported with:   Trinean

## 2017-02-15 PROBLEM — A40.9 STREPTOCOCCAL SEPSIS (HCC): Status: ACTIVE | Noted: 2017-01-01

## 2017-02-15 NOTE — PROGRESS NOTES
Pharmacokinetic Consult to Pharmacist    Deirdre So is a 55 y.o. female being treated for bloodstream infection (gram positive cocci detected in blood culture) with vancomycin and ceftriaxone. Weight: 52.1 kg (114 lb 14.4 oz)  Lab Results   Component Value Date/Time    BUN 14 02/15/2017 01:23 PM    Creatinine 2.41 02/15/2017 01:23 PM    WBC 8.3 02/15/2017 01:23 PM    Procalcitonin 0.6 07/28/2016 05:15 AM      Estimated Creatinine Clearance: 21.7 mL/min (based on Cr of 2.41). CULTURES:  02/12   BC x 2   GPC detected in 2/2, pending     Ascitic fluid cx  Negative, final  2/15   BC X 2   Pending    Lab Results   Component Value Date/Time    VANCOMYCIN,RANDOM 11.7 02/15/2017 01:23 PM       Day 3 of vancomycin. Goal trough is 15-20. Vancomycin to be dosed based on post-HD random levels    Vancomycin random level drawn post HD today resulted at 11.7, so will give 1000 mg X 1. Next random level to be drawn after next HD session. Will continue to follow patient.       Thank you,  Denisse Tom, PharmD  Clinical Pharmacist  [phone]

## 2017-02-15 NOTE — CDMP QUERY
Please clarify if this patient is being treated/managed for:    SEPSIS in the setting of SBP with temp 100.5 and tachycardia being treated with Rocephin, Vancomycin, and frequent vital sign monitoring. =>Other Explanation of clinical findings  =>Unable to Determine (no explanation of clinical findings)    The medical record reflects the following:    Risk Factors: SBP, Immunocompromised with Liver transplant and ESRD    Clinical Indicators: Temp 100.5, Tachycardia    Treatment: Rocephin, Vancomycin, Frequent vital sign monitoring. Please clarify and document your clinical opinion in the progress notes and discharge summary including the definitive and/or presumptive diagnosis, (suspected or probable), related to the above clinical findings. Please include clinical findings supporting your diagnosis.     Thanks,  Lynn Caro RN, [address] [place] [address] Documentation Management Program  [phone]

## 2017-02-15 NOTE — PROGRESS NOTES
Admit Date: 2/12/2017      Subjective:          Review of Systems  Cardio-vascular: no chest pain, no SOB  GI: no N/V/D  : no dysuria, no hematuria    Objective:     Patient Vitals for the past 8 hrs:   BP Temp Pulse Resp SpO2 Weight   02/15/17 0929 (!) 88/55 - (!) 101 - - -   02/15/17 0859 98/63 - (!) 104 - - -   02/15/17 0832 103/62 - (!) 103 - - -   02/15/17 0756 92/58 - 99 - - -   02/15/17 0717 95/59 - (!) 105 - - -   02/15/17 0507 - - - - - 52.1 kg (114 lb 14.4 oz)   02/15/17 0443 (!) 88/51 (!) 100.5 °F (38.1 °C) (!) 110 18 98 % -            Physical Exam:   Lungs: clear  CV: RR,   Abdomen: soft, distended  Ext: no edema          Data Review   Recent Results (from the past 8 hour(s))   CULTURE, BLOOD    Collection Time: 02/15/17  8:07 AM   Result Value Ref Range    Special Requests: HD ARTERIAL PORT     Culture result: PENDING    AMMONIA    Collection Time: 02/15/17  8:10 AM   Result Value Ref Range    Ammonia 13 11 - 32 UMOL/L   CULTURE, BLOOD    Collection Time: 02/15/17  8:32 AM   Result Value Ref Range    Special Requests: HD ARTERIAL PORT     Culture result: PENDING            Assessment:     Principal Problem:    SBP (spontaneous bacterial peritonitis) ([facility] (2/12/2017)    Active Problems:    Type 2 diabetes mellitus with hyperglycemia (HCC) (7/4/2016)      Thrombocytopenia ([facility] (7/4/2016)      Ascites (7/4/2016)      ESRD (end stage renal disease) on dialysis [facility]) (11/2/2016)      Liver transplant recipient [facility]) (2/13/2017)        Plan:     Seen on HD  No complication  Add Epo    Delicia Guerrier MD

## 2017-02-15 NOTE — PROGRESS NOTES
Hospitalist Progress Note    2/15/2017  Admit Date: 2017 10:42 PM   NAME: Lamin Lemon   :  1970   MRN:  [id number]   Attending: Richar Hackett MD  PCP:  Shyam Champagne MD  Treatment Team: Attending Provider: Richar Hackett MD; Consulting Provider: Benna Cowden, MD; Hospitalist: Oni Roldan MD; Consulting Provider: Christiano Ramirez MD; Care Manager: SYED Hernandez    Full Code    SUBJECTIVE:   No complaint went to HD was hypotensive 70 systolic. Given bolus of 250 ml stat.  Shimon temp last PM.            Recent Results (from the past 24 hour(s))   GLUCOSE, POC    Collection Time: 17  6:00 PM   Result Value Ref Range    Glucose (POC) 152 (H) 65 - 100 mg/dL   GLUCOSE, POC    Collection Time: 17  9:09 PM   Result Value Ref Range    Glucose (POC) 169 (H) 65 - 100 mg/dL   CULTURE, BLOOD    Collection Time: 02/15/17  8:07 AM   Result Value Ref Range    Special Requests: HD ARTERIAL PORT     Culture result: PENDING    AMMONIA    Collection Time: 02/15/17  8:10 AM   Result Value Ref Range    Ammonia 13 11 - 32 UMOL/L   CULTURE, BLOOD    Collection Time: 02/15/17  8:32 AM   Result Value Ref Range    Special Requests: HD ARTERIAL PORT     Culture result: PENDING    GLUCOSE, POC    Collection Time: 02/15/17 10:37 AM   Result Value Ref Range    Glucose (POC) 113 (H) 65 - 100 mg/dL   GLUCOSE, POC    Collection Time: 02/15/17 11:31 AM   Result Value Ref Range    Glucose (POC) 123 (H) 65 - 358 mg/dL   METABOLIC PANEL, COMPREHENSIVE    Collection Time: 02/15/17  1:23 PM   Result Value Ref Range    Sodium 141 136 - 145 mmol/L    Potassium 3.0 (L) 3.5 - 5.1 mmol/L    Chloride 102 98 - 107 mmol/L    CO2 28 21 - 32 mmol/L    Anion gap 11 7 - 16 mmol/L    Glucose 145 (H) 65 - 100 mg/dL    BUN 14 6 - 23 MG/DL    Creatinine 2.41 (H) 0.6 - 1.0 MG/DL    GFR est AA 28 (L) >60 ml/min/1.73m2    GFR est non-AA 23 (L) >60 ml/min/1.73m2    Calcium 7.5 (L) 8.3 - 10.4 MG/DL Bilirubin, total 0.8 0.2 - 1.1 MG/DL    ALT (SGPT) 15 12 - 65 U/L    AST (SGOT) 16 15 - 37 U/L    Alk.  phosphatase 178 (H) 50 - 136 U/L    Protein, total 5.3 (L) 6.3 - 8.2 g/dL    Albumin 2.3 (L) 3.5 - 5.0 g/dL    Globulin 3.0 2.3 - 3.5 g/dL    A-G Ratio 0.8 (L) 1.2 - 3.5     CBC W/O DIFF    Collection Time: 02/15/17  1:23 PM   Result Value Ref Range    WBC 8.3 4.3 - 11.1 K/uL    RBC 2.56 (L) 4.05 - 5.25 M/uL    HGB 7.9 (L) 11.7 - 15.4 g/dL    HCT 23.0 (L) 35.8 - 46.3 %    MCV 89.8 79.6 - 97.8 FL    MCH 30.9 26.1 - 32.9 PG    MCHC 34.3 31.4 - 35.0 g/dL    RDW 15.6 (H) 11.9 - 14.6 %    PLATELET 66 (L) 249 - 450 K/uL    MPV 9.5 (L) 10.8 - 14.1 FL   VANCOMYCIN, RANDOM    Collection Time: 02/15/17  1:23 PM   Result Value Ref Range    VANCOMYCIN,RANDOM 11.7 UG/ML   GLUCOSE, POC    Collection Time: 02/15/17  4:15 PM   Result Value Ref Range    Glucose (POC) 188 (H) 65 - 100 mg/dL       Allergies   Allergen Reactions    Codeine Nausea Only    Compazine [Prochlorperazine Edisylate] Unknown (comments)     Causes Lock Jaw    Pcn [Penicillins] Other (comments)     \"drops wbc\"     Current Facility-Administered Medications   Medication Dose Route Frequency Provider Last Rate Last Dose    epoetin ping (EPOGEN;PROCRIT) injection 10,000 Units  10,000 Units SubCUTAneous Q7D Ever Reynolds MD        vancomycin (VANCOCIN) 1,000 mg in 0.9% sodium chloride (MBP/ADV) 250 mL  1,000 mg IntraVENous ONCE Michael Nuno MD        vancomycin [facility]) - pharmacy dosing  500 mg IntraVENous Rx Dosing/Monitoring Alf Wan MD        dextrose 40% (GLUTOSE) oral gel 1 Tube  1 Tube Oral PRN Michael Nuno MD   1 Tube at 02/14/17 0802    dextrose (D50W) injection syrg 25 g  25 g IntraVENous PRN Michael Nuno MD        sodium chloride (NS) flush 5-10 mL  5-10 mL IntraVENous Q8H Jose Bolanos DO   10 mL at 02/15/17 4479    sodium chloride (NS) flush 5-10 mL  5-10 mL IntraVENous PRN Jose Bolanos DO       [employer] oxyCODONE IR (ROXICODONE) tablet 5 mg  5 mg Oral Q4H PRN [facility], DO   5 mg at 02/15/17 0024    HYDROmorphone (PF) (DILAUDID) injection 0.5 mg  0.5 mg IntraVENous Q3H PRN [facility], DO   0.5 mg at 02/15/17 9807    naloxone [facility]) injection 0.4 mg  0.4 mg IntraVENous PRN [facility], DO        diphenhydrAMINE (BENADRYL) injection 12.5 mg  12.5 mg IntraVENous Q4H PRN [facility], DO        promethazine [facility]) with saline injection 12.5 mg  12.5 mg IntraVENous Q6H PRN [facility], DO   12.5 mg at 02/15/17 0025    cefTRIAXone (ROCEPHIN) 2 g in 0.9% sodium chloride (MBP/ADV) 50 mL  2 g IntraVENous Q24H [facility],  mL/hr at 17 1855 2 g at 17 1855    cycloSPORINE modified (GENGRAF, NEORAL) capsule 50 mg (patient supplied)  50 mg Oral Q12H [facility] DO   50 mg at 17 1035    pantoprazole (PROTONIX) tablet 40 mg  40 mg Oral ACB&D [facility], DO   40 mg at 02/15/17 0646    rifAXIMin (XIFAXAN) tablet 550 mg  550 mg Oral BID [facility] DO   550 mg at 17 1855    insulin glargine (LANTUS) injection 25 Units  25 Units SubCUTAneous QHS [facility] DO   25 Units at 02/15/17 0041    lactulose (CHRONULAC) solution 20 g  30 mL Oral TID Louie Montes MD   20 g at 02/15/17 3385    insulin lispro (HUMALOG) injection   SubCUTAneous AC&HS [facility], DO   Stopped at 02/15/17 0730           Review of Systems negative with exception of pertinent positives noted above  PHYSICAL EXAM     Visit Vitals    BP (!) 87/56 (BP 1 Location: Left arm, BP Patient Position: Sitting)    Pulse (!) 106    Temp 98 °F (36.7 °C)    Resp 16    Wt 52.1 kg (114 lb 14.4 oz)    LMP 2016    SpO2 90%    Breastfeeding No    BMI 21.89 kg/m2      Temp (24hrs), Av.7 °F (37.1 °C), Min:98 °F (36.7 °C), Max:100.5 °F (38.1 °C)    Oxygen Therapy  O2 Sat (%): 90 % (02/15/17 1359)  Pulse via Oximetry: 92 beats per minute (02/15/17 7833)  O2 Device: Room air (02/15/17 8629)    Intake/Output Summary (Last 24 hours) at 02/15/17 1647  Last data filed at 02/15/17 1028   Gross per 24 hour   Intake                0 ml   Output             3900 ml   Net            -3900 ml      General: No acute distress    Lungs:  CTA, normal respiratory effort  Heart:  RRR no  Murmur, gallops or rubs  Abdomen: Soft NT, ND, NHSM  Extremities: No cyanosis, clubbing or edema  Neurologic:  CN 2- 12 intact, no sensory or motor deficit      [facility] Problems    Diagnosis Date Noted    Streptococcal sepsis [address] [address] [address] 02/15/2017    Liver transplant recipient [facility]) 02/13/2017    SBP (spontaneous bacterial peritonitis) ([address] 02/12/2017    ESRD (end stage renal disease) on dialysis ([address] 11/02/2016    Ascites 07/04/2016    Type 2 diabetes mellitus with hyperglycemia ([address] 07/04/2016    Thrombocytopenia ([facility] [address] 07/04/2016       Plan:  CBC, CMP in am  Continue vanc and zosyn  Lactic acid level in am      DVT Prophylaxis:

## 2017-02-15 NOTE — PROGRESS NOTES
Blood pressure taken 3 times, pt hypotensive after dialysis averaging 76/48. Dr. Dana Gray notifed, order received for 250 NS bolus.

## 2017-02-15 NOTE — DIALYSIS
HD treatment completed without complication. No UF removed due to hypotension during tx and patient's recent paracentesis. VS stable, NAD. Needles removed and pressure dressing applied to bilateral site. Transport contacted for return to room. NAD.

## 2017-02-15 NOTE — PROGRESS NOTES
Blood pressure after NS bolus 86/51, heart rate 100. IV abx infusing at 250/hour. Will continue to monitor.

## 2017-02-15 NOTE — PROGRESS NOTES
Able to draw blood from line after Cathflo given IV, flushed with NS. 250 ml NS bolus infusing, pt resting in bed.

## 2017-02-15 NOTE — DIALYSIS
Hemodialysis treatment initiated using left thigh graft and 15 g needles by Shaniqua King. Pt alert and VS stable. Machine settings per MD order. Will monitor during treatment.

## 2017-02-15 NOTE — PROGRESS NOTES
GI DAILY PROGRESS NOTE    Admit Date:  2/12/2017    Today's Date:  2/15/2017    CC: Abdominal pain    Subjective:     Patient currently in hemodialysis. S/p Paracentesis 2/14/17 with removal of 3.9L ascitic fluid. She feels much better today. Abdomina pain improved though persist.  8/10 pain scale today compared to 11 or 12 out of 10 pain scale yesterday. No c/o N/V. She is having at least 4-5 BMs daily. No overt GI bleeding. New onset increased temp of 100.5 early this morning.     Medications:   Current Facility-Administered Medications   Medication Dose Route Frequency    epoetin ping (EPOGEN;PROCRIT) injection 10,000 Units  10,000 Units SubCUTAneous Q7D    vancomycin (VANCOCIN) - pharmacy dosing  500 mg IntraVENous Rx Dosing/Monitoring    dextrose 40% (GLUTOSE) oral gel 1 Tube  1 Tube Oral PRN    dextrose (D50W) injection syrg 25 g  25 g IntraVENous PRN    sodium chloride (NS) flush 5-10 mL  5-10 mL IntraVENous Q8H    sodium chloride (NS) flush 5-10 mL  5-10 mL IntraVENous PRN    oxyCODONE IR (ROXICODONE) tablet 5 mg  5 mg Oral Q4H PRN    HYDROmorphone (PF) (DILAUDID) injection 0.5 mg  0.5 mg IntraVENous Q3H PRN    naloxone (NARCAN) injection 0.4 mg  0.4 mg IntraVENous PRN    diphenhydrAMINE (BENADRYL) injection 12.5 mg  12.5 mg IntraVENous Q4H PRN    promethazine (PHENERGAN) with saline injection 12.5 mg  12.5 mg IntraVENous Q6H PRN    cefTRIAXone (ROCEPHIN) 2 g in 0.9% sodium chloride (MBP/ADV) 50 mL  2 g IntraVENous Q24H    cycloSPORINE modified (GENGRAF, NEORAL) capsule 50 mg (patient supplied)  50 mg Oral Q12H    pantoprazole (PROTONIX) tablet 40 mg  40 mg Oral ACB&D    rifAXIMin (XIFAXAN) tablet 550 mg  550 mg Oral BID    insulin glargine (LANTUS) injection 25 Units  25 Units SubCUTAneous QHS    lactulose (CHRONULAC) solution 20 g  30 mL Oral TID    insulin lispro (HUMALOG) injection   SubCUTAneous AC&HS       Review of Systems:  ROS was obtained, with pertinent positives as listed above.  No chest pain or SOB. Diet:  Renal regular    Objective:   Vitals:  Visit Vitals    BP (!) 88/55    Pulse (!) 101    Temp (!) 100.5 °F (38.1 °C)    Resp 18    Wt 52.1 kg (114 lb 14.4 oz)    LMP 01/02/2016    SpO2 98%    Breastfeeding No    BMI 21.89 kg/m2     Intake/Output:     02/13 1901 - 02/15 0700  In: 960 [P.O.:960]  Out: 3900   Exam:  General appearance: alert, cooperative, no distress  Lungs: clear to auscultation bilaterally anteriorly  Heart: regular rate and rhythm  Abdomen: soft,diffuse mild tenderness, large umbilical hernia. Bowel sounds normal.   Extremities: extremities normal, atraumatic, no cyanosis or edema  Neuro:  alert and oriented    Data Review (Labs):    Recent Labs      02/13/17   2359  02/13/17   0715  02/12/17   1333   WBC   --   7.7  9.6   HGB   --   8.1*  8.9*   HCT   --   25.3*  27.5*   PLT   --   97*  86*   MCV   --   95.1  94.8   NA  136  139  137   K  3.4*  3.6  3.6   CL  97*  100  99   CO2  28  27  31   BUN  25*  39*  31*   CREA  3.46*  4.58*  3.88*   CA  7.6*  7.5*  8.5   GLU  198*  120*  195*   AP   --   212*  234*   SGOT   --   12*  17   ALT   --   21  23   TBILI   --   0.6  1.4*   ALB   --   2.3*  2.8*   TP   --   5.6*  6.3   LPSE   --    --   69*   PTP   --    --   14.1*   INR   --    --   1.3*       Assessment:     Principal Problem:    SBP (spontaneous bacterial peritonitis) (HCC) (2/12/2017)    Active Problems:    Type 2 diabetes mellitus with hyperglycemia (HCC) (7/4/2016)      Thrombocytopenia (HCC) (7/4/2016)      Ascites (7/4/2016)      ESRD (end stage renal disease) on dialysis [facility]) (11/2/2016)      Liver transplant recipient [facility]) (2/13/2017)      55year old female with history of 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   Based on recent paracentesis 2/10 (repeated 2/14), clinical symptoms and positive blood cultures, this is clinically suspicious for SBP (Spontaneous bacterial peritonitis).     Plan:     1. Continue Vancomycin and Rocephin. Follow up on pending blood culture results. Follow up tomorrow morning labs. 2. Dilaudid as needed for pain. 3. Continue lactulose with titration of dosing to 2-3 BMs daily along with Xifaxan b.i.d. Further recommendations will be based upon pt clinical course. Antonio Maradiaga PA-C      ATTENDING NOTE:  I agree with the above assessment and plan. Follow up results of culture data and tailor antibiotic therapy is appropriate. Modestly improved after large-volume paracentesis.     Fabby Perez MD

## 2017-02-15 NOTE — PROGRESS NOTES
NS bolus won't infuse, Picc line also won't draw blood. Cathflo 2 mg given IV, will monitor and restart NS bolus infusion in 30-45 mins.

## 2017-02-16 PROBLEM — A41.9 SEPSIS (HCC): Status: ACTIVE | Noted: 2017-01-01

## 2017-02-16 NOTE — PROGRESS NOTES
Gastroenterology Progress Note           Date of admission:  2/12/2017    Today's date:  2/16/2017    CC: Abdominal pain    HPI:   Patient complains of ongoing episodes of severe pain especially in the lower abdomen, wrapping around to her back. She feels that she has not significantly improved over the past few days. She reports having 7 loose bowel movements per day.     ROS:    Gen: No fevers, no chills   CV:   No chest pain, no SOB    Current Medications:     Current Facility-Administered Medications   Medication Dose Route Frequency    epoetin ping (EPOGEN;PROCRIT) injection 10,000 Units  10,000 Units SubCUTAneous Q7D    vancomycin (VANCOCIN) - pharmacy dosing  500 mg IntraVENous Rx Dosing/Monitoring    dextrose 40% (GLUTOSE) oral gel 1 Tube  1 Tube Oral PRN    dextrose (D50W) injection syrg 25 g  25 g IntraVENous PRN    sodium chloride (NS) flush 5-10 mL  5-10 mL IntraVENous Q8H    sodium chloride (NS) flush 5-10 mL  5-10 mL IntraVENous PRN    oxyCODONE IR (ROXICODONE) tablet 5 mg  5 mg Oral Q4H PRN    HYDROmorphone (PF) (DILAUDID) injection 0.5 mg  0.5 mg IntraVENous Q3H PRN    naloxone (NARCAN) injection 0.4 mg  0.4 mg IntraVENous PRN    diphenhydrAMINE (BENADRYL) injection 12.5 mg  12.5 mg IntraVENous Q4H PRN    promethazine (PHENERGAN) with saline injection 12.5 mg  12.5 mg IntraVENous Q6H PRN    cefTRIAXone (ROCEPHIN) 2 g in 0.9% sodium chloride (MBP/ADV) 50 mL  2 g IntraVENous Q24H    cycloSPORINE modified (GENGRAF, NEORAL) capsule 50 mg (patient supplied)  50 mg Oral Q12H    pantoprazole (PROTONIX) tablet 40 mg  40 mg Oral ACB&D    rifAXIMin (XIFAXAN) tablet 550 mg  550 mg Oral BID    insulin glargine (LANTUS) injection 25 Units  25 Units SubCUTAneous QHS    insulin lispro (HUMALOG) injection   SubCUTAneous AC&HS       Physical Exam:   Vitals:  Visit Vitals    /60 (BP 1 Location: Left arm, BP Patient Position: At rest)    Pulse (!) 107    Temp 98.1 °F (36.7 °C)    Resp 18  Wt 52.1 kg (114 lb 14.4 oz)    LMP 01/02/2016    SpO2 94%    Breastfeeding No    BMI 21.89 kg/m2     Intake/Output:          HEENT:  No scleral icterus, no oral ulcers  Abdomen: Soft, mild tenderness to palpation in the suprapubic region, protuberant umbilical hernia  Extremities:  No cyanosis, no leg edema  Neuro:  Alert and oriented to person, place, and time; no asterixis    Data:     Recent Results (from the past 24 hour(s))   GLUCOSE, POC    Collection Time: 02/15/17 10:37 AM   Result Value Ref Range    Glucose (POC) 113 (H) 65 - 100 mg/dL   GLUCOSE, POC    Collection Time: 02/15/17 11:31 AM   Result Value Ref Range    Glucose (POC) 123 (H) 65 - 732 mg/dL   METABOLIC PANEL, COMPREHENSIVE    Collection Time: 02/15/17  1:23 PM   Result Value Ref Range    Sodium 141 136 - 145 mmol/L    Potassium 3.0 (L) 3.5 - 5.1 mmol/L    Chloride 102 98 - 107 mmol/L    CO2 28 21 - 32 mmol/L    Anion gap 11 7 - 16 mmol/L    Glucose 145 (H) 65 - 100 mg/dL    BUN 14 6 - 23 MG/DL    Creatinine 2.41 (H) 0.6 - 1.0 MG/DL    GFR est AA 28 (L) >60 ml/min/1.73m2    GFR est non-AA 23 (L) >60 ml/min/1.73m2    Calcium 7.5 (L) 8.3 - 10.4 MG/DL    Bilirubin, total 0.8 0.2 - 1.1 MG/DL    ALT (SGPT) 15 12 - 65 U/L    AST (SGOT) 16 15 - 37 U/L    Alk.  phosphatase 178 (H) 50 - 136 U/L    Protein, total 5.3 (L) 6.3 - 8.2 g/dL    Albumin 2.3 (L) 3.5 - 5.0 g/dL    Globulin 3.0 2.3 - 3.5 g/dL    A-G Ratio 0.8 (L) 1.2 - 3.5     CBC W/O DIFF    Collection Time: 02/15/17  1:23 PM   Result Value Ref Range    WBC 8.3 4.3 - 11.1 K/uL    RBC 2.56 (L) 4.05 - 5.25 M/uL    HGB 7.9 (L) 11.7 - 15.4 g/dL    HCT 23.0 (L) 35.8 - 46.3 %    MCV 89.8 79.6 - 97.8 FL    MCH 30.9 26.1 - 32.9 PG    MCHC 34.3 31.4 - 35.0 g/dL    RDW 15.6 (H) 11.9 - 14.6 %    PLATELET 66 (L) 883 - 450 K/uL    MPV 9.5 (L) 10.8 - 14.1 FL   VANCOMYCIN, RANDOM    Collection Time: 02/15/17  1:23 PM   Result Value Ref Range    VANCOMYCIN,RANDOM 11.7 UG/ML   GLUCOSE, POC    Collection Time: 02/15/17  4:15 PM   Result Value Ref Range    Glucose (POC) 188 (H) 65 - 100 mg/dL   GLUCOSE, POC    Collection Time: 02/15/17  9:01 PM   Result Value Ref Range    Glucose (POC) 208 (H) 65 - 100 mg/dL   CBC W/O DIFF    Collection Time: 02/16/17  6:00 AM   Result Value Ref Range    WBC 6.5 4.3 - 11.1 K/uL    RBC 2.49 (L) 4.05 - 5.25 M/uL    HGB 7.6 (L) 11.7 - 15.4 g/dL    HCT 22.2 (L) 35.8 - 46.3 %    MCV 89.2 79.6 - 97.8 FL    MCH 30.5 26.1 - 32.9 PG    MCHC 34.2 31.4 - 35.0 g/dL    RDW 15.4 (H) 11.9 - 14.6 %    PLATELET 81 (L) 512 - 450 K/uL    MPV 10.0 (L) 10.8 - 76.9 FL   METABOLIC PANEL, COMPREHENSIVE    Collection Time: 02/16/17  6:00 AM   Result Value Ref Range    Sodium 138 136 - 145 mmol/L    Potassium 2.7 (LL) 3.5 - 5.1 mmol/L    Chloride 99 98 - 107 mmol/L    CO2 25 21 - 32 mmol/L    Anion gap 14 7 - 16 mmol/L    Glucose 137 (H) 65 - 100 mg/dL    BUN 21 6 - 23 MG/DL    Creatinine 3.30 (H) 0.6 - 1.0 MG/DL    GFR est AA 19 (L) >60 ml/min/1.73m2    GFR est non-AA 16 (L) >60 ml/min/1.73m2    Calcium 7.5 (L) 8.3 - 10.4 MG/DL    Bilirubin, total 0.5 0.2 - 1.1 MG/DL    ALT (SGPT) 14 12 - 65 U/L    AST (SGOT) 13 (L) 15 - 37 U/L    Alk. 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 Based on recent paracentesis 2/10 (repeated 2/14), clinical symptoms and positive blood cultures, this is clinically suspicious for SBP. Final culture data is pending.  Patient perseverates about the precise dosing of pain medication in a pattern concerning for drug-seeking behavior. The severity of pain is out of portion with what would be expected based on current therapy for suspected SBP. 1. Continue Vancomycin and Rocephin. Follow up final culture data. 2. Transition to liquid diet. 3. Minimize use of opiates. 4. Hold lactulose. 5. Continue Rifaximin 550 mg tab - 1 tab PO bid. 6. Check pelvic ultrasound today to evaluate for a GYN process. 7. Check urinalysis.     Signed:  Arletha Goldmann, MD  2/16/2017  8:41 AM

## 2017-02-16 NOTE — PROGRESS NOTES
Patient placed in Enteric Isolation as precaution. Explained to patient that stool and urine specimen are needed. Hat x2 placed in commode. Patient verbalized good understanding related to this. AM medication administered. Lactic acid specimen obtained from left chest PICC access without difficulty. This labeled, iced, and tubed to lab. Patient requested to have Oxy and phenergan when time permits. Will follow up with this request. Otherwise no issues or concerns noted at this time.

## 2017-02-16 NOTE — PROGRESS NOTES
Hospitalist Progress Note    2017  Admit Date: 2017 10:42 PM   NAME: Justin Snider   :  1970   MRN:  [id number]   Attending: Diane Soni MD  PCP:  Greg Ham MD    SUBJECTIVE:   Patient requiring narcotic analgesic q2h, states that the pain from ascites and the volume of ascites. No other problems. Review of Systems negative with exception of pertinent positives noted above  PHYSICAL EXAM     Visit Vitals    /67 (BP 1 Location: Left arm, BP Patient Position: Sitting)    Pulse 85    Temp 98.2 °F (36.8 °C)    Resp 18    Wt 52.1 kg (114 lb 14.4 oz)    LMP 2016    SpO2 90%    Breastfeeding No    BMI 21.89 kg/m2      Temp (24hrs), Av.4 °F (36.9 °C), Min:98 °F (36.7 °C), Max:99.3 °F (37.4 °C)    Oxygen Therapy  O2 Sat (%): 90 % (17 1116)  Pulse via Oximetry: 93 beats per minute (17 0447)  O2 Device: Room air (17 1116)  No intake or output data in the 24 hours ending 17 1150   General: No acute distress    Lungs:  CTA Bilaterally.    Heart:  Regular rate and rhythm,  No murmur, rub, or gallop  Abdomen: Soft, Non distended, Non tender, Positive bowel sounds + ascites + umbilical hernia  Extremities: No cyanosis, clubbing or edema  Neurologic:  No focal deficits    ASSESSMENT      Active Hospital Problems    Diagnosis Date Noted    Sepsis ([address] 2017    Liver transplant recipient [facility]) 2017    SBP (spontaneous bacterial peritonitis) ([address] 2017    ESRD (end stage renal disease) on dialysis ([address] 2016    Ascites 2016    Type 2 diabetes mellitus with hyperglycemia ([address] 2016    Thrombocytopenia ([address] 2016     Plan:  ·     DVT Prophylaxis:     Signed By: Gerry Jamison MD     2017

## 2017-02-16 NOTE — PROGRESS NOTES
Paged  regarding low K+ 2.7   Awaiting call back. Patient noted to be on lactulose and reporting multiple loose bowel movements. No potassium supplements noted in MAR.

## 2017-02-16 NOTE — PROGRESS NOTES
C-Diff testing has been ordered but the test has not been completed. It did not meet testing criteria because: The patient has been given laxatives or stool softeners in the last 24 hours. Received call from Janie in [address] [place] [address], stated specimen that was taken to lab is unable to be used, due to having lactulose in past 24 hours. Specimen will be able to be collected after 1700 today after patient has 3 loose stools. Will monitor this and pass information on to next shift.

## 2017-02-16 NOTE — PROGRESS NOTES
Stool specimen obtained at 10:20 and walked to lab. Patient currently without complaint. Will continue to follow up.

## 2017-02-16 NOTE — PROGRESS NOTES
[facility]    Follow-Up on:2/16/17    HPI: severe abdominal pain with any movement; reducible umbilical hernia  ROS:  Denies CP, SOB.     Current Facility-Administered Medications   Medication Dose Route Frequency    potassium chloride (K-DUR, KLOR-CON) tablet 40 mEq  40 mEq Oral Q4H    morphine CR (MS CONTIN) tablet 15 mg  15 mg Oral Q12H    epoetin ping (EPOGEN;PROCRIT) injection 10,000 Units  10,000 Units SubCUTAneous Q7D    vancomycin (VANCOCIN) - pharmacy dosing  500 mg IntraVENous Rx Dosing/Monitoring    dextrose 40% (GLUTOSE) oral gel 1 Tube  1 Tube Oral PRN    dextrose (D50W) injection syrg 25 g  25 g IntraVENous PRN    sodium chloride (NS) flush 5-10 mL  5-10 mL IntraVENous Q8H    sodium chloride (NS) flush 5-10 mL  5-10 mL IntraVENous PRN    oxyCODONE IR (ROXICODONE) tablet 5 mg  5 mg Oral Q4H PRN    naloxone (NARCAN) injection 0.4 mg  0.4 mg IntraVENous PRN    diphenhydrAMINE (BENADRYL) injection 12.5 mg  12.5 mg IntraVENous Q4H PRN    promethazine (PHENERGAN) with saline injection 12.5 mg  12.5 mg IntraVENous Q6H PRN    cefTRIAXone (ROCEPHIN) 2 g in 0.9% sodium chloride (MBP/ADV) 50 mL  2 g IntraVENous Q24H    cycloSPORINE modified (GENGRAF, NEORAL) capsule 50 mg (patient supplied)  50 mg Oral Q12H    pantoprazole (PROTONIX) tablet 40 mg  40 mg Oral ACB&D    rifAXIMin (XIFAXAN) tablet 550 mg  550 mg Oral BID    insulin glargine (LANTUS) injection 25 Units  25 Units SubCUTAneous QHS    insulin lispro (HUMALOG) injection   SubCUTAneous AC&HS       Exam:  Vitals:    02/16/17 0235 02/16/17 0447 02/16/17 0724 02/16/17 1116   BP: 106/56 100/57 104/60 100/67   Pulse: (!) 101 (!) 109 (!) 107 85   Resp:  18 18 18   Temp:  99.3 °F (37.4 °C) 98.1 °F (36.7 °C) 98.2 °F (36.8 °C)   SpO2: 94% 93% 94% 90%   Weight:           No intake or output data in the 24 hours ending 02/16/17 1400  PE:  GEN - in no distress  CV - regular, no murmur, no rub  Lung - clear bilaterally  Abd - soft,  Diffuse tenderness  Ext - no edema    Labs  Recent Labs      02/16/17   0600  02/15/17   1323   WBC  6.5  8.3   HGB  7.6*  7.9*   HCT  22.2*  23.0*   PLT  81*  66*     Recent Labs      02/16/17   0600  02/15/17   1323  02/13/17   2359   NA  138  141  136   K  2.7*  3.0*  3.4*   CL  99  102  97*   CO2  25  28  28   BUN  21  14  25*   CREA  3.30*  2.41*  3.46*   GLU  137*  145*  198*   CA  7.5*  7.5*  7.6*     No results for input(s): PH, PCO2, PO2, PCO2 in the last 72 hours. Problem List:  Patient Active Problem List    Diagnosis Date Noted    Sepsis ([address] 02/16/2017    Streptococcal sepsis ([facility] [address] 02/15/2017    Liver transplant recipient [facility]) 02/13/2017    SBP (spontaneous bacterial peritonitis) ([address] 02/12/2017    Hemodialysis access, AV graft ([address] 11/22/2016    ESRD (end stage renal disease) on dialysis ([address] 11/02/2016    Esophageal varices ([address] 07/27/2016    Hypotension 07/06/2016    Chronic kidney disease     GERD (gastroesophageal reflux disease)     Congenital hepatic fibrosis     Type 2 diabetes mellitus with hyperglycemia ([address] 07/04/2016    Hepatic encephalopathy ([address] 07/04/2016    Anemia 07/04/2016    Thrombocytopenia ([address] 07/04/2016    Ascites 07/04/2016    Elevated diaphragm 07/04/2016    Raynaud's disease 07/04/2016    Hepatitis C 07/04/2016    Insomnia 07/13/2015       Issues Addressed By Nephrology:    Plan:  1. Abdominal pain etiology elusive thus far  2. Orthotic liver transplant  3. Liver cirrhosis  4. Ascites  5. ?SBP  6.  ESRD HD mwf

## 2017-02-17 NOTE — PROGRESS NOTES
Admit Date: 2/12/2017      Subjective:          Review of Systems  Cardio-vascular: no chest pain, +SOB  GI: no N/V/D, abd pain  : no dysuria, no hematuria    Objective:     Patient Vitals for the past 8 hrs:   BP Temp Pulse Resp SpO2 Weight   02/17/17 0900 90/57 - 93 - - -   02/17/17 0825 109/63 - 93 - - -   02/17/17 0731 104/63 97.7 °F (36.5 °C) 100 16 99 % -   02/17/17 0425 95/67 98.2 °F (36.8 °C) (!) 102 16 93 % 55.8 kg (123 lb 1.6 oz)            Physical Exam:   Lungs: poor inspiration  CV: RR,   Abdomen: distended, tender  Ext: no edema          Data Review   Recent Results (from the past 8 hour(s))   URINALYSIS W/ RFLX MICROSCOPIC    Collection Time: 02/17/17  2:50 AM   Result Value Ref Range    Color YELLOW      Appearance CLOUDY      Specific gravity 1.010 1.001 - 1.023      pH (UA) 5.0 5.0 - 9.0      Protein NEGATIVE  NEG mg/dL    Glucose NEGATIVE  mg/dL    Ketone NEGATIVE  NEG mg/dL    Bilirubin NEGATIVE  NEG      Blood NEGATIVE  NEG      Urobilinogen 0.2 0.2 - 1.0 EU/dL    Nitrites NEGATIVE  NEG      Leukocyte Esterase TRACE (A) NEG      WBC 0-3 0 /hpf    RBC 0-3 0 /hpf    Epithelial cells 3-5 0 /hpf    Bacteria 0 0 /hpf    Casts HYALINE 0 /lpf   METABOLIC PANEL, BASIC    Collection Time: 02/17/17  6:00 AM   Result Value Ref Range    Sodium 136 136 - 145 mmol/L    Potassium 4.0 3.5 - 5.1 mmol/L    Chloride 100 98 - 107 mmol/L    CO2 23 21 - 32 mmol/L    Anion gap 13 7 - 16 mmol/L    Glucose 181 (H) 65 - 100 mg/dL    BUN 28 (H) 6 - 23 MG/DL    Creatinine 4.16 (H) 0.6 - 1.0 MG/DL    GFR est AA 15 (L) >60 ml/min/1.73m2    GFR est non-AA 12 (L) >60 ml/min/1.73m2    Calcium 7.7 (L) 8.3 - 10.4 MG/DL   MAGNESIUM    Collection Time: 02/17/17  6:00 AM   Result Value Ref Range    Magnesium 1.9 1.8 - 2.4 mg/dL   GLUCOSE, POC    Collection Time: 02/17/17  7:25 AM   Result Value Ref Range    Glucose (POC) 157 (H) 65 - 100 mg/dL           Assessment:     Principal Problem:    SBP (spontaneous bacterial peritonitis) ([address] (2/12/2017)    Active Problems:    Type 2 diabetes mellitus with hyperglycemia ([address] (7/4/2016)      Thrombocytopenia ([address] (7/4/2016)      Ascites (7/4/2016)      ESRD (end stage renal disease) on dialysis [facility]) (11/2/2016)      Liver transplant recipient [facility]) (2/13/2017)      Sepsis ([address] (2/16/2017)        Plan:     Seen on HD  No pb  Increase Epo.   For paracentesis today    Gian Higgins MD

## 2017-02-17 NOTE — PROGRESS NOTES
Pain control remains an issue. Encourage patient oob and ambulation. DC plan remains the same. Home with family. Dialysis: [facility]  M/W/F  Ramya Galloway.  Ilaina Wyman

## 2017-02-17 NOTE — DIALYSIS
Hemodialysis treatment initiated using left thigh AVG and 15 g needles by Judy LARSON. Pt alert and VS stable. Machine settings per MD order. Will monitor during treatment.

## 2017-02-17 NOTE — PROGRESS NOTES
Pt back from IR, drsg L abd c/d/i. Left upper chest temp perm cath drsg c/d/i. Pt resting in bed, no distress noted.

## 2017-02-17 NOTE — PROGRESS NOTES
Patient complains of itching and was clawing at her skin, given benedryl 12.5 mg IV 2236. Patients 2100 dose of Morphine  CR 15mg held at 2100 due to sedation. She was more alert at 2236 when benedryl was given, she stated that she could not handle the itching anymore. Patient complains of pain at 0230 and nausea, given Phenergan 12.5mg IV and Oxycodone IR 5mg po.

## 2017-02-17 NOTE — PROGRESS NOTES
TRANSFER - IN REPORT:    Verbal report received from Scott RN(name) on Gabbie Rivera  being received from IR(unit) for routine progression of care      Report consisted of patients Situation, Background, Assessment and   Recommendations(SBAR). Information from the following report(s) OR Summary, Procedure Summary and Recent Results was reviewed with the receiving nurse. Opportunity for questions and clarification was provided. Assessment completed upon patients arrival to unit and care assumed.

## 2017-02-17 NOTE — PROGRESS NOTES
HD running fine until all of a sudden this time, vascular access (L thigh AVG) clotted mid-treatment. VS as follows: 95/63, hr 100. No thrill or bruit present to graft. Unable to return blood to patient. Dr. Peri Cash paged--awaiting call back. Treatment stopped at this time.

## 2017-02-17 NOTE — PROGRESS NOTES
Hospitalist Progress Note    2017  Admit Date: 2017 10:42 PM   NAME: Ava Saavedra   :  1970   MRN:  [id number]   Attending: Nikki Marin MD  PCP:  Wilmer James MD    SUBJECTIVE:   Patient pt could notuse L thigh AV graft. Pt also still c/o pain in the abd  requestingmore analagesic      Review of Systems negative with exception of pertinent positives noted above  PHYSICAL EXAM     Visit Vitals    /53 (BP 1 Location: Left arm, BP Patient Position: At rest)    Pulse 94    Temp 98.3 °F (36.8 °C)    Resp 13    Wt 55.8 kg (123 lb 1.6 oz)    LMP 2016    SpO2 96%    Breastfeeding No    BMI 23.45 kg/m2      Temp (24hrs), Av °F (36.7 °C), Min:97.6 °F (36.4 °C), Max:98.3 °F (36.8 °C)    Oxygen Therapy  O2 Sat (%): 96 % (17 1510)  Pulse via Oximetry: 93 beats per minute (17 0447)  O2 Device: Room air (17 1510)  ETCO2 (mmHg): 30 mmHg (17 1510)    Intake/Output Summary (Last 24 hours) at 17 1530  Last data filed at 17 0956   Gross per 24 hour   Intake                0 ml   Output              100 ml   Net             -100 ml      General: No acute distress    Lungs:  CTA Bilaterally. Heart:  Regular rate and rhythm,  No murmur, rub, or gallop  Abdomen: Soft, Non distended, Non tender, Positive bowel sounds + umbilical hernia  Extremities: No cyanosis, clubbing or edema  Neurologic:  No focal deficits    ASSESSMENT      Active Hospital Problems    Diagnosis Date Noted    Sepsis ([address] 2017    Liver transplant recipient [facility]) 2017    SBP (spontaneous bacterial peritonitis) ([address] 2017    ESRD (end stage renal disease) on dialysis ([address] 2016    Ascites 2016    Type 2 diabetes mellitus with hyperglycemia ([address] 2016    Thrombocytopenia ([address] 2016     Plan:  · L thigh AV graft thrombolectomy.   · D/c oxy ir  · Increase ms contin 30mg po BID prn pain  · D/c home once pain has improved  · CBC,BMP in am    DVT Prophylaxis:     Signed By: Dulce Maria Garcia MD     February 17, 2017    R/o Pneumonia  CXR

## 2017-02-17 NOTE — PROGRESS NOTES
TRANSFER - OUT REPORT:    Verbal report given to Kellen LARSON(name) on Gordon Garcia  being transferred to 604(unit) for ordered procedure       Report consisted of patients Situation, Background, Assessment and   Recommendations(SBAR). Information from the following report(s) Procedure Summary and MAR was reviewed with the receiving nurse. Lines:   PICC Single Lumen 09/22/16 (Active)   Central Line Being Utilized Yes 2/17/2017  2:00 PM   Criteria for Appropriate Use Irritant/vesicant medication 2/17/2017  2:00 PM   Site Assessment Clean, dry, & intact 2/17/2017  2:00 PM   Phlebitis Assessment 0 2/17/2017  2:00 PM   Infiltration Assessment 0 2/17/2017  2:00 PM   Date of Last Dressing Change 02/16/17 2/17/2017  2:00 PM   Dressing Status Clean, dry, & intact 2/17/2017  2:00 PM   Dressing Type Disk with Chlorhexadine Gluconate (CHG) 2/17/2017  2:00 PM   Hub Color/Line Status Capped 2/17/2017  2:00 PM   Action Taken Dressing changed 2/16/2017  4:58 PM   Positive Blood Return (Site #1) Yes 2/17/2017  2:00 PM   Alcohol Cap Used No 2/17/2017  2:00 PM        Opportunity for questions and clarification was provided.       Patient transported with:   [employer]

## 2017-02-17 NOTE — PROCEDURES
Department of Interventional Radiology  [phone]        Interventional Radiology Brief Procedure Note    Patient: Gwyn Lee MRN: [id number]  SSN: [id number]    YOB: 1970  Age: 55 y.o. Sex: female      Date of Procedure: 2/17/2017    Pre-Procedure Diagnosis: ESRD, ascites    Post-Procedure Diagnosis: SAME    Procedure(s): Paracentesis  Tunneled Central Venous Catheter    Brief Description of Procedure: US guided paracentesis, 3 liters removed. Right IJ tunneled hemodialysis catheter placed    Performed By: Jackquelyn Snellen, PA-C     Assistants: None    Anesthesia:Lidocaine    Estimated Blood Loss: Less than 10ml    Specimens: asictes    Implants: Tunneled Central Venous Catheter    Findings: right IJ patent.  Large vol ascites    Complications: None    Recommendations: ok to use catheter     Follow Up: referring MD    Signed By: Jackquelyn Snellen, PA-C     February 17, 2017

## 2017-02-17 NOTE — PROGRESS NOTES
Interventional Radiology Prep Area Handoff      Allergies   Allergen Reactions    Codeine Nausea Only    Compazine [Prochlorperazine Edisylate] Unknown (comments)     Causes Lock Jaw    Pcn [Penicillins] Other (comments)     \"drops wbc\"       [place] reviewed. Pt identified with two identifiers. Verified procedure with Patient and IR Provider as Tunneled Dialysis Catheter. Consent obtained: Awaiting MD  H&P complete/updated: Yes MD airway complete: Awaiting MD    Sedation:Yes   NPO: Yes   Anticoagulation: None     Pt shaved: N/A   Antibiotics: Awaiting MD   Anesthesia notified: N/A    Additional Notes: None      Lines:  Peripherally Inserted Central Catheter:  PICC Single Lumen 09/22/16 (Active)   Central Line Being Utilized Yes 2/17/2017  7:00 AM   Criteria for Appropriate Use Limited/no vessel suitable for conventional peripheral access 2/17/2017  7:00 AM   Site Assessment Clean, dry, & intact 2/17/2017  3:12 PM   Phlebitis Assessment 0 2/17/2017  3:12 PM   Infiltration Assessment 0 2/17/2017  3:12 PM   Date of Last Dressing Change 02/16/17 2/17/2017  3:12 PM   Dressing Status Clean, dry, & intact 2/17/2017  3:12 PM   Dressing Type Disk with Chlorhexadine Gluconate (CHG); Transparent 2/17/2017  2:03 AM   Hub Color/Line Status Flushed;Patent 2/17/2017  2:03 AM   Action Taken Dressing changed 2/16/2017  4:58 PM   Positive Blood Return (Site #1) Yes 2/16/2017  4:58 PM   Alcohol Cap Used No 2/14/2017  8:10 PM     Central Venous Catheter:     Venous Access Device:     Peripheral Intravenous Line:     Hemodialysis Catheter:     Drain(s):       Labs verified: Yes  Recent Results (from the past 24 hour(s))   GLUCOSE, POC    Collection Time: 02/16/17  4:15 PM   Result Value Ref Range    Glucose (POC) 153 (H) 65 - 100 mg/dL   GLUCOSE, POC    Collection Time: 02/16/17  8:44 PM   Result Value Ref Range    Glucose (POC) 222 (H) 65 - 100 mg/dL   URINALYSIS W/ RFLX MICROSCOPIC    Collection Time: 02/17/17  2:50 AM Result Value Ref Range    Color YELLOW      Appearance CLOUDY      Specific gravity 1.010 1.001 - 1.023      pH (UA) 5.0 5.0 - 9.0      Protein NEGATIVE  NEG mg/dL    Glucose NEGATIVE  mg/dL    Ketone NEGATIVE  NEG mg/dL    Bilirubin NEGATIVE  NEG      Blood NEGATIVE  NEG      Urobilinogen 0.2 0.2 - 1.0 EU/dL    Nitrites NEGATIVE  NEG      Leukocyte Esterase TRACE (A) NEG      WBC 0-3 0 /hpf    RBC 0-3 0 /hpf    Epithelial cells 3-5 0 /hpf    Bacteria 0 0 /hpf    Casts HYALINE 0 /lpf   METABOLIC PANEL, BASIC    Collection Time: 02/17/17  6:00 AM   Result Value Ref Range    Sodium 136 136 - 145 mmol/L    Potassium 4.0 3.5 - 5.1 mmol/L    Chloride 100 98 - 107 mmol/L    CO2 23 21 - 32 mmol/L    Anion gap 13 7 - 16 mmol/L    Glucose 181 (H) 65 - 100 mg/dL    BUN 28 (H) 6 - 23 MG/DL    Creatinine 4.16 (H) 0.6 - 1.0 MG/DL    GFR est AA 15 (L) >60 ml/min/1.73m2    GFR est non-AA 12 (L) >60 ml/min/1.73m2    Calcium 7.7 (L) 8.3 - 10.4 MG/DL   MAGNESIUM    Collection Time: 02/17/17  6:00 AM   Result Value Ref Range    Magnesium 1.9 1.8 - 2.4 mg/dL   GLUCOSE, POC    Collection Time: 02/17/17  7:25 AM   Result Value Ref Range    Glucose (POC) 157 (H) 65 - 100 mg/dL   GLUCOSE, POC    Collection Time: 02/17/17 12:41 PM   Result Value Ref Range    Glucose (POC) 126 (H) 65 - 100 mg/dL     Patient Vitals for the past 8 hrs:   Temp Pulse Resp BP SpO2   02/17/17 1510 98.3 °F (36.8 °C) 94 13 101/53 96 %   02/17/17 1445 98 °F (36.7 °C) 93 12 94/64 97 %   02/17/17 1246 97.6 °F (36.4 °C) 66 16 104/69 95 %   02/17/17 0956 - 100 - 95/63 -   02/17/17 0927 - 97 - 100/67 -   02/17/17 0900 - 93 - 90/57 -   02/17/17 0825 - 93 - 109/63 -   02/17/17 0731 97.7 °F (36.5 °C) 100 16 104/63 99 %

## 2017-02-17 NOTE — PROGRESS NOTES
Gastroenterology Progress Note           Date of admission:  2/12/2017    Today's date:  2/17/2017    CC: Abdominal pain    HPI:   Patient resting comfortably in dialysis. She also complains of episodes exquisite pelvic pain and requests better pain control.     ROS:    Gen: No fevers, no chills   CV:   No chest pain, no SOB    Current Medications:     Current Facility-Administered Medications   Medication Dose Route Frequency    morphine CR (MS CONTIN) tablet 15 mg  15 mg Oral Q12H    epoetin ping (EPOGEN;PROCRIT) injection 10,000 Units  10,000 Units SubCUTAneous Q7D    vancomycin (VANCOCIN) - pharmacy dosing  500 mg IntraVENous Rx Dosing/Monitoring    dextrose 40% (GLUTOSE) oral gel 1 Tube  1 Tube Oral PRN    dextrose (D50W) injection syrg 25 g  25 g IntraVENous PRN    sodium chloride (NS) flush 5-10 mL  5-10 mL IntraVENous Q8H    sodium chloride (NS) flush 5-10 mL  5-10 mL IntraVENous PRN    oxyCODONE IR (ROXICODONE) tablet 5 mg  5 mg Oral Q4H PRN    naloxone (NARCAN) injection 0.4 mg  0.4 mg IntraVENous PRN    diphenhydrAMINE (BENADRYL) injection 12.5 mg  12.5 mg IntraVENous Q4H PRN    promethazine (PHENERGAN) with saline injection 12.5 mg  12.5 mg IntraVENous Q6H PRN    cefTRIAXone (ROCEPHIN) 2 g in 0.9% sodium chloride (MBP/ADV) 50 mL  2 g IntraVENous Q24H    cycloSPORINE modified (GENGRAF, NEORAL) capsule 50 mg (patient supplied)  50 mg Oral Q12H    pantoprazole (PROTONIX) tablet 40 mg  40 mg Oral ACB&D    rifAXIMin (XIFAXAN) tablet 550 mg  550 mg Oral BID    insulin glargine (LANTUS) injection 25 Units  25 Units SubCUTAneous QHS    insulin lispro (HUMALOG) injection   SubCUTAneous AC&HS       Physical Exam:   Vitals:  Visit Vitals    /63    Pulse 93    Temp 97.7 °F (36.5 °C)    Resp 16    Wt 55.8 kg (123 lb 1.6 oz)    LMP 01/02/2016    SpO2 99%    Breastfeeding No    BMI 23.45 kg/m2     Intake/Output:          HEENT:  No scleral icterus, no oral ulcers  Abdomen: Firm, protuberant, moderate lower abdominal tenderness with voluntary guarding, large umbilical hernia  Extremities:  No cyanosis, no leg edema  Neuro:  Alert and oriented to person, place, and time; + asterixis    Data:     Recent Results (from the past 24 hour(s))   LACTIC ACID, PLASMA    Collection Time: 02/16/17  9:20 AM   Result Value Ref Range    Lactic acid 1.8 0.4 - 2.0 MMOL/L   GLUCOSE, POC    Collection Time: 02/16/17 11:08 AM   Result Value Ref Range    Glucose (POC) 105 (H) 65 - 100 mg/dL   GLUCOSE, POC    Collection Time: 02/16/17  4:15 PM   Result Value Ref Range    Glucose (POC) 153 (H) 65 - 100 mg/dL   GLUCOSE, POC    Collection Time: 02/16/17  8:44 PM   Result Value Ref Range    Glucose (POC) 222 (H) 65 - 100 mg/dL   URINALYSIS W/ RFLX MICROSCOPIC    Collection Time: 02/17/17  2:50 AM   Result Value Ref Range    Color YELLOW      Appearance CLOUDY      Specific gravity 1.010 1.001 - 1.023      pH (UA) 5.0 5.0 - 9.0      Protein NEGATIVE  NEG mg/dL    Glucose NEGATIVE  mg/dL    Ketone NEGATIVE  NEG mg/dL    Bilirubin NEGATIVE  NEG      Blood NEGATIVE  NEG      Urobilinogen 0.2 0.2 - 1.0 EU/dL    Nitrites NEGATIVE  NEG      Leukocyte Esterase TRACE (A) NEG      WBC 0-3 0 /hpf    RBC 0-3 0 /hpf    Epithelial cells 3-5 0 /hpf    Bacteria 0 0 /hpf    Casts HYALINE 0 /lpf   METABOLIC PANEL, BASIC    Collection Time: 02/17/17  6:00 AM   Result Value Ref Range    Sodium 136 136 - 145 mmol/L    Potassium 4.0 3.5 - 5.1 mmol/L    Chloride 100 98 - 107 mmol/L    CO2 23 21 - 32 mmol/L    Anion gap 13 7 - 16 mmol/L    Glucose 181 (H) 65 - 100 mg/dL    BUN 28 (H) 6 - 23 MG/DL    Creatinine 4.16 (H) 0.6 - 1.0 MG/DL    GFR est AA 15 (L) >60 ml/min/1.73m2    GFR est non-AA 12 (L) >60 ml/min/1.73m2    Calcium 7.7 (L) 8.3 - 10.4 MG/DL   MAGNESIUM    Collection Time: 02/17/17  6:00 AM   Result Value Ref Range    Magnesium 1.9 1.8 - 2.4 mg/dL   GLUCOSE, POC    Collection Time: 02/17/17  7:25 AM   Result Value Ref Range Glucose (POC) 157 (H) 65 - 100 mg/dL       Impression/Plan:       55year old female with history of 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 Based on recent paracentesis 2/10 (repeated 2/14), clinical symptoms and positive blood cultures, this is clinically suspicious for SBP. Blood cultures reveal Staph epidermidis. Based on risk of bacterial endocarditis, she will require 14 days of IV Vancomycin. The severity of pain is out of portion with what would be expected with SBP especially with current therapy. Pelvic ultrasound and urinalysis are negative. There is concern for drug-seeking behavior.     1. Continue Vancomycin. 2. Liquid diet. 3. Minimize use of opiates. 4. Resume lactulose. 5. Continue Rifaximin 550 mg tab - 1 tab PO bid.     Signed:  Mya Silva MD  2/17/2017  8:50 AM

## 2017-02-17 NOTE — PROGRESS NOTES
Paracentesis complete. Paracentesis fluid sent to lab for cell count per ELLEN Aguirre PA-C. 25 GM Albumin given and patient tolerated procedure well without difficulties noted. Although patient requested pain medication prior to procedure patient slept throughout procedure awakening at times to ask how much fluid was being removed.  Awaiting tunneled line placement

## 2017-02-18 NOTE — PROGRESS NOTES
Hospitalist Progress Note    2017  Admit Date: 2017 10:42 PM   NAME: Marichuy Turner   :  1970   MRN:  [id number]   Attending: Kelsey Patel MD  PCP:  Justin Tom MD    SUBJECTIVE:   Patient 55 y.o. female who presents with abdominal pain. Pt has h/o congential hepatic fibrosis and had her first liver transplant complicated by hepatitis C, which she got from the transplant. She had another transplant due to cirrhosis from the hepatitis C. She takes lactulose at home for h/o hepatic encephalopathy. Pt had paracentesis about 2 days ago and states she's had worsening abdominal pain since that time. Pain is diffuse and worse with movement or palpation. She had a temperature of 100.3F at home. She denies chest pain, dyspnea, cough, N/V, sore throat, or skin rashes, but admits to chronic loose stools. She denies h/o prior peritonitis  17  --- pt fell of the commode this am no injuries per the patient. Review of Systems negative with exception of pertinent positives noted above  PHYSICAL EXAM     Visit Vitals    BP 91/63 (BP 1 Location: Right arm, BP Patient Position: At rest)    Pulse 94    Temp 97.6 °F (36.4 °C)    Resp 17    Wt 55.8 kg (123 lb 1.6 oz)    LMP 2016    SpO2 98%    Breastfeeding No    BMI 23.45 kg/m2      Temp (24hrs), Av °F (36.7 °C), Min:97.6 °F (36.4 °C), Max:98.3 °F (36.8 °C)    Oxygen Therapy  O2 Sat (%): 98 % (17 1228)  Pulse via Oximetry: 98 beats per minute (17 1710)  O2 Device: Room air (17 1645)  ETCO2 (mmHg): 30 mmHg (17 1510)    Intake/Output Summary (Last 24 hours) at 17 1245  Last data filed at 17 1623   Gross per 24 hour   Intake                0 ml   Output             3000 ml   Net            -3000 ml      General: No acute distress lethagry but awkes readily  Lungs:  CTA Bilaterally.    Heart:  Regular rate and rhythm,  No murmur, rub, or gallop  Abdomen: Soft, Non distended, Non tender, Positive bowel sounds, umbilical hernia  Extremities: No cyanosis, clubbing or edema  Neurologic:  No focal deficits    ASSESSMENT      Active Hospital Problems    Diagnosis Date Noted    Sepsis ([facility] 02/16/2017    Liver transplant recipient [facility]) 02/13/2017    SBP (spontaneous bacterial peritonitis) ([facility] [facility] 02/12/2017    ESRD (end stage renal disease) on dialysis ([facility] [address] [facility] 11/02/2016    Ascites 07/04/2016    Type 2 diabetes mellitus with hyperglycemia ([facility] [facility] 07/04/2016    Thrombocytopenia ([facility] 07/04/2016     Plan:  · type & cross and transfuse 3 unit prbc'S.   · CBCin am   · Decrease MS contin 15mg iv q8 h    DVT Prophylaxis:     Signed By: Yayo Dove MD     February 18, 2017

## 2017-02-18 NOTE — PROGRESS NOTES
Gastroenterology Progress Note           Date of admission:  2/12/2017    Today's date:  2/18/2017    CC: Abdominal pain    HPI:   Patient had paracentesis with 3 L of fluid removed yesterday. She feels modestly improved but continues to have lower abdominal pain.      ROS:    Gen: No fevers, no chills   CV:   No chest pain, no SOB    Current Medications:     Current Facility-Administered Medications   Medication Dose Route Frequency    lactulose (CHRONULAC) solution 20 g  30 mL Oral TID    [START ON 2/22/2017] epoetin ping (EPOGEN;PROCRIT) injection 20,000 Units  20,000 Units SubCUTAneous Q7D    morphine CR (MS CONTIN) tablet 30 mg  30 mg Oral Q12H    fentaNYL citrate (PF) injection 12.5-100 mcg  12.5-100 mcg IntraVENous Multiple    vancomycin (VANCOCIN) - pharmacy dosing  500 mg IntraVENous Rx Dosing/Monitoring    dextrose 40% (GLUTOSE) oral gel 1 Tube  1 Tube Oral PRN    dextrose (D50W) injection syrg 25 g  25 g IntraVENous PRN    sodium chloride (NS) flush 5-10 mL  5-10 mL IntraVENous Q8H    sodium chloride (NS) flush 5-10 mL  5-10 mL IntraVENous PRN    naloxone (NARCAN) injection 0.4 mg  0.4 mg IntraVENous PRN    diphenhydrAMINE (BENADRYL) injection 12.5 mg  12.5 mg IntraVENous Q4H PRN    promethazine (PHENERGAN) with saline injection 12.5 mg  12.5 mg IntraVENous Q6H PRN    cycloSPORINE modified (GENGRAF, NEORAL) capsule 50 mg (patient supplied)  50 mg Oral Q12H    pantoprazole (PROTONIX) tablet 40 mg  40 mg Oral ACB&D    rifAXIMin (XIFAXAN) tablet 550 mg  550 mg Oral BID    insulin glargine (LANTUS) injection 25 Units  25 Units SubCUTAneous QHS    insulin lispro (HUMALOG) injection   SubCUTAneous AC&HS       Physical Exam:   Vitals:  Visit Vitals    /66 (BP 1 Location: Left arm, BP Patient Position: At rest)    Pulse 82    Temp 98 °F (36.7 °C)    Resp 14    Wt 55.8 kg (123 lb 1.6 oz)    LMP 01/02/2016    SpO2 96%    Breastfeeding No    BMI 23.45 kg/m2     Intake/Output: 02/16 1901 - 02/18 0700  In: -   Out: 3100     HEENT:  No scleral icterus, no oral ulcers  Abdomen: Distended, mild lower abdominal tenderness, large umbilical hernia  Extremities:  No cyanosis, no leg edema  Neuro:  Alert and oriented to person, place, and time    Data:     Recent Results (from the past 24 hour(s))   GLUCOSE, POC    Collection Time: 02/17/17 12:41 PM   Result Value Ref Range    Glucose (POC) 126 (H) 65 - 100 mg/dL   VANCOMYCIN, RANDOM    Collection Time: 02/17/17  3:00 PM   Result Value Ref Range    VANCOMYCIN,RANDOM 20.4 UG/ML   CELL COUNT, BODY FLUID    Collection Time: 02/17/17  4:10 PM   Result Value Ref Range    BODY FLUID TYPE PARACENTESIS FLUID      FLUID COLOR YELLOW      FLUID APPEARANCE CLEAR      FLUID RBC COUNT <93549 /cu mm    FLD NUCLEATED CELLS 773 /cu mm    FLD SEGS 80 %    FLD LYMPHS 5 %    FLD MONO/MACROPHAGE 15 %   GLUCOSE, POC    Collection Time: 02/17/17  6:21 PM   Result Value Ref Range    Glucose (POC) 137 (H) 65 - 100 mg/dL   GLUCOSE, POC    Collection Time: 02/17/17  8:53 PM   Result Value Ref Range    Glucose (POC) 93 65 - 100 mg/dL   AMMONIA    Collection Time: 02/17/17 11:40 PM   Result Value Ref Range    Ammonia 12 11 - 32 UMOL/L   CBC W/O DIFF    Collection Time: 02/18/17  4:40 AM   Result Value Ref Range    WBC 4.9 4.3 - 11.1 K/uL    RBC 2.14 (L) 4.05 - 5.25 M/uL    HGB 6.5 (LL) 11.7 - 15.4 g/dL    HCT 19.4 (LL) 35.8 - 46.3 %    MCV 90.7 79.6 - 97.8 FL    MCH 30.4 26.1 - 32.9 PG    MCHC 33.5 31.4 - 35.0 g/dL    RDW 15.4 (H) 11.9 - 14.6 %    PLATELET 82 (L) 665 - 450 K/uL    MPV 9.8 (L) 10.8 - 13.2 FL   METABOLIC PANEL, BASIC    Collection Time: 02/18/17  4:40 AM   Result Value Ref Range    Sodium 138 136 - 145 mmol/L    Potassium 3.7 3.5 - 5.1 mmol/L    Chloride 102 98 - 107 mmol/L    CO2 24 21 - 32 mmol/L    Anion gap 12 7 - 16 mmol/L    Glucose 113 (H) 65 - 100 mg/dL    BUN 29 (H) 6 - 23 MG/DL    Creatinine 4.19 (H) 0.6 - 1.0 MG/DL    GFR est AA 15 (L) >60 ml/min/1.73m2    GFR est non-AA 12 (L) >60 ml/min/1.73m2    Calcium 7.5 (L) 8.3 - 10.4 MG/DL   GLUCOSE, POC    Collection Time: 02/18/17  8:38 AM   Result Value Ref Range    Glucose (POC) 88 65 - 100 mg/dL     Impression/Plan:       55year old female with history of 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 Based on recent paracentesis 2/10 (repeated 2/14), clinical symptoms and positive blood cultures, this is clinically suspicious for SBP. Blood cultures reveal Staph epidermidis. Based on risk of bacterial endocarditis, she will require 14 days of IV Vancomycin. The severity of pain is out of portion with what would be expected with SBP especially with current therapy. Pelvic ultrasound and urinalysis are negative. There is concern for drug-seeking behavior.      1. Continue Vancomycin for total of 14 days. This can be given with dialysis. 2. Will advance diet. 3. Oral analgesia as needed. 4. Resume lactulose. 5. Continue Rifaximin 550 mg tab - 1 tab PO bid. 6. Probable discharge home soon. 7. We will sign off, but do not hesitate to contact us for any additional assistance. We will arrange for a GI follow-up office visit in 2-3 weeks. Patient will be contacted by our office.     Signed:  Oriana Sorenson MD  2/18/2017  9:22 AM

## 2017-02-18 NOTE — PROGRESS NOTES
On dialysis via  catheter, by Noah Meeks RN. Catheter aspirated and flushed with NS without difficulty. Vital signs stable. Pt noted alert and oriented x 4. Heparin dose is 0. Dialysis machine alarms WNL. Will continue to monitor throughout treatment.

## 2017-02-18 NOTE — PROGRESS NOTES
Admit Date: 2/12/2017      Subjective:          Review of Systems  Cardio-vascular: no chest pain, + SOB  GI:  N/V, abd pain+  : no dysuria, no hematuria    Objective:     Patient Vitals for the past 8 hrs:   BP Temp Pulse Resp SpO2   02/18/17 0843 106/66 98 °F (36.7 °C) 82 14 96 %   02/18/17 0442 99/57 98.3 °F (36.8 °C) 79 16 92 %            Physical Exam:   Lungs: decreased BS  CV: RR,   Abdomen: distended tender  Ext: trace edema          Data Review   Recent Results (from the past 8 hour(s))   CBC W/O DIFF    Collection Time: 02/18/17  4:40 AM   Result Value Ref Range    WBC 4.9 4.3 - 11.1 K/uL    RBC 2.14 (L) 4.05 - 5.25 M/uL    HGB 6.5 (LL) 11.7 - 15.4 g/dL    HCT 19.4 (LL) 35.8 - 46.3 %    MCV 90.7 79.6 - 97.8 FL    MCH 30.4 26.1 - 32.9 PG    MCHC 33.5 31.4 - 35.0 g/dL    RDW 15.4 (H) 11.9 - 14.6 %    PLATELET 82 (L) 468 - 450 K/uL    MPV 9.8 (L) 10.8 - 38.6 FL   METABOLIC PANEL, BASIC    Collection Time: 02/18/17  4:40 AM   Result Value Ref Range    Sodium 138 136 - 145 mmol/L    Potassium 3.7 3.5 - 5.1 mmol/L    Chloride 102 98 - 107 mmol/L    CO2 24 21 - 32 mmol/L    Anion gap 12 7 - 16 mmol/L    Glucose 113 (H) 65 - 100 mg/dL    BUN 29 (H) 6 - 23 MG/DL    Creatinine 4.19 (H) 0.6 - 1.0 MG/DL    GFR est AA 15 (L) >60 ml/min/1.73m2    GFR est non-AA 12 (L) >60 ml/min/1.73m2    Calcium 7.5 (L) 8.3 - 10.4 MG/DL   GLUCOSE, POC    Collection Time: 02/18/17  8:38 AM   Result Value Ref Range    Glucose (POC) 88 65 - 100 mg/dL           Assessment:     Principal Problem:    SBP (spontaneous bacterial peritonitis) ([facility] [address] (2/12/2017)    Active Problems:    Type 2 diabetes mellitus with hyperglycemia (HCC) (7/4/2016)      Thrombocytopenia (HCC) (7/4/2016)      Ascites (7/4/2016)      ESRD (end stage renal disease) on dialysis [facility]) (11/2/2016)      Liver transplant recipient [facility]) (2/13/2017)      Sepsis ([facility] [address] (2/16/2017)        Plan:     Transfuse 2 units  with HD today    Steven Lopez MD

## 2017-02-18 NOTE — PROGRESS NOTES
Dr Sheldon Marte will get her scheduled as quickly as possible and if she is to be discharged over the weekend we can schedule her to be done as an outpatient - most likely Tuesday.

## 2017-02-18 NOTE — PROGRESS NOTES
Off dialysis at this time . 0.5 Kg's removed and 2 units PRBCs transused with this treatment . R permcath tunneled dialysis Catheter flushed with 10 ml NS per protocol. Vital signs  101/62, hr 92 at completion on treatment. Pt noted alert and oriented. Pt to 604 after treatment completed.

## 2017-02-18 NOTE — PROGRESS NOTES
Was in 36 scanning medications, heard a thud type noise in 604. Went into room and found patient lying in bathroom floor. Pt stated she was sitting in the commode and was going to call for assistance, but \"slid off commode into floor\". Faint bruise noted on right shoulder. Patient stated she also hit the right side of her head, could not find any lump. Assisted patient back to bed, verbalized she would continue to call for help when needing assistance. Dr. Tasneem Cordoba notified of fall.

## 2017-02-19 NOTE — PROGRESS NOTES
Admit Date: 2/12/2017      Subjective:          Review of Systems  Cardio-vascular: no chest pain,  SOB  GI: Abd pain  : no dysuria, no hematuria    Objective:     Patient Vitals for the past 8 hrs:   BP Temp Pulse Resp SpO2   02/19/17 1108 111/72 97.7 °F (36.5 °C) 100 16 90 %   02/19/17 0721 106/75 98.2 °F (36.8 °C) (!) 103 14 93 %   02/19/17 0448 95/60 98.6 °F (37 °C) 96 14 100 %     02/19 0701 - 02/19 1900  In: 480 [P.O.:480]  Out: -       Physical Exam:   Lungs: clear  CV: RR,   Abdomen: soft, tender,  Ext: no edema          Data Review   Recent Results (from the past 8 hour(s))   CBC W/O DIFF    Collection Time: 02/19/17  6:00 AM   Result Value Ref Range    WBC 9.1 4.3 - 11.1 K/uL    RBC 4.02 (L) 4.05 - 5.25 M/uL    HGB 11.9 11.7 - 15.4 g/dL    HCT 35.0 (L) 35.8 - 46.3 %    MCV 87.1 79.6 - 97.8 FL    MCH 29.6 26.1 - 32.9 PG    MCHC 34.0 31.4 - 35.0 g/dL    RDW 15.8 (H) 11.9 - 14.6 %    PLATELET 95 (L) 934 - 450 K/uL    MPV 9.7 (L) 10.8 - 14.1 FL   GLUCOSE, POC    Collection Time: 02/19/17  7:19 AM   Result Value Ref Range    Glucose (POC) 83 65 - 100 mg/dL   GLUCOSE, POC    Collection Time: 02/19/17 11:04 AM   Result Value Ref Range    Glucose (POC) 110 (H) 65 - 100 mg/dL           Assessment:     Principal Problem:    SBP (spontaneous bacterial peritonitis) ([facility] [address] (2/12/2017)    Active Problems:    Type 2 diabetes mellitus with hyperglycemia (HCC) (7/4/2016)      Thrombocytopenia (HCC) (7/4/2016)      Ascites (7/4/2016)      ESRD (end stage renal disease) on dialysis [facility]) (11/2/2016)      Liver transplant recipient [facility]) (2/13/2017)      Sepsis ([facility] [address] (2/16/2017)        Plan:   Recheck H/H in am  HD in am    Christiano Antonio MD

## 2017-02-19 NOTE — PROGRESS NOTES
Hospitalist Progress Note    2017  Admit Date: 2017 10:42 PM   NAME: Bozena Alfredo   :  1970   MRN:  [id number]   Attending: Ehsan Ruby MD  PCP:  Cade Ramos MD  Treatment Team: Attending Provider: Ehsan Ruby MD; Consulting Provider: Mc Feliciano MD; Hospitalist: Ehsan Ruby MD; Care Manager: David Ramos; Utilization Review: Vel Marks RN; Consulting Provider: Sol Aguayo MD    Full Code     SUBJECTIVE:   Patient 55 y.o. female who presents with abdominal pain. Pt has h/o congential hepatic fibrosis and had her first liver transplant complicated by hepatitis C, which she got from the transplant. She had another transplant due to cirrhosis from the hepatitis C. She takes lactulose at home for h/o hepatic encephalopathy. Pt had paracentesis about 2 days ago and states she's had worsening abdominal pain since that time. Pain is diffuse and worse with movement or palpation. She had a temperature of 100.3F at home. She denies chest pain, dyspnea, cough, N/V, sore throat, or skin rashes, but admits to chronic loose stools. She denies h/o prior peritonitis  17  --- pt fell of the commode this am no injuries per the patient.     17  --- feeling better. Still having more pain.           Recent Results (from the past 24 hour(s))   TYPE & CROSSMATCH    Collection Time: 17 10:52 AM   Result Value Ref Range    Crossmatch Expiration 2017     ABO/Rh(D) A POSITIVE     Antibody screen NEG     Unit number [id number]     Blood component type RC LRIR AS5     Unit division 00     Status of unit TRANSFUSED     Crossmatch result Compatible     Unit number [id number]     Blood component type RC LR AS5     Unit division 00     Status of unit TRANSFUSED     Crossmatch result Compatible     Unit number [id number]     Blood component type RC LR AS5     Unit division 00     Status of unit TRANSFUSED     Crossmatch result Compatible    GLUCOSE, POC    Collection Time: 02/18/17  4:04 PM   Result Value Ref Range    Glucose (POC) 80 65 - 100 mg/dL   GLUCOSE, POC    Collection Time: 02/18/17  8:46 PM   Result Value Ref Range    Glucose (POC) 142 (H) 65 - 100 mg/dL   CBC W/O DIFF    Collection Time: 02/19/17  6:00 AM   Result Value Ref Range    WBC 9.1 4.3 - 11.1 K/uL    RBC 4.02 (L) 4.05 - 5.25 M/uL    HGB 11.9 11.7 - 15.4 g/dL    HCT 35.0 (L) 35.8 - 46.3 %    MCV 87.1 79.6 - 97.8 FL    MCH 29.6 26.1 - 32.9 PG    MCHC 34.0 31.4 - 35.0 g/dL    RDW 15.8 (H) 11.9 - 14.6 %    PLATELET 95 (L) 883 - 450 K/uL    MPV 9.7 (L) 10.8 - 14.1 FL   GLUCOSE, POC    Collection Time: 02/19/17  7:19 AM   Result Value Ref Range    Glucose (POC) 83 65 - 100 mg/dL       Allergies   Allergen Reactions    Codeine Nausea Only    Compazine [Prochlorperazine Edisylate] Unknown (comments)     Causes Lock Jaw    Pcn [Penicillins] Other (comments)     \"drops wbc\"     Current Facility-Administered Medications   Medication Dose Route Frequency Provider Last Rate Last Dose    0.9% sodium chloride infusion 250 mL  250 mL IntraVENous PRN Janes Riley MD        0.9% sodium chloride infusion 250 mL  250 mL IntraVENous PRN Adriana Neri MD        morphine CR (MS CONTIN) tablet 15 mg  15 mg Oral Q8H Janes Riley MD   15 mg at 02/19/17 0549    lactulose (CHRONULAC) solution 20 g  30 mL Oral TID Lisa Marie MD   20 g at 02/19/17 1012    [START ON 2/22/2017] epoetin ping (EPOGEN;PROCRIT) injection 20,000 Units  20,000 Units SubCUTAneous Q7D Adriana Neri MD        fentaNYL citrate (PF) injection 12.5-100 mcg  12.5-100 mcg IntraVENous Felicia Simon MD        [facility]) - pharmacy dosing  500 mg IntraVENous Rx Dosing/Monitoring Roberto Carlos Weaver MD        dextrose 40% (GLUTOSE) oral gel 1 Tube  1 Tube Oral PRN Janes Riley MD   1 Tube at 02/14/17 0802    dextrose (D50W) injection syrg 25 g  25 g IntraVENous PRN Blas Bonds Jian Medley MD        sodium chloride (NS) flush 5-10 mL  5-10 mL IntraVENous Q8H Sharmon Carlos, DO   10 mL at 17 0549    sodium chloride (NS) flush 5-10 mL  5-10 mL IntraVENous PRN Sharmon Carlos, DO        naloxone [facility]) injection 0.4 mg  0.4 mg IntraVENous PRN Sharmon Carlos, DO        diphenhydrAMINE (BENADRYL) injection 12.5 mg  12.5 mg IntraVENous Q4H PRN Sharmon Carlos, DO   12.5 mg at 17 2118    promethazine (PHENERGAN) with saline injection 12.5 mg  12.5 mg IntraVENous Q6H PRN Sharmon Carlos, DO   12.5 mg at 17 1009    cycloSPORINE modified (GENGRAF, NEORAL) capsule 50 mg (patient supplied)  50 mg Oral Q12H Sharmon Carlos, DO   50 mg at 17 2100    pantoprazole (PROTONIX) tablet 40 mg  40 mg Oral ACB&D Sharmon Carlos, DO   40 mg at 17 0549    rifAXIMin (XIFAXAN) tablet 550 mg  550 mg Oral BID Sharmon Carlos, DO   550 mg at 17 1012    insulin glargine (LANTUS) injection 25 Units  25 Units SubCUTAneous QHS Sharmon Carlos, DO   25 Units at 17 2120    insulin lispro (HUMALOG) injection   SubCUTAneous AC&HS Sharmon Carlos, DO   Stopped at 17 0730           Review of Systems negative with exception of pertinent positives noted above  PHYSICAL EXAM     Visit Vitals    /75 (BP 1 Location: Left arm, BP Patient Position: At rest)    Pulse (!) 103    Temp 98.2 °F (36.8 °C)    Resp 14    Wt 55.8 kg (123 lb 1.6 oz)    LMP 2016    SpO2 93%    Breastfeeding No    BMI 23.45 kg/m2      Temp (24hrs), Av °F (36.7 °C), Min:97.1 °F (36.2 °C), Max:98.6 °F (37 °C)    Oxygen Therapy  O2 Sat (%): 93 % (17 0721)  Pulse via Oximetry: 98 beats per minute (17 1710)  O2 Device: Room air (17 1645)  ETCO2 (mmHg): 30 mmHg (17 1510)    Intake/Output Summary (Last 24 hours) at 17 1032  Last data filed at 17 0852   Gross per 24 hour   Intake              480 ml   Output 600 ml   Net             -120 ml      General: No acute distress    Lungs:  CTA, normal respiratory effort  Heart:  RRR no  Murmur, gallops or rubs  Abdomen: Soft NT, ND, NHSM  Extremities: No cyanosis, clubbing or edema  Neurologic:  CN 2- 12 intact, no sensory or motor deficit      DIAGNOSTIC STUDIES          ASSESSMENT      Active Hospital Problems    Diagnosis Date Noted    Sepsis ([facility] [address] 02/16/2017    Liver transplant recipient [facility]) 02/13/2017    SBP (spontaneous bacterial peritonitis) ([facility] [address] 02/12/2017    ESRD (end stage renal disease) on dialysis ([facility] [address] 11/02/2016    Ascites 07/04/2016    Type 2 diabetes mellitus with hyperglycemia ([facility] [address] 07/04/2016    Thrombocytopenia ([facility] [address] 07/04/2016       Plan:      DVT Prophylaxis:

## 2017-02-19 NOTE — PROGRESS NOTES
END OF SHIFT NOTE:    INTAKE/OUTPUT  02/18 0701 - 02/19 0700  In: -   Out: 600   Voiding: YES  Catheter: NO  Color: yellow  Drain:              DIET  regular    Flatus: Patient does have flatus present. Stool:  0 occurrences. Characteristics:       Ambulating  Yes    Emesis: 0 occurrences.     Characteristics:          VITAL SIGNS  Patient Vitals for the past 12 hrs:   Temp Pulse Resp BP SpO2   02/19/17 0721 98.2 °F (36.8 °C) (!) 103 14 106/75 93 %   02/19/17 0448 98.6 °F (37 °C) 96 14 95/60 100 %   02/18/17 2347 98.6 °F (37 °C) 95 14 114/78 91 %   02/18/17 1946 98.3 °F (36.8 °C) 100 16 (!) 88/64 98 %       Pain Assessment  Pain Intensity 1: 0 (02/19/17 0007)  Pain Location 1: Abdomen  Pain Intervention(s) 1: Medication (see MAR)  Patient Stated Pain Goal: 0            Jule Spatz, RN

## 2017-02-20 NOTE — PROGRESS NOTES
Evelina Forbes   [address] [place] [address], [address] [place] [address]. . k 125 FAX: [phone]        Vascular Surgery Progress Note    Admit Date: 2017  POD * No surgery date entered *    Procedure:  Procedure(s):  DECLOT LEFT THIGH ARTERIOVENOUS GRAFT/ ROOM 604      Subjective:     Patient has no new complaints. Patient states that she is ready to go home and that she will come back tomorrow for scheduled OR procedure. I informed her that I would talk with Dr. Otto Vincent and will then leave that decision up to him and her attending. She also states that her left leg is itching and swollen. Objective:     Blood pressure 105/68, pulse 91, temperature 98.1 °F (36.7 °C), resp. rate 16, weight 123 lb 1.6 oz (55.8 kg), last menstrual period 2016, SpO2 91 %, not currently breastfeeding. Temp (24hrs), Av °F (36.7 °C), Min:97.4 °F (36.3 °C), Max:98.4 °F (36.9 °C)      Physical Exam:  GENERAL: alert, cooperative, no distress, appears stated age, LUNG:  clear to auscultation bilaterally, HEART:  regular rate and rhythm, S1, S2 normal, no murmur, click, rub or gallop and EXTREMITIES:  Left Lower Extremity:  mild edema, no thrill or bruit. Labs:   Recent Labs      17   0915  17   0745   17   0440   HGB   --   12.3   < >  6.5*   WBC   --   10.1   < >  4.9   K   --    --    --   3.7   GLU  125*   --    --   113*    < > = values in this interval not displayed.        Data Review: images and reports reviewed  Current Facility-Administered Medications   Medication Dose Route Frequency    0.9% sodium chloride infusion 250 mL  250 mL IntraVENous PRN    morphine CR (MS CONTIN) tablet 15 mg  15 mg Oral Q8H    lactulose (CHRONULAC) solution 20 g  30 mL Oral TID    [START ON 2017] epoetin ping (EPOGEN;PROCRIT) injection 20,000 Units  20,000 Units SubCUTAneous Q7D    vancomycin (VANCOCIN) - pharmacy dosing  500 mg IntraVENous Rx Dosing/Monitoring    dextrose 40% (GLUTOSE) oral gel 1 Tube  1 Tube Oral PRN    dextrose (D50W) injection syrg 25 g  25 g IntraVENous PRN    sodium chloride (NS) flush 5-10 mL  5-10 mL IntraVENous Q8H    sodium chloride (NS) flush 5-10 mL  5-10 mL IntraVENous PRN    naloxone (NARCAN) injection 0.4 mg  0.4 mg IntraVENous PRN    diphenhydrAMINE (BENADRYL) injection 12.5 mg  12.5 mg IntraVENous Q4H PRN    promethazine (PHENERGAN) with saline injection 12.5 mg  12.5 mg IntraVENous Q6H PRN    cycloSPORINE modified (GENGRAF, NEORAL) capsule 50 mg (patient supplied)  50 mg Oral Q12H    pantoprazole (PROTONIX) tablet 40 mg  40 mg Oral ACB&D    rifAXIMin (XIFAXAN) tablet 550 mg  550 mg Oral BID    insulin glargine (LANTUS) injection 25 Units  25 Units SubCUTAneous QHS     Assessment:     Principal Problem:    SBP (spontaneous bacterial peritonitis) ([facility] [address] (2/12/2017)    Active Problems:    Type 2 diabetes mellitus with hyperglycemia (HCC) (7/4/2016)      Thrombocytopenia ([address] (7/4/2016)      Ascites (7/4/2016)      ESRD (end stage renal disease) on dialysis [facility]) (11/2/2016)      Liver transplant recipient [facility]) (2/13/2017)      Sepsis ([facility] [address] (2/16/2017)        Plan/Recommendations/Medical Decision Making:   Continue present treatment   -Will talk with Dr. Vijaya Bravo about patient being discharged today and coming back in for OR procedure tomorrow. If he would like patient to stay in the hospital she will be NPO after midnight for a repeat thrombectomy of AV graft. If Dr. Vijaya Bravo is unable to support his AV graft in the thigh think it would be reasonable to try an upper arm AV graft perhaps using a tapered (4 mm to 7 mm) AV graft with a proximal arterial anastomosis. The patient will then need an attempt at maintaining an improved blood pressure to support the graft. This predisposes that we do not find a mechanical issue with the graft itself. Dr. Shantal Fernandez has been notified of these plans.

## 2017-02-20 NOTE — PROGRESS NOTES
Pt BS 45 upon recheck. Pt still does not believe it is accurate and wants to recheck with her home monitor. Home monitor states 55, pt now more will to take oral gel.    Will recheck in 15 mins

## 2017-02-20 NOTE — INTERDISCIPLINARY ROUNDS
Interdisciplinary team rounds were held 2/20/2017 with the following team members:Care Management, Nursing, Pastoral Care, Pharmacy and Physician. Plan of care discussed. See clinical pathway and/or care plan for interventions and desired outcomes.

## 2017-02-20 NOTE — PROGRESS NOTES
Pt opted not to take her 2200 dose of MS Contin, stating she couldn't feel anything when she takes it and feels overmedicated. Will pass on to day shift nurse for possible adjustment of dose. Pt did complain of nausea, Phenergan 12.5mg given IV. Will continue to monitor.

## 2017-02-20 NOTE — PROGRESS NOTES
[facility]    Follow-Up on:2/20/17    HPI: Pt seen on HD tolerating it well  ROS:  Denies CP, SOB.     Current Facility-Administered Medications   Medication Dose Route Frequency    0.9% sodium chloride infusion 250 mL  250 mL IntraVENous PRN    morphine CR (MS CONTIN) tablet 15 mg  15 mg Oral Q8H    lactulose (CHRONULAC) solution 20 g  30 mL Oral TID    [START ON 2/22/2017] epoetin ping (EPOGEN;PROCRIT) injection 20,000 Units  20,000 Units SubCUTAneous Q7D    vancomycin (VANCOCIN) - pharmacy dosing  500 mg IntraVENous Rx Dosing/Monitoring    dextrose 40% (GLUTOSE) oral gel 1 Tube  1 Tube Oral PRN    dextrose (D50W) injection syrg 25 g  25 g IntraVENous PRN    sodium chloride (NS) flush 5-10 mL  5-10 mL IntraVENous Q8H    sodium chloride (NS) flush 5-10 mL  5-10 mL IntraVENous PRN    naloxone (NARCAN) injection 0.4 mg  0.4 mg IntraVENous PRN    diphenhydrAMINE (BENADRYL) injection 12.5 mg  12.5 mg IntraVENous Q4H PRN    promethazine (PHENERGAN) with saline injection 12.5 mg  12.5 mg IntraVENous Q6H PRN    cycloSPORINE modified (GENGRAF, NEORAL) capsule 50 mg (patient supplied)  50 mg Oral Q12H    pantoprazole (PROTONIX) tablet 40 mg  40 mg Oral ACB&D    rifAXIMin (XIFAXAN) tablet 550 mg  550 mg Oral BID    insulin glargine (LANTUS) injection 25 Units  25 Units SubCUTAneous QHS       Exam:  Vitals:    02/20/17 0719 02/20/17 1035 02/20/17 1105 02/20/17 1133   BP: 114/79 105/68 107/79 100/64   Pulse: 95 91 91 87   Resp: 16      Temp: 98.1 °F (36.7 °C)      SpO2:       Weight:           No intake or output data in the 24 hours ending 02/20/17 1150  PE:  GEN - in no distress  CV - regular, no murmur, no rub  Lung - clear bilaterally  Abd - soft,  Diffuse tenderness  Ext - no edema    Labs  Recent Labs      02/20/17   0745  02/19/17   0600  02/18/17   0440   WBC  10.1  9.1  4.9   HGB  12.3  11.9  6.5*   HCT  36.1  35.0*  19.4*   PLT  94*  95*  82*     Recent Labs      02/20/17   0915  02/18/17 0440   NA   --   138   K   --   3.7   CL   --   102   CO2   --   24   BUN   --   29*   CREA   --   4.19*   GLU  125*  113*   CA   --   7.5*     No results for input(s): PH, PCO2, PO2, PCO2 in the last 72 hours. Problem List:  Patient Active Problem List    Diagnosis Date Noted    Sepsis ([address] 02/16/2017    Streptococcal sepsis ([address] 02/15/2017    Liver transplant recipient [facility]) 02/13/2017    SBP (spontaneous bacterial peritonitis) ([address] 02/12/2017    Hemodialysis access, AV graft ([address] 11/22/2016    ESRD (end stage renal disease) on dialysis ([address] 11/02/2016    Esophageal varices ([address] 07/27/2016    Hypotension 07/06/2016    Chronic kidney disease     GERD (gastroesophageal reflux disease)     Congenital hepatic fibrosis     Type 2 diabetes mellitus with hyperglycemia ([address] 07/04/2016    Hepatic encephalopathy ([address] 07/04/2016    Anemia 07/04/2016    Thrombocytopenia ([address] 07/04/2016    Ascites 07/04/2016    Elevated diaphragm 07/04/2016    Raynaud's disease 07/04/2016    Hepatitis C 07/04/2016    Insomnia 07/13/2015       Issues Addressed By Nephrology:    Plan:  1. Abdominal pain etiology elusive thus far  2. Orthotic liver transplant  3. Liver cirrhosis  4. Ascites  5. ?SBP  6. ESRD HD mwf  7.  Left thigh clotted AVG per Vascular surgery; chronic hypotension is a problem for access anywhere except [facility]

## 2017-02-20 NOTE — PROGRESS NOTES
PT with the BS of 45. Pt requested to eat and drink her juice. Upon recheck blood sugar is 47. Pt states she doesn't believe it is accurate. Pt requesting to recheck in 15 more mins. Will allow but told pt she would have to take the oral gel if it is not above 80. Pt states its taste bad. Dialysis is on hold for now.

## 2017-02-20 NOTE — PROGRESS NOTES
Problem: Nutrition Deficit  Goal: *Optimize nutritional status  Nutrition LOS Note:   Assessment  Diet order(s): 212;renal, 2-16: Full liquid, 2-18: GI soft, 2-21: NPO for thrombectomy of AV graft   Food,Nutrition, and Pertinent History: Attempted to see patient x2. She was on the phone and says she will be for a long time. Introduced self and she says \"everything is fine with the food\" and she will be going home tomorrow. She did not want to engage in an interview. Hx of DM, liver transplant, cirrhsis sand bi weekly paracentesis, ESRD on HD. Admitted with abdominal pain and suspected SBP. Anthropometrics: Height 5'3/4\", Weight Source: Standing scale (comment), Weight: 55.8 kg (123 lb 1.6 oz), Body mass index is 23.45 kg/(m^2). BMI class of normal weight. Note fluid mask dry weight. UBW range per -130# in the past 1 year. Macronutrient Needs:  · EER:  4043-6908 kcal /day (30-35 kcal/kg IBW)  · EPR:  57-67 grams protein/day (1.2-1.4 grams/kg IBW)  Intake/Comparative Standards:  Average intake for past 7 day(s)/1 recorded meal(s): 50%. Not enough data to assess. Pt reports she is now eating 100% of her meals which would meet >100% of kcal and protein needs. Nutrition Diagnosis: No nutrition diagnosis at this time     Intervention: Meals and snacks: Continue current diet.      Minesh Zamora, [address] [place] [address], [facility] [address] [place] [address]

## 2017-02-20 NOTE — PROGRESS NOTES
Hospitalist Progress Note    2017  Admit Date: 2017 10:42 PM   NAME: Daya Meyer   :  1970   MRN:  [id number]   Attending: Effie Canela MD  PCP:  Mel Huber MD  Treatment Team: Attending Provider: Effie Canela MD; Consulting Provider: Arjun Robb MD; Hospitalist: Effie Canela MD; Care Manager: Jonathan Hernandez; Utilization Review: Sree Grimes RN; Consulting Provider: Nu Gatica MD    Full Code     SUBJECTIVE:   Patient 55 y.o. female who presents with abdominal pain. Pt has h/o congential hepatic fibrosis and had her first liver transplant complicated by hepatitis C, which she got from the transplant. She had another transplant due to cirrhosis from the hepatitis C. She takes lactulose at home for h/o hepatic encephalopathy. Pt had paracentesis about 2 days ago and states she's had worsening abdominal pain since that time. Pain is diffuse and worse with movement or palpation. She had a temperature of 100.3F at home. She denies chest pain, dyspnea, cough, N/V, sore throat, or skin rashes, but admits to chronic loose stools. She denies h/o prior peritonitis  17  --- pt fell of the commode this am no injuries per the patient.     17  --- feeling better. Still having more pain.   17  --- hypoglycemia this am , D/c;ed SSI           Recent Results (from the past 24 hour(s))   GLUCOSE, POC    Collection Time: 17  7:35 PM   Result Value Ref Range    Glucose (POC) 190 (H) 65 - 100 mg/dL   GLUCOSE, POC    Collection Time: 17  7:11 AM   Result Value Ref Range    Glucose (POC) 45 (L) 65 - 100 mg/dL   CBC W/O DIFF    Collection Time: 17  7:45 AM   Result Value Ref Range    WBC 10.1 4.3 - 11.1 K/uL    RBC 4.14 4.05 - 5.25 M/uL    HGB 12.3 11.7 - 15.4 g/dL    HCT 36.1 35.8 - 46.3 %    MCV 87.2 79.6 - 97.8 FL    MCH 29.7 26.1 - 32.9 PG    MCHC 34.1 31.4 - 35.0 g/dL    RDW 16.0 (H) 11.9 - 14.6 %    PLATELET 94 (L) 518 - 450 K/uL    MPV 10.1 (L) 10.8 - 14.1 FL   GLUCOSE, POC    Collection Time: 02/20/17  8:08 AM   Result Value Ref Range    Glucose (POC) 47 (L) 65 - 100 mg/dL   GLUCOSE, POC    Collection Time: 02/20/17  8:25 AM   Result Value Ref Range    Glucose (POC) 45 (L) 65 - 100 mg/dL   GLUCOSE, POC    Collection Time: 02/20/17  8:50 AM   Result Value Ref Range    Glucose (POC) 45 (L) 65 - 100 mg/dL   GLUCOSE, RANDOM    Collection Time: 02/20/17  9:15 AM   Result Value Ref Range    Glucose 125 (H) 65 - 100 mg/dL   GLUCOSE, POC    Collection Time: 02/20/17  9:43 AM   Result Value Ref Range    Glucose (POC) 176 (H) 65 - 100 mg/dL   GLUCOSE, POC    Collection Time: 02/20/17 10:36 AM   Result Value Ref Range    Glucose (POC) 158 (H) 65 - 100 mg/dL   GLUCOSE, POC    Collection Time: 02/20/17  4:19 PM   Result Value Ref Range    Glucose (POC) 79 65 - 100 mg/dL       Allergies   Allergen Reactions    Codeine Nausea Only    Compazine [Prochlorperazine Edisylate] Unknown (comments)     Causes Lock Jaw    Pcn [Penicillins] Other (comments)     \"drops wbc\"     Current Facility-Administered Medications   Medication Dose Route Frequency Provider Last Rate Last Dose    0.9% sodium chloride infusion 250 mL  250 mL IntraVENous PRN Twila Hugo MD        morphine CR (MS CONTIN) tablet 15 mg  15 mg Oral Q8H Carla Chawla MD   Stopped at 02/20/17 1400    lactulose (CHRONULAC) solution 20 g  30 mL Oral TID Max Robles MD   Stopped at 02/20/17 1100    [START ON 2/22/2017] epoetin ping (EPOGEN;PROCRIT) injection 20,000 Units  20,000 Units SubCUTAneous Q7D Smita James MD        vancomycin [facility]) - pharmacy dosing  500 mg IntraVENous Rx Dosing/Monitoring Ike Marsh MD        dextrose 40% (GLUTOSE) oral gel 1 Tube  1 Tube Oral PRN Carla Chawla MD   1 Tube at 02/20/17 0825    dextrose (D50W) injection syrg 25 g  25 g IntraVENous PRN Carla Chawla MD   25 g at 02/20/17 0913    sodium chloride (NS) flush 5-10 mL  5-10 mL IntraVENous Q8H Roma Jenn, DO   10 mL at 17 1400    sodium chloride (NS) flush 5-10 mL  5-10 mL IntraVENous PRN Roma Jenn, DO        naloxone [facility]) injection 0.4 mg  0.4 mg IntraVENous PRN Roma Jenn, DO        diphenhydrAMINE (BENADRYL) injection 12.5 mg  12.5 mg IntraVENous Q4H PRN Roma Jenn, DO   12.5 mg at 17 2118    promethazine (PHENERGAN) with saline injection 12.5 mg  12.5 mg IntraVENous Q6H PRN Roma Jenn, DO   12.5 mg at 17 0140    cycloSPORINE modified (GENGRAF, NEORAL) capsule 50 mg (patient supplied)  50 mg Oral Q12H Roma Jenn, DO   Stopped at 17 0900    pantoprazole (PROTONIX) tablet 40 mg  40 mg Oral ACB&D Roma Jenn, DO   40 mg at 17 7308    rifAXIMin (XIFAXAN) tablet 550 mg  550 mg Oral BID Roma Jenn, DO   Stopped at 17 0900    insulin glargine (LANTUS) injection 25 Units  25 Units SubCUTAneous QHS Roma Jenn, DO   25 Units at 17 0001           Review of Systems negative with exception of pertinent positives noted above  PHYSICAL EXAM     Visit Vitals    /61 (BP 1 Location: Left arm, BP Patient Position: At rest)    Pulse 93    Temp 97.8 °F (36.6 °C)    Resp 17    Wt 55.8 kg (123 lb 1.6 oz)    LMP 2016    SpO2 100%    Breastfeeding No    BMI 23.45 kg/m2      Temp (24hrs), Av.9 °F (36.6 °C), Min:97.4 °F (36.3 °C), Max:98.3 °F (36.8 °C)    Oxygen Therapy  O2 Sat (%): 100 % (17 1352)  Pulse via Oximetry: 98 beats per minute (17 1710)  O2 Device: Room air (17 1352)  ETCO2 (mmHg): 30 mmHg (17 1510)    Intake/Output Summary (Last 24 hours) at 17 1716  Last data filed at 17 1333   Gross per 24 hour   Intake                0 ml   Output             1700 ml   Net            -1700 ml      General: No acute distress    Lungs:  CTA, normal respiratory effort  Heart:  RRR no  Murmur, gallops or rubs  Abdomen: Soft NT, ND, NHSM  Extremities: No cyanosis, clubbing or edema  Neurologic:  CN 2- 12 intact, no sensory or motor deficit      DIAGNOSTIC STUDIES          ASSESSMENT      Active Hospital Problems    Diagnosis Date Noted    [facility]eps[facility] 2017    [facility] transplant recipient [facility]) 2017    SBP [facility]ontaneous [facility] per[facility]itis) [facility] 2017    [facility]    Ascites 2016    Type 2 diabetes mellitus with hyperglycemia [facility] 2016    Thrombocytopenia [facility] 2016       Plan:      DVT Prophylaxis:          Hospitalist Progress Note    2017  Admit Date: 2017 10:42 PM   NAME: Viola Matute   :  1970   MRN:  [id number]   Attending: Dulce Maria Garcia MD  PCP:  Tae Hays MD    SUBJECTIVE:   Patient       Review of Systems negative with exception of pertinent positives noted above  PHYSICAL EXAM     Visit Vitals    /61 (BP 1 Location: Left arm, BP Patient Position: At rest)    Pulse 93    Temp 97.8 °F (36.6 °C)    Resp 17    Wt 55.8 kg (123 lb 1.6 oz)    LMP 2016    SpO2 100%    Breastfeeding No    BMI 23.45 kg/m2      Temp (24hrs), Av.9 °F (36.6 °C), Min:97.4 °F (36.3 °C), Max:98.3 °F (36.8 °C)    Oxygen Therapy  O2 Sat (%): 100 % (17 1352)  Pulse via Oximetry: 98 beats per minute (17 1710)  O2 Device: Room air (17 1352)  ETCO2 (mmHg): 30 mmHg (17 1510)    Intake/Output Summary (Last 24 hours) at 17 1715  Last data filed at 17 1333   Gross per 24 hour   Intake                0 ml   Output             1700 ml   Net            -1700 ml      General: No acute distress    Lungs:  CTA Bilaterally.    Heart:  Regular rate and rhythm,  No murmur, rub, or gallop  Abdomen: Soft, Non distended, Non tender, Positive bowel sounds  Extremities: No cyanosis, clubbing or edema  Neurologic:  No focal deficits    [address] Problems    Diagnosis Date Noted    Sepsis ([facility] 02/16/2017    Liver transplant recipient [facility]) 02/13/2017    SBP (spontaneous bacterial peritonitis) ([facility] 02/12/2017    ESRD (end stage renal disease) on dialysis ([facility] 11/02/2016    Ascites 07/04/2016    Type 2 diabetes mellitus with hyperglycemia ([facility] 07/04/2016    Thrombocytopenia ([facility] 07/04/2016     Plan:  · Decrease glargine to 18 units sq qhs  · Nutrition note reviewed    DVT Prophylaxis:     Signed By: Fatmata Qureshi MD     February 20, 2017

## 2017-02-20 NOTE — PROGRESS NOTES
HD treatment completed without complication. Total of 1.7 kgs removed. VS stable, NAD. CVC dressing clean, dry, and intact, tego caps intact, bilateral lumen flushed with 9 cc NS. Transport contacted for return to room.

## 2017-02-20 NOTE — PROGRESS NOTES
Pt is still reading 45 after a meal, juice, and oral gel. Dr. Zahra Willson has ordered a blood glucose, an Amp if glucose, and to D/C pts sliding scale. Will recheck.

## 2017-02-20 NOTE — PROGRESS NOTES
Patient requesting to see Dr. Gloria Delgadillo as she is wanting to go home and come back in the am for her procedure. Spoke with NP for Vascular and Dr. Sheldon Marte nor her will be able to come back by today. Pt is scheduled for procedure tomorrow at 1400. Will relay to patient.

## 2017-02-20 NOTE — PROGRESS NOTES
Hemodialysis treatment initiated using right perm catheter by Cristian Ortez RN. Aspirated and flushed both ports without problem. Pt alert and VS stable. 105/68 P 91  Dressing changed per sterile technique. No signs or symptoms of infection. Machine settings per MD order. Will monitor during treatment.

## 2017-02-21 NOTE — PROGRESS NOTES
[facility] Progress Note    Follow-Up on: ESRD    ROS:  Sedated, seen in pre-op    Exam:  Vitals:    02/21/17 0404 02/21/17 0708 02/21/17 1120 02/21/17 1421   BP: 92/68 93/60 94/60 (!) 86/52   Pulse: 99 96 97 95   Resp: 18 18 18 18   Temp: 98.2 °F (36.8 °C) 97.5 °F (36.4 °C) 97.4 °F (36.3 °C) 97.8 °F (36.6 °C)   SpO2:  94%  95%   Weight:           No intake or output data in the 24 hours ending 02/21/17 1432    Wt Readings from Last 3 Encounters:   02/21/17 53.8 kg (118 lb 11.2 oz)   02/12/17 51.7 kg (114 lb)   01/27/17 47.2 kg (104 lb)       GEN - in no distress  CV - regular, no murmur, no rub  Lung - clear bilaterally  Abd - soft, nontender, + hernia  Ext - no edema    Recent Labs      02/20/17   0745  02/19/17   0600   WBC  10.1  9.1   HGB  12.3  11.9   HCT  36.1  35.0*   PLT  94*  95*        Recent Labs      02/20/17   0915   GLU  125*       Assessment / Plan:  Principal Problem:    SBP (spontaneous bacterial peritonitis) ([address] (2/12/2017)    Active Problems:    Type 2 diabetes mellitus with hyperglycemia (H[facility]) (7/4/2016)      Thrombocytopenia ([facility]) (7/4/2016)      Ascites (7/4/2016)      ESRD (end stage renal disease) on dialysis ([address] (11/2/2016)      Liver transplant recipient [facility]) (2/13/2017)      Sepsis ([address] (2/16/2017)    1. ESRD - HD MWF  2. Clotted thigh AVG - due for declot today  3. Liver transplant  4. Cirrhosis  5.  Hernia

## 2017-02-21 NOTE — BRIEF OP NOTE
BRIEF OPERATIVE NOTE    Date of Procedure: 2/21/2017   Preoperative Diagnosis: Clotted dialysis [facility], [facility] [T82.49XA]  Postoperative Diagnosis: Clotted dialysis access, initial encounter ([facility] [T82.49XA]    Procedure(s):  THROMBECTOMY LEFT THIGH ARTERIOVENOUS GRAFT / FISTULAGRAM/ VENOUS ANGIOPLASTY AND STENT PLACEMENT   Surgeon(s) and Role:     * Robyn Guerrier MD - Primary            Surgical Staff:  Circ-1: Kwesi Romero RN  Scrub Tech-1: Marcell Mendiola CNA  Scrub Tech-2: Bonnie Cooper  Event Time In   Incision Start 1713   Incision Close 1806     Anesthesia: General   Estimated Blood Loss: 50 cc  Specimens: * No specimens in log *   Findings: Venous outflow stenosis   Complications: None  Implants:   Implant Name Type Inv.  Item Serial No.  Lot No. LRB No. Used Action   STENT BILI ILIAC 8X40 80 -- SMART CONTROL - ORDER AS EA - [id number]   STENT BILI ILIAC 8X40 80 -- SMART CONTROL - ORDER AS EA   [address] [id number] 1 Implanted

## 2017-02-21 NOTE — PROGRESS NOTES
[address] FAX: [phone]        Vascular Surgery Progress Note    Admit Date: 2017  POD * No surgery date entered *    Procedure:  Procedure(s):  DECLOT LEFT THIGH ARTERIOVENOUS GRAFT/ ROOM 604      Subjective:     Patient has no new complaints. Patient states that she would like to get a paracentesis today before she heads to the OR. She is to have a declot today by Dr. Leonie Toney of her left thigh AVG. Objective:     Blood pressure 93/60, pulse 96, temperature 97.5 °F (36.4 °C), resp. rate 18, weight 118 lb 11.2 oz (53.8 kg), last menstrual period 2016, SpO2 94 %, not currently breastfeeding. Temp (24hrs), Av.9 °F (36.6 °C), Min:97.5 °F (36.4 °C), Max:98.2 °F (36.8 °C)      Physical Exam:  GENERAL: alert, cooperative, no distress, appears stated age, LUNG:  clear to auscultation bilaterally, HEART:  regular rate and rhythm, S1, S2 normal, no murmur, click, rub or gallop and EXTREMITIES:  Left Lower Extremity:  mild edema, no thrill or bruit.      Labs:   Recent Labs      17   0915  17   0745   HGB   --   12.3   WBC   --   10.1   GLU  125*   --        Data Review: images and reports reviewed  Current Facility-Administered Medications   Medication Dose Route Frequency    insulin glargine (LANTUS) injection 18 Units  18 Units SubCUTAneous QHS    0.9% sodium chloride infusion 250 mL  250 mL IntraVENous PRN    morphine CR (MS CONTIN) tablet 15 mg  15 mg Oral Q8H    lactulose (CHRONULAC) solution 20 g  30 mL Oral TID    [START ON 2017] epoetin ping (EPOGEN;PROCRIT) injection 20,000 Units  20,000 Units SubCUTAneous Q7D    vancomycin (VANCOCIN) - pharmacy dosing  500 mg IntraVENous Rx Dosing/Monitoring    dextrose 40% (GLUTOSE) oral gel 1 Tube  1 Tube Oral PRN    dextrose (D50W) injection syrg 25 g  25 g IntraVENous PRN    sodium chloride (NS) flush 5-10 mL  5-10 mL IntraVENous Q8H    sodium chloride (NS) flush 5-10 mL  5-10 mL IntraVENous PRN    naloxone (NARCAN) injection 0.4 mg  0.4 mg IntraVENous PRN    diphenhydrAMINE (BENADRYL) injection 12.5 mg  12.5 mg IntraVENous Q4H PRN    promethazine (PHENERGAN) with saline injection 12.5 mg  12.5 mg IntraVENous Q6H PRN    cycloSPORINE modified (GENGRAF, NEORAL) capsule 50 mg (patient supplied)  50 mg Oral Q12H    pantoprazole (PROTONIX) tablet 40 mg  40 mg Oral ACB&D    rifAXIMin (XIFAXAN) tablet 550 mg  550 mg Oral BID     Assessment:     Principal Problem:    SBP (spontaneous bacterial peritonitis) ([facility] (2/12/2017)    Active Problems:    Type 2 diabetes mellitus with hyperglycemia ([facility] (7/4/2016)      [facility] ([email or web address] (7/4/2016)      Ascites (7/4/2016)      ESRD (end stage renal disease) on dialysis [facility]) (11/2/2016)      Liver transplant recipient [facility]) (2/13/2017)      Sepsis ([email or web address] (2/16/2017)        Plan/Recommendations/Medical Decision Making:   Continue present treatment   -Patient can be discharged after surgical procedure today. NPO until after surgical procedure. If Dr. Hugo Cisneros is unable to support his AV graft in the thigh think it would be reasonable to try an upper arm AV graft perhaps using a tapered (4 mm to 7 mm) AV graft with a proximal arterial anastomosis. The patient will then need an attempt at maintaining an improved blood pressure to support the graft. This predisposes that we do not find a mechanical issue with the graft itself. Dr. Britni Purdy has been notified of these plans.

## 2017-02-21 NOTE — INTERDISCIPLINARY ROUNDS
Interdisciplinary team rounds were held 2/21/2017 with the following team members:Care Management, Nursing, Pastoral Care, Pharmacy and Physician. Plan of care discussed. See clinical pathway and/or care plan for interventions and desired outcomes. Patient off floor at this time.

## 2017-02-21 NOTE — PROGRESS NOTES
Hospitalist Progress Note    Subjective:   Daily Progress Note: 2/21/2017 3:51 PM    Ms. Nora Guzman is a 54 yo white female, with a pmh of liver transplant x two, who presented 2/12 for abdominal pain due to ascites and possible SBP for which she is on rocephin and vancomycin. Cultures negative except for coag neg staph and staph epi but vancomycin being given for 14 days with HD in case this is due to SBP. Dialyzing per routine. To OR today for left thigh graft declot with Dr. Darnell Joseph. Patient in OR, will check back as time allows.      Current Facility-Administered Medications   Medication Dose Route Frequency    lidocaine (XYLOCAINE) 10 mg/mL (1 %) injection 0.1 mL  0.1 mL SubCUTAneous PRN    lactated ringers infusion  100 mL/hr IntraVENous CONTINUOUS    0.9% sodium chloride infusion  25 mL/hr IntraVENous CONTINUOUS    sodium chloride (NS) flush 5-10 mL  5-10 mL IntraVENous Q8H    sodium chloride (NS) flush 5-10 mL  5-10 mL IntraVENous PRN    famotidine (PEPCID) tablet 20 mg  20 mg Oral ONCE    fentaNYL citrate (PF) injection 100 mcg  100 mcg IntraVENous ONCE    midazolam (VERSED) injection 2 mg  2 mg IntraVENous ONCE PRN    ceFAZolin in 0.9% NS (ANCEF) IVPB soln 2 g  2 g IntraVENous ON CALL TO OR    ondansetron (ZOFRAN ODT) tablet 8 mg  8 mg Oral Q8H PRN    insulin glargine (LANTUS) injection 10 Units  10 Units SubCUTAneous QHS    morphine CR (MS CONTIN) tablet 15 mg  15 mg Oral Q12H    0.9% sodium chloride infusion 250 mL  250 mL IntraVENous PRN    lactulose (CHRONULAC) solution 20 g  30 mL Oral TID    [START ON 2/22/2017] epoetin ping (EPOGEN;PROCRIT) injection 20,000 Units  20,000 Units SubCUTAneous Q7D    vancomycin (VANCOCIN) - pharmacy dosing  500 mg IntraVENous Rx Dosing/Monitoring    dextrose 40% (GLUTOSE) oral gel 1 Tube  1 Tube Oral PRN    dextrose (D50W) injection syrg 25 g  25 g IntraVENous PRN    sodium chloride (NS) flush 5-10 mL  5-10 mL IntraVENous Q8H    sodium chloride (NS) flush 5-10 mL  5-10 mL IntraVENous PRN    naloxone (NARCAN) injection 0.4 mg  0.4 mg IntraVENous PRN    diphenhydrAMINE (BENADRYL) injection 12.5 mg  12.5 mg IntraVENous Q4H PRN    cycloSPORINE modified (GENGRAF, NEORAL) capsule 50 mg (patient supplied)  50 mg Oral Q12H    pantoprazole (PROTONIX) tablet 40 mg  40 mg Oral ACB&D    rifAXIMin (XIFAXAN) tablet 550 mg  550 mg Oral BID        Objective:     Visit Vitals    BP (!) 86/52 (BP 1 Location: Right arm, BP Patient Position: At rest)    Pulse 95    Temp 97.8 °F (36.6 °C)    Resp 18    Wt 53.8 kg (118 lb 11.2 oz)    LMP 2016    SpO2 95%    Breastfeeding No    BMI 22.61 kg/m2      O2 Device: Room air    Temp (24hrs), Av.8 °F (36.6 °C), Min:97.4 °F (36.3 °C), Max:98.2 °F (36.8 °C)          1901 -  0700  In: -   Out: 1700       Additional comments: all labs, notes and studies from the past 24 hours have been personally reviewed today by me.      Data Review    Recent Results (from the past 24 hour(s))   GLUCOSE, POC    Collection Time: 17  4:19 PM   Result Value Ref Range    Glucose (POC) 79 65 - 100 mg/dL   VANCOMYCIN, RANDOM    Collection Time: 17  6:29 PM   Result Value Ref Range    VANCOMYCIN,RANDOM 21.3 UG/ML   GLUCOSE, POC    Collection Time: 17  8:31 PM   Result Value Ref Range    Glucose (POC) 141 (H) 65 - 100 mg/dL   GLUCOSE, POC    Collection Time: 17  7:11 AM   Result Value Ref Range    Glucose (POC) 85 65 - 100 mg/dL   GLUCOSE, POC    Collection Time: 17 11:16 AM   Result Value Ref Range    Glucose (POC) 51 (L) 65 - 100 mg/dL   GLUCOSE, POC    Collection Time: 17  1:02 PM   Result Value Ref Range    Glucose (POC) 50 (L) 65 - 100 mg/dL   GLUCOSE, POC    Collection Time: 17  2:12 PM   Result Value Ref Range    Glucose (POC) 160 (H) 65 - 100 mg/dL         Assessment/Plan:     Principal Problem:    SBP (spontaneous bacterial peritonitis) ([facility] (2017)    Active Problems:    Type 2 diabetes mellitus with hyperglycemia ([facility] (7/4/2016)      Thrombocytopenia ([facility] (7/4/2016)      Ascites (7/4/2016)      ESRD (end stage renal disease) on dialysis [facility]) (11/2/2016)      Liver transplant recipient [facility]) (2/13/2017)      Sepsis ([facility] (2/16/2017)    -continue vancomycin for 14 days total, to be given with dialysis. -de clot of left thigh graft with Dr. Sandy Bernstein today.   -will see if IR can do paracentesis while she is in house- may have to reschedule as an outpatient?  -multiple notes say patient falls asleep when being talked to, etc.  Will decrease MS contin to 15 mg BID from TID and change phenergan to zofran.   -home soon. Patient in OR, will check back as time allows.        Signed By: Ruddy Gomes MD     February 21, 2017

## 2017-02-21 NOTE — ANESTHESIA PREPROCEDURE EVALUATION
Anesthetic History     PONV          Review of Systems / Medical History  Patient summary reviewed, nursing notes reviewed and pertinent labs reviewed    Pulmonary  Within defined limits                 Neuro/Psych              Cardiovascular            Dysrhythmias : sinus tachycardia      Exercise tolerance: >4 METS  Comments: Raynaud's   GI/Hepatic/Renal     GERD: well controlled    Renal disease: ESRD  Liver disease (s/p Liver transplant x 2 (Congenital hepatic fibrosis and Hep C) - on transplant list again)    Comments: H/O congenital hepatic fibrosis, s/p transplant times two, esophageal varices, thrombocytopenia, ascites Endo/Other    Diabetes: well controlled, type 2, using insulin    Arthritis     Other Findings   Comments:  Thrombocytopenia - plt count has ranged 66k-94k since admission               Anesthetic Plan    ASA: 4  Anesthesia type: general          Induction: Intravenous  Anesthetic plan and risks discussed with: Patient

## 2017-02-21 NOTE — PERIOP NOTES
Patient came down from floor medicated with narcotics. Patient falls asleep between questions and is unable to sign consent. Patient step-mother, David Dat present. Patient states it is ok for David Dat to sign surgical consent. 2 nurses witnessed.  Layne Mancuso at [employer],

## 2017-02-21 NOTE — BRIEF OP NOTE
BRIEF OPERATIVE NOTE    Date of Procedure: 2/21/2017   Preoperative Diagnosis: Clotted dialysis access, initial encounter ([facility] [T82.49XA]  Postoperative Diagnosis: Clotted dialysis access, [facility] [T82.49XA]    Procedure(s):  THROMBECTOMY LEFT THIGH ARTERIOVENOUS GRAFT / FISTULAGRAM/ VENOUS ANGIOPLASTY AND STENT PLACEMENT   Surgeon(s) and Role:     * Reba Lopez MD - Primary            Surgical Staff:  Circ-1: Jennifer Hearn RN  Scrub Tech-1: Rick Sinclair CNA  Scrub Tech-2: Theron Cooper  Event Time In   Incision Start 1713   Incision Close 1806     Anesthesia: General   Estimated Blood Loss: 50 cc  Specimens: * No specimens in log *   Findings:    Complications: None  Implants:   Implant Name Type Inv.  Item Serial No.  Lot No. LRB No. Used Action   STENT BILI ILIAC [id number] -- SMART CONTROL - ORDER AS EA - [id number]   STENT BILI ILIAC 8X40 80 -- SMART CONTROL - ORDER AS EA   [facility] [id number] 1 Implanted

## 2017-02-21 NOTE — PERIOP NOTES
TRANSFER - IN REPORT:    Verbal report received from NEO Payan on Gordon Garcia  being received from 6th floor for routine progression of care      Report consisted of patients Situation, Background, Assessment and   Recommendations(SBAR). Information from the following report(s) SBAR was reviewed with the receiving nurse. Opportunity for questions and clarification was provided. Assessment completed upon patients arrival to unit and care assumed.

## 2017-02-22 NOTE — PROGRESS NOTES
[address] [facility]   [address], [address] [place] [address]. [address] FAX: [phone]        Vascular Surgery Progress Note    Admit Date: 2017  POD 1 Day Post-Op    Procedure:  Procedure(s):  THROMBECTOMY LEFT THIGH ARTERIOVENOUS GRAFT / FISTULAGRAM/ VENOUS ANGIOPLASTY AND STENT PLACEMENT       Subjective:     Patient has complaints of abdominal pain. Objective:     Blood pressure (!) 84/58, pulse 98, temperature 97.5 °F (36.4 °C), resp. rate 16, weight 121 lb 8 oz (55.1 kg), last menstrual period 2016, SpO2 94 %, not currently breastfeeding.     Temp (24hrs), Av.4 °F (36.3 °C), Min:96.7 °F (35.9 °C), Max:98 °F (36.7 °C)      Physical Exam:  GENERAL: alert, cooperative, no distress, appears stated age, HEART:  regular rate and rhythm, S1, S2 normal, no murmur, click, rub or gallop, ABDOMEN:  no change and INCISION:  left leg  edema, tenderness    Labs:   Recent Labs      17   0615   HGB  10.6*   WBC  9.0   K  3.9   GLU  66       Data Review: images and reports reviewed  Current Facility-Administered Medications   Medication Dose Route Frequency    lidocaine-prilocaine (EMLA) 2.5-2.5 % cream   Topical PRN    vancomycin (VANCOCIN) 1,000 mg in 0.9% sodium chloride (MBP/ADV) 250 mL  1,000 mg IntraVENous ONCE    ondansetron (ZOFRAN ODT) tablet 8 mg  8 mg Oral Q8H PRN    insulin glargine (LANTUS) injection 10 Units  10 Units SubCUTAneous QHS    morphine CR (MS CONTIN) tablet 15 mg  15 mg Oral Q12H    promethazine (PHENERGAN) with saline injection 12.5 mg  12.5 mg IntraVENous Q6H PRN    0.9% sodium chloride infusion 250 mL  250 mL IntraVENous PRN    lactulose (CHRONULAC) solution 20 g  30 mL Oral TID    epoetin ping (EPOGEN;PROCRIT) injection 20,000 Units  20,000 Units SubCUTAneous Q7D    vancomycin (VANCOCIN) - pharmacy dosing  500 mg IntraVENous Rx Dosing/Monitoring    dextrose 40% (GLUTOSE) oral gel 1 Tube  1 Tube Oral PRN    dextrose (D50W) injection syrg 25 g  25 g IntraVENous PRN    sodium chloride (NS) flush 5-10 mL  5-10 mL IntraVENous Q8H    sodium chloride (NS) flush 5-10 mL  5-10 mL IntraVENous PRN    naloxone (NARCAN) injection 0.4 mg  0.4 mg IntraVENous PRN    diphenhydrAMINE (BENADRYL) injection 12.5 mg  12.5 mg IntraVENous Q4H PRN    cycloSPORINE modified (GENGRAF, NEORAL) capsule 50 mg (patient supplied)  50 mg Oral Q12H    pantoprazole (PROTONIX) tablet 40 mg  40 mg Oral ACB&D    rifAXIMin (XIFAXAN) tablet 550 mg  550 mg Oral BID     Assessment:     Principal Problem:    SBP (spontaneous bacterial peritonitis) ([facility] (2/12/2017)    Active Problems:    Type 2 diabetes mellitus with hyperglycemia ([facility] (7/4/2016)      Thrombocytopenia ([facility] (7/4/2016)      Ascites (7/4/2016)      ESRD (end stage renal disease) on dialysis [facility]) (11/2/2016)      Liver transplant recipient [facility]) (2/13/2017)      Sepsis ([facility] (2/16/2017)        Plan/Recommendations/Medical Decision Making:   Continue present treatment   -Jacqui Roberts from a vascular standpoint to discharge. Patient can follow-up as an outpatient with Dr. Eduardo Farah. Left thigh AVG is ok to use for HD. Elements of this note have been dictated using speech recognition software. As a result, errors of speech recognition may have occurred.

## 2017-02-22 NOTE — OP NOTES
[address]   OPERATIVE REPORT       Name:  Chetan Staples   MR#:  [id number]   :  1970   Account #:  [de-identified]   Date of Adm:  2017       DATE OF PROCEDURE: 2017    PREOPERATIVE DIAGNOSIS: End-stage renal disease with thrombosed   left thigh arteriovenous graft. POSTOPERATIVE DIAGNOSIS: End-stage renal disease with thrombosed   left thigh arteriovenous graft. PROCEDURE   1. Thrombectomy left thigh arteriovenous graft. 2. Left arteriovenous graft fistulogram.   3. Left arteriovenous graft venous angioplasty and stent   placement. SURGEON: Estiven Mcneil MD    ANESTHESIA: General endotracheal.    ESTIMATED BLOOD LOSS: 10 mL. DRAINS: None. SPECIMENS: None. COMPLICATIONS: None. DESCRIPTION OF PROCEDURE: The patient was brought to the   operating room and placed on the operating table in supine   position. Following adequate anesthesia, the left thigh was   draped and prepped in a sterile fashion. A small transverse   incision was made over the apex of the graft. The graft was   identified, mobilized and encircled with a vessel loop. The   patient was then systemically heparinized. Next, a small   transverse graftotomy was performed. The venous outflow tract,   which was lateral was thrombectomized with a #4 Nithin   catheter. Ultimately, the graft was cleared of all of its clot   and there was moderate backbleeding. An 8-Upper sorbian sheath was   placed through the arteriotomy and a fistulogram was performed   imaging the venous outflow of the AV graft. There appeared to be   high-grade stenosis at the venous anastomosis. This was dilated   with a 6 mm balloon with residual stenosis present. Next, an 8   mm stent was placed across this lesion. Completion imaging   demonstrated the stent to be widely patent with excellent flow. Next, heparinized saline was instilled and the venous limb of   the graft was occluded with a vascular clamp.  Next, the arterial limb of the graft was thrombectomized with a #4 Nithin   catheter. There was return of a fibrin cap and pulsatile inflow. At this point, the graftotomy was closed with running 5-0   Prolene sutures. Flow was restored. There was excellent thrill   in the graft. The wound was then closed in layers of Vicryl   suture. The patient was then awakened from anesthesia and   transferred to the recovery room in stable condition. The   patient tolerated the procedure well. No complications. All   needle and sponge counts were correct.         MD Christy Jacome / Jose Enrique Castaneda   D:  02/22/2017   11:32   T:  02/22/2017   12:38   Job #:  [id number]

## 2017-02-22 NOTE — DIALYSIS
HD treatment completed without complication. Total of 1.2 kgs removed. VS stable, NAD. CVC dressing clean, dry, and intact, tego caps intact, bilateral lumen flushed with 9cc NS and curos applied. Transport contacted for return to room. No complaints at this time.

## 2017-02-22 NOTE — DISCHARGE SUMMARY
Hospitalist Discharge Summary     Patient ID:  Karolyn Paz  [id number]  82 y.o.  1970  Admit date: 2/12/2017 10:42 PM  Discharge date and time: 2/22/2017  Attending: Rosenda Landa MD  PCP:  Merari Lemos MD  Treatment Team: Attending Provider: Rosenda Landa MD; Consulting Provider: Silvestre Ramirez MD; Hospitalist: Rosenda Landa MD; Care Manager: Sonya Mcdowell; Consulting Provider: Nallely Romero MD; Utilization Review: J Luis Kwan RN    Principal Diagnosis SBP (spontaneous bacterial peritonitis) [facility])   Principal Problem:    SBP (spontaneous bacterial peritonitis) ([facility] (2/12/2017)    Active Problems:    Type 2 diabetes mellitus with hyperglycemia ([facility] (7/4/2016)      Thrombocytopenia ([facility] (7/4/2016)      Ascites (7/4/2016)      ESRD (end stage renal disease) on dialysis ([facility] (11/2/2016)      Liver transplant recipient [facility]) (2/13/2017)      Sepsis ([facility] (2/16/2017)             Hospital Course:  Please refer to the admission H&P for details of presentation. In summary,  Ms. Stella Valadez is a 56 yo white female, with a pmh of liver transplant x two, who presented 2/12 for abdominal pain due to ascites and possible SBP for which she is on vancomycin. Cultures negative except for coag neg staph and staph epi but vancomycin being given for 14 days with HD in case this is due to SBP. Dialyzing per routine. S/p left thigh graft declot with Dr. Sandy Bernstein on 2-21. Has had two therapeutic paracentesis during stay - 2-17 and 2-21. Patient is anxious for discharge and considered stable to [place] home with [address] RN ordered.        Significant Diagnostic Studies:   IMPRESSION  IMPRESSION:   1. Extensive ascites. 2. Nonspecific small bowel dilatation, overall bowel gas pattern nonobstructive. 2. Evidence of portal hypertension, ascites, and occluded appearing TIPS similar  to the comparison study.     Labs: Results:       Chemistry Recent Labs      02/22/17   5462  02/20/17   5354 GLU  66  125*   NA  135*   --    K  3.9   --    CL  100   --    CO2  23   --    BUN  32*   --    CREA  4.29*   --    CA  6.8*   --    AGAP  12   --       CBC w/Diff Recent Labs      02/22/17   0615  02/20/17   0745   WBC  9.0  10.1   RBC  3.64*  4.14   HGB  10.6*  12.3   HCT  32.0*  36.1   PLT  62*  94*   GRANS  75   --    LYMPH  7*   --    EOS  4   --       Cardiac Enzymes No results for input(s): CPK, CKND1, RADHA in the last 72 hours. No lab exists for component: CKRMB, TROIP   Coagulation No results for input(s): PTP, INR, APTT in the last 72 hours. No lab exists for component: INREXT    Lipid Panel Lab Results   Component Value Date/Time    Cholesterol, total 126 01/24/2017 11:00 AM    HDL Cholesterol 43 01/24/2017 11:00 AM    LDL, calculated 61 01/24/2017 11:00 AM    VLDL, calculated 22 01/24/2017 11:00 AM    Triglyceride 110 01/24/2017 11:00 AM    CHOL/HDL Ratio 2.9 01/24/2017 11:00 AM      BNP No results for input(s): BNPP in the last 72 hours. Liver Enzymes No results for input(s): TP, ALB, TBIL, AP, SGOT, GPT in the last 72 hours. No lab exists for component: DBIL   Thyroid Studies No results found for: T4, T3U, TSH, TSHEXT         Discharge Exam:  Visit Vitals    BP 96/58 (BP 1 Location: Right arm, BP Patient Position: At rest)    Pulse (!) 106    Temp 98 °F (36.7 °C)    Resp 17    Wt 55.1 kg (121 lb 8 oz)    LMP 01/02/2016    SpO2 97%    Breastfeeding No    BMI 23.15 kg/m2     General appearance: alert, cooperative, no distress, appears stated age  Lungs: clear to auscultation bilaterally  Heart: regular rate and rhythm, S1, S2 normal, no murmur, click, rub or gallop  Abdomen: soft, non-tender.  Bowel sounds normal. No masses,  no organomegaly  Extremities: no cyanosis or edema  Neurologic: Grossly normal    Disposition: home  Discharge Condition: stable  Patient Instructions:   Current Discharge Medication List      START taking these medications    Details   morphine CR (MS CONTIN) 15 mg CR tablet Take 1 Tab by mouth every twelve (12) hours. Max Daily Amount: 30 mg.  Qty: 20 Tab, Refills: 0      vancomycin 500 mg 500 mg IVPB 500 mg by IntraVENous route DIALYSIS MON, WED & FRI for 2 days. Qty: 1 Dose, Refills: 0         CONTINUE these medications which have CHANGED    Details   lactulose (CHRONULAC) 10 gram/15 mL solution Take 30 mL by mouth three (3) times daily. Qty: 480 mL, Refills: 0         CONTINUE these medications which have NOT CHANGED    Details   colchicine (MITIGARE) 0.6 mg capsule Take 0.6 mg by mouth daily. traMADol (ULTRAM) 50 mg tablet Take 1 Tab by mouth every six (6) hours as needed. Max Daily Amount: 200 mg. Indications: PAIN  Qty: 20 Tab, Refills: 0      insulin glargine (LANTUS SOLOSTAR) 100 unit/mL (3 mL) pen 25 Units by SubCUTAneous route nightly.      magnesium oxide (MAG-OX) 400 mg tablet Take 400 mg by mouth two (2) times a day. cycloSPORINE modified (GENGRAF) 25 mg capsule Take 2.5 mg/kg/day by mouth every morning. Indications: Takes in the AM      midodrine (PROAMITINE) 5 mg tablet Take 5 mg by mouth every eight (8) hours. 2 q8h       baclofen (LIORESAL) 10 mg tablet Take 10 mg by mouth as needed. insulin aspart (NOVOLOG) 100 unit/mL injection by SubCUTAneous route. Sliding scale       ondansetron hcl (ZOFRAN) 4 mg tablet Take 1 Tab by mouth every eight (8) hours as needed for Nausea. Qty: 30 Tab, Refills: 0      zinc sulfate (ZINCATE) 220 (50) mg capsule Take 220 mg by mouth every morning. Refills: 5      XIFAXAN 550 mg tablet Take 550 mg by mouth three (3) times daily. Refills: 0      pantoprazole (PROTONIX) 40 mg tablet Take 40 mg by mouth four (4) times daily.  2tabs bid             Activity: as tolerated  Diet: renal    Follow-up  · PCP in 1-2 weeks, GI as per routine, HD m/w/f    Time spent to discharge patient 35 minutes  Signed:  Susan Melendez DO  2/22/2017  6:09 PM

## 2017-02-22 NOTE — DISCHARGE INSTRUCTIONS
DISCHARGE SUMMARY from Nurse    The following personal items are in your possession at time of discharge:    Dental Appliances: None  Visual Aid: Glasses, With patient     Home Medications: None  Jewelry: Ring, Watch (given to sister)  Clothing: None  Other Valuables: None             PATIENT INSTRUCTIONS:    After general anesthesia or intravenous sedation, for 24 hours or while taking prescription Narcotics:  · Limit your activities  · Do not drive and operate hazardous machinery  · Do not make important personal or business decisions  · Do  not drink alcoholic beverages  · If you have not urinated within 8 hours after discharge, please contact your surgeon on call. Report the following to your surgeon:  · Excessive pain, swelling, redness or odor of or around the surgical area  · Temperature over 100.5  · Nausea and vomiting lasting longer than 4 hours or if unable to take medications  · Any signs of decreased circulation or nerve impairment to extremity: change in color, persistent  numbness, tingling, coldness or increase pain  · Any questions        What to do at Home:  Recommended activity: Activity as tolerated,     If you experience any of the following symptoms fever greater than 100.5, persistent nausea or vomiting, new or unrelieved pain, dizziness, chest pain or shortness of breath, please follow up with MD.      *  Please give a list of your current medications to your Primary Care Provider. *  Please update this list whenever your medications are discontinued, doses are      changed, or new medications (including over-the-counter products) are added. *  Please carry medication information at all times in case of emergency situations. These are general instructions for a healthy lifestyle:    No smoking/ No tobacco products/ Avoid exposure to second hand smoke    Surgeon General's Warning:  Quitting smoking now greatly reduces serious risk to your health.     Obesity, smoking, and sedentary lifestyle greatly increases your risk for illness    A healthy diet, regular physical exercise & weight monitoring are important for maintaining a healthy lifestyle    You may be retaining fluid if you have a history of heart failure or if you experience any of the following symptoms:  Weight gain of 3 pounds or more overnight or 5 pounds in a week, increased swelling in our hands or feet or shortness of breath while lying flat in bed. Please call your doctor as soon as you notice any of these symptoms; do not wait until your next office visit. Recognize signs and symptoms of STROKE:    F-face looks uneven    A-arms unable to move or move unevenly    S-speech slurred or non-existent    T-time-call 911 as soon as signs and symptoms begin-DO NOT go       Back to bed or wait to see if you get better-TIME IS BRAIN. Warning Signs of HEART ATTACK     Call 911 if you have these symptoms:   Chest discomfort. Most heart attacks involve discomfort in the center of the chest that lasts more than a few minutes, or that goes away and comes back. It can feel like uncomfortable pressure, squeezing, fullness, or pain.  Discomfort in other areas of the upper body. Symptoms can include pain or discomfort in one or both arms, the back, neck, jaw, or stomach.  Shortness of breath with or without chest discomfort.  Other signs may include breaking out in a cold sweat, nausea, or lightheadedness. Don't wait more than five minutes to call 911 - MINUTES MATTER! Fast action can save your life. Calling 911 is almost always the fastest way to get lifesaving treatment. Emergency Medical Services staff can begin treatment when they arrive -- up to an hour sooner than if someone gets to the hospital by car. The discharge information has been reviewed with the patient. The patient verbalized understanding.     Discharge medications reviewed with the patient and appropriate educational materials and side effects teaching were provided.

## 2017-02-22 NOTE — PROGRESS NOTES
[address]  Face to Face Encounter    Patients Name: Senia Thomas    YOB: 1970    Ordering Physician: Osvaldo Higgins    Primary Diagnosis: Clotted dialysis access, initial encounter [facility]) Kristel Elena    Date of Face to Face:   2/22/2017                                  Face to Face Encounter findings are related to primary reason for home care:   yes. 1. I certify that the patient needs intermittent care as follows: skilled nursing care:  teaching/training of chronic disease management, s/p parencentesis. 2. I certify that this patient is homebound, that is: 1) patient requires the use of a none device, special transportation, or assistance of another to leave the home; or 2) patient's condition makes leaving the home medically contraindicated; and 3) patient has a normal inability to leave the home and leaving the home requires considerable and taxing effort. Patient may leave the home for infrequent and short duration for medical reasons, and occasional absences for non-medical reasons. Homebound status is due to the following functional limitations: Patient with strength deficits limiting the performance of all ADL's without caregiver assistance or the use of an assistive device. 3. I certify that this patient is under my care and that I, or a nurse practitioner or [phone], or clinical nurse specialist, or certified nurse midwife, working with me, had a Face-to-Face Encounter that meets the physician Face-to-Face Encounter requirements.   The following are the clinical findings from the [address] [place] [address] encounter that support the need for skilled services and is a summary of the encounter:     See discharge summary The patient has a medical condition which requires positioning of the body in ways not feasible with an ordinary bed, requires the head of bed to be elevated more than 30 degrees most of the time due to COPD, and the need for frequent changes in body position. Pillows and wedges have been considered, but ruled out. chart      Letty Roman, SYED  2/22/2017      THE FOLLOWING TO BE COMPLETED BY THE COMMUNITY PHYSICIAN:    I concur with the findings described above from the F2F encounter that this patient is homebound and in need of a skilled service.     Certifying Physician: _____________________________________      Printed Certifying Physician Name: _____________________________________    Date: _________________

## 2017-02-22 NOTE — PROCEDURES
Interventional Radiology Brief Procedure Note    Patient: Deirdre Ramos MRN: [id number]  SSN: [id number]    YOB: 1970  Age: 55 y.o. Sex: female      Date of Procedure: 2/22/2017    Pre-Procedure Diagnosis: Recurrent Ascites. Post-Procedure Diagnosis: SAME    Procedure(s): Paracentesis    Brief Description of Procedure: as above    Performed By: Kati Chandler MD     Assistants: None    Anesthesia: None    Estimated Blood Loss: None    Specimens: None    Implants: None    Findings: Massive ascites. Complications: None    Recommendations: This patient has now undergone 42 paracentesis procedures in the last eight months. Strongly recommend tunneled abdominal drain placement for a more comfortable way of draining the ascites. She does not currently have spontaneous bacterial peritonitis--repeat ascites cultures have been negative since December, 2016. A previously placed TIPS was apparently occluded, and not narrowed, due to encephalopathy. Follow Up: PRN.      Signed By: Kati Chandler MD     February 22, 2017

## 2017-02-22 NOTE — PROGRESS NOTES
Pharmacokinetic Consult to Pharmacist    Adi Ma is a 55 y.o. female being treated for possible bloodstream infection and possible spontaneous bacterial peritonitis with vancomycin. Weight: 55.1 kg (121 lb 8 oz)  Lab Results   Component Value Date/Time    BUN 32 02/22/2017 06:15 AM    Creatinine 4.29 02/22/2017 06:15 AM    WBC 9.0 02/22/2017 06:15 AM    Procalcitonin 0.6 07/28/2016 05:15 AM      Estimated Creatinine Clearance: 12.2 mL/min (based on Cr of 4.29). CULTURES:  02/12   BC x 2   Staph Epi, final     Ascitic fluid cx  Negative, final  2/15   BC X 2   No growth, final    Lab Results   Component Value Date/Time    VANCOMYCIN,RANDOM 21.3 02/20/2017 06:29 PM       Day 10 of 14 of vancomycin for possible spontaneous bacterial peritonitis. Goal trough is 15-20. Vancomycin to be dosed based on post-HD random levels    As most recent vancomycin random drawn after HD was 21.3, will re-dose after today's HD session with 1000 mg X 1. Next random level to be drawn after next HD session. Will continue to follow patient.       Thank you,  Yo Sorenson, PharmD  Clinical Pharmacist  [phone]

## 2017-02-22 NOTE — PROGRESS NOTES
TRANSFER - IN REPORT:    Verbal report received from [facility] RN(name) on Bozena Alfredo  being received from [facility]) for routine post - op      Report consisted of patients Situation, Background, Assessment and   Recommendations(SBAR). Information from the following report(s) SBAR, OR Summary, Intake/Output and MAR was reviewed with the receiving nurse. Opportunity for questions and clarification was provided. Assessment completed upon patients arrival to unit and care assumed.

## 2017-02-22 NOTE — PROGRESS NOTES
[facility] Progress Note    Follow-Up on: ESRD    Pt had a thigh declot yesterday. Seen on HD. Using her catheter with the edematous graft. ROS:  +edema, Negative SOB    Exam:  Vitals:    02/22/17 0415 02/22/17 0606 02/22/17 0656 02/22/17 0905   BP: 109/65  101/64 96/67   Pulse: (!) 103  (!) 105 97   Resp: 16  16    Temp: 96.9 °F (36.1 °C)  97.5 °F (36.4 °C)    SpO2: 96%  94%    Weight:  55.1 kg (121 lb 8 oz)           Intake/Output Summary (Last 24 hours) at 02/22/17 0921  Last data filed at 02/21/17 1817   Gross per 24 hour   Intake              600 ml   Output               75 ml   Net              525 ml       Wt Readings from Last 3 Encounters:   02/22/17 55.1 kg (121 lb 8 oz)   02/12/17 51.7 kg (114 lb)   01/27/17 47.2 kg (104 lb)       GEN - in no distress  CV - tachycardic, no murmur, no rub  Lung - clear bilaterally  Abd - soft, nontender, + hernia  Ext - trace edema    Recent Labs      02/22/17   0615  02/20/17   0745   WBC  9.0  10.1   HGB  10.6*  12.3   HCT  32.0*  36.1   PLT  62*  94*        Recent Labs      02/22/17   0615  02/20/17   0915   NA  135*   --    K  3.9   --    CL  100   --    CO2  23   --    BUN  32*   --    CREA  4.29*   --    CA  6.8*   --    GLU  66  125*   MG  1.9   --    PHOS  5.4*   --        Assessment / Plan:  Principal Problem:    SBP (spontaneous bacterial peritonitis) ([address] (2/12/2017)    Active Problems:    Type 2 diabetes mellitus with hyperglycemia (HCC) (7/4/2016)      Thrombocytopenia ([address] (7/4/2016)      Ascites (7/4/2016)      ESRD (end stage renal disease) on dialysis ([facility] (11/2/2016)      Liver transplant recipient [facility]) (2/13/2017)      Sepsis ([address] (2/16/2017)    1. ESRD - HD MWF. Tolerating well today. 2. Clotted thigh AVG - declotted yesterday. Use AVG on Friday at [facility]. 3. Liver transplant  4. Cirrhosis  5.  Hernia

## 2017-02-22 NOTE — PERIOP NOTES
TRANSFER - OUT REPORT:    Verbal report given to Mackenzie RN(name) on Franklin Foss  being transferred to 604(unit) for routine post - op       Report consisted of patients Situation, Background, Assessment and   Recommendations(SBAR). Information from the following report(s) SBAR, OR Summary, Procedure Summary, Intake/Output and MAR was reviewed with the receiving nurse. Lines:   PICC Single Lumen 09/22/16 (Active)   Central Line Being Utilized No 2/21/2017  6:51 PM   Criteria for Appropriate Use Limited/no vessel suitable for conventional peripheral access 2/21/2017  7:08 AM   Site Assessment Clean, dry, & intact 2/21/2017  7:08 AM   Phlebitis Assessment 0 2/21/2017  7:08 AM   Infiltration Assessment 0 2/21/2017  7:08 AM   Date of Last Dressing Change 02/17/17 2/21/2017  7:08 AM   Dressing Status Clean, dry, & intact 2/21/2017  7:08 AM   Dressing Type Transparent;Tape;Disk with Chlorhexadine Gluconate (CHG) 2/21/2017  7:08 AM   Hub Color/Line Status Capped; Patent 2/21/2017  7:08 AM   Action Taken Open ports on tubing capped 2/21/2017  2:00 AM   Positive Blood Return (Site #1) Yes 2/21/2017  2:00 AM   Alcohol Cap Used No 2/21/2017  2:00 AM       Peripheral IV 02/21/17 Left Forearm (Active)   Site Assessment Clean, dry, & intact 2/21/2017  6:51 PM   Phlebitis Assessment 0 2/21/2017  6:51 PM   Infiltration Assessment 0 2/21/2017  6:51 PM   Dressing Status Clean, dry, & intact 2/21/2017  6:51 PM   Dressing Type Transparent 2/21/2017  6:51 PM   Hub Color/Line Status Blue 2/21/2017  6:51 PM        Opportunity for questions and clarification was provided. Patient transported with:   O2 @ 3 liters    VTE prophylaxis orders have not been written for Franklin Foss. Patient and family given floor number and nurses name. Family updated re: pt status after security code verified.

## 2017-02-22 NOTE — PROGRESS NOTES
Spoke with Dr. Misael Gunderson about pt wanting to leave after procedure. Will notify MD when pt is back to the floor.

## 2017-02-22 NOTE — DIALYSIS
HD treatment initiated via right perm cath without difficulty. LLE AVG still with edema, Dr. Heather Rizvi notified and will use CVC instead. CVC dressing clean, dry, and intact, tego caps intact, bilateral lumen aspirated and flushed with 9cc NS. VS stable, will continue to monitor during tx. No Heparin this tx. Machine tests passed and alarms intact. NAD.

## 2017-02-22 NOTE — ANESTHESIA POSTPROCEDURE EVALUATION
Post-Anesthesia Evaluation and Assessment    Patient: Franklin Foss MRN: [id number]  SSN: [id number]    YOB: 1970  Age: 55 y.o. Sex: female       Cardiovascular Function/Vital Signs  Visit Vitals    BP 95/54    Pulse 92    Temp 36.7 °C (98 °F)    Resp 16    Wt 53.8 kg (118 lb 11.2 oz)    SpO2 100%    Breastfeeding No    BMI 22.61 kg/m2       Patient is status post general anesthesia for Procedure(s):  THROMBECTOMY LEFT THIGH ARTERIOVENOUS GRAFT / FISTULAGRAM/ VENOUS ANGIOPLASTY AND STENT PLACEMENT . Nausea/Vomiting: None    Postoperative hydration reviewed and adequate. Pain:  Pain Scale 1: Numeric (0 - 10) (02/21/17 1851)  Pain Intensity 1: 0 (02/21/17 1851)   Managed    Neurological Status:   Neuro (WDL): Within Defined Limits (02/21/17 1851)   At baseline    Mental Status and Level of Consciousness: Arousable    Pulmonary Status:   O2 Device: Nasal cannula (02/21/17 1851)   Adequate oxygenation and airway patent    Complications related to anesthesia: None    Post-anesthesia assessment completed. No concerns. Appears at baseline.      Signed By: Lauro Giordano MD     February 21, 2017

## 2017-02-22 NOTE — PROGRESS NOTES
Interventional Radiology Post Paracentesis/Thoracentesis Note    2/22/2017    Procedure(s): Ultrasound guided Therapeutic Paracentesis Performed with 8 Sami catheter total volume 3900 ml. Preliminary Findings: medium yellow. Complications: None    Plan:  Observation, check labs if drawn.           Chest X-Ray:  no    Full dictated report to follow    Signed By: Parish Romero RN

## 2017-02-23 NOTE — ED TRIAGE NOTES
PT arrived to ED c/o abdominal pain, nausea, and vomiting. PT was discharged several hour PTA to ED. PT states her pain has gotten worse.

## 2017-02-23 NOTE — ED PROVIDER NOTES
HPI Comments: Patient with hep C and liver failure. Has had 2 liver transplants. Has chronic ascites. Also has kidney failure on dialysis Monday Wednesday Friday. Takes lactulose for elevated ammonia. He gets recurrent abdominal paracentesis to relieve her fluid. Was recently in the hospital for 10 days and discharged yesterday. Was treated with vancomycin and Rocephin for possible SBP. Nothing specific or fluid. Her blood cultures grew staph epidermidis. Not MRSA. She was discharged with MS Contin but was unable to get filled last night and is back now with worse pain in her abdomen all over. Also with nausea and vomiting. Discharge summary. Ms. Galo Ann is a 54 yo white female, with a pmh of liver transplant x two, who presented 2/12 for abdominal pain due to ascites and possible SBP for which she is on vancomycin. Cultures negative except for coag neg staph and staph epi but vancomycin being given for 14 days with HD in case this is due to SBP. Dialyzing per routine. S/p left thigh graft declot with Dr. Magen Marroquin on 2-21. Has had two therapeutic paracentesis during stay - 2-17 and 2-21. Patient is a 55 y.o. female presenting with abdominal pain. The history is provided by the patient. No  was used. Abdominal Pain    This is a new problem. The current episode started 6 to 12 hours ago. The problem occurs constantly. The problem has been gradually worsening. The pain is associated with an unknown factor. The pain is located in the generalized abdominal region. The quality of the pain is sharp. The pain is moderate. Associated symptoms include diarrhea, nausea and vomiting. Pertinent negatives include no fever, no melena, no constipation, no dysuria, no hematuria, no headaches, no chest pain and no back pain. The pain is worsened by palpation. The pain is relieved by nothing. Past workup includes CT scan. Her past medical history is significant for DM.         Past Medical History: Diagnosis Date    SEAN (acute kidney injury) ([facility] 6/26/2016    Arthritis     kath feet    Ascites     Benign neoplasm of skin of trunk, except scrotum     pt denies    Cellulitis and abscess of unspecified digit     hx of    Chronic kidney disease     [facility] on Mon/Wed/Fri    Chronic pain     abd area    Congenital hepatic fibrosis     Liver transplant X2    Esophageal varices (HCC)     GERD (gastroesophageal reflux disease)     Hemodialysis access, AV graft ([address] 11/22/2016    Hepatic encephalopathy ([address] 10/2016    recently hospitalized for hepatic encephalopathy    Hepatitis C     hx of hepatitis C-- dx after first liver transplant from a transfusion   (first transplant age 13)    History of gastric ulcer     Insomnia 07/13/2015    no current meds    Liver transplant status ([address] 1986 and 1999    X2- congential hepatic fibrosis    Pain in joint, ankle and foot     PICC (peripherally inserted central catheter) in place     PONV (postoperative nausea and vomiting)     some N/V, pt states she does well with Phenergan    Port-a-cath in place     R chest for HD    Raynaud disease     Spleen enlarged     Tachycardia     Thrombocytopenia (HCC)     Type 2 diabetes mellitus (HCC)     insulin only/AVG /s.s of hypoglycemia 30's/Last A1c 5.6    Vasculitis ([address]     resolved       Past Surgical History:   Procedure Laterality Date    HX CHOLECYSTECTOMY  1984    HX COLONOSCOPY      HX OTHER SURGICAL      biliary reconstructive surgery    HX OTHER SURGICAL  2013    EGD    HX OTHER SURGICAL  03/2016    tips procedure    HX OTHER SURGICAL      paracentesis    HX TRANSPLANT  [id number]    2 liver - first one for congenital hepatic fibrosis, 2nd for Hep C cirrhosis    HX TUBAL LIGATION      LAP,TUBAL CAUTERY      VASCULAR SURGERY PROCEDURE UNLIST Left 11/02/2016    thigh AVG insertion in thigh         Family History:   Problem Relation Age of Onset    Diabetes Mother    [address] [facility] Heart Disease Mother     Hypertension Mother     Heart Disease Father     Stroke Father     Cancer Maternal Grandfather      liver cancer, alcoholism    No Known Problems Sister     Cancer Maternal Aunt      cervical cancer    Breast Cancer Paternal Grandmother      early 45s    Colon Cancer Paternal Grandmother      late 76s    Cancer Other      maternal niece- cervical cancer    Bipolar Disorder Brother     Heart Disease Brother        Social History     Social History    Marital status:      Spouse name: N/A    Number of children: N/A    Years of education: N/A     Occupational History    Not on file. Social History Main Topics    Smoking status: Never Smoker    Smokeless tobacco: Never Used    Alcohol use No    Drug use: No    Sexual activity: Yes     Partners: Male     Birth control/ protection: Surgical      Comment: Tubal Ligation     Other Topics Concern    Not on file     Social History Narrative         ALLERGIES: Aspirin; Codeine; Compazine [prochlorperazine edisylate]; and Pcn [penicillins]    Review of Systems   Constitutional: Negative for chills and fever. HENT: Negative for rhinorrhea and sore throat. Eyes: Negative for pain and redness. Respiratory: Negative for chest tightness, shortness of breath and wheezing. Cardiovascular: Negative for chest pain and leg swelling. Gastrointestinal: Positive for abdominal pain, diarrhea, nausea and vomiting. Negative for constipation and melena. Genitourinary: Negative for dysuria and hematuria. Musculoskeletal: Negative for back pain, gait problem, neck pain and neck stiffness. Skin: Negative for color change and rash. Neurological: Negative for weakness, numbness and headaches.        Vitals:    02/23/17 0314   BP: 99/64   Pulse: (!) 110   Resp: 20   Temp: 98.2 °F (36.8 °C)   SpO2: 97%   Weight: 49 kg (108 lb)   Height: 5' (1.524 m)            Physical Exam   Constitutional: She is oriented to person, place, and time. She appears well-developed and well-nourished. HENT:   Head: Normocephalic and atraumatic. Neck: Normal range of motion. Neck supple. Cardiovascular: Regular rhythm. Tachycardia present. No murmur heard. Pulmonary/Chest: Effort normal and breath sounds normal. She has no wheezes. Abdominal: Soft. Bowel sounds are normal. She exhibits distension. There is tenderness. There is guarding. There is no rebound. Large umbilical hernia. Musculoskeletal: Normal range of motion. She exhibits no edema. Neurological: She is alert and oriented to person, place, and time. No cranial nerve deficit. Skin: Skin is warm and dry. Nursing note and vitals reviewed. MDM  Number of Diagnoses or Management Options  Diagnosis management comments: Chronic abd pain. On abx for SBP. Will discharge to continue treatment with nephrologist.        Amount and/or Complexity of Data Reviewed  Clinical lab tests: ordered and reviewed  Tests in the radiology section of CPT®: ordered and reviewed  Tests in the medicine section of CPT®: reviewed and ordered    Patient Progress  Patient progress: stable    ED Course       Procedures           Results Include:    Recent Results (from the past 24 hour(s))   CBC WITH AUTOMATED DIFF    Collection Time: 02/22/17  6:15 AM   Result Value Ref Range    WBC 9.0 4.3 - 11.1 K/uL    RBC 3.64 (L) 4.05 - 5.25 M/uL    HGB 10.6 (L) 11.7 - 15.4 g/dL    HCT 32.0 (L) 35.8 - 46.3 %    MCV 87.9 79.6 - 97.8 FL    MCH 29.1 26.1 - 32.9 PG    MCHC 33.1 31.4 - 35.0 g/dL    RDW 16.3 (H) 11.9 - 14.6 %    PLATELET 62 (L) 368 - 450 K/uL    MPV 9.5 (L) 10.8 - 14.1 FL    DF AUTOMATED      NEUTROPHILS 75 43 - 78 %    LYMPHOCYTES 7 (L) 13 - 44 %    MONOCYTES 12 4.0 - 12.0 %    EOSINOPHILS 4 0.5 - 7.8 %    BASOPHILS 0 0.0 - 2.0 %    IMMATURE GRANULOCYTES 1.6 0.0 - 5.0 %    ABS. NEUTROPHILS 6.7 1.7 - 8.2 K/UL    ABS. LYMPHOCYTES 0.7 0.5 - 4.6 K/UL    ABS. MONOCYTES 1.1 0.1 - 1.3 K/UL    ABS. EOSINOPHILS 0.4 0.0 - 0.8 K/UL    ABS. BASOPHILS 0.0 0.0 - 0.2 K/UL    ABS. IMM. GRANS. 0.1 0.0 - 0.5 K/UL   METABOLIC PANEL, BASIC    Collection Time: 02/22/17  6:15 AM   Result Value Ref Range    Sodium 135 (L) 136 - 145 mmol/L    Potassium 3.9 3.5 - 5.1 mmol/L    Chloride 100 98 - 107 mmol/L    CO2 23 21 - 32 mmol/L    Anion gap 12 7 - 16 mmol/L    Glucose 66 65 - 100 mg/dL    BUN 32 (H) 6 - 23 MG/DL    Creatinine 4.29 (H) 0.6 - 1.0 MG/DL    GFR est AA 14 (L) >60 ml/min/1.73m2    GFR est non-AA 12 (L) >60 ml/min/1.73m2    Calcium 6.8 (L) 8.3 - 10.4 MG/DL   MAGNESIUM    Collection Time: 02/22/17  6:15 AM   Result Value Ref Range    Magnesium 1.9 1.8 - 2.4 mg/dL   PHOSPHORUS    Collection Time: 02/22/17  6:15 AM   Result Value Ref Range    Phosphorus 5.4 (H) 2.5 - 4.5 MG/DL   GLUCOSE, POC    Collection Time: 02/22/17  6:59 AM   Result Value Ref Range    Glucose (POC) 59 (L) 65 - 100 mg/dL   GLUCOSE, POC    Collection Time: 02/22/17 10:30 AM   Result Value Ref Range    Glucose (POC) 94 65 - 100 mg/dL   POC LACTIC ACID    Collection Time: 02/23/17  4:18 AM   Result Value Ref Range    Lactic Acid (POC) 1.7 0.5 - 1.9 mmol/L   CBC WITH AUTOMATED DIFF    Collection Time: 02/23/17  4:20 AM   Result Value Ref Range    WBC 12.8 (H) 4.3 - 11.1 K/uL    RBC 4.03 (L) 4.05 - 5.25 M/uL    HGB 11.9 11.7 - 15.4 g/dL    HCT 35.8 35.8 - 46.3 %    MCV 88.8 79.6 - 97.8 FL    MCH 29.5 26.1 - 32.9 PG    MCHC 33.2 31.4 - 35.0 g/dL    RDW 16.4 (H) 11.9 - 14.6 %    PLATELET 85 (L) 121 - 450 K/uL    MPV 9.9 (L) 10.8 - 14.1 FL    DF AUTOMATED      NEUTROPHILS 85 (H) 43 - 78 %    LYMPHOCYTES 10 (L) 13 - 44 %    MONOCYTES 2 (L) 4.0 - 12.0 %    EOSINOPHILS 2 0.5 - 7.8 %    BASOPHILS 0 0.0 - 2.0 %    IMMATURE GRANULOCYTES 1.1 0.0 - 5.0 %    ABS. NEUTROPHILS 10.8 (H) 1.7 - 8.2 K/UL    ABS. LYMPHOCYTES 1.3 0.5 - 4.6 K/UL    ABS. MONOCYTES 0.3 0.1 - 1.3 K/UL    ABS. EOSINOPHILS 0.2 0.0 - 0.8 K/UL    ABS. BASOPHILS 0.0 0.0 - 0.2 K/UL    ABS. IMM. GRANS. 0.1 0.0 - 0.5 K/UL   METABOLIC PANEL, COMPREHENSIVE    Collection Time: 02/23/17  4:20 AM   Result Value Ref Range    Sodium 136 136 - 145 mmol/L    Potassium 4.4 3.5 - 5.1 mmol/L    Chloride 100 98 - 107 mmol/L    CO2 25 21 - 32 mmol/L    Anion gap 11 7 - 16 mmol/L    Glucose 167 (H) 65 - 100 mg/dL    BUN 18 6 - 23 MG/DL    Creatinine 3.18 (H) 0.6 - 1.0 MG/DL    GFR est AA 20 (L) >60 ml/min/1.73m2    GFR est non-AA 17 (L) >60 ml/min/1.73m2    Calcium 7.6 (L) 8.3 - 10.4 MG/DL    Bilirubin, total 1.1 0.2 - 1.1 MG/DL    ALT (SGPT) 18 12 - 65 U/L    AST (SGOT) 44 (H) 15 - 37 U/L    Alk.  phosphatase 625 (H) 50 - 136 U/L    Protein, total 5.8 (L) 6.3 - 8.2 g/dL    Albumin 2.3 (L) 3.5 - 5.0 g/dL    Globulin 3.5 2.3 - 3.5 g/dL    A-G Ratio 0.7 (L) 1.2 - 3.5     AMMONIA    Collection Time: 02/23/17  4:20 AM   Result Value Ref Range    Ammonia 21 11 - 32 UMOL/L

## 2017-02-23 NOTE — DISCHARGE INSTRUCTIONS

## 2017-02-23 NOTE — ED NOTES
I have reviewed discharge instructions with the patient. The patient verbalized understanding. Patient walking to lobby to go home. Patient family member driving home.

## 2017-02-23 NOTE — PROGRESS NOTES
Discharge instructions provided to pt. IV has been removed. Scripts given to pt. Pt ready for discharge.

## 2017-02-23 NOTE — PROGRESS NOTES
Call to Ondina Peralta to make sure she will have her last two vanco doses with dialysis tomorrow and monday. Left a message and will follow up in the am.   Marcelina Joyce.  Nigel Maldonado

## 2017-02-27 NOTE — DISCHARGE INSTRUCTIONS
Siobhani [address]  Department of Interventional Radiology  [facility]  [phone] Office  [phone] Fax    PARACENTESIS DISCHARGE INSTRUCTIONS    General Information:  During this procedure, the doctor will insert a needle into the abdomen to drain fluid. After the procedure, you will be able to take a deep breath much easier. The site of the puncture may ooze the first day. This will decrease and eventually stop. Paracentesis (draining fluid from the abdomen) sometimes makes patients hypotensive (low blood pressure). Your doctor may order for you to receive fluids or albumin (a volume booster) during the procedure through an IV site. Home Care Instructions:  Keep the puncture site clean and dry. No tub baths or swimming until puncture site heals. Showering is acceptable. Resume your normal diet, and resume your normal activity slowly and as you tolerate. If you are short of breath, rest. If shortness of breath does not ease, please call your ordering doctor. Fluid can re-accumulate in the chest and/or in the abdomen. If this should occur, your doctor needs to know as you may need to have the procedure done again. Call If:     You should call your Physician and/or the Radiology Nurse if you notice any signs of infection, like pus draining, or if it is swollen or reddened. Also call if you have a fever, or if you are bleeding from the puncture site more than a small amount on the dressing. Call if the puncture site keeps draining fluid. Some oozing is to be expected, but should slow and then stop. Call if you feel like you have pressure in your abdomen. SEEK IMMEDIATE CARE OR CALL 911 IF YOU SUDDENLY HAVE TROUBLE BREATHING, OR IF YOUR LIPS TURN BLUE, OR IF YOU NOTICE BLOOD IN YOUR SPUTUM. Follow-Up Instructions: Please see your ordering doctor as he/she has requested. To Reach Us:  If you have any questions about your procedure, please call the Interventional Radiology department at [phone]. After business hours (5pm) and weekends, call the answering service at [phone] and ask for the Radiologist on call to be paged. Interventional Radiology General Nurse Discharge    After general anesthesia or intravenous sedation, for 24 hours or while taking prescription Narcotics:  · Limit your activities  · Do not drive and operate hazardous machinery  · Do not make important personal or business decisions  · Do  not drink alcoholic beverages  · If you have not urinated within 8 hours after discharge, please contact your surgeon on call. * Please give a list of your current medications to your Primary Care Provider. * Please update this list whenever your medications are discontinued, doses are     changed, or new medications (including over-the-counter products) are added. * Please carry medication information at all times in case of emergency situations. These are general instructions for a healthy lifestyle:    No smoking/ No tobacco products/ Avoid exposure to second hand smoke  Surgeon General's Warning:  Quitting smoking now greatly reduces serious risk to your health. Obesity, smoking, and sedentary lifestyle greatly increases your risk for illness  A healthy diet, regular physical exercise & weight monitoring are important for maintaining a healthy lifestyle    You may be retaining fluid if you have a history of heart failure or if you experience any of the following symptoms:  Weight gain of 3 pounds or more overnight or 5 pounds in a week, increased swelling in our hands or feet or shortness of breath while lying flat in bed. Please call your doctor as soon as you notice any of these symptoms; do not wait until your next office visit.     Recognize signs and symptoms of STROKE:  F-face looks uneven    A-arms unable to move or move unevenly    S-speech slurred or non-existent    T-time-call 911 as soon as signs and symptoms begin-DO NOT go       Back to bed or wait to see if you get better-TIME IS BRAIN.            Date: 2/27/2017  Discharging Nurse: Hernán Moreno RN

## 2017-02-27 NOTE — PROGRESS NOTES
Pt b/p 79/48. Jesus Álvarez made aware prior to procedure. Pt administered 25g of Albumin. Proceeded with case. Pt very drowsy. Pt states that she was seen in dialysis today. Pt asymptomatic for hypotension other than drowsiness. Instructed to go home and rest and follow her normal HD regimen. Pt verbalized understanding and compliance.

## 2017-02-27 NOTE — IP AVS SNAPSHOT
[address] 
[phone] Patient: Justin Snider MRN: [id number] RGW:[id number] You are allergic to the following Allergen Reactions Aspirin Nausea Only Codeine Nausea Only Compazine (Prochlorperazine Edisylate) Unknown (comments) Causes Lock Jaw Pcn (Penicillins) Other (comments) \"drops wbc\" Recent Documentation OB Status Postmenopausal       
  
  
 
  
  
  
Emergency Contacts Name Discharge Info Relation Home Work Mobile Reed Gauthier CAREGIVER [3] Spouse [3] [phone] About your hospitalization You were admitted on:  February 27, 2017 You last received care in the:  [facility] You were discharged on:  February 27, 2017 Unit phone number:  [phone] Why you were hospitalized Your primary diagnosis was:  Not on File Providers Seen During Your Hospitalizations Provider Role Specialty Primary office phone Chinedu Modi MD Attending Provider Gastroenterology [phone] Your Primary Care Physician (PCP) Primary Care Physician Office Phone Office Fax [address] [phone] Follow-up Information None Your Appointments Tuesday February 28, 2017 To Be Determined ROUTINE with NEO Rojo [facility] ([address] [place] [address]) [facility] ([address] [place] [address]) Thursday March 02, 2017  2:00 PM EST Office Visit with Greg Ham MD  
[address] [facility] ([address] [place] [address]) [address] [place] [id number]  
[phone] Friday March 03, 2017 To Be Determined ROUTINE with NEO Rojo [facility] ([address] [place] [address]) [facility] ([address] [facility]) Tuesday March 07, 2017 To Be Determined ROUTINE with NEO Casey [facility] ([address] [facility]) [address] [place] [address] ([address] [facility]) Friday March 10, 2017 To Be Determined SKILLLED NURSING DISCHARGE with NEO Casey [facility] ([address] [facility]) [address] [place] [address] ([facility] [address] [facility]) Current Discharge Medication List  
  
ASK your doctor about these medications Dose & Instructions Dispensing Information Comments Morning Noon Evening Bedtime  
 baclofen 10 mg tablet Commonly known as:  LIORESAL Your next dose is: Today, Tomorrow Other:  _________ Dose:  10 mg Take 10 mg by mouth as needed. Refills:  0  
     
   
   
   
  
 calcium acetate 667 mg Cap Commonly known as:  PHOSLO Your next dose is: Today, Tomorrow Other:  _________ Dose:  1 Cap Take 1 Cap by mouth three (3) times daily (with meals). Refills:  0 GENGRAF 25 mg capsule Generic drug:  cycloSPORINE modified Your next dose is: Today, Tomorrow Other:  _________ Dose:  2.5 mg/kg/day Take 2.5 mg/kg/day by mouth every morning. Indications: Takes in the AM  
 Refills:  0  
     
   
   
   
  
 lactulose 10 gram/15 mL solution Commonly known as:  Herber Luis Alberto Your next dose is: Today, Tomorrow Other:  _________ Dose:  20 g Take 30 mL by mouth three (3) times daily. Quantity:  480 mL Refills:  0  
     
   
   
   
  
 LANTUS SOLOSTAR 100 unit/mL (3 mL) pen Generic drug:  insulin glargine Your next dose is: Today, Tomorrow Other:  _________ Dose:  25 Units 25 Units by SubCUTAneous route nightly. Refills:  0  
     
   
   
   
  
 magnesium oxide 400 mg tablet Commonly known as:  MAG-OX Your next dose is: Today, Tomorrow Other:  _________ Dose:  400 mg Take 400 mg by mouth two (2) times a day. Refills:  0  
     
   
   
   
  
 midodrine 5 mg tablet Commonly known as:  Arthor Dikes Your next dose is: Today, Tomorrow Other:  _________ Dose:  5 mg Take 5 mg by mouth every eight (8) hours. 2 q8h Refills:  0 MITIGARE 0.6 mg capsule Generic drug:  colchicine Your next dose is: Today, Tomorrow Other:  _________ Dose:  0.6 mg Take 0.6 mg by mouth daily. Refills:  0  
     
   
   
   
  
 morphine CR 15 mg CR tablet Commonly known as:  MS CONTIN Your next dose is: Today, Tomorrow Other:  _________ Dose:  15 mg Take 1 Tab by mouth every twelve (12) hours. Max Daily Amount: 30 mg.  
 Quantity:  20 Tab Refills:  0 NovoLOG 100 unit/mL injection Generic drug:  insulin aspart Your next dose is: Today, Tomorrow Other:  _________  
   
   
 by SubCUTAneous route. Sliding scale Refills:  0  
     
   
   
   
  
 ondansetron hcl 4 mg tablet Commonly known as:  Kerrie Bene Your next dose is: Today, Tomorrow Other:  _________ Dose:  4 mg Take 1 Tab by mouth every eight (8) hours as needed for Nausea. Quantity:  30 Tab Refills:  0 PROTONIX 40 mg tablet Generic drug:  pantoprazole Your next dose is: Today, Tomorrow Other:  _________ Dose:  40 mg Take 40 mg by mouth four (4) times daily. 2tabs bid Refills:  0  
     
   
   
   
  
 traMADol 50 mg tablet Commonly known as:  ULTRAM  
   
Your next dose is: Today, Tomorrow Other:  _________  Dose:  50 mg  
 Take 1 Tab by mouth every six (6) hours as needed. Max Daily Amount: 200 mg. Indications: PAIN Quantity:  20 Tab Refills:  0  
     
   
   
   
  
 XIFAXAN 550 mg tablet Generic drug:  rifAXIMin Your next dose is: Today, Tomorrow Other:  _________ Dose:  550 mg Take 550 mg by mouth two (2) times a day. Refills:  0  
     
   
   
   
  
 zinc sulfate 220 (50) mg capsule Commonly known as:  ZINCATE Your next dose is: Today, Tomorrow Other:  _________ Dose:  220 mg Take 220 mg by mouth every morning. Refills:  5 Discharge Instructions [place] [address] Department of Interventional Radiology [facility] 
[phone] Office 
[phone] Fax PARACENTESIS DISCHARGE INSTRUCTIONS General Information: 
During this procedure, the doctor will insert a needle into the abdomen to drain fluid. After the procedure, you will be able to take a deep breath much easier. The site of the puncture may ooze the first day. This will decrease and eventually stop. Paracentesis (draining fluid from the abdomen) sometimes makes patients hypotensive (low blood pressure). Your doctor may order for you to receive fluids or albumin (a volume booster) during the procedure through an IV site. Home Care Instructions: 
Keep the puncture site clean and dry. No tub baths or swimming until puncture site heals. Showering is acceptable. Resume your normal diet, and resume your normal activity slowly and as you tolerate. If you are short of breath, rest. If shortness of breath does not ease, please call your ordering doctor. Fluid can re-accumulate in the chest and/or in the abdomen. If this should occur, your doctor needs to know as you may need to have the procedure done again.  
 
Call If: 
   You should call your Physician and/or the Radiology Nurse if you notice any signs of infection, like pus draining, or if it is swollen or reddened. Also call if you have a fever, or if you are bleeding from the puncture site more than a small amount on the dressing. Call if the puncture site keeps draining fluid. Some oozing is to be expected, but should slow and then stop. Call if you feel like you have pressure in your abdomen. SEEK IMMEDIATE CARE OR CALL 911 IF YOU SUDDENLY HAVE TROUBLE BREATHING, OR IF YOUR LIPS TURN BLUE, OR IF YOU NOTICE BLOOD IN YOUR SPUTUM. Follow-Up Instructions: Please see your ordering doctor as he/she has requested. To Reach Us: If you have any questions about your procedure, please call the Interventional Radiology department at [phone]. After business hours (5pm) and weekends, call the answering service at [phone] and ask for the Radiologist on call to be paged. Interventional Radiology General Nurse Discharge After general anesthesia or intravenous sedation, for 24 hours or while taking prescription Narcotics: · Limit your activities · Do not drive and operate hazardous machinery · Do not make important personal or business decisions · Do  not drink alcoholic beverages · If you have not urinated within 8 hours after discharge, please contact your surgeon on call. * Please give a list of your current medications to your Primary Care Provider. * Please update this list whenever your medications are discontinued, doses are 
   changed, or new medications (including over-the-counter products) are added. * Please carry medication information at all times in case of emergency situations. These are general instructions for a healthy lifestyle: No smoking/ No tobacco products/ Avoid exposure to second hand smoke Surgeon General's Warning:  Quitting smoking now greatly reduces serious risk to your health. Obesity, smoking, and sedentary lifestyle greatly increases your risk for illness A healthy diet, regular physical exercise & weight monitoring are important for maintaining a healthy lifestyle You may be retaining fluid if you have a history of heart failure or if you experience any of the following symptoms:  Weight gain of 3 pounds or more overnight or 5 pounds in a week, increased swelling in our hands or feet or shortness of breath while lying flat in bed. Please call your doctor as soon as you notice any of these symptoms; do not wait until your next office visit. Recognize signs and symptoms of STROKE: 
F-face looks uneven A-arms unable to move or move unevenly S-speech slurred or non-existent T-time-call 911 as soon as signs and symptoms begin-DO NOT go Back to bed or wait to see if you get better-TIME IS BRAIN. Date: 2/27/2017 Discharging Nurse: Morenita Talley RN Discharge Orders None Introducing [facility]! Dear Nikita Saucedo: 
Thank you for requesting a Omaze account. Our records indicate that you already have an active Omaze account. You can access your account anytime at [email or web address] Did you know that you can access your hospital and ER discharge instructions at any time in Omaze? You can also review all of your test results from your hospital stay or ER visit. Additional Information If you have questions, please visit the Frequently Asked Questions section of the Omaze website at [email or web address]/. Remember, Omaze is NOT to be used for urgent needs. For medical emergencies, dial 911. Now available from your iPhone and Android! General Information Please provide this summary of care documentation to your next provider. Patient Signature:  ____________________________________________________________ Date:  ____________________________________________________________  
  
Claudio Mtz Signature: ____________________________________________________________ Date:  ____________________________________________________________

## 2017-02-27 NOTE — IP AVS SNAPSHOT
Current Discharge Medication List  
  
ASK your doctor about these medications Dose & Instructions Dispensing Information Comments Morning Noon Evening Bedtime  
 baclofen 10 mg tablet Commonly known as:  LIORESAL Your next dose is: Today, Tomorrow Other:  ____________ Dose:  10 mg Take 10 mg by mouth as needed. Refills:  0  
     
   
   
   
  
 calcium acetate 667 mg Cap Commonly known as:  PHOSLO Your next dose is: Today, Tomorrow Other:  ____________ Dose:  1 Cap Take 1 Cap by mouth three (3) times daily (with meals). Refills:  0 GENGRAF 25 mg capsule Generic drug:  cycloSPORINE modified Your next dose is: Today, Tomorrow Other:  ____________ Dose:  2.5 mg/kg/day Take 2.5 mg/kg/day by mouth every morning. Indications: Takes in the AM  
 Refills:  0  
     
   
   
   
  
 lactulose 10 gram/15 mL solution Commonly known as:  Arin Common Your next dose is: Today, Tomorrow Other:  ____________ Dose:  20 g Take 30 mL by mouth three (3) times daily. Quantity:  480 mL Refills:  0  
     
   
   
   
  
 LANTUS SOLOSTAR 100 unit/mL (3 mL) pen Generic drug:  insulin glargine Your next dose is: Today, Tomorrow Other:  ____________ Dose:  25 Units 25 Units by SubCUTAneous route nightly. Refills:  0  
     
   
   
   
  
 magnesium oxide 400 mg tablet Commonly known as:  MAG-OX Your next dose is: Today, Tomorrow Other:  ____________ Dose:  400 mg Take 400 mg by mouth two (2) times a day. Refills:  0  
     
   
   
   
  
 midodrine 5 mg tablet Commonly known as:  Genetta Sheer Your next dose is: Today, Tomorrow Other:  ____________ Dose:  5 mg Take 5 mg by mouth every eight (8) hours. 2 q8h Refills:  0 MITIGARE 0.6 mg capsule Generic drug:  colchicine Your next dose is: Today, Tomorrow Other:  ____________ Dose:  0.6 mg Take 0.6 mg by mouth daily. Refills:  0  
     
   
   
   
  
 morphine CR 15 mg CR tablet Commonly known as:  MS CONTIN Your next dose is: Today, Tomorrow Other:  ____________ Dose:  15 mg Take 1 Tab by mouth every twelve (12) hours. Max Daily Amount: 30 mg.  
 Quantity:  20 Tab Refills:  0 NovoLOG 100 unit/mL injection Generic drug:  insulin aspart Your next dose is: Today, Tomorrow Other:  ____________  
   
   
 by SubCUTAneous route. Sliding scale Refills:  0  
     
   
   
   
  
 ondansetron hcl 4 mg tablet Commonly known as:  Melvena Yazdanism Your next dose is: Today, Tomorrow Other:  ____________ Dose:  4 mg Take 1 Tab by mouth every eight (8) hours as needed for Nausea. Quantity:  30 Tab Refills:  0 PROTONIX 40 mg tablet Generic drug:  pantoprazole Your next dose is: Today, Tomorrow Other:  ____________ Dose:  40 mg Take 40 mg by mouth four (4) times daily. 2tabs bid Refills:  0  
     
   
   
   
  
 traMADol 50 mg tablet Commonly known as:  ULTRAM  
   
Your next dose is: Today, Tomorrow Other:  ____________ Dose:  50 mg Take 1 Tab by mouth every six (6) hours as needed. Max Daily Amount: 200 mg. Indications: PAIN Quantity:  20 Tab Refills:  0  
     
   
   
   
  
 XIFAXAN 550 mg tablet Generic drug:  rifAXIMin Your next dose is: Today, Tomorrow Other:  ____________ Dose:  550 mg Take 550 mg by mouth two (2) times a day. Refills:  0  
     
   
   
   
  
 zinc sulfate 220 (50) mg capsule Commonly known as:  ZINCATE Your next dose is: Today, Tomorrow Other:  ____________ Dose:  220 mg Take 220 mg by mouth every morning. Refills:  5

## 2017-02-27 NOTE — PROGRESS NOTES
Patient here for routine CVC dressing change and flush. Site pink and healthy. Flushes well and aspirates easily. Redressed with [id number] dressing. Appt made for next Monday.   Taye Rivera RN, VAT

## 2017-02-27 NOTE — PROGRESS NOTES
Interventional Radiology Post Paracentesis/Thoracentesis Note    2/27/2017    Procedure(s): Ultrasound guided Therapeutic Paracentesis Performed with 8 Azeri catheter total volume 3100 ml. Samples sent to lab. Preliminary Findings: moderate yellow and cloudy. Complications: None    Plan:  Observation, check labs if drawn.           Chest X-Ray:  no    Full dictated report to follow    Signed By: Ady Kirkland RN

## 2017-03-06 NOTE — IP AVS SNAPSHOT
Current Discharge Medication List  
  
ASK your doctor about these medications Dose & Instructions Dispensing Information Comments Morning Noon Evening Bedtime  
 baclofen 10 mg tablet Commonly known as:  LIORESAL Your next dose is: Today, Tomorrow Other:  ____________ Dose:  10 mg Take 10 mg by mouth as needed. Refills:  0  
     
   
   
   
  
 calcium acetate 667 mg Cap Commonly known as:  PHOSLO Your next dose is: Today, Tomorrow Other:  ____________ Dose:  1 Cap Take 1 Cap by mouth three (3) times daily (with meals). Refills:  0 GENGRAF 25 mg capsule Generic drug:  cycloSPORINE modified Your next dose is: Today, Tomorrow Other:  ____________ Dose:  2.5 mg/kg/day Take 2.5 mg/kg/day by mouth every morning. Indications: Takes in the AM  
 Refills:  0  
     
   
   
   
  
 insulin glargine 100 unit/mL (3 mL) pen Commonly known as:  LANTUS SOLOSTAR Your next dose is: Today, Tomorrow Other:  ____________ Dose:  25 Units 25 Units by SubCUTAneous route nightly. Quantity:  15 Each Refills:  3  
     
   
   
   
  
 insulin lispro 100 unit/mL kwikpen Commonly known as:  HUMALOG Your next dose is: Today, Tomorrow Other:  ____________ Up to 10 units sq q ac Quantity:  15 Pen Refills:  3  
     
   
   
   
  
 lactulose 10 gram/15 mL solution Commonly known as:  Michelle Sandoval Your next dose is: Today, Tomorrow Other:  ____________ Dose:  20 g Take 30 mL by mouth three (3) times daily. Quantity:  480 mL Refills:  0  
     
   
   
   
  
 magnesium oxide 400 mg tablet Commonly known as:  MAG-OX Your next dose is: Today, Tomorrow Other:  ____________ Dose:  400 mg Take 400 mg by mouth two (2) times a day. Refills:  0  
     
   
   
   
  
 midodrine 5 mg tablet Commonly known as:  Ajit Adan Your next dose is: Today, Tomorrow Other:  ____________ Dose:  5 mg Take 5 mg by mouth every eight (8) hours. 2 q8h Refills:  0 MITIGARE 0.6 mg capsule Generic drug:  colchicine Your next dose is: Today, Tomorrow Other:  ____________ Dose:  0.6 mg Take 0.6 mg by mouth daily. Refills:  0  
     
   
   
   
  
 morphine CR 15 mg CR tablet Commonly known as:  MS CONTIN Your next dose is: Today, Tomorrow Other:  ____________ Dose:  15 mg Take 1 Tab by mouth every twelve (12) hours. Max Daily Amount: 30 mg.  
 Quantity:  20 Tab Refills:  0 NovoLOG 100 unit/mL injection Generic drug:  insulin aspart Your next dose is: Today, Tomorrow Other:  ____________  
   
   
 by SubCUTAneous route. Sliding scale Refills:  0  
     
   
   
   
  
 ondansetron hcl 4 mg tablet Commonly known as:  Mandy Banda Your next dose is: Today, Tomorrow Other:  ____________ Dose:  4 mg Take 1 Tab by mouth every eight (8) hours as needed for Nausea. Quantity:  30 Tab Refills:  0 PROTONIX 40 mg tablet Generic drug:  pantoprazole Your next dose is: Today, Tomorrow Other:  ____________ Dose:  40 mg Take 40 mg by mouth four (4) times daily. 2tabs bid Refills:  0  
     
   
   
   
  
 traMADol 50 mg tablet Commonly known as:  ULTRAM  
   
Your next dose is: Today, Tomorrow Other:  ____________ Dose:  50 mg Take 1 Tab by mouth every six (6) hours as needed. Max Daily Amount: 200 mg. Indications: PAIN Quantity:  20 Tab Refills:  0  
     
   
   
   
  
 XIFAXAN 550 mg tablet Generic drug:  rifAXIMin Your next dose is: Today, Tomorrow Other:  ____________ Dose:  550 mg Take 550 mg by mouth two (2) times a day. Refills:  0 zinc sulfate 220 (50) mg capsule Commonly known as:  ZINCATE Your next dose is: Today, Tomorrow Other:  ____________ Dose:  220 mg Take 220 mg by mouth every morning. Refills:  5

## 2017-03-06 NOTE — PROGRESS NOTES
Patient discharged to [facility] with mother . Patient states will walk to private vehicle from here . Patient denies any c /o weakness at this time . Alert and oriented x 3 . Patient refusing discharge papers at time of discharge . Patient verbalizes understanding of all dc instructions.

## 2017-03-06 NOTE — DISCHARGE INSTRUCTIONS
Julio Césarigi 34 [address]  Department of Interventional Radiology  [facility]  [phone] Office  [phone] Fax    PARACENTESIS DISCHARGE INSTRUCTIONS    General Information:  During this procedure, the doctor will insert a needle into the abdomen to drain fluid. After the procedure, you will be able to take a deep breath much easier. The site of the puncture may ooze the first day. This will decrease and eventually stop. Paracentesis (draining fluid from the abdomen) sometimes makes patients hypotensive (low blood pressure). Your doctor may order for you to receive fluids or albumin (a volume booster) during the procedure through an IV site. Home Care Instructions:  Keep the puncture site clean and dry. No tub baths or swimming until puncture site heals. Showering is acceptable. Resume your normal diet, and resume your normal activity slowly and as you tolerate. If you are short of breath, rest. If shortness of breath does not ease, please call your ordering doctor. Fluid can re-accumulate in the chest and/or in the abdomen. If this should occur, your doctor needs to know as you may need to have the procedure done again. Call If:     You should call your Physician and/or the Radiology Nurse if you notice any signs of infection, like pus draining, or if it is swollen or reddened. Also call if you have a fever, or if you are bleeding from the puncture site more than a small amount on the dressing. Call if the puncture site keeps draining fluid. Some oozing is to be expected, but should slow and then stop. Call if you feel like you have pressure in your abdomen. SEEK IMMEDIATE CARE OR CALL 911 IF YOU SUDDENLY HAVE TROUBLE BREATHING, OR IF YOUR LIPS TURN BLUE, OR IF YOU NOTICE BLOOD IN YOUR SPUTUM. Follow-Up Instructions: Please see your ordering doctor as he/she has requested.    Interventional Radiology General Nurse Discharge    After general anesthesia or intravenous sedation, for 24 hours or while taking prescription Narcotics:  · Limit your activities  · Do not drive and operate hazardous machinery  · Do not make important personal or business decisions  · Do  not drink alcoholic beverages  · If you have not urinated within 8 hours after discharge, please contact your surgeon on call. * Please give a list of your current medications to your Primary Care Provider. * Please update this list whenever your medications are discontinued, doses are     changed, or new medications (including over-the-counter products) are added. * Please carry medication information at all times in case of emergency situations. These are general instructions for a healthy lifestyle:    No smoking/ No tobacco products/ Avoid exposure to second hand smoke  Surgeon General's Warning:  Quitting smoking now greatly reduces serious risk to your health. Obesity, smoking, and sedentary lifestyle greatly increases your risk for illness  A healthy diet, regular physical exercise & weight monitoring are important for maintaining a healthy lifestyle    You may be retaining fluid if you have a history of heart failure or if you experience any of the following symptoms:  Weight gain of 3 pounds or more overnight or 5 pounds in a week, increased swelling in our hands or feet or shortness of breath while lying flat in bed. Please call your doctor as soon as you notice any of these symptoms; do not wait until your next office visit. Recognize signs and symptoms of STROKE:  F-face looks uneven    A-arms unable to move or move unevenly    S-speech slurred or non-existent    T-time-call 911 as soon as signs and symptoms begin-DO NOT go       Back to bed or wait to see if you get better-TIME IS BRAIN. To Reach Us: If you have any questions about your procedure, please call the Interventional Radiology department at [phone].  After business hours (5pm) and weekends, call the answering service at [phone] and ask for the Radiologist on call to be paged. Si tiene Preguntas acerca del procedimiento, por favor llame al departamento de Radiología Intervencional al [phone]. Después de horas de oficina (5 pm) y los fines de [place], llamar al Storm [place] de llamadas al [phone] y pregunte por el Radiologo de [facility].            Date: 3/6/2017  Discharging Nurse: June Pulido RN

## 2017-03-06 NOTE — PROGRESS NOTES
Interventional Radiology Post Paracentesis/Thoracentesis Note    3/6/2017    Procedure(s): Ultrasound guided Therapeutic Paracentesis Performed with 8 Amharic catheter total volume 3200 ml. Preliminary Findings: medium clear and yellow. Complications: None    Plan:  Observation, check labs if drawn.           Chest X-Ray:  no    Full dictated report to follow    Signed By: Bianka Bobby RN

## 2017-03-06 NOTE — IP AVS SNAPSHOT
[address] 
[phone] Patient: Mary Anne Almanza MRN: [id number] PIM:[id number] You are allergic to the following Allergen Reactions Aspirin Nausea Only Codeine Nausea Only Compazine (Prochlorperazine Edisylate) Unknown (comments) Causes Lock Jaw Pcn (Penicillins) Other (comments) \"drops wbc\" Recent Documentation Height Weight Breastfeeding? BMI OB Status Smoking Status 1.524 m 46.7 kg No 20.12 kg/m2 Postmenopausal Never Smoker Emergency Contacts Name Discharge Info Relation Home Work Mobile [address] CAREGIVER [3] Spouse [3] [phone] About your hospitalization You were admitted on:  March 6, 2017 You last received care in the:  [facility]TR[facility] You were discharged on:  March 6, 2017 Unit phone number:  [phone] Why you were hospitalized Your primary diagnosis was:  Not on File Providers Seen During Your Hospitalizations Provider Role Specialty Primary office phone Shahbaz Baer MD Attending Provider Gastroenterology [phone] Your Primary Care Physician (PCP) Primary Care Physician Office Phone Office Fax [address] [phone] Follow-up Information None Your Appointments Tuesday March 07, 2017  3:30 PM EST  
ROUTINE with Mike Ridley RN  
[facility]) [facility] Wednesday March 08, 2017  1:30 PM EST  
US GUIDED PARACENTISIS INIT with [facility] US LOGIC 7 UNIT 2, SFD NURSING, SFD IR RADIOLOGIST RESOURCE  
[facility] RADIOLOGY ULTRASOUND ([address] [place] [address]) [address] [place] [address]  
[phone] IODINE/CONTRAST ALLERGY - Must be Pre-medicated - Fax Allergy protocol to MD office(not patient) - Schedule appointment at least 48 hours after call - All medication holds must be approved (cleared) by the physician who ordered the medication. This medication hold is recommended for all invasive procedures except vascular arteriograms - It is recommended to hold all blood thinners (antiplatelet) medication such as Aspirin, Plavix, Brilinta, and Coumadin for 5 days prior to procedure - Effient  should be held for 7 days prior to the procedure - Hold Metformin the day of the procedure and 48 hours post procedure if receiving contrast - Insulin dosage: If Patient NPO, they should administer only 1/2 of their normal dose the morning of the procedure  H&P - Fax current H&P (within 30 days) to IR Scheduling  Protocols - For patients requiring Sedation: Patient must be NPO - Lab work: To be completed within 14 days of procedure for all Invasive procedures o CBC o BMP o PTT o PT/INR Friday March 10, 2017  9:00 AM EST  
US GUIDED PARACENTISIS INIT with [facility] US LOGIC 7 UNIT 2, SFD NURSING, D IR RADIOLOGIST RESOURCE  
D RADIOLOGY ULTRASOUND ([address] [place] [address]) [address]  
[phone] IODINE/CONTRAST ALLERGY - Must be Pre-medicated - Fax Allergy protocol to MD office(not patient) - Schedule appointment at least 48 hours after call - All medication holds must be approved (cleared) by the physician who ordered the medication.  This medication hold is recommended for all invasive procedures except vascular arteriograms - It is recommended to hold all blood thinners (antiplatelet) medication such as Aspirin, Plavix, Brilinta, and Coumadin for 5 days prior to procedure - Effient  should be held for 7 days prior to the procedure - Hold Metformin the day of the procedure and 48 hours post procedure if receiving contrast - Insulin dosage: If Patient NPO, they should administer only 1/2 of their normal dose the morning of the procedure  H&P - Fax current H&P (within 30 days) to IR Scheduling  Protocols - For patients requiring Sedation: Patient must be NPO - Lab work: To be completed within 14 days of procedure for all Invasive procedures o CBC o BMP o PTT o PT/INR Friday March 10, 2017 To Be Determined SKILLLED NURSING DISCHARGE with [facility] ([address] [place]) [facility] ([address] [facility]) Monday March 13, 2017  9:30 AM EDT  
CVC DRESSING/FLUSH with PICC ROOM  
SFD VASCULAR ACCESS ([address] [place] [address]) [address] [facility]  
7th Floor [facility] [address]  
[phone] Current Discharge Medication List  
  
ASK your doctor about these medications Dose & Instructions Dispensing Information Comments Morning Noon Evening Bedtime  
 baclofen 10 mg tablet Commonly known as:  LIORESAL Your next dose is: Today, Tomorrow Other:  _________ Dose:  10 mg Take 10 mg by mouth as needed. Refills:  0  
     
   
   
   
  
 calcium acetate 667 mg Cap Commonly known as:  PHOSLO Your next dose is: Today, Tomorrow Other:  _________ Dose:  1 Cap Take 1 Cap by mouth three (3) times daily (with meals). Refills:  0 GENGRAF 25 mg capsule Generic drug:  cycloSPORINE modified Your next dose is: Today, Tomorrow Other:  _________ Dose:  2.5 mg/kg/day Take 2.5 mg/kg/day by mouth every morning. Indications: Takes in the AM  
 Refills:  0  
     
   
   
   
  
 insulin glargine 100 unit/mL (3 mL) pen Commonly known as:  LANTUS SOLOSTAR Your next dose is: Today, Tomorrow Other:  _________ Dose:  25 Units 25 Units by SubCUTAneous route nightly. Quantity:  15 Each Refills:  3  
     
   
   
   
  
 insulin lispro 100 unit/mL kwikpen Commonly known as:  HUMALOG Your next dose is: Today, Tomorrow Other:  _________ Up to 10 units sq q ac Quantity:  15 Pen Refills:  3  
     
   
   
   
  
 lactulose 10 gram/15 mL solution Commonly known as:  Aloma Parent Your next dose is: Today, Tomorrow Other:  _________ Dose:  20 g Take 30 mL by mouth three (3) times daily. Quantity:  480 mL Refills:  0  
     
   
   
   
  
 magnesium oxide 400 mg tablet Commonly known as:  MAG-OX Your next dose is: Today, Tomorrow Other:  _________ Dose:  400 mg Take 400 mg by mouth two (2) times a day. Refills:  0  
     
   
   
   
  
 midodrine 5 mg tablet Commonly known as:  Keagan Croon Your next dose is: Today, Tomorrow Other:  _________ Dose:  5 mg Take 5 mg by mouth every eight (8) hours. 2 q8h Refills:  0 MITIGARE 0.6 mg capsule Generic drug:  colchicine Your next dose is: Today, Tomorrow Other:  _________ Dose:  0.6 mg Take 0.6 mg by mouth daily. Refills:  0  
     
   
   
   
  
 morphine CR 15 mg CR tablet Commonly known as:  MS CONTIN Your next dose is: Today, Tomorrow Other:  _________ Dose:  15 mg Take 1 Tab by mouth every twelve (12) hours. Max Daily Amount: 30 mg.  
 Quantity:  20 Tab Refills:  0 NovoLOG 100 unit/mL injection Generic drug:  insulin aspart Your next dose is: Today, Tomorrow Other:  _________  
   
   
 by SubCUTAneous route. Sliding scale Refills:  0  
     
   
   
   
  
 ondansetron hcl 4 mg tablet Commonly known as:  Allegra Zayas Your next dose is: Today, Tomorrow Other:  _________ Dose:  4 mg Take 1 Tab by mouth every eight (8) hours as needed for Nausea. Quantity:  30 Tab Refills:  0 PROTONIX 40 mg tablet Generic drug:  pantoprazole Your next dose is: Today, Tomorrow Other:  _________ Dose:  40 mg Take 40 mg by mouth four (4) times daily. 2tabs bid Refills:  0  
     
   
   
   
  
 traMADol 50 mg tablet Commonly known as:  ULTRAM  
   
Your next dose is: Today, Tomorrow Other:  _________ Dose:  50 mg Take 1 Tab by mouth every six (6) hours as needed. Max Daily Amount: 200 mg. Indications: PAIN Quantity:  20 Tab Refills:  0  
     
   
   
   
  
 XIFAXAN 550 mg tablet Generic drug:  rifAXIMin Your next dose is: Today, Tomorrow Other:  _________ Dose:  550 mg Take 550 mg by mouth two (2) times a day. Refills:  0  
     
   
   
   
  
 zinc sulfate 220 (50) mg capsule Commonly known as:  ZINCATE Your next dose is: Today, Tomorrow Other:  _________ Dose:  220 mg Take 220 mg by mouth every morning. Refills:  5 Discharge Instructions [facility] 34 [address] Department of Interventional Radiology [facility] 
[phone] Office 
[phone] Fax PARACENTESIS DISCHARGE INSTRUCTIONS General Information: 
During this procedure, the doctor will insert a needle into the abdomen to drain fluid. After the procedure, you will be able to take a deep breath much easier. The site of the puncture may ooze the first day. This will decrease and eventually stop. Paracentesis (draining fluid from the abdomen) sometimes makes patients hypotensive (low blood pressure). Your doctor may order for you to receive fluids or albumin (a volume booster) during the procedure through an IV site. Home Care Instructions: 
Keep the puncture site clean and dry. No tub baths or swimming until puncture site heals. Showering is acceptable.  Resume your normal diet, and resume your normal activity slowly and as you tolerate. If you are short of breath, rest. If shortness of breath does not ease, please call your ordering doctor. Fluid can re-accumulate in the chest and/or in the abdomen. If this should occur, your doctor needs to know as you may need to have the procedure done again. Call If: 
   You should call your Physician and/or the Radiology Nurse if you notice any signs of infection, like pus draining, or if it is swollen or reddened. Also call if you have a fever, or if you are bleeding from the puncture site more than a small amount on the dressing. Call if the puncture site keeps draining fluid. Some oozing is to be expected, but should slow and then stop. Call if you feel like you have pressure in your abdomen. SEEK IMMEDIATE CARE OR CALL 911 IF YOU SUDDENLY HAVE TROUBLE BREATHING, OR IF YOUR LIPS TURN BLUE, OR IF YOU NOTICE BLOOD IN YOUR SPUTUM. Follow-Up Instructions: Please see your ordering doctor as he/she has requested. Interventional Radiology General Nurse Discharge After general anesthesia or intravenous sedation, for 24 hours or while taking prescription Narcotics: · Limit your activities · Do not drive and operate hazardous machinery · Do not make important personal or business decisions · Do  not drink alcoholic beverages · If you have not urinated within 8 hours after discharge, please contact your surgeon on call. * Please give a list of your current medications to your Primary Care Provider. * Please update this list whenever your medications are discontinued, doses are 
   changed, or new medications (including over-the-counter products) are added. * Please carry medication information at all times in case of emergency situations. These are general instructions for a healthy lifestyle: No smoking/ No tobacco products/ Avoid exposure to second hand smoke Surgeon General's Warning:  Quitting smoking now greatly reduces serious risk to your health. Obesity, smoking, and sedentary lifestyle greatly increases your risk for illness A healthy diet, regular physical exercise & weight monitoring are important for maintaining a healthy lifestyle You may be retaining fluid if you have a history of heart failure or if you experience any of the following symptoms:  Weight gain of 3 pounds or more overnight or 5 pounds in a week, increased swelling in our hands or feet or shortness of breath while lying flat in bed. Please call your doctor as soon as you notice any of these symptoms; do not wait until your next office visit. Recognize signs and symptoms of STROKE: 
F-face looks uneven A-arms unable to move or move unevenly S-speech slurred or non-existent T-time-call 911 as soon as signs and symptoms begin-DO NOT go Back to bed or wait to see if you get better-TIME IS BRAIN. To Reach Us: If you have any questions about your procedure, please call the Interventional Radiology department at [phone]. After business hours (5pm) and weekends, call the answering service at [phone] and ask for the Radiologist on call to be paged. Si tiene Preguntas acerca del procedimiento, por favor llame al departamento de Radiología Intervencional al [phone]. Después de horas de oficina (5 pm) y los fines de [place], llamar al [place] de llamadas al [phone] y pregunte por el Radiologo de [place] [facility]. Date: 3/6/2017 Discharging Nurse: Meagan Layton RN Discharge Orders None Introducing [facility]! Dear Elisabeth Rojas: 
Thank you for requesting a [employer] account. Our records indicate that you already have an active [employer] account. You can access your account anytime at [email or web address]). [email or web address] ([email or web address]) Did you know that you can access your hospital and ER discharge instructions at any time in [employer]? You can also review all of your test results from your hospital stay or ER visit. Additional Information If you have questions, please visit the Frequently Asked Questions section of the MyChart website at [email or web address] Remember, MyChart is NOT to be used for urgent needs. For medical emergencies, dial 911. Now available from your iPhone and Android! General Information Please provide this summary of care documentation to your next provider. Patient Signature:  ____________________________________________________________ Date:  ____________________________________________________________  
  
Ben Mais Provider Signature:  ____________________________________________________________ Date:  ____________________________________________________________

## 2017-04-06 NOTE — PROGRESS NOTES
arrived to room 709,  left port cleaned with chloraprep and accessed using sterile technique by dannie fuller rn. Port has good blood return, flushes well, no s/s of infection. Port flushed with 10 cc normal saline then packed with 500 units of heparin per policy. Robbins needle removed and dressing applied. Discharged home.

## 2017-05-05 NOTE — PROGRESS NOTES
picc dressing in left upper chest changed by Delilah Smith rn using aseptic technique. Port with good blood return and flushed well with normal saline. Port packed with heparin flush. No sign and symptoms of infection noted. cvc in right upper chest dressing changed also, no sign and symptoms noted.

## 2017-05-08 PROBLEM — R10.9 ABDOMINAL PAIN, ACUTE: Status: ACTIVE | Noted: 2017-01-01

## 2017-05-08 PROBLEM — R10.9 ABDOMINAL PAIN: Status: ACTIVE | Noted: 2017-01-01

## 2017-05-08 PROBLEM — N17.9 ARF (ACUTE RENAL FAILURE) (HCC): Status: ACTIVE | Noted: 2017-01-01

## 2017-05-08 NOTE — ED PROVIDER NOTES
HPI Comments: With recurring vomiting and low blood sugars at home. She states this is now been going on for 4 days. She was admitted in April from the 10th to the 25th at [place] with GIB/aspiration and sepsis. She has baseline hepatic fibrosis, had her first liver transplant in 1986 with failure and second transplant in 1999. He has had recurring issues with hepatitis C but is now clear of this per her report. She is on hemodialysis on Tuesday Thursday and Saturday, this more recent. Patient is a 55 y.o. female presenting with vomiting and hypoglycemia. The history is provided by the patient and medical records. Vomiting    This is a new problem. Episode onset: 4 days. Episode frequency: several. The emesis has an appearance of stomach contents. There has been no fever. Associated symptoms include abdominal pain. Pertinent negatives include no chills, no fever, no diarrhea and no cough. Low Blood Sugar    Pertinent negatives include no confusion.         Past Medical History:   Diagnosis Date    SEAN (acute kidney injury) ([facility] 6/26/2016    Arthritis     kath feet    Ascites     Benign neoplasm of skin of trunk, except scrotum     pt denies    Cellulitis and abscess of unspecified digit     hx of    Chronic kidney disease     [facility] on Mon/Wed/Fri    Chronic pain     abd area    Congenital hepatic fibrosis     Liver transplant X2    Esophageal varices (HCC)     GERD (gastroesophageal reflux disease)     Hemodialysis access, AV graft ([address] 11/22/2016    Hepatic encephalopathy ([address] 10/2016    recently hospitalized for hepatic encephalopathy    Hepatitis C     hx of hepatitis C-- dx after first liver transplant from a transfusion   (first transplant age 13)   Chyrl Pitkin History of gastric ulcer     Insomnia 07/13/2015    no current meds    Liver transplant status ([address] 1986 and 1999    X2- congential hepatic fibrosis    Pain in joint, ankle and foot     PICC (peripherally inserted central catheter) in place     PONV (postoperative nausea and vomiting)     some N/V, pt states she does well with Phenergan    Port-a-cath in place     R chest for HD    Raynaud disease     Spleen enlarged     Tachycardia     Thrombocytopenia (HCC)     Type 2 diabetes mellitus (HCC)     insulin only/AVG /s.s of hypoglycemia 30's/Last A1c 5.6    Vasculitis (HCC)     resolved       Past Surgical History:   Procedure Laterality Date    HX CHOLECYSTECTOMY  1984    HX COLONOSCOPY      HX OTHER SURGICAL      biliary reconstructive surgery    HX OTHER SURGICAL  2013    EGD    HX OTHER SURGICAL  03/2016    tips procedure    HX OTHER SURGICAL      paracentesis    HX TRANSPLANT  0043,1027    2 liver - first one for congenital hepatic fibrosis, 2nd for Hep C cirrhosis    HX TUBAL LIGATION      LAP,TUBAL CAUTERY      VASCULAR SURGERY PROCEDURE UNLIST Left 11/02/2016    thigh AVG insertion in thigh         Family History:   Problem Relation Age of Onset    Diabetes Mother     Heart Disease Mother     Hypertension Mother     Heart Disease Father     Stroke Father     Cancer Maternal Grandfather      liver cancer, alcoholism    No Known Problems Sister     Cancer Maternal Aunt      cervical cancer    Breast Cancer Paternal Grandmother      early 45s    Colon Cancer Paternal Grandmother      late 76s    Cancer Other      maternal niece- cervical cancer    Bipolar Disorder Brother     Heart Disease Brother        Social History     Social History    Marital status:      Spouse name: N/A    Number of children: N/A    Years of education: N/A     Occupational History    Not on file.      Social History Main Topics    Smoking status: Never Smoker    Smokeless tobacco: Never Used    Alcohol use No    Drug use: No    Sexual activity: Yes     Partners: Male     Birth control/ protection: Surgical      Comment: Tubal Ligation     Other Topics Concern    Not on file     Social History Narrative         ALLERGIES: Aspirin; Codeine; Compazine [prochlorperazine edisylate]; and Pcn [penicillins]    Review of Systems   Constitutional: Positive for fatigue. Negative for chills and fever. Respiratory: Negative for cough and shortness of breath. Cardiovascular: Negative. Gastrointestinal: Positive for abdominal pain and vomiting. Negative for diarrhea. See HPI   Genitourinary: Negative. Psychiatric/Behavioral: Negative for confusion and decreased concentration. All other systems reviewed and are negative. Vitals:    05/08/17 1723   BP: 104/63   Pulse: 92   Resp: 16   Temp: 97.3 °F (36.3 °C)   SpO2: 100%   Weight: 46.7 kg (103 lb)   Height: 5' (1.524 m)            Physical Exam   Constitutional: She appears well-developed and well-nourished. She appears cachectic. Non-toxic appearance. She has a sickly appearance. She appears ill. No distress. Chronic disease manifestations     HENT:   Head: Atraumatic. Mouth/Throat: Oropharynx is clear and moist.   Eyes: No scleral icterus. Neck: Neck supple. Cardiovascular: Normal rate and intact distal pulses. Pulmonary/Chest: Effort normal. No respiratory distress. She has no wheezes. Abdominal: Soft. She exhibits distension and ascites. Bowel sounds are decreased. There is no rigidity, no rebound, no guarding and no CVA tenderness. A hernia is present. Large /non-tender umbilical hernia   Skin: Skin is warm and dry. Psychiatric: Thought content normal.   Nursing note and vitals reviewed. MDM  Number of Diagnoses or Management Options  Diagnosis management comments: Complex with anemia/ esrd/ progressive liver issues. At present no overt infectious changes. Low WBC and hemoglobin. Significant baseline debility. . Will need admission and transfer       Amount and/or Complexity of Data Reviewed  Clinical lab tests: ordered and reviewed  Tests in the radiology section of CPT®: reviewed and ordered  Decide to obtain previous medical records or to obtain history from someone other than the patient: yes  Discuss the patient with other providers: yes  Independent visualization of images, tracings, or specimens: yes    Risk of Complications, Morbidity, and/or Mortality  Presenting problems: high  Diagnostic procedures: low  Management options: high    Critical Care  Total time providing critical care: 30-74 minutes (===================================================================  This patient is critically ill and there is a high probability of of imminent or life threatening deterioration in the patient's condition without immediate management. The nature of the patient's clinical problem is: esrd/anemia  And low WBC/ liver disease    I have spent 50 minutes in direct patient care, documentation, review of labs/xrays/old records, discussion with Staff, Colleague, Nursing . The time involved in the performance of separately reportable procedures was not counted toward critical care time. Zina Hodge MD; 5/9/2017 @10:05 PM  ===================================================================  )    Patient Progress  Patient progress: stable    ED Course       Procedures    Recent Results (from the past 12 hour(s))   GLUCOSE, POC    Collection Time: 05/08/17  5:32 PM   Result Value Ref Range    Glucose (POC) 131 (H) 65 - 100 mg/dL   CBC WITH AUTOMATED DIFF    Collection Time: 05/08/17  5:45 PM   Result Value Ref Range    WBC 2.2 (L) 4.3 - 11.1 K/uL    RBC 2.44 (L) 4.05 - 5.25 M/uL    HGB 7.1 (L) 11.7 - 15.4 g/dL    HCT 22.5 (L) 35.8 - 46.3 %    MCV 92.2 79.6 - 97.8 FL    MCH 29.1 26.1 - 32.9 PG    MCHC 31.6 31.4 - 35.0 g/dL    RDW 20.8 (H) 11.9 - 14.6 %    PLATELET 58 (L) 016 - 450 K/uL    MPV 10.1 (L) 10.8 - 14.1 FL    DF AUTOMATED      NEUTROPHILS 61 43 - 78 %    LYMPHOCYTES 24 13 - 44 %    MONOCYTES 10 4.0 - 12.0 %    EOSINOPHILS 5 0.5 - 7.8 %    BASOPHILS 0 0.0 - 2.0 %    IMMATURE GRANULOCYTES 0.0 0.0 - 5.0 %    ABS. NEUTROPHILS 1.3 (L) 1.7 - 8.2 K/UL    ABS. LYMPHOCYTES 0.5 0.5 - 4.6 K/UL    ABS. MONOCYTES 0.2 0.1 - 1.3 K/UL    ABS. EOSINOPHILS 0.1 0.0 - 0.8 K/UL    ABS. BASOPHILS 0.0 0.0 - 0.2 K/UL    ABS. IMM. GRANS. 0.0 0.0 - 0.5 K/UL   METABOLIC PANEL, COMPREHENSIVE    Collection Time: 05/08/17  5:45 PM   Result Value Ref Range    Sodium 145 136 - 145 mmol/L    Potassium 3.3 (L) 3.5 - 5.1 mmol/L    Chloride 106 98 - 107 mmol/L    CO2 33 (H) 21 - 32 mmol/L    Anion gap 6 (L) 7 - 16 mmol/L    Glucose 163 (H) 65 - 100 mg/dL    BUN 16 6 - 23 MG/DL    Creatinine 3.55 (H) 0.6 - 1.0 MG/DL    GFR est AA 18 (L) >60 ml/min/1.73m2    GFR est non-AA 15 (L) >60 ml/min/1.73m2    Calcium 7.3 (L) 8.3 - 10.4 MG/DL    Bilirubin, total 1.0 0.2 - 1.1 MG/DL    ALT (SGPT) 24 12 - 65 U/L    AST (SGOT) 26 15 - 37 U/L    Alk.  phosphatase 342 (H) 50 - 136 U/L    Protein, total 6.0 (L) 6.3 - 8.2 g/dL    Albumin 2.1 (L) 3.5 - 5.0 g/dL    Globulin 3.9 (H) 2.3 - 3.5 g/dL    A-G Ratio 0.5 (L) 1.2 - 3.5

## 2017-05-08 NOTE — IP AVS SNAPSHOT
[address] 
[phone] Patient: Eddie Suazo MRN: [id number] You are allergic to the following Allergen Reactions Aspirin Nausea Only Codeine Nausea Only Compazine (Prochlorperazine Edisylate) Unknown (comments) Causes Lock Jaw Immunizations Administered for This Admission Name Date  
 TB Skin Test (PPD) Intradermal 5/16/2017 Recent Documentation Weight Breastfeeding? BMI OB Status Smoking Status 47.4 kg No 20.41 kg/m2 Postmenopausal Never Smoker Unresulted Labs Order Current Status CULTURE, BLOOD Preliminary result CULTURE, BLOOD Preliminary result CULTURE, BLOOD Preliminary result CULTURE, BLOOD Preliminary result CULTURE, BLOOD Preliminary result CULTURE, BODY FLUID W GRAM STAIN Preliminary result CULTURE, CATHETER TIP Preliminary result Emergency Contacts Name Discharge Info Relation Home Work Mobile Saimatimothywilla [address] CAREGIVER [3] Spouse [3] [phone] About your hospitalization You were admitted on: May 8, 2017 You last received care in the:  [facility] 8 MED SURG You were discharged on:  May 17, 2017 Unit phone number:  [phone] Why you were hospitalized Your primary diagnosis was: Intractable Nausea And Vomiting Your diagnoses also included:  Abdominal Pain, Abdominal Pain, Acute, Esrd (End Stage Renal Disease) On Dialysis (Hcc), Hemodialysis Access, Av Graft (Hcc), Liver Transplant Recipient (Hcc), Esophageal Varices (Hcc), Iron Deficiency Anemia, Thrombocytopenia (Hcc), Hepatitis C, Congenital Hepatic Fibrosis, Type 2 Diabetes Mellitus With Hyperglycemia (Hcc), Hypotension, Coagulopathy (Hcc), Cirrhosis Of Liver With Ascites (Hcc), Pancytopenia (Hcc) Providers Seen During Your Hospitalizations Provider Role Specialty Primary office phone Cricket Cisneros MD Attending Provider [facility] [phone] Your Primary Care Physician (PCP) Primary Care Physician Office Phone Office Fax [address], [facility] [address] [phone] Follow-up Information Follow up With Details Comments Contact Info Robert Mina MD On 5/25/2017 10:30 [address] [place] [address] 
[phone] 
  
   keep your scheduled follow up appointments with hepatology and nephrology @ [facility] Narciso Lopez MD  Office calling back with appt [address] [facility] [address] 
[phone] Your Appointments Thursday May 25, 2017 10:30 AM EDT Office Visit with Robert Mina MD  
[address] [facility] ([address] [facility]) [address] [address]  
[phone] Current Discharge Medication List  
  
START taking these medications Dose & Instructions Dispensing Information Comments Morning Noon Evening Bedtime  
 ondansetron 8 mg disintegrating tablet Commonly known as:  ZOFRAN ODT Your next dose is:  5/17 at 2pm  
   
 Dose:  8 mg Take 1 Tab by mouth three (3) times daily for 30 days. Indications: persistent N&V Quantity:  90 Tab Refills:  1  
     
  
   
  
   
  
   
  
 potassium chloride 20 mEq tablet Commonly known as:  K-DUR, KLOR-CON Your next dose is:  5/18 Dose:  20 mEq Take 1 Tab by mouth daily for 20 days. Quantity:  20 Tab Refills:  0 CONTINUE these medications which have CHANGED Dose & Instructions Dispensing Information Comments Morning Noon Evening Bedtime  
 insulin glargine 100 unit/mL (3 mL) pen Commonly known as:  LANTUS SOLOSTAR What changed:  Another medication with the same name was removed. Continue taking this medication, and follow the directions you see here. Your next dose is:  5/17 Dose:  25 Units 25 Units by SubCUTAneous route nightly. Quantity:  15 Each Refills:  3  
     
   
   
   
  
  
 midodrine 10 mg tablet Commonly known as:  Nancy Hopkins What changed:   
- medication strength 
- how much to take 
- additional instructions Dose:  10 mg Take 1 Tab by mouth every eight (8) hours for 30 days. Quantity:  90 Tab Refills:  1 CONTINUE these medications which have NOT CHANGED Dose & Instructions Dispensing Information Comments Morning Noon Evening Bedtime  
 calcium acetate 667 mg Cap Commonly known as:  PHOSLO Your next dose is:  5/17 lunch Dose:  1 Cap Take 1 Cap by mouth three (3) times daily (with meals). Quantity:  270 Cap Refills:  0 GENGRAF 25 mg capsule Generic drug:  cycloSPORINE modified Your next dose is:  5/18 Dose:  2.5 mg/kg/day Take 2.5 mg/kg/day by mouth every morning. Indications: Takes in the AM  
 Refills:  0  
     
  
   
   
   
  
 insulin aspart 100 unit/mL injection Commonly known as:  Raynold Quince Your next dose is:  5/17 Sliding scale maximum 15 units each meal  
 Quantity:  10 mL Refills:  5  
     
  
   
  
   
  
   
  
 insulin lispro 100 unit/mL kwikpen Commonly known as:  HUMALOG Your next dose is:  5/17 Up to 10 units sq q ac Quantity:  15 Pen Refills:  3  
     
  
   
  
   
  
   
  
 lactulose 10 gram/15 mL solution Commonly known as:  Layvonne Sheeba Your next dose is:  5/17 Dose:  20 g Take 30 mL by mouth three (3) times daily. Quantity:  480 mL Refills:  0  
     
  
   
  
   
  
   
  
 magnesium oxide 400 mg tablet Commonly known as:  MAG-OX Your next dose is:  5/17 Dose:  400 mg Take 400 mg by mouth two (2) times a day. Refills:  0  
     
  
   
   
  
   
  
 morphine CR 15 mg CR tablet Commonly known as:  MS CONTIN  
 Your next dose is:  5/17 Dose:  15 mg Take 1 Tab by mouth every twelve (12) hours. Max Daily Amount: 30 mg.  
 Quantity:  20 Tab Refills:  0  
     
  
   
   
  
   
  
 ondansetron hcl 4 mg tablet Commonly known as:  Job Edgar Dose:  4 mg Take 1 Tab by mouth every eight (8) hours as needed for Nausea. Quantity:  90 Tab Refills:  0  
     
   
   
   
  
 promethazine 25 mg tablet Commonly known as:  PHENERGAN Dose:  25 mg Take 25 mg by mouth every six (6) hours as needed for Nausea. Refills:  0 PROTONIX 40 mg tablet Generic drug:  pantoprazole Your next dose is:  5/17 Dose:  40 mg Take 40 mg by mouth four (4) times daily. 2tabs bid Refills:  0  
     
  
   
   
  
   
  
 traMADol 50 mg tablet Commonly known as:  ULTRAM  
   
 Dose:  50 mg Take 1 Tab by mouth every six (6) hours as needed. Max Daily Amount: 200 mg. Indications: PAIN Quantity:  20 Tab Refills:  0  
     
   
   
   
  
 XIFAXAN 550 mg tablet Generic drug:  rifAXIMin Your next dose is:  5/17 Dose:  550 mg Take 1 Tab by mouth two (2) times a day for 30 days. Quantity:  60 Tab Refills:  2 STOP taking these medications MITIGARE 0.6 mg capsule Generic drug:  colchicine Where to Get Your Medications These medications were sent to [address] [place] [address], [address] [place] [address] [address] Phone:  [phone]  
  potassium chloride 20 mEq tablet Information on where to get these meds will be given to you by the nurse or doctor. ! Ask your nurse or doctor about these medications  
  midodrine 10 mg tablet  
 ondansetron 8 mg disintegrating tablet XIFAXAN 550 mg tablet Discharge Instructions Follow up with Gastroenterology and Nephrology at [address] as scheduled. Follow up with regular dialysis as before. Continue Vancomycin with dialysis to complete 6 weeks as recommended by ID. Vancomycin (By injection) Vancomycin ([id number]) Treats infections. Belongs to a class of drugs called antibiotics. Brand Name(s): PremierPro Rx Vancomycin HCl, Vancomycin HCl Novaplus There may be other brand names for this medicine. When This Medicine Should Not Be Used: This medicine is not right for everyone. You should not receive it if you had an allergic reaction to vancomycin, corn, or corn products. How to Use This Medicine:  
Injectable · Your doctor will prescribe your dose and schedule. This medicine is given through a needle placed in a vein. · A nurse or other health provider will give you this medicine. Drugs and Foods to Avoid: Ask your doctor or pharmacist before using any other medicine, including over-the-counter medicines, vitamins, and herbal products. · Some medicines can affect how this medicine works. Tell your doctor if you are using any of the following: ¨ Amphotericin B, bacitracin, polymyxin B, cisplatin, colistin, viomycin ¨ Aminoglycoside antibiotic (including amikacin, gentamicin, streptomycin) ¨ Diuretic (water pill) ¨ NSAID pain or arthritis medicine (including aspirin, celecoxib, diclofenac, ibuprofen, naproxen) Warnings While Using This Medicine: · Tell your doctor if you are pregnant or breastfeeding, or if you have kidney disease, congestive heart failure, or hearing problems. Tell your doctor if you have a restricted sodium intake. · This medicine may cause the following problems: ¨ Infusion reactions ¨ Kidney damage ¨ Hearing loss · This medicine can cause diarrhea. Call your doctor if the diarrhea becomes severe, does not stop, or is bloody. Do not take any medicine to stop diarrhea until you have talked to your doctor. Diarrhea can occur 2 months or more after you stop taking this medicine. · This medicine may make you bleed, bruise, or get infections more easily. Take precautions to prevent illness and injury. Wash your hands often. · Your doctor will do lab tests at regular visits to check on the effects of this medicine. Keep all appointments. Possible Side Effects While Using This Medicine:  
Call your doctor right away if you notice any of these side effects: · Allergic reaction: Itching or hives, swelling in your face or hands, swelling or tingling in your mouth or throat, chest tightness, trouble breathing · Blistering, peeling, red skin rash · Chest pain, trouble breathing · Decrease in how much or how often you urinate, rapid weight gain, swelling of your hands, ankles, or feet · Diarrhea that may contain blood · Fever, chills, cough, sore throat, and body aches · Lightheadedness, dizziness, or fainting · Ringing in the ears or trouble hearing · Swelling, pain, or redness under your skin where the needle is placed · Unusual bleeding, bruising, or weakness If you notice other side effects that you think are caused by this medicine, tell your doctor. Call your doctor for medical advice about side effects. You may report side effects to FDA at 0-276-FDA-1234 © 2017 [facility] Information is for End User's use only and may not be sold, redistributed or otherwise used for commercial purposes. The above information is an  only. It is not intended as medical advice for individual conditions or treatments. Talk to your doctor, nurse or pharmacist before following any medical regimen to see if it is safe and effective for you. Learning About Fever What is a fever? A fever is a high body temperature. It's one way your body fights being sick. A fever shows that the body is responding to infection or other illnesses, both minor and severe. A fever is a symptom, not an illness by itself.  A fever can be a sign that you are ill, but most fevers are not caused by a serious problem. You may have a fever with a minor illness, such as a cold. But sometimes a very serious infection may cause little or no fever. It is important to look at other symptoms, other conditions you have, and how you feel in general. In children, notice how they act and see what symptoms they complain of. What is a normal body temperature? A normal body temperature is about 98. 6ºF. Some people have a normal temperature that is a little higher or a little lower than this. Your temperature may be a little lower in the morning than it is later in the day. It may go up during hot weather or when you exercise, wear heavy clothes, or take a hot bath. Your temperature may also be different depending on how you take it. A temperature taken in the mouth (oral) or under the arm may be a little lower than your core temperature (rectal). What is a fever temperature? A core temperature of 100.4°F or above is considered a fever. What can cause a fever? A fever may be caused by: · Infections. This is the most common cause of a fever. Examples of infections that can cause a fever include the flu, a kidney infection, or pneumonia. · Some medicines. · Severe trauma or injury, such as a heart attack, stroke, heatstroke, or burns. · Other medical conditions, such as arthritis and some cancers. How can you treat a fever at home? · Ask your doctor if you can take an over-the-counter pain medicine, such as acetaminophen (Tylenol), ibuprofen (Advil, Motrin), or naproxen (Aleve). Be safe with medicines. Read and follow all instructions on the label. · To prevent dehydration, drink plenty of fluids. Choose water and other caffeine-free clear liquids until you feel better. If you have kidney, heart, or liver disease and have to limit fluids, talk with your doctor before you increase the amount of fluids you drink. Follow-up care is a key part of your treatment and safety. Be sure to make and go to all appointments, and call your doctor if you are having problems. It's also a good idea to know your test results and keep a list of the medicines you take. Where can you learn more? Go to http://julia-raimundo.info/. Enter M557 in the search box to learn more about \"Learning About Fever. \" Current as of: May 27, 2016 Content Version: 11.2 © 9615-4018 [employer]iced [employer]its. Care instructions adapted under license by [employer] (which disclaims liability or warranty for this information). If you have questions about a medical condition or this instruction, always ask your healthcare professional. [address] any warranty or liability for your use of this information. DISCHARGE SUMMARY from Nurse The following personal items are in your possession at time of discharge: 
 
Dental Appliances: None Visual Aid: Glasses Home Medications: Kept at bedside Jewelry: None Clothing: Shirt, Socks, Jacket/Coat, Undergarments Other Valuables: None PATIENT INSTRUCTIONS: 
 
 
F-face looks uneven A-arms unable to move or move unevenly S-speech slurred or non-existent T-time-call 911 as soon as signs and symptoms begin-DO NOT go Back to bed or wait to see if you get better-TIME IS BRAIN. Warning Signs of HEART ATTACK Call 911 if you have these symptoms: 
? Chest discomfort. Most heart attacks involve discomfort in the center of the chest that lasts more than a few minutes, or that goes away and comes back. It can feel like uncomfortable pressure, squeezing, fullness, or pain. ? Discomfort in other areas of the upper body. Symptoms can include pain or discomfort in one or both arms, the back, neck, jaw, or stomach. ? Shortness of breath with or without chest discomfort. ? Other signs may include breaking out in a cold sweat, nausea, or lightheadedness. Don't wait more than five minutes to call [address]! Fast action can save your life. Calling 911 is almost always the fastest way to get lifesaving treatment. Emergency Medical Services staff can begin treatment when they arrive  up to an hour sooner than if someone gets to the hospital by car. The discharge information has been reviewed with the patient. The patient verbalized understanding. Discharge medications reviewed with the patient and appropriate educational materials and side effects teaching were provided. Abdominal Pain: Care Instructions Your Care Instructions Abdominal pain has many possible causes. Some aren't serious and get better on their own in a few days. Others need more testing and treatment. If your pain continues or gets worse, you need to be rechecked and may need more tests to find out what is wrong. You may need surgery to correct the problem. Don't ignore new symptoms, such as fever, nausea and vomiting, urination problems, pain that gets worse, and dizziness. These may be signs of a more serious problem. Your doctor may have recommended a follow-up visit in the next 8 to 12 hours. If you are not getting better, you may need more tests or treatment. The doctor has checked you carefully, but problems can develop later. If you notice any problems or new symptoms, get medical treatment right away. Follow-up care is a key part of your treatment and safety. Be sure to make and go to all appointments, and call your doctor if you are having problems. It's also a good idea to know your test results and keep a list of the medicines you take. How can you care for yourself at home? · Rest until you feel better. · To prevent dehydration, drink plenty of fluids, enough so that your urine is light yellow or clear like water. Choose water and other caffeine-free clear liquids until you feel better. If you have kidney, heart, or liver disease and have to limit fluids, talk with your doctor before you increase the amount of fluids you drink. · If your stomach is upset, eat mild foods, such as rice, dry toast or crackers, bananas, and applesauce. Try eating several small meals instead of two or three large ones. · Wait until 48 hours after all symptoms have gone away before you have spicy foods, alcohol, and drinks that contain caffeine. · Do not eat foods that are high in fat. · Avoid anti-inflammatory medicines such as aspirin, ibuprofen (Advil, Motrin), and naproxen (Aleve). These can cause stomach upset. Talk to your doctor if you take daily aspirin for another health problem. When should you call for help? Call 911 anytime you think you may need emergency care. For example, call if: 
· You passed out (lost consciousness). · You pass maroon or very bloody stools. · You vomit blood or what looks like coffee grounds. · You have new, severe belly pain. Call your doctor now or seek immediate medical care if: 
· Your pain gets worse, especially if it becomes focused in one area of your belly. · You have a new or higher fever. · Your stools are black and look like tar, or they have streaks of blood. · You have unexpected vaginal bleeding. · You have symptoms of a urinary tract infection. These may include: 
¨ Pain when you urinate. ¨ Urinating more often than usual. 
¨ Blood in your urine. · You are dizzy or lightheaded, or you feel like you may faint. Watch closely for changes in your health, and be sure to contact your doctor if: 
· You are not getting better after 1 day (24 hours). Where can you learn more? Go to http://julia-raimundo.info/. Enter C632 in the search box to learn more about \"Abdominal Pain: Care Instructions. \" Current as of: May 27, 2016 Content Version: 11.2 © 0157-6657 Naldo. Care instructions adapted under license by [employer] (which disclaims liability or warranty for this information). If you have questions about a medical condition or this instruction, always ask your healthcare professional. [place] [address] any warranty or liability for your use of this information. Discharge Orders None [employer] Announcement We are excited to announce that we are making your provider's discharge notes available to you in [employer]. You will see these notes when they are completed and signed by the physician that discharged you from your recent hospital stay. If you have any questions or concerns about any information you see in [employer], please call the Health Information Department where you were seen or reach out to your Primary Care Provider for more information about your plan of care. Introducing [facility]! Dear Ariel Rothman: 
Thank you for requesting a [employer] account. Our records indicate that you already have an active [employer] account. You can access your account anytime at [email or web address] Did you know that you can access your hospital and ER discharge instructions at any time in [employer]? You can also review all of your test results from your hospital stay or ER visit. Additional Information If you have questions, please visit the Frequently Asked Questions section of the [employer] website at [email or web address] Remember, [employer] is NOT to be used for urgent needs. For medical emergencies, dial 911. Now available from your iPhone and Android! General Information Please provide this summary of care documentation to your next provider. Patient Signature:  ____________________________________________________________ Date:  ____________________________________________________________  
  
[employer] Provider Signature:  ____________________________________________________________ Date:  ____________________________________________________________

## 2017-05-08 NOTE — ED NOTES
Took bedside report from [facility]. Pt resting. Call bell in place. Bed low and locked.  Encouraged to call with needs

## 2017-05-08 NOTE — ED TRIAGE NOTES
Patient complains of vomiting and unable to keep anything down. Patient states blood sugar has been low 32 EMS came out to house to stabilized glucose.

## 2017-05-09 PROBLEM — A41.9 SEPSIS (HCC): Status: RESOLVED | Noted: 2017-01-01 | Resolved: 2017-01-01

## 2017-05-09 PROBLEM — R11.2 INTRACTABLE NAUSEA AND VOMITING: Status: ACTIVE | Noted: 2017-01-01

## 2017-05-09 PROBLEM — Z94.4 LIVER TRANSPLANT RECIPIENT (HCC): Chronic | Status: ACTIVE | Noted: 2017-01-01

## 2017-05-09 PROBLEM — R18.8 CIRRHOSIS OF LIVER WITH ASCITES (HCC): Chronic | Status: ACTIVE | Noted: 2017-01-01

## 2017-05-09 PROBLEM — K74.60 CIRRHOSIS OF LIVER WITH ASCITES (HCC): Chronic | Status: ACTIVE | Noted: 2017-01-01

## 2017-05-09 PROBLEM — K65.2 SBP (SPONTANEOUS BACTERIAL PERITONITIS) (HCC): Status: RESOLVED | Noted: 2017-01-01 | Resolved: 2017-01-01

## 2017-05-09 PROBLEM — D68.9 COAGULOPATHY (HCC): Chronic | Status: ACTIVE | Noted: 2017-01-01

## 2017-05-09 PROBLEM — A40.9 STREPTOCOCCAL SEPSIS (HCC): Status: RESOLVED | Noted: 2017-01-01 | Resolved: 2017-01-01

## 2017-05-09 PROBLEM — D61.818 PANCYTOPENIA (HCC): Chronic | Status: ACTIVE | Noted: 2017-01-01

## 2017-05-09 NOTE — PROGRESS NOTES
Critical D-Dimer of 7.05 reported from lab, Dr. Jluis Medley notified, no new orders received.  states, Deborah Exon course her D-dimer's elevated when she's that sick --it's irrelevant\". Will monitor.

## 2017-05-09 NOTE — PROGRESS NOTES
Pt resting quietly in bed on RA, PRBCs infusing. SCDs in place. No s/s of acute distress noted. Report given to oncoming RN.

## 2017-05-09 NOTE — CDMP QUERY
Please clarify if this patient is being treated/managed for:    Pancytopenia  In the setting of congenital hepatic fibrosis w transplant, ESRD, anemia, leukopenia, and thrombocytopenia being treated w transfusions and lab monitoring. =>Other Explanation of clinical findings  =>Unable to Determine (no explanation of clinical findings)    The medical record reflects the following:    Risk Factors: Congenital hepatin fibrosis, ESRD    Clinical Indicators: WBC 2.2   HGB 7.1   PLT 58 on arrival    Treatment: 2 units blood, serial lab monitoring. Please clarify and document your clinical opinion in the progress notes and discharge summary including the definitive and/or presumptive diagnosis, (suspected or probable), related to the above clinical findings. Please include clinical findings supporting your diagnosis.     Thanks,  Ryan House RN, CDS  Compliant Documentation Management Program  [phone]

## 2017-05-09 NOTE — H&P
[address]  ^ PHYSICAL       Name:  Jameson Avila   MR#:  [id number]   :  1970   Account #:  [de-identified]   Date of Adm:  2017       PRIMARY CARE PROVIDER: Tamir Melton. MD Flor    CHIEF COMPLAINT: Nausea, vomiting, and abdominal pain. 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 The patient complains of chronic nausea, vomiting that has been   going on for about the last 6 months. She says that she has   nausea, vomiting about 3-4 times per day, usually after trying   to eat something. The patient states that the nausea, vomiting   has been worse for the last 3-4 days to the point that she can   no longer even tolerate drinking water. The patient does not   report any recent hematemesis, although she was hospitalized in   the early part of April of this year with esophageal variceal   bleeding, but she says since that episode, she has had no   further hematemesis. The patient does not report any diarrhea   and she does not report any recent fever. The patient does   report one episode of isolated fever that occurred last Thursday   and apparently resolved spontaneously. Regarding her abdominal   pain, she says this has been a chronic issue for several years   and states that it is unchanged recently. She describes pain   throughout her entire abdomen which is usually dull, but at   times sharp and the maximum intensity of the pain is 9/10. The   patient comes to the emergency department today due to the   worsening of the nausea and vomiting.  This patient also does   have a history of diabetes and states that her physical therapy   nurse was with her today and noticed that she was shaking. Her   blood sugar was checked and it was apparently, according to the   patient, 44. The patient drank some juice and rechecked the blood   sugar a few minutes later and the blood sugar had decreased to   32. [de-identified] office was contacted at that point and they   recommended that EMS be alerted. When EMS came to see the   patient her blood sugar I believe by fingerstick was 67 and she   was given some glucose solution. There is also some report that   some blood was taken at some point from indwelling ports that   the patient has and the blood sugar on the blood that was taken   via that method did not correlate with the fingerstick blood   sugar. When EMS initially saw the patient for the issues of low   blood sugar they recommended the patient come to the hospital,   but she apparently refused transport to the hospital at that   time. The patient's doctor's office later contacted the patient   and told her that she needed to come to the emergency department   and she complied with that instruction. Hospitalists are asked   to see the patient due to the nausea, vomiting issue and also   because the patient was found on her lab work to be anemic. The   patient is being admitted to the [facility] because she   has dialysis needs among other things that cannot be met at this   facility. PAST MEDICAL HISTORY   1. End-stage renal disease. 2. Chronic pain. 3. History of congenital hepatic fibrosis status post 2 liver   transplants. 4. Hepatitis C.   5. Esophageal varices. 6. GERD. 7. History of hepatic encephalopathy. 8. Peptic ulcer disease. 9. Raynaud's disease. 10. Diabetes mellitus. 11. History of thrombocytopenia. 12. History of splenomegaly. 13. Cirrhosis. 14. Anemia. 15. The patient describes having a \"growth\" around the vessels   of her kidneys and liver, but the details of this are unclear.     PAST SURGICAL HISTORY   1. Liver transplant in the 1980's due to congenital hepatic   fibrosis. 2. Liver transplant in the  reportedly due to hepatitis C.   3. AV graft placed in the left thigh in 2016.   4. Declotting of AV graft in the left thigh in 2017.   5. Dialysis access catheter placed in the right chest in   2017. 6. Cholecystectomy. 7. Colonoscopy. 8. EGD. 9. History of multiple paracentesis. The patient states that at   one point she was getting this about once or twice per month. 10. Bilateral tubal ligation. 11. History of biliary reconstruction in the  around the   time of her first liver transplant. 12. TIPS in 2016, the patient states that this was later   reversed in 2016. 15. Port-A-Cath placed in the left chest.   14. Biopsy of \"growth\" around the vessels of her kidney and   liver. Details of this are very unclear. SOCIAL HISTORY: The patient does not smoke, drink or do drugs. FAMILY HISTORY: The patient's mother is alive with history of   congestive heart failure, but the details are unknown at this   time. The patient's father is alive with a history of \"stomach   issues\", these include GERD, also the father has a history of   stroke and early onset dementia. The patient has 1 brother who   is alive, history of heart disease, unspecified type, that also   has a permanent pacemaker in place. The patient has 1 sister who   is alive and generally healthy and she has 1 brother who is    that had a history of bipolar disorder. ALLERGIES   1. CODEINE. 2. COMPAZINE. 3. PENICILLIN. 4. ASPIRIN. MEDICATIONS   See list of medications based on prescription bottles that were   brought in with the patient. I reviewed the prescription   bottles. Two of her medications were missing from the group that   was brought in and she told me these and the best she could from   Memory, I suspect the list may not be up to date and/or comprehensive.   The best available information at this time is as follows:   1. Xifaxan 550 mg by mouth twice daily. 2. Promethazine 25 mg by mouth every 4-6 hours as needed for   nausea and vomiting. 3. Pantoprazole 40 mg by mouth twice daily. 4. Lactulose 20 g/30 mL. The patient takes 30 mL by mouth twice   daily. 5. Morphine 10 mg by mouth twice daily as needed, exact morphine   preparation is unclear. 6. Midodrine, dose unclear. The patient takes 1 tablet by mouth   twice daily. REVIEW OF SYSTEMS: The patient reports some minor chest pain   that has been going on for about 2 weeks, it is in the left side   of the chest, seems to be worse with breathing and relieved by   exhaling, there is no associated shortness of breath. A 10-point   review of systems otherwise negative other than in the history   of present illness. PHYSICAL EXAMINATION   VITAL SIGNS: Temperature is 98.2, heart rate 86, respirations   16, blood pressure 96/63, oxygen saturations are 100% on room   air. GENERAL: Very thin female in bed in no acute distress. EYES: Anicteric. Pupils equal, round, reactive to light. Extraocular movements are intact. ENT: Mucous membranes are moist. Throat is free of erythema or   exudate. No cervical lymph nodes. SKIN: No rash or lesions. CARDIOVASCULAR: Regular rate and rhythm. No rubs, murmurs or   gallops; 2+ pulses throughout. LUNGS: Clear to auscultation bilaterally. She is not tachypneic. There is no accessory muscle use. ABDOMEN: Soft, mildly distended with fluid. She does have a   large periumbilical hernia. MUSCULOSKELETAL: No clubbing or cyanosis. NEUROLOGIC: Nonfocal examination. Cranial nerves 2-12 grossly   intact. PSYCHOLOGICAL: The patient is awake, alert, oriented, converses   freely, gives consistent historical data. DIAGNOSTICS   1. EKG showed normal sinus rhythm, I reviewed the EKG   personally, this is my interpretation.    2. Chest x-ray was negative for any infiltrate or effusion, I   reviewed the x-ray personally and that is my interpretation. LABORATORY DATA: CBC showed a white blood cell count of 2200,   hemoglobin of 7.1 with an MCV of 92.2, platelet count of 58,489. Differential white blood cell count showed 61% neutrophils, 24%   lymphocytes, 10% monocytes, 5% eosinophils, no basophils. Metabolic profile showed a sodium of 145, potassium 3.3,   chloride 106, bicarbonate 33, glucose 163, BUN 16, creatinine   3.55, calcium 7.3, total bilirubin of 1.0, total protein 6.0,   albumin 2.1, globulin 3.9, ALT 24, AST of 26, alkaline   phosphatase of 342. INR of 1.5. IMPRESSION:  This is a 55-year-old female presenting for   exacerbation of chronic abdominal issues. PLAN   Plan is as follows:   1. Nausea, vomiting: Seems to be a chronic issue that is worse   lately. Abdomen is benign to examination, although she does have   evident ascites. The patient is being [facility] and will have GI consultation. 2. Abdominal pain: This appears to again be a chronic issue. I   do not see any indication for acute imaging, but the patient   should receive a GI consultation. The patient states that she   used to get a paracentesis about twice per month and she has not   had one now in over a month. The last recorded paracentesis in   our system is from 03/06/2017. I believe the patient would   benefit from diagnostic and therapeutic paracentesis. The   patient's abdominal pain and nausea will also be treated   symptomatically. 3. Leukopenia: This is a chronic and intermittent problem for   the patient and should be monitored. No evidence of sepsis at   this time. 4. Anemia: Hemoglobin is 7.1. Looking back at old lab data, the   patient does have chronic anemia, baseline hemoglobin generally   higher than 7.1. The patient is not reporting any active   bleeding. Will plan to recheck a CBC on the patient with plans   to transfuse if hemoglobin drops below 7.   5. Thrombocytopenia:  This is a chronic issue for the patient. 6. Diabetes mellitus: Will hold any insulin as there was some   question about the patient having hypoglycemia during the course   of the day today. Initial plan will be to do frequent Accu-Cheks   until the blood sugar demonstrates stability and then institute   sliding scale at that time. 7. Hypokalemia: Replace with intravenous potassium supplementation. Check magnesium level. 8. End-stage renal disease: [facility]. Renal consultation will be obtained to facilitate that. 9. Chest pain: No suspicion for any cardiac etiology. EKG is   unremarkable. Will check cardiac enzymes for completeness. 10. Prophylaxis: Will use SCDs for DVT prophylaxis. Will avoid   any anticoagulation due to the patient's lab work showing   elevated INR of 1.5. Proton pump inhibitor for GI prophylaxis. 11. Elevated INR: I suspect related to chronic liver disease. We   will check PTT as well. 12. Code status: The patient is a FULL CODE.   13. Disposition: The patient is being admitted under inpatient   status with an expected duration of stay of greater than 2   midnights.         MD COLBY Askew / LEE ANN   D:  05/08/2017   20:36   T:  05/08/2017   21:38   Job #:  [id number]

## 2017-05-09 NOTE — DIALYSIS
HD treatment completed without complication. Total of 1.0 kgs removed. Flushed both ports with 0.9 mL of NS.  VS stable, NAD. CVC dressing clean, dry, and intact, tego caps intact, bilateral lumen flushed with 9 cc NS. Transport contacted for return to room.

## 2017-05-09 NOTE — PROGRESS NOTES
Pt given Morphine 2mg IVP and Zofran 4mg IVP per pt request for 10/10 generalized pain and nausea. Will monitor.

## 2017-05-09 NOTE — PROGRESS NOTES
Pt blood sugar 66, pt refusing PO intake d/t nausea. Dextrose 25g given IVP, will monitor and recheck.

## 2017-05-09 NOTE — PROGRESS NOTES
No relief of 10/10 pain and nausea with previous dose, Dr. Radha Everett called, received orders for Morphine 10mg IVP Q4H PRN and Phenergan 12.5mg IVP Q4H PRN. Morphine 10mg and Phenergan 12.5 given, will monitor. See MAR.

## 2017-05-09 NOTE — CONSULTS
Renal Consult    Subjective:     Andrade Naidu is a 55 y.o.  female who is being seen for ESRD. She is on dialysis TTS. She has a new graft, bu also a catheter and is on dialysis at [facility]. She is admitted with nausea/vomitting, and abominal pain. Complex hx with liver transplant. Anemic with transfusion ordered. . No fever, chills, sweats, epistaxis, vision changes, headache, shortness of breath, wheezing,  chest pain, palpitations. No dysuria, hematuria. No paresthesia, seizure history, no arthritis/ arthralgia or skin rashes.      Past Medical History:   Diagnosis Date    SEAN (acute kidney injury) ([address] 6/26/2016    Arthritis     kath feet    Ascites     Benign neoplasm of skin of trunk, except scrotum     pt denies    Cellulitis and abscess of unspecified digit     hx of    Chronic kidney disease     [facility] on Mon/Wed/Fri    Chronic pain     abd area    Congenital hepatic fibrosis     Liver transplant X2    Esophageal varices (HCC)     GERD (gastroesophageal reflux disease)     Hemodialysis access, AV graft ([address] 11/22/2016    Hepatic encephalopathy ([address] 10/2016    recently hospitalized for hepatic encephalopathy    Hepatitis C     hx of hepatitis C-- dx after first liver transplant from a transfusion   (first transplant age 13)   [facility] History of gastric ulcer     Insomnia 07/13/2015    no current meds    Liver transplant status ([address] 1986 and 1999    X2- congential hepatic fibrosis    Pain in joint, ankle and foot     PICC (peripherally inserted central catheter) in place     PONV (postoperative nausea and vomiting)     some N/V, pt states she does well with Phenergan    Port-a-cath in place     R chest for HD    Raynaud disease     Spleen enlarged     Tachycardia     Thrombocytopenia (HCC)     Type 2 diabetes mellitus (HCC)     insulin only/AVG /s.s of hypoglycemia 30's/Last A1c 5.6    Vasculitis ([address]     resolved      Past Surgical History: Procedure Laterality Date    HX CHOLECYSTECTOMY  1984    HX COLONOSCOPY      HX OTHER SURGICAL      biliary reconstructive surgery    HX OTHER SURGICAL  2013    EGD    HX OTHER SURGICAL  03/2016    tips procedure    HX OTHER SURGICAL      paracentesis    HX TRANSPLANT  3359,7204    2 liver - first one for congenital hepatic fibrosis, 2nd for Hep C cirrhosis    HX TUBAL LIGATION      LAP,TUBAL CAUTERY      VASCULAR SURGERY PROCEDURE UNLIST Left 11/02/2016    thigh AVG insertion in thigh     Family History   Problem Relation Age of Onset    Diabetes Mother     Heart Disease Mother     Hypertension Mother     Heart Disease Father     Stroke Father     Cancer Maternal Grandfather      liver cancer, alcoholism    No Known Problems Sister     Cancer Maternal Aunt      cervical cancer    Breast Cancer Paternal Grandmother      early 45s    Colon Cancer Paternal Grandmother      late 76s    Cancer Other      maternal niece- cervical cancer    Bipolar Disorder Brother     Heart Disease Brother       Social History   Substance Use Topics    Smoking status: Never Smoker    Smokeless tobacco: Never Used    Alcohol use No       Current Facility-Administered Medications   Medication Dose Route Frequency Provider Last Rate Last Dose    dextrose (D50W) injection syrg 25 g  25 g IntraVENous ONCE Wei Jerez MD        magnesium oxide (MAG-OX) tablet 400 mg  400 mg Oral BID Wei Jerez MD   400 mg at 05/09/17 0372    0.9% sodium chloride infusion 250 mL  250 mL IntraVENous PRN Wei Jerez MD        0.9% sodium chloride infusion 250 mL  250 mL IntraVENous PRN Kulwant De Leon MD        lactulose [facility]) solution 20 g  20 g Oral TID Trevor Beltrán MD        midodrine (PROAMITINE) tablet 5 mg  5 mg Oral Q8H Trevor Beltrán MD   5 mg at 05/09/17 9401    sodium chloride (NS) flush 5-10 mL  5-10 mL IntraVENous Q8H Trevor Beltrán MD   5 mL at 05/09/17 1818    sodium chloride (NS) flush 5-10 mL  5-10 mL IntraVENous PRN Mellisa Mendosa MD        pantoprazole (PROTONIX) 40 mg in sodium chloride 0.9 % 10 mL injection  40 mg IntraVENous Q12H Nyla Kaufman MD   40 mg at 05/08/17 2224    ondansetron (ZOFRAN) injection 4 mg  4 mg IntraVENous Q6H PRN Jeevan Ortega MD   4 mg at 05/08/17 2128    promethazine (PHENERGAN) with saline injection 12.5 mg  12.5 mg IntraVENous Q4H PRN Jeevan Ortega MD   12.5 mg at 05/09/17 4812    morphine injection 10 mg  10 mg IntraVENous Q4H PRN Jeevan Ortega MD   10 mg at 05/09/17 3890        Allergies   Allergen Reactions    Aspirin Nausea Only    Codeine Nausea Only    Compazine [Prochlorperazine Edisylate] Unknown (comments)     Causes Lock Jaw    Pcn [Penicillins] Other (comments)     \"drops wbc\"        Review of Systems:  A comprehensive review of systems was negative except for that written in the History of Present Illness. Objective:      Intake and Output:            Physical Exam:   General appearance: chronically ill , appears stated age  Head: atraumatic  Eyes: conjunctivae/corneas clear  Nose: no discharge  Throat: Lips, mucosa, and tongue normal.  Neck: supple, symmetrical, trachea midline   Lungs: clear to auscultation bilaterally  Heart: regular rate and rhythm, S1, S2 normal, no murmur, click, rub or gallop  Abdomen: distended with unbilical hernia  Extremities: trace edema    Skin: Skin color, texture, turgor normal. No rashes or lesions    Catheter right IJ cuffed position  Left thigh graft with a thrill        Data Review:   Recent Results (from the past 24 hour(s))   GLUCOSE, POC    Collection Time: 05/08/17  5:32 PM   Result Value Ref Range    Glucose (POC) 131 (H) 65 - 100 mg/dL   CBC WITH AUTOMATED DIFF    Collection Time: 05/08/17  5:45 PM   Result Value Ref Range    WBC 2.2 (L) 4.3 - 11.1 K/uL    RBC 2.44 (L) 4.05 - 5.25 M/uL    HGB 7.1 (L) 11.7 - 15.4 g/dL    HCT 22.5 (L) 35.8 - 46.3 %    MCV 92.2 79.6 - 97.8 FL    MCH 29.1 26.1 - 32.9 PG    MCHC 31.6 31.4 - 35.0 g/dL    RDW 20.8 (H) 11.9 - 14.6 %    PLATELET 58 (L) 058 - 450 K/uL    MPV 10.1 (L) 10.8 - 14.1 FL    DF AUTOMATED      NEUTROPHILS 61 43 - 78 %    LYMPHOCYTES 24 13 - 44 %    MONOCYTES 10 4.0 - 12.0 %    EOSINOPHILS 5 0.5 - 7.8 %    BASOPHILS 0 0.0 - 2.0 %    IMMATURE GRANULOCYTES 0.0 0.0 - 5.0 %    ABS. NEUTROPHILS 1.3 (L) 1.7 - 8.2 K/UL    ABS. LYMPHOCYTES 0.5 0.5 - 4.6 K/UL    ABS. MONOCYTES 0.2 0.1 - 1.3 K/UL    ABS. EOSINOPHILS 0.1 0.0 - 0.8 K/UL    ABS. BASOPHILS 0.0 0.0 - 0.2 K/UL    ABS. IMM. GRANS. 0.0 0.0 - 0.5 K/UL   METABOLIC PANEL, COMPREHENSIVE    Collection Time: 05/08/17  5:45 PM   Result Value Ref Range    Sodium 145 136 - 145 mmol/L    Potassium 3.3 (L) 3.5 - 5.1 mmol/L    Chloride 106 98 - 107 mmol/L    CO2 33 (H) 21 - 32 mmol/L    Anion gap 6 (L) 7 - 16 mmol/L    Glucose 163 (H) 65 - 100 mg/dL    BUN 16 6 - 23 MG/DL    Creatinine 3.55 (H) 0.6 - 1.0 MG/DL    GFR est AA 18 (L) >60 ml/min/1.73m2    GFR est non-AA 15 (L) >60 ml/min/1.73m2    Calcium 7.3 (L) 8.3 - 10.4 MG/DL    Bilirubin, total 1.0 0.2 - 1.1 MG/DL    ALT (SGPT) 24 12 - 65 U/L    AST (SGOT) 26 15 - 37 U/L    Alk.  phosphatase 342 (H) 50 - 136 U/L    Protein, total 6.0 (L) 6.3 - 8.2 g/dL    Albumin 2.1 (L) 3.5 - 5.0 g/dL    Globulin 3.9 (H) 2.3 - 3.5 g/dL    A-G Ratio 0.5 (L) 1.2 - 3.5     PROTHROMBIN TIME + INR    Collection Time: 05/08/17  7:45 PM   Result Value Ref Range    Prothrombin time 16.1 (H) 9.6 - 12.0 sec    INR 1.5 (H) 0.9 - 1.2     EKG, 12 LEAD, INITIAL    Collection Time: 05/08/17  7:50 PM   Result Value Ref Range    Ventricular Rate 87 BPM    Atrial Rate 87 BPM    P-R Interval 124 ms    QRS Duration 84 ms    Q-T Interval 382 ms    QTC Calculation (Bezet) 459 ms    Calculated P Axis 20 degrees    Calculated R Axis 13 degrees    Calculated T Axis 68 degrees    Diagnosis       Normal sinus rhythm  Cannot rule out Anterior infarct , age undetermined  Abnormal ECG  When compared with ECG of 25-OCT-2016 13:29,  Minimal criteria for Anterior infarct are now Present  Nonspecific T wave abnormality now evident in Inferior leads  Nonspecific T wave abnormality, worse in Anterolateral leads  Confirmed by Mike Morrison ([id number]) on 5/8/2017 11:19:25 PM     GLUCOSE, POC    Collection Time: 05/08/17 10:50 PM   Result Value Ref Range    Glucose (POC) 66 65 - 100 mg/dL   CBC W/O DIFF    Collection Time: 05/08/17 11:42 PM   Result Value Ref Range    WBC 2.1 (L) 4.3 - 11.1 K/uL    RBC 2.31 (L) 4.05 - 5.25 M/uL    HGB 7.0 (L) 11.7 - 15.4 g/dL    HCT 21.2 (L) 35.8 - 46.3 %    MCV 91.8 79.6 - 97.8 FL    MCH 30.3 26.1 - 32.9 PG    MCHC 33.0 31.4 - 35.0 g/dL    RDW 20.6 (H) 11.9 - 14.6 %    PLATELET 61 (L) 486 - 450 K/uL    MPV 9.5 (L) 10.8 - 14.1 FL   D DIMER    Collection Time: 05/08/17 11:42 PM   Result Value Ref Range    D DIMER 7.05 (HH) <0.55 ug/ml(FEU)   HEMOGLOBIN A1C WITH EAG    Collection Time: 05/08/17 11:42 PM   Result Value Ref Range    Hemoglobin A1c <3.5 (L) 4.8 - 6.0 %    Est. average glucose Cannot be calulated mg/dL   MAGNESIUM    Collection Time: 05/08/17 11:42 PM   Result Value Ref Range    Magnesium 1.6 (L) 1.8 - 2.4 mg/dL   PHOSPHORUS    Collection Time: 05/08/17 11:42 PM   Result Value Ref Range    Phosphorus 2.0 (L) 2.5 - 4.5 MG/DL   PTT    Collection Time: 05/08/17 11:42 PM   Result Value Ref Range    aPTT 30.4 23.5 - 31.7 SEC   CK WITH MB    Collection Time: 05/08/17 11:42 PM   Result Value Ref Range    CK 25 21 - 215 U/L    CK - MB 0.6 0.5 - 3.6 ng/ml    CK-MB Index 2.4 <2.5 %   TROPONIN I    Collection Time: 05/08/17 11:42 PM   Result Value Ref Range    Troponin-I, Qt. <0.02 (L) 0.02 - 0.05 NG/ML   GLUCOSE, POC    Collection Time: 05/09/17  1:01 AM   Result Value Ref Range    Glucose (POC) 180 (H) 65 - 100 mg/dL   TYPE & CROSSMATCH    Collection Time: 05/09/17  3:15 AM   Result Value Ref Range    Crossmatch Expiration 05/12/2017 ABO/Rh(D) A POSITIVE     Antibody screen NEG     Unit number [id number]     Blood component type  LR AS5     Unit division 00     Status of unit ISSUED     Crossmatch result Compatible     Unit number [id number]     Blood component type  LR AS5     Unit division 00     Status of unit ALLOCATED     Crossmatch result Compatible    METABOLIC PANEL, COMPREHENSIVE    Collection Time: 05/09/17  4:30 AM   Result Value Ref Range    Sodium 144 136 - 145 mmol/L    Potassium 3.5 3.5 - 5.1 mmol/L    Chloride 105 98 - 107 mmol/L    CO2 32 21 - 32 mmol/L    Anion gap 7 7 - 16 mmol/L    Glucose 131 (H) 65 - 100 mg/dL    BUN 15 6 - 23 MG/DL    Creatinine 3.78 (H) 0.6 - 1.0 MG/DL    GFR est AA 17 (L) >60 ml/min/1.73m2    GFR est non-AA 14 (L) >60 ml/min/1.73m2    Calcium 7.2 (L) 8.3 - 10.4 MG/DL    Bilirubin, total 1.0 0.2 - 1.1 MG/DL    ALT (SGPT) 24 12 - 65 U/L    AST (SGOT) 29 15 - 37 U/L    Alk. phosphatase 360 (H) 50 - 136 U/L    Protein, total 5.8 (L) 6.3 - 8.2 g/dL    Albumin 2.0 (L) 3.5 - 5.0 g/dL    Globulin 3.8 (H) 2.3 - 3.5 g/dL    A-G Ratio 0.5 (L) 1.2 - 3.5     CK WITH MB    Collection Time: 05/09/17  4:30 AM   Result Value Ref Range    CK 28 21 - 215 U/L    CK - MB 0.6 0.5 - 3.6 ng/ml    CK-MB Index 2.1 <2.5 %   TROPONIN I    Collection Time: 05/09/17  4:30 AM   Result Value Ref Range    Troponin-I, Qt. <0.02 (L) 0.02 - 0.05 NG/ML   GLUCOSE, POC    Collection Time: 05/09/17  5:54 AM   Result Value Ref Range    Glucose (POC) 123 (H) 65 - 100 mg/dL           Assessment:     Active Problems:    ARF (acute renal failure) ([facility] (5/8/2017)      Abdominal pain (5/8/2017)      Abdominal pain, acute (5/8/2017)        Plan:     ESRD- dialysis TTS. Admitted with abdominal pain. Work up in progress. Plan dialysis today on her regular schedule- no heparin and will transfuse the remaining unit of blood .      Signed By: Mike Cannon MD     May 9, 2017       The patient was seen on dialysis at 10:08 AM .  BP is stable- but low as is typical. Catheter is functioning well.

## 2017-05-09 NOTE — PROGRESS NOTES
Pt resting quietly in bed, HOB elevated, parents at bedside. Pt A&Ox4 on RA. Lung sounds diminished, S1/S2 audible, peripheral pulses palpable, extremities warm. Abdomen distended. Pt has HD catheter to right chest and accessed port to left chest, sites clean, dry, and intact. Pt reporting severe nausea, 10/10 generalized pain. Skin assessment completed. Pt has large umbilical hernia, large horizontal scars to abdomen from previous liver transplants. Pt has small skin tear to sacrum, covered with zinc paste and Allevyn. Pt oriented to room, call light in place, pt instructed to call for assistance as needed.

## 2017-05-09 NOTE — PROGRESS NOTES
Consent signed, transfusion of first unit of PRBCs begun, pt tolerating well. VSS, BP 89/62, will monitor. Rate increased to 150mL/hr after initial 15 minutes. Will monitor.

## 2017-05-09 NOTE — PROGRESS NOTES
Problem: Interdisciplinary Rounds  Goal: Interdisciplinary Rounds  Outcome: Progressing Towards Goal  Interdisciplinary team rounds were held 5/9/2017 with the following team members:Care Management, Nursing and Clinical Coordinator and the patient. Plan of care discussed. See clinical pathway and/or care plan for interventions and desired outcomes.

## 2017-05-09 NOTE — WOUND CARE
Attempted X2 to see patient, out of room for dialysis. Spoke with primary nurse, will follow up tomorrow.

## 2017-05-09 NOTE — PROGRESS NOTES
Hospitalist Progress Note     Admit Date:  2017  8:52 PM   Name:  Elijah Ace   Age:  55 y.o.  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 Admitted and GI and Nephrology consulted.  - still with persistent nausea. Says she doesn't have anything left to vomit up. abd pain chronic, stable. Objective:     Patient Vitals for the past 24 hrs:   Temp Pulse Resp BP SpO2   17 1230 97.5 °F (36.4 °C) 83 18 103/72 -   17 1213 - 85 - 101/67 -   17 1138 98.3 °F (36.8 °C) 91 18 102/75 -   17 1123 98.3 °F (36.8 °C) 94 18 92/65 -   17 1051 - 94 - 96/72 -   17 1032 - 95 - 102/71 -   17 1004 - 94 - 101/72 -   17 0704 98.4 °F (36.9 °C) 94 16 119/75 96 %   17 0622 - - - 102/64 -   17 0615 98.2 °F (36.8 °C) 93 12 94/58 99 %   17 0514 98.3 °F (36.8 °C) 83 13 (!) 87/62 94 %   17 0458 98.2 °F (36.8 °C) 84 12 90/52 95 %   17 0456 98.2 °F (36.8 °C) 84 12 90/52 95 %   17 0331 98.2 °F (36.8 °C) 94 12 104/73 -   17 2350 98.5 °F (36.9 °C) (!) 105 12 108/73 92 %   17 98.4 °F (36.9 °C) 94 12 104/68 93 %     Oxygen Therapy  O2 Sat (%): 96 % (17 0704)  O2 Device: Room air (17 0331)    Intake/Output Summary (Last 24 hours) at 17 1252  Last data filed at 17 1237   Gross per 24 hour   Intake              310 ml   Output                0 ml   Net              310 ml         General:    Well nourished. Alert. CV:   RRR. No murmur, rub, or gallop. Lungs:   Clear to auscultation bilaterally.   No wheezing, rhonchi, or rales. Abdomen:   Soft, distended, mild-moderate TTP, diffuse. abd hernia. Extremities: Warm and dry. No cyanosis or edema. Skin:     No rashes or jaundice. Current Meds:  Current Facility-Administered Medications   Medication Dose Route Frequency    dextrose (D50W) injection syrg 25 g  25 g IntraVENous ONCE    magnesium oxide (MAG-OX) tablet 400 mg  400 mg Oral BID    0.9% sodium chloride infusion 250 mL  250 mL IntraVENous PRN    0.9% sodium chloride infusion 250 mL  250 mL IntraVENous PRN    potassium, sodium phosphates (NEUTRA-PHOS) packet 1 Packet  1 Packet Oral PRN    potassium, sodium phosphates (NEUTRA-PHOS) packet 1 Packet  1 Packet Oral PRN    sodium phosphate 20 mmol in 0.9% sodium chloride 250 mL infusion   IntraVENous PRN    magnesium sulfate 2 g/50 ml IVPB (premix or compounded)  2 g IntraVENous PRN    magnesium sulfate 4 g/100 mL IVPB  4 g IntraVENous PRN    magnesium sulfate 2 g/50 ml IVPB (premix or compounded)  2 g IntraVENous PRN    potassium chloride (KAON 10%) 20 mEq/15 mL oral liquid 40 mEq  40 mEq Oral PRN    potassium chloride 20 mEq in 100 ml IVPB  20 mEq IntraVENous PRN    morphine injection 4 mg  4 mg IntraVENous Q4H PRN    metoclopramide HCl (REGLAN) injection 5 mg  5 mg IntraVENous Q6H PRN    lactulose (CHRONULAC) solution 20 g  20 g Oral TID    midodrine (PROAMITINE) tablet 5 mg  5 mg Oral Q8H    sodium chloride (NS) flush 5-10 mL  5-10 mL IntraVENous Q8H    sodium chloride (NS) flush 5-10 mL  5-10 mL IntraVENous PRN    pantoprazole (PROTONIX) 40 mg in sodium chloride 0.9 % 10 mL injection  40 mg IntraVENous Q12H    ondansetron (ZOFRAN) injection 4 mg  4 mg IntraVENous Q6H PRN    promethazine (PHENERGAN) with saline injection 12.5 mg  12.5 mg IntraVENous Q4H PRN    morphine injection 10 mg  10 mg IntraVENous Q4H PRN       Labs and Studies:  I have reviewed all labs, meds, telemetry events, and studies from the last 24 hours.     Recent Results (from the past 24 hour(s))   GLUCOSE, POC    Collection Time: 05/08/17  5:32 PM   Result Value Ref Range    Glucose (POC) 131 (H) 65 - 100 mg/dL   CBC WITH AUTOMATED DIFF    Collection Time: 05/08/17  5:45 PM   Result Value Ref Range    WBC 2.2 (L) 4.3 - 11.1 K/uL    RBC 2.44 (L) 4.05 - 5.25 M/uL    HGB 7.1 (L) 11.7 - 15.4 g/dL    HCT 22.5 (L) 35.8 - 46.3 %    MCV 92.2 79.6 - 97.8 FL    MCH 29.1 26.1 - 32.9 PG    MCHC 31.6 31.4 - 35.0 g/dL    RDW 20.8 (H) 11.9 - 14.6 %    PLATELET 58 (L) 780 - 450 K/uL    MPV 10.1 (L) 10.8 - 14.1 FL    DF AUTOMATED      NEUTROPHILS 61 43 - 78 %    LYMPHOCYTES 24 13 - 44 %    MONOCYTES 10 4.0 - 12.0 %    EOSINOPHILS 5 0.5 - 7.8 %    BASOPHILS 0 0.0 - 2.0 %    IMMATURE GRANULOCYTES 0.0 0.0 - 5.0 %    ABS. NEUTROPHILS 1.3 (L) 1.7 - 8.2 K/UL    ABS. LYMPHOCYTES 0.5 0.5 - 4.6 K/UL    ABS. MONOCYTES 0.2 0.1 - 1.3 K/UL    ABS. EOSINOPHILS 0.1 0.0 - 0.8 K/UL    ABS. BASOPHILS 0.0 0.0 - 0.2 K/UL    ABS. IMM. GRANS. 0.0 0.0 - 0.5 K/UL   METABOLIC PANEL, COMPREHENSIVE    Collection Time: 05/08/17  5:45 PM   Result Value Ref Range    Sodium 145 136 - 145 mmol/L    Potassium 3.3 (L) 3.5 - 5.1 mmol/L    Chloride 106 98 - 107 mmol/L    CO2 33 (H) 21 - 32 mmol/L    Anion gap 6 (L) 7 - 16 mmol/L    Glucose 163 (H) 65 - 100 mg/dL    BUN 16 6 - 23 MG/DL    Creatinine 3.55 (H) 0.6 - 1.0 MG/DL    GFR est AA 18 (L) >60 ml/min/1.73m2    GFR est non-AA 15 (L) >60 ml/min/1.73m2    Calcium 7.3 (L) 8.3 - 10.4 MG/DL    Bilirubin, total 1.0 0.2 - 1.1 MG/DL    ALT (SGPT) 24 12 - 65 U/L    AST (SGOT) 26 15 - 37 U/L    Alk.  phosphatase 342 (H) 50 - 136 U/L    Protein, total 6.0 (L) 6.3 - 8.2 g/dL    Albumin 2.1 (L) 3.5 - 5.0 g/dL    Globulin 3.9 (H) 2.3 - 3.5 g/dL    A-G Ratio 0.5 (L) 1.2 - 3.5     PROTHROMBIN TIME + INR    Collection Time: 05/08/17  7:45 PM   Result Value Ref Range    Prothrombin time 16.1 (H) 9.6 - 12.0 sec    INR 1.5 (H) 0.9 - 1.2     EKG, 12 LEAD, INITIAL    Collection Time: 05/08/17 7:50 PM   Result Value Ref Range    Ventricular Rate 87 BPM    Atrial Rate 87 BPM    P-R Interval 124 ms    QRS Duration 84 ms    Q-T Interval 382 ms    QTC Calculation (Bezet) 459 ms    Calculated P Axis 20 degrees    Calculated R Axis 13 degrees    Calculated T Axis 68 degrees    Diagnosis       Normal sinus rhythm  Cannot rule out Anterior infarct , age undetermined  Abnormal ECG  When compared with ECG of 25-OCT-2016 13:29,  Minimal criteria for Anterior infarct are now Present  Nonspecific T wave abnormality now evident in Inferior leads  Nonspecific T wave abnormality, worse in Anterolateral leads  Confirmed by Fidel Garcia ([id number]) on 5/8/2017 11:19:25 PM     GLUCOSE, POC    Collection Time: 05/08/17 10:50 PM   Result Value Ref Range    Glucose (POC) 66 65 - 100 mg/dL   CBC W/O DIFF    Collection Time: 05/08/17 11:42 PM   Result Value Ref Range    WBC 2.1 (L) 4.3 - 11.1 K/uL    RBC 2.31 (L) 4.05 - 5.25 M/uL    HGB 7.0 (L) 11.7 - 15.4 g/dL    HCT 21.2 (L) 35.8 - 46.3 %    MCV 91.8 79.6 - 97.8 FL    MCH 30.3 26.1 - 32.9 PG    MCHC 33.0 31.4 - 35.0 g/dL    RDW 20.6 (H) 11.9 - 14.6 %    PLATELET 61 (L) 409 - 450 K/uL    MPV 9.5 (L) 10.8 - 14.1 FL   D DIMER    Collection Time: 05/08/17 11:42 PM   Result Value Ref Range    D DIMER 7.05 (HH) <0.55 ug/ml(FEU)   HEMOGLOBIN A1C WITH EAG    Collection Time: 05/08/17 11:42 PM   Result Value Ref Range    Hemoglobin A1c <3.5 (L) 4.8 - 6.0 %    Est. average glucose Cannot be calulated mg/dL   MAGNESIUM    Collection Time: 05/08/17 11:42 PM   Result Value Ref Range    Magnesium 1.6 (L) 1.8 - 2.4 mg/dL   PHOSPHORUS    Collection Time: 05/08/17 11:42 PM   Result Value Ref Range    Phosphorus 2.0 (L) 2.5 - 4.5 MG/DL   PTT    Collection Time: 05/08/17 11:42 PM   Result Value Ref Range    aPTT 30.4 23.5 - 31.7 SEC   CK WITH MB    Collection Time: 05/08/17 11:42 PM   Result Value Ref Range    CK 25 21 - 215 U/L    CK - MB 0.6 0.5 - 3.6 ng/ml    CK-MB Index 2.4 <2.5 %   TROPONIN I    Collection Time: 05/08/17 11:42 PM   Result Value Ref Range    Troponin-I, Qt. <0.02 (L) 0.02 - 0.05 NG/ML   GLUCOSE, POC    Collection Time: 05/09/17  1:01 AM   Result Value Ref Range    Glucose (POC) 180 (H) 65 - 100 mg/dL   TYPE & CROSSMATCH    Collection Time: 05/09/17  3:15 AM   Result Value Ref Range    Crossmatch Expiration 05/12/2017     ABO/Rh(D) A POSITIVE     Antibody screen NEG     Unit number [id number]     Blood component type  LR AS5     Unit division 00     Status of unit ISSUED     Crossmatch result Compatible     Unit number [id number]     Blood component type  LR AS5     Unit division 00     Status of unit ISSUED     Crossmatch result Compatible    METABOLIC PANEL, COMPREHENSIVE    Collection Time: 05/09/17  4:30 AM   Result Value Ref Range    Sodium 144 136 - 145 mmol/L    Potassium 3.5 3.5 - 5.1 mmol/L    Chloride 105 98 - 107 mmol/L    CO2 32 21 - 32 mmol/L    Anion gap 7 7 - 16 mmol/L    Glucose 131 (H) 65 - 100 mg/dL    BUN 15 6 - 23 MG/DL    Creatinine 3.78 (H) 0.6 - 1.0 MG/DL    GFR est AA 17 (L) >60 ml/min/1.73m2    GFR est non-AA 14 (L) >60 ml/min/1.73m2    Calcium 7.2 (L) 8.3 - 10.4 MG/DL    Bilirubin, total 1.0 0.2 - 1.1 MG/DL    ALT (SGPT) 24 12 - 65 U/L    AST (SGOT) 29 15 - 37 U/L    Alk.  phosphatase 360 (H) 50 - 136 U/L    Protein, total 5.8 (L) 6.3 - 8.2 g/dL    Albumin 2.0 (L) 3.5 - 5.0 g/dL    Globulin 3.8 (H) 2.3 - 3.5 g/dL    A-G Ratio 0.5 (L) 1.2 - 3.5     CK WITH MB    Collection Time: 05/09/17  4:30 AM   Result Value Ref Range    CK 28 21 - 215 U/L    CK - MB 0.6 0.5 - 3.6 ng/ml    CK-MB Index 2.1 <2.5 %   TROPONIN I    Collection Time: 05/09/17  4:30 AM   Result Value Ref Range    Troponin-I, Qt. <0.02 (L) 0.02 - 0.05 NG/ML   TROPONIN I    Collection Time: 05/09/17  5:47 AM   Result Value Ref Range    Troponin-I, Qt. <0.02 (L) 0.02 - 0.05 NG/ML   CK WITH MB    Collection Time: 05/09/17  5:47 AM   Result Value Ref Range    CK 77 21 - 215 U/L    CK - MB <0.5 (L) 0.5 - 3.6 ng/ml    CK-MB Index Cannot be calulated <2.5 %   GLUCOSE, POC    Collection Time: 05/09/17  5:54 AM   Result Value Ref Range    Glucose (POC) 123 (H) 65 - 100 mg/dL   CBC WITH AUTOMATED DIFF    Collection Time: 05/09/17 10:00 AM   Result Value Ref Range    WBC 2.8 (L) 4.3 - 11.1 K/uL    RBC 3.06 (L) 4.05 - 5.25 M/uL    HGB 9.0 (L) 11.7 - 15.4 g/dL    HCT 27.5 (L) 35.8 - 46.3 %    MCV 89.9 79.6 - 97.8 FL    MCH 29.4 26.1 - 32.9 PG    MCHC 32.7 31.4 - 35.0 g/dL    RDW 18.9 (H) 11.9 - 14.6 %    PLATELET 64 (L) 690 - 450 K/uL    MPV 9.6 (L) 10.8 - 14.1 FL    DF AUTOMATED      NEUTROPHILS 54 43 - 78 %    LYMPHOCYTES 28 13 - 44 %    MONOCYTES 8 4.0 - 12.0 %    EOSINOPHILS 10 (H) 0.5 - 7.8 %    BASOPHILS 0 0.0 - 2.0 %    IMMATURE GRANULOCYTES 0.4 0.0 - 5.0 %    ABS. NEUTROPHILS 1.5 (L) 1.7 - 8.2 K/UL    ABS. LYMPHOCYTES 0.8 0.5 - 4.6 K/UL    ABS. MONOCYTES 0.2 0.1 - 1.3 K/UL    ABS. EOSINOPHILS 0.3 0.0 - 0.8 K/UL    ABS. BASOPHILS 0.0 0.0 - 0.2 K/UL    ABS. IMM.  GRANS. 0.0 0.0 - 0.5 K/UL   PROTHROMBIN TIME + INR    Collection Time: 05/09/17 10:00 AM   Result Value Ref Range    Prothrombin time 16.4 (H) 9.6 - 12.0 sec    INR 1.5 (H) 0.9 - 1.2     GLUCOSE, POC    Collection Time: 05/09/17 10:10 AM   Result Value Ref Range    Glucose (POC) 128 (H) 65 - 100 mg/dL        US GUIDE PARACENTESIS    (Results Pending)       All Micro Results     Procedure Component Value Units Date/Time    CULTURE, BODY FLUID Henrene Cleveland STAIN [[id number]]     Order Status:  Sent Specimen:  Ascitic Fluid             Assessment and Plan:     Hospital Problems as of 5/9/2017  Date Reviewed: 3/2/2017          Codes Class Noted - Resolved POA    * (Principal)Intractable nausea and vomiting ICD-10-CM: R11.2  ICD-9-CM: 536.2  5/9/2017 - Present Yes        Coagulopathy ([address] (Chronic) ICD-10-CM: D68.9  ICD-9-CM: 286.9  5/9/2017 - Present Yes    Overview Signed 5/9/2017 10:57 AM by Fuad Birmingham MD     Liver disease             Cirrhosis of liver with ascites (HCC) (Chronic) ICD-10-CM: K74.60  ICD-9-CM: 571.5  5/9/2017 - Present Yes        Abdominal pain ICD-10-CM: R10.9  ICD-9-CM: 789.00  5/8/2017 - Present Yes        Abdominal pain, acute ICD-10-CM: R10.9  ICD-9-CM: 789.00, 338.19  5/8/2017 - Present Yes        Liver transplant recipient [facility]) (Chronic) ICD-10-CM: Z94.4  ICD-9-CM: V42.7  2/13/2017 - Present Yes        Hemodialysis access, AV graft ([facility] [address] (Chronic) ICD-10-CM: Z99.2  ICD-9-CM: V45.11  11/22/2016 - Present Yes        ESRD (end stage renal disease) on dialysis [facility]) (Chronic) ICD-10-CM: N18.6, Z99.2  ICD-9-CM: 585.6, V45.11  11/2/2016 - Present Yes        Esophageal varices (HCC) (Chronic) ICD-10-CM: I85.00  ICD-9-CM: 456.1  7/27/2016 - Present Yes        Congenital hepatic fibrosis (Chronic) ICD-10-CM: P78.81  ICD-9-CM: 777.8  Unknown - Present Yes        Hypotension (Chronic) ICD-10-CM: I95.9  ICD-9-CM: 458.9  7/6/2016 - Present Yes        Type 2 diabetes mellitus with hyperglycemia (HCC) (Chronic) ICD-10-CM: E11.65  ICD-9-CM: 250.00  7/4/2016 - Present Yes        Iron deficiency anemia (Chronic) ICD-10-CM: D50.9  ICD-9-CM: 280.9  7/4/2016 - Present Yes        Thrombocytopenia (HCC) (Chronic) ICD-10-CM: D69.6  ICD-9-CM: 287.5  7/4/2016 - Present Yes        Hepatitis C (Chronic) ICD-10-CM: B19.20  ICD-9-CM: 070.70  7/4/2016 - Present Yes                PLAN:    · Appreciate GI and Nephrology consultants  · Blood transfusion with HD today. Ferritin was low in the past; recheck today. Not on iron at home. Monitor CBC  · Paracentesis ordered. If showing SBP will start zosyn but would hold off for now. Ideally paracentesis done before abx  · Hypotension stable, chronic, from liver disease. Cont midodrine  · Monitor thrombocytopenia from liver disease. Avoid AC  · Continue PRN meds as listed for abd pain, nausea, vomiting. · Monitor and replace lytes.     DC planning:  Likely home when improved  DVT ppx:  SCDs  Discussed plan with: Pt  Risk:  High from numerous comorbidities, Iv narcotics    Signed:  Kaia Zacarias MD

## 2017-05-09 NOTE — DIALYSIS
Hemodialysis treatment initiated using Right chest perma cath. Aspirated and flushed both ports without problem. Dressing clean, dry and intact. Pt alert and VS stable. HR 94; /72 Machine settings per MD order. Will monitor during treatment.

## 2017-05-09 NOTE — PROGRESS NOTES
Patient very drowsy, barely arousable. Pushed 0.4mg of Narcan. Patient became more alert and verbal. Family by bedside.

## 2017-05-09 NOTE — PROGRESS NOTES
TRANSFER - OUT REPORT:    Verbal report given to Zach June RN(name) on Andrade Naidu  being transferred to [facility](unit) for routine progression of care       Report consisted of patients Situation, Background, Assessment and   Recommendations(SBAR). Information from the following report(s) SBAR, Procedure Summary and MAR was reviewed with the receiving nurse. Lines:   Venous Access Device PORT A CATH 03/03/17 Upper chest (subclavicular area), left (Active)   Central Line Being Utilized Yes 5/9/2017  2:56 PM   Criteria for Appropriate Use Limited/no vessel suitable for conventional peripheral access 5/9/2017  2:56 PM   Site Assessment Clean, dry, & intact 5/9/2017  2:56 PM   Date of Last Dressing Change 05/05/17 5/9/2017  8:59 AM   Dressing Status Clean, dry, & intact 5/9/2017  2:56 PM   Dressing Type Bacteriocidal;Tape;Transparent 5/9/2017  2:56 PM   Alcohol Cap Used No 5/9/2017  3:50 AM        Opportunity for questions and clarification was provided.       Patient transported with:   Transport

## 2017-05-09 NOTE — PROGRESS NOTES
Patient to [facility] room for procedure. Patient awake and alert, verbalized name, , and procedure to be performed. Patient remains in stretcher for procedure.

## 2017-05-09 NOTE — CONSULTS
[facility] Consult Note       Primary GI Physician: Dr. Anila Woodard    Referring Physician:  Dr. Magdiel Weaver    Consult Date:  5/9/2017    Admit Date:  5/8/2017    Chief Complaint:  N&V    Subjective:     History of Present Illness:  Patient is a 55 y.o. 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 Her cirrhosis has been complicated by esophageal varices s/p banding x2 on 7/2016, recurrent ascites with failed TIPS 3/2016 and history of SBP, renal failure on HD who is seen in consultation at the request of Dr.K. Roseanne Glaser for N&V. The patient is well known to our practice with long standing history of chronic abdominal pain and N&V. She has had multiple endoscopy procedures with her most recent one on 7/27/16 by Dr. Sandrita Menchaca which revealed variceal bleeding and bands x 2 were placed. She takes protonix 40mg one po bid, zofran and phenergan daily. She has been having progressively worsening N&V over the last several weeks. She has generalized abdominal pain which is unchanged. She denies any change in her bowel habits. She has a BM 3-4x daily. She remains on Lactulose and Xifaxan. She denies any dysphagia, but has been having increasing GERD over the last few weeks. She denies any NSAIDS, ETOH, or Tobacco. She has lost 16 pounds since February. Her LFTS have improved since 2/23/17. AP at that time was 625, AST 44, ALT 18, T bili 1.1. Current labs on 5/8/17 at time of admission revealed an , AST 26, ALT 24 and T bili 1.0, Her creat has worsened 5/8/17 3.55 from 3.18 2/2017. She is s/p cholecystectomy. She has a known large umbilical hernia that has recently worsened. She has been evaluated by surgery previously, but is not a candidate due to her current decompensated cirrhosis. She was recently admitted at [facility] with variceal bleeding 3/2017.  She has been scheduled for outpatient EUS, but doesn't recall when this appt is. She is currently not being considered for transplantation by them and has a follow up appt at [address] transplant [place] next month. She has had to obtain frequent paracentesis for recurrent ascites. She last had a paracentesis on 3/6/17 with 3600cc of fluid removed. IR has suggested tunneled abdominal drain placement. Repeat paracentesis with fluid analysis is pending. Currently receiving 1 U PRBC with her dialysis treatment. Colonoscopy 3/11/99 by Dr. Bill Zuluaga revealed minute punctate sporadic ulcers without pseudomembranous.      PMH:  Past Medical History:   Diagnosis Date    SEAN (acute kidney injury) ([address] 6/26/2016    Arthritis     kath feet    Ascites     Benign neoplasm of skin of trunk, except scrotum     pt denies    Cellulitis and abscess of unspecified digit     hx of    Chronic kidney disease     [facility] on Mon/Wed/Fri    Chronic pain     abd area    Congenital hepatic fibrosis     Liver transplant X2    Esophageal varices (HCC)     GERD (gastroesophageal reflux disease)     Hemodialysis access, AV graft ([address] 11/22/2016    Hepatic encephalopathy ([address] 10/2016    recently hospitalized for hepatic encephalopathy    Hepatitis C     hx of hepatitis C-- dx after first liver transplant from a transfusion   (first transplant age 13)   [facility] History of gastric ulcer     Insomnia 07/13/2015    no current meds    Liver transplant status ([address] 1986 and 1999    X2- congential hepatic fibrosis    Pain in joint, ankle and foot     PICC (peripherally inserted central catheter) in place     PONV (postoperative nausea and vomiting)     some N/V, pt states she does well with Phenergan    Port-a-cath in place     R chest for HD    Raynaud disease     Spleen enlarged     Tachycardia     Thrombocytopenia (HCC)     Type 2 diabetes mellitus (HCC)     insulin only/AVG /s.s of hypoglycemia 30's/Last A1c 5.6    Vasculitis ([address] resolved       PSH:  Past Surgical History:   Procedure Laterality Date    HX CHOLECYSTECTOMY  1984    HX COLONOSCOPY      HX OTHER SURGICAL      biliary reconstructive surgery    HX OTHER SURGICAL  2013    EGD    HX OTHER SURGICAL  03/2016    tips procedure    HX OTHER SURGICAL      paracentesis    HX TRANSPLANT  [id number],3175    2 liver - first one for congenital hepatic fibrosis, 2nd for Hep C cirrhosis    HX TUBAL LIGATION      LAP,TUBAL CAUTERY      VASCULAR SURGERY PROCEDURE UNLIST Left 11/02/2016    thigh AVG insertion in thigh       Allergies: Allergies   Allergen Reactions    Aspirin Nausea Only    Codeine Nausea Only    Compazine [Prochlorperazine Edisylate] Unknown (comments)     Causes Lock Jaw    Pcn [Penicillins] Other (comments)     \"drops wbc\"       Home Medications:  Prior to Admission medications    Medication Sig Start Date End Date Taking? Authorizing Provider   insulin glargine (LANTUS SOLOSTAR) 100 unit/mL (3 mL) pen 25 Units by SubCUTAneous route once. patient takes a needed depending on her Bs    Historical Provider   promethazine (PHENERGAN) 25 mg tablet Take 25 mg by mouth every six (6) hours as needed for Nausea. Historical Provider   insulin aspart (NOVOLOG) 100 unit/mL injection Sliding scale maximum 15 units each meal 3/24/17   Mellisa Kohler MD   insulin glargine (LANTUS SOLOSTAR) 100 unit/mL (3 mL) pen 25 Units by SubCUTAneous route nightly. 3/24/17   Mellisa Kohler MD   calcium acetate (PHOSLO) 667 mg cap Take 1 Cap by mouth three (3) times daily (with meals). 3/24/17   Mellisa Kohler MD   ondansetron hcl (ZOFRAN) 4 mg tablet Take 1 Tab by mouth every eight (8) hours as needed for Nausea. 3/24/17   Mellisa Kohler MD   insulin lispro (HUMALOG) 100 unit/mL kwikpen Up to 10 units sq q ac 3/2/17   Mellisa Kohler MD   lactulose (CHRONULAC) 10 gram/15 mL solution Take 30 mL by mouth three (3) times daily.  2/22/17   Jayla Shock DO Abdirizak   morphine CR (MS CONTIN) 15 mg CR tablet Take 1 Tab by mouth every twelve (12) hours. Max Daily Amount: 30 mg. 2/22/17   Indira Vernon DO   colchicine (MITIGARE) 0.6 mg capsule Take 0.6 mg by mouth daily. Historical Provider   traMADol (ULTRAM) 50 mg tablet Take 1 Tab by mouth every six (6) hours as needed. Max Daily Amount: 200 mg. Indications: PAIN 11/3/16   Sonia Garces MD   magnesium oxide (MAG-OX) 400 mg tablet Take 400 mg by mouth two (2) times a day. Historical Provider   cycloSPORINE modified (GENGRAF) 25 mg capsule Take 2.5 mg/kg/day by mouth every morning. Indications: Takes in the AM    Historical Provider   midodrine (PROAMITINE) 5 mg tablet Take 5 mg by mouth every eight (8) hours. 2 q8h     Historical Provider   XIFAXAN 550 mg tablet Take 550 mg by mouth two (2) times a day. 3/22/16   Historical Provider   pantoprazole (PROTONIX) 40 mg tablet Take 40 mg by mouth four (4) times daily.  2tabs bid    Historical Provider       Hospital Medications:  Current Facility-Administered Medications   Medication Dose Route Frequency    dextrose (D50W) injection syrg 25 g  25 g IntraVENous ONCE    magnesium oxide (MAG-OX) tablet 400 mg  400 mg Oral BID    0.9% sodium chloride infusion 250 mL  250 mL IntraVENous PRN    0.9% sodium chloride infusion 250 mL  250 mL IntraVENous PRN    potassium, sodium phosphates (NEUTRA-PHOS) packet 1 Packet  1 Packet Oral PRN    potassium, sodium phosphates (NEUTRA-PHOS) packet 1 Packet  1 Packet Oral PRN    sodium phosphate 20 mmol in 0.9% sodium chloride 250 mL infusion   IntraVENous PRN    magnesium sulfate 2 g/50 ml IVPB (premix or compounded)  2 g IntraVENous PRN    magnesium sulfate 4 g/100 mL IVPB  4 g IntraVENous PRN    magnesium sulfate 2 g/50 ml IVPB (premix or compounded)  2 g IntraVENous PRN    potassium chloride (KAON 10%) 20 mEq/15 mL oral liquid 40 mEq  40 mEq Oral PRN    potassium chloride 20 mEq in 100 ml IVPB  20 mEq IntraVENous PRN    lactulose (CHRONULAC) solution 20 g  20 g Oral TID    midodrine (PROAMITINE) tablet 5 mg  5 mg Oral Q8H    sodium chloride (NS) flush 5-10 mL  5-10 mL IntraVENous Q8H    sodium chloride (NS) flush 5-10 mL  5-10 mL IntraVENous PRN    pantoprazole (PROTONIX) 40 mg in sodium chloride 0.9 % 10 mL injection  40 mg IntraVENous Q12H    ondansetron (ZOFRAN) injection 4 mg  4 mg IntraVENous Q6H PRN    promethazine (PHENERGAN) with saline injection 12.5 mg  12.5 mg IntraVENous Q4H PRN    morphine injection 10 mg  10 mg IntraVENous Q4H PRN       Social History:  Social History   Substance Use Topics    Smoking status: Never Smoker    Smokeless tobacco: Never Used    Alcohol use No       Family History:  Family History   Problem Relation Age of Onset    Diabetes Mother     Heart Disease Mother     Hypertension Mother     Heart Disease Father     Stroke Father     Cancer Maternal Grandfather      liver cancer, alcoholism    No Known Problems Sister     Cancer Maternal Aunt      cervical cancer    Breast Cancer Paternal Grandmother      early 45s    Colon Cancer Paternal Grandmother      late 76s    Cancer Other      maternal niece- cervical cancer    Bipolar Disorder Brother     Heart Disease Brother        Review of Systems:  A detailed 10 system ROS is obtained, with pertinent positives as listed above. All others are negative. Diet:  Clear liquid diet    Objective:     Physical Exam:  Vitals:  Visit Vitals    /67    Pulse 85    Temp 98.3 °F (36.8 °C)    Resp 18    LMP 01/02/2016    SpO2 96%    Breastfeeding No     Gen:  Pt is alert, cooperative, no acute distress THIN; IN HD; VOMITED ONCE DURING HPI  Skin:  Extremities and face reveal no rashes. HEENT: Sclerae anicteric. Extra-occular muscles are intact. No oral ulcers. No abnormal pigmentation of the lips. The neck is supple. Cardiovascular: Regular rate and rhythm. No murmurs, gallops, or rubs.   Respiratory: Comfortable breathing with no accessory muscle use. Clear breath sounds anteriorly with no wheezes, rales, or rhonchi. GI:  Abdomen DISTENTED WITH MODERATE ASCITES. LARGE UMBILICAL HERNIA NOT REDUCIBLE. WITHOUT ERYTHEMA OR TENDERNESS. Normal active bowel soundX 4 No enlargement of the liver or spleen. No masses palpable. Rectal:  Deferred  Musculoskeletal:  No pitting edema of the lower legs. Neurological:  Gross memory appears intact. Patient is alert and oriented X4. NEGATIVE ASTERIXIS  Psychiatric:  Mood appears appropriate with judgement intact. Lymphatic:  No cervical or supraclavicular adenopathy.     Laboratory:    Recent Labs      05/09/17   1000  05/09/17   0430  05/08/17   2342  05/08/17   1945  05/08/17   1745   WBC  2.8*   --   2.1*   --   2.2*   HGB  9.0*   --   7.0*   --   7.1*   HCT  27.5*   --   21.2*   --   22.5*   PLT  64*   --   61*   --   58*   MCV  89.9   --   91.8   --   92.2   NA   --   144   --    --   145   K   --   3.5   --    --   3.3*   CL   --   105   --    --   106   CO2   --   32   --    --   33*   BUN   --   15   --    --   16   CREA   --   3.78*   --    --   3.55*   CA   --   7.2*   --    --   7.3*   MG   --    --   1.6*   --    --    GLU   --   131*   --    --   163*   AP   --   360*   --    --   342*   SGOT   --   29   --    --   26   ALT   --   24   --    --   24   TBILI   --   1.0   --    --   1.0   ALB   --   2.0*   --    --   2.1*   TP   --   5.8*   --    --   6.0*   PTP  16.4*   --    --   16.1*   --    INR  1.5*   --    --   1.5*   --    APTT   --    --   30.4   --    --           Assessment:     Principal Problem:    Intractable nausea and vomiting (5/9/2017)    Active Problems:    Type 2 diabetes mellitus with hyperglycemia (HCC) (7/4/2016)      Iron deficiency anemia (7/4/2016)      Thrombocytopenia (HCC) (7/4/2016)      Hepatitis C (7/4/2016)      Congenital hepatic fibrosis ()      Hypotension (7/6/2016)      Esophageal varices (HCC) (7/27/2016)      ESRD (end stage renal disease) on dialysis ([facility] (11/2/2016)      Hemodialysis access, AV graft ([facility] (11/22/2016)      Liver transplant recipient [facility]) (2/13/2017)      Abdominal pain (5/8/2017)      Abdominal pain, acute (5/8/2017)      Coagulopathy ([facility] (5/9/2017)      Overview: Liver disease      Cirrhosis of liver with ascites ([facility] (5/9/2017)    55 y.o. 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 Her cirrhosis has been complicated by esophageal varices s/p banding x2 on 7/2016, recurrent ascites with failed TIPS 3/2016 and history of SBP, renal failure on HD who is seen in consultation at the request of Dr.K. Ashanti Ye for N&V. She has been followed at [facility] hepatology, but with upcoming appt at [address] transplant [place]. She has had multiple endoscopy procedures with her most recent one on 7/27/16 by Dr. Nain Stovall which revealed variceal bleeding and bands x 2 were placed. She takes protonix 40mg one po bid, zofran and phenergan daily. She has been having progressively worsening N&V over the last several weeks. This is likely multifactoral with her worsening renal function, known liver disease, and recurrent ascites. Plan: 1. Agree with repeat paracentesis with fluid analysis to assess for SBP  2. Continue current antiemetics, but increase zofran to 8mg and give scheduled. Consider adding Reglan if not improving  3. Abdominal US to assess the biliary tree  4. Continue PPI bid  5. Continue clear liquid diet  6. Continue Xifaxan and lactulose  7. No plans for repeat endoscopy at this time, but will continue to follow with you. Cody Roy. Atilio Benavidez [place] [id number]  [facility]      Patient is seen and examined in collaboration with Dr. Ruddy Garcia. Assessment and plan as per Dr.P. Anika Magaña. =If her sx of epigastric pain and N/V persist then an EGD will be necessary.   Await above studies first.  Little De Jesus MD

## 2017-05-09 NOTE — PROGRESS NOTES
Decreased IV narctoics and stopped IV phenergan due to lethargy. Holding parameters in place to not give narcotics if lethargic/somnolent.

## 2017-05-10 NOTE — PROGRESS NOTES
Pt. Sitting up in chair eating lunch. No acute distress noted at this time. Call light in reach. Instructed to call for assistance.

## 2017-05-10 NOTE — PROGRESS NOTES
Night was uneventful. Pt. Voiced no complaints. VS remained stable. Respirations currently even and unlabored.

## 2017-05-10 NOTE — PROGRESS NOTES
Pt. resting quietly in bed. No acute distress noted. Sitting up in bed awake and alert watching tv. Call light in reach. Instructed to call for assistance.

## 2017-05-10 NOTE — PROGRESS NOTES
Hospitalist Progress Note     Admit Date:  2017  8:52 PM   Name:  Adama Lorenzo   Age:  55 y.o.  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 Admitted and GI and Nephrology consulted. 5/10 - doing well. Denies any abdominal pain, nausea or vomiting  Had paracentesis yesterday with removal of 5.3 ltrs. Fluid analysis showed SAAG of 1.7 suggestive for transudate with WBC < 100.       Objective:     Patient Vitals for the past 24 hrs:   Temp Pulse Resp BP SpO2   05/10/17 0747 98.2 °F (36.8 °C) 85 18 119/76 95 %   05/10/17 0431 98.2 °F (36.8 °C) 80 18 111/80 90 %   05/10/17 0230 - 83 - - -   05/10/17 0058 98.9 °F (37.2 °C) 60 19 125/79 100 %   17 1907 98.4 °F (36.9 °C) 91 18 108/73 98 %   17 1617 - 84 16 108/68 98 %   17 1604 - 85 14 110/70 96 %   17 1557 - 86 15 105/74 95 %   17 1537 - 87 16 108/72 92 %   17 1512 98.3 °F (36.8 °C) 91 18 102/70 95 %   17 1331 - 84 - 97/71 -   17 1259 - 95 - 105/86 -   17 1230 97.5 °F (36.4 °C) 83 18 103/72 -   17 1213 - 85 - 101/67 -   17 1138 98.3 °F (36.8 °C) 91 18 102/75 -   17 1123 98.3 °F (36.8 °C) 94 18 92/65 -   17 1051 - 94 - 96/72 -   17 1032 - 95 - 102/71 -   17 1004 - 94 - 101/72 -     Oxygen Therapy  O2 Sat (%): 95 % (05/10/17 0747)  O2 Device: Room air (17 1617)    Intake/Output Summary (Last 24 hours) at 05/10/17 0812  Last data filed at 05/10/17 0058   Gross per 24 hour   Intake              750 ml   Output             1000 ml   Net             -250 ml         General: Well nourished. Alert. CV:   RRR. No murmur, rub, or gallop. Lungs:   Clear to auscultation bilaterally. No wheezing, rhonchi, or rales. Abdomen:   Soft, mildly distended, mild vTTP, diffuse. abd hernia. Extremities: Warm and dry. No cyanosis or edema. Skin:     No rashes or jaundice. Current Meds:  Current Facility-Administered Medications   Medication Dose Route Frequency    magnesium oxide (MAG-OX) tablet 400 mg  400 mg Oral BID    0.9% sodium chloride infusion 250 mL  250 mL IntraVENous PRN    0.9% sodium chloride infusion 250 mL  250 mL IntraVENous PRN    metoclopramide HCl (REGLAN) injection 5 mg  5 mg IntraVENous Q6H PRN    ondansetron (ZOFRAN ODT) tablet 8 mg  8 mg Oral TID    morphine injection 2 mg  2 mg IntraVENous Q4H PRN    acetaminophen (TYLENOL) tablet 650 mg  650 mg Oral Q6H PRN    HYDROmorphone (PF) (DILAUDID) injection 0.2 mg  0.2 mg IntraVENous Q4H PRN    ondansetron (ZOFRAN) injection 4 mg  4 mg IntraVENous Q6H PRN    lactulose (CHRONULAC) solution 20 g  20 g Oral TID    midodrine (PROAMITINE) tablet 5 mg  5 mg Oral Q8H    sodium chloride (NS) flush 5-10 mL  5-10 mL IntraVENous Q8H    sodium chloride (NS) flush 5-10 mL  5-10 mL IntraVENous PRN    pantoprazole (PROTONIX) 40 mg in sodium chloride 0.9 % 10 mL injection  40 mg IntraVENous Q12H       Labs and Studies:  I have reviewed all labs, meds, telemetry events, and studies from the last 24 hours.     Recent Results (from the past 24 hour(s))   CBC WITH AUTOMATED DIFF    Collection Time: 05/09/17 10:00 AM   Result Value Ref Range    WBC 2.8 (L) 4.3 - 11.1 K/uL    RBC 3.06 (L) 4.05 - 5.25 M/uL    HGB 9.0 (L) 11.7 - 15.4 g/dL    HCT 27.5 (L) 35.8 - 46.3 %    MCV 89.9 79.6 - 97.8 FL    MCH 29.4 26.1 - 32.9 PG    MCHC 32.7 31.4 - 35.0 g/dL    RDW 18.9 (H) 11.9 - 14.6 %    PLATELET 64 (L) 758 - 450 K/uL    MPV 9.6 (L) 10.8 - 14.1 FL    DF AUTOMATED      NEUTROPHILS 54 43 - 78 %    LYMPHOCYTES 28 13 - 44 %    MONOCYTES 8 4.0 - 12.0 %    EOSINOPHILS 10 (H) 0.5 - 7.8 %    BASOPHILS 0 0.0 - 2.0 %    IMMATURE GRANULOCYTES 0.4 0.0 - 5.0 %    ABS. NEUTROPHILS 1.5 (L) 1.7 - 8.2 K/UL    ABS. LYMPHOCYTES 0.8 0.5 - 4.6 K/UL    ABS. MONOCYTES 0.2 0.1 - 1.3 K/UL    ABS. EOSINOPHILS 0.3 0.0 - 0.8 K/UL    ABS. BASOPHILS 0.0 0.0 - 0.2 K/UL    ABS. IMM. GRANS. 0.0 0.0 - 0.5 K/UL   PROTHROMBIN TIME + INR    Collection Time: 05/09/17 10:00 AM   Result Value Ref Range    Prothrombin time 16.4 (H) 9.6 - 12.0 sec    INR 1.5 (H) 0.9 - 1.2     GLUCOSE, POC    Collection Time: 05/09/17 10:10 AM   Result Value Ref Range    Glucose (POC) 128 (H) 65 - 100 mg/dL   ALBUMIN, FLUID    Collection Time: 05/09/17  4:00 PM   Result Value Ref Range    Fluid Type: ASCITIC FLUID       Albumin, body fld. <0.3 g/dL   CELL COUNT, BODY FLUID    Collection Time: 05/09/17  4:00 PM   Result Value Ref Range    BODY FLUID TYPE ASCITIC FLUID       FLUID COLOR PALE YELLOW     FLUID APPEARANCE CLEAR      FLUID RBC COUNT <86524 /cu mm    FLD NUCLEATED CELLS <100 /cu mm   LDH, BODY FLUID    Collection Time: 05/09/17  4:00 PM   Result Value Ref Range    Fluid Type: ASCITIC FLUID       LD, body fld.  42 U/L   GLUCOSE, POC    Collection Time: 05/09/17  5:52 PM   Result Value Ref Range    Glucose (POC) 27 (LL) 65 - 100 mg/dL   GLUCOSE, POC    Collection Time: 05/09/17  6:12 PM   Result Value Ref Range    Glucose (POC) 236 (H) 65 - 100 mg/dL   TROPONIN I    Collection Time: 05/09/17  6:15 PM   Result Value Ref Range    Troponin-I, Qt. <0.02 (L) 0.02 - 0.05 NG/ML   FERRITIN    Collection Time: 05/09/17  6:15 PM   Result Value Ref Range    Ferritin 631 (H) 8 - 388 NG/ML   TRANSFERRIN SATURATION    Collection Time: 05/09/17  6:15 PM   Result Value Ref Range    Iron 44 35 - 150 ug/dL    TIBC 70 (L) 250 - 450 ug/dL    Transferrin Saturation 63 >20 %   IRON    Collection Time: 05/09/17  6:15 PM   Result Value Ref Range    Iron 65 35 - 150 ug/dL   RETICULOCYTE COUNT    Collection Time: 05/09/17 6:15 PM   Result Value Ref Range    Reticulocyte count 1.7 0.3 - 2.0 %    Absolute Retic Cnt. 0.0654 0.0164 - 0.0776 M/ul    Immature Retic Fraction 14.4 3.0 - 15.9 %    Retic Hgb Conc. 31 29 - 35 pg   GLUCOSE, POC    Collection Time: 05/10/17  2:46 AM   Result Value Ref Range    Glucose (POC) 202 (H) 65 - 100 mg/dL   GLUCOSE, POC    Collection Time: 05/10/17  5:59 AM   Result Value Ref Range    Glucose (POC) 206 (H) 65 - 100 mg/dL   CBC W/O DIFF    Collection Time: 05/10/17  6:40 AM   Result Value Ref Range    WBC 3.7 (L) 4.3 - 11.1 K/uL    RBC 3.54 (L) 4.05 - 5.25 M/uL    HGB 10.7 (L) 11.7 - 15.4 g/dL    HCT 31.7 (L) 35.8 - 46.3 %    MCV 89.5 79.6 - 97.8 FL    MCH 30.2 26.1 - 32.9 PG    MCHC 33.8 31.4 - 35.0 g/dL    RDW 18.2 (H) 11.9 - 14.6 %    PLATELET 52 (L) 434 - 450 K/uL    MPV 9.7 (L) 10.8 - 94.4 FL   METABOLIC PANEL, BASIC    Collection Time: 05/10/17  6:40 AM   Result Value Ref Range    Sodium 144 136 - 145 mmol/L    Potassium 3.9 3.5 - 5.1 mmol/L    Chloride 106 98 - 107 mmol/L    CO2 31 21 - 32 mmol/L    Anion gap 7 7 - 16 mmol/L    Glucose 174 (H) 65 - 100 mg/dL    BUN 9 6 - 23 MG/DL    Creatinine 2.51 (H) 0.6 - 1.0 MG/DL    GFR est AA 27 (L) >60 ml/min/1.73m2    GFR est non-AA 22 (L) >60 ml/min/1.73m2    Calcium 7.2 (L) 8.3 - 10.4 MG/DL   MAGNESIUM    Collection Time: 05/10/17  6:40 AM   Result Value Ref Range    Magnesium 1.8 1.8 - 2.4 mg/dL   PHOSPHORUS    Collection Time: 05/10/17  6:40 AM   Result Value Ref Range    Phosphorus 2.1 (L) 2.5 - 4.5 MG/DL        US ABD LTD   Final Result   Impression:      Nonspecific hypoechoic area within or immediately adjacent to the pancreatic   head, this does not have typical appearance of pancreatitis however recommend   correlation with lipase levels to help exclude edematous pancreatitis. Evidence of hepatic cirrhosis with at least moderate volume abdominal ascites in   the upper abdomen. Postcholecystectomy.       US GUIDE PARACENTESIS    (Results Pending)       All Micro Results     Procedure Component Value Units Date/Time    CULTURE, BODY FLUID Gennette Balk STAIN [[id number]] Collected:  05/09/17 1600    Order Status:  Completed Updated:  05/09/17 1753    CULTURE, BODY FLUID Gennette Balk STAIN [[id number]]     Order Status:  Sent Specimen:  Ascitic Fluid             Assessment and Plan:     Hospital Problems as of 5/10/2017  Date Reviewed: 3/2/2017          Codes Class Noted - Resolved POA    * (Principal)Intractable nausea and vomiting ICD-10-CM: R11.2  ICD-9-CM: 536.2  5/9/2017 - Present Yes        Coagulopathy ([facility] [address] (Chronic) ICD-10-CM: D68.9  ICD-9-CM: 286.9  5/9/2017 - Present Yes    Overview Signed 5/9/2017 10:57 AM by Ariana Vera MD     Liver disease             Cirrhosis of liver with ascites ([facility] [address] (Chronic) ICD-10-CM: K74.60  ICD-9-CM: 571.5  5/9/2017 - Present Yes        Pancytopenia ([facility] [address] (Chronic) ICD-10-CM: J76.406  ICD-9-CM: 284.19  5/9/2017 - Present Yes        Abdominal pain ICD-10-CM: R10.9  ICD-9-CM: 789.00  5/8/2017 - Present Yes        Abdominal pain, acute ICD-10-CM: R10.9  ICD-9-CM: 789.00, 338.19  5/8/2017 - Present Yes        Liver transplant recipient [facility]) (Chronic) ICD-10-CM: Z94.4  ICD-9-CM: V42.7  2/13/2017 - Present Yes        Hemodialysis access, AV graft ([facility] [address] (Chronic) ICD-10-CM: Z99.2  ICD-9-CM: V45.11  11/22/2016 - Present Yes        ESRD (end stage renal disease) on dialysis [facility]) (Chronic) ICD-10-CM: N18.6, Z99.2  ICD-9-CM: 585.6, V45.11  11/2/2016 - Present Yes        Esophageal varices (HCC) (Chronic) ICD-10-CM: I85.00  ICD-9-CM: 456.1  7/27/2016 - Present Yes        Congenital hepatic fibrosis (Chronic) ICD-10-CM: P78.81  ICD-9-CM: 777.8  Unknown - Present Yes        Hypotension (Chronic) ICD-10-CM: I95.9  ICD-9-CM: 458.9  7/6/2016 - Present Yes        Type 2 diabetes mellitus with hyperglycemia (HCC) (Chronic) ICD-10-CM: E11.65  ICD-9-CM: 250.00  7/4/2016 - Present Yes        Iron deficiency anemia (Chronic) ICD-10-CM: D50.9  ICD-9-CM: 280.9  7/4/2016 - Present Yes        Thrombocytopenia (HCC) (Chronic) ICD-10-CM: D69.6  ICD-9-CM: 287.5  7/4/2016 - Present Yes        Hepatitis C (Chronic) ICD-10-CM: B19.20  ICD-9-CM: 070.70  7/4/2016 - Present Yes                PLAN:    · Appreciate GI and Nephrology consultants  · Ascitic fluid is transudate and negative for SBP, Will continue xifaxan and lactulose. · S/p blood transfusion, Hb is 10.7. HD as per nephrology TTS  · Hypotension stable, chronic, from liver disease. Cont midodrine  · Thrombocytopenia 2/2 chronic liver disease. Avoid AC  · Continue PRN meds as listed for abd pain, nausea, vomiting. · Monitor and replace lytes. · Stage 2 sacral ulcer, will continue foam dressing and change mattress for frequent position change.     DC planning:  Likely home in 1-2 days  DVT ppx:  SCDs  Discussed plan with:  Pt  Risk:  High from numerous comorbidities, Iv narcotics    Signed:  Delgado Hu MD

## 2017-05-10 NOTE — WOUND CARE
Coccyx with 3x3cm area of nonblanchable erythema with center open 1.0.5x0.1cm, stage 2 pressure injury, present on admission. Patient states has \"been there about 1 1/2 weeks\". Allevyn and turning currently in use will had low air loss mattress and foam cushion for chair. Will monitor.

## 2017-05-10 NOTE — PROGRESS NOTES
Shift assessment completed. Pt. Alert and oriented x4. Room air. No acute distress noted. No pain at this time. Remote telemetry in place. Life port c/d/i. SCDs in place. Call light in reach. Instructed to call for assistance.

## 2017-05-10 NOTE — PROGRESS NOTES
Shift assessment completed. Patient alert and oriented x 4, but drowsy. Family member at bedside. Pt. On RA. Respirations even and unlabored. No acute distress noted. Bed low, locked, call bell within reach. Side rails up x 2. No complaints voiced at this time. Pt. Instructed to call for assistance if needed. Pt. Instructed to call for assistance if needed.

## 2017-05-10 NOTE — PROGRESS NOTES
GI DAILY PROGRESS NOTE    Admit Date:  5/8/2017    Today's Date:  5/10/2017    CC:  N&V    Subjective:     Patient: No longer having abdominal pain or N&V. She underwent paracentesis with 5300cc removed on 5/9/17 and her symptoms have resolved. Exam: Limited abdominal ultrasound focusing on the right upper quadrant 5/9/17     Indication: Abnormal liver function tests, upper abdominal pain     Comparison: CT abdomen pelvis from February 12, 2017     Findings:     2.2 x 2.6 x 2.7 cm hypoechoic area within or immediately adjacent to the  pancreatic head/uncinate process is nonspecific in appearance. Remaining  visualized pancreas is unremarkable. Pancreatic duct is at the upper limits of  normal measuring 3 mm in diameter. No peripancreatic fluid collections are  identified.     The liver displays mildly echogenic and relatively coarse echotexture. No  evidence of focal disease. No intrahepatic biliary ductal dilatation. The  juxtahepatic IVC is unremarkable. The abdominal aorta is not well visualized  due to bowel gas. There is moderate volume abdominal ascites in the upper  abdomen.     The gallbladder is surgically absent. The common duct measures 5 mm.     The right kidney measures 7.1 cm longitudinally. No hydronephrosis.     IMPRESSION  Impression:     Nonspecific hypoechoic area within or immediately adjacent to the pancreatic  head, this does not have typical appearance of pancreatitis however recommend  correlation with lipase levels to help exclude edematous pancreatitis.     Evidence of hepatic cirrhosis with at least moderate volume abdominal ascites in  the upper abdomen.     Postcholecystectomy.     Medications:   Current Facility-Administered Medications   Medication Dose Route Frequency    rifAXIMin (XIFAXAN) tablet 550 mg  550 mg Oral BID    magnesium oxide (MAG-OX) tablet 400 mg  400 mg Oral BID    0.9% sodium chloride infusion 250 mL  250 mL IntraVENous PRN    0.9% sodium chloride infusion 250 mL  250 mL IntraVENous PRN    metoclopramide HCl (REGLAN) injection 5 mg  5 mg IntraVENous Q6H PRN    ondansetron (ZOFRAN ODT) tablet 8 mg  8 mg Oral TID    morphine injection 2 mg  2 mg IntraVENous Q4H PRN    acetaminophen (TYLENOL) tablet 650 mg  650 mg Oral Q6H PRN    HYDROmorphone (PF) (DILAUDID) injection 0.2 mg  0.2 mg IntraVENous Q4H PRN    ondansetron (ZOFRAN) injection 4 mg  4 mg IntraVENous Q6H PRN    lactulose (CHRONULAC) solution 20 g  20 g Oral TID    midodrine (PROAMITINE) tablet 5 mg  5 mg Oral Q8H    sodium chloride (NS) flush 5-10 mL  5-10 mL IntraVENous Q8H    sodium chloride (NS) flush 5-10 mL  5-10 mL IntraVENous PRN    pantoprazole (PROTONIX) 40 mg in sodium chloride 0.9 % 10 mL injection  40 mg IntraVENous Q12H       Review of Systems:  ROS was obtained, with pertinent positives as listed above. No chest pain or SOB. Diet:  Clear liquid diet    Objective:   Vitals:  Visit Vitals    /76    Pulse 85    Temp 98.2 °F (36.8 °C)    Resp 18    Wt 43.8 kg (96 lb 9.6 oz)    LMP 01/02/2016    SpO2 95%    Breastfeeding No    BMI 18.87 kg/m2     Intake/Output:     05/08 1901 - 05/10 0700  In: 750 [P.O.:440; I.V.:310]  Out: 1000   Exam:  General appearance: alert, cooperative, no distress SITTING UP IN BED EATING A CLEAR LIQUID DIET  Lungs: clear to auscultation bilaterally anteriorly  Heart: regular rate and rhythm  Abdomen: soft, LESS DISTENTION TODAY. BANDAGE RIGHT SIDE PLACED WITHOUT DISCHARGE. LARGE UMBILICAL HERNIA.  Bowel sounds normal. No masses, no organomegaly  Extremities: extremities normal, atraumatic, no cyanosis or edema  Neuro:  alert and oriented X4, NO ASTERIXIS    Data Review (Labs):    Recent Labs      05/10/17   0640  05/09/17   1000  05/09/17   0430  05/08/17   2342  05/08/17   1945  05/08/17   1745   WBC  3.7*  2.8*   --   2.1*   --   2.2*   HGB  10.7*  9.0*   --   7.0*   --   7.1*   HCT  31.7*  27.5*   --   21.2*   --   22.5*   PLT  52*  64*   --   61* --   58*   MCV  89.5  89.9   --   91.8   --   92.2   NA  144   --   144   --    --   145   K  3.9   --   3.5   --    --   3.3*   CL  106   --   105   --    --   106   CO2  31   --   32   --    --   33*   BUN  9   --   15   --    --   16   CREA  2.51*   --   3.78*   --    --   3.55*   CA  7.2*   --   7.2*   --    --   7.3*   MG  1.8   --    --   1.6*   --    --    GLU  174*   --   131*   --    --   163*   AP   --    --   360*   --    --   342*   SGOT   --    --   29   --    --   26   ALT   --    --   24   --    --   24   TBILI   --    --   1.0   --    --   1.0   ALB   --    --   2.0*   --    --   2.1*   TP   --    --   5.8*   --    --   6.0*   PTP   --   16.4*   --    --   16.1*   --    INR   --   1.5*   --    --   1.5*   --    APTT   --    --    --   30.4   --    --        Assessment:     Principal Problem:    Intractable nausea and vomiting (5/9/2017)    Active Problems:    Type 2 diabetes mellitus with hyperglycemia (HCC) (7/4/2016)      Iron deficiency anemia (7/4/2016)      Thrombocytopenia (HCC) (7/4/2016)      Hepatitis C (7/4/2016)      Congenital hepatic fibrosis ()      Hypotension (7/6/2016)      Esophageal varices (HCC) (7/27/2016)      ESRD (end stage renal disease) on dialysis ([facility] [address] (11/2/2016)      Hemodialysis access, AV graft ([facility] [address] (11/22/2016)      Liver transplant recipient [facility]) (2/13/2017)      Abdominal pain (5/8/2017)      Abdominal pain, acute (5/8/2017)      Coagulopathy ([address] (5/9/2017)      Overview: Liver disease      Cirrhosis of liver with ascites ([address] (5/9/2017)      Pancytopenia ([facility] [address] (5/9/2017)    55 y.o. 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  Her symptoms resolved with paracentesis on 5/9/17. She had 5300cc removed. She has fluid analysis pending, but is negative thus far. Abdominal US on 5/9/17 revealed a nonspecific hypoechoic area within or immediately adjacent to the pancreatic head, this does not have typical appearance of pancreatitis however recommend correlation with lipase levels to help exclude edematous pancreatitis. Her symptoms have improved, but will obtain lipase for evaluation. Plan: 1. Continue antiemetics  2. Advance to GI soft 2gm sodium diet  3. Lipase, amylase  4. Continue Xifaxan and lactulose. 5.Continue bid PPI  6. Await results of fluid analysis    Orlando Pollen. Donato Councilman in collaboration with Dr. Sidney Mcclain. [facility]    =Much better post paracentesis. Patient looking forward to her [address] visit later this month. Wants her hernia repaired. Plans as above.   Sidney Mcclain MD

## 2017-05-10 NOTE — PROGRESS NOTES
[facility] progress note    Follow-Up on: 5/10/2017     Patient seen and examined. Up in bed. Did well with HD yesterday. Currently without complaints. ROS:  Gen - no fever, no chills  CV - no chest pain, no orthopnea  Lung - no shortness of breath, no cough  Abd - no tenderness, no nausea, no vomiting  Ext - no edema    Exam:  Vitals:    05/10/17 0628 05/10/17 0747 05/10/17 1134 05/10/17 1209   BP:  119/76 105/73    Pulse:  85 89 96   Resp:  18 18    Temp:  98.2 °F (36.8 °C) 99.9 °F (37.7 °C)    SpO2:  95% 96%    Weight: 43.8 kg (96 lb 9.6 oz)            Intake/Output Summary (Last 24 hours) at 05/10/17 1335  Last data filed at 05/10/17 1012   Gross per 24 hour   Intake              680 ml   Output                0 ml   Net              680 ml       GEN - in no distress  CV - S1, S2, no rub  Lung - clear bilaterally  Abd - distended  Ext - no edema    Recent Labs      05/10/17   0640  05/09/17   1000  05/08/17   2342   WBC  3.7*  2.8*  2.1*   HGB  10.7*  9.0*  7.0*   HCT  31.7*  27.5*  21.2*   PLT  52*  64*  61*        Recent Labs      05/10/17   0640  05/09/17   0430  05/08/17   2342  05/08/17   1745   NA  144  144   --   145   K  3.9  3.5   --   3.3*   CL  106  105   --   106   CO2  31  32   --   33*   BUN  9  15   --   16   CREA  2.51*  3.78*   --   3.55*   CA  7.2*  7.2*   --   7.3*   GLU  174*  131*   --   163*   MG  1.8   --   1.6*   --    PHOS  2.1*   --   2.0*   --        Assessment / Plan:    1. ESRD - T, T, S via TCC. She has a LLE AVG with good thrill - unclear why not using. 2.  Anemia s/p PRBC    3. S/p paracentesis    HD tomorrow. Will use AVG unless we find a good reason not to at this time. Orders written.

## 2017-05-11 NOTE — PROGRESS NOTES
Shift assessment completed. Pt. drowsy but wakens to voice. Room air. No pain noted. No distress. BP is 88/54. Fever overnight. BC obtained. LLE access positive for bruit and thrill. Right perm cath c/d/i. Left port c/d/i. Will monitor.

## 2017-05-11 NOTE — PROGRESS NOTES
BP is 93/59. Pt. With eyes closed. Respirations even and unlabored. Room air. Awakens to voice. Will monitor. Scheduled dose of midodrin given .

## 2017-05-11 NOTE — PROGRESS NOTES
Patient very lethargic. Awakens to stimulus/voice. Pt. Found with large puddle of drool.  BP97/61, HR NSR @ 80, O2 sat 98 % on room air temp 99.1 orally

## 2017-05-11 NOTE — PROGRESS NOTES
Renal Progress Note    Admission Date: 5/8/2017   Subjective: The patient was seen on dialysis at 10:38 AM .  BP is stable. Access is working well. Objective:     Physical Exam:    Patient Vitals for the past 8 hrs:   BP Temp Pulse Resp SpO2 Weight   05/11/17 1025 134/84 - (!) 114 - - -   05/11/17 0936 132/90 - 97 - - -   05/11/17 0905 129/79 - 84 - - -   05/11/17 0815 141/79 - 74 - - -   05/11/17 0709 (!) 88/54 98.4 °F (36.9 °C) 73 16 96 % -   05/11/17 0642 (!) 87/58 98.7 °F (37.1 °C) - - - -   05/11/17 0546 - - - - - 49.8 kg (109 lb 11.2 oz)   05/11/17 0528 93/64 98.7 °F (37.1 °C) - - - -   05/11/17 0432 97/61 - - - - -   05/11/17 0426 94/63 99.3 °F (37.4 °C) 96 16 93 % -   05/11/17 0330 96/62 - - - - -      Mentally clear.   BP stable     Current Facility-Administered Medications   Medication Dose Route Frequency    midodrine (PROAMITINE) tablet 10 mg  10 mg Oral Q8H    rifAXIMin (XIFAXAN) tablet 550 mg  550 mg Oral BID    norflurane-pentafluoropropane (PAIN EASE) topical spray 1 Spray  1 Spray Topical DIALYSIS PRN    acetaminophen (TYLENOL) suppository 650 mg  650 mg Rectal Q6H PRN    magnesium oxide (MAG-OX) tablet 400 mg  400 mg Oral BID    0.9% sodium chloride infusion 250 mL  250 mL IntraVENous PRN    0.9% sodium chloride infusion 250 mL  250 mL IntraVENous PRN    metoclopramide HCl (REGLAN) injection 5 mg  5 mg IntraVENous Q6H PRN    ondansetron (ZOFRAN ODT) tablet 8 mg  8 mg Oral TID    morphine injection 2 mg  2 mg IntraVENous Q4H PRN    acetaminophen (TYLENOL) tablet 650 mg  650 mg Oral Q6H PRN    HYDROmorphone (PF) (DILAUDID) injection 0.2 mg  0.2 mg IntraVENous Q4H PRN    ondansetron (ZOFRAN) injection 4 mg  4 mg IntraVENous Q6H PRN    lactulose (CHRONULAC) solution 20 g  20 g Oral TID    sodium chloride (NS) flush 5-10 mL  5-10 mL IntraVENous Q8H    sodium chloride (NS) flush 5-10 mL  5-10 mL IntraVENous PRN    pantoprazole (PROTONIX) 40 mg in sodium chloride 0.9 % 10 mL injection  40 mg IntraVENous Q12H            Data Review:     LABS:   Recent Results (from the past 12 hour(s))   CULTURE, BLOOD    Collection Time: 05/11/17 12:10 AM   Result Value Ref Range    Special Requests: LIFE PORT     Culture result: PENDING    GLUCOSE, POC    Collection Time: 05/11/17 12:13 AM   Result Value Ref Range    Glucose (POC) 245 (H) 65 - 100 mg/dL   CBC WITH AUTOMATED DIFF    Collection Time: 05/11/17 12:20 AM   Result Value Ref Range    WBC 4.4 4.3 - 11.1 K/uL    RBC 3.77 (L) 4.05 - 5.25 M/uL    HGB 11.3 (L) 11.7 - 15.4 g/dL    HCT 33.1 (L) 35.8 - 46.3 %    MCV 87.8 79.6 - 97.8 FL    MCH 30.0 26.1 - 32.9 PG    MCHC 34.1 31.4 - 35.0 g/dL    RDW 18.2 (H) 11.9 - 14.6 %    PLATELET 59 (L) 567 - 450 K/uL    MPV 9.8 (L) 10.8 - 14.1 FL    DF AUTOMATED      NEUTROPHILS 73 43 - 78 %    LYMPHOCYTES 15 13 - 44 %    MONOCYTES 8 4.0 - 12.0 %    EOSINOPHILS 3 0.5 - 7.8 %    BASOPHILS 1 0.0 - 2.0 %    IMMATURE GRANULOCYTES 0.5 0.0 - 5.0 %    ABS. NEUTROPHILS 3.2 1.7 - 8.2 K/UL    ABS. LYMPHOCYTES 0.7 0.5 - 4.6 K/UL    ABS. MONOCYTES 0.4 0.1 - 1.3 K/UL    ABS. EOSINOPHILS 0.1 0.0 - 0.8 K/UL    ABS. BASOPHILS 0.0 0.0 - 0.2 K/UL    ABS. IMM.  GRANS. 0.0 0.0 - 0.5 K/UL   CBC W/O DIFF    Collection Time: 05/11/17  6:40 AM   Result Value Ref Range    WBC 3.2 (L) 4.3 - 11.1 K/uL    RBC 3.52 (L) 4.05 - 5.25 M/uL    HGB 10.4 (L) 11.7 - 15.4 g/dL    HCT 30.7 (L) 35.8 - 46.3 %    MCV 87.2 79.6 - 97.8 FL    MCH 29.5 26.1 - 32.9 PG    MCHC 33.9 31.4 - 35.0 g/dL    RDW 18.4 (H) 11.9 - 14.6 %    PLATELET 54 (L) 877 - 450 K/uL    MPV 9.7 (L) 10.8 - 55.8 FL   METABOLIC PANEL, BASIC    Collection Time: 05/11/17  6:40 AM   Result Value Ref Range    Sodium 142 136 - 145 mmol/L    Potassium 3.5 3.5 - 5.1 mmol/L    Chloride 104 98 - 107 mmol/L    CO2 30 21 - 32 mmol/L    Anion gap 8 7 - 16 mmol/L    Glucose 197 (H) 65 - 100 mg/dL    BUN 17 6 - 23 MG/DL    Creatinine 3.48 (H) 0.6 - 1.0 MG/DL    GFR est AA 18 (L) >60 ml/min/1.73m2 GFR est non-AA 15 (L) >60 ml/min/1.73m2    Calcium 7.2 (L) 8.3 - 10.4 MG/DL   GLUCOSE, POC    Collection Time: 05/11/17  6:53 AM   Result Value Ref Range    Glucose (POC) 187 (H) 65 - 100 mg/dL         Plan:     Principal Problem:    Intractable nausea and vomiting (5/9/2017)    Active Problems:    Type 2 diabetes mellitus with hyperglycemia ([address] (7/4/2016)      Iron deficiency anemia (7/4/2016)      Thrombocytopenia ([address] (7/4/2016)      Hepatitis C (7/4/2016)      Congenital hepatic fibrosis ()      Hypotension (7/6/2016)      Esophageal varices ([address] (7/27/2016)      ESRD (end stage renal disease) on dialysis ([address] (11/2/2016)      Hemodialysis access, AV graft ([address] (11/22/2016)      Liver transplant recipient [facility]) (2/13/2017)      Abdominal pain (5/8/2017)      Abdominal pain, acute (5/8/2017)      Coagulopathy ([address] (5/9/2017)      Overview: Liver disease      Cirrhosis of liver with ascites ([address] (5/9/2017)      Pancytopenia ([address] (5/9/2017)      1. ESRD - T, T, S via TCC. She has a LLE AVG   Tolerating dialysis well with no drops in BP. Early this AM was hypotensive and got a fluid bolus and extra midodrine     2. Anemia s/p PRBC     3.  S/p paracentesis

## 2017-05-11 NOTE — PROGRESS NOTES
Pt. back from dialysis. Pt. very confused. Telling me her name only. Lethargic. Falls quickly back to sleep when asked a question. Pt.'s personal bag in bed. Assessed and no controlled medicine found. Left leg access positive for bruit and thrill. VS as follows- temp 102.6 axillary, pulse=114, RR=18, BP is 110/70. Dr. Abraham Rothman paged. New orders received.

## 2017-05-11 NOTE — PROGRESS NOTES
Lab draws from [facility] cath sent with [employer], [facility].

## 2017-05-11 NOTE — PROGRESS NOTES
Spoke to Dr. Morel Manual regarding pt. gabrielnet BP of 87/58 and fever during the night. 500 cc saline bolus ordered STAT. One time dose of Midodrine 5 mg ordered now.  mq8h daily midodrine changed from 5 mg TID to 10 mg TID

## 2017-05-11 NOTE — PROGRESS NOTES
On HD via L thigh graft by ILEANA Mcintyre RN using 15 ga needles x2 without difficulty. Vitals signs prior to beginning treatment as follows: /79, HR 84. Pt noted alert and oriented x 4. Will continue to monitor throughout treatment.

## 2017-05-11 NOTE — PROGRESS NOTES
Pt. Sitting up in bed. Oriented to person only. Room air. Remote telemetry monitor in place. Eating supper. No overt distress. Belt pad in place. Door open. Will monitor.

## 2017-05-11 NOTE — PROGRESS NOTES
Hospitalist Progress Note     Admit Date:  2017  8:52 PM   Name:  Gume Renee   Age:  55 y.o.  :  1970   MRN:  [id number]   PCP:  Soheila Patterson MD  Treatment Team: Attending Provider: Millie Daniels MD; Consulting Provider: Prachi Vazquez DO; Consulting Provider: Leobardo Davison MD    Subjective:   Patient is a 56 y/o F with ESRD on HD, liver transplant x2 from congenital hepatic fibrosis, chronic issues with abd pain, nausea and vomiting, who presented with more of the same and inability to take PO. Admitted and GI and Nephrology consulted.  - seen in dialysis center  Denies any new complaints. No nausea, vomiting. Pt spiked fever after dialysis 102.6 and was tachycardic.   Abdominal discomfort is minimal.    Objective:     Patient Vitals for the past 24 hrs:   Temp Pulse Resp BP SpO2   17 1400 (!) 102.6 °F (39.2 °C) (!) 114 18 110/70 93 %   17 1225 - (!) 139 - 110/71 -   17 1155 - 94 - 138/87 -   17 1125 - (!) 57 - (!) 113/92 -   17 1059 - (!) 120 - (!) 136/94 -   17 1025 - (!) 114 - 134/84 -   17 0936 - 97 - 132/90 -   17 0905 - 84 - 129/79 -   17 0815 - 74 - 141/79 -   17 0709 98.4 °F (36.9 °C) 73 16 (!) 88/54 96 %   17 0642 98.7 °F (37.1 °C) - - (!) 87/58 -   17 0528 98.7 °F (37.1 °C) - - 93/64 -   17 0432 - - - 97/61 -   17 0426 99.3 °F (37.4 °C) 96 16 94/63 93 %   17 0330 - - - 96/62 -   17 0215 (!) 100.8 °F (38.2 °C) - - - -   17 0103 (!) 102.7 °F (39.3 °C) - - - -   17 0015 - 99 - - -   05/10/17 2314 (!) 102.8 °F (39.3 °C) 84 16 126/77 99 %   05/10/17 2110 96.9 °F (36.1 °C) (!) 111 22 (!) 119/94 -   05/10/17 1836 - 100 - - -   05/10/17 1511 99.7 °F (37.6 °C) 95 19 108/68 95 %     Oxygen Therapy  O2 Sat (%): 93 % (17 1400)  O2 Device: Room air (17 1617)    Intake/Output Summary (Last 24 hours) at 17 1428  Last data filed at 17 0815   Gross per 24 hour   Intake              200 ml   Output                0 ml   Net              200 ml         General:    Well nourished. Alert. CV:   RRR. No murmur, rub, or gallop. Lungs:   Clear to auscultation bilaterally. No wheezing, rhonchi, or rales. Abdomen:   Soft, mildly distended, mild vTTP, diffuse. abd hernia. Extremities: Warm and dry. No cyanosis or edema. Skin:     No rashes or jaundice. Current Meds:  Current Facility-Administered Medications   Medication Dose Route Frequency    midodrine (PROAMITINE) tablet 10 mg  10 mg Oral Q8H    rifAXIMin (XIFAXAN) tablet 550 mg  550 mg Oral BID    norflurane-pentafluoropropane (PAIN EASE) topical spray 1 Spray  1 Spray Topical DIALYSIS PRN    acetaminophen (TYLENOL) suppository 650 mg  650 mg Rectal Q6H PRN    magnesium oxide (MAG-OX) tablet 400 mg  400 mg Oral BID    0.9% sodium chloride infusion 250 mL  250 mL IntraVENous PRN    0.9% sodium chloride infusion 250 mL  250 mL IntraVENous PRN    metoclopramide HCl (REGLAN) injection 5 mg  5 mg IntraVENous Q6H PRN    ondansetron (ZOFRAN ODT) tablet 8 mg  8 mg Oral TID    morphine injection 2 mg  2 mg IntraVENous Q4H PRN    acetaminophen (TYLENOL) tablet 650 mg  650 mg Oral Q6H PRN    HYDROmorphone (PF) (DILAUDID) injection 0.2 mg  0.2 mg IntraVENous Q4H PRN    ondansetron (ZOFRAN) injection 4 mg  4 mg IntraVENous Q6H PRN    lactulose (CHRONULAC) solution 20 g  20 g Oral TID    sodium chloride (NS) flush 5-10 mL  5-10 mL IntraVENous Q8H    sodium chloride (NS) flush 5-10 mL  5-10 mL IntraVENous PRN    pantoprazole (PROTONIX) 40 mg in sodium chloride 0.9 % 10 mL injection  40 mg IntraVENous Q12H       Labs and Studies:  I have reviewed all labs, meds, telemetry events, and studies from the last 24 hours.     Recent Results (from the past 24 hour(s))   GLUCOSE, POC    Collection Time: 05/10/17  4:28 PM   Result Value Ref Range    Glucose (POC) 210 (H) 65 - 100 mg/dL CULTURE, BLOOD    Collection Time: 05/11/17 12:10 AM   Result Value Ref Range    Special Requests: LIFE PORT     Culture result: PENDING    GLUCOSE, POC    Collection Time: 05/11/17 12:13 AM   Result Value Ref Range    Glucose (POC) 245 (H) 65 - 100 mg/dL   CBC WITH AUTOMATED DIFF    Collection Time: 05/11/17 12:20 AM   Result Value Ref Range    WBC 4.4 4.3 - 11.1 K/uL    RBC 3.77 (L) 4.05 - 5.25 M/uL    HGB 11.3 (L) 11.7 - 15.4 g/dL    HCT 33.1 (L) 35.8 - 46.3 %    MCV 87.8 79.6 - 97.8 FL    MCH 30.0 26.1 - 32.9 PG    MCHC 34.1 31.4 - 35.0 g/dL    RDW 18.2 (H) 11.9 - 14.6 %    PLATELET 59 (L) 058 - 450 K/uL    MPV 9.8 (L) 10.8 - 14.1 FL    DF AUTOMATED      NEUTROPHILS 73 43 - 78 %    LYMPHOCYTES 15 13 - 44 %    MONOCYTES 8 4.0 - 12.0 %    EOSINOPHILS 3 0.5 - 7.8 %    BASOPHILS 1 0.0 - 2.0 %    IMMATURE GRANULOCYTES 0.5 0.0 - 5.0 %    ABS. NEUTROPHILS 3.2 1.7 - 8.2 K/UL    ABS. LYMPHOCYTES 0.7 0.5 - 4.6 K/UL    ABS. MONOCYTES 0.4 0.1 - 1.3 K/UL    ABS. EOSINOPHILS 0.1 0.0 - 0.8 K/UL    ABS. BASOPHILS 0.0 0.0 - 0.2 K/UL    ABS. IMM.  GRANS. 0.0 0.0 - 0.5 K/UL   CBC W/O DIFF    Collection Time: 05/11/17  6:40 AM   Result Value Ref Range    WBC 3.2 (L) 4.3 - 11.1 K/uL    RBC 3.52 (L) 4.05 - 5.25 M/uL    HGB 10.4 (L) 11.7 - 15.4 g/dL    HCT 30.7 (L) 35.8 - 46.3 %    MCV 87.2 79.6 - 97.8 FL    MCH 29.5 26.1 - 32.9 PG    MCHC 33.9 31.4 - 35.0 g/dL    RDW 18.4 (H) 11.9 - 14.6 %    PLATELET 54 (L) 144 - 450 K/uL    MPV 9.7 (L) 10.8 - 00.7 FL   METABOLIC PANEL, BASIC    Collection Time: 05/11/17  6:40 AM   Result Value Ref Range    Sodium 142 136 - 145 mmol/L    Potassium 3.5 3.5 - 5.1 mmol/L    Chloride 104 98 - 107 mmol/L    CO2 30 21 - 32 mmol/L    Anion gap 8 7 - 16 mmol/L    Glucose 197 (H) 65 - 100 mg/dL    BUN 17 6 - 23 MG/DL    Creatinine 3.48 (H) 0.6 - 1.0 MG/DL    GFR est AA 18 (L) >60 ml/min/1.73m2    GFR est non-AA 15 (L) >60 ml/min/1.73m2    Calcium 7.2 (L) 8.3 - 10.4 MG/DL   GLUCOSE, POC    Collection Time: 05/11/17  6:53 AM   Result Value Ref Range    Glucose (POC) 187 (H) 65 - 100 mg/dL   GLUCOSE, POC    Collection Time: 05/11/17 10:29 AM   Result Value Ref Range    Glucose (POC) 158 (H) 65 - 100 mg/dL        US GUIDE PARACENTESIS   Final Result   Impression: Uncomplicated ultrasound guided paracentesis. US ABD LTD   Final Result   Impression:      Nonspecific hypoechoic area within or immediately adjacent to the pancreatic   head, this does not have typical appearance of pancreatitis however recommend   correlation with lipase levels to help exclude edematous pancreatitis. Evidence of hepatic cirrhosis with at least moderate volume abdominal ascites in   the upper abdomen. Postcholecystectomy.           All Micro Results     Procedure Component Value Units Date/Time    CULTURE, BODY FLUID [place] STAIN [[id number]] Collected:  05/09/17 1600    Order Status:  Completed Specimen:  Ascitic Fluid Updated:  05/11/17 0843     Special Requests: NO SPECIAL REQUESTS        GRAM STAIN NO WBCS SEEN         NO DEFINITE ORGANISM SEEN        Culture result: NO GROWTH 1 DAY       CULTURE, BLOOD [[id number]] Collected:  05/11/17 0230    Order Status:  Completed Specimen:  Blood from Blood Updated:  05/11/17 0245    CULTURE, BLOOD [[id number]] Collected:  05/11/17 0010    Order Status:  Completed Specimen:  Blood from Blood Updated:  05/11/17 0244     Special Requests: LIFE PORT     Culture result: PENDING    CULTURE, BODY FLUID Catalino Barillas [[id number]] Collected:  05/08/17 2115    Order Status:  Canceled Specimen:  Ascitic Fluid             Assessment and Plan:     Hospital Problems as of 5/11/2017  Date Reviewed: 3/2/2017          Codes Class Noted - Resolved POA    * (Principal)Intractable nausea and vomiting ICD-10-CM: R11.2  ICD-9-CM: 536.2  5/9/2017 - Present Yes        Coagulopathy ([address] (Chronic) ICD-10-CM: D68.9  ICD-9-CM: 286.9  5/9/2017 - Present Yes    Overview Signed 5/9/2017 10:57 AM by Fabricio Ann Keron Millard MD     Liver disease             Cirrhosis of liver with ascites [facility]) (Chronic) ICD-10-CM: K74.60  ICD-9-CM: 571.5  5/9/2017 - Present Yes        Pancytopenia (HCC) (Chronic) ICD-10-CM: I17.102  ICD-9-CM: 284.19  5/9/2017 - Present Yes        Abdominal pain ICD-10-CM: R10.9  ICD-9-CM: 789.00  5/8/2017 - Present Yes        Abdominal pain, acute ICD-10-CM: R10.9  ICD-9-CM: 789.00, 338.19  5/8/2017 - Present Yes        Liver transplant recipient [facility]) (Chronic) ICD-10-CM: Z94.4  ICD-9-CM: V42.7  2/13/2017 - Present Yes        Hemodialysis access, AV graft ([facility] (Chronic) ICD-10-CM: Z99.2  ICD-9-CM: V45.11  11/22/2016 - Present Yes        ESRD (end stage renal disease) on dialysis [facility]) (Chronic) ICD-10-CM: N18.6, Z99.2  ICD-9-CM: 585.6, V45.11  11/2/2016 - Present Yes        Esophageal varices (HCC) (Chronic) ICD-10-CM: I85.00  ICD-9-CM: 456.1  7/27/2016 - Present Yes        Congenital hepatic fibrosis (Chronic) ICD-10-CM: P78.81  ICD-9-CM: 777.8  Unknown - Present Yes        Hypotension (Chronic) ICD-10-CM: I95.9  ICD-9-CM: 458.9  7/6/2016 - Present Yes        Type 2 diabetes mellitus with hyperglycemia (HCC) (Chronic) ICD-10-CM: E11.65  ICD-9-CM: 250.00  7/4/2016 - Present Yes        Iron deficiency anemia (Chronic) ICD-10-CM: D50.9  ICD-9-CM: 280.9  7/4/2016 - Present Yes        Thrombocytopenia (HCC) (Chronic) ICD-10-CM: D69.6  ICD-9-CM: 287.5  7/4/2016 - Present Yes        Hepatitis C (Chronic) ICD-10-CM: B19.20  ICD-9-CM: 070.70  7/4/2016 - Present Yes                PLAN:    · Blood cultures sent last night in view of febrile episodes. No fever during dialysis session today, but spiked fever of 102.8 after dialysis. Will send blood cultures from permacath and lactic acid. Will consider starting empiric antibiotics vanc and zosyn. Will consult ID. · Ascitic fluid is transudate and negative for SBP, Will continue xifaxan and lactulose. ·  Hb is 10.4.  HD as per nephrology TTS  · Blood pressure on lower side this morning, given bolus of 500 cc, midodrine increased to 10 mg po q8h. · Thrombocytopenia 2/2 chronic liver disease. · Continue PRN meds as listed for abd pain, nausea, vomiting. · Monitor and replace lytes. · Stage 2 sacral ulcer, will continue foam dressing and change mattress for frequent position change.     DC planning:  Likely home in 1-2 days  DVT ppx:  SCDs  Discussed plan with:  Pt  Risk:  High from numerous comorbidities, Iv narcotics    Signed:  Clint Novak MD

## 2017-05-11 NOTE — PROGRESS NOTES
Problem: Nutrition Deficit  Goal: *Optimize nutritional status  Nutrition  Reason for assessment: BPA pressure ulcer  Assessment:   Diet order(s): GI soft  Food/Nutrition Patient History:  Patient with h/o diabetes, hepatitis C, ESRD, liver transplant recipient, liver cirrhosis with ascites and congenital hepatic fibrosis. Patient is well known to RD staff here. Patient undergoes bi-weekly paracentesis and pt is s/p paracentesis on 5/9 with 5300ml removed. Patient reports she feels much better now that the fluid is removed and expects her appetite to pick back up now. Reports her appetite was doing poor related to fluid accumulation. Patient denies any other po difficulties at this time. Patient with a stage 2 pressure injury to coccyx. Anthropometrics: Height: 5',  Weight: 49.8 kg (109 lb 11.2 oz), Weight Source: Bed, Body mass index is 21.42 kg/(m^2). BMI class of normal weight. Macronutrient needs:  EER:  6079-4014 kcal /day (25-30 kcal/kg actual BW)  EPR:  55-64 grams protein/day (1.2-1.4 grams/kg IBW)  Intake/Comparative Standards: Average intake for past 3 recorded meal(s): 50%. This potentially meets ~88% of kcal and ~87% of protein needs     Nutrition Diagnosis: Increased nutrient needs (protein) related to wound healing as evidenced by patient with a stage 2 pressure injury to coccyx. Intervention:  Meals and snacks: Continue current diet. Nutrition Supplement Therapy: Glucerna FELICITY Bermudez.  Ryan Brown [address], [address],  [phone]

## 2017-05-11 NOTE — PROGRESS NOTES
Pt. Sitting up on the side of the bed eating a late lunch.  at bedside. Pt. alert to person only. Call light in reach. Instructed  to tell RN when leaving room.

## 2017-05-11 NOTE — PROGRESS NOTES
Pharmacokinetic Consult to Pharmacist    Laura Patel is a 55 y.o. female being treated for possible line infection with vancomycin and pip/tazo. Patient spiked a fever following HD today and empiric abx were started       Weight: 49.8 kg (109 lb 11.2 oz)  Lab Results   Component Value Date/Time    BUN 17 05/11/2017 06:40 AM    Creatinine 3.48 05/11/2017 06:40 AM    WBC 3.2 05/11/2017 06:40 AM    Procalcitonin 0.6 07/28/2016 05:15 AM      Estimated Creatinine Clearance: 14.5 mL/min (based on Cr of 3.48). CULTURES:  5/9  Ascitic fluid Cx: - NGTD, prelim  5/10  Blood Cx:  - Pending  5/11  Blood Cx:  - Pending    Day 1 of vancomycin. Goal trough is 15-20. Vancomycin dose initiated at 1000 mg IV x 1. Plan to schedule further doses around HD and random levels. Pharmacy will continue to follow. Please call with any questions.       Thank you,  Rei Ricketts, PharmD  Clinical Pharmacist  [phone]

## 2017-05-11 NOTE — PROGRESS NOTES
Shift assessment completed. Patient alert and oriented x 4 sitting up in recliner as specialty air bed is being moved into room. Pt. On RA. Respirations even and unlabored. No acute distress noted. Bed low, locked, call bell within reach. Side rails up x 2. No complaints voiced at this time. Pt. Instructed to call for assistance if needed. Pt.  Instructed to call for assistance if needed

## 2017-05-11 NOTE — PROGRESS NOTES
Patient remained lethargic majority of night. BP still low at 87/58 despite morning dose of midodrine dose of 5 mg PO given at 0528. Temp. No 98.7. Oncoming nurse inforned of pt. Condition.

## 2017-05-11 NOTE — PROGRESS NOTES
Patient given Reglan 10 mg IV via slow IV push for nausea and vomiting. Patient refused tylenol suppository 650 mg. Pt states that she has had suppositories before and that \"they hurt. \" pt. Explained of complications if fever left untreated.

## 2017-05-11 NOTE — PROGRESS NOTES
Zofran 8 mg ODT tablet given SL as scheduled. Patient began to throw up shortly after transfer ing from recliner to bed. Pt. Appears to have recently rohan part of a milkshake. Pt. States that she had bad hiccups prior to vomiting.

## 2017-05-11 NOTE — PROGRESS NOTES
Dr. Syeda Duke made aware of pt.'s continued confused mental status. MD at the bedside speaking with pt. at this time. Pt. Alert and oriented x2 at this time.

## 2017-05-11 NOTE — PROGRESS NOTES
Spoke with Dr. Matias Kern regarding pt.'s bp this am. Instructed to wait until Dr. Kizzy Leger sees pt. before sending to dialysis.

## 2017-05-12 NOTE — PROGRESS NOTES
Dr. Antonio Ledesma updated on patient vital signs. Pt sitting up in bed reading menu. SR on remote tele, HR 85. Dressing changed to 5 [facility]. No acute distress on RA. Call bell in reach.

## 2017-05-12 NOTE — PROGRESS NOTES
GI DAILY PROGRESS NOTE    Admit Date:  5/8/2017    Today's Date:  5/12/2017    CC:  Cirrhosis, Ascites    Subjective:     Patient is very groggy today and seemingly altered. Reports abdominal pain. Remains febrile and pancytopenia- ID following and recommending JAILYN and continued Zosyn and Vanco. LVP 5/9 without sign of SBP. Creatinine remains elevated but is improving (2.9 today down from 3.4 on 5/11).     Medications:   Current Facility-Administered Medications   Medication Dose Route Frequency    potassium chloride 20 mEq in 100 ml IVPB  20 mEq IntraVENous Q2H    lactulose (CHRONULAC) solution 30 g  30 g Oral QID    midodrine (PROAMITINE) tablet 10 mg  10 mg Oral Q8H    piperacillin-tazobactam (ZOSYN) 3.375 g in 0.9% sodium chloride (MBP/ADV) 100 mL  3.375 g IntraVENous Q12H    vancomycin (VANCOCIN) 750 mg in  ml infusion  750 mg IntraVENous See Admin Instructions    rifAXIMin (XIFAXAN) tablet 550 mg  550 mg Oral BID    norflurane-pentafluoropropane (PAIN EASE) topical spray 1 Spray  1 Spray Topical DIALYSIS PRN    acetaminophen (TYLENOL) suppository 650 mg  650 mg Rectal Q6H PRN    magnesium oxide (MAG-OX) tablet 400 mg  400 mg Oral BID    0.9% sodium chloride infusion 250 mL  250 mL IntraVENous PRN    0.9% sodium chloride infusion 250 mL  250 mL IntraVENous PRN    metoclopramide HCl (REGLAN) injection 5 mg  5 mg IntraVENous Q6H PRN    ondansetron (ZOFRAN ODT) tablet 8 mg  8 mg Oral TID    morphine injection 2 mg  2 mg IntraVENous Q4H PRN    acetaminophen (TYLENOL) tablet 650 mg  650 mg Oral Q6H PRN    HYDROmorphone (PF) (DILAUDID) injection 0.2 mg  0.2 mg IntraVENous Q4H PRN    ondansetron (ZOFRAN) injection 4 mg  4 mg IntraVENous Q6H PRN    sodium chloride (NS) flush 5-10 mL  5-10 mL IntraVENous Q8H    sodium chloride (NS) flush 5-10 mL  5-10 mL IntraVENous PRN    pantoprazole (PROTONIX) 40 mg in sodium chloride 0.9 % 10 mL injection  40 mg IntraVENous Q12H       Review of Systems:  ROS was obtained, with pertinent positives as listed above. No chest pain or SOB. Diet:  2g Na GI soft    Objective:   Vitals:  Visit Vitals    /61    Pulse (!) 105    Temp (!) 102.1 °F (38.9 °C)    Resp 16    Wt 52.5 kg (115 lb 11.2 oz)    LMP 01/02/2016    SpO2 90%    Breastfeeding No    BMI 22.6 kg/m2     Intake/Output:  05/12 0701 - 05/12 1900  In: 118 [P.O.:118]  Out: -   05/10 1901 - 05/12 0700  In: 200 [P.O.:200]  Out: -   Exam:  General appearance: groggy, difficult to awake. Confused. Lungs: clear to auscultation bilaterally anteriorly  Heart: regular rate and rhythm  Abdomen: distended but soft, tender. Bowel sounds normal. No masses, no organomegaly  Extremities: extremities normal, atraumatic, no cyanosis or edema  Neuro:  Confused. + asterixis.    Data Review (Labs):    Recent Labs      05/12/17   0645  05/11/17   0640  05/11/17   0020  05/10/17   0640   WBC  2.3*  3.2*  4.4  3.7*   HGB  10.3*  10.4*  11.3*  10.7*   HCT  30.1*  30.7*  33.1*  31.7*   PLT  50*  54*  59*  52*   MCV  86.7  87.2  87.8  89.5   NA  142  142   --   144   K  3.0*  3.5   --   3.9   CL  103  104   --   106   CO2  31  30   --   31   BUN  16  17   --   9   CREA  2.91*  3.48*   --   2.51*   CA  7.4*  7.2*   --   7.2*   MG   --    --    --   1.8   GLU  218*  197*   --   174*   AML   --    --    --   47   LPSE   --    --    --   57*       Assessment:     Principal Problem:    Intractable nausea and vomiting (5/9/2017)    Active Problems:    Type 2 diabetes mellitus with hyperglycemia ([address] (7/4/2016)      Iron deficiency anemia (7/4/2016)      Thrombocytopenia (HCC) (7/4/2016)      Hepatitis C (7/4/2016)      Congenital hepatic fibrosis ()      Hypotension (7/6/2016)      Esophageal varices (HCC) (7/27/2016)      ESRD (end stage renal disease) on dialysis ([facility] [address] (11/2/2016)      Hemodialysis access, AV graft ([facility] [address] (11/22/2016)      Liver transplant recipient [facility]) (2/13/2017)      Abdominal pain (5/8/2017)      Abdominal pain, acute (5/8/2017)      Coagulopathy ([facility] [address] (5/9/2017)      Overview: Liver disease      Cirrhosis of liver with ascites ([facility] [address] (5/9/2017)      Pancytopenia ([facility] [address] (5/9/2017)    55 y.o. 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 Her symptoms resolved with paracentesis on 5/9/17. She had 5300cc removed. She has fluid analysis pending, but is negative thus far. Abdominal US on 5/9/17 revealed a nonspecific hypoechoic area within or immediately adjacent to the pancreatic head, this does not have typical appearance of pancreatitis however recommend correlation with lipase levels to help exclude edematous pancreatitis. Her symptoms have improved, but will obtain lipase for evaluation. Remains febrile w pancytopenia. LVP with PMNs <100 which makes SBP less likely.        Plan:     - Increase lactulose to 30g QID and continue xifaxan due to probable HE.   - monitor labs closely. - appreciate ID recommendations re fever/ pancytopenia- plans for JAILYN and repeat blood cx noted. - continue BID PPI. Patient is seen and examined in collaboration with Dr. Rubens Suarez. Assessment and plan as per Dr. Daljit Montes.

## 2017-05-12 NOTE — PROGRESS NOTES
Hospitalist Progress Note     Admit Date:  2017  8:52 PM   Name:  Shahid Mejia   Age:  55 y.o.  :  1970   MRN:  [id number]   PCP:  Mellisa Kohler MD  Treatment Team: Attending Provider: Zohra Edmond MD; Consulting Provider: Jamaal Olson DO; Consulting Provider: Kaylah Newsome MD; Consulting Provider: Lauren Vogel MD    Subjective:   Patient is a 56 y/o F with ESRD on HD, liver transplant x2 from congenital hepatic fibrosis, chronic issues with abd pain, nausea and vomiting, who presented with more of the same and inability to take PO. Admitted and GI and Nephrology consulted. Latest infectious work up showed gram positive cocci in blood cultures obtained from right perma cath, periphery and life port.  - seen at bedside. Appeared confused and sluggish in answering simple questions. Significant deterioration in clinical status as compared to yesterday. Had 5 BM since yesterday. Spiked fever of 102. 1. No nausea or vomiting.       Objective:     Patient Vitals for the past 24 hrs:   Temp Pulse Resp BP SpO2   17 0728 (!) 102.1 °F (38.9 °C) (!) 105 16 102/61 90 %   17 0411 98.9 °F (37.2 °C) 79 19 111/68 98 %   17 2300 98.3 °F (36.8 °C) 77 18 97/60 97 %   17 2218 98.6 °F (37 °C) 85 18 91/57 99 %   17 2146 97.9 °F (36.6 °C) 81 18 91/57 100 %   17 1825 - (!) 102 - - -   17 1800 - - - 93/59 -   17 1651 98.6 °F (37 °C) (!) 113 - 92/61 96 %   17 1504 (!) 102.8 °F (39.3 °C) (!) 112 16 107/64 94 %   17 1400 (!) 102.6 °F (39.2 °C) (!) 113 18 92/67 93 %   17 1225 - (!) 139 - 110/71 -   17 1155 - 94 - 138/87 -   17 1125 - (!) 57 - (!) 113/92 -   17 1059 - (!) 120 - (!) 136/94 -   17 1025 - (!) 114 - 134/84 -   17 0936 - 97 - 132/90 -   17 0905 - 84 - 129/79 -   17 0815 - 74 - 141/79 -     Oxygen Therapy  O2 Sat (%): 90 % (17)  O2 Device: Room air (05/09/17 1617)    Intake/Output Summary (Last 24 hours) at 05/12/17 0811  Last data filed at 05/11/17 0815   Gross per 24 hour   Intake              200 ml   Output                0 ml   Net              200 ml         General:    Confused and sluggish, malnourished. CV:   RRR. No murmur, rub, or gallop. Tachycadic  Lungs:   Clear to auscultation bilaterally. No wheezing, rhonchi, or rales. Abdomen:   Soft, mildly distended, mild vTTP, diffuse. anterior abd hernia. Extremities: Warm and dry. No cyanosis or edema. Skin:     No rashes or jaundice.    CNS:               gcs 15, CN 2-12 intact, no motor or sensory deficits  Psych:             AO x 1, mood and affect flat    Current Meds:  Current Facility-Administered Medications   Medication Dose Route Frequency    potassium chloride 20 mEq in 100 ml IVPB  20 mEq IntraVENous Q2H    midodrine (PROAMITINE) tablet 10 mg  10 mg Oral Q8H    piperacillin-tazobactam (ZOSYN) 3.375 g in 0.9% sodium chloride (MBP/ADV) 100 mL  3.375 g IntraVENous Q12H    vancomycin (VANCOCIN) 750 mg in  ml infusion  750 mg IntraVENous See Admin Instructions    lactulose (CHRONULAC) solution 20 g  20 g Oral QID    rifAXIMin (XIFAXAN) tablet 550 mg  550 mg Oral BID    norflurane-pentafluoropropane (PAIN EASE) topical spray 1 Spray  1 Spray Topical DIALYSIS PRN    acetaminophen (TYLENOL) suppository 650 mg  650 mg Rectal Q6H PRN    magnesium oxide (MAG-OX) tablet 400 mg  400 mg Oral BID    0.9% sodium chloride infusion 250 mL  250 mL IntraVENous PRN    0.9% sodium chloride infusion 250 mL  250 mL IntraVENous PRN    metoclopramide HCl (REGLAN) injection 5 mg  5 mg IntraVENous Q6H PRN    ondansetron (ZOFRAN ODT) tablet 8 mg  8 mg Oral TID    morphine injection 2 mg  2 mg IntraVENous Q4H PRN    acetaminophen (TYLENOL) tablet 650 mg  650 mg Oral Q6H PRN    HYDROmorphone (PF) (DILAUDID) injection 0.2 mg  0.2 mg IntraVENous Q4H PRN    ondansetron (ZOFRAN) injection 4 mg 4 mg IntraVENous Q6H PRN    sodium chloride (NS) flush 5-10 mL  5-10 mL IntraVENous Q8H    sodium chloride (NS) flush 5-10 mL  5-10 mL IntraVENous PRN    pantoprazole (PROTONIX) 40 mg in sodium chloride 0.9 % 10 mL injection  40 mg IntraVENous Q12H       Labs and Studies:  I have reviewed all labs, meds, telemetry events, and studies from the last 24 hours.     Recent Results (from the past 24 hour(s))   GLUCOSE, POC    Collection Time: 05/11/17 10:29 AM   Result Value Ref Range    Glucose (POC) 158 (H) 65 - 100 mg/dL   CULTURE, BLOOD    Collection Time: 05/11/17  3:41 PM   Result Value Ref Range    Special Requests: LEFT WRIST      Culture result: NO GROWTH AFTER 14 HOURS     LACTIC ACID, PLASMA    Collection Time: 05/11/17  3:42 PM   Result Value Ref Range    Lactic acid 2.7 (HH) 0.4 - 2.0 MMOL/L   CULTURE, BLOOD    Collection Time: 05/11/17  3:45 PM   Result Value Ref Range    Special Requests: RIGHT  PERMANENT CATH        Culture result: NO GROWTH AFTER 14 HOURS     GLUCOSE, POC    Collection Time: 05/11/17  3:47 PM   Result Value Ref Range    Glucose (POC) 123 (H) 65 - 100 mg/dL   GLUCOSE, POC    Collection Time: 05/11/17  9:46 PM   Result Value Ref Range    Glucose (POC) 287 (H) 65 - 100 mg/dL   LACTIC ACID, PLASMA    Collection Time: 05/12/17  2:35 AM   Result Value Ref Range    Lactic acid 3.5 (HH) 0.4 - 2.0 MMOL/L   GLUCOSE, POC    Collection Time: 05/12/17  6:07 AM   Result Value Ref Range    Glucose (POC) 172 (H) 65 - 100 mg/dL   CBC W/O DIFF    Collection Time: 05/12/17  6:45 AM   Result Value Ref Range    WBC 2.3 (L) 4.3 - 11.1 K/uL    RBC 3.47 (L) 4.05 - 5.25 M/uL    HGB 10.3 (L) 11.7 - 15.4 g/dL    HCT 30.1 (L) 35.8 - 46.3 %    MCV 86.7 79.6 - 97.8 FL    MCH 29.7 26.1 - 32.9 PG    MCHC 34.2 31.4 - 35.0 g/dL    RDW 18.4 (H) 11.9 - 14.6 %    PLATELET 50 (L) 433 - 450 K/uL    MPV 9.4 (L) 10.8 - 50.8 FL   METABOLIC PANEL, BASIC    Collection Time: 05/12/17  6:45 AM   Result Value Ref Range    Sodium 142 136 - 145 mmol/L    Potassium 3.0 (L) 3.5 - 5.1 mmol/L    Chloride 103 98 - 107 mmol/L    CO2 31 21 - 32 mmol/L    Anion gap 8 7 - 16 mmol/L    Glucose 218 (H) 65 - 100 mg/dL    BUN 16 6 - 23 MG/DL    Creatinine 2.91 (H) 0.6 - 1.0 MG/DL    GFR est AA 22 (L) >60 ml/min/1.73m2    GFR est non-AA 18 (L) >60 ml/min/1.73m2    Calcium 7.4 (L) 8.3 - 10.4 MG/DL        US GUIDE PARACENTESIS   Final Result   Impression: Uncomplicated ultrasound guided paracentesis. US ABD LTD   Final Result   Impression:      Nonspecific hypoechoic area within or immediately adjacent to the pancreatic   head, this does not have typical appearance of pancreatitis however recommend   correlation with lipase levels to help exclude edematous pancreatitis. Evidence of hepatic cirrhosis with at least moderate volume abdominal ascites in   the upper abdomen. Postcholecystectomy. All Micro Results     Procedure Component Value Units Date/Time    CULTURE, BLOOD [[id number]] Collected:  05/11/17 0230    Order Status:  Completed Specimen:  Blood from Blood Updated:  05/12/17 0719     Special Requests: LEFT HAND        GRAM STAIN         GRAM POS COCCI IN CLUSTERS              AEROBIC AND ANAEROBIC BOTTLES              CRITICAL RESULT NOT CALLED DUE TO PREVIOUS NOTIFICATION OF CRITICAL RESULT WITHIN THE LAST 24 HOURS. Culture result:         CULTURE IN PROGRESS,FURTHER UPDATES TO FOLLOW              SA target DNA sequence not detected within the sample.  Test performed by PCR    CULTURE, BLOOD [[id number]] Collected:  05/11/17 6019    Order Status:  Completed Specimen:  Blood from Blood Updated:  05/12/17 0679     Special Requests: --        RIGHT  PERMANENT CATH       Culture result: NO GROWTH AFTER 14 HOURS       CULTURE, BLOOD [[id number]] Collected:  05/11/17 1541    Order Status:  Completed Specimen:  Blood from Blood Updated:  05/12/17 0654     Special Requests: LEFT WRIST        Culture result: NO GROWTH AFTER 14 HOURS       CULTURE, BLOOD [[id number]] Collected:  05/11/17 0010    Order Status:  Completed Specimen:  Blood from Blood Updated:  05/12/17 0045     Special Requests: LIFE PORT     GRAM STAIN         GRAM POS COCCI IN CLUSTERS              AEROBIC AND ANAEROBIC BOTTLES            RESULTS VERIFIED, PHONED TO AND READ BACK BY  Orestes Winston @ 9896 ON 5/11/17 BY NKE. Culture result:         CULTURE IN PROGRESS,FURTHER UPDATES TO FOLLOW              SA target DNA sequence not detected within the sample.  Test performed by PCR    CULTURE, BODY FLUID GUI Jones [[id number]] Collected:  05/09/17 1600    Order Status:  Completed Specimen:  Ascitic Fluid Updated:  05/11/17 0843     Special Requests: NO SPECIAL REQUESTS        GRAM STAIN NO WBCS SEEN         NO DEFINITE ORGANISM SEEN        Culture result: NO GROWTH 1 DAY       CULTURE, BODY FLUID Toro Kruger [[id number]] Collected:  05/08/17 2115    Order Status:  Canceled Specimen:  Ascitic Fluid             Assessment and Plan:     Hospital Problems as of 5/12/2017  Date Reviewed: 3/2/2017          Codes Class Noted - Resolved POA    * (Principal)Intractable nausea and vomiting ICD-10-CM: R11.2  ICD-9-CM: 536.2  5/9/2017 - Present Yes        Coagulopathy ([facility] [address] (Chronic) ICD-10-CM: D68.9  ICD-9-CM: 286.9  5/9/2017 - Present Yes    Overview Signed 5/9/2017 10:57 AM by Laura Cummings MD     Liver disease             Cirrhosis of liver with ascites ([facility] [address] (Chronic) ICD-10-CM: K74.60  ICD-9-CM: 571.5  5/9/2017 - Present Yes        Pancytopenia (HCC) (Chronic) ICD-10-CM: F92.854  ICD-9-CM: 284.19  5/9/2017 - Present Yes        Abdominal pain ICD-10-CM: R10.9  ICD-9-CM: 789.00  5/8/2017 - Present Yes        Abdominal pain, acute ICD-10-CM: R10.9  ICD-9-CM: 789.00, 338.19  5/8/2017 - Present Yes        Liver transplant recipient [facility]) (Chronic) ICD-10-CM: Z94.4  ICD-9-CM: V42.7  2/13/2017 - Present Yes        Hemodialysis access, AV graft ([address] (Chronic) ICD-10-CM: Z99.2  ICD-9-CM: V45.11  11/22/2016 - Present Yes        ESRD (end stage renal disease) on dialysis [facility]) (Chronic) ICD-10-CM: N18.6, Z99.2  ICD-9-CM: 585.6, V45.11  11/2/2016 - Present Yes        Esophageal varices (HCC) (Chronic) ICD-10-CM: I85.00  ICD-9-CM: 456.1  7/27/2016 - Present Yes        Congenital hepatic fibrosis (Chronic) ICD-10-CM: P78.81  ICD-9-CM: 777.8  Unknown - Present Yes        Hypotension (Chronic) ICD-10-CM: I95.9  ICD-9-CM: 458.9  7/6/2016 - Present Yes        Type 2 diabetes mellitus with hyperglycemia (HCC) (Chronic) ICD-10-CM: E11.65  ICD-9-CM: 250.00  7/4/2016 - Present Yes        Iron deficiency anemia (Chronic) ICD-10-CM: D50.9  ICD-9-CM: 280.9  7/4/2016 - Present Yes        Thrombocytopenia (HCC) (Chronic) ICD-10-CM: D69.6  ICD-9-CM: 287.5  7/4/2016 - Present Yes        Hepatitis C (Chronic) ICD-10-CM: B19.20  ICD-9-CM: 070.70  7/4/2016 - Present Yes                PLAN:      Acute infectious vs hepatic encephalopathy  Blood cultures positive for gram positive cocci in clusters from periphery, dialysis catheter and life port. Plan for continuing Vancomycin and  Zosyn. Repeat culture in AM. TTE ordered. Appreciate ID recs. · Ammonia level is 40, will continue lactulose QID and rifaxamin. Appreciate GI recs. ·  Nephro recs appreciated. Plan for HD as scheduled, plan for removal of dialysis catheter and left IJ. · Continue with midodrine 10 mg po q8h  · Thrombocytopenia 2/2 chronic liver disease. · Continue PRN meds as listed for abd pain, nausea, vomiting. · Monitor and replace lytes. · Stage 2 sacral ulcer, will continue foam dressing and change mattress for frequent position change. · Hb is stable at 10.3.     DC planning:  Likely home in 1-2 days  DVT ppx:  SCDs  Discussed plan with:  Pt  Risk:  High from numerous comorbidities, Iv narcotics    Signed:  Beltran Pettit MD

## 2017-05-12 NOTE — PROGRESS NOTES
Lab called to inform primary nurse of lactic acid of 3.5. Physician on call (Dr. Parekh [place] calls)  was paged repeatedly but no answer was received. Dr MCELROY (admissions/consults)  was then paged and informed of situation. Critical result of positive anaerobic blood culture bottles (gram + cocci) was also given to Dr MCELROY. Physician notified of pt already on vancomycin and zosyn and no orders were given. Lactic acid to be repeated in 4 hrs per protocol. Pt shows no signs of distress or pain. Will continue to monitor.

## 2017-05-12 NOTE — PROGRESS NOTES
Pt is resting comfortably. Respirations are even and unlabored. No signs or symptoms of distress are noted. No SOB or pain is reported at this time. Call light is within reach. Will continue to monitor.

## 2017-05-12 NOTE — PROGRESS NOTES
[facility]    Follow-Up on: ESRD    HPI: Dialysis yesterday and tolerated well. Has GPC bacteremia. ROS:  Denies CP, SOB.     Current Facility-Administered Medications   Medication Dose Route Frequency    potassium chloride 20 mEq in 100 ml IVPB  20 mEq IntraVENous Q2H    lactulose (CHRONULAC) solution 30 g  30 g Oral QID    midodrine (PROAMITINE) tablet 10 mg  10 mg Oral Q8H    piperacillin-tazobactam (ZOSYN) 3.375 g in 0.9% sodium chloride (MBP/ADV) 100 mL  3.375 g IntraVENous Q12H    vancomycin (VANCOCIN) 750 mg in  ml infusion  750 mg IntraVENous See Admin Instructions    rifAXIMin (XIFAXAN) tablet 550 mg  550 mg Oral BID    norflurane-pentafluoropropane (PAIN EASE) topical spray 1 Spray  1 Spray Topical DIALYSIS PRN    acetaminophen (TYLENOL) suppository 650 mg  650 mg Rectal Q6H PRN    magnesium oxide (MAG-OX) tablet 400 mg  400 mg Oral BID    0.9% sodium chloride infusion 250 mL  250 mL IntraVENous PRN    0.9% sodium chloride infusion 250 mL  250 mL IntraVENous PRN    metoclopramide HCl (REGLAN) injection 5 mg  5 mg IntraVENous Q6H PRN    ondansetron (ZOFRAN ODT) tablet 8 mg  8 mg Oral TID    morphine injection 2 mg  2 mg IntraVENous Q4H PRN    acetaminophen (TYLENOL) tablet 650 mg  650 mg Oral Q6H PRN    HYDROmorphone (PF) (DILAUDID) injection 0.2 mg  0.2 mg IntraVENous Q4H PRN    ondansetron (ZOFRAN) injection 4 mg  4 mg IntraVENous Q6H PRN    sodium chloride (NS) flush 5-10 mL  5-10 mL IntraVENous Q8H    sodium chloride (NS) flush 5-10 mL  5-10 mL IntraVENous PRN    pantoprazole (PROTONIX) 40 mg in sodium chloride 0.9 % 10 mL injection  40 mg IntraVENous Q12H       Exam:  Vitals:    05/11/17 2300 05/12/17 0345 05/12/17 0411 05/12/17 0728   BP: 97/60  111/68 102/61   Pulse: 77  79 (!) 105   Resp: 18  19 16   Temp: 98.3 °F (36.8 °C)  98.9 °F (37.2 °C) (!) 102.1 °F (38.9 °C)   SpO2: 97%  98% 90%   Weight:  52.5 kg (115 lb 11.2 oz)           Intake/Output Summary (Last 24 hours) at 05/12/17 1045  Last data filed at 05/12/17 0824   Gross per 24 hour   Intake              118 ml   Output                0 ml   Net              118 ml     PE:  GEN - in no distress  CV - regular, no murmur, no rub  Lung - clear bilaterally  Abd - soft, large hernia  Ext - no edema  Left Tunneled single lumen catheter  Right IJ TCC for dialysis    Labs  Recent Labs      05/12/17   0645  05/11/17   0640  05/11/17   0020   WBC  2.3*  3.2*  4.4   HGB  10.3*  10.4*  11.3*   HCT  30.1*  30.7*  33.1*   PLT  50*  54*  59*     Recent Labs      05/12/17   0645  05/11/17   0640  05/10/17   0640   NA  142  142  144   K  3.0*  3.5  3.9   CL  103  104  106   CO2  31  30  31   BUN  16  17  9   CREA  2.91*  3.48*  2.51*   GLU  218*  197*  174*   CA  7.4*  7.2*  7.2*   MG   --    --   1.8   PHOS   --    --   2.1*     No results for input(s): PH, PCO2, PO2, PCO2 in the last 72 hours. Problem List:  Patient Active Problem List    Diagnosis Date Noted    Intractable nausea and vomiting 05/09/2017    Coagulopathy ([address] 05/09/2017    Cirrhosis of liver with ascites ([address] 05/09/2017    Pancytopenia ([address] 05/09/2017    Abdominal pain 05/08/2017    Abdominal pain, acute 05/08/2017    Liver transplant recipient [facility]) 02/13/2017    Hemodialysis access, AV graft ([address] 11/22/2016    ESRD (end stage renal disease) on dialysis ([address] 11/02/2016    Esophageal varices ([address] 07/27/2016    Hypotension 07/06/2016    GERD (gastroesophageal reflux disease)     Congenital hepatic fibrosis     Type 2 diabetes mellitus with hyperglycemia ([address] 07/04/2016    Iron deficiency anemia 07/04/2016    Thrombocytopenia ([address] 07/04/2016    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 ESRD: Dialysis tomorrow. Thigh graft worked well yesterday. 2. GPC Bacteremia: Cultures positive from Left IJ non-HD catheter and peripherally. WIll await speciation and then will likely plan to remover her dialysis catheter and I would consider removing her LIJ line as well. 3. Pancytopenia  4.  Liver transplant    Plan:  -Await all cultures  -Continue vancomycin  -DIalysis tomorrow and if graft is successful, will work to get her TCC for dialysis out

## 2017-05-12 NOTE — PROGRESS NOTES
Pt resting in bed, alert to person only. Mumbling and nodding head repeatedly. Temp 102.1, ST  on remote tele, /61. Lactic 3.5 @ 0230, life port blood cx gram +cocci in clusters. Dr. Aloma Lanes notified. Orders received to continue to monitor and call if decline in status.

## 2017-05-12 NOTE — CONSULTS
Infectious Disease Consult    Today's Date: 5/12/2017   Admit Date: 5/8/2017    Impression:   · GPC bacteremia (5/10, 5/12); source possibly HD cath vs abd  · Fever  · Chronic ABD pain  · Chronic nausea/vomiting  · ESRD on HD  · Ascites s/p US guided paracentesis  · Type II DM  · Congenital hepatic fibrosis  · Liver transplant X 2; with chronic immunosuppression  · Hepatitis C; treated    Plan:   · Continue Vancomycin and Zosyn for now  · Obtain TTE  · Repeat blood cultures in AM  · Follow cultures    Anti-infectives:     Inpat Anti-Infectives     Start     Ordered Stop    05/11/17 1500  piperacillin-tazobactam (ZOSYN) 3.375 g in 0.9% sodium chloride (MBP/ADV) 100 mL  3.375 g,   IntraVENous,   EVERY 12 HOURS      05/11/17 1442 --    05/11/17 1451  vancomycin (VANCOCIN) 750 mg in  ml infusion  750 mg,   IntraVENous,   SEE ADMIN INSTRUCTIONS      05/11/17 1451 --    05/11/17 0000  XIFAXAN 550 mg tablet  550 mg,   Oral,   2 TIMES DAILY      05/11/17 1232 06/10/17 2359    05/10/17 1000  rifAXIMin (XIFAXAN) tablet 550 mg  550 mg,   Oral,   2 TIMES DAILY      05/10/17 0814 --          Subjective:   Date of Consultation:  May 12, 2017  Referring Physician: Dr. Tito Bennett    Patient is a 55 y.o. female presented 5/8/17 with a very complicated past medical history including congenital hepatic fibrosis with 2 liver transplants. She also has end-stage renal disease and chronic abdominal issues that are worsening and is why she presented. The patient complains of chronic nausea, vomiting that has been   going on for about the last 6 months. She says that she has nausea, vomiting about 3-4 times per day, usually after trying to eat something. The patient states that the nausea, vomiting has been worse for the last 3-4 days to the point that she can no longer even tolerate drinking water.  The patient does not report any recent hematemesis, although she was hospitalized in the early part of April of this year with esophageal variceal   bleeding, but she says since that episode, she has had no further hematemesis. The patient does not report any diarrhea and she does not report any recent fevers. The patient does report one episode of isolated fever that occurred last Thursday and apparently resolved spontaneously. Regarding her abdominal pain, she says this has been a chronic issue for several years and states that it is unchanged recently. She describes pain   throughout her entire abdomen which is usually dull, but at times sharp and the maximum intensity of the pain is 9/10. The patient presented to the emergency department 5/8/17 due to the worsening of the nausea and vomiting. This patient also does have a history of diabetes and states that her physical therapy   nurse was with her today and noticed that she was shaking. Her blood sugar was checked and it was apparently, according to the patient, 44. The patient drank some juice and rechecked the blood   sugar a few minutes later and the blood sugar had decreased to 32. Her Physician's office was contacted at that point and they recommended that EMS be alerted. When EMS checked her blood sugar it was 67; treated with glucose solution. Blood cultures obtained 5/10/17 are growing GPC's in clusters, DNA PCR not SA; pending ID and sensitivity. On 5/8/17 she underwent US guided paracentesis with total 5300 CC of thin yellow fluid extracted. CX NGTD. The patient is spiking fevers. US ABD showed  nonspecific hypoechoic area within or immediately adjacent to the pancreatic head, this does not have typical appearance of pancreatitis however recommended correlation with lipase levels to help exclude edematous pancreatitis. Lipase was 57. She was started on Vancomycin and Zosyn 5/11/17. ID is asked to evaluate this patient for recommendation regarding antimicrobial therapy.     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    Raynaud's disease I73.00    Hepatitis C B19.20    GERD (gastroesophageal reflux disease) K21.9    Congenital hepatic fibrosis P78.81    Hypotension I95.9    Esophageal varices (HCC) I85.00    ESRD (end stage renal disease) on dialysis (HCC) N18.6, Z99.2    Hemodialysis access, AV graft (HCC) Z99.2    Liver transplant recipient [facility]) Z94.4    Abdominal pain R10.9    Abdominal pain, acute R10.9    Intractable nausea and vomiting R11.2    Coagulopathy (HCC) D68.9    Cirrhosis of liver with ascites (HCC) K74.60    Pancytopenia (HCC) N77.540     Past Medical History:   Diagnosis Date    SEAN (acute kidney injury) ([facility] 6/26/2016    Arthritis     kath feet    Ascites     Benign neoplasm of skin of trunk, except scrotum     pt denies    Cellulitis and abscess of unspecified digit     hx of    Chronic kidney disease     [facility] on Mon/Wed/Fri    Chronic pain     abd area    Congenital hepatic fibrosis     Liver transplant X2    Esophageal varices (HCC)     GERD (gastroesophageal reflux disease)     Hemodialysis access, AV graft ([address] 11/22/2016    Hepatic encephalopathy ([address] 10/2016    recently hospitalized for hepatic encephalopathy    Hepatitis C     hx of hepatitis C-- dx after first liver transplant from a transfusion   (first transplant age 13)    History of gastric ulcer     Insomnia 07/13/2015    no current meds    Liver transplant status ([address] 1986 and 1999    X2- congential hepatic fibrosis    Pain in joint, ankle and foot     PICC (peripherally inserted central catheter) in place     PONV (postoperative nausea and vomiting)     some N/V, pt states she does well with Phenergan    Port-a-cath in place     R chest for HD    Raynaud disease     Spleen enlarged     Tachycardia     Thrombocytopenia (HCC)     Type 2 diabetes mellitus ([address] insulin only/AVG /s.s of hypoglycemia 30's/Last A1c 5.6    Vasculitis (HCC)     resolved      Family History   Problem Relation Age of Onset    Diabetes Mother     Heart Disease Mother     Hypertension Mother     Heart Disease Father     Stroke Father     Cancer Maternal Grandfather      liver cancer, alcoholism    No Known Problems Sister     Cancer Maternal Aunt      cervical cancer    Breast Cancer Paternal Grandmother      early 45s    Colon Cancer Paternal Grandmother      late 76s    Cancer Other      maternal niece- cervical cancer    Bipolar Disorder Brother     Heart Disease Brother       Social History   Substance Use Topics    Smoking status: Never Smoker    Smokeless tobacco: Never Used    Alcohol use No     Past Surgical History:   Procedure Laterality Date    HX CHOLECYSTECTOMY  1984    HX COLONOSCOPY      HX OTHER SURGICAL      biliary reconstructive surgery    HX OTHER SURGICAL  2013    EGD    HX OTHER SURGICAL  03/2016    tips procedure    HX OTHER SURGICAL      paracentesis    HX TRANSPLANT  3467,3111    2 liver - first one for congenital hepatic fibrosis, 2nd for Hep C cirrhosis    HX TUBAL LIGATION      LAP,TUBAL CAUTERY      VASCULAR SURGERY PROCEDURE UNLIST Left 11/02/2016    thigh AVG insertion in thigh      Prior to Admission medications    Medication Sig Start Date End Date Taking? Authorizing Provider   ondansetron (ZOFRAN ODT) 8 mg disintegrating tablet Take 1 Tab by mouth three (3) times daily for 30 days. Indications: persistent N&V 5/11/17 6/10/17 Yes Clint Novak MD   XIFAXAN 550 mg tablet Take 1 Tab by mouth two (2) times a day for 30 days. 5/11/17 6/10/17 Yes Clint Novak MD   midodrine (PROAMITINE) 10 mg tablet Take 1 Tab by mouth every eight (8) hours for 30 days. 5/11/17 6/10/17 Yes Clint Novak MD   insulin glargine (LANTUS SOLOSTAR) 100 unit/mL (3 mL) pen 25 Units by SubCUTAneous route once.  patient takes a needed depending on her Bs Historical Provider   promethazine (PHENERGAN) 25 mg tablet Take 25 mg by mouth every six (6) hours as needed for Nausea. Historical Provider   insulin aspart (NOVOLOG) 100 unit/mL injection Sliding scale maximum 15 units each meal 3/24/17   Junie Nance MD   insulin glargine (LANTUS SOLOSTAR) 100 unit/mL (3 mL) pen 25 Units by SubCUTAneous route nightly. 3/24/17   Junie Nance MD   calcium acetate (PHOSLO) 667 mg cap Take 1 Cap by mouth three (3) times daily (with meals). 3/24/17   Junie Nance MD   ondansetron hcl (ZOFRAN) 4 mg tablet Take 1 Tab by mouth every eight (8) hours as needed for Nausea. 3/24/17   Junie Nance MD   insulin lispro (HUMALOG) 100 unit/mL kwikpen Up to 10 units sq q ac 3/2/17   Junie Nance MD   lactulose (CHRONULAC) 10 gram/15 mL solution Take 30 mL by mouth three (3) times daily. 2/22/17   Chacha Glass DO   morphine CR (MS CONTIN) 15 mg CR tablet Take 1 Tab by mouth every twelve (12) hours. Max Daily Amount: 30 mg. 2/22/17   Chacha Glass DO   colchicine (MITIGARE) 0.6 mg capsule Take 0.6 mg by mouth daily. Historical Provider   traMADol (ULTRAM) 50 mg tablet Take 1 Tab by mouth every six (6) hours as needed. Max Daily Amount: 200 mg. Indications: PAIN 11/3/16   Zulay Mahoney MD   magnesium oxide (MAG-OX) 400 mg tablet Take 400 mg by mouth two (2) times a day. Historical Provider   cycloSPORINE modified (GENGRAF) 25 mg capsule Take 2.5 mg/kg/day by mouth every morning. Indications: Takes in the AM    Historical Provider   midodrine (PROAMITINE) 5 mg tablet Take 5 mg by mouth every eight (8) hours. 2 q8h     Historical Provider   pantoprazole (PROTONIX) 40 mg tablet Take 40 mg by mouth four (4) times daily.  2tabs bid    Historical Provider       Allergies   Allergen Reactions    Aspirin Nausea Only    Codeine Nausea Only    Compazine [Prochlorperazine Edisylate] Unknown (comments)     Causes Lock Jaw Review of Systems:  A comprehensive review of systems was negative except for that written in the History of Present Illness. Objective:     Visit Vitals    /61    Pulse (!) 105    Temp (!) 102.1 °F (38.9 °C)    Resp 16    Wt 52.5 kg (115 lb 11.2 oz)    SpO2 90%    Breastfeeding No    BMI 22.6 kg/m2     Temp (24hrs), Av °F (37.8 °C), Min:97.9 °F (36.6 °C), Max:102.8 °F (39.3 °C)       Lines:  Hemodialysis Catheter:            Physical Exam:    General:  Alert, cooperative, cachectic, appears older than stated age   Eyes:  Sclera anicteric. Pupils equally round and reactive to light. Mouth/Throat: Mucous membranes normal, oral pharynx clear   Neck: Supple   Lungs:   Clear to auscultation bilaterally, good effort   CV:  Tachycardic,no murmur, click, rub or gallop   Abdomen:   Soft, non-tender. bowel sounds hyperactive, non-distended, lg  abd hernia   Extremities: No cyanosis or edema   Skin: Skin color, texture, turgor normal. no acute rash or lesions   Lymph nodes: Cervical and supraclavicular normal   Musculoskeletal: No swelling or deformity   Lines/Devices:  Intact, no erythema, drainage or tenderness   Psych: Alert and oriented, normal mood affect given the setting       Data Review:     CBC:  Recent Labs      17   0645  17   0640  17   0020   17   1000   WBC  2.3*  3.2*  4.4   < >  2.8*   GRANS   --    --   73   --   54   MONOS   --    --   8   --   8   EOS   --    --   3   --   10*   ANEU   --    --   3.2   --   1.5*   ABL   --    --   0.7   --   0.8   HGB  10.3*  10.4*  11.3*   < >  9.0*   HCT  30.1*  30.7*  33.1*   < >  27.5*   PLT  50*  54*  59*   < >  64*    < > = values in this interval not displayed.        BMP:  Recent Labs      17   0645  17   0640  05/10/17   0640   CREA  2.91*  3.48*  2.51*   BUN  16  17  9   NA  142  142  144   K  3.0*  3.5  3.9   CL  103  104  106   CO2  31  30  31   AGAP  8  8  7   GLU  218*  197*  174*       LFTS:  No results for input(s): TBILI, ALT, SGOT, AP, TP, ALB in the last 72 hours. Microbiology:     All Micro Results     Procedure Component Value Units Date/Time    CULTURE, BLOOD [[id number]] Collected:  05/11/17 0230    Order Status:  Completed Specimen:  Blood from Blood Updated:  05/12/17 0719     Special Requests: LEFT HAND        GRAM STAIN         GRAM POS COCCI IN CLUSTERS              AEROBIC AND ANAEROBIC BOTTLES              CRITICAL RESULT NOT CALLED DUE TO PREVIOUS NOTIFICATION OF CRITICAL RESULT WITHIN THE LAST 24 HOURS. Culture result:         CULTURE IN PROGRESS,FURTHER UPDATES TO FOLLOW              SA target DNA sequence not detected within the sample. Test performed by PCR    CULTURE, BLOOD [[id number]] Collected:  05/11/17 1545    Order Status:  Completed Specimen:  Blood from Blood Updated:  05/12/17 0654     Special Requests: --        RIGHT  PERMANENT CATH       Culture result: NO GROWTH AFTER 14 HOURS       CULTURE, BLOOD [[id number]] Collected:  05/11/17 1541    Order Status:  Completed Specimen:  Blood from Blood Updated:  05/12/17 0654     Special Requests: LEFT WRIST        Culture result: NO GROWTH AFTER 14 HOURS       CULTURE, BLOOD [[id number]] Collected:  05/11/17 0010    Order Status:  Completed Specimen:  Blood from Blood Updated:  05/12/17 0045     Special Requests: LIFE PORT     GRAM STAIN         GRAM POS COCCI IN CLUSTERS              AEROBIC AND ANAEROBIC BOTTLES            RESULTS VERIFIED, PHONED TO AND READ BACK BY  Walt Hanks @ 0807 ON 5/11/17 BY RAMAN. Culture result:         CULTURE IN PROGRESS,FURTHER UPDATES TO FOLLOW              SA target DNA sequence not detected within the sample.  Test performed by PCR    CULTURE, BODY FLUID GUI Otero [[id number]] Collected:  05/09/17 1600    Order Status:  Completed Specimen:  Ascitic Fluid Updated:  05/11/17 0843     Special Requests: NO SPECIAL REQUESTS        GRAM STAIN NO WBCS SEEN         NO DEFINITE ORGANISM SEEN Culture result: NO GROWTH 1 DAY       CULTURE, BODY FLUID Toro Kruger [[id number]] Collected:  05/08/17 2115    Order Status:  Canceled Specimen:  Ascitic Fluid           Imaging:   As above    Signed By: Scott Lopez NP     May 12, 2017

## 2017-05-13 NOTE — PROGRESS NOTES
Pt refused night time lactulose, attempted to encourage pt the importance of taking it, states she will resume tomorrow.

## 2017-05-13 NOTE — DIALYSIS
Dialysis treatment completed at 1200. Pt tolerated well. No fluid removed. Two 15 ga needles removed from access and manual pressure held until hemostasis complete and pressure dressing applied. Pt noted alert and oriented; Vital signs HR=81, BP=83/57. Pt to room after dialysis completed.

## 2017-05-13 NOTE — DIALYSIS
Patient continuing to bleed despite surgiceal. Dr. Marni Noonan notified and at bedside. Order for 1 unit of platelets to administered. Cleared with Dr. Bowen Ernandez. New surgiceal in place and bleeding stopped for the time being. Platelet infusion in progress.

## 2017-05-13 NOTE — DIALYSIS
Platelent transfusion complete. Bleeding at AVG site stopped. Transport contacted for return to room.

## 2017-05-13 NOTE — DIALYSIS
Dr. Cami Modi notified of prolonged bleed time. Instructed me to hold 15 more minutes and if no improvement to call Vascular. Vascular called and made aware of prolonged bleed time and that patient's vascular access will need assessment. Dr. Kelvin Boswell stated that he could not come to the dialysis unit right now but to continue to hold pressure and that he would see the patient when he had time. Surgiceal applied and will continue to monitor bleed.

## 2017-05-13 NOTE — PROGRESS NOTES
GI DAILY PROGRESS NOTE    Admit Date:  5/8/2017    Today's Date:  5/13/2017    CC:  Cirrhosis, ascites    Subjective:     Patient is much more alert and oriented today. Reports abdominal pain and nausea. Reports numerous loose stools overnight. Remains hypotensive. Pancytopenia persists- ID following and recommending JAILYN and continued Zosyn and Vanco. LVP 5/9 without sign of SBP. Creatinine remains elevated (3.76 this am prior to HD). Pt seen in dialysis and AV graft will  Not clot after holding pressure for one hour. Awaiting vascular evaluation. Plt count 46k.        Medications:   Current Facility-Administered Medications   Medication Dose Route Frequency    ketorolac (TORADOL) injection 30 mg  30 mg IntraMUSCular Q6H PRN    lactulose (CHRONULAC) solution 30 g  30 g Oral TID    potassium chloride (K-DUR, KLOR-CON) SR tablet 20 mEq  20 mEq Oral BID    potassium chloride 20 mEq in 100 ml IVPB  20 mEq IntraVENous ONCE    potassium chloride 20 mEq in 100 ml IVPB  20 mEq IntraVENous ONCE    midodrine (PROAMITINE) tablet 10 mg  10 mg Oral Q8H    vancomycin (VANCOCIN) 750 mg in  ml infusion  750 mg IntraVENous See Admin Instructions    rifAXIMin (XIFAXAN) tablet 550 mg  550 mg Oral BID    norflurane-pentafluoropropane (PAIN EASE) topical spray 1 Spray  1 Spray Topical DIALYSIS PRN    acetaminophen (TYLENOL) suppository 650 mg  650 mg Rectal Q6H PRN    magnesium oxide (MAG-OX) tablet 400 mg  400 mg Oral BID    0.9% sodium chloride infusion 250 mL  250 mL IntraVENous PRN    0.9% sodium chloride infusion 250 mL  250 mL IntraVENous PRN    metoclopramide HCl (REGLAN) injection 5 mg  5 mg IntraVENous Q6H PRN    ondansetron (ZOFRAN ODT) tablet 8 mg  8 mg Oral TID    morphine injection 2 mg  2 mg IntraVENous Q4H PRN    acetaminophen (TYLENOL) tablet 650 mg  650 mg Oral Q6H PRN    HYDROmorphone (PF) (DILAUDID) injection 0.2 mg  0.2 mg IntraVENous Q4H PRN    ondansetron (ZOFRAN) injection 4 mg  4 mg IntraVENous Q6H PRN    sodium chloride (NS) flush 5-10 mL  5-10 mL IntraVENous Q8H    sodium chloride (NS) flush 5-10 mL  5-10 mL IntraVENous PRN    pantoprazole (PROTONIX) 40 mg in sodium chloride 0.9 % 10 mL injection  40 mg IntraVENous Q12H       Review of Systems:  ROS was obtained, with pertinent positives as listed above. No chest pain or SOB. Diet:  2g Na, gi soft    Objective:   Vitals:  Visit Vitals    BP (!) 83/51    Pulse 76    Temp 97.5 °F (36.4 °C)    Resp 18    Wt 54.7 kg (120 lb 11.2 oz)    LMP 01/02/2016    SpO2 98%    Breastfeeding No    BMI 23.57 kg/m2     Intake/Output:     05/11 1901 - 05/13 0700  In: 118 [P.O.:118]  Out: -   Exam:  General appearance: alert, cooperative, no distress  Lungs: clear to auscultation bilaterally anteriorly  Heart: regular rate and rhythm  Abdomen: distended but soft and nontender. Bowel sounds normal. No masses, no organomegaly. Large hernia unchanged  Extremities: extremities normal, atraumatic, no cyanosis or edema. Nurse holding pressure to left groin.    Neuro:  alert and oriented    Data Review (Labs):    Recent Labs      05/13/17   0620  05/12/17   0645  05/11/17   0640  05/11/17   0020   WBC  1.6*  2.3*  3.2*  4.4   HGB  9.6*  10.3*  10.4*  11.3*   HCT  28.2*  30.1*  30.7*  33.1*   PLT  46*  50*  54*  59*   MCV  85.7  86.7  87.2  87.8   NA  142  142  142   --    K  2.7*  3.0*  3.5   --    CL  106  103  104   --    CO2  27  31  30   --    BUN  25*  16  17   --    CREA  3.76*  2.91*  3.48*   --    CA  7.1*  7.4*  7.2*   --    GLU  179*  218*  197*   --        Assessment:     Principal Problem:    Intractable nausea and vomiting (5/9/2017)    Active Problems:    Type 2 diabetes mellitus with hyperglycemia (HCC) (7/4/2016)      Iron deficiency anemia (7/4/2016)      Thrombocytopenia (HCC) (7/4/2016)      Hepatitis C (7/4/2016)      Congenital hepatic fibrosis ()      Hypotension (7/6/2016)      Esophageal varices (HCC) (7/27/2016)      ESRD (end stage renal disease) on dialysis ([facility] (11/2/2016)      Hemodialysis access, AV graft ([facility] (11/22/2016)      Liver transplant recipient [facility]) (2/13/2017)      Abdominal pain (5/8/2017)      Abdominal pain, acute (5/8/2017)      Coagulopathy ([address] (5/9/2017)      Overview: Liver disease      Cirrhosis of liver with ascites ([facility] (5/9/2017)      Pancytopenia ([facility] (5/9/2017)    55 y.o. 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 Her symptoms resolved with paracentesis on 5/9/17. She had 5300cc removed. She has fluid analysis negative thus far. Abdominal US on 5/9/17 revealed a nonspecific hypoechoic area within or immediately adjacent to the pancreatic head, this does not have typical appearance of pancreatitis however recommend correlation with lipase levels to help exclude edematous pancreatitis (lipase normal on 5/10). Remains febrile w pancytopenia which appears to be worsending- ID following. Diana Marli LVP with PMNs <100 which makes SBP less likely.        Plan:     - lactulose increased for HE 5/12 but now with significant diarrhea- will decrease.  - monitor labs closely. - appreciate ID recommendations re fever/ pancytopenia- plans for JAILYN and repeat blood cx noted. - continue BID PPI.  - A/CKD with HD today. Juan Ray [place]    Patient is seen and examined in collaboration with Dr. Skylar Willingham. Assessment and plan as per Dr. Skylar Willingham.    =Abdomen NT. Large hernia unchanged. Plans as above. She has F/U visit at [place] [id number] regarding transplant issues.   Diana Hickye MD

## 2017-05-13 NOTE — DIALYSIS
Holding pressure on arterial needles access site for >30 minutes bleed time. Will continue to hold pressure and will notify MD. Current labs show platelet levels as low.

## 2017-05-13 NOTE — PROGRESS NOTES
Shift assessment complete. Pt resting quietly in bed. No signs of acute distress. Resp even and unlabored. Pt having multiple bowel movements due to lactulose. Abdominal hernia noted. Call light within reach.

## 2017-05-13 NOTE — PROGRESS NOTES
Hospitalist Progress Note     Admit Date:  2017  8:52 PM   Name:  Korin Moreland   Age:  55 y.o.  :  1970   MRN:  [id number]   PCP:  Roxane Esquivel MD  Treatment Team: Attending Provider: Brian Donato MD; Consulting Provider: Jeffery Peralta DO; Consulting Provider: Elayne Castro MD; Consulting Provider: Kev Perez MD    Subjective:   Patient is a 54 y/o F with ESRD on HD, liver transplant x2 from congenital hepatic fibrosis, chronic issues with abd pain, nausea and vomiting, who presented with more of the same and inability to take PO. Admitted and GI and Nephrology consulted. Latest infectious work up showed gram positive cocci in blood cultures obtained from right perma cath, periphery and life port.  - seen at bedside. Objective:     Patient Vitals for the past 24 hrs:   Temp Pulse Resp BP SpO2   17 0759 97.5 °F (36.4 °C) 72 18 (!) 82/52 98 %   17 0313 98.9 °F (37.2 °C) 85 17 90/55 97 %   17 2343 99.8 °F (37.7 °C) 90 19 112/69 100 %   17 1932 97.9 °F (36.6 °C) 80 17 91/53 100 %   17 1649 - 84 - 97/60 -   17 1546 100.3 °F (37.9 °C) 92 17 (!) 89/52 94 %   17 1133 100.3 °F (37.9 °C) (!) 103 17 102/62 96 %     Oxygen Therapy  O2 Sat (%): 98 % (17 0759)  O2 Device: Room air (17 1617)  No intake or output data in the 24 hours ending 17 0828      General:    Confused and sluggish, malnourished. CV:   RRR. No murmur, rub, or gallop. Tachycadic  Lungs:   Clear to auscultation bilaterally. No wheezing, rhonchi, or rales. Abdomen:   Soft, mildly distended, mild vTTP, diffuse. anterior abd hernia. Extremities: Warm and dry. No cyanosis or edema. Skin:     No rashes or jaundice.    CNS:               gcs 15, CN 2-12 intact, no motor or sensory deficits  Psych:             AO x 1, mood and affect flat    Current Meds:  Current Facility-Administered Medications   Medication Dose Route Frequency    ketorolac (TORADOL) injection 30 mg  30 mg IntraMUSCular Q6H PRN    lactulose (CHRONULAC) solution 30 g  30 g Oral TID    midodrine (PROAMITINE) tablet 10 mg  10 mg Oral Q8H    vancomycin (VANCOCIN) 750 mg in  ml infusion  750 mg IntraVENous See Admin Instructions    rifAXIMin (XIFAXAN) tablet 550 mg  550 mg Oral BID    norflurane-pentafluoropropane (PAIN EASE) topical spray 1 Spray  1 Spray Topical DIALYSIS PRN    acetaminophen (TYLENOL) suppository 650 mg  650 mg Rectal Q6H PRN    magnesium oxide (MAG-OX) tablet 400 mg  400 mg Oral BID    0.9% sodium chloride infusion 250 mL  250 mL IntraVENous PRN    0.9% sodium chloride infusion 250 mL  250 mL IntraVENous PRN    metoclopramide HCl (REGLAN) injection 5 mg  5 mg IntraVENous Q6H PRN    ondansetron (ZOFRAN ODT) tablet 8 mg  8 mg Oral TID    morphine injection 2 mg  2 mg IntraVENous Q4H PRN    acetaminophen (TYLENOL) tablet 650 mg  650 mg Oral Q6H PRN    HYDROmorphone (PF) (DILAUDID) injection 0.2 mg  0.2 mg IntraVENous Q4H PRN    ondansetron (ZOFRAN) injection 4 mg  4 mg IntraVENous Q6H PRN    sodium chloride (NS) flush 5-10 mL  5-10 mL IntraVENous Q8H    sodium chloride (NS) flush 5-10 mL  5-10 mL IntraVENous PRN    pantoprazole (PROTONIX) 40 mg in sodium chloride 0.9 % 10 mL injection  40 mg IntraVENous Q12H       Labs and Studies:  I have reviewed all labs, meds, telemetry events, and studies from the last 24 hours.     Recent Results (from the past 24 hour(s))   LACTIC ACID, PLASMA    Collection Time: 05/12/17  9:28 AM   Result Value Ref Range    Lactic acid 3.1 (HH) 0.4 - 2.0 MMOL/L   AMMONIA    Collection Time: 05/12/17  9:55 AM   Result Value Ref Range    Ammonia 40 (H) 11 - 32 UMOL/L   GLUCOSE, POC    Collection Time: 05/12/17 11:38 AM   Result Value Ref Range    Glucose (POC) 237 (H) 65 - 100 mg/dL   GLUCOSE, POC    Collection Time: 05/12/17  3:27 PM   Result Value Ref Range    Glucose (POC) 301 (H) 65 - 100 mg/dL GLUCOSE, POC    Collection Time: 05/13/17 12:42 AM   Result Value Ref Range    Glucose (POC) 241 (H) 65 - 100 mg/dL   GLUCOSE, POC    Collection Time: 05/13/17  5:37 AM   Result Value Ref Range    Glucose (POC) 194 (H) 65 - 933 mg/dL   METABOLIC PANEL, BASIC    Collection Time: 05/13/17  6:20 AM   Result Value Ref Range    Sodium 142 136 - 145 mmol/L    Potassium PENDING mmol/L    Chloride 106 98 - 107 mmol/L    CO2 27 21 - 32 mmol/L    Anion gap 9 7 - 16 mmol/L    Glucose 179 (H) 65 - 100 mg/dL    BUN 25 (H) 6 - 23 MG/DL    Creatinine 3.76 (H) 0.6 - 1.0 MG/DL    GFR est AA 17 (L) >60 ml/min/1.73m2    GFR est non-AA 14 (L) >60 ml/min/1.73m2    Calcium 7.1 (L) 8.3 - 10.4 MG/DL        US GUIDE PARACENTESIS   Final Result   Impression: Uncomplicated ultrasound guided paracentesis. US ABD LTD   Final Result   Impression:      Nonspecific hypoechoic area within or immediately adjacent to the pancreatic   head, this does not have typical appearance of pancreatitis however recommend   correlation with lipase levels to help exclude edematous pancreatitis. Evidence of hepatic cirrhosis with at least moderate volume abdominal ascites in   the upper abdomen. Postcholecystectomy.       IR REMOVE TUNL CVAD W/O PORT / PUMP    (Results Pending)       All Micro Results     Procedure Component Value Units Date/Time    CULTURE, BLOOD [[id number]]     Order Status:  Sent Specimen:  Blood from Blood     CULTURE, BLOOD [[id number]]     Order Status:  Sent Specimen:  Blood from Blood     CULTURE, BLOOD [[id number]]     Order Status:  Sent Specimen:  Blood from Blood     CULTURE, BLOOD [[id number]]  (Abnormal) Collected:  05/11/17 1541    Order Status:  Completed Specimen:  Blood from Blood Updated:  05/13/17 0808     Special Requests: LEFT WRIST        GRAM STAIN         GRAM POS COCCI IN CLUSTERS      PEDIATRIC BOTTLE                 CRITICAL RESULT NOT CALLED DUE TO PREVIOUS NOTIFICATION OF CRITICAL RESULT WITHIN THE LAST 24 HOURS. Culture result: GRAM POSITIVE COCCI (A)                 CULTURE IN PROGRESS,FURTHER UPDATES TO FOLLOW    CULTURE, BLOOD [[id number]]  (Abnormal) Collected:  05/11/17 1545    Order Status:  Completed Specimen:  Blood from Blood Updated:  05/13/17 0802     Special Requests: --        RIGHT  PERMANENT CATH       GRAM STAIN         GRAM POS COCCI IN CLUSTERS              AEROBIC AND ANAEROBIC BOTTLES              CRITICAL RESULT NOT CALLED DUE TO PREVIOUS NOTIFICATION OF CRITICAL RESULT WITHIN THE LAST 24 HOURS. Culture result:       GRAM POSITIVE COCCI  IDENTIFICATION AND SUSCEPTIBILITY TO FOLLOW   (A)              SA target DNA sequence not detected within the sample. Test performed by PCR    CULTURE, BLOOD [[id number]]  (Abnormal) Collected:  05/11/17 0230    Order Status:  Completed Specimen:  Blood from Blood Updated:  05/13/17 0759     Special Requests: LEFT HAND        GRAM STAIN         GRAM POS COCCI IN CLUSTERS              AEROBIC AND ANAEROBIC BOTTLES              CRITICAL RESULT NOT CALLED DUE TO PREVIOUS NOTIFICATION OF CRITICAL RESULT WITHIN THE LAST 24 HOURS. Culture result:       GRAM POSITIVE COCCI  IDENTIFICATION AND SUSCEPTIBILITY TO FOLLOW   (A)              SA target DNA sequence not detected within the sample. Test performed by PCR    CULTURE, BLOOD [[id number]]  (Abnormal) Collected:  05/11/17 0010    Order Status:  Completed Specimen:  Blood from Blood Updated:  05/13/17 0757     Special Requests: LIFE PORT     GRAM STAIN         GRAM POS COCCI IN CLUSTERS              AEROBIC AND ANAEROBIC BOTTLES            RESULTS VERIFIED, PHONED TO AND READ BACK BY  Kwasi Lujan @ 7203 ON 5/11/17 BY LEV. Culture result:       GRAM POSITIVE COCCI  IDENTIFICATION AND SUSCEPTIBILITY TO FOLLOW   (A)              SA target DNA sequence not detected within the sample.  Test performed by PCR    CULTURE, BODY FLUID W Holley Rudolph [[id number]] Collected:  05/09/17 1600    Order Status:  Completed Specimen:  Ascitic Fluid Updated:  05/12/17 0910     Special Requests: NO SPECIAL REQUESTS        GRAM STAIN NO WBCS SEEN         NO DEFINITE ORGANISM SEEN        Culture result: NO GROWTH 2 DAYS       CULTURE, BODY FLUID Luellen Crass STAIN [[id number]] Collected:  05/08/17 2115    Order Status:  Canceled Specimen:  Ascitic Fluid             Assessment and Plan:     Hospital Problems as of 5/13/2017  Date Reviewed: 3/2/2017          Codes Class Noted - Resolved POA    * (Principal)Intractable nausea and vomiting ICD-10-CM: R11.2  ICD-9-CM: 536.2  5/9/2017 - Present Yes        Coagulopathy ([facility] [address] (Chronic) ICD-10-CM: D68.9  ICD-9-CM: 286.9  5/9/2017 - Present Yes    Overview Signed 5/9/2017 10:57 AM by Hernando Arndt MD     Liver disease             Cirrhosis of liver with ascites ([facility] [address] (Chronic) ICD-10-CM: K74.60  ICD-9-CM: 571.5  5/9/2017 - Present Yes        Pancytopenia (HCC) (Chronic) ICD-10-CM: M05.349  ICD-9-CM: 284.19  5/9/2017 - Present Yes        Abdominal pain ICD-10-CM: R10.9  ICD-9-CM: 789.00  5/8/2017 - Present Yes        Abdominal pain, acute ICD-10-CM: R10.9  ICD-9-CM: 789.00, 338.19  5/8/2017 - Present Yes        Liver transplant recipient [facility]) (Chronic) ICD-10-CM: Z94.4  ICD-9-CM: V42.7  2/13/2017 - Present Yes        Hemodialysis access, AV graft ([facility] [address] (Chronic) ICD-10-CM: Z99.2  ICD-9-CM: V45.11  11/22/2016 - Present Yes        ESRD (end stage renal disease) on dialysis [facility]) (Chronic) ICD-10-CM: N18.6, Z99.2  ICD-9-CM: 585.6, V45.11  11/2/2016 - Present Yes        Esophageal varices (HCC) (Chronic) ICD-10-CM: I85.00  ICD-9-CM: 456.1  7/27/2016 - Present Yes        Congenital hepatic fibrosis (Chronic) ICD-10-CM: P78.81  ICD-9-CM: 777.8  Unknown - Present Yes        Hypotension (Chronic) ICD-10-CM: I95.9  ICD-9-CM: 458.9  7/6/2016 - Present Yes        Type 2 diabetes mellitus with hyperglycemia (HCC) (Chronic) ICD-10-CM: E11.65  ICD-9-CM: 250.00  7/4/2016 - Present Yes Iron deficiency anemia (Chronic) ICD-10-CM: D50.9  ICD-9-CM: 280.9  7/4/2016 - Present Yes        Thrombocytopenia (HCC) (Chronic) ICD-10-CM: D69.6  ICD-9-CM: 287.5  7/4/2016 - Present Yes        Hepatitis C (Chronic) ICD-10-CM: B19.20  ICD-9-CM: 070.70  7/4/2016 - Present Yes                PLAN:      Acute infectious encephalopathy: improving  Blood cultures positive for gram positive cocci in clusters from periphery, dialysis catheter and life port. Plan for continuing Vancomycin. Repeat cultures sent. 2D-ECHO showed grade 1 diastolic dysfunction with EF 65-70%    S/p dialysis cath and IJ cath removal.       · Given increased diarrhea, will decrease lactulose frequency. · Hypokalemia: 2/2 lactulose induced diarrhea. Potassium of 2.7, will give 40 meq IV and Chlor Hal 20 meq po BID. ·  Nephro recs appreciated. Plan for HD as scheduled. Continue with midodrine 10 mg po q8h  · Leukopenia could be due to colchicine, which has been discontinued. If continues to decline further, will consult hematology for eval.  · Thrombocytopenia 2/2 chronic liver disease. · Continue PRN meds as listed for abd pain, nausea, vomiting. · Stage 2 sacral ulcer, will continue foam dressing and change mattress for frequent position change. · Hb is stable at 9.6. DC planning:  Discharge to home when medically stable.   DVT ppx:  SCDs  Discussed plan with:  Pt  Risk:  High from numerous comorbidities, Iv narcotics    Signed:  Alexa Macias MD

## 2017-05-13 NOTE — DIALYSIS
On HD via Left thigh AVG using 15 ga needles x2 without difficulty. No heparin used for this treatment. Vitals signs prior to beginning treatment as follows: HR=70, BP=80/36.  ml given before starting HD. Will not be pulling fluid, only cleaning blood. Pt noted alert and oriented x 4. Will continue to monitor throughout treatment.

## 2017-05-13 NOTE — PROGRESS NOTES
[facility]    Follow-Up on: ESRD    HPI:  Seen on HD. Thigh AVG working well. ROS:  Denies CP, SOB.     Current Facility-Administered Medications   Medication Dose Route Frequency    ketorolac (TORADOL) injection 30 mg  30 mg IntraMUSCular Q6H PRN    lactulose (CHRONULAC) solution 30 g  30 g Oral TID    midodrine (PROAMITINE) tablet 10 mg  10 mg Oral Q8H    vancomycin (VANCOCIN) 750 mg in  ml infusion  750 mg IntraVENous See Admin Instructions    rifAXIMin (XIFAXAN) tablet 550 mg  550 mg Oral BID    norflurane-pentafluoropropane (PAIN EASE) topical spray 1 Spray  1 Spray Topical DIALYSIS PRN    acetaminophen (TYLENOL) suppository 650 mg  650 mg Rectal Q6H PRN    magnesium oxide (MAG-OX) tablet 400 mg  400 mg Oral BID    0.9% sodium chloride infusion 250 mL  250 mL IntraVENous PRN    0.9% sodium chloride infusion 250 mL  250 mL IntraVENous PRN    metoclopramide HCl (REGLAN) injection 5 mg  5 mg IntraVENous Q6H PRN    ondansetron (ZOFRAN ODT) tablet 8 mg  8 mg Oral TID    morphine injection 2 mg  2 mg IntraVENous Q4H PRN    acetaminophen (TYLENOL) tablet 650 mg  650 mg Oral Q6H PRN    HYDROmorphone (PF) (DILAUDID) injection 0.2 mg  0.2 mg IntraVENous Q4H PRN    ondansetron (ZOFRAN) injection 4 mg  4 mg IntraVENous Q6H PRN    sodium chloride (NS) flush 5-10 mL  5-10 mL IntraVENous Q8H    sodium chloride (NS) flush 5-10 mL  5-10 mL IntraVENous PRN    pantoprazole (PROTONIX) 40 mg in sodium chloride 0.9 % 10 mL injection  40 mg IntraVENous Q12H       Exam:  Vitals:    05/13/17 0334 05/13/17 0759 05/13/17 0834 05/13/17 0907   BP:  (!) 82/52 (!) 80/36 94/57   Pulse:  72 70 87   Resp:  18     Temp:  97.5 °F (36.4 °C)     SpO2:  98%     Weight: 54.7 kg (120 lb 11.2 oz)          No intake or output data in the 24 hours ending 05/13/17 0912  PE:  GEN - in no distress  CV - regular, no murmur, no rub  Lung - clear bilaterally  Abd - soft, large hernia  Ext - no edema  Left Tunneled single lumen catheter  Right IJ TCC for dialysis    Labs  Recent Labs      05/13/17   0620  05/12/17   0645  05/11/17   0640   WBC  1.6*  2.3*  3.2*   HGB  9.6*  10.3*  10.4*   HCT  28.2*  30.1*  30.7*   PLT  46*  50*  54*     Recent Labs      05/13/17   0620  05/12/17   0645  05/11/17   0640   NA  142  142  142   K  2.7*  3.0*  3.5   CL  106  103  104   CO2  27  31  30   BUN  25*  16  17   CREA  3.76*  2.91*  3.48*   GLU  179*  218*  197*   CA  7.1*  7.4*  7.2*     No results for input(s): PH, PCO2, PO2, PCO2 in the last 72 hours. Problem List:  Patient Active Problem List    Diagnosis Date Noted    Intractable nausea and vomiting 05/09/2017    Coagulopathy ([address] 05/09/2017    Cirrhosis of liver with ascites ([address] 05/09/2017    Pancytopenia ([address] 05/09/2017    Abdominal pain 05/08/2017    Abdominal pain, acute 05/08/2017    Liver transplant recipient [facility]) 02/13/2017    Hemodialysis access, AV graft ([address] 11/22/2016    ESRD (end stage renal disease) on dialysis ([address] 11/02/2016    Esophageal varices ([address] 07/27/2016    Hypotension 07/06/2016    GERD (gastroesophageal reflux disease)     Congenital hepatic fibrosis     Type 2 diabetes mellitus with hyperglycemia ([address] 07/04/2016    Iron deficiency anemia 07/04/2016    Thrombocytopenia ([address] 07/04/2016    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 ESRD: Dialysis tomorrow. Thigh graft worked well yesterday. 2. GPC Bacteremia: Cultures positive from Left IJ non-HD catheter, dialysis catheter, and peripherally. Recommend removal of all tunneled lines. 3. Pancytopenia  4. Liver transplant  5. Hypokalemia    Plan:  -Await all cultures  -Continue vancomycin -- [facility]  -IR to remove all tunneled lines on Monday  -She will need a new LIJ single lumen line placed at some point for blood draws.

## 2017-05-14 NOTE — PROGRESS NOTES
No c/o NV, ate dinner. Consent received for platelet transfusion and placed on chart. First unit platelets started at 1814 without product reaction, VSS. Will continue to monitor.

## 2017-05-14 NOTE — PROGRESS NOTES
Pt resting quietly in bed. No signs of acute distress. Resp even and unlabored. Call light within reach.

## 2017-05-14 NOTE — PROGRESS NOTES
Respirations even and unlabored. No c/o pain, NV or SOB. Encouraged to call for needs. Assessment completed.

## 2017-05-14 NOTE — PROGRESS NOTES
Transfusion of platelet completed. Safety maintained through shift. Respirations even and unlabored.

## 2017-05-14 NOTE — PROGRESS NOTES
[facility]    Follow-Up on: ESRD    HPI:  Seen on HD. Thigh AVG worked well but she had bleeding after the procedure that did not stop for hours and required platelet transfusion. ROS:  Denies CP, SOB.     Current Facility-Administered Medications   Medication Dose Route Frequency    potassium chloride (K-DUR, KLOR-CON) SR tablet 20 mEq  20 mEq Oral BID    lactulose (CHRONULAC) solution 20 g  20 g Oral TID    0.9% sodium chloride infusion 250 mL  250 mL IntraVENous PRN    midodrine (PROAMITINE) tablet 10 mg  10 mg Oral Q8H    vancomycin (VANCOCIN) 750 mg in  ml infusion  750 mg IntraVENous See Admin Instructions    rifAXIMin (XIFAXAN) tablet 550 mg  550 mg Oral BID    norflurane-pentafluoropropane (PAIN EASE) topical spray 1 Spray  1 Spray Topical DIALYSIS PRN    acetaminophen (TYLENOL) suppository 650 mg  650 mg Rectal Q6H PRN    magnesium oxide (MAG-OX) tablet 400 mg  400 mg Oral BID    0.9% sodium chloride infusion 250 mL  250 mL IntraVENous PRN    0.9% sodium chloride infusion 250 mL  250 mL IntraVENous PRN    metoclopramide HCl (REGLAN) injection 5 mg  5 mg IntraVENous Q6H PRN    ondansetron (ZOFRAN ODT) tablet 8 mg  8 mg Oral TID    morphine injection 2 mg  2 mg IntraVENous Q4H PRN    acetaminophen (TYLENOL) tablet 650 mg  650 mg Oral Q6H PRN    HYDROmorphone (PF) (DILAUDID) injection 0.2 mg  0.2 mg IntraVENous Q4H PRN    ondansetron (ZOFRAN) injection 4 mg  4 mg IntraVENous Q6H PRN    sodium chloride (NS) flush 5-10 mL  5-10 mL IntraVENous Q8H    sodium chloride (NS) flush 5-10 mL  5-10 mL IntraVENous PRN    pantoprazole (PROTONIX) 40 mg in sodium chloride 0.9 % 10 mL injection  40 mg IntraVENous Q12H       Exam:  Vitals:    05/13/17 1947 05/13/17 2338 05/14/17 0300 05/14/17 0707   BP: (!) 89/50 (!) 82/47 93/64 94/60   Pulse: 99 86 85 84   Resp: 16 17 18 18   Temp: 97.6 °F (36.4 °C) 98 °F (36.7 °C) 97.9 °F (36.6 °C) 98.1 °F (36.7 °C)   SpO2: 96% 100% 97% 98%   Weight: 54.5 kg (120 lb 1.6 oz)          Intake/Output Summary (Last 24 hours) at 05/14/17 0827  Last data filed at 05/13/17 2338   Gross per 24 hour   Intake              840 ml   Output                1 ml   Net              839 ml     PE:  GEN - in no distress  CV - regular, no murmur, no rub  Lung - clear bilaterally  Abd - soft, large hernia  Ext - no edema  Left Tunneled single lumen catheter  Right IJ TCC for dialysis    Labs  Recent Labs      05/14/17   0640 05/13/17   0620 05/12/17   0645   WBC  2.1*  1.6*  2.3*   HGB  9.5*  9.6*  10.3*   HCT  28.6*  28.2*  30.1*   PLT  57*  46*  50*     Recent Labs      05/14/17 0640 05/13/17 0620 05/12/17 0645   NA  145  142  142   K  3.4*  2.7*  3.0*   CL  107  106  103   CO2  32  27  31   BUN  14  25*  16   CREA  2.38*  3.76*  2.91*   GLU  183*  179*  218*   CA  6.9*  7.1*  7.4*     No results for input(s): PH, PCO2, PO2, PCO2 in the last 72 hours. Problem List:  Patient Active Problem List    Diagnosis Date Noted    Intractable nausea and vomiting 05/09/2017    Coagulopathy ([address] 05/09/2017    Cirrhosis of liver with ascites ([address] 05/09/2017    Pancytopenia ([address] 05/09/2017    Abdominal pain 05/08/2017    Abdominal pain, acute 05/08/2017    Liver transplant recipient [facility]) 02/13/2017    Hemodialysis access, AV graft ([address] 11/22/2016    ESRD (end stage renal disease) on dialysis ([address] 11/02/2016    Esophageal varices ([address] 07/27/2016    Hypotension 07/06/2016    GERD (gastroesophageal reflux disease)     Congenital hepatic fibrosis     Type 2 diabetes mellitus with hyperglycemia ([address] 07/04/2016    Iron deficiency anemia 07/04/2016    Thrombocytopenia ([address] 07/04/2016    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 ESRD: Dialysis Tuesday. Thigh graft worked well yesterday but bleeding is a concern.  If she is going to bleed due to her thrombocytopenia then the thigh graft is not a long term option for dialysis as an outpatient. Will see how she does on Tuesday. .   2. GPC Bacteremia: Cultures positive from Left IJ non-HD catheter, dialysis catheter, and peripherally. Recommend removal of all tunneled lines. On vancomycin. Cultures now clear. Per ID recommend 4-6 weeks vancomycin. 3. Pancytopenia  4. Liver transplant  5.  Hypokalemia    Plan:  -Await all cultures  -Continue vancomycin -- [facility]  -IR to remove all tunneled lines on Monday  -Consider Heme consult tomorrow for pancytopenia

## 2017-05-14 NOTE — PROGRESS NOTES
GI DAILY PROGRESS NOTE    Admit Date:  5/8/2017    Today's Date:  5/14/2017    CC: cirrhosis, ascites, sepsis,     Subjective:     Patient remains alert and oriented today, but continues to have significant diarrhea. Nausea persists- eating very little Pancytopenia persists- ID following and recommending JAILYN and continued Zosyn and Vanco. Nephro suggests possible hematology evaluations. LVP 5/9 without sign of SBP. Pt reports re-acummulation of ascitic fluid. Creatinine remains elevated (2.38 this am). Pt had dialysis and AV graft would Not clot after holding pressure for several hours- required platelet transfusion.  Pt verbalizes frustration with cardiac monitor and wonders if it is still necessary.        Medications:   Current Facility-Administered Medications   Medication Dose Route Frequency    0.9% sodium chloride infusion 250 mL  250 mL IntraVENous PRN    potassium chloride (K-DUR, KLOR-CON) SR tablet 20 mEq  20 mEq Oral BID    lactulose (CHRONULAC) solution 20 g  20 g Oral TID    0.9% sodium chloride infusion 250 mL  250 mL IntraVENous PRN    midodrine (PROAMITINE) tablet 10 mg  10 mg Oral Q8H    vancomycin (VANCOCIN) 750 mg in  ml infusion  750 mg IntraVENous See Admin Instructions    rifAXIMin (XIFAXAN) tablet 550 mg  550 mg Oral BID    norflurane-pentafluoropropane (PAIN EASE) topical spray 1 Spray  1 Spray Topical DIALYSIS PRN    acetaminophen (TYLENOL) suppository 650 mg  650 mg Rectal Q6H PRN    magnesium oxide (MAG-OX) tablet 400 mg  400 mg Oral BID    0.9% sodium chloride infusion 250 mL  250 mL IntraVENous PRN    0.9% sodium chloride infusion 250 mL  250 mL IntraVENous PRN    metoclopramide HCl (REGLAN) injection 5 mg  5 mg IntraVENous Q6H PRN    ondansetron (ZOFRAN ODT) tablet 8 mg  8 mg Oral TID    morphine injection 2 mg  2 mg IntraVENous Q4H PRN    acetaminophen (TYLENOL) tablet 650 mg  650 mg Oral Q6H PRN    HYDROmorphone (PF) (DILAUDID) injection 0.2 mg  0.2 mg IntraVENous Q4H PRN    ondansetron (ZOFRAN) injection 4 mg  4 mg IntraVENous Q6H PRN    sodium chloride (NS) flush 5-10 mL  5-10 mL IntraVENous Q8H    sodium chloride (NS) flush 5-10 mL  5-10 mL IntraVENous PRN    pantoprazole (PROTONIX) 40 mg in sodium chloride 0.9 % 10 mL injection  40 mg IntraVENous Q12H       Review of Systems:  ROS was obtained, with pertinent positives as listed above. No chest pain or SOB. Diet:  Gi soft with supplements    Objective:   Vitals:  Visit Vitals    BP 94/60 (BP 1 Location: Left arm, BP Patient Position: At rest)    Pulse 84    Temp 98.1 °F (36.7 °C)    Resp 18    Wt 54.5 kg (120 lb 1.6 oz)    LMP 01/02/2016    SpO2 98%    Breastfeeding No    BMI 23.46 kg/m2     Intake/Output:     05/12 1901 - 05/14 0700  In: 840 [P.O.:840]  Out: 1   Exam:    General appearance: alert, cooperative, no distress  Lungs: clear to auscultation bilaterally anteriorly  Heart: regular rate and rhythm  Abdomen: distended but relatively soft, large umbilical hernia, mildly tender. . Bowel sounds hyperactive.    Extremities: extremities normal, atraumatic, no cyanosis or edema  Neuro:  alert and oriented, no asterixis    Data Review (Labs):    Recent Labs      05/14/17   0640  05/13/17   0620  05/12/17   0645   WBC  2.1*  1.6*  2.3*   HGB  9.5*  9.6*  10.3*   HCT  28.6*  28.2*  30.1*   PLT  57*  46*  50*   MCV  86.7  85.7  86.7   NA  145  142  142   K  3.4*  2.7*  3.0*   CL  107  106  103   CO2  32  27  31   BUN  14  25*  16   CREA  2.38*  3.76*  2.91*   CA  6.9*  7.1*  7.4*   GLU  183*  179*  218*       Assessment:     Principal Problem:    Intractable nausea and vomiting (5/9/2017)    Active Problems:    Type 2 diabetes mellitus with hyperglycemia (HCC) (7/4/2016)      Iron deficiency anemia (7/4/2016)      Thrombocytopenia (HCC) (7/4/2016)      Hepatitis C (7/4/2016)      Congenital hepatic fibrosis ()      Hypotension (7/6/2016)      Esophageal varices (HCC) (7/27/2016)      ESRD (end stage renal disease) on dialysis ([facility] (11/2/2016)      Hemodialysis access, AV graft ([facility] (11/22/2016)      Liver transplant recipient [facility]) (2/13/2017)      Abdominal pain (5/8/2017)      Abdominal pain, acute (5/8/2017)      Coagulopathy ([address] (5/9/2017)      Overview: Liver disease      Cirrhosis of liver with ascites ([address] (5/9/2017)      Pancytopenia ([facility] (5/9/2017)    55 y.o. 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 Her symptoms resolved with paracentesis on 5/9/17. She had 5300cc removed. She has fluid analysis negative thus far. Abdominal US on 5/9/17 revealed a nonspecific hypoechoic area within or immediately adjacent to the pancreatic head, this does not have typical appearance of pancreatitis however recommend correlation with lipase levels to help exclude edematous pancreatitis (lipase normal on 5/10). Remains febrile w pancytopenia which appears to be worsending- ID following. Arvell Caro LVP with PMNs <100 which makes SBP less likely.        Plan:     - lactulose increased for HE 5/12 but now with significant diarrhea- will decrease again. - consider ultrasound today to reevaluate ascites with possible repeat LVP soon - this could contribute to nausea and decreased po intake. - monitor labs closely. - appreciate ID recommendations re fever/ pancytopenia- plans for JAILYN and repeat blood cx noted. - agree with hematology consultation for pancytopenia tomorrow  - continue BID PPI.  - A/CKD with HD scheduled for Tuesday. Simona Paige, [address]      Patient is seen and examined in collaboration with Dr. Beverly Nesbitt. Assessment and plan as per Dr. Beverly Nesbitt.    =C/O nausea, GERD and abdominal pain.   Plan for EGD Monday schedule allowing. No active bleeding. Has a hx of esophageal varices. Discussed risks including bleeding, perforation, IV complications, sedation risks, and pt states understanding and agrees to proceed. She says phenergan works best for her nausea and she takes 25 mg at home every 6 hrs. I will order IV 12.5 mg every 6 hrs prn. Reglan dced. Has appt at [address] [place] [address] Drive 5/25  .   Shayla Santos MD

## 2017-05-14 NOTE — PROGRESS NOTES
Pharmacokinetic Consult to Pharmacist    Lab Results   Component Value Date/Time    VANCOMYCIN,RANDOM 11.0 05/13/2017 05:57 PM     Vanc level 11 after dialysis this evening. Will give 1g dose tonight. Will continue to redose vanco around dialysis days and/or random levels when less than 20.     Thank you,  Naomy Damon, PharmD  Clinical Pharmacist

## 2017-05-14 NOTE — PROGRESS NOTES
Shift assessment complete. Pt resting quietly in bed. No signs of acute distress. Resp even and unlabored. Call light within reach.

## 2017-05-14 NOTE — PROGRESS NOTES
Hospitalist Progress Note     Admit Date:  2017  8:52 PM   Name:  Higinio Romero   Age:  55 y.o.  :  1970   MRN:  [id number]   PCP:  Francesco Otto MD  Treatment Team: Attending Provider: Richie Mendez MD; Consulting Provider: Kristi Vasquez DO; Consulting Provider: Orly Shah MD; Consulting Provider: Earl Berger MD    Subjective:   Patient is a 54 y/o F with ESRD on HD, liver transplant x2 from congenital hepatic fibrosis, chronic issues with abd pain, nausea and vomiting, who presented with more of the same and inability to take PO. Admitted and GI and Nephrology consulted. Latest infectious work up showed gram positive cocci in blood cultures obtained from right perma cath, periphery and life port.  - seen at bedside. Appearing significantly improved. Addressed concerns about low platelet count and long bleeding from AV graft yesterday. Still complains of nausea and vomiting, specially after eating. Objective:     Patient Vitals for the past 24 hrs:   Temp Pulse Resp BP SpO2   17 0707 98.1 °F (36.7 °C) 84 18 94/60 98 %   17 0300 97.9 °F (36.6 °C) 85 18 93/64 97 %   17 2338 98 °F (36.7 °C) 86 17 (!) 82/47 100 %   17 1947 97.6 °F (36.4 °C) 99 16 (!) 89/50 96 %   17 1505 97.6 °F (36.4 °C) 81 14 92/64 -   17 1440 97.8 °F (36.6 °C) 85 16 90/64 -     Oxygen Therapy  O2 Sat (%): 98 % (17 0707)  O2 Device: Room air (17 1617)    Intake/Output Summary (Last 24 hours) at 17 1207  Last data filed at 17 2338   Gross per 24 hour   Intake              840 ml   Output                1 ml   Net              839 ml         General:    Confused and sluggish, malnourished. CV:   RRR. No murmur, rub, or gallop. Tachycadic  Lungs:   Clear to auscultation bilaterally. No wheezing, rhonchi, or rales. Abdomen:   Soft, mildly distended, mild vTTP, diffuse. anterior abd hernia.     Extremities: Warm and dry.  No cyanosis or edema. Skin:     No rashes or jaundice. CNS:               gcs 15, CN 2-12 intact, no motor or sensory deficits  Psych:             AO x 1, mood and affect flat    Current Meds:  Current Facility-Administered Medications   Medication Dose Route Frequency    0.9% sodium chloride infusion 250 mL  250 mL IntraVENous PRN    lactulose (CHRONULAC) solution 20 g  20 g Oral BID    promethazine (PHENERGAN) with saline injection 12.5 mg  12.5 mg IntraVENous Q6H PRN    potassium chloride (K-DUR, KLOR-CON) SR tablet 20 mEq  20 mEq Oral BID    0.9% sodium chloride infusion 250 mL  250 mL IntraVENous PRN    midodrine (PROAMITINE) tablet 10 mg  10 mg Oral Q8H    vancomycin (VANCOCIN) 750 mg in  ml infusion  750 mg IntraVENous See Admin Instructions    rifAXIMin (XIFAXAN) tablet 550 mg  550 mg Oral BID    norflurane-pentafluoropropane (PAIN EASE) topical spray 1 Spray  1 Spray Topical DIALYSIS PRN    acetaminophen (TYLENOL) suppository 650 mg  650 mg Rectal Q6H PRN    magnesium oxide (MAG-OX) tablet 400 mg  400 mg Oral BID    0.9% sodium chloride infusion 250 mL  250 mL IntraVENous PRN    0.9% sodium chloride infusion 250 mL  250 mL IntraVENous PRN    ondansetron (ZOFRAN ODT) tablet 8 mg  8 mg Oral TID    morphine injection 2 mg  2 mg IntraVENous Q4H PRN    acetaminophen (TYLENOL) tablet 650 mg  650 mg Oral Q6H PRN    HYDROmorphone (PF) (DILAUDID) injection 0.2 mg  0.2 mg IntraVENous Q4H PRN    ondansetron (ZOFRAN) injection 4 mg  4 mg IntraVENous Q6H PRN    sodium chloride (NS) flush 5-10 mL  5-10 mL IntraVENous Q8H    sodium chloride (NS) flush 5-10 mL  5-10 mL IntraVENous PRN    pantoprazole (PROTONIX) 40 mg in sodium chloride 0.9 % 10 mL injection  40 mg IntraVENous Q12H       Labs and Studies:  I have reviewed all labs, meds, telemetry events, and studies from the last 24 hours.     Recent Results (from the past 24 hour(s))   PLATELETS, ALLOCATE    Collection Time: 05/13/17 2:30 PM   Result Value Ref Range    Unit number [id number]     Blood component type PLTPH LR,1     Unit division 00     Status of unit TRANSFUSED    CULTURE, BLOOD    Collection Time: 05/13/17  4:00 PM   Result Value Ref Range    Special Requests: L PORT     Culture result: NO GROWTH AFTER 11 HOURS     CULTURE, BLOOD    Collection Time: 05/13/17  5:57 PM   Result Value Ref Range    Special Requests: LEFT HAND      Culture result: NO GROWTH AFTER 10 HOURS     VANCOMYCIN, RANDOM    Collection Time: 05/13/17  5:57 PM   Result Value Ref Range    VANCOMYCIN,RANDOM 08.4 UG/ML   METABOLIC PANEL, BASIC    Collection Time: 05/14/17  6:40 AM   Result Value Ref Range    Sodium 145 136 - 145 mmol/L    Potassium 3.4 (L) 3.5 - 5.1 mmol/L    Chloride 107 98 - 107 mmol/L    CO2 32 21 - 32 mmol/L    Anion gap 6 (L) 7 - 16 mmol/L    Glucose 183 (H) 65 - 100 mg/dL    BUN 14 6 - 23 MG/DL    Creatinine 2.38 (H) 0.6 - 1.0 MG/DL    GFR est AA 28 (L) >60 ml/min/1.73m2    GFR est non-AA 23 (L) >60 ml/min/1.73m2    Calcium 6.9 (L) 8.3 - 10.4 MG/DL   CBC WITH AUTOMATED DIFF    Collection Time: 05/14/17  6:40 AM   Result Value Ref Range    WBC 2.1 (L) 4.3 - 11.1 K/uL    RBC 3.30 (L) 4.05 - 5.25 M/uL    HGB 9.5 (L) 11.7 - 15.4 g/dL    HCT 28.6 (L) 35.8 - 46.3 %    MCV 86.7 79.6 - 97.8 FL    MCH 28.8 26.1 - 32.9 PG    MCHC 33.2 31.4 - 35.0 g/dL    RDW 18.9 (H) 11.9 - 14.6 %    PLATELET 57 (L) 773 - 450 K/uL    MPV 10.0 (L) 10.8 - 14.1 FL    DF AUTOMATED      NEUTROPHILS 33 (L) 43 - 78 %    LYMPHOCYTES 39 13 - 44 %    MONOCYTES 22 (H) 4.0 - 12.0 %    EOSINOPHILS 4 0.5 - 7.8 %    BASOPHILS 1 0.0 - 2.0 %    IMMATURE GRANULOCYTES 1.5 0.0 - 5.0 %    ABS. NEUTROPHILS 0.7 (L) 1.7 - 8.2 K/UL    ABS. LYMPHOCYTES 0.8 0.5 - 4.6 K/UL    ABS. MONOCYTES 0.5 0.1 - 1.3 K/UL    ABS. EOSINOPHILS 0.1 0.0 - 0.8 K/UL    ABS. BASOPHILS 0.0 0.0 - 0.2 K/UL    ABS. IMM.  GRANS. 0.0 0.0 - 0.5 K/UL   PLATELETS, ALLOCATE    Collection Time: 05/14/17  9:00 AM   Result Value Ref Range    Unit number [id number]     Blood component type PLTPH LR 3     Unit division 00     Status of unit ALLOCATED         US GUIDE PARACENTESIS   Final Result   Impression: Uncomplicated ultrasound guided paracentesis. US ABD LTD   Final Result   Impression:      Nonspecific hypoechoic area within or immediately adjacent to the pancreatic   head, this does not have typical appearance of pancreatitis however recommend   correlation with lipase levels to help exclude edematous pancreatitis. Evidence of hepatic cirrhosis with at least moderate volume abdominal ascites in   the upper abdomen. Postcholecystectomy. IR REMOVE TUNL CVAD W/O PORT / PUMP    (Results Pending)   IR REMOVE TUNL CVAD W/O PORT / PUMP    (Results Pending)   US ABD COMP    (Results Pending)       All Micro Results     Procedure Component Value Units Date/Time    CULTURE, BLOOD [[id number]]  (Abnormal) Collected:  05/11/17 0230    Order Status:  Completed Specimen:  Blood from Blood Updated:  05/14/17 0711     Special Requests: LEFT HAND        GRAM STAIN         GRAM POS COCCI IN CLUSTERS              AEROBIC AND ANAEROBIC BOTTLES              CRITICAL RESULT NOT CALLED DUE TO PREVIOUS NOTIFICATION OF CRITICAL RESULT WITHIN THE LAST 24 HOURS. Culture result:       GRAM POSITIVE COCCI  IDENTIFICATION AND SUSCEPTIBILITY TO FOLLOW   (A)              SA target DNA sequence not detected within the sample. Test performed by PCR      REPEATING ID AND SUSCEPTIBILITY    CULTURE, BLOOD [[id number]]  (Abnormal) Collected:  05/11/17 1545    Order Status:  Completed Specimen:  Blood from Blood Updated:  05/14/17 0709     Special Requests: --        RIGHT  PERMANENT CATH       GRAM STAIN         GRAM POS COCCI IN CLUSTERS              AEROBIC AND ANAEROBIC BOTTLES              CRITICAL RESULT NOT CALLED DUE TO PREVIOUS NOTIFICATION OF CRITICAL RESULT WITHIN THE LAST 24 HOURS.      Culture result: STAPHYLOCOCCUS EPIDERMIDIS  SENSITIVITY TO FOLLOW   (A)              SA target DNA sequence not detected within the sample. Test performed by PCR      REPEATING SUSCEPTIBILITY    CULTURE, BLOOD [[id number]]  (Abnormal) Collected:  05/11/17 0010    Order Status:  Completed Specimen:  Blood from Blood Updated:  05/14/17 0705     Special Requests: LIFE PORT     GRAM STAIN         GRAM POS COCCI IN CLUSTERS              AEROBIC AND ANAEROBIC BOTTLES            RESULTS VERIFIED, PHONED TO AND READ BACK BY  [employer] @ 0988 ON 5/11/17 BY NKE. Culture result:       STAPHYLOCOCCUS SPECIES, COAGULASE NEGATIVE  IDENTIFICATION AND SUSCEPTIBILITY TO FOLLOW   (A)              SA target DNA sequence not detected within the sample. Test performed by PCR      REPEATING ID AND SUSCEPTIBILITY    CULTURE, BLOOD [[id number]] Collected:  05/13/17 0955    Order Status:  Completed Specimen:  Blood from Blood Updated:  05/14/17 0609     Special Requests: HD ARTERIAL     Culture result: NO GROWTH AFTER 19 HOURS       CULTURE, BLOOD [[id number]] Collected:  05/13/17 1600    Order Status:  Completed Specimen:  Blood from Blood Updated:  05/14/17 0609     Special Requests: L PORT     Culture result: NO GROWTH AFTER 11 HOURS       CULTURE, BLOOD [[id number]] Collected:  05/13/17 1757    Order Status:  Completed Specimen:  Blood from Blood Updated:  05/14/17 0609     Special Requests: LEFT HAND        Culture result: NO GROWTH AFTER 10 HOURS       CULTURE, BLOOD [[id number]]  (Abnormal) Collected:  05/11/17 1541    Order Status:  Completed Specimen:  Blood from Blood Updated:  05/13/17 0808     Special Requests: LEFT WRIST        GRAM STAIN         GRAM POS COCCI IN CLUSTERS      PEDIATRIC BOTTLE                 CRITICAL RESULT NOT CALLED DUE TO PREVIOUS NOTIFICATION OF CRITICAL RESULT WITHIN THE LAST 24 HOURS.      Culture result: GRAM POSITIVE COCCI (A)                 CULTURE IN PROGRESS,FURTHER UPDATES TO FOLLOW    CULTURE, BODY FLUID W Renaldo Renee [[id number]] Collected:  05/09/17 1600    Order Status:  Completed Specimen:  Ascitic Fluid Updated:  05/12/17 0910     Special Requests: NO SPECIAL REQUESTS        GRAM STAIN NO WBCS SEEN         NO DEFINITE ORGANISM SEEN        Culture result: NO GROWTH 2 DAYS       CULTURE, BODY FLUID Silvana Lo STAIN [[id number]] Collected:  05/08/17 2115    Order Status:  Canceled Specimen:  Ascitic Fluid             Assessment and Plan:     Hospital Problems as of 5/14/2017  Date Reviewed: 3/2/2017          Codes Class Noted - Resolved POA    * (Principal)Intractable nausea and vomiting ICD-10-CM: R11.2  ICD-9-CM: 536.2  5/9/2017 - Present Yes        Coagulopathy ([facility] (Chronic) ICD-10-CM: D68.9  ICD-9-CM: 286.9  5/9/2017 - Present Yes    Overview Signed 5/9/2017 10:57 AM by Nilay Jarquin MD     Liver disease             Cirrhosis of liver with ascites ([facility] (Chronic) ICD-10-CM: K74.60  ICD-9-CM: 571.5  5/9/2017 - Present Yes        Pancytopenia (HCC) (Chronic) ICD-10-CM: F69.910  ICD-9-CM: 284.19  5/9/2017 - Present Yes        Abdominal pain ICD-10-CM: R10.9  ICD-9-CM: 789.00  5/8/2017 - Present Yes        Abdominal pain, acute ICD-10-CM: R10.9  ICD-9-CM: 789.00, 338.19  5/8/2017 - Present Yes        Liver transplant recipient [facility]) (Chronic) ICD-10-CM: Z94.4  ICD-9-CM: V42.7  2/13/2017 - Present Yes        Hemodialysis access, AV graft ([facility] (Chronic) ICD-10-CM: Z99.2  ICD-9-CM: V45.11  11/22/2016 - Present Yes        ESRD (end stage renal disease) on dialysis [facility]) (Chronic) ICD-10-CM: N18.6, Z99.2  ICD-9-CM: 585.6, V45.11  11/2/2016 - Present Yes        Esophageal varices (HCC) (Chronic) ICD-10-CM: I85.00  ICD-9-CM: 456.1  7/27/2016 - Present Yes        Congenital hepatic fibrosis (Chronic) ICD-10-CM: P78.81  ICD-9-CM: 777.8  Unknown - Present Yes        Hypotension (Chronic) ICD-10-CM: I95.9  ICD-9-CM: 458.9  7/6/2016 - Present Yes        Type 2 diabetes mellitus with hyperglycemia (HCC) (Chronic) ICD-10-CM: E11.65  ICD-9-CM: 250.00  7/4/2016 - Present Yes        Iron deficiency anemia (Chronic) ICD-10-CM: D50.9  ICD-9-CM: 280.9  7/4/2016 - Present Yes        Thrombocytopenia (HCC) (Chronic) ICD-10-CM: D69.6  ICD-9-CM: 287.5  7/4/2016 - Present Yes        Hepatitis C (Chronic) ICD-10-CM: B19.20  ICD-9-CM: 070.70  7/4/2016 - Present Yes                PLAN:      Acute infectious encephalopathy: resolved  One set showed Staph Epidermidus, other sets pending for ID. Plan for continuing Vancomycin. Repeat cultures sent on 5/13, negative so far  2D-ECHO showed grade 1 diastolic dysfunction with EF 65-70%    Will repeat USG abdomen to evaluate for worsening ascites, in view of persistent nausea and vomiting. Dialysis and IJ line scheduled for removal tomorrow. · Diarrhea improved. · Hypokalemia: improved from 2.7 to 3.4. Continue Chlor Hal 20 meq po BID. ·  Nephro recs appreciated. Plan for HD as scheduled. Continue with midodrine 10 mg po q8h  · Leukopenia is slightly better. Will continue to monitor  · Thrombocytopenia 2/2 chronic liver disease. Will transfuse 2 bags of platelets in view of epistaxis and prolonged bleeding from AV graft yesterday. · Continue PRN meds as listed for abd pain, nausea, vomiting. · Stage 2 sacral ulcer, will continue foam dressing and change mattress for frequent position change. · Hb is stable at 9.5.    DC planning:  Discharge to home when medically stable.   DVT ppx:  SCDs  Discussed plan with:  Pt  Risk:  High from numerous comorbidities, Iv narcotics    Signed:  Amarilis Parada MD

## 2017-05-15 NOTE — PROGRESS NOTES
Called by nursing re: access. I initially thought we should pull both lines but with her poor access and the bacteria coag negative staph, I recommend we pull the dialysis catheter and just exchange single lumen IJ tunneled line over a wire. PONCHO Waddell in [employer].

## 2017-05-15 NOTE — PROGRESS NOTES
TRANSFER - OUT REPORT:    Verbal report given to Neha(name) on Gabbie Robles  being transferred to [facility](unit) for routine progression of care       Report consisted of patients Situation, Background, Assessment and   Recommendations(SBAR). Information from the following report(s) Procedure Summary was reviewed with the receiving nurse. Lines:   Venous Access Device duel lumen cath 05/15/17 Upper chest (subclavicular area), left (Active)   Central Line Being Utilized Yes 5/15/2017  4:56 PM   Criteria for Appropriate Use Long term IV/antibiotic administration 5/15/2017  4:56 PM   Site Assessment Clean, dry, & intact 5/15/2017  4:56 PM   Date of Last Dressing Change 05/15/17 5/15/2017  4:56 PM   Dressing Status Clean, dry, & intact 5/15/2017  4:56 PM   Dressing Type Disk with Chlorhexadine gluconate (CHG); Transparent 5/15/2017  4:56 PM   Positive Blood Return (Medial Site) Yes 5/15/2017  2:15 AM   Action Taken (Medial Site) Flushed 5/15/2017  2:15 AM   Positive Blood Return (Lateral Site) Yes 5/15/2017  2:15 AM   Alcohol Cap Used No 5/15/2017  7:49 AM        Opportunity for questions and clarification was provided.       Patient transported with:

## 2017-05-15 NOTE — PROGRESS NOTES
Spoke to Dr. Skip Mckee regarding pt. Request for increased phenergan IV. Pt. Currently on 12.5 mg IV and still experiencing N/V. Pt. Refusing Zofran.

## 2017-05-15 NOTE — PROGRESS NOTES
TRANSFER - OUT REPORT:    Verbal report given to Pradeep Holm RN(name) on Fercho Cabrales  being transferred to [facility](unit) for routine progression of care       Report consisted of patients Situation, Background, Assessment and   Recommendations(SBAR). Information from the following report(s) SBAR, Kardex, Procedure Summary, Intake/Output, MAR and Accordion was reviewed with the receiving nurse. Lines:   Venous Access Device duel lumen cath 05/15/17 Upper chest (subclavicular area), left (Active)   Central Line Being Utilized Yes 5/15/2017  4:56 PM   Criteria for Appropriate Use Long term IV/antibiotic administration 5/15/2017  4:56 PM   Site Assessment Clean, dry, & intact 5/15/2017  4:56 PM   Date of Last Dressing Change 05/15/17 5/15/2017  4:56 PM   Dressing Status Clean, dry, & intact 5/15/2017  4:56 PM   Dressing Type Disk with Chlorhexadine gluconate (CHG); Transparent 5/15/2017  4:56 PM   Positive Blood Return (Medial Site) Yes 5/15/2017  2:15 AM   Action Taken (Medial Site) Flushed 5/15/2017  2:15 AM   Positive Blood Return (Lateral Site) Yes 5/15/2017  2:15 AM   Alcohol Cap Used No 5/15/2017  7:49 AM        Opportunity for questions and clarification was provided.       Patient transported with:   Registered Nurse  Tech

## 2017-05-15 NOTE — PROGRESS NOTES
GI DAILY PROGRESS NOTE    Admit Date:  5/8/2017    Today's Date:  5/15/2017    CC:  N/v and abd pain    Subjective:     Patient sedated after IR procedure. Unable to consent.      Medications:   Current Facility-Administered Medications   Medication Dose Route Frequency    fentaNYL citrate (PF) injection 12.5-100 mcg  12.5-100 mcg IntraVENous Multiple    midazolam (VERSED) injection 0.25-2 mg  0.25-2 mg IntraVENous Multiple    0.9% sodium chloride infusion 250 mL  250 mL IntraVENous PRN    heparin (PF) 2 units/ml in NS infusion 2,000 Units  1,000 mL Irrigation Multiple    lidocaine-EPINEPHrine (XYLOCAINE) 2 %-1:100,000 injection  mg   mg SubCUTAneous ONCE    0.9% sodium chloride infusion 250 mL  250 mL IntraVENous PRN    lactulose (CHRONULAC) solution 20 g  20 g Oral BID    promethazine (PHENERGAN) with saline injection 25 mg  25 mg IntraVENous Q6H PRN    potassium chloride (K-DUR, KLOR-CON) SR tablet 20 mEq  20 mEq Oral BID    0.9% sodium chloride infusion 250 mL  250 mL IntraVENous PRN    midodrine (PROAMITINE) tablet 10 mg  10 mg Oral Q8H    vancomycin (VANCOCIN) 750 mg in  ml infusion  750 mg IntraVENous See Admin Instructions    rifAXIMin (XIFAXAN) tablet 550 mg  550 mg Oral BID    norflurane-pentafluoropropane (PAIN EASE) topical spray 1 Spray  1 Spray Topical DIALYSIS PRN    acetaminophen (TYLENOL) suppository 650 mg  650 mg Rectal Q6H PRN    magnesium oxide (MAG-OX) tablet 400 mg  400 mg Oral BID    0.9% sodium chloride infusion 250 mL  250 mL IntraVENous PRN    0.9% sodium chloride infusion 250 mL  250 mL IntraVENous PRN    ondansetron (ZOFRAN ODT) tablet 8 mg  8 mg Oral TID    morphine injection 2 mg  2 mg IntraVENous Q4H PRN    acetaminophen (TYLENOL) tablet 650 mg  650 mg Oral Q6H PRN    HYDROmorphone (PF) (DILAUDID) injection 0.2 mg  0.2 mg IntraVENous Q4H PRN    ondansetron (ZOFRAN) injection 4 mg  4 mg IntraVENous Q6H PRN    sodium chloride (NS) flush 5-10 mL 5-10 mL IntraVENous Q8H    sodium chloride (NS) flush 5-10 mL  5-10 mL IntraVENous PRN    pantoprazole (PROTONIX) 40 mg in sodium chloride 0.9 % 10 mL injection  40 mg IntraVENous Q12H       Review of Systems:  ROS was obtained, with pertinent positives as listed above. No chest pain or SOB. Diet:      Objective:   Vitals:  Visit Vitals    BP 96/62    Pulse 96    Temp 98 °F (36.7 °C)    Resp 18    Wt 50.2 kg (110 lb 9.6 oz)    LMP 01/02/2016    SpO2 99%    Breastfeeding No    BMI 21.6 kg/m2     Intake/Output:     05/13 1901 - 05/15 0700  In: 600 [P.O.:600]  Out: -   Exam:  General appearance: alert, cooperative, no distress, ill appearing. thin  Lungs: clear to auscultation bilaterally anteriorly  Heart: regular rate and rhythm  Abdomen: soft, non-tender.  Bowel sounds normal. Surgical scars  Skin: hyperpigmented  Neuro:  sedated    Data Review (Labs):    Recent Labs      05/15/17   0711  05/15/17   0710  05/14/17   0640  05/13/17   0620   WBC  3.8*   --   2.1*  1.6*   HGB  9.5*   --   9.5*  9.6*   HCT  28.7*   --   28.6*  28.2*   PLT  93*   --   57*  46*   MCV  87.8   --   86.7  85.7   NA   --   144  145  142   K   --   3.6  3.4*  2.7*   CL   --   108*  107  106   CO2   --   27  32  27   BUN   --   20  14  25*   CREA   --   3.23*  2.38*  3.76*   CA   --   7.8*  6.9*  7.1*   GLU   --   174*  183*  179*   AP   --   361*   --    --    SGOT   --   41*   --    --    ALB   --   2.2*   --    --    TP   --   6.1*   --    --    LPSE  146   --    --    --    PTP  15.0*   --    --    --    INR  1.4*   --    --    --        Assessment:     Principal Problem:    Intractable nausea and vomiting (5/9/2017)    Active Problems:    Type 2 diabetes mellitus with hyperglycemia (HCC) (7/4/2016)      Iron deficiency anemia (7/4/2016)      Thrombocytopenia (HCC) (7/4/2016)      Hepatitis C (7/4/2016)      Congenital hepatic fibrosis ()      Hypotension (7/6/2016)      Esophageal varices (HCC) (7/27/2016)      ESRD (end stage renal disease) on dialysis ([address] (11/2/2016)      Hemodialysis access, AV graft ([address] (11/22/2016)      Liver transplant recipient [facility]) (2/13/2017)      Abdominal pain (5/8/2017)      Abdominal pain, acute (5/8/2017)      Coagulopathy ([address] (5/9/2017)      Overview: Liver disease      Cirrhosis of liver with ascites ([address] (5/9/2017)      Pancytopenia ([address] (5/9/2017)    Nonspecific hypoechoic area within or immediately adjacent to the pancreatic head on US  Elevated transaminases    Plan:   Cont ABX  May need EUS/CT pancreas future  Cont rifaximin      Mary Roldan MD  [facility], [place]

## 2017-05-15 NOTE — ROUTINE PROCESS
Spoke to Dr Luz Gtz regarding IR questions to Pull venous access device or to perform exchange. Orders received to Pull. IV meds to be addressed later.

## 2017-05-15 NOTE — PROGRESS NOTES
TRANSFER - IN REPORT:    Verbal report received from Denae(name) on Gabbie [place] Trendbertha Epp  being received from GI(unit) for routine progression of care      Report consisted of patients Situation, Background, Assessment and   Recommendations(SBAR). Information from the following report(s) SBAR was reviewed with the receiving nurse. Opportunity for questions and clarification was provided. Assessment completed upon patients arrival to unit and care assumed.

## 2017-05-15 NOTE — PROGRESS NOTES
Spoke with yunier in IR. Pt to go for Paracentesis and tunneled cath removal/lifeport line exchange around noon.  Keep patient NPO

## 2017-05-15 NOTE — PROGRESS NOTES
Paracentesis required more than 20 minutes of manual compression in order stop the bleeding. Chronically low platelet count secondary to Renal Failure and Liver Failure. Transient platelet count increase after platelet transfusion. Will need platelet transfusion in order to safely remove the tunnelled Hemodialysis Catheter and exchange the tunnelled Central Venous Catheter.     Chino Casey MD

## 2017-05-15 NOTE — PROGRESS NOTES
TRANSFER - OUT REPORT:    Verbal report given to Bairon(name) on Gabbie Sommer  being transferred to IR(unit) for ordered procedure       Report consisted of patients Situation, Background, Assessment and   Recommendations(SBAR). Information from the following report(s) SBAR and Kardex was reviewed with the receiving nurse. Lines:   Venous Access Device PORT A CATH 03/03/17 Upper chest (subclavicular area), left (Active)   Central Line Being Utilized Yes 5/15/2017  7:49 AM   Criteria for Appropriate Use Limited/no vessel suitable for conventional peripheral access 5/15/2017  7:49 AM   Site Assessment Clean, dry, & intact 5/15/2017  7:49 AM   Date of Last Dressing Change 05/12/17 5/15/2017  7:49 AM   Dressing Status Clean, dry, & intact 5/15/2017  7:49 AM   Dressing Type Disk with Chlorhexadine gluconate (CHG) 5/15/2017  7:49 AM   Positive Blood Return (Medial Site) Yes 5/15/2017  2:15 AM   Action Taken (Medial Site) Flushed 5/15/2017  2:15 AM   Positive Blood Return (Lateral Site) Yes 5/15/2017  2:15 AM   Alcohol Cap Used No 5/15/2017  7:49 AM        Opportunity for questions and clarification was provided.       Patient transported with:   O2 @ RA liters

## 2017-05-15 NOTE — PROGRESS NOTES
Pt resting in bed at this time. AAOX4. No acute distress on RA. SR on remote tele, hr 90. Pt scheduled for paracentesis and removal of tunneled cath today. Pt turned off side, allevyn intact to sacrum. No needs at this time. Door closed, call bell in reach.

## 2017-05-15 NOTE — ROUTINE PROCESS
Shift assessment completed. Patient alert and oriented x 4. Respirations even and unlabored. No acute distress noted. Bed low, locked, call bell within reach. Side rails up x 2. Pt. Instructed to call for assistance if needed.

## 2017-05-15 NOTE — PROCEDURES
Interventional Radiology Brief Procedure Note    Patient: Sissy Costa MRN: [id number]  SSN: [id number]    YOB: 1970  Age: 55 y.o. Sex: female      Date of Procedure: 5/15/2017    Pre-Procedure Diagnosis: ESRD. Cirrhosis. Post-Procedure Diagnosis: SAME    Procedure(s): RIJV tunnelled hemodialysis catheter removal.  LIJV tunnelled CVC exchange. Brief Description of Procedure: as aboev. Performed By: Angy Oleary MD     Assistants: None    Anesthesia: Moderate Sedation    Estimated Blood Loss: Less than 10ml    Specimens: Dialysis catheter tip to Microbiology. Implants: See Above    Findings: Tip in RA. Complications: None    Recommendations: 2 hour bedrest.  Elevate HOB. Tranfuse 1 U Plt. Follow Up: PRN.       Signed By: Angy Oleary MD     May 15, 2017

## 2017-05-15 NOTE — PROGRESS NOTES
Hospitalist Progress Note     Admit Date:  2017  8:52 PM   Name:  Federico Zafar   Age:  55 y.o.  :  1970   MRN:  [id number]   PCP:  Kelle Penaloza MD  Treatment Team: Attending Provider: Telma Tristan MD; Consulting Provider: Susi Muhammad DO; Consulting Provider: Markus Majano MD; Consulting Provider: Jameson Bledsoe MD; Utilization Review: Eddie Mosley RN    Subjective:   Patient is a 56 y/o F with ESRD on HD, liver transplant x2 from congenital hepatic fibrosis, chronic issues with abd pain, nausea and vomiting, who presented with more of the same and inability to take PO. Admitted and GI and Nephrology consulted. Latest infectious work up showed gram positive cocci in blood cultures obtained from right perma cath, periphery and life port. 5/15 - seen at bedside. Appearing significantly improved. Expressed wished to go home tomorrow. States that she is hungry, but is NPO for IR guided dialysis cath removal.  No nausea or vomiting. Minimal oral intake        Objective:     Patient Vitals for the past 24 hrs:   Temp Pulse Resp BP SpO2   05/15/17 0747 98.4 °F (36.9 °C) 82 17 100/64 100 %   05/15/17 0257 97.3 °F (36.3 °C) 86 18 105/71 100 %   05/15/17 0005 98.2 °F (36.8 °C) 80 16 101/62 99 %   17 2324 97.8 °F (36.6 °C) 82 16 105/69 97 %   17 2309 97.7 °F (36.5 °C) 81 16 98/65 100 %   17 1900 - 89 20 105/69 100 %   17 1830 97.9 °F (36.6 °C) 90 20 111/70 100 %   17 1802 97.9 °F (36.6 °C) 87 18 104/69 99 %   17 1559 97.4 °F (36.3 °C) 90 18 104/67 98 %   17 1130 - 93 17 107/64 98 %     Oxygen Therapy  O2 Sat (%): 100 % (05/15/17 0747)  O2 Device: Room air (17 1617)    Intake/Output Summary (Last 24 hours) at 05/15/17 0884  Last data filed at 05/15/17 0811   Gross per 24 hour   Intake              240 ml   Output                0 ml   Net              240 ml         General:    Confused and sluggish, malnourished. CV: RRR. No murmur, rub, or gallop. Tachycadic  Lungs:   Clear to auscultation bilaterally. No wheezing, rhonchi, or rales. Abdomen:   Soft, mildly distended, mild vTTP, diffuse. anterior abd hernia. Extremities: Warm and dry. No cyanosis or edema. Skin:     No rashes or jaundice.    CNS:               gcs 15, CN 2-12 intact, no motor or sensory deficits  Psych:             AO x 3, mood and affect flat    Current Meds:  Current Facility-Administered Medications   Medication Dose Route Frequency    0.9% sodium chloride infusion 250 mL  250 mL IntraVENous PRN    lactulose (CHRONULAC) solution 20 g  20 g Oral BID    promethazine (PHENERGAN) with saline injection 25 mg  25 mg IntraVENous Q6H PRN    potassium chloride (K-DUR, KLOR-CON) SR tablet 20 mEq  20 mEq Oral BID    0.9% sodium chloride infusion 250 mL  250 mL IntraVENous PRN    midodrine (PROAMITINE) tablet 10 mg  10 mg Oral Q8H    vancomycin (VANCOCIN) 750 mg in  ml infusion  750 mg IntraVENous See Admin Instructions    rifAXIMin (XIFAXAN) tablet 550 mg  550 mg Oral BID    norflurane-pentafluoropropane (PAIN EASE) topical spray 1 Spray  1 Spray Topical DIALYSIS PRN    acetaminophen (TYLENOL) suppository 650 mg  650 mg Rectal Q6H PRN    magnesium oxide (MAG-OX) tablet 400 mg  400 mg Oral BID    0.9% sodium chloride infusion 250 mL  250 mL IntraVENous PRN    0.9% sodium chloride infusion 250 mL  250 mL IntraVENous PRN    ondansetron (ZOFRAN ODT) tablet 8 mg  8 mg Oral TID    morphine injection 2 mg  2 mg IntraVENous Q4H PRN    acetaminophen (TYLENOL) tablet 650 mg  650 mg Oral Q6H PRN    HYDROmorphone (PF) (DILAUDID) injection 0.2 mg  0.2 mg IntraVENous Q4H PRN    ondansetron (ZOFRAN) injection 4 mg  4 mg IntraVENous Q6H PRN    sodium chloride (NS) flush 5-10 mL  5-10 mL IntraVENous Q8H    sodium chloride (NS) flush 5-10 mL  5-10 mL IntraVENous PRN    pantoprazole (PROTONIX) 40 mg in sodium chloride 0.9 % 10 mL injection  40 mg IntraVENous Q12H       Labs and Studies:  I have reviewed all labs, meds, telemetry events, and studies from the last 24 hours. Recent Results (from the past 24 hour(s))   PLATELETS, ALLOCATE    Collection Time: 05/14/17  9:00 AM   Result Value Ref Range    Unit number [id number]     Blood component type PLTPH LR 3     Unit division 00     Status of unit TRANSFUSED     Unit number [id number]     Blood component type PLTPH LR,2     Unit division 00     Status of unit TRANSFUSED    GLUCOSE, POC    Collection Time: 05/14/17 12:19 PM   Result Value Ref Range    Glucose (POC) 211 (H) 65 - 100 mg/dL   GLUCOSE, POC    Collection Time: 05/14/17  6:00 PM   Result Value Ref Range    Glucose (POC) 245 (H) 65 - 100 mg/dL   GLUCOSE, POC    Collection Time: 05/14/17  8:47 PM   Result Value Ref Range    Glucose (POC) 225 (H) 65 - 100 mg/dL   GLUCOSE, POC    Collection Time: 05/15/17  5:02 AM   Result Value Ref Range    Glucose (POC) 154 (H) 65 - 079 mg/dL   METABOLIC PANEL, COMPREHENSIVE    Collection Time: 05/15/17  7:10 AM   Result Value Ref Range    Sodium 144 136 - 145 mmol/L    Potassium 3.6 3.5 - 5.1 mmol/L    Chloride 108 (H) 98 - 107 mmol/L    CO2 27 21 - 32 mmol/L    Anion gap 9 7 - 16 mmol/L    Glucose 174 (H) 65 - 100 mg/dL    BUN 20 6 - 23 MG/DL    Creatinine 3.23 (H) 0.6 - 1.0 MG/DL    GFR est AA 20 (L) >60 ml/min/1.73m2    GFR est non-AA 16 (L) >60 ml/min/1.73m2    Calcium 7.8 (L) 8.3 - 10.4 MG/DL    Bilirubin, total 1.4 (H) 0.2 - 1.1 MG/DL    ALT (SGPT) 28 12 - 65 U/L    AST (SGOT) 41 (H) 15 - 37 U/L    Alk.  phosphatase 361 (H) 50 - 136 U/L    Protein, total 6.1 (L) 6.3 - 8.2 g/dL    Albumin 2.2 (L) 3.5 - 5.0 g/dL    Globulin 3.9 (H) 2.3 - 3.5 g/dL    A-G Ratio 0.6 (L) 1.2 - 3.5     CBC W/O DIFF    Collection Time: 05/15/17  7:11 AM   Result Value Ref Range    WBC 3.8 (L) 4.3 - 11.1 K/uL    RBC 3.27 (L) 4.05 - 5.25 M/uL    HGB 9.5 (L) 11.7 - 15.4 g/dL    HCT 28.7 (L) 35.8 - 46.3 %    MCV 87.8 79.6 - 97.8 FL    MCH 29.1 26.1 - 32.9 PG    MCHC 33.1 31.4 - 35.0 g/dL    RDW 19.0 (H) 11.9 - 14.6 %    PLATELET 93 (L) 236 - 450 K/uL    MPV 10.1 (L) 10.8 - 14.1 FL   PROTHROMBIN TIME + INR    Collection Time: 05/15/17  7:11 AM   Result Value Ref Range    Prothrombin time 15.0 (H) 9.6 - 12.0 sec    INR 1.4 (H) 0.9 - 1.2     LIPASE    Collection Time: 05/15/17  7:11 AM   Result Value Ref Range    Lipase 146 73 - 393 U/L        US ABD LTD   Final Result   IMPRESSION: Large amount of ascites. US GUIDE PARACENTESIS   Final Result   Impression: Uncomplicated ultrasound guided paracentesis. US ABD LTD   Final Result   Impression:      Nonspecific hypoechoic area within or immediately adjacent to the pancreatic   head, this does not have typical appearance of pancreatitis however recommend   correlation with lipase levels to help exclude edematous pancreatitis. Evidence of hepatic cirrhosis with at least moderate volume abdominal ascites in   the upper abdomen. Postcholecystectomy. IR REMOVE TUNL CVAD W/O PORT / PUMP    (Results Pending)   IR REMOVE TUNL CVAD W/O PORT / PUMP    (Results Pending)   IR PARACENTESIS ABD W IMAGE    (Results Pending)       All Micro Results     Procedure Component Value Units Date/Time    CULTURE, BLOOD [[id number]]  (Abnormal) Collected:  05/11/17 1541    Order Status:  Completed Specimen:  Blood from Blood Updated:  05/15/17 0793     Special Requests: LEFT WRIST        GRAM STAIN         GRAM POS COCCI IN CLUSTERS      PEDIATRIC BOTTLE                 CRITICAL RESULT NOT CALLED DUE TO PREVIOUS NOTIFICATION OF CRITICAL RESULT WITHIN THE LAST 24 HOURS.      Culture result:         STAPHYLOCOCCUS SPECIES (A)              CULTURE IN PROGRESS,FURTHER UPDATES TO FOLLOW    CULTURE, BLOOD [[id number]]  (Abnormal) Collected:  05/11/17 1545    Order Status:  Completed Specimen:  Blood from Blood Updated:  05/15/17 0738     Special Requests: --        RIGHT  PERMANENT CATH GRAM STAIN         GRAM POS COCCI IN CLUSTERS              AEROBIC AND ANAEROBIC BOTTLES              CRITICAL RESULT NOT CALLED DUE TO PREVIOUS NOTIFICATION OF CRITICAL RESULT WITHIN THE LAST 24 HOURS. Culture result:       STAPHYLOCOCCUS EPIDERMIDIS  SENSITIVITY TO FOLLOW   (A)              SA target DNA sequence not detected within the sample. Test performed by PCR      REPEATING SUSCEPTIBILITY    CULTURE, BLOOD [[id number]]  (Abnormal)  (Susceptibility) Collected:  05/11/17 0010    Order Status:  Completed Specimen:  Blood from Blood Updated:  05/15/17 0712     Special Requests: LIFE PORT     GRAM STAIN         GRAM POS COCCI IN CLUSTERS              AEROBIC AND ANAEROBIC BOTTLES            RESULTS VERIFIED, PHONED TO AND READ BACK BY  Lichatashia Severs @ [facility] ON 5/11/17 BY NKE. Culture result:         STAPHYLOCOCCUS EPIDERMIDIS (A)              SA target DNA sequence not detected within the sample. Test performed by PCR    CULTURE, BLOOD [[id number]]  (Abnormal)  (Susceptibility) Collected:  05/11/17 0230    Order Status:  Completed Specimen:  Blood from Blood Updated:  05/15/17 0708     Special Requests: LEFT HAND        GRAM STAIN         GRAM POS COCCI IN CLUSTERS              AEROBIC AND ANAEROBIC BOTTLES              CRITICAL RESULT NOT CALLED DUE TO PREVIOUS NOTIFICATION OF CRITICAL RESULT WITHIN THE LAST 24 HOURS. Culture result:         STAPHYLOCOCCUS EPIDERMIDIS (A)              SA target DNA sequence not detected within the sample.  Test performed by PCR    CULTURE, BLOOD [[id number]] Collected:  05/13/17 0955    Order Status:  Completed Specimen:  Blood from Blood Updated:  05/14/17 0609     Special Requests: HD ARTERIAL     Culture result: NO GROWTH AFTER 19 HOURS       CULTURE, BLOOD [[id number]] Collected:  05/13/17 1600    Order Status:  Completed Specimen:  Blood from Blood Updated:  05/14/17 0609     Special Requests: L PORT     Culture result: NO GROWTH AFTER 11 HOURS       CULTURE, BLOOD [[id number]] Collected:  05/13/17 1757    Order Status:  Completed Specimen:  Blood from Blood Updated:  05/14/17 0609     Special Requests: LEFT HAND        Culture result: NO GROWTH AFTER 10 HOURS       CULTURE, BODY FLUID [place] STAIN [[id number]] Collected:  05/09/17 1600    Order Status:  Completed Specimen:  Ascitic Fluid Updated:  05/12/17 0910     Special Requests: NO SPECIAL REQUESTS        GRAM STAIN NO WBCS SEEN         NO DEFINITE ORGANISM SEEN        Culture result: NO GROWTH 2 DAYS       CULTURE, BODY FLUID W Rachel Hobbs [[id number]] Collected:  05/08/17 2115    Order Status:  Canceled Specimen:  Ascitic Fluid             Assessment and Plan:     Hospital Problems as of 5/15/2017  Date Reviewed: 3/2/2017          Codes Class Noted - Resolved POA    * (Principal)Intractable nausea and vomiting ICD-10-CM: R11.2  ICD-9-CM: 536.2  5/9/2017 - Present Yes        Coagulopathy ([facility] [address] (Chronic) ICD-10-CM: D68.9  ICD-9-CM: 286.9  5/9/2017 - Present Yes    Overview Signed 5/9/2017 10:57 AM by Fuad Birmingham MD     Liver disease             Cirrhosis of liver with ascites ([facility] [address] (Chronic) ICD-10-CM: K74.60  ICD-9-CM: 571.5  5/9/2017 - Present Yes        Pancytopenia ([facility] [address] (Chronic) ICD-10-CM: H13.376  ICD-9-CM: 284.19  5/9/2017 - Present Yes        Abdominal pain ICD-10-CM: R10.9  ICD-9-CM: 789.00  5/8/2017 - Present Yes        Abdominal pain, acute ICD-10-CM: R10.9  ICD-9-CM: 789.00, 338.19  5/8/2017 - Present Yes        Liver transplant recipient [facility]) (Chronic) ICD-10-CM: Z94.4  ICD-9-CM: V42.7  2/13/2017 - Present Yes        Hemodialysis access, AV graft ([facility] [address] (Chronic) ICD-10-CM: Z99.2  ICD-9-CM: V45.11  11/22/2016 - Present Yes        ESRD (end stage renal disease) on dialysis [facility]) (Chronic) ICD-10-CM: N18.6, Z99.2  ICD-9-CM: 585.6, V45.11  11/2/2016 - Present Yes        Esophageal varices ([facility] [address] (Chronic) ICD-10-CM: I85.00  ICD-9-CM: 456.1  7/27/2016 - Present Yes        Congenital hepatic fibrosis (Chronic) ICD-10-CM: P78.81  ICD-9-CM: 777.8  Unknown - Present Yes        Hypotension (Chronic) ICD-10-CM: I95.9  ICD-9-CM: 458.9  7/6/2016 - Present Yes        Type 2 diabetes mellitus with hyperglycemia (HCC) (Chronic) ICD-10-CM: E11.65  ICD-9-CM: 250.00  7/4/2016 - Present Yes        Iron deficiency anemia (Chronic) ICD-10-CM: D50.9  ICD-9-CM: 280.9  7/4/2016 - Present Yes        Thrombocytopenia (HCC) (Chronic) ICD-10-CM: D69.6  ICD-9-CM: 287.5  7/4/2016 - Present Yes        Hepatitis C (Chronic) ICD-10-CM: B19.20  ICD-9-CM: 070.70  7/4/2016 - Present Yes                PLAN:      Acute infectious encephalopathy: resolved  One set showed Staph Epidermidus. Plan for continuing Vancomycin for 4-6 weeks with HD. Repeat cultures sent on 5/13, negative so far    Repeat USG abdomen showed large ascites, will get IR guided paracentesis today    Dialysis catheter to be removed today, and life port will be replaced by a new one. · Hypokalemia: improved from 2.7 to 3.4. Continue Chlor Hal 20 meq po BID. ·  Nephro recs appreciated. Plan for HD as scheduled. Continue with midodrine 10 mg po q8h  · Leukopenia continues to improve, likely due to colchicine. · Thrombocytopenia 2/2 chronic liver disease. Post transfusion platelet count 68,615. · Continue PRN meds as listed for abd pain, nausea, vomiting. · Stage 2 sacral ulcer, will continue foam dressing and change mattress for frequent position change. · Hb is stable at 9.5.    DC planning:  Discharge to home when medically stable.   DVT ppx:  SCDs  Discussed plan with:  Pt  Risk:  High from numerous comorbidities, Iv narcotics    Signed:  Odalis Chong MD

## 2017-05-15 NOTE — PROGRESS NOTES
Patient to IR room for procedure. Patient awake and alert, verbalized name, , and procedure to be performed. Patient moved to procedure table with plus 2 assist for safey .

## 2017-05-15 NOTE — PROGRESS NOTES
Pt here from IR. Sedated and no consent signed. No family with pt or in room to sign consent. Procedure cancelled today per Dr. Doris Baker and will reschedule for tomorrow.

## 2017-05-15 NOTE — PROGRESS NOTES
Verbal order per dr. Abraham Rothman to hold AM meds, NPO for procedures today. Remote telemetry discontinued.

## 2017-05-15 NOTE — PROGRESS NOTES
TRANSFER - IN REPORT:    Verbal report received from Marissa Serra RN (name) on [facility]   being received from room 825 (unit) for ordered procedure. Pt is currently in IR for line exchange. Spoke with Clinton Linares in IR and he states that they will send the pt to the GI Lab when they are finished. Report consisted of patients Situation, Background, Assessment and   Recommendations(SBAR). Information from the following report(s) SBAR was reviewed with the receiving nurse. Opportunity for questions and clarification was provided. Awaiting pt to complete procedure in IR at this time and then she will be transported to the GI Lab.

## 2017-05-15 NOTE — PROGRESS NOTES
TRANSFER - IN REPORT:    Verbal report received from Nestor(name) on Gabbie Martinez  being received from IR, patient to go to GI prior to returning to the floor(unit) for routine post - op      Report consisted of patients Situation, Background, Assessment and   Recommendations(SBAR). Information from the following report(s) SBAR was reviewed with the receiving nurse. Opportunity for questions and clarification was provided. Assessment completed upon patients arrival to unit and care assumed.

## 2017-05-15 NOTE — PROGRESS NOTES
Pt returned to room 825. Remains sedated from procedure, but able to answer questions appropriately. Pt requesting dinner. HOB elevated 45 degress. VSS. No acute distress noted. Dressing to rt chest dry, clean and intact. L double lumen IV access noted, dry clean and intact. Dressing to L lower abdomen dry, clean and intact.  Door open and call bell in reach

## 2017-05-16 NOTE — PROGRESS NOTES
Shift assessment complete. Pt resting quietly in bed. No signs of acute distress. Resp even and unlabored. Call light within reach. Pt instructed to call for assistance.

## 2017-05-16 NOTE — PROGRESS NOTES
PT returned to room 825. AAOX4. VSS. EGD and HD completed this shift. Pt eating supper at this time. No further complaints at this time. Door open and call bell in reach.

## 2017-05-16 NOTE — PROGRESS NOTES
TRANSFER - IN REPORT:    Verbal report received from Magaly Shaw RN (name) on Froylan Sams  being received from room 825 (unit) for ordered procedure. Report consisted of patients Situation, Background, Assessment and   Recommendations(SBAR). Information from the following report(s) SBAR was reviewed with the receiving nurse. Opportunity for questions and clarification was provided. Awaiting transport to bring pt to the GI Lab at this time.

## 2017-05-16 NOTE — PROGRESS NOTES
Pt AAOX4, ambulating to bathroom this AM. No acute distress on RA. Dressings to Rt chest s/p tunneled cath removal and L abdomen s/p paracentesis clean dry and intact. Pt NPO for EGD today. HD scheduled for today. Call bell in reach.

## 2017-05-16 NOTE — PROGRESS NOTES
Subjective:   Daily Progress Note: 2017 9:40 AM    Arousable, sleepy    Current Facility-Administered Medications   Medication Dose Route Frequency    fentaNYL citrate (PF) injection 12.5-100 mcg  12.5-100 mcg IntraVENous Multiple    midazolam (VERSED) injection 0.25-2 mg  0.25-2 mg IntraVENous Multiple    0.9% sodium chloride infusion 250 mL  250 mL IntraVENous PRN    heparin (PF) 2 units/ml in NS infusion 2,000 Units  1,000 mL Irrigation Multiple    lactulose (CHRONULAC) solution 20 g  20 g Oral BID    promethazine (PHENERGAN) with saline injection 25 mg  25 mg IntraVENous Q6H PRN    potassium chloride (K-DUR, KLOR-CON) SR tablet 20 mEq  20 mEq Oral BID    midodrine (PROAMITINE) tablet 10 mg  10 mg Oral Q8H    vancomycin (VANCOCIN) 750 mg in  ml infusion  750 mg IntraVENous See Admin Instructions    rifAXIMin (XIFAXAN) tablet 550 mg  550 mg Oral BID    norflurane-pentafluoropropane (PAIN EASE) topical spray 1 Spray  1 Spray Topical DIALYSIS PRN    acetaminophen (TYLENOL) suppository 650 mg  650 mg Rectal Q6H PRN    magnesium oxide (MAG-OX) tablet 400 mg  400 mg Oral BID    ondansetron (ZOFRAN ODT) tablet 8 mg  8 mg Oral TID    morphine injection 2 mg  2 mg IntraVENous Q4H PRN    acetaminophen (TYLENOL) tablet 650 mg  650 mg Oral Q6H PRN    HYDROmorphone (PF) (DILAUDID) injection 0.2 mg  0.2 mg IntraVENous Q4H PRN    ondansetron (ZOFRAN) injection 4 mg  4 mg IntraVENous Q6H PRN    sodium chloride (NS) flush 5-10 mL  5-10 mL IntraVENous Q8H    sodium chloride (NS) flush 5-10 mL  5-10 mL IntraVENous PRN    pantoprazole (PROTONIX) 40 mg in sodium chloride 0.9 % 10 mL injection  40 mg IntraVENous Q12H               Objective:     Visit Vitals    BP 96/70    Pulse 85    Temp 98 °F (36.7 °C)    Resp 18    Wt 48.9 kg (107 lb 11.2 oz)    LMP 2016    SpO2 98%    Breastfeeding No    BMI 21.03 kg/m2      O2 Device: Room air    Temp (24hrs), Av °F (36.7 °C), Min:97.3 °F (36.3 °C), Max:99.1 °F (37.3 °C)         05/14 1901 - 05/16 0700  In: 207.5   Out: -       Visit Vitals    BP 96/70    Pulse 85    Temp 98 °F (36.7 °C)    Resp 18    Wt 48.9 kg (107 lb 11.2 oz)    LMP 01/02/2016    SpO2 98%    Breastfeeding No    BMI 21.03 kg/m2     Head: Normocephalic, without obvious abnormality  Neck: no JVD  Lungs: clear to auscultation bilaterally  Heart: regular rate and rhythm  Abdomen: soft, non-tender. Extremities: no edema          Data Review    Recent Results (from the past 48 hour(s))   GLUCOSE, POC    Collection Time: 05/14/17 12:19 PM   Result Value Ref Range    Glucose (POC) 211 (H) 65 - 100 mg/dL   GLUCOSE, POC    Collection Time: 05/14/17  6:00 PM   Result Value Ref Range    Glucose (POC) 245 (H) 65 - 100 mg/dL   GLUCOSE, POC    Collection Time: 05/14/17  8:47 PM   Result Value Ref Range    Glucose (POC) 225 (H) 65 - 100 mg/dL   GLUCOSE, POC    Collection Time: 05/15/17  5:02 AM   Result Value Ref Range    Glucose (POC) 154 (H) 65 - 461 mg/dL   METABOLIC PANEL, COMPREHENSIVE    Collection Time: 05/15/17  7:10 AM   Result Value Ref Range    Sodium 144 136 - 145 mmol/L    Potassium 3.6 3.5 - 5.1 mmol/L    Chloride 108 (H) 98 - 107 mmol/L    CO2 27 21 - 32 mmol/L    Anion gap 9 7 - 16 mmol/L    Glucose 174 (H) 65 - 100 mg/dL    BUN 20 6 - 23 MG/DL    Creatinine 3.23 (H) 0.6 - 1.0 MG/DL    GFR est AA 20 (L) >60 ml/min/1.73m2    GFR est non-AA 16 (L) >60 ml/min/1.73m2    Calcium 7.8 (L) 8.3 - 10.4 MG/DL    Bilirubin, total 1.4 (H) 0.2 - 1.1 MG/DL    ALT (SGPT) 28 12 - 65 U/L    AST (SGOT) 41 (H) 15 - 37 U/L    Alk.  phosphatase 361 (H) 50 - 136 U/L    Protein, total 6.1 (L) 6.3 - 8.2 g/dL    Albumin 2.2 (L) 3.5 - 5.0 g/dL    Globulin 3.9 (H) 2.3 - 3.5 g/dL    A-G Ratio 0.6 (L) 1.2 - 3.5     CBC W/O DIFF    Collection Time: 05/15/17  7:11 AM   Result Value Ref Range    WBC 3.8 (L) 4.3 - 11.1 K/uL    RBC 3.27 (L) 4.05 - 5.25 M/uL    HGB 9.5 (L) 11.7 - 15.4 g/dL    HCT 28.7 (L) 35.8 - 46.3 %    MCV 87.8 79.6 - 97.8 FL    MCH 29.1 26.1 - 32.9 PG    MCHC 33.1 31.4 - 35.0 g/dL    RDW 19.0 (H) 11.9 - 14.6 %    PLATELET 93 (L) 366 - 450 K/uL    MPV 10.1 (L) 10.8 - 14.1 FL   PROTHROMBIN TIME + INR    Collection Time: 05/15/17  7:11 AM   Result Value Ref Range    Prothrombin time 15.0 (H) 9.6 - 12.0 sec    INR 1.4 (H) 0.9 - 1.2     LIPASE    Collection Time: 05/15/17  7:11 AM   Result Value Ref Range    Lipase 146 73 - 393 U/L   CULTURE, BODY FLUID W GRAM STAIN    Collection Time: 05/15/17  2:53 PM   Result Value Ref Range    Special Requests: NO SPECIAL REQUESTS      GRAM STAIN PENDING     Culture result:        NO GROWTH AFTER SHORT PERIOD OF INCUBATION. FURTHER RESULTS TO FOLLOW AFTER OVERNIGHT INCUBATION. ALBUMIN, FLUID    Collection Time: 05/15/17  2:53 PM   Result Value Ref Range    Fluid Type: ASCITIC FLUID       Albumin, body fld. PENDING g/dL   AMYLASE, FLUID    Collection Time: 05/15/17  2:53 PM   Result Value Ref Range    Fluid Type: ASCITIC FLUID       Amylase, body fld. PENDING U/L   CELL COUNT, BODY FLUID    Collection Time: 05/15/17  2:53 PM   Result Value Ref Range    BODY FLUID TYPE ASCITIC FLUID       FLUID COLOR YELLOW      FLUID APPEARANCE CLEAR      FLUID RBC COUNT <65280 /cu mm    FLD NUCLEATED CELLS <100 /cu mm   PROTEIN TOTAL, FLUID    Collection Time: 05/15/17  2:53 PM   Result Value Ref Range    Fluid Type: ASCITIC FLUID       Protein total, body fld. PENDING g/dL   PLATELETS, ALLOCATE    Collection Time: 05/15/17  3:45 PM   Result Value Ref Range    Unit number [id number]     Blood component type PLTPH LR,1     Unit division 00     Status of unit TRANSFUSED    CULTURE, CATHETER TIP    Collection Time: 05/15/17  4:30 PM   Result Value Ref Range    Special Requests: NO SPECIAL REQUESTS      Culture result:        NO GROWTH AFTER SHORT PERIOD OF INCUBATION. FURTHER RESULTS TO FOLLOW AFTER OVERNIGHT INCUBATION.    GLUCOSE, POC    Collection Time: 05/15/17  6:02 PM   Result Value Ref Range    Glucose (POC) 129 (H) 65 - 100 mg/dL   GLUCOSE, POC    Collection Time: 05/15/17  9:44 PM   Result Value Ref Range    Glucose (POC) 182 (H) 65 - 100 mg/dL   VANCOMYCIN, RANDOM    Collection Time: 05/16/17  4:17 AM   Result Value Ref Range    VANCOMYCIN,RANDOM 31.5 UG/ML   GLUCOSE, POC    Collection Time: 05/16/17  5:40 AM   Result Value Ref Range    Glucose (POC) 182 (H) 65 - 100 mg/dL   CBC WITH AUTOMATED DIFF    Collection Time: 05/16/17  6:05 AM   Result Value Ref Range    WBC 4.2 (L) 4.3 - 11.1 K/uL    RBC 2.86 (L) 4.05 - 5.25 M/uL    HGB 8.3 (L) 11.7 - 15.4 g/dL    HCT 25.4 (L) 35.8 - 46.3 %    MCV 88.8 79.6 - 97.8 FL    MCH 29.0 26.1 - 32.9 PG    MCHC 32.7 31.4 - 35.0 g/dL    RDW 18.7 (H) 11.9 - 14.6 %    PLATELET 89 (L) 005 - 450 K/uL    MPV 9.9 (L) 10.8 - 14.1 FL    DF AUTOMATED      NEUTROPHILS 63 43 - 78 %    LYMPHOCYTES 26 13 - 44 %    MONOCYTES 7 4.0 - 12.0 %    EOSINOPHILS 3 0.5 - 7.8 %    BASOPHILS 0 0.0 - 2.0 %    IMMATURE GRANULOCYTES 0.7 0.0 - 5.0 %    ABS. NEUTROPHILS 2.7 1.7 - 8.2 K/UL    ABS. LYMPHOCYTES 1.1 0.5 - 4.6 K/UL    ABS. MONOCYTES 0.3 0.1 - 1.3 K/UL    ABS. EOSINOPHILS 0.1 0.0 - 0.8 K/UL    ABS. BASOPHILS 0.0 0.0 - 0.2 K/UL    ABS. IMM.  GRANS. 0.0 0.0 - 0.5 K/UL   METABOLIC PANEL, BASIC    Collection Time: 05/16/17  6:05 AM   Result Value Ref Range    Sodium 145 136 - 145 mmol/L    Potassium 3.6 3.5 - 5.1 mmol/L    Chloride 109 (H) 98 - 107 mmol/L    CO2 28 21 - 32 mmol/L    Anion gap 8 7 - 16 mmol/L    Glucose 180 (H) 65 - 100 mg/dL    BUN 25 (H) 6 - 23 MG/DL    Creatinine 4.04 (H) 0.6 - 1.0 MG/DL    GFR est AA 15 (L) >60 ml/min/1.73m2    GFR est non-AA 13 (L) >60 ml/min/1.73m2    Calcium 7.8 (L) 8.3 - 10.4 MG/DL       Assessment     Patient Active Problem List    Diagnosis Date Noted    Intractable nausea and vomiting 05/09/2017    Coagulopathy ([facility] 05/09/2017    Cirrhosis of liver with ascites ([facility] 05/09/2017    Pancytopenia ([facility] 05/09/2017    Abdominal pain 05/08/2017    Abdominal pain, acute 05/08/2017    Liver [facility] recipient [facility]) 02/13/2017    Hemodialysis access, AV graft ([facility] [facility] 11/22/2016    ESR[facility]end stage renal [facility] 11/02/2016    Esophageal varices ([facility] 07/27/2016    Hypotension 07/06/2016    GERD (gastroesophageal reflux disease)     Congenital hepatic fibrosis     Type 2 diabetes mellitus with hyperglycemia ([facility] 07/04/2016    Iron deficiency anemia 07/04/2016    Thrombocytopenia [facility] 07/04/2016    Elevated diaphragm 07/04/2016    Raynaud's disease 07/04/2016    Hepatitis C 07/04/2016    Insomnia 07/13/2015           Problems Addressed by Nephrology     ESRD  Staph bacteremia - s/p line exchange, permcath removal    Plan     Seen on dialysis, tolerating well. Smaller needles with graft. Heme eval pending.

## 2017-05-16 NOTE — CONSULTS
Inpatient Hematology / Oncology Consult Note    Reason for Consult:  Abdominal pain, unspecified location [R10.9]  Gastroesophageal reflux disease, esophagitis presence not specified [K21.9]  Nausea and vomiting, intractability of vomiting not specified, unspecified vomiting type [R11.2]  Referring Physician:  Asim Castaneda MD    History of Present Illness:  Ms. Tray Yao is a 55 y.o. female admitted on 5/8/2017 with worsening nausea/vomiting, abdominal pain, unable to keep even water down. 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 Reports she again developed cirrhosis at that time. She requires paracenteses twice monthly and had one here on 5/15 with ~4300ml removed. Last year, she developed renal failure and is now on HD. She has chronic thrombocytopenia and anemia per patient. However, she developed leukocytosis during this admission. We were consulted for further recommendations. She is followed by ID for a high grade CONS bacteremia and persistent fevers, likely secondary to her lines which have been replaced. JAILYN pending. Her fevers have now resolved and she is feeling better today. WBC is 4200 from a low of 1600 on 5/13. Plt 89,000. Hgb 8.3. She has received a total of 2 units PRBCs on 5/9 and 4 units of platelets from 0/84-0/01. GI is on board and considering EUS or pancreatic CT to evaluate a hypoechoic area around the pancreatic head. We were consulted for recommendations regarding her pancytopenia. Review of Systems:  Constitutional + weight loss secondary to n/v and frequent hospitalizations. Denies fever, chills, weight loss, appetite changes, fatigue, night sweats. HEENT Denies trauma, blurry vision, hearing loss, ear pain, nosebleeds, sore throat, neck pain    Skin Denies lesions or rashes. Lungs Denies dyspnea, cough, sputum production or hemoptysis.    Cardiovascular Denies chest pain, palpitations, or lower extremity edema. Gastrointestinal +nausea, vomiting, abdominal pain - improving. Denies  changes in bowel habits, bloody or black stools, abdominal pain.  Denies dysuria, frequency or hesitancy of urination. Neuro Denies headaches, visual changes or ataxia. Denies dizziness, tingling, tremors, sensory change, speech change, focal weakness      Hematology Denies easy bruising or bleeding, denies gingival bleeding or epistaxis. Endo Denies heat/cold intolerance, denies diabetes or thyroid abnormalities. MSK Denies back pain, arthralgias, myalgias or frequent falls. Psychiatric/Behavioral Denies depression and substance abuse. The patient is not nervous/anxious.          Allergies   Allergen Reactions    Aspirin Nausea Only    Codeine Nausea Only    Compazine [Prochlorperazine Edisylate] Unknown (comments)     Causes Lock Jaw     Past Medical History:   Diagnosis Date    SEAN [facility]acute kidney injury) ([facility] [address] 6/26/2016    Arthritis     kath feet    Ascites     Benign neoplasm of skin of trunk, except scrotum     pt denies    Cellulitis and abscess of unspecified digit     hx of    Chronic kidney disease     [facility] on Mon/Wed/Fri    Chronic pain     abd area    Congenital hepatic fibrosis     Liver transplant X2    Esophageal varices (HCC)     GERD (gastroesophageal reflux disease)     Hemodialysis access, AV graft ([address] 11/22/2016    Hepatic encephalopathy ([address] 10/2016    recently hospitalized for hepatic encephalopathy    Hepatitis C     hx of hepatitis C-- dx after first liver transplant from a transfusion   (first transplant age 13)   [facility] History of gastric ulcer     Insomnia 07/13/2015    no current meds    Liver transplant status ([address] 1986 and 1999    X2- congential hepatic fibrosis    Pain in joint, ankle and foot     PICC (peripherally inserted central catheter) in place     PONV (postoperative nausea and vomiting)     some N/V, pt states she does well with Phenergan    Port-a-cath in place     R chest for HD    Raynaud disease     Spleen enlarged     Tachycardia     Thrombocytopenia (HCC)     Type 2 diabetes mellitus (HCC)     insulin only/AVG /s.s of hypoglycemia 30's/Last A1c 5.6    Vasculitis ([facility] [address]     resolved     Past Surgical History:   Procedure Laterality Date    HX CHOLECYSTECTOMY  1984    HX COLONOSCOPY      HX OTHER SURGICAL      biliary reconstructive surgery    HX OTHER SURGICAL  2013    EGD    HX OTHER SURGICAL  03/2016    tips procedure    HX OTHER SURGICAL      paracentesis    HX TRANSPLANT  [id number]    2 liver - first one for congenital hepatic fibrosis, 2nd for Hep C cirrhosis    HX TUBAL LIGATION      LAP,TUBAL CAUTERY      VASCULAR SURGERY PROCEDURE UNLIST Left 11/02/2016    thigh AVG insertion in thigh     Family History   Problem Relation Age of Onset    Diabetes Mother     Heart Disease Mother     Hypertension Mother     Heart Disease Father     Stroke Father     Cancer Maternal Grandfather      liver cancer, alcoholism    No Known Problems Sister     Cancer Maternal Aunt      cervical cancer    Breast Cancer Paternal Grandmother      early 45s    Colon Cancer Paternal Grandmother      late 76s    Cancer Other      maternal niece- cervical cancer    Bipolar Disorder Brother     Heart Disease Brother      Social History     Social History    Marital status:      Spouse name: N/A    Number of children: N/A    Years of education: N/A     Occupational History    Not on file.      Social History Main Topics    Smoking status: Never Smoker    Smokeless tobacco: Never Used    Alcohol use No    Drug use: No    Sexual activity: Yes     Partners: Male     Birth control/ protection: Surgical      Comment: Tubal Ligation     Other Topics Concern    Not on file     Social History Narrative     Current Facility-Administered Medications   Medication Dose Route Frequency Provider Last Rate Last Dose    fentaNYL citrate (PF) injection 12.5-100 mcg  12.5-100 mcg IntraVENous Felicia Heaton MD   50 mcg at 05/15/17 1614    midazolam (VERSED) injection 0.25-2 mg  0.25-2 mg IntraVENous Multiple Jarod Heaton MD   1 mg at 05/15/17 1614    0.9% sodium chloride infusion 250 mL  250 mL IntraVENous PRN Jarod Heaton MD        heparin (PF) 2 units/ml in NS infusion 2,000 Units  1,000 mL Irrigation Multiple Jarod Heaton MD   2,000 Units at 05/15/17 1611    lactulose (CHRONULAC) solution 20 g  20 g Oral BID Mariluz Sin [place]   Stopped at 05/16/17 0900    promethazine (PHENERGAN) with saline injection 25 mg  25 mg IntraVENous Q6H PRN Josee Escamilla MD   25 mg at 05/16/17 1343    potassium chloride (K-DUR, KLOR-CON) SR tablet 20 mEq  20 mEq Oral BID Delgado Hu MD   Stopped at 05/16/17 0900    midodrine (PROAMITINE) tablet 10 mg  10 mg Oral Q8H Delgado Hu MD   Stopped at 05/16/17 0800    vancomycin (VANCOCIN) 750 mg in  ml infusion  750 mg IntraVENous See Admin Instructions Delgado Hu MD        rifAXIMin Lurdes Heater) tablet 550 mg  550 mg Oral BID Delgado Hu MD   Stopped at 05/16/17 0900    norflurane-pentafluoropropane (PAIN EASE) topical spray 1 Spray  1 Spray Topical DIALYSIS PRN Marylee Fiddler, DO        acetaminophen (TYLENOL) suppository 650 mg  650 mg Rectal Q6H PRN Baldomero Gustafson MD        magnesium oxide (MAG-OX) tablet 400 mg  400 mg Oral BID Josee Escamilla MD   Stopped at 05/16/17 0900    ondansetron (ZOFRAN ODT) tablet 8 mg  8 mg Oral TID Laurie Austin NP   Stopped at 05/15/17 0800    morphine injection 2 mg  2 mg IntraVENous Q4H PRN Louie Gray MD        acetaminophen (TYLENOL) tablet 650 mg  650 mg Oral Q6H PRN Louie Gray MD   650 mg at 05/11/17 1534    HYDROmorphone (PF) (DILAUDID) injection 0.2 mg  0.2 mg IntraVENous Q4H PRN Louie Gray MD        ondansetron [facility]) injection 4 mg  4 mg IntraVENous Q6H PRN Ariana Vera MD   4 mg at 17 0033    sodium chloride (NS) flush 5-10 mL  5-10 mL IntraVENous Q8H Maico Garcia MD   10 mL at 17 1344    sodium chloride (NS) flush 5-10 mL  5-10 mL IntraVENous PRN Maico Garcia MD        pantoprazole (PROTONIX) 40 mg in sodium chloride 0.9 % 10 mL injection  40 mg IntraVENous Q12H Maico Garcia MD   40 mg at 05/15/17 2211       OBJECTIVE:  Patient Vitals for the past 8 hrs:   BP Temp Pulse Resp SpO2 Weight   17 1245 93/65 97.6 °F (36.4 °C) 95 16 (!) 75 % -   17 1121 (!) 89/61 - 89 - - -   17 1100 (!) 85/59 - 89 - - -   17 1030 96/67 - 86 - - -   17 1002 90/68 - 83 - - -   17 0933 96/70 - 85 - - -   17 5927 95/67 - 79 - - -   17 9192 97/68 - 82 - - -   17 0745 102/64 - 91 - - -   17 0640 - - - - - 107 lb 11.2 oz (48.9 kg)     Temp (24hrs), Av.9 °F (36.6 °C), Min:97.3 °F (36.3 °C), Max:99.1 °F (37.3 °C)     0701 -  1900  In: -   Out: 1000     Physical Exam:  Constitutional: Well developed, well nourished female in no acute distress, sitting comfortably in the hospital bed. HEENT: Normocephalic and atraumatic. Oropharynx is clear, mucous membranes are moist.   Neck supple    Lymph node   No palpable submandibular, cervical, supraclavicular, axillary or inguinal lymph nodes. Skin Warm and dry. No bruising and no rash noted. No erythema. No pallor. Respiratory Lungs are clear to auscultation bilaterally without wheezes, rales or rhonchi, normal air exchange without accessory muscle use. CVS Normal rate, regular rhythm and normal S1 and S2. No murmurs, gallops, or rubs. Abdomen Large umbilical hernia. Soft, nontender and nondistended, normoactive bowel sounds. No hepatosplenomegaly. Neuro Grossly nonfocal with no obvious sensory or motor deficits.    MSK Normal range of motion in general.  No edema and no tenderness. Psych Appropriate mood and affect. Labs:    Recent Results (from the past 24 hour(s))   CULTURE, BODY FLUID W GRAM STAIN    Collection Time: 05/15/17  2:53 PM   Result Value Ref Range    Special Requests: NO SPECIAL REQUESTS      GRAM STAIN NO  WBCS SEEN        GRAM STAIN NO DEFINITE ORGANISM SEEN      Culture result:        NO GROWTH AFTER SHORT PERIOD OF INCUBATION. FURTHER RESULTS TO FOLLOW AFTER OVERNIGHT INCUBATION. ALBUMIN, FLUID    Collection Time: 05/15/17  2:53 PM   Result Value Ref Range    Fluid Type: ASCITIC FLUID       Albumin, body fld. PENDING g/dL   AMYLASE, FLUID    Collection Time: 05/15/17  2:53 PM   Result Value Ref Range    Fluid Type: ASCITIC FLUID       Amylase, body fld. PENDING U/L   CELL COUNT, BODY FLUID    Collection Time: 05/15/17  2:53 PM   Result Value Ref Range    BODY FLUID TYPE ASCITIC FLUID       FLUID COLOR YELLOW      FLUID APPEARANCE CLEAR      FLUID RBC COUNT <21188 /cu mm    FLD NUCLEATED CELLS <100 /cu mm   PROTEIN TOTAL, FLUID    Collection Time: 05/15/17  2:53 PM   Result Value Ref Range    Fluid Type: ASCITIC FLUID       Protein total, body fld. PENDING g/dL   PLATELETS, ALLOCATE    Collection Time: 05/15/17  3:45 PM   Result Value Ref Range    Unit number [id number]     Blood component type PLTPH LR,1     Unit division 00     Status of unit TRANSFUSED    CULTURE, CATHETER TIP    Collection Time: 05/15/17  4:30 PM   Result Value Ref Range    Special Requests: NO SPECIAL REQUESTS      Culture result:        NO GROWTH AFTER SHORT PERIOD OF INCUBATION. FURTHER RESULTS TO FOLLOW AFTER OVERNIGHT INCUBATION.    GLUCOSE, POC    Collection Time: 05/15/17  6:02 PM   Result Value Ref Range    Glucose (POC) 129 (H) 65 - 100 mg/dL   GLUCOSE, POC    Collection Time: 05/15/17  9:44 PM   Result Value Ref Range    Glucose (POC) 182 (H) 65 - 100 mg/dL   VANCOMYCIN, RANDOM    Collection Time: 05/16/17  4:17 AM   Result Value Ref Range    VANCOMYCIN,RANDOM 31.5 UG/ML GLUCOSE, POC    Collection Time: 05/16/17  5:40 AM   Result Value Ref Range    Glucose (POC) 182 (H) 65 - 100 mg/dL   CBC WITH AUTOMATED DIFF    Collection Time: 05/16/17  6:05 AM   Result Value Ref Range    WBC 4.2 (L) 4.3 - 11.1 K/uL    RBC 2.86 (L) 4.05 - 5.25 M/uL    HGB 8.3 (L) 11.7 - 15.4 g/dL    HCT 25.4 (L) 35.8 - 46.3 %    MCV 88.8 79.6 - 97.8 FL    MCH 29.0 26.1 - 32.9 PG    MCHC 32.7 31.4 - 35.0 g/dL    RDW 18.7 (H) 11.9 - 14.6 %    PLATELET 89 (L) 913 - 450 K/uL    MPV 9.9 (L) 10.8 - 14.1 FL    DF AUTOMATED      NEUTROPHILS 63 43 - 78 %    LYMPHOCYTES 26 13 - 44 %    MONOCYTES 7 4.0 - 12.0 %    EOSINOPHILS 3 0.5 - 7.8 %    BASOPHILS 0 0.0 - 2.0 %    IMMATURE GRANULOCYTES 0.7 0.0 - 5.0 %    ABS. NEUTROPHILS 2.7 1.7 - 8.2 K/UL    ABS. LYMPHOCYTES 1.1 0.5 - 4.6 K/UL    ABS. MONOCYTES 0.3 0.1 - 1.3 K/UL    ABS. EOSINOPHILS 0.1 0.0 - 0.8 K/UL    ABS. BASOPHILS 0.0 0.0 - 0.2 K/UL    ABS. IMM. GRANS. 0.0 0.0 - 0.5 K/UL   METABOLIC PANEL, BASIC    Collection Time: 05/16/17  6:05 AM   Result Value Ref Range    Sodium 145 136 - 145 mmol/L    Potassium 3.6 3.5 - 5.1 mmol/L    Chloride 109 (H) 98 - 107 mmol/L    CO2 28 21 - 32 mmol/L    Anion gap 8 7 - 16 mmol/L    Glucose 180 (H) 65 - 100 mg/dL    BUN 25 (H) 6 - 23 MG/DL    Creatinine 4.04 (H) 0.6 - 1.0 MG/DL    GFR est AA 15 (L) >60 ml/min/1.73m2    GFR est non-AA 13 (L) >60 ml/min/1.73m2    Calcium 7.8 (L) 8.3 - 10.4 MG/DL   GLUCOSE, POC    Collection Time: 05/16/17 10:24 AM   Result Value Ref Range    Glucose (POC) 115 (H) 65 - 100 mg/dL       Imaging:  Abdominal ultrasound 5/15  Procedure: Maximal sterile barrier technique was used. Following routine prep  and drape of the abdomen, a local field block with 1% lidocaine was achieved. Ultrasound evaluation was performed.      Fluid was identified in the peritoneal cavity. Using real-time ultrasound  guidance, the peritoneal cavity was accessed with an 8 Nepali centesis catheter.   The catheter was connected to Vacutainer bottles.     A total of 4350 cc of thin yellow fluid was removed and sent to the lab. The  catheter was removed and a bandage applied.     Complications: None.     Findings: Large-volume ascites.     Impression: Uncomplicated ultrasound guided paracentesis. ASSESSMENT:  Problem List  Date Reviewed: 3/2/2017          Codes Class Noted    * (Principal)Intractable nausea and vomiting ICD-10-CM: R11.2  ICD-9-CM: 536.2  5/9/2017        Coagulopathy ([facility] [address] (Chronic) ICD-10-CM: D68.9  ICD-9-CM: 286.9  5/9/2017    Overview Signed 5/9/2017 10:57 AM by Anuel Cisneros MD     Liver disease             Cirrhosis of liver with ascites ([facility] [address] (Chronic) ICD-10-CM: K74.60  ICD-9-CM: 571.5  5/9/2017        Pancytopenia ([facility] [address] (Chronic) ICD-10-CM: H80.617  ICD-9-CM: 284.19  5/9/2017        Abdominal pain ICD-10-CM: R10.9  ICD-9-CM: 789.00  5/8/2017        Abdominal pain, acute ICD-10-CM: R10.9  ICD-9-CM: 789.00, 338.19  5/8/2017        Liver transplant recipient [facility]) (Chronic) ICD-10-CM: Z94.4  ICD-9-CM: V42.7  2/13/2017        Hemodialysis access, AV graft ([facility] (Chronic) ICD-10-CM: Z99.2  ICD-9-CM: V45.11  11/22/2016        ESRD (end stage renal disease) on dialysis [facility]) (Chronic) ICD-10-CM: N18.6, Z99.2  ICD-9-CM: 585.6, V45.11  11/2/2016        Esophageal varices (HCC) (Chronic) ICD-10-CM: I85.00  ICD-9-CM: 456.1  7/27/2016        GERD (gastroesophageal reflux disease) (Chronic) ICD-10-CM: K21.9  ICD-9-CM: 530.81  Unknown    Overview Signed 7/6/2016  9:58 AM by Krystin Singer NP     \"mostly controlled\" protonix b.i.d.              Congenital hepatic fibrosis (Chronic) ICD-10-CM: P78.81  ICD-9-CM: 777.8  Unknown        Hypotension (Chronic) ICD-10-CM: I95.9  ICD-9-CM: 458.9  7/6/2016        Type 2 diabetes mellitus with hyperglycemia (HCC) (Chronic) ICD-10-CM: E11.65  ICD-9-CM: 250.00  7/4/2016        Iron deficiency anemia (Chronic) ICD-10-CM: D50.9  ICD-9-CM: 280.9  7/4/2016        Thrombocytopenia ([facility] [address] (Chronic) ICD-10-CM: D69.6  ICD-9-CM: 287.5  7/4/2016        Elevated diaphragm (Chronic) ICD-10-CM: J98.6  ICD-9-CM: 519.4  7/4/2016        Raynaud's disease (Chronic) ICD-10-CM: I73.00  ICD-9-CM: 443.0  7/4/2016        Hepatitis C (Chronic) ICD-10-CM: B19.20  ICD-9-CM: 070.70  7/4/2016        Insomnia (Chronic) ICD-10-CM: G47.00  ICD-9-CM: 780.52  7/13/2015              RECOMMENDATIONS:  Pancytopenia  - Check peripheral blood smear, peripheral blood flow cytometry. Likely related to splenomegaly (spleen measures 22cm per Dr. Blanca Schwartz review) and baseline liver disease. Leukopenia could have been transient secondary to infection or medications but now resolving    Bacteremia  - ID following. Continues on vancomycin and rifaximin    ESRD  - Dialysis per nephrology    Elevated transaminases  - Abdominal us showed nonspecific hypoechoic area within or immediately adjacent to the pancreatic head on US. Considering EUS/CT pancreas      Lab studies and imaging studies (abdominal ultrasound) were personally reviewed. Thank you for allowing us to participate in the care of Ms. Verónica Haile. We will continue to follow along            [facility]  [address] [place] [address]  Office : [phone]  Fax : [phone]       Attending Addendum:  I personally evaluated the patient with Roma Leavitt N.P.,  and agree with the assessment, findings and plan as documented. Appears stable, heart regular without murmur, lungs clear, abdomen benign. Baseline thrombocytopenia likely related to her cirrhosis and splenomegaly. Leukopenia/thrombocytopenia now improving to baseline, will get peripheral smear review as well as peripheral blood flow cytometry for completion. A primary marrow condition is less likely though a BM biopsy can be considered if counts worsen down the line. Thank you for allowing us to participate in care of this pleasant patient.  Please call ellen any questions.                 Baldomero Lion MD  [facility]  [address] [address]  Office : [phone]  Fax : [phone]

## 2017-05-16 NOTE — PROGRESS NOTES
Infectious Disease Consult    Today's Date: 5/16/2017   Admit Date: 5/8/2017    Impression:   · GPC bacteremia (5/10, 5/12); source possibly HD cath; unknown whether AV graft is involved. She did actually have bacteremia with the same organism on 2/12/17. It appears that she received a short course of inpatient vancomycin and zosyn at that time but that the bigger concern was SBP. · Fever  · Chronic ABD pain  · Chronic nausea/vomiting  · ESRD on HD  · Ascites s/p US guided paracentesis  · Type II DM  · Congenital hepatic fibrosis  · Liver transplant X 2; with chronic immunosuppression  · Hepatitis C; treated    Plan:   · Continue Vancomycin  · Follow cultures are negative  · I agree with Dr. Prasad Doctor previous assessment. This bacteremia has likely been there since 2/12/17 at least and did not clear with a previous antibiotic course. Hopefully, the removal of the IVs will be successful in eradication, but I am still concerned about the perm cath tract and the graft. I think that a longer course of treatment is prudent. I recommend vancomycin dosed with HD through 6/24/17    Anti-infectives:     Vancomycin    Subjective:   Date of Consultation:  May 16, 2017  Referring Physician: Dr. Antonio Ledesma    She is feeling much better. Nausea and vomiting is improved. The port was removed yesterday and the perm cath was exchanged over a wire. She is presently getting HD via the left femoral AV graft.     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    Raynaud's disease I73.00    Hepatitis C B19.20    GERD (gastroesophageal reflux disease) K21.9    Congenital hepatic fibrosis P78.81    Hypotension I95.9    Esophageal varices (HCC) I85.00    ESRD (end stage renal disease) on dialysis (HCC) N18.6, Z99.2    Hemodialysis access, AV graft (HCC) Z99.2  Liver transplant recipient [facility]) Z94.4    Abdominal pain R10.9    Abdominal pain, acute R10.9    Intractable nausea and vomiting R11.2    Coagulopathy (HCC) D68.9    Cirrhosis of liver with ascites (HCC) K74.60    Pancytopenia (HCC) D61.818       Allergies   Allergen Reactions    Aspirin Nausea Only    Codeine Nausea Only    Compazine [Prochlorperazine Edisylate] Unknown (comments)     Causes Lock Jaw        Review of Systems:  A comprehensive review of systems was negative except for that written in the History of Present Illness. Objective:     Visit Vitals    BP (!) 89/61    Pulse 89    Temp 98 °F (36.7 °C)    Resp 18    Wt 48.9 kg (107 lb 11.2 oz)    SpO2 98%    Breastfeeding No    BMI 21.03 kg/m2     Temp (24hrs), Av °F (36.7 °C), Min:97.3 °F (36.3 °C), Max:99.1 °F (37.3 °C)       Lines:  Hemodialysis Catheter:            Physical Exam:    General:  Alert, cooperative, cachectic, appears older than stated age   Eyes:  Sclera anicteric. Pupils equally round and reactive to light. Mouth/Throat: Mucous membranes normal, oral pharynx clear   Neck: Supple   Lungs:   Clear to auscultation bilaterally, good effort   CV:  Tachycardic,no murmur, click, rub or gallop   Abdomen:   Soft, non-tender.  bowel sounds hyperactive, non-distended, lg  abd hernia   Extremities: No cyanosis or edema   Skin: Skin color, texture, turgor normal. no acute rash or lesions   Lymph nodes: Cervical and supraclavicular normal   Musculoskeletal: No swelling or deformity   Lines/Devices:  Intact, no erythema, drainage or tenderness   Psych: Alert and oriented, normal mood affect given the setting       Data Review:     CBC:  Recent Labs      17   0605  05/15/17   0711  17   0640   WBC  4.2*  3.8*  2.1*   GRANS  63   --   33*   MONOS  7   --   22*   EOS  3   --   4   ANEU  2.7   --   0.7*   ABL  1.1   --   0.8   HGB  8.3*  9.5*  9.5*   HCT  25.4*  28.7*  28.6*   PLT  89*  93*  57*       BMP:  Recent Labs      05/16/17   0605  05/15/17   0710  05/14/17   0640   CREA  4.04*  3.23*  2.38*   BUN  25*  20  14   NA  145  144  145   K  3.6  3.6  3.4*   CL  109*  108*  107   CO2  28  27  32   AGAP  8  9  6*   GLU  180*  174*  183*       LFTS:  Recent Labs      05/15/17   0710   TBILI  1.4*   ALT  28   SGOT  41*   AP  361*   TP  6.1*   ALB  2.2*       Microbiology:     All Micro Results     Procedure Component Value Units Date/Time    CULTURE, BODY FLUID Dinorah Bares STAIN [[id number]] Collected:  05/15/17 1453    Order Status:  Completed Specimen:  Ascitic Fluid Updated:  05/16/17 1017     Special Requests: NO SPECIAL REQUESTS        GRAM STAIN NO  WBCS SEEN         NO DEFINITE ORGANISM SEEN        Culture result:         NO GROWTH AFTER SHORT PERIOD OF INCUBATION. FURTHER RESULTS TO FOLLOW AFTER OVERNIGHT INCUBATION. CULTURE, CATHETER TIP [[id number]] Collected:  05/15/17 1630    Order Status:  Completed Specimen:  Catheter Tip Updated:  05/16/17 0913     Special Requests: NO SPECIAL REQUESTS        Culture result:         NO GROWTH AFTER SHORT PERIOD OF INCUBATION. FURTHER RESULTS TO FOLLOW AFTER OVERNIGHT INCUBATION.     CULTURE, BLOOD [[id number]] Collected:  05/13/17 1757    Order Status:  Completed Specimen:  Blood from Blood Updated:  05/16/17 0749     Special Requests: LEFT HAND        Culture result: NO GROWTH 3 DAYS       CULTURE, BLOOD [[id number]] Collected:  05/13/17 0955    Order Status:  Completed Specimen:  Blood from Blood Updated:  05/16/17 0749     Special Requests: HD ARTERIAL     Culture result: NO GROWTH 3 DAYS       CULTURE, BLOOD [[id number]] Collected:  05/13/17 1600    Order Status:  Completed Specimen:  Blood from Blood Updated:  05/16/17 0749     Special Requests: L PORT     Culture result: NO GROWTH 3 DAYS       CULTURE, BLOOD [[id number]]  (Abnormal) Collected:  05/11/17 1541    Order Status:  Completed Specimen:  Blood from Blood Updated:  05/15/17 0717     Special Requests: LEFT WRIST        Madisyn Carroll STAIN         GRAM POS COCCI IN CLUSTERS      PEDIATRIC BOTTLE                 CRITICAL RESULT NOT CALLED DUE TO PREVIOUS NOTIFICATION OF CRITICAL RESULT WITHIN THE LAST 24 HOURS. Culture result:         STAPHYLOCOCCUS SPECIES (A)              CULTURE IN PROGRESS,FURTHER UPDATES TO FOLLOW    CULTURE, BLOOD [[id number]]  (Abnormal) Collected:  05/11/17 1545    Order Status:  Completed Specimen:  Blood from Blood Updated:  05/15/17 0713     Special Requests: --        RIGHT  PERMANENT CATH       GRAM STAIN         GRAM POS COCCI IN CLUSTERS              AEROBIC AND ANAEROBIC BOTTLES              CRITICAL RESULT NOT CALLED DUE TO PREVIOUS NOTIFICATION OF CRITICAL RESULT WITHIN THE LAST 24 HOURS. Culture result:       STAPHYLOCOCCUS EPIDERMIDIS  SENSITIVITY TO FOLLOW   (A)              SA target DNA sequence not detected within the sample. Test performed by PCR      REPEATING SUSCEPTIBILITY    CULTURE, BLOOD [[id number]]  (Abnormal)  (Susceptibility) Collected:  05/11/17 0010    Order Status:  Completed Specimen:  Blood from Blood Updated:  05/15/17 0712     Special Requests: LIFE PORT     GRAM STAIN         GRAM POS COCCI IN CLUSTERS              AEROBIC AND ANAEROBIC BOTTLES            RESULTS VERIFIED, PHONED TO AND READ BACK BY  Yuriy Alfaro @ 9776 ON 5/11/17 BY LEV. Culture result:         STAPHYLOCOCCUS EPIDERMIDIS (A)              SA target DNA sequence not detected within the sample. Test performed by PCR    CULTURE, BLOOD [[id number]]  (Abnormal)  (Susceptibility) Collected:  05/11/17 0230    Order Status:  Completed Specimen:  Blood from Blood Updated:  05/15/17 0708     Special Requests: LEFT HAND        GRAM STAIN         GRAM POS COCCI IN CLUSTERS              AEROBIC AND ANAEROBIC BOTTLES              CRITICAL RESULT NOT CALLED DUE TO PREVIOUS NOTIFICATION OF CRITICAL RESULT WITHIN THE LAST 24 HOURS.      Culture result:         STAPHYLOCOCCUS EPIDERMIDIS (A)              SA target DNA sequence not detected within the sample.  Test performed by PCR    CULTURE, BODY FLUID W Ramiro Burnett [[id number]] Collected:  05/09/17 1600    Order Status:  Completed Specimen:  Ascitic Fluid Updated:  05/12/17 0910     Special Requests: NO SPECIAL REQUESTS        GRAM STAIN NO WBCS SEEN         NO DEFINITE ORGANISM SEEN        Culture result: NO GROWTH 2 DAYS       CULTURE, BODY FLUID GUI Burnett [[id number]] Collected:  05/08/17 2115    Order Status:  Canceled Specimen:  Ascitic Fluid           Imaging:   As above    Signed By: Quincy Palomino MD     May 16, 2017

## 2017-05-16 NOTE — PROGRESS NOTES
TRANSFER - OUT REPORT:    Verbal report given to Nathalia(name) on Gabbie Pierce  being transferred to GI lab(unit) for ordered procedure       Report consisted of patients Situation, Background, Assessment and   Recommendations(SBAR). Information from the following report(s) SBAR was reviewed with the receiving nurse. Lines:   Venous Access Device duel lumen cath 05/15/17 Upper chest (subclavicular area), left (Active)   Central Line Being Utilized Yes 5/16/2017  1:50 PM   Criteria for Appropriate Use Limited/no vessel suitable for conventional peripheral access 5/16/2017  1:50 PM   Site Assessment Clean, dry, & intact 5/16/2017  1:50 PM   Date of Last Dressing Change 05/15/17 5/16/2017  1:50 PM   Dressing Status Clean, dry, & intact 5/16/2017  1:50 PM   Dressing Type Transparent 5/16/2017  1:50 PM   Positive Blood Return (Medial Site) Yes 5/16/2017  1:50 PM   Action Taken (Medial Site) Flushed 5/16/2017  1:50 PM   Positive Blood Return (Lateral Site) Yes 5/16/2017  1:50 PM   Alcohol Cap Used No 5/16/2017  1:50 PM        Opportunity for questions and clarification was provided.       Patient transported with:   O2 @ RA liters

## 2017-05-16 NOTE — PROGRESS NOTES
Pt complaint of nausea. Pt refusing zofran and requesting phenergan. \"It is the only thing that works. \" Phenergan 25mg IV given slow push. Pt requesting ice chips. Spoke with GI NP. PT needs to remain NPO, no ice chips. Pt updated.

## 2017-05-16 NOTE — PROGRESS NOTES
Pt returned to room via stretcher by Supriya Nathan RN, and Kelly Castelan RN. VSS on room air. Dr. Tomy Gatica spoke to pt at bedside.

## 2017-05-16 NOTE — DIALYSIS
HD treatment completed without complication. Total of 1 kg removed. VS stable, NAD. Needles removed and pressure dressing applied to bilateral site. Transport contacted for return to room. Vanc administered at end of tx.

## 2017-05-16 NOTE — ANESTHESIA POSTPROCEDURE EVALUATION
Post-Anesthesia Evaluation and Assessment    Patient: Laura Patel MRN: [id number]  SSN: [id number]    YOB: 1970  Age: 55 y.o. Sex: female       Cardiovascular Function/Vital Signs  Visit Vitals    BP 97/59    Pulse (!) 112    Temp 37 °C (98.6 °F)    Resp 18    Wt 48.9 kg (107 lb 11.2 oz)    SpO2 100%    Breastfeeding No    BMI 21.03 kg/m2       Patient is status post total IV anesthesia anesthesia for Procedure(s):  ESOPHAGOGASTRODUODENOSCOPY (EGD). Nausea/Vomiting: None    Postoperative hydration reviewed and adequate. Pain:  Pain Scale 1: Numeric (0 - 10) (05/16/17 1734)  Pain Intensity 1: 0 (05/16/17 1734)   Managed    Neurological Status:   Neuro  Neurologic State: Alert (05/16/17 0734)  Orientation Level: Oriented X4 (05/16/17 0734)  Cognition: Appropriate decision making (05/16/17 0734)  Speech: Clear (05/16/17 0734)   At baseline    Mental Status and Level of Consciousness: Arousable    Pulmonary Status:   O2 Device: Nasal cannula (05/16/17 1734)   Adequate oxygenation and airway patent    Complications related to anesthesia: None    Post-anesthesia assessment completed.  No concerns    Signed By: Samantha Casarez MD     May 16, 2017

## 2017-05-16 NOTE — PROCEDURES
Endoscopic Gastroduodenoscopy Procedure Note    Indications: 55year old woman with cirrhosis and bacteremia with nausea and vomiting    Anesthesia/Sedation: MAC IV          Procedure Details     Informed consent was obtained for the procedure, including conscious sedation. Risks of infection, perforation, hemorrhage, adverse drug reaction and aspiration were discussed. The patient was placed in the left lateral decubitus position. She was monitored continuously with ECG tracing, pulse oximetry, blood pressure monitoring, and direct observation. The [id number] gastroscope was inserted into the mouth and advanced under direct vision to the second portion of the duodenum. A careful inspection was made as the gastroscope was withdrawn, including a retroflexed view of the proximal stomach; findings and interventions are described below. Appropriate photodocumentation was obtained. Findings:     Esophagus appeared normal, with refluxing of retained gastric contents seen in the distal esophagus. A moderate hiatus hernia was present. Retained gastric contents in the fundus and body, could not be suctioned. Mild portal hypertensive gastropathy present. The duodenum appeared normal.       Specimens: none    Estimated Blood Loss: None           Complications:   None; patient tolerated the procedure well. Attending Attestation:  I performed the procedure. Impression:    1. Retained gastric contents, likely delayed gastric emptying in the setting of infection. No gastric outlet obstruction or large varices     Recommendations:  1. Follow up with inpatient team   2.  Continue PPI and antiemetics

## 2017-05-16 NOTE — PROGRESS NOTES
Pharmacokinetic Consult to Pharmacist    Tom Abby is a 55 y.o. female being treated for CONS bacteremia with vancomycin. Planned duration 4-6 weeks dosed with HD. Weight: 48.9 kg (107 lb 11.2 oz)  Lab Results   Component Value Date/Time    BUN 25 05/16/2017 06:05 AM    Creatinine 4.04 05/16/2017 06:05 AM    WBC 4.2 05/16/2017 06:05 AM    Procalcitonin 0.6 07/28/2016 05:15 AM      Estimated Creatinine Clearance: 12.5 mL/min (based on Cr of 4.04). CULTURES:  5/9  Ascitic fluid Cx: - NGTD, prelim  5/10  Blood Cx:  - Staph epi, final  5/11  Blood Cx:  - Staph epi, final  5/13  Blood Cx:  - NGTD, prelim    Lab Results   Component Value Date/Time    VANCOMYCIN,RANDOM 31.5 05/16/2017 04:17 AM       Day 6 of vancomycin. Goal trough is 15-20. Pre-dialysis level today is 31.5. Expect about 30-40% removal with HD. Will schedule 500 mg IV x 1 to be given at the end of HD today. Plan to schedule further doses around HD and random levels. Pharmacy will continue to follow. Please call with any questions.       Thank you,  Mario Styles, PharmD  Clinical Pharmacist  [phone]

## 2017-05-16 NOTE — PROGRESS NOTES
Spoke with Keerthi Vidales NP with GI. Notified that patient is not on list for EGD.  She is to speak with them and get her on list. Pt to remain NPO

## 2017-05-16 NOTE — DIALYSIS
HD treatment initiated via left upper leg AVG without difficulty. Cannulated by Jimmy Macias RN using 16 gauge needles. VS stable, will continue to monitor during tx. No Heparin. Machine tests passed and alarms intact.

## 2017-05-16 NOTE — ANESTHESIA PREPROCEDURE EVALUATION
Anesthetic History     PONV          Review of Systems / Medical History  Patient summary reviewed, nursing notes reviewed and pertinent labs reviewed    Pulmonary  Within defined limits                 Neuro/Psych              Cardiovascular            Dysrhythmias : sinus tachycardia      Exercise tolerance: >4 METS  Comments: Raynaud's   GI/Hepatic/Renal     GERD: well controlled    Renal disease: ESRD  Liver disease (s/p Liver transplant x 2 (Congenital hepatic fibrosis and Hep C) - on transplant list again)    Comments: H/O congenital hepatic fibrosis, s/p transplant times two, esophageal varices, thrombocytopenia, ascites Endo/Other    Diabetes: well controlled, type 2, using insulin    Arthritis and anemia     Other Findings   Comments:  Thrombocytopenia - plt count has ranged 66k-94k since admission            Physical Exam    Airway  Mallampati: II  TM Distance: 4 - 6 cm  Neck ROM: normal range of motion   Mouth opening: Normal     Cardiovascular    Rhythm: regular  Rate: normal         Dental  No notable dental hx       Pulmonary  Breath sounds clear to auscultation               Abdominal  GI exam deferred       Other Findings            Anesthetic Plan    ASA: 4  Anesthesia type: total IV anesthesia          Induction: Intravenous  Anesthetic plan and risks discussed with: Patient

## 2017-05-16 NOTE — PROGRESS NOTES
TRANSFER - OUT REPORT:    Verbal report given to [facility] LARSON on Gaston Hartley  being transferred to [address] [place] [address] (unit) for routine progression of care       Report consisted of patients Situation, Background, Assessment and   Recommendations(SBAR). Information from the following report(s) SBAR, Procedure Summary and Recent Results was reviewed with the receiving nurse. Lines:   Venous Access Device duel lumen cath 05/15/17 Upper chest (subclavicular area), left (Active)   Central Line Being Utilized Yes 5/16/2017  4:00 PM   Criteria for Appropriate Use Limited/no vessel suitable for conventional peripheral access 5/16/2017  4:00 PM   Site Assessment Clean, dry, & intact 5/16/2017  4:00 PM   Date of Last Dressing Change 05/15/17 5/16/2017  4:00 PM   Dressing Status Clean, dry, & intact 5/16/2017  4:00 PM   Dressing Type Transparent 5/16/2017  4:00 PM   Positive Blood Return (Medial Site) Yes 5/16/2017  4:00 PM   Action Taken (Medial Site) Infusing;Flushed 5/16/2017  4:00 PM   Positive Blood Return (Lateral Site) Yes 5/16/2017  4:00 PM   Alcohol Cap Used No 5/16/2017  4:00 PM        Opportunity for questions and clarification was provided.       Patient transported with:   O2 @ 3 liters  Tech

## 2017-05-16 NOTE — PROGRESS NOTES
Hospitalist Progress Note     Admit Date:  2017  8:52 PM   Name:  Radha Chacon   Age:  55 y.o.  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 Admitted and GI and Nephrology consulted. Latest infectious work up showed gram positive cocci in blood cultures obtained from right perma cath, periphery and life port.  - seen at bedside. Appearing significantly improved. No overnight events.           Objective:     Patient Vitals for the past 24 hrs:   Temp Pulse Resp BP SpO2   17 0745 - 91 - 102/64 -   17 0356 98 °F (36.7 °C) 82 18 108/68 98 %   05/15/17 2319 97.7 °F (36.5 °C) (!) 110 18 116/56 99 %   05/15/17 2217 97.9 °F (36.6 °C) 92 18 117/75 99 %   05/15/17 2140 98.2 °F (36.8 °C) 96 18 114/75 99 %   05/15/17 2100 99.1 °F (37.3 °C) 96 18 114/75 96 %   05/15/17 2041 97.3 °F (36.3 °C) (!) 101 18 104/67 99 %   05/15/17 2023 97.9 °F (36.6 °C) (!) 107 18 102/71 98 %   05/15/17 1756 97.7 °F (36.5 °C) 99 18 97/65 99 %   05/15/17 1738 - 96 18 - 99 %   05/15/17 1659 - 96 18 96/62 99 %   05/15/17 1654 - 92 18 100/63 99 %   05/15/17 1649 - 96 16 96/63 98 %   05/15/17 1644 - 94 18 96/61 -   05/15/17 1636 - 96 18 96/67 98 %   05/15/17 1631 - 93 18 98/65 98 %   05/15/17 1626 - 92 18 98/65 99 %   05/15/17 1621 - 88 18 108/71 96 %   05/15/17 1616 - 86 16 113/76 95 %   05/15/17 1612 - 98 20 - 100 %   05/15/17 1611 - 92 18 109/73 -   05/15/17 1606 - 94 18 111/69 98 %   05/15/17 1603 - 96 18 106/72 100 %   05/15/17 1531 - 98 18 108/70 99 %   05/15/17 1513 - 98 17 104/69 98 %   05/15/17 1435 - 96 17 103/67 98 %   05/15/17 1131 98 °F (36.7 °C) 97 16 114/74 100 %     Oxygen Therapy  O2 Sat (%): 98 % (05/16/17 0356)  Pulse via Oximetry: 97 beats per minute (05/15/17 1659)  O2 Device: Room air (05/15/17 1738)    Intake/Output Summary (Last 24 hours) at 05/16/17 0830  Last data filed at 05/15/17 2217   Gross per 24 hour   Intake            207.5 ml   Output                0 ml   Net            207.5 ml         General:    Confused and sluggish, malnourished. CV:   RRR. No murmur, rub, or gallop. Tachycadic  Lungs:   Clear to auscultation bilaterally. No wheezing, rhonchi, or rales. Abdomen:   Soft, less distended than yesterday, diffuse. anterior abd hernia. Extremities: Warm and dry. No cyanosis or edema. Skin:     No rashes or jaundice.    CNS:               gcs 15, CN 2-12 intact, no motor or sensory deficits  Psych:             AO x 3, mood and affect flat    Current Meds:  Current Facility-Administered Medications   Medication Dose Route Frequency    fentaNYL citrate (PF) injection 12.5-100 mcg  12.5-100 mcg IntraVENous Multiple    midazolam (VERSED) injection 0.25-2 mg  0.25-2 mg IntraVENous Multiple    0.9% sodium chloride infusion 250 mL  250 mL IntraVENous PRN    heparin (PF) 2 units/ml in NS infusion 2,000 Units  1,000 mL Irrigation Multiple    0.9% sodium chloride infusion 250 mL  250 mL IntraVENous PRN    lactulose (CHRONULAC) solution 20 g  20 g Oral BID    promethazine (PHENERGAN) with saline injection 25 mg  25 mg IntraVENous Q6H PRN    potassium chloride (K-DUR, KLOR-CON) SR tablet 20 mEq  20 mEq Oral BID    0.9% sodium chloride infusion 250 mL  250 mL IntraVENous PRN    midodrine (PROAMITINE) tablet 10 mg  10 mg Oral Q8H    vancomycin (VANCOCIN) 750 mg in  ml infusion  750 mg IntraVENous See Admin Instructions    rifAXIMin (XIFAXAN) tablet 550 mg  550 mg Oral BID  norflurane-pentafluoropropane (PAIN EASE) topical spray 1 Spray  1 Spray Topical DIALYSIS PRN    acetaminophen (TYLENOL) suppository 650 mg  650 mg Rectal Q6H PRN    magnesium oxide (MAG-OX) tablet 400 mg  400 mg Oral BID    0.9% sodium chloride infusion 250 mL  250 mL IntraVENous PRN    0.9% sodium chloride infusion 250 mL  250 mL IntraVENous PRN    ondansetron (ZOFRAN ODT) tablet 8 mg  8 mg Oral TID    morphine injection 2 mg  2 mg IntraVENous Q4H PRN    acetaminophen (TYLENOL) tablet 650 mg  650 mg Oral Q6H PRN    HYDROmorphone (PF) (DILAUDID) injection 0.2 mg  0.2 mg IntraVENous Q4H PRN    ondansetron (ZOFRAN) injection 4 mg  4 mg IntraVENous Q6H PRN    sodium chloride (NS) flush 5-10 mL  5-10 mL IntraVENous Q8H    sodium chloride (NS) flush 5-10 mL  5-10 mL IntraVENous PRN    pantoprazole (PROTONIX) 40 mg in sodium chloride 0.9 % 10 mL injection  40 mg IntraVENous Q12H       Labs and Studies:  I have reviewed all labs, meds, telemetry events, and studies from the last 24 hours. Recent Results (from the past 24 hour(s))   ALBUMIN, FLUID    Collection Time: 05/15/17  2:53 PM   Result Value Ref Range    Fluid Type: ASCITIC FLUID       Albumin, body fld. PENDING g/dL   AMYLASE, FLUID    Collection Time: 05/15/17  2:53 PM   Result Value Ref Range    Fluid Type: ASCITIC FLUID       Amylase, body fld. PENDING U/L   CELL COUNT, BODY FLUID    Collection Time: 05/15/17  2:53 PM   Result Value Ref Range    BODY FLUID TYPE ASCITIC FLUID       FLUID COLOR YELLOW      FLUID APPEARANCE CLEAR      FLUID RBC COUNT <04028 /cu mm    FLD NUCLEATED CELLS <100 /cu mm   PROTEIN TOTAL, FLUID    Collection Time: 05/15/17  2:53 PM   Result Value Ref Range    Fluid Type: ASCITIC FLUID       Protein total, body fld.  PENDING g/dL   PLATELETS, ALLOCATE    Collection Time: 05/15/17  3:45 PM   Result Value Ref Range    Unit number [id number]     Blood component type PLTPH LR,1     Unit division 00     Status of unit TRANSFUSED    GLUCOSE, POC    Collection Time: 05/15/17  6:02 PM   Result Value Ref Range    Glucose (POC) 129 (H) 65 - 100 mg/dL   GLUCOSE, POC    Collection Time: 05/15/17  9:44 PM   Result Value Ref Range    Glucose (POC) 182 (H) 65 - 100 mg/dL   VANCOMYCIN, RANDOM    Collection Time: 05/16/17  4:17 AM   Result Value Ref Range    VANCOMYCIN,RANDOM 31.5 UG/ML   GLUCOSE, POC    Collection Time: 05/16/17  5:40 AM   Result Value Ref Range    Glucose (POC) 182 (H) 65 - 100 mg/dL   CBC WITH AUTOMATED DIFF    Collection Time: 05/16/17  6:05 AM   Result Value Ref Range    WBC 4.2 (L) 4.3 - 11.1 K/uL    RBC 2.86 (L) 4.05 - 5.25 M/uL    HGB 8.3 (L) 11.7 - 15.4 g/dL    HCT 25.4 (L) 35.8 - 46.3 %    MCV 88.8 79.6 - 97.8 FL    MCH 29.0 26.1 - 32.9 PG    MCHC 32.7 31.4 - 35.0 g/dL    RDW 18.7 (H) 11.9 - 14.6 %    PLATELET 89 (L) 230 - 450 K/uL    MPV 9.9 (L) 10.8 - 14.1 FL    DF AUTOMATED      NEUTROPHILS 63 43 - 78 %    LYMPHOCYTES 26 13 - 44 %    MONOCYTES 7 4.0 - 12.0 %    EOSINOPHILS 3 0.5 - 7.8 %    BASOPHILS 0 0.0 - 2.0 %    IMMATURE GRANULOCYTES 0.7 0.0 - 5.0 %    ABS. NEUTROPHILS 2.7 1.7 - 8.2 K/UL    ABS. LYMPHOCYTES 1.1 0.5 - 4.6 K/UL    ABS. MONOCYTES 0.3 0.1 - 1.3 K/UL    ABS. EOSINOPHILS 0.1 0.0 - 0.8 K/UL    ABS. BASOPHILS 0.0 0.0 - 0.2 K/UL    ABS. IMM. GRANS. 0.0 0.0 - 0.5 K/UL   METABOLIC PANEL, BASIC    Collection Time: 05/16/17  6:05 AM   Result Value Ref Range    Sodium 145 136 - 145 mmol/L    Potassium 3.6 3.5 - 5.1 mmol/L    Chloride 109 (H) 98 - 107 mmol/L    CO2 28 21 - 32 mmol/L    Anion gap 8 7 - 16 mmol/L    Glucose 180 (H) 65 - 100 mg/dL    BUN 25 (H) 6 - 23 MG/DL    Creatinine 4.04 (H) 0.6 - 1.0 MG/DL    GFR est AA 15 (L) >60 ml/min/1.73m2    GFR est non-AA 13 (L) >60 ml/min/1.73m2    Calcium 7.8 (L) 8.3 - 10.4 MG/DL        IR REMOVE TUNL CVAD W/O PORT / PUMP   Final Result   Impression: Technically successful tunneled left internal jugular vein tunneled   central venous catheter exchange. Uncomplicated right internal jugular vein   hemodialysis catheter removal.      Plan: Recover for 1 hour. Catheter is ready for use. Suture should be removed in   2 weeks. US PARACENTESIS ABD W IMAGING   Preliminary Result   Impression: Uncomplicated ultrasound guided paracentesis. US ABD LTD   Final Result   IMPRESSION: Large amount of ascites. US GUIDE PARACENTESIS   Final Result   Impression: Uncomplicated ultrasound guided paracentesis. US ABD LTD   Final Result   Impression:      Nonspecific hypoechoic area within or immediately adjacent to the pancreatic   head, this does not have typical appearance of pancreatitis however recommend   correlation with lipase levels to help exclude edematous pancreatitis. Evidence of hepatic cirrhosis with at least moderate volume abdominal ascites in   the upper abdomen. Postcholecystectomy.       IR REMOVE TUNL CVAD W/O PORT / PUMP    (Results Pending)       All Micro Results     Procedure Component Value Units Date/Time    CULTURE, BLOOD [[id number]] Collected:  05/13/17 1757    Order Status:  Completed Specimen:  Blood from Blood Updated:  05/16/17 0749     Special Requests: LEFT HAND        Culture result: NO GROWTH 3 DAYS       CULTURE, BLOOD [[id number]] Collected:  05/13/17 0955    Order Status:  Completed Specimen:  Blood from Blood Updated:  05/16/17 0749     Special Requests: HD ARTERIAL     Culture result: NO GROWTH 3 DAYS       CULTURE, BLOOD [[id number]] Collected:  05/13/17 1600    Order Status:  Completed Specimen:  Blood from Blood Updated:  05/16/17 0749     Special Requests: L PORT     Culture result: NO GROWTH 3 DAYS       CULTURE, CATHETER TIP [[id number]] Collected:  05/15/17 1630    Order Status:  Completed Specimen:  Catheter Tip Updated:  05/15/17 1747    CULTURE, BODY FLUID W Babar Saleh [[id number]] Collected:  05/15/17 1453    Order Status:  Completed Updated:  05/15/17 1705    CULTURE, BLOOD [[id number]]  (Abnormal) Collected:  05/11/17 1541    Order Status:  Completed Specimen:  Blood from Blood Updated:  05/15/17 0717     Special Requests: LEFT WRIST        GRAM STAIN         GRAM POS COCCI IN CLUSTERS      PEDIATRIC BOTTLE                 CRITICAL RESULT NOT CALLED DUE TO PREVIOUS NOTIFICATION OF CRITICAL RESULT WITHIN THE LAST 24 HOURS. Culture result:         STAPHYLOCOCCUS SPECIES (A)              CULTURE IN PROGRESS,FURTHER UPDATES TO FOLLOW    CULTURE, BLOOD [[id number]]  (Abnormal) Collected:  05/11/17 1545    Order Status:  Completed Specimen:  Blood from Blood Updated:  05/15/17 0713     Special Requests: --        RIGHT  PERMANENT CATH       GRAM STAIN         GRAM POS COCCI IN CLUSTERS              AEROBIC AND ANAEROBIC BOTTLES              CRITICAL RESULT NOT CALLED DUE TO PREVIOUS NOTIFICATION OF CRITICAL RESULT WITHIN THE LAST 24 HOURS. Culture result:       STAPHYLOCOCCUS EPIDERMIDIS  SENSITIVITY TO FOLLOW   (A)              SA target DNA sequence not detected within the sample. Test performed by PCR      REPEATING SUSCEPTIBILITY    CULTURE, BLOOD [[id number]]  (Abnormal)  (Susceptibility) Collected:  05/11/17 0010    Order Status:  Completed Specimen:  Blood from Blood Updated:  05/15/17 0712     Special Requests: LIFE PORT     GRAM STAIN         GRAM POS COCCI IN CLUSTERS              AEROBIC AND ANAEROBIC BOTTLES            RESULTS VERIFIED, PHONED TO AND READ BACK BY  Ninoska Bhat @ 5912 ON 5/11/17 BY NKE. Culture result:         STAPHYLOCOCCUS EPIDERMIDIS (A)              SA target DNA sequence not detected within the sample.  Test performed by PCR    CULTURE, BLOOD [[id number]]  (Abnormal)  (Susceptibility) Collected:  05/11/17 0230    Order Status:  Completed Specimen:  Blood from Blood Updated:  05/15/17 0708     Special Requests: LEFT HAND        GRAM STAIN         GRAM POS COCCI IN CLUSTERS              AEROBIC AND ANAEROBIC BOTTLES              CRITICAL RESULT NOT CALLED DUE TO PREVIOUS NOTIFICATION OF CRITICAL RESULT WITHIN THE LAST 24 HOURS. Culture result:         STAPHYLOCOCCUS EPIDERMIDIS (A)              SA target DNA sequence not detected within the sample.  Test performed by PCR    CULTURE, BODY FLUID GUI Hernandez [[id number]] Collected:  05/09/17 1600    Order Status:  Completed Specimen:  Ascitic Fluid Updated:  05/12/17 0910     Special Requests: NO SPECIAL REQUESTS        GRAM STAIN NO WBCS SEEN         NO DEFINITE ORGANISM SEEN        Culture result: NO GROWTH 2 DAYS       CULTURE, BODY FLUID Naun Riser STAIN [[id number]] Collected:  05/08/17 2115    Order Status:  Canceled Specimen:  Ascitic Fluid             Assessment and Plan:     Hospital Problems as of 5/16/2017  Date Reviewed: 3/2/2017          Codes Class Noted - Resolved POA    * (Principal)Intractable nausea and vomiting ICD-10-CM: R11.2  ICD-9-CM: 536.2  5/9/2017 - Present Yes        Coagulopathy ([facility] [address] (Chronic) ICD-10-CM: D68.9  ICD-9-CM: 286.9  5/9/2017 - Present Yes    Overview Signed 5/9/2017 10:57 AM by Hansa Gale MD     Liver disease             Cirrhosis of liver with ascites ([facility] [address] (Chronic) ICD-10-CM: K74.60  ICD-9-CM: 571.5  5/9/2017 - Present Yes        Pancytopenia (HCC) (Chronic) ICD-10-CM: G05.739  ICD-9-CM: 284.19  5/9/2017 - Present Yes        Abdominal pain ICD-10-CM: R10.9  ICD-9-CM: 789.00  5/8/2017 - Present Yes        Abdominal pain, acute ICD-10-CM: R10.9  ICD-9-CM: 789.00, 338.19  5/8/2017 - Present Yes        Liver transplant recipient [facility]) (Chronic) ICD-10-CM: Z94.4  ICD-9-CM: V42.7  2/13/2017 - Present Yes        Hemodialysis access, AV graft ([facility] [address] (Chronic) ICD-10-CM: Z99.2  ICD-9-CM: V45.11  11/22/2016 - Present Yes        ESRD (end stage renal disease) on dialysis [facility]) (Chronic) ICD-10-CM: N18.6, Z99.2  ICD-9-CM: 585.6, V45.11  11/2/2016 - Present Yes        Esophageal varices (HCC) (Chronic) ICD-10-CM: I85.00  ICD-9-CM: 456.1  7/27/2016 - Present Yes Congenital hepatic fibrosis (Chronic) ICD-10-CM: P78.81  ICD-9-CM: 777.8  Unknown - Present Yes        Hypotension (Chronic) ICD-10-CM: I95.9  ICD-9-CM: 458.9  7/6/2016 - Present Yes        Type 2 diabetes mellitus with hyperglycemia (HCC) (Chronic) ICD-10-CM: E11.65  ICD-9-CM: 250.00  7/4/2016 - Present Yes        Iron deficiency anemia (Chronic) ICD-10-CM: D50.9  ICD-9-CM: 280.9  7/4/2016 - Present Yes        Thrombocytopenia (HCC) (Chronic) ICD-10-CM: D69.6  ICD-9-CM: 287.5  7/4/2016 - Present Yes        Hepatitis C (Chronic) ICD-10-CM: B19.20  ICD-9-CM: 070.70  7/4/2016 - Present Yes                PLAN:      Acute infectious encephalopathy: resolved  One set showed Staph Epidermidus. Plan for continuing Vancomycin for 4-6 weeks with HD. Repeat cultures sent on 5/13, negative so far    S/p IR guided paracentesis, and catheter removal. Ascitic fluid was negative for SBP. Will get getting EGD today. Dialysis catheter to be removed today, and life port will be replaced by a new one. · Hypokalemia: improved from to 3.6. Continue Chlor Hal 20 meq po BID. · Leukopenia was likely due to colchicine, which is resolving after stopping colchicine. DC planning:  Possible discharge tomorrow.   DVT ppx:  SCDs  Discussed plan with:  Pt  Risk:  High from numerous comorbidities, Iv narcotics    Signed:  Karen Carver MD

## 2017-05-17 NOTE — PROGRESS NOTES
Patient in bed resting with no complaints at this time. Patient is alert and orientated with no distress noted. IV intact and patent with no s/s of infection noted. Respirations even and unlabored with heart rate regular. Patient able to ambulate independently without assistance. Bed in low locked position with call light within reach. Patient instructed to call if assistance is needed. Will continue to monitor.

## 2017-05-17 NOTE — PROGRESS NOTES
GI DAILY PROGRESS NOTE    Admit Date:  5/8/2017    Today's Date:  5/17/2017    CC: cirrhosis, ascites, sepsis,     Subjective:     Reports multiple episodes of vomiting last night and this am. Still has persistent nausea. She states that the antiemetics are helpful. Denies any chest pain or SOB. Underwent EGD 5/16/17 which revealed retained gastric contents, likely delayed gastric emptying in the setting of infection. No gastric outlet obstruction or large varices. Pancytopenia persists. Continues Vanco. LVP 5/15/17 with 4350cc fluid removed. No sign of SBP.  Creatinine is 2.89 this am.      Medications:   Current Facility-Administered Medications   Medication Dose Route Frequency    fentaNYL citrate (PF) injection 12.5-100 mcg  12.5-100 mcg IntraVENous Multiple    midazolam (VERSED) injection 0.25-2 mg  0.25-2 mg IntraVENous Multiple    0.9% sodium chloride infusion 250 mL  250 mL IntraVENous PRN    heparin (PF) 2 units/ml in NS infusion 2,000 Units  1,000 mL Irrigation Multiple    lactulose (CHRONULAC) solution 20 g  20 g Oral BID    promethazine (PHENERGAN) with saline injection 25 mg  25 mg IntraVENous Q6H PRN    potassium chloride (K-DUR, KLOR-CON) SR tablet 20 mEq  20 mEq Oral BID    midodrine (PROAMITINE) tablet 10 mg  10 mg Oral Q8H    vancomycin (VANCOCIN) 750 mg in  ml infusion  750 mg IntraVENous See Admin Instructions    rifAXIMin (XIFAXAN) tablet 550 mg  550 mg Oral BID    norflurane-pentafluoropropane (PAIN EASE) topical spray 1 Spray  1 Spray Topical DIALYSIS PRN    acetaminophen (TYLENOL) suppository 650 mg  650 mg Rectal Q6H PRN    magnesium oxide (MAG-OX) tablet 400 mg  400 mg Oral BID    ondansetron (ZOFRAN ODT) tablet 8 mg  8 mg Oral TID    morphine injection 2 mg  2 mg IntraVENous Q4H PRN    acetaminophen (TYLENOL) tablet 650 mg  650 mg Oral Q6H PRN    HYDROmorphone (PF) (DILAUDID) injection 0.2 mg  0.2 mg IntraVENous Q4H PRN    ondansetron (ZOFRAN) injection 4 mg  4 mg IntraVENous Q6H PRN    sodium chloride (NS) flush 5-10 mL  5-10 mL IntraVENous Q8H    sodium chloride (NS) flush 5-10 mL  5-10 mL IntraVENous PRN    pantoprazole (PROTONIX) 40 mg in sodium chloride 0.9 % 10 mL injection  40 mg IntraVENous Q12H       Review of Systems:  ROS was obtained, with pertinent positives as listed above. No chest pain or SOB. Diet:  Renal regular    Objective:   Vitals:  Visit Vitals    /75    Pulse 99    Temp 98.6 °F (37 °C)    Resp 18    Wt 47.4 kg (104 lb 8 oz)    LMP 01/02/2016    SpO2 96%    Breastfeeding No    BMI 20.41 kg/m2     Intake/Output:     05/15 1901 - 05/17 0700  In: 457.5 [I.V.:250]  Out: 1000   Exam:    General appearance: alert, cooperative, no distress  Lungs: clear to auscultation bilaterally anteriorly  Heart: regular rate and rhythm. No MGCR  Abdomen: distended but soft, large umbilical hernia, mildly tender. Bowel sounds hyperactive.    Extremities: extremities normal, atraumatic, no cyanosis or edema  Neuro:  alert and oriented, no asterixis    Data Review (Labs):    Recent Labs      05/17/17   0820  05/16/17   0605  05/15/17   0711  05/15/17   0710   WBC  4.1*  4.2*  3.8*   --    HGB  8.1*  8.3*  9.5*   --    HCT  24.6*  25.4*  28.7*   --    PLT  103*  89*  93*   --    MCV  87.5  88.8  87.8   --    NA  145  145   --   144   K  3.7  3.6   --   3.6   CL  108*  109*   --   108*   CO2  28  28   --   27   BUN  15  25*   --   20   CREA  2.89*  4.04*   --   3.23*   CA  8.1*  7.8*   --   7.8*   GLU  204*  180*   --   174*   AP   --    --    --   361*   SGOT   --    --    --   41*   ALT   --    --    --   28   TBILI   --    --    --   1.4*   ALB   --    --    --   2.2*   TP   --    --    --   6.1*   LPSE   --    --   146   --    PTP   --    --   15.0*   --    INR   --    --   1.4*   --      Endoscopic Gastroduodenoscopy Procedure Note 5/16/17   Indications: 55year old woman with cirrhosis and bacteremia with nausea and vomiting   Anesthesia/Sedation: MAC IV     Procedure Details    Informed consent was obtained for the procedure, including conscious sedation. Risks of infection, perforation, hemorrhage, adverse drug reaction and aspiration were discussed. The patient was placed in the left lateral decubitus position. She was monitored continuously with ECG tracing, pulse oximetry, blood pressure monitoring, and direct observation.    The [id number] gastroscope was inserted into the mouth and advanced under direct vision to the second portion of the duodenum. A careful inspection was made as the gastroscope was withdrawn, including a retroflexed view of the proximal stomach; findings and interventions are described below. Appropriate photodocumentation was obtained.   Findings:    Esophagus appeared normal, with refluxing of retained gastric contents seen in the distal esophagus. A moderate hiatus hernia was present. Retained gastric contents in the fundus and body, could not be suctioned. Mild portal hypertensive gastropathy present. The duodenum appeared normal.   Specimens: none   Estimated Blood Loss: None    Complications: None; patient tolerated the procedure well.    Attending Attestation: I performed the procedure.    Impression:   1. Retained gastric contents, likely delayed gastric emptying in the setting of infection. No gastric outlet obstruction or large varices   Recommendations:  1. Follow up with inpatient team   2.  Continue PPI and antiemetics     Assessment:     Principal Problem:    Intractable nausea and vomiting (5/9/2017)    Active Problems:    Type 2 diabetes mellitus with hyperglycemia ([address] (7/4/2016)      Iron deficiency anemia (7/4/2016)      Thrombocytopenia ([address] (7/4/2016)      Hepatitis C (7/4/2016)      Congenital hepatic fibrosis ()      Hypotension (7/6/2016)      Esophageal varices ([address] (7/27/2016)      ESRD (end stage renal disease) on dialysis ([address] (11/2/2016)      Hemodialysis access, AV graft ([address] (11/22/2016)      Liver transplant recipient [facility]) (2/13/2017)      Abdominal pain (5/8/2017)      Abdominal pain, acute (5/8/2017)      Coagulopathy ([facility] [address] (5/9/2017)      Overview: Liver disease      Cirrhosis of liver with ascites ([facility] [address] (5/9/2017)      Pancytopenia ([facility] [address] (5/9/2017)    55 y.o. 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 Her symptoms resolved with paracentesis on 5/9/17. She had 5300cc removed. She has fluid analysis negative thus far. Abdominal US on 5/9/17 revealed a nonspecific hypoechoic area within or immediately adjacent to the pancreatic head, this does not have typical appearance of pancreatitis however recommend correlation with lipase levels to help exclude edematous pancreatitis (lipase normal on 5/10). Remains febrile w pancytopenia which appears to be worsending- ID following. Picher Bending LVP 5/15/17 with PMNs <100 which makes SBP less likely.        Plan:     -Continue PPI BID and antiemetics  -Continue to follow labs  -Continue rifaximin  -Possible EUS vc CT in future for further evaluation of pancreas    Edson Colby NP      Patient is seen and examined in collaboration with Dr. Shekhar Snell. Assessment and plan as per Dr. Shekhar Snell.

## 2017-05-17 NOTE — PROGRESS NOTES
Discharge instructions and prescriptions provided and explained to the pt. Med side effect sheet reviewed. Opportunity for questions provided. Pt waiting on ride. Instructed to call once ready to leave.

## 2017-05-17 NOTE — DISCHARGE SUMMARY
Hospitalist Discharge Summary     Patient ID:  Mikala Blas  [id number]  50 y.o.  1970  Admit date: 5/8/2017  8:52 PM  Discharge date and time: 5/17/2017  Attending: Jeevan Ortega MD  PCP:  Ludwig Mason MD  Treatment Team: Attending Provider: Jeevan Ortega MD; Consulting Provider: Willian Rivero DO; Consulting Provider: Christiano Hodge MD; Consulting Provider: Marc Flores MD; Consulting Provider: Julia Rosas MD; Utilization Review: Janine Pennington RN; Care Manager: Janie Perdue RN    Principal Diagnosis   Intractable nausea and vomiting 2/2 ascites vs possible delayed gastric emptying; resolving  Staphylococcus Epidermidus bacteremia 2/2 infected dialysis and portacth  Ascites 2/2 cirrhosis, s/p paracentesis x2, no SBP    ESRD -DD    Principal Problem:    Intractable nausea and vomiting (5/9/2017)    Active Problems:    Type 2 diabetes mellitus with hyperglycemia ([address] (7/4/2016)      Iron deficiency anemia (7/4/2016)      Thrombocytopenia ([address] (7/4/2016)      Hepatitis C (7/4/2016)      Congenital hepatic fibrosis ()      Hypotension (7/6/2016)      Esophageal varices ([address] (7/27/2016)      ESRD (end stage renal disease) on dialysis ([address] (11/2/2016)      Hemodialysis access, AV graft ([address] (11/22/2016)      Liver transplant recipient [facility]) (2/13/2017)      Abdominal pain (5/8/2017)      Abdominal pain, acute (5/8/2017)      Coagulopathy ([address] (5/9/2017)      Overview: Liver disease      Cirrhosis of liver with ascites ([address] (5/9/2017)      Pancytopenia ([address] (5/9/2017)             Hospital Course:  Please refer to the admission H&P for details of presentation.  In summary, the patient is 55years old female with pmhx of congenital hepatic fibrosis s/p 2 liver transplant and recurrent ascites due to cirrhosis/portal hypertension, ESRD-DD, protein energy malnutrition, hepatitis C, GERD, chronic thrombocytopenia, anemia who was admitted on 5/8/17 with chief complaints of abdominal pain, and worsening nausea and vomiting. While in the hospital she was evaluated by GI, Nephrology, ID and primarily managed by hospitalist service. Her initial symptomatology seemed to be coming from large ascites and abdominal distension, that ws managed with paracentesis with removal of above 5 ltrs of clear straw colored fluid, biochemical analysis proved it to be transudate and negative for SBP. She was managed with lactulose and rifaxamin. She was improving clinically and getting ready for discharge, but started spiking fever, with borderline blood pressure and worsening of mental status. Septic work up from dialysis cath, life port and peripheral blood showed Staphylococcus epidermidus. ID was on board. Dialysis catheter was removed and life port was changed to a new one by IR. She was initially being treated with vancomycin and zosyn, and eventually vancomycin only after identification of bacteria. Following the removal of line, pt started showing a significant improvement overall. Her ammonia levels were never elevated to cause hepatic encephalopathy. She was concerned about nausea and vomiting and poor appetite. Another usg abdomen was done which showed large ascites, which was tapped with removal of 4.7 ltrs. On both paracentesis, albumin was given before the procedure. GI did an upper GI endoscopy which showed normal appearing esophagus with refluxing of retained gastric contents in the distal esophagus. A moderate hiatus hernia with mild portal hypertensive gastropathy. No masses or ulcer were seen. Pt also received 2 bags of platelets in view of prolonged bleeding from AV graft during dialysis. Hb was at baseline. Pt was taking colchicine on daily basis as out patient and for couple of days inpatient, which was discontinued in view of worsening leukopenia. Following discontinuing of colchicine, leukopenia resolved.     She will be following up with gastroenterology, Nephrology and pcp as out patient. She will see ID on completing 6 weeks of vancomycin course on 6/24/17. She will follow up with hepatology and nephrology at [address] on 5/25/17. Rest of the hospital course was uneventful. Significant Diagnostic Studies:   2d-echo:Hyperdynamic LV systolic function with EF of 65-70% with mild grade 1 diastolic dysfunction. USG abdomen x2: Large ascites. CXR: 5/8: mew left basilar airspace changes and small left basilar pleural effusion. Labs: Results:       Chemistry Recent Labs      05/16/17   0605  05/15/17   0710   GLU  180*  174*   NA  145  144   K  3.6  3.6   CL  109*  108*   CO2  28  27   BUN  25*  20   CREA  4.04*  3.23*   CA  7.8*  7.8*   AGAP  8  9   AP   --   361*   TP   --   6.1*   ALB   --   2.2*   GLOB   --   3.9*   AGRAT   --   0.6*      CBC w/Diff Recent Labs      05/16/17   0605  05/15/17   0711   WBC  4.2*  3.8*   RBC  2.86*  3.27*   HGB  8.3*  9.5*   HCT  25.4*  28.7*   PLT  89*  93*   GRANS  63   --    LYMPH  26   --    EOS  3   --       Cardiac Enzymes No results for input(s): CPK, CKND1, RADHA in the last 72 hours. No lab exists for component: CKRMB, TROIP   Coagulation Recent Labs      05/15/17   0711   PTP  15.0*   INR  1.4*       Lipid Panel Lab Results   Component Value Date/Time    Cholesterol, total 126 01/24/2017 11:00 AM    HDL Cholesterol 43 01/24/2017 11:00 AM    LDL, calculated 61 01/24/2017 11:00 AM    VLDL, calculated 22 01/24/2017 11:00 AM    Triglyceride 110 01/24/2017 11:00 AM    CHOL/HDL Ratio 2.9 01/24/2017 11:00 AM      BNP No results for input(s): BNPP in the last 72 hours.    Liver Enzymes Recent Labs      05/15/17   0710   TP  6.1*   ALB  2.2*   AP  361*   SGOT  41*      Thyroid Studies No results found for: T4, T3U, TSH, TSHEXT         Discharge Exam:  Visit Vitals    /75    Pulse 99    Temp 98.6 °F (37 °C)    Resp 18    Wt 47.4 kg (104 lb 8 oz)    LMP 01/02/2016    SpO2 96%    Breastfeeding No    BMI 20.41 kg/m2 General:  Confused and sluggish, malnourished. CV:   RRR. No murmur, rub, or gallop. Tachycadic  Lungs:  Clear to auscultation bilaterally. No wheezing, rhonchi, or rales. Abdomen:  Soft, less distended than yesterday, anterior abd hernia. Extremities: Warm and dry. No cyanosis or edema. Skin:   No rashes or jaundice. CNS: gcs 15, CN 2-12 intact, no motor or sensory deficits  Psych: AO x 3, mood and affect flat    Disposition: home  Discharge Condition: stable  Patient Instructions:   Current Discharge Medication List      START taking these medications    Details   potassium chloride (K-DUR, KLOR-CON) 20 mEq tablet Take 1 Tab by mouth daily for 20 days. Qty: 20 Tab, Refills: 0      ondansetron (ZOFRAN ODT) 8 mg disintegrating tablet Take 1 Tab by mouth three (3) times daily for 30 days. Indications: persistent N&V  Qty: 90 Tab, Refills: 1         CONTINUE these medications which have CHANGED    Details   XIFAXAN 550 mg tablet Take 1 Tab by mouth two (2) times a day for 30 days. Qty: 60 Tab, Refills: 2      midodrine (PROAMITINE) 10 mg tablet Take 1 Tab by mouth every eight (8) hours for 30 days. Qty: 90 Tab, Refills: 1         CONTINUE these medications which have NOT CHANGED    Details   promethazine (PHENERGAN) 25 mg tablet Take 25 mg by mouth every six (6) hours as needed for Nausea. insulin aspart (NOVOLOG) 100 unit/mL injection Sliding scale maximum 15 units each meal  Qty: 10 mL, Refills: 5      insulin glargine (LANTUS SOLOSTAR) 100 unit/mL (3 mL) pen 25 Units by SubCUTAneous route nightly. Qty: 15 Each, Refills: 3    Associated Diagnoses: Uncontrolled type 2 diabetes mellitus with chronic kidney disease on chronic dialysis, with long-term current use of insulin ([facility])      calcium acetate (PHOSLO) 667 mg cap Take 1 Cap by mouth three (3) times daily (with meals).   Qty: 270 Cap, Refills: 0      ondansetron hcl (ZOFRAN) 4 mg tablet Take 1 Tab by mouth every eight (8) hours as needed for Nausea. Qty: 90 Tab, Refills: 0      insulin lispro (HUMALOG) 100 unit/mL kwikpen Up to 10 units sq q ac  Qty: 15 Pen, Refills: 3    Associated Diagnoses: Uncontrolled type 2 diabetes mellitus with chronic kidney disease on chronic dialysis, with long-term current use of insulin (HCC)      lactulose (CHRONULAC) 10 gram/15 mL solution Take 30 mL by mouth three (3) times daily. Qty: 480 mL, Refills: 0      morphine CR (MS CONTIN) 15 mg CR tablet Take 1 Tab by mouth every twelve (12) hours. Max Daily Amount: 30 mg.  Qty: 20 Tab, Refills: 0      traMADol (ULTRAM) 50 mg tablet Take 1 Tab by mouth every six (6) hours as needed. Max Daily Amount: 200 mg. Indications: PAIN  Qty: 20 Tab, Refills: 0      magnesium oxide (MAG-OX) 400 mg tablet Take 400 mg by mouth two (2) times a day. cycloSPORINE modified (GENGRAF) 25 mg capsule Take 2.5 mg/kg/day by mouth every morning. Indications: Takes in the AM      pantoprazole (PROTONIX) 40 mg tablet Take 40 mg by mouth four (4) times daily. 2tabs bid         STOP taking these medications       colchicine (MITIGARE) 0.6 mg capsule Comments:   Reason for Stopping:               Activity: Activity as tolerated  Diet: Renal Diet and low salt diet  Wound Care: None needed    Follow-up  · Follow up with GI, Nephrology, PCP, ID as scheduled.   · Follow up with Hepatologist and Nephrology at [address] on 5/25/17    Time spent to discharge patient 35 minutes  Signed:  Clint Novak MD  5/17/2017  8:09 AM

## 2017-05-17 NOTE — PROGRESS NOTES
Infectious Disease Consult    Today's Date: 5/17/2017   Admit Date: 5/8/2017    Impression:   · GPC bacteremia (5/10, 5/12); source possibly HD cath; unknown whether AV graft is involved. She did actually have bacteremia with the same organism on 2/12/17. It appears that she received a short course of inpatient vancomycin and zosyn at that time but then the bigger concern was SBP. · ESRD on HD  · Ascites s/p US guided paracentesis  · Type II DM  · Congenital hepatic fibrosis  · Liver transplant X 2; with chronic immunosuppression    Plan:   · Continue Vancomycin  · Follow cultures are negative  · This bacteremia has likely been there since 2/12/17 at least and did not clear with a previous antibiotic course. Hopefully, the removal of the IVs will be successful in eradication, but I am still concerned about the perm cath tract and the graft. I think that a longer course of treatment is prudent. I recommend vancomycin dosed with HD through 6/24/17    Anti-infectives:     Vancomycin    Subjective:   Date of Consultation:  May 17, 2017  Referring Physician: Dr. Syeda Duke    Her primary complaint remains to be nausea and poor appetite. No pain.     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    Raynaud's disease I73.00    Hepatitis C B19.20    GERD (gastroesophageal reflux disease) K21.9    Congenital hepatic fibrosis P78.81    Hypotension I95.9    Esophageal varices (HCC) I85.00    ESRD (end stage renal disease) on dialysis (HCC) N18.6, Z99.2    Hemodialysis access, AV graft (HCC) Z99.2    Liver transplant recipient [facility]) Z94.4    Abdominal pain R10.9    Abdominal pain, acute R10.9    Intractable nausea and vomiting R11.2    Coagulopathy (HCC) D68.9    Cirrhosis of liver with ascites (HCC) K74.60    Pancytopenia (HCC) T88.060 Allergies   Allergen Reactions    Aspirin Nausea Only    Codeine Nausea Only    Compazine [Prochlorperazine Edisylate] Unknown (comments)     Causes Lock Jaw        Review of Systems:  A comprehensive review of systems was negative except for that written in the History of Present Illness. Objective:     Visit Vitals    /75    Pulse 99    Temp 98.6 °F (37 °C)    Resp 18    Wt 47.4 kg (104 lb 8 oz)    SpO2 96%    Breastfeeding No    BMI 20.41 kg/m2     Temp (24hrs), Av.1 °F (36.7 °C), Min:97.4 °F (36.3 °C), Max:98.6 °F (37 °C)       Lines:  Hemodialysis Catheter:            Physical Exam:    General:  Alert, cooperative, cachectic, appears older than stated age   Eyes:  Sclera anicteric. Pupils equally round and reactive to light. Mouth/Throat: Mucous membranes normal, oral pharynx clear   Neck: Supple   Lungs:   Clear to auscultation bilaterally, good effort   CV:  Tachycardic,no murmur, click, rub or gallop   Abdomen:   Soft, non-tender.  bowel sounds hyperactive, non-distended, lg  abd hernia   Extremities: No cyanosis or edema; left leg AV graft site without sign of infection   Skin: Skin color, texture, turgor normal. no acute rash or lesions   Lymph nodes: Cervical and supraclavicular normal   Musculoskeletal: No swelling or deformity   Lines/Devices:  Intact, no erythema, drainage or tenderness   Psych: Alert and oriented, normal mood affect given the setting       Data Review:     CBC:  Recent Labs      17   0820  17   0605  05/15/17   0711   WBC  4.1*  4.2*  3.8*   GRANS  63  63   --    MONOS  11  7   --    EOS  2  3   --    ANEU  2.6  2.7   --    ABL  0.9  1.1   --    HGB  8.1*  8.3*  9.5*   HCT  24.6*  25.4*  28.7*   PLT  103*  89*  93*       BMP:  Recent Labs      17   0605  05/15/17   0710   CREA  4.04*  3.23*   BUN  25*  20   NA  145  144   K  3.6  3.6   CL  109*  108*   CO2  28  27   AGAP  8  9   GLU  180*  174*       LFTS:  Recent Labs      05/15/17 0710   TBILI  1.4*   ALT  28   SGOT  41*   AP  361*   TP  6.1*   ALB  2.2*       Microbiology:     All Micro Results     Procedure Component Value Units Date/Time    CULTURE, CATHETER TIP [[id number]] Collected:  05/15/17 1630    Order Status:  Completed Specimen:  Catheter Tip Updated:  05/17/17 0851     Special Requests: NO SPECIAL REQUESTS        Culture result: NO GROWTH 1 DAY       CULTURE, BODY FLUID Marden Winter STAIN [[id number]] Collected:  05/15/17 1453    Order Status:  Completed Specimen:  Ascitic Fluid Updated:  05/17/17 0851     Special Requests: NO SPECIAL REQUESTS        GRAM STAIN NO  WBCS SEEN         NO DEFINITE ORGANISM SEEN        Culture result: NO GROWTH 1 DAY       CULTURE, BLOOD [[id number]] Collected:  05/13/17 1757    Order Status:  Completed Specimen:  Blood from Blood Updated:  05/16/17 0749     Special Requests: LEFT HAND        Culture result: NO GROWTH 3 DAYS       CULTURE, BLOOD [[id number]] Collected:  05/13/17 0955    Order Status:  Completed Specimen:  Blood from Blood Updated:  05/16/17 0749     Special Requests: HD ARTERIAL     Culture result: NO GROWTH 3 DAYS       CULTURE, BLOOD [[id number]] Collected:  05/13/17 1600    Order Status:  Completed Specimen:  Blood from Blood Updated:  05/16/17 0749     Special Requests: L PORT     Culture result: NO GROWTH 3 DAYS       CULTURE, BLOOD [[id number]]  (Abnormal) Collected:  05/11/17 1541    Order Status:  Completed Specimen:  Blood from Blood Updated:  05/15/17 0717     Special Requests: LEFT WRIST        GRAM STAIN         GRAM POS COCCI IN CLUSTERS      PEDIATRIC BOTTLE                 CRITICAL RESULT NOT CALLED DUE TO PREVIOUS NOTIFICATION OF CRITICAL RESULT WITHIN THE LAST 24 HOURS.      Culture result:         STAPHYLOCOCCUS SPECIES (A)              CULTURE IN PROGRESS,FURTHER UPDATES TO FOLLOW    CULTURE, BLOOD [[id number]]  (Abnormal) Collected:  05/11/17 1545    Order Status:  Completed Specimen:  Blood from Blood Updated:  05/15/17 9597 Special Requests: --        RIGHT  PERMANENT CATH       GRAM STAIN         GRAM POS COCCI IN CLUSTERS              AEROBIC AND ANAEROBIC BOTTLES              CRITICAL RESULT NOT CALLED DUE TO PREVIOUS NOTIFICATION OF CRITICAL RESULT WITHIN THE LAST 24 HOURS. Culture result:       STAPHYLOCOCCUS EPIDERMIDIS  SENSITIVITY TO FOLLOW   (A)              SA target DNA sequence not detected within the sample. Test performed by PCR      REPEATING SUSCEPTIBILITY    CULTURE, BLOOD [[id number]]  (Abnormal)  (Susceptibility) Collected:  05/11/17 0010    Order Status:  Completed Specimen:  Blood from Blood Updated:  05/15/17 0712     Special Requests: LIFE PORT     GRAM STAIN         GRAM POS COCCI IN CLUSTERS              AEROBIC AND ANAEROBIC BOTTLES            RESULTS VERIFIED, PHONED TO AND READ BACK BY  Ninoska Bhat @ 6493 ON 5/11/17 BY NKE. Culture result:         STAPHYLOCOCCUS EPIDERMIDIS (A)              SA target DNA sequence not detected within the sample. Test performed by PCR    CULTURE, BLOOD [[id number]]  (Abnormal)  (Susceptibility) Collected:  05/11/17 0230    Order Status:  Completed Specimen:  Blood from Blood Updated:  05/15/17 0708     Special Requests: LEFT HAND        GRAM STAIN         GRAM POS COCCI IN CLUSTERS              AEROBIC AND ANAEROBIC BOTTLES              CRITICAL RESULT NOT CALLED DUE TO PREVIOUS NOTIFICATION OF CRITICAL RESULT WITHIN THE LAST 24 HOURS. Culture result:         STAPHYLOCOCCUS EPIDERMIDIS (A)              SA target DNA sequence not detected within the sample.  Test performed by PCR    CULTURE, BODY FLUID W Kennedybarry Varmaer [[id number]] Collected:  05/09/17 1600    Order Status:  Completed Specimen:  Ascitic Fluid Updated:  05/12/17 0910     Special Requests: NO SPECIAL REQUESTS        GRAM STAIN NO WBCS SEEN         NO DEFINITE ORGANISM SEEN        Culture result: NO GROWTH 2 DAYS       CULTURE, BODY FLUID Nikos Davidmadi [[id number]] Collected:  05/08/17 2115    Order Status:  Canceled Specimen:  Ascitic Fluid           Imaging:   Abd ultrasound (5/14/17) large ascites    Signed By: Svetlana Shahid MD     May 17, 2017

## 2017-05-17 NOTE — PROGRESS NOTES
Chief Complaint   Patient presents with   • Follow-up     BP has been elevated some, denies any other problems.    • Med Refill     PCP checked labs within the past 6-8 months ago. Refills needed on cardiac meds.  90 days to [facility].        Jean       Jonn Cespedes is a 71 y.o. male history of hypertension, hypercholesterolemia who also has arrhythmias in the form of PAF. He has done well with Tambocor. IN 2016 repeat stress test and echocardiogram showed good LV function and no ischemia. Today he comes to the office for a follow up appointment and no complaints are voiced. He continues his normal activities without problems. He admits to occasional increase in blood pressure with systolic 140s.     HPI         Cardiac History:    Past Surgical History   Procedure Laterality Date   • Cholecystectomy     • Converted (historical) holter  10/25/2011     Holter-freq PAC's and PVC's, freq runs V-tach   • Echo - converted  10/28/2011     Echo=EF 50%. RVSP-47mmHg.   • Ep study  12/20/2011     EP Study () no inducible V-tach, multifocal PACs and atrial fibrillation   • Cardiovascular stress test  01/11/2012     Stress-6min, 89%THR, 170-76, EF 46%, Negative   • Cath lab procedure  01/30/2012     Cath-Normal Coronaries. EF 45%   • Echo - converted  07/23/2013     Echo-EF 60-65%, mild to mod MR.   • Cardiovascular stress test  08/11/2016     6 min 16 sec, 87% THr, 172/76, no definite ischemia   • Echo - converted  08/09/2016     EF 60-65%, mild MR, mild AR, RVSP 30-35 mmHg       Current Outpatient Prescriptions   Medication Sig Dispense Refill   • aspirin 325 MG tablet Take 325 mg by mouth daily.     • flecainide (TAMBOCOR) 50 MG tablet Take 1 tablet by mouth every 12 (twelve) hours. 180 tablet 3   • pravastatin (PRAVACHOL) 40 MG tablet Take 1 tablet by mouth daily. 90 tablet 3   • valsartan (DIOVAN) 80 MG tablet 2 tabs daily       No current facility-administered medications for this visit.        Review  Renal Progress Note    Admission Date: 5/8/2017   Subjective:      Planning discharge    Objective:     Physical Exam:    Patient Vitals for the past 8 hrs:   BP Temp Pulse Resp SpO2   05/17/17 1132 106/66 98.2 °F (36.8 °C) 86 17 97 %   05/17/17 0747 116/75 98.6 °F (37 °C) 99 18 96 %          Current Facility-Administered Medications   Medication Dose Route Frequency    fentaNYL citrate (PF) injection 12.5-100 mcg  12.5-100 mcg IntraVENous Multiple    midazolam (VERSED) injection 0.25-2 mg  0.25-2 mg IntraVENous Multiple    0.9% sodium chloride infusion 250 mL  250 mL IntraVENous PRN    heparin (PF) 2 units/ml in NS infusion 2,000 Units  1,000 mL Irrigation Multiple    lactulose (CHRONULAC) solution 20 g  20 g Oral BID    promethazine (PHENERGAN) with saline injection 25 mg  25 mg IntraVENous Q6H PRN    potassium chloride (K-DUR, KLOR-CON) SR tablet 20 mEq  20 mEq Oral BID    midodrine (PROAMITINE) tablet 10 mg  10 mg Oral Q8H    vancomycin (VANCOCIN) 750 mg in  ml infusion  750 mg IntraVENous See Admin Instructions    rifAXIMin (XIFAXAN) tablet 550 mg  550 mg Oral BID    norflurane-pentafluoropropane (PAIN EASE) topical spray 1 Spray  1 Spray Topical DIALYSIS PRN    acetaminophen (TYLENOL) suppository 650 mg  650 mg Rectal Q6H PRN    magnesium oxide (MAG-OX) tablet 400 mg  400 mg Oral BID    ondansetron (ZOFRAN ODT) tablet 8 mg  8 mg Oral TID    morphine injection 2 mg  2 mg IntraVENous Q4H PRN    acetaminophen (TYLENOL) tablet 650 mg  650 mg Oral Q6H PRN    HYDROmorphone (PF) (DILAUDID) injection 0.2 mg  0.2 mg IntraVENous Q4H PRN    ondansetron (ZOFRAN) injection 4 mg  4 mg IntraVENous Q6H PRN    sodium chloride (NS) flush 5-10 mL  5-10 mL IntraVENous Q8H    sodium chloride (NS) flush 5-10 mL  5-10 mL IntraVENous PRN    pantoprazole (PROTONIX) 40 mg in sodium chloride 0.9 % 10 mL injection  40 mg IntraVENous Q12H            Data Review:     LABS:   Recent Results (from the past 12 "of patient's allergies indicates no known allergies.    Past Medical History   Diagnosis Date   • A-fib    • History of cholecystectomy    • Hypertension        Social History     Social History   • Marital status:      Spouse name: N/A   • Number of children: N/A   • Years of education: N/A     Occupational History   • Not on file.     Social History Main Topics   • Smoking status: Never Smoker   • Smokeless tobacco: Never Used   • Alcohol use No   • Drug use: No   • Sexual activity: Not on file     Other Topics Concern   • Not on file     Social History Narrative       Family History   Problem Relation Age of Onset   • No Known Problems Mother        Review of Systems   Constitutional: Negative.    HENT: Negative for congestion, nosebleeds, sinus pressure and trouble swallowing.    Respiratory: Negative.    Cardiovascular: Negative.    Genitourinary: Negative.    Musculoskeletal: Positive for arthralgias. Negative for gait problem and myalgias.   Neurological: Positive for dizziness (relates to history of vertigo) and headaches (slight morning headaches at times). Negative for tremors, syncope, facial asymmetry, speech difficulty, weakness, light-headedness and numbness.   Hematological: Does not bruise/bleed easily.   Psychiatric/Behavioral: Negative.  Negative for sleep disturbance.       Diabetes- No  Thyroid-normal    Objective     Visit Vitals   • /70   • Pulse 75   • Ht 69\" (175.3 cm)   • Wt 213 lb (96.6 kg)   • BMI 31.45 kg/m2       Physical Exam   Constitutional: He is oriented to person, place, and time. Vital signs are normal. He appears well-developed.   Eyes: Pupils are equal, round, and reactive to light.   Neck: Neck supple. No JVD present. Carotid bruit is not present. No edema present.   Cardiovascular: Normal rate, regular rhythm, S1 normal, S2 normal and normal pulses.    Murmur heard.   Systolic murmur is present with a grade of 2/6   Pulmonary/Chest: Effort normal and breath sounds " hour(s))   GLUCOSE, POC    Collection Time: 05/17/17  5:52 AM   Result Value Ref Range    Glucose (POC) 187 (H) 65 - 100 mg/dL   CBC WITH AUTOMATED DIFF    Collection Time: 05/17/17  8:20 AM   Result Value Ref Range    WBC 4.1 (L) 4.3 - 11.1 K/uL    RBC 2.81 (L) 4.05 - 5.25 M/uL    HGB 8.1 (L) 11.7 - 15.4 g/dL    HCT 24.6 (L) 35.8 - 46.3 %    MCV 87.5 79.6 - 97.8 FL    MCH 28.8 26.1 - 32.9 PG    MCHC 32.9 31.4 - 35.0 g/dL    RDW 18.7 (H) 11.9 - 14.6 %    PLATELET 861 (L) 421 - 450 K/uL    MPV 9.9 (L) 10.8 - 14.1 FL    DF AUTOMATED      NEUTROPHILS 63 43 - 78 %    LYMPHOCYTES 22 13 - 44 %    MONOCYTES 11 4.0 - 12.0 %    EOSINOPHILS 2 0.5 - 7.8 %    BASOPHILS 1 0.0 - 2.0 %    IMMATURE GRANULOCYTES 1.0 0.0 - 5.0 %    ABS. NEUTROPHILS 2.6 1.7 - 8.2 K/UL    ABS. LYMPHOCYTES 0.9 0.5 - 4.6 K/UL    ABS. MONOCYTES 0.4 0.1 - 1.3 K/UL    ABS. EOSINOPHILS 0.1 0.0 - 0.8 K/UL    ABS. BASOPHILS 0.0 0.0 - 0.2 K/UL    ABS. IMM.  GRANS. 0.0 0.0 - 0.5 K/UL   METABOLIC PANEL, BASIC    Collection Time: 05/17/17  8:20 AM   Result Value Ref Range    Sodium 145 136 - 145 mmol/L    Potassium 3.7 3.5 - 5.1 mmol/L    Chloride 108 (H) 98 - 107 mmol/L    CO2 28 21 - 32 mmol/L    Anion gap 9 7 - 16 mmol/L    Glucose 204 (H) 65 - 100 mg/dL    BUN 15 6 - 23 MG/DL    Creatinine 2.89 (H) 0.6 - 1.0 MG/DL    GFR est AA 23 (L) >60 ml/min/1.73m2    GFR est non-AA 19 (L) >60 ml/min/1.73m2    Calcium 8.1 (L) 8.3 - 10.4 MG/DL   GLUCOSE, POC    Collection Time: 05/17/17 11:20 AM   Result Value Ref Range    Glucose (POC) 197 (H) 65 - 100 mg/dL         Plan:     Principal Problem:    Intractable nausea and vomiting (5/9/2017)    Active Problems:    Type 2 diabetes mellitus with hyperglycemia (HCC) (7/4/2016)      Iron deficiency anemia (7/4/2016)      Thrombocytopenia (HCC) (7/4/2016)      Hepatitis C (7/4/2016)      Congenital hepatic fibrosis ()      Hypotension (7/6/2016)      Esophageal varices (HCC) (7/27/2016)      ESRD (end stage renal disease) on dialysis ([address] (11/2/2016)      Hemodialysis access, AV graft ([address] (11/22/2016)      Liver transplant recipient [facility]) (2/13/2017)      Abdominal pain (5/8/2017)      Abdominal pain, acute (5/8/2017)      Coagulopathy ([address] (5/9/2017)      Overview: Liver disease      Cirrhosis of liver with ascites ([address] (5/9/2017)      Pancytopenia ([address] (5/9/2017)      ESRD on dialysis TTS at [facility]  Bacteremia.  I will call her clinic and arrange vancomycin dosed with HD through 6/24/17  Dialysis catheter removed and now using her access normal.   Abdominal: Soft. Bowel sounds are normal.   Musculoskeletal: He exhibits no edema.   Neurological: He is alert and oriented to person, place, and time.   Skin: Skin is warm and dry.   Psychiatric: He has a normal mood and affect. His speech is normal and behavior is normal. Judgment and thought content normal.   Vitals reviewed.          ECG 12 Lead  Date/Time: 3/14/2017 2:30 PM  Performed by: PATO JOAQUIN  Authorized by: PATO JOAQUIN   Comparison: compared with previous ECG from 8/1/2016  Comparison to previous ECG: sinus  Rhythm: sinus rhythm  BPM: 75                    Assessment/Plan      Jonn was seen today for follow-up and med refill.    Diagnoses and all orders for this visit:    Paroxysmal atrial fibrillation    Long term current use of antiarrhythmic medical therapy    Hypercholesteremia    Essential hypertension    Other orders  -     ECG 12 Lead      The reports of his last stress test and echocardiogram were reviewed which were negative for ischemia. His vital signs are at goal today. He will continue to monitor at home. His weight is up about 10 pounds and plans to diet and exercise more for benefit of weight loss. For management of labs he follows with you. No further cardiac testing ordered at this time. Should he have more issue with blood pressure he understands to call.                Electronically signed by MARINO Keen,  March 14, 2017 4:36 PM

## 2017-05-17 NOTE — DISCHARGE INSTRUCTIONS
Follow up with Gastroenterology and Nephrology at [address] as scheduled. Follow up with regular dialysis as before. Continue Vancomycin with dialysis to complete 6 weeks as recommended by ID. Vancomycin (By injection)   Vancomycin ([id number])  Treats infections. Belongs to a class of drugs called antibiotics. Brand Name(s): PremierPro Rx Vancomycin HCl, Vancomycin HCl Novaplus   There may be other brand names for this medicine. When This Medicine Should Not Be Used: This medicine is not right for everyone. You should not receive it if you had an allergic reaction to vancomycin, corn, or corn products. How to Use This Medicine:   Injectable  · Your doctor will prescribe your dose and schedule. This medicine is given through a needle placed in a vein. · A nurse or other health provider will give you this medicine. Drugs and Foods to Avoid:   Ask your doctor or pharmacist before using any other medicine, including over-the-counter medicines, vitamins, and herbal products. · Some medicines can affect how this medicine works. Tell your doctor if you are using any of the following:  ¨ Amphotericin B, bacitracin, polymyxin B, cisplatin, colistin, viomycin  ¨ Aminoglycoside antibiotic (including amikacin, gentamicin, streptomycin)  ¨ Diuretic (water pill)  ¨ NSAID pain or arthritis medicine (including aspirin, celecoxib, diclofenac, ibuprofen, naproxen)  Warnings While Using This Medicine:   · Tell your doctor if you are pregnant or breastfeeding, or if you have kidney disease, congestive heart failure, or hearing problems. Tell your doctor if you have a restricted sodium intake. · This medicine may cause the following problems:  ¨ Infusion reactions  ¨ Kidney damage  ¨ Hearing loss  · This medicine can cause diarrhea. Call your doctor if the diarrhea becomes severe, does not stop, or is bloody. Do not take any medicine to stop diarrhea until you have talked to your doctor.  Diarrhea can occur 2 months or more after you stop taking this medicine. · This medicine may make you bleed, bruise, or get infections more easily. Take precautions to prevent illness and injury. Wash your hands often. · Your doctor will do lab tests at regular visits to check on the effects of this medicine. Keep all appointments. Possible Side Effects While Using This Medicine:   Call your doctor right away if you notice any of these side effects:  · Allergic reaction: Itching or hives, swelling in your face or hands, swelling or tingling in your mouth or throat, chest tightness, trouble breathing  · Blistering, peeling, red skin rash  · Chest pain, trouble breathing  · Decrease in how much or how often you urinate, rapid weight gain, swelling of your hands, ankles, or feet  · Diarrhea that may contain blood  · Fever, chills, cough, sore throat, and body aches  · Lightheadedness, dizziness, or fainting  · Ringing in the ears or trouble hearing  · Swelling, pain, or redness under your skin where the needle is placed  · Unusual bleeding, bruising, or weakness  If you notice other side effects that you think are caused by this medicine, tell your doctor. Call your doctor for medical advice about side effects. You may report side effects to FDA at 3-301-FDA-5505  © 2017 [facility] Information is for End User's use only and may not be sold, redistributed or otherwise used for commercial purposes. The above information is an  only. It is not intended as medical advice for individual conditions or treatments. Talk to your doctor, nurse or pharmacist before following any medical regimen to see if it is safe and effective for you. Learning About Fever  What is a fever? A fever is a high body temperature. It's one way your body fights being sick. A fever shows that the body is responding to infection or other illnesses, both minor and severe. A fever is a symptom, not an illness by itself.  A fever can be a sign that you are ill, but most fevers are not caused by a serious problem. You may have a fever with a minor illness, such as a cold. But sometimes a very serious infection may cause little or no fever. It is important to look at other symptoms, other conditions you have, and how you feel in general. In children, notice how they act and see what symptoms they complain of. What is a normal body temperature? A normal body temperature is about 98. 6ºF. Some people have a normal temperature that is a little higher or a little lower than this. Your temperature may be a little lower in the morning than it is later in the day. It may go up during hot weather or when you exercise, wear heavy clothes, or take a hot bath. Your temperature may also be different depending on how you take it. A temperature taken in the mouth (oral) or under the arm may be a little lower than your core temperature (rectal). What is a fever temperature? A core temperature of 100.4°F or above is considered a fever. What can cause a fever? A fever may be caused by:  · Infections. This is the most common cause of a fever. Examples of infections that can cause a fever include the flu, a kidney infection, or pneumonia. · Some medicines. · Severe trauma or injury, such as a heart attack, stroke, heatstroke, or burns. · Other medical conditions, such as arthritis and some cancers. How can you treat a fever at home? · Ask your doctor if you can take an over-the-counter pain medicine, such as acetaminophen (Tylenol), ibuprofen (Advil, Motrin), or naproxen (Aleve). Be safe with medicines. Read and follow all instructions on the label. · To prevent dehydration, drink plenty of fluids. Choose water and other caffeine-free clear liquids until you feel better. If you have kidney, heart, or liver disease and have to limit fluids, talk with your doctor before you increase the amount of fluids you drink.   Follow-up care is a key part of your treatment and safety. Be sure to make and go to all appointments, and call your doctor if you are having problems. It's also a good idea to know your test results and keep a list of the medicines you take. Where can you learn more? Go to http://julia-raimundo.info/. Enter M552 in the search box to learn more about \"Learning About Fever. \"  Current as of: May 27, 2016  Content Version: 11.2  © 6473-1508 [employer]. Care instructions adapted under license by [employer] (which disclaims liability or warranty for this information). If you have questions about a medical condition or this instruction, always ask your healthcare professional. [place] [address] any warranty or liability for your use of this information. DISCHARGE SUMMARY from Nurse    The following personal items are in your possession at time of discharge:    Dental Appliances: None  Visual Aid: Glasses     Home Medications: Kept at bedside  Jewelry: None  Clothing: Shirt, Socks, Jacket/Coat, Undergarments  Other Valuables: None             PATIENT INSTRUCTIONS:    After general anesthesia or intravenous sedation, for 24 hours or while taking prescription Narcotics:  · Limit your activities  · Do not drive and operate hazardous machinery  · Do not make important personal or business decisions  · Do  not drink alcoholic beverages  · If you have not urinated within 8 hours after discharge, please contact your surgeon on call. Report the following to your surgeon:  · Excessive pain, swelling, redness or odor of or around the surgical area  · Temperature over 100.5  · Nausea and vomiting lasting longer than 4 hours or if unable to take medications  · Any signs of decreased circulation or nerve impairment to extremity: change in color, persistent  numbness, tingling, coldness or increase pain  · Any questions        What to do at Home:  Recommended activity: Activity as tolerated.   If you experience any of the following symptoms shortness of breath, chest pain, or a fever of 100.4 or greater, please follow up with your primary care provider. *  Please give a list of your current medications to your Primary Care Provider. *  Please update this list whenever your medications are discontinued, doses are      changed, or new medications (including over-the-counter products) are added. *  Please carry medication information at all times in case of emergency situations. These are general instructions for a healthy lifestyle:    No smoking/ No tobacco products/ Avoid exposure to second hand smoke    Surgeon General's Warning:  Quitting smoking now greatly reduces serious risk to your health. Obesity, smoking, and sedentary lifestyle greatly increases your risk for illness    A healthy diet, regular physical exercise & weight monitoring are important for maintaining a healthy lifestyle    You may be retaining fluid if you have a history of heart failure or if you experience any of the following symptoms:  Weight gain of 3 pounds or more overnight or 5 pounds in a week, increased swelling in our hands or feet or shortness of breath while lying flat in bed. Please call your doctor as soon as you notice any of these symptoms; do not wait until your next office visit. Recognize signs and symptoms of STROKE:    F-face looks uneven    A-arms unable to move or move unevenly    S-speech slurred or non-existent    T-time-call 911 as soon as signs and symptoms begin-DO NOT go       Back to bed or wait to see if you get better-TIME IS BRAIN. Warning Signs of HEART ATTACK     Call 911 if you have these symptoms:   Chest discomfort. Most heart attacks involve discomfort in the center of the chest that lasts more than a few minutes, or that goes away and comes back. It can feel like uncomfortable pressure, squeezing, fullness, or pain.  Discomfort in other areas of the upper body.  Symptoms can include pain or discomfort in one or both arms, the back, neck, jaw, or stomach.  Shortness of breath with or without chest discomfort.  Other signs may include breaking out in a cold sweat, nausea, or lightheadedness. Don't wait more than five minutes to call 911 - MINUTES MATTER! Fast action can save your life. Calling 911 is almost always the fastest way to get lifesaving treatment. Emergency Medical Services staff can begin treatment when they arrive -- up to an hour sooner than if someone gets to the hospital by car. The discharge information has been reviewed with the patient. The patient verbalized understanding. Discharge medications reviewed with the patient and appropriate educational materials and side effects teaching were provided. Abdominal Pain: Care Instructions  Your Care Instructions    Abdominal pain has many possible causes. Some aren't serious and get better on their own in a few days. Others need more testing and treatment. If your pain continues or gets worse, you need to be rechecked and may need more tests to find out what is wrong. You may need surgery to correct the problem. Don't ignore new symptoms, such as fever, nausea and vomiting, urination problems, pain that gets worse, and dizziness. These may be signs of a more serious problem. Your doctor may have recommended a follow-up visit in the next 8 to 12 hours. If you are not getting better, you may need more tests or treatment. The doctor has checked you carefully, but problems can develop later. If you notice any problems or new symptoms, get medical treatment right away. Follow-up care is a key part of your treatment and safety. Be sure to make and go to all appointments, and call your doctor if you are having problems. It's also a good idea to know your test results and keep a list of the medicines you take. How can you care for yourself at home? · Rest until you feel better.   · To prevent dehydration, drink plenty of fluids, enough so that your urine is light yellow or clear like water. Choose water and other caffeine-free clear liquids until you feel better. If you have kidney, heart, or liver disease and have to limit fluids, talk with your doctor before you increase the amount of fluids you drink. · If your stomach is upset, eat mild foods, such as rice, dry toast or crackers, bananas, and applesauce. Try eating several small meals instead of two or three large ones. · Wait until 48 hours after all symptoms have gone away before you have spicy foods, alcohol, and drinks that contain caffeine. · Do not eat foods that are high in fat. · Avoid anti-inflammatory medicines such as aspirin, ibuprofen (Advil, Motrin), and naproxen (Aleve). These can cause stomach upset. Talk to your doctor if you take daily aspirin for another health problem. When should you call for help? Call 911 anytime you think you may need emergency care. For example, call if:  · You passed out (lost consciousness). · You pass maroon or very bloody stools. · You vomit blood or what looks like coffee grounds. · You have new, severe belly pain. Call your doctor now or seek immediate medical care if:  · Your pain gets worse, especially if it becomes focused in one area of your belly. · You have a new or higher fever. · Your stools are black and look like tar, or they have streaks of blood. · You have unexpected vaginal bleeding. · You have symptoms of a urinary tract infection. These may include:  ¨ Pain when you urinate. ¨ Urinating more often than usual.  ¨ Blood in your urine. · You are dizzy or lightheaded, or you feel like you may faint. Watch closely for changes in your health, and be sure to contact your doctor if:  · You are not getting better after 1 day (24 hours). Where can you learn more? Go to http://julia-raimundo.info/.   Enter P540 in the search box to learn more about \"Abdominal Pain: Care Instructions. \"  Current as of: May 27, 2016  Content Version: 11.2  © 8358-0387 [employer], [email or web address]. Care instructions adapted under license by [employer] (which disclaims liability or warranty for this information). If you have questions about a medical condition or this instruction, always ask your healthcare professional. [place] [address] any warranty or liability for your use of this information.

## 2017-05-17 NOTE — PROGRESS NOTES
Pt less nauseated at present. Instructed to call should needs arise. Pt voiced understanding. Will monitor.

## 2017-05-22 NOTE — PROGRESS NOTES
Recovery period without difficulty. Pt alert and oriented and denies pain. Dressing is clean, dry, and intact. Reviewed discharge instructions with patient, verbalized understanding. Pt escorted to lobby discharge area via wheelchair. Vital signs  completed.

## 2017-05-22 NOTE — DISCHARGE INSTRUCTIONS
Julio Césarigi 34 [address]  Department of Interventional Radiology  [facility]  [phone] Office  [phone] Fax    PARACENTESIS DISCHARGE INSTRUCTIONS    General Information:  During this procedure, the doctor will insert a needle into the abdomen to drain fluid. After the procedure, you will be able to take a deep breath much easier. The site of the puncture may ooze the first day. This will decrease and eventually stop. Paracentesis (draining fluid from the abdomen) sometimes makes patients hypotensive (low blood pressure). Your doctor may order for you to receive fluids or albumin (a volume booster) during the procedure through an IV site. Home Care Instructions:  Keep the puncture site clean and dry. No tub baths or swimming until puncture site heals. Showering is acceptable. Resume your normal diet, and resume your normal activity slowly and as you tolerate. If you are short of breath, rest. If shortness of breath does not ease, please call your ordering doctor. Fluid can re-accumulate in the chest and/or in the abdomen. If this should occur, your doctor needs to know as you may need to have the procedure done again. Call If:     You should call your Physician and/or the Radiology Nurse if you notice any signs of infection, like pus draining, or if it is swollen or reddened. Also call if you have a fever, or if you are bleeding from the puncture site more than a small amount on the dressing. Call if the puncture site keeps draining fluid. Some oozing is to be expected, but should slow and then stop. Call if you feel like you have pressure in your abdomen. SEEK IMMEDIATE CARE OR CALL 911 IF YOU SUDDENLY HAVE TROUBLE BREATHING, OR IF YOUR LIPS TURN BLUE, OR IF YOU NOTICE BLOOD IN YOUR SPUTUM. Follow-Up Instructions: Please see your ordering doctor as he/she has requested. To Reach Us:   If you have any questions about your procedure, please call the Interventional Radiology department at [phone]. After business hours (5pm) and weekends, call the answering service at [phone] and ask for the Radiologist on call to be paged. Date: 5/22/2017  Discharging Nurse: Margie Walker RN    Interventional Radiology General Nurse Discharge    After general anesthesia or intravenous sedation, for 24 hours or while taking prescription Narcotics:  · Limit your activities  · Do not drive and operate hazardous machinery  · Do not make important personal or business decisions  · Do  not drink alcoholic beverages  · If you have not urinated within 8 hours after discharge, please contact your surgeon on call. * Please give a list of your current medications to your Primary Care Provider. * Please update this list whenever your medications are discontinued, doses are     changed, or new medications (including over-the-counter products) are added. * Please carry medication information at all times in case of emergency situations. These are general instructions for a healthy lifestyle:    No smoking/ No tobacco products/ Avoid exposure to second hand smoke  Surgeon General's Warning:  Quitting smoking now greatly reduces serious risk to your health. Obesity, smoking, and sedentary lifestyle greatly increases your risk for illness  A healthy diet, regular physical exercise & weight monitoring are important for maintaining a healthy lifestyle    You may be retaining fluid if you have a history of heart failure or if you experience any of the following symptoms:  Weight gain of 3 pounds or more overnight or 5 pounds in a week, increased swelling in our hands or feet or shortness of breath while lying flat in bed. Please call your doctor as soon as you notice any of these symptoms; do not wait until your next office visit.     Recognize signs and symptoms of STROKE:  F-face looks uneven    A-arms unable to move or move unevenly    S-speech slurred or non-existent    T-time-call 911 as soon as signs and symptoms begin-DO NOT go       Back to bed or wait to see if you get better-TIME IS BRAIN.

## 2017-05-22 NOTE — IP AVS SNAPSHOT
[address] 
[phone] Patient: Korin Moreland MRN: [id number] You are allergic to the following Allergen Reactions Aspirin Nausea Only Codeine Nausea Only Compazine (Prochlorperazine Edisylate) Unknown (comments) Causes Lock Jaw Recent Documentation Breastfeeding? OB Status Smoking Status No Postmenopausal Never Smoker Emergency Contacts Name Discharge Info Relation Home Work Mobile Reed [address] CAREGIVER [3] Spouse [3] [phone] About your hospitalization You were admitted on:  May 22, 2017 You last received care in the:  [facility] You were discharged on:  May 22, 2017 Unit phone number:  [phone] Why you were hospitalized Your primary diagnosis was:  Not on File Providers Seen During Your Hospitalizations Provider Role Specialty Primary office phone Dale Murphy MD Attending Provider Gastroenterology [phone] Your Primary Care Physician (PCP) Primary Care Physician Office Phone Office Fax [address] [phone] Follow-up Information None Your Appointments Thursday May 25, 2017 10:30 AM EDT Office Visit with Roxane Esquivel MD  
[address] [facility] ([address] [place] [address]) [address] [place] [address]  
[phone] Current Discharge Medication List  
  
ASK your doctor about these medications Dose & Instructions Dispensing Information Comments Morning Noon Evening Bedtime  
 calcium acetate 667 mg Cap Commonly known as:  PHOSLO Your last dose was: Your next dose is:    
   
   
 Dose:  1 Cap Take 1 Cap by mouth three (3) times daily (with meals). Quantity:  270 Cap Refills:  0 GENGRAF 25 mg capsule Generic drug:  cycloSPORINE modified Your last dose was: Your next dose is:    
   
   
 Dose:  2.5 mg/kg/day Take 2.5 mg/kg/day by mouth every morning. Indications: Takes in the AM  
 Refills:  0  
     
   
   
   
  
 insulin aspart 100 unit/mL injection Commonly known as:  Lonzell Dubs Your last dose was: Your next dose is:    
   
   
 Sliding scale maximum 15 units each meal  
 Quantity:  10 mL Refills:  5  
     
   
   
   
  
 insulin glargine 100 unit/mL (3 mL) pen Commonly known as:  LANTUS SOLOSTAR Your last dose was: Your next dose is:    
   
   
 Dose:  25 Units 25 Units by SubCUTAneous route nightly. Quantity:  15 Each Refills:  3  
     
   
   
   
  
 insulin lispro 100 unit/mL kwikpen Commonly known as:  HUMALOG Your last dose was: Your next dose is:    
   
   
 Up to 10 units sq q ac Quantity:  15 Pen Refills:  3  
     
   
   
   
  
 lactulose 10 gram/15 mL solution Commonly known as:  Ottis Brill Your last dose was: Your next dose is:    
   
   
 Dose:  20 g Take 30 mL by mouth three (3) times daily. Quantity:  480 mL Refills:  0  
     
   
   
   
  
 magnesium oxide 400 mg tablet Commonly known as:  MAG-OX Your last dose was: Your next dose is:    
   
   
 Dose:  400 mg Take 400 mg by mouth two (2) times a day. Refills:  0  
     
   
   
   
  
 metoclopramide HCl 10 mg tablet Commonly known as:  REGLAN Your last dose was: Your next dose is:    
   
   
 Dose:  10 mg Take 1 Tab by mouth Before breakfast, lunch, dinner and at bedtime for 10 days. Quantity:  40 Tab Refills:  1  
     
   
   
   
  
 midodrine 10 mg tablet Commonly known as:  Grier Carmel Your last dose was: Your next dose is:    
   
   
 Dose:  10 mg Take 1 Tab by mouth every eight (8) hours for 30 days. Quantity:  90 Tab Refills:  1  
     
   
   
   
  
 morphine CR 15 mg CR tablet Commonly known as:  MS CONTIN Your last dose was: Your next dose is:    
   
   
 Dose:  15 mg Take 1 Tab by mouth every twelve (12) hours. Max Daily Amount: 30 mg.  
 Quantity:  20 Tab Refills:  0  
     
   
   
   
  
 ondansetron 8 mg disintegrating tablet Commonly known as:  ZOFRAN ODT Your last dose was: Your next dose is:    
   
   
 Dose:  8 mg Take 1 Tab by mouth three (3) times daily for 30 days. Indications: persistent N&V Quantity:  90 Tab Refills:  1  
     
   
   
   
  
 ondansetron hcl 4 mg tablet Commonly known as:  Trish Lim Your last dose was: Your next dose is:    
   
   
 Dose:  4 mg Take 1 Tab by mouth every eight (8) hours as needed for Nausea. Quantity:  90 Tab Refills:  0  
     
   
   
   
  
 potassium chloride 20 mEq tablet Commonly known as:  K-DUR, KLOR-CON Your last dose was: Your next dose is:    
   
   
 Dose:  20 mEq Take 1 Tab by mouth daily for 20 days. Quantity:  20 Tab Refills:  0  
     
   
   
   
  
 promethazine 25 mg tablet Commonly known as:  PHENERGAN Your last dose was: Your next dose is:    
   
   
 Dose:  25 mg Take 25 mg by mouth every six (6) hours as needed for Nausea. Refills:  0 PROTONIX 40 mg tablet Generic drug:  pantoprazole Your last dose was: Your next dose is:    
   
   
 Dose:  40 mg Take 40 mg by mouth four (4) times daily. 2tabs bid Refills:  0  
     
   
   
   
  
 traMADol 50 mg tablet Commonly known as:  ULTRAM  
   
Your last dose was: Your next dose is:    
   
   
 Dose:  50 mg Take 1 Tab by mouth every six (6) hours as needed. Max Daily Amount: 200 mg. Indications: PAIN Quantity:  20 Tab Refills:  0  
     
   
   
   
  
 XIFAXAN 550 mg tablet Generic drug:  rifAXIMin Your last dose was: Your next dose is:    
   
   
 Dose:  550 mg Take 1 Tab by mouth two (2) times a day for 30 days. Quantity:  60 Tab Refills:  2 Discharge Instructions [address] Department of Interventional Radiology [facility] 
[phone] Office 
[phone] Fax PARACENTESIS DISCHARGE INSTRUCTIONS General Information: 
During this procedure, the doctor will insert a needle into the abdomen to drain fluid. After the procedure, you will be able to take a deep breath much easier. The site of the puncture may ooze the first day. This will decrease and eventually stop. Paracentesis (draining fluid from the abdomen) sometimes makes patients hypotensive (low blood pressure). Your doctor may order for you to receive fluids or albumin (a volume booster) during the procedure through an IV site. Home Care Instructions: 
Keep the puncture site clean and dry. No tub baths or swimming until puncture site heals. Showering is acceptable. Resume your normal diet, and resume your normal activity slowly and as you tolerate. If you are short of breath, rest. If shortness of breath does not ease, please call your ordering doctor. Fluid can re-accumulate in the chest and/or in the abdomen. If this should occur, your doctor needs to know as you may need to have the procedure done again. Call If: 
   You should call your Physician and/or the Radiology Nurse if you notice any signs of infection, like pus draining, or if it is swollen or reddened. Also call if you have a fever, or if you are bleeding from the puncture site more than a small amount on the dressing. Call if the puncture site keeps draining fluid. Some oozing is to be expected, but should slow and then stop. Call if you feel like you have pressure in your abdomen.  SEEK IMMEDIATE CARE OR CALL 911 IF YOU SUDDENLY HAVE TROUBLE BREATHING, OR IF YOUR LIPS TURN BLUE, OR IF YOU NOTICE BLOOD IN YOUR SPUTUM. Follow-Up Instructions: Please see your ordering doctor as he/she has requested. To Reach Us: If you have any questions about your procedure, please call the Interventional Radiology department at [phone]. After business hours (5pm) and weekends, call the answering service at [phone] and ask for the Radiologist on call to be paged. Date: 5/22/2017 Discharging Nurse: Ingrid Barry RN Interventional Radiology General Nurse Discharge After general anesthesia or intravenous sedation, for 24 hours or while taking prescription Narcotics: · Limit your activities · Do not drive and operate hazardous machinery · Do not make important personal or business decisions · Do  not drink alcoholic beverages · If you have not urinated within 8 hours after discharge, please contact your surgeon on call. * Please give a list of your current medications to your Primary Care Provider. * Please update this list whenever your medications are discontinued, doses are 
   changed, or new medications (including over-the-counter products) are added. * Please carry medication information at all times in case of emergency situations. These are general instructions for a healthy lifestyle: No smoking/ No tobacco products/ Avoid exposure to second hand smoke Surgeon General's Warning:  Quitting smoking now greatly reduces serious risk to your health. Obesity, smoking, and sedentary lifestyle greatly increases your risk for illness A healthy diet, regular physical exercise & weight monitoring are important for maintaining a healthy lifestyle You may be retaining fluid if you have a history of heart failure or if you experience any of the following symptoms:  Weight gain of 3 pounds or more overnight or 5 pounds in a week, increased swelling in our hands or feet or shortness of breath while lying flat in bed. Please call your doctor as soon as you notice any of these symptoms; do not wait until your next office visit. Recognize signs and symptoms of STROKE: 
F-face looks uneven A-arms unable to move or move unevenly S-speech slurred or non-existent T-time-call 911 as soon as signs and symptoms begin-DO NOT go Back to bed or wait to see if you get better-TIME IS BRAIN. Discharge Orders None Introducing [facility] SERVICES! Dear Milton: 
Thank you for requesting a [employer] account. Our records indicate that you already have an active [employer]e account. You can access your account anytime at [email or web address] Did you know that you can access your hospital and ER discharge instructions at any time in Sque[employer]e? You can also review all of your test results from your hospital stay or ER visit. Additional Information If you have questions, please visit the Frequently Asked Questions section of the [employer]e website at [email or web address] Remember, Squeakee is NOT to be used for urgent needs. For medical emergencies, dial 911. Now available from your iPhone and Android! General Information Please provide this summary of care documentation to your next provider. Patient Signature:  ____________________________________________________________ Date:  ____________________________________________________________  
  
Jazzy Shah Provider Signature:  ____________________________________________________________ Date:  ____________________________________________________________

## 2017-06-02 NOTE — PROGRESS NOTES
Educated patient about not trying to  Change cvc dressing due to risk of infection. Informed patient to reinforce dressing if needed  and to call to get dressing  Changed as soon as possible.

## 2017-06-02 NOTE — PROGRESS NOTES
Interventional Radiology Post Paracentesis Note    6/2/2017    Procedure(s): Ultrasound guided Diagnostic Paracentesis Performed with 8 Spanish catheter total volume 4200 ml. Samples sent to lab. Preliminary Findings: moderate clear and yellow.     Complications: None    Plan:  Observation, discharge home          Chest X-Ray:  no    Full dictated report to follow by Meme Mckeon PA-C     Signed By: Karin Lewis RN

## 2017-06-02 NOTE — PROGRESS NOTES
Left CVC dressing changed per policy. Some redness at the insertion site. Dressing care instructions done with patient.

## 2017-06-09 NOTE — DISCHARGE INSTRUCTIONS
[address]  Department of Interventional Radiology  [facility]  [phone] Office  [phone] Fax    PARACENTESIS DISCHARGE INSTRUCTIONS    General Information:  During this procedure, the doctor will insert a needle into the abdomen to drain fluid. After the procedure, you will be able to take a deep breath much easier. The site of the puncture may ooze the first day. This will decrease and eventually stop. Paracentesis (draining fluid from the abdomen) sometimes makes patients hypotensive (low blood pressure). Your doctor may order for you to receive fluids or albumin (a volume booster) during the procedure through an IV site. Home Care Instructions:  Keep the puncture site clean and dry. No tub baths or swimming until puncture site heals. Showering is acceptable. Resume your normal diet, and resume your normal activity slowly and as you tolerate. If you are short of breath, rest. If shortness of breath does not ease, please call your ordering doctor. Fluid can re-accumulate in the chest and/or in the abdomen. If this should occur, your doctor needs to know as you may need to have the procedure done again. Call If:     You should call your Physician and/or the Radiology Nurse if you notice any signs of infection, like pus draining, or if it is swollen or reddened. Also call if you have a fever, or if you are bleeding from the puncture site more than a small amount on the dressing. Call if the puncture site keeps draining fluid. Some oozing is to be expected, but should slow and then stop. Call if you feel like you have pressure in your abdomen. SEEK IMMEDIATE CARE OR CALL 911 IF YOU SUDDENLY HAVE TROUBLE BREATHING, OR IF YOUR LIPS TURN BLUE, OR IF YOU NOTICE BLOOD IN YOUR SPUTUM. Follow-Up Instructions: Please see your ordering doctor as he/she has requested. To Reach Us:   If you have any questions about your procedure, please call the Interventional Radiology department at [phone]. After business hours (5pm) and weekends, call the answering service at [phone] and ask for the Radiologist on call to be paged. Interventional Radiology General Nurse Discharge    After general anesthesia or intravenous sedation, for 24 hours or while taking prescription Narcotics:  · Limit your activities  · Do not drive and operate hazardous machinery  · Do not make important personal or business decisions  · Do  not drink alcoholic beverages  · If you have not urinated within 8 hours after discharge, please contact your surgeon on call. * Please give a list of your current medications to your Primary Care Provider. * Please update this list whenever your medications are discontinued, doses are     changed, or new medications (including over-the-counter products) are added. * Please carry medication information at all times in case of emergency situations. These are general instructions for a healthy lifestyle:    No smoking/ No tobacco products/ Avoid exposure to second hand smoke  Surgeon General's Warning:  Quitting smoking now greatly reduces serious risk to your health. Obesity, smoking, and sedentary lifestyle greatly increases your risk for illness  A healthy diet, regular physical exercise & weight monitoring are important for maintaining a healthy lifestyle    You may be retaining fluid if you have a history of heart failure or if you experience any of the following symptoms:  Weight gain of 3 pounds or more overnight or 5 pounds in a week, increased swelling in our hands or feet or shortness of breath while lying flat in bed. Please call your doctor as soon as you notice any of these symptoms; do not wait until your next office visit.     Recognize signs and symptoms of STROKE:  F-face looks uneven    A-arms unable to move or move unevenly    S-speech slurred or non-existent    T-time-call 911 as soon as signs and symptoms begin-DO NOT go       Back to bed or wait to see if you get better-TIME IS BRAIN.       Date: 6/9/2017  Discharging Nurse: Andreas Smith RN

## 2017-06-09 NOTE — PROGRESS NOTES
Left chest picc line dressing, end caps, stat lock, and bio patch changed using sterile procedure. Ports flush well with good blood return. No s/s of infection.

## 2017-06-09 NOTE — PROGRESS NOTES
Interventional Radiology Post Paracentesis/Thoracentesis Note    6/9/2017    Procedure(s): Ultrasound guided Therapeutic Paracentesis Performed total volume 4000 ml. Samples sent to lab. Preliminary Findings: moderate yellow. Complications: None    Plan:  Observation, check labs if drawn.           Chest X-Ray:  no    Full dictated report to follow    Signed By: Joselyn Lee RN

## 2017-06-16 NOTE — PROGRESS NOTES
Interventional Radiology Post Paracentesis/Thoracentesis Note    6/16/2017    Procedure(s): Ultrasound guided Therapeutic Paracentesis Performed total volume 4300 ml. Samples sent to lab. Preliminary Findings: moderate yellow. Complications: None    Plan:  Observation, check labs if drawn.           Chest X-Ray:  no    Full dictated report to follow    Signed By: Pietro Orozco RN

## 2017-06-16 NOTE — DISCHARGE INSTRUCTIONS
[address]  Department of Interventional Radiology  [facility]  [phone] Office  [phone] Fax    PARACENTESIS DISCHARGE INSTRUCTIONS    General Information:  During this procedure, the doctor will insert a needle into the abdomen to drain fluid. After the procedure, you will be able to take a deep breath much easier. The site of the puncture may ooze the first day. This will decrease and eventually stop. Paracentesis (draining fluid from the abdomen) sometimes makes patients hypotensive (low blood pressure). Your doctor may order for you to receive fluids or albumin (a volume booster) during the procedure through an IV site. Home Care Instructions:  Keep the puncture site clean and dry. No tub baths or swimming until puncture site heals. Showering is acceptable. Resume your normal diet, and resume your normal activity slowly and as you tolerate. If you are short of breath, rest. If shortness of breath does not ease, please call your ordering doctor. Fluid can re-accumulate in the chest and/or in the abdomen. If this should occur, your doctor needs to know as you may need to have the procedure done again. Call If:     You should call your Physician and/or the Radiology Nurse if you notice any signs of infection, like pus draining, or if it is swollen or reddened. Also call if you have a fever, or if you are bleeding from the puncture site more than a small amount on the dressing. Call if the puncture site keeps draining fluid. Some oozing is to be expected, but should slow and then stop. Call if you feel like you have pressure in your abdomen. SEEK IMMEDIATE CARE OR CALL 911 IF YOU SUDDENLY HAVE TROUBLE BREATHING, OR IF YOUR LIPS TURN BLUE, OR IF YOU NOTICE BLOOD IN YOUR SPUTUM. Follow-Up Instructions: Please see your ordering doctor as he/she has requested. To Reach Us:   If you have any questions about your procedure, please call the Interventional Radiology department at [phone]. After business hours (5pm) and weekends, call the answering service at [phone] and ask for the Radiologist on call to be paged. Interventional Radiology General Nurse Discharge    After general anesthesia or intravenous sedation, for 24 hours or while taking prescription Narcotics:  · Limit your activities  · Do not drive and operate hazardous machinery  · Do not make important personal or business decisions  · Do  not drink alcoholic beverages  · If you have not urinated within 8 hours after discharge, please contact your surgeon on call. * Please give a list of your current medications to your Primary Care Provider. * Please update this list whenever your medications are discontinued, doses are     changed, or new medications (including over-the-counter products) are added. * Please carry medication information at all times in case of emergency situations. These are general instructions for a healthy lifestyle:    No smoking/ No tobacco products/ Avoid exposure to second hand smoke  Surgeon General's Warning:  Quitting smoking now greatly reduces serious risk to your health. Obesity, smoking, and sedentary lifestyle greatly increases your risk for illness  A healthy diet, regular physical exercise & weight monitoring are important for maintaining a healthy lifestyle    You may be retaining fluid if you have a history of heart failure or if you experience any of the following symptoms:  Weight gain of 3 pounds or more overnight or 5 pounds in a week, increased swelling in our hands or feet or shortness of breath while lying flat in bed. Please call your doctor as soon as you notice any of these symptoms; do not wait until your next office visit.     Recognize signs and symptoms of STROKE:  F-face looks uneven    A-arms unable to move or move unevenly    S-speech slurred or non-existent    T-time-call 911 as soon as signs and symptoms begin-DO NOT go       Back to bed or wait to see if you get better-TIME IS BRAIN.         Date: 6/16/2017  Discharging Nurse: Edwina Barba RN

## 2017-06-16 NOTE — PROGRESS NOTES
Discharge complete. Discharge instructions reviewed with pt. Time for questions allowed. Pt denies any questions at this time. Dressing to left abdomen noted to be clean, dry, and intact. Pt assisted to front of hospital via wheelchair.      Visit Vitals    BP 92/58 (BP 1 Location: Left arm, BP Patient Position: At rest)    Pulse 88    Temp 97.8 °F (36.6 °C)    Resp 16    Ht 5' 4\" (1.626 m)    Wt 41.7 kg (92 lb)    SpO2 100%    BMI 15.79 kg/m2

## 2017-06-16 NOTE — PROGRESS NOTES
cvc in left chest dressing changed by Owen tobin using aseptic technique. Ports with good blood return and flushed well. No sign and symptoms of infection noted. Ports packed with heparin flush. Patient tolerated well.

## 2017-06-22 ENCOUNTER — TELEPHONE (OUTPATIENT)
Dept: TRANSPLANT | Facility: CLINIC | Age: 47
End: 2017-06-22

## 2017-06-23 NOTE — DISCHARGE INSTRUCTIONS
[address] C[address]arigi [address]  Department of Interventional Radiology  [facility]  [phone] Office  [phone] Fax    PARACENTESIS DISCHARGE INSTRUCTIONS    General Information:  During this procedure, the doctor will insert a needle into the abdomen to drain fluid. After the procedure, you will be able to take a deep breath much easier. The site of the puncture may ooze the first day. This will decrease and eventually stop. Paracentesis (draining fluid from the abdomen) sometimes makes patients hypotensive (low blood pressure). Your doctor may order for you to receive fluids or albumin (a volume booster) during the procedure through an IV site. Home Care Instructions:  Keep the puncture site clean and dry. No tub baths or swimming until puncture site heals. Showering is acceptable. Resume your normal diet, and resume your normal activity slowly and as you tolerate. If you are short of breath, rest. If shortness of breath does not ease, please call your ordering doctor. Fluid can re-accumulate in the chest and/or in the abdomen. If this should occur, your doctor needs to know as you may need to have the procedure done again. Call If:     You should call your Physician and/or the Radiology Nurse if you notice any signs of infection, like pus draining, or if it is swollen or reddened. Also call if you have a fever, or if you are bleeding from the puncture site more than a small amount on the dressing. Call if the puncture site keeps draining fluid. Some oozing is to be expected, but should slow and then stop. Call if you feel like you have pressure in your abdomen. SEEK IMMEDIATE CARE OR CALL 911 IF YOU SUDDENLY HAVE TROUBLE BREATHING, OR IF YOUR LIPS TURN BLUE, OR IF YOU NOTICE BLOOD IN YOUR SPUTUM. Follow-Up Instructions: Please see your ordering doctor as he/she has requested. To Reach Us:   If you have any questions about your procedure, please call the Interventional Radiology department at [phone]. After business hours (5pm) and weekends, call the answering service at [phone] and ask for the Radiologist on call to be paged. Interventional Radiology General Nurse Discharge    After general anesthesia or intravenous sedation, for 24 hours or while taking prescription Narcotics:  · Limit your activities  · Do not drive and operate hazardous machinery  · Do not make important personal or business decisions  · Do  not drink alcoholic beverages  · If you have not urinated within 8 hours after discharge, please contact your surgeon on call. * Please give a list of your current medications to your Primary Care Provider. * Please update this list whenever your medications are discontinued, doses are     changed, or new medications (including over-the-counter products) are added. * Please carry medication information at all times in case of emergency situations. These are general instructions for a healthy lifestyle:    No smoking/ No tobacco products/ Avoid exposure to second hand smoke  Surgeon General's Warning:  Quitting smoking now greatly reduces serious risk to your health. Obesity, smoking, and sedentary lifestyle greatly increases your risk for illness  A healthy diet, regular physical exercise & weight monitoring are important for maintaining a healthy lifestyle    You may be retaining fluid if you have a history of heart failure or if you experience any of the following symptoms:  Weight gain of 3 pounds or more overnight or 5 pounds in a week, increased swelling in our hands or feet or shortness of breath while lying flat in bed. Please call your doctor as soon as you notice any of these symptoms; do not wait until your next office visit.     Recognize signs and symptoms of STROKE:  F-face looks uneven    A-arms unable to move or move unevenly    S-speech slurred or non-existent    T-time-call 911 as soon as signs and symptoms begin-DO NOT go       Back to bed or wait to see if you get better-TIME IS BRAIN.           Date: 6/23/2017  Discharging Nurse: Shira Muller RN

## 2017-06-23 NOTE — PROGRESS NOTES
Interventional Radiology Post Paracentesis Note    6/23/2017    Procedure(s): Ultrasound guided Diagnostic Paracentesis Performed with 8 Malawian catheter total volume 4200 ml. Samples sent to lab. Preliminary Findings: medium clear and dark orange.     Complications: None    Plan:  Observation, discharge home          Chest X-Ray:  no    Full dictated report to follow    Signed By: Marie Wellington RN

## 2017-06-23 NOTE — PROGRESS NOTES
l chest picc line dressing, end caps, and bio patch changed using sterile procedure by Otilio Harris rn. Blood drawn and sent to lab. Ports flush well with good blood return. No s/s of infection.

## 2017-06-28 ENCOUNTER — DOCUMENTATION ONLY (OUTPATIENT)
Dept: TRANSPLANT | Facility: CLINIC | Age: 47
End: 2017-06-28

## 2017-06-30 ENCOUNTER — TELEPHONE (OUTPATIENT)
Dept: TRANSPLANT | Facility: CLINIC | Age: 47
End: 2017-06-30

## 2017-06-30 NOTE — PROGRESS NOTES
Picc line dressing, end caps, stat lock, and bio patch changed using sterile procedure by Chika Gotti RN. Ports flush well with normal saline and packed with heparin; good blood return. No s/s of infection.

## 2017-06-30 NOTE — DISCHARGE INSTRUCTIONS
[address]  Department of Interventional Radiology  [facility]  [phone] Office  [phone] Fax    PARACENTESIS DISCHARGE INSTRUCTIONS    General Information:  During this procedure, the doctor will insert a needle into the abdomen to drain fluid. After the procedure, you will be able to take a deep breath much easier. The site of the puncture may ooze the first day. This will decrease and eventually stop. Paracentesis (draining fluid from the abdomen) sometimes makes patients hypotensive (low blood pressure). Your doctor may order for you to receive fluids or albumin (a volume booster) during the procedure through an IV site. Home Care Instructions:  Keep the puncture site clean and dry. No tub baths or swimming until puncture site heals. Showering is acceptable. Resume your normal diet, and resume your normal activity slowly and as you tolerate. If you are short of breath, rest. If shortness of breath does not ease, please call your ordering doctor. Fluid can re-accumulate in the chest and/or in the abdomen. If this should occur, your doctor needs to know as you may need to have the procedure done again. Call If:     You should call your Physician and/or the Radiology Nurse if you notice any signs of infection, like pus draining, or if it is swollen or reddened. Also call if you have a fever, or if you are bleeding from the puncture site more than a small amount on the dressing. Call if the puncture site keeps draining fluid. Some oozing is to be expected, but should slow and then stop. Call if you feel like you have pressure in your abdomen. SEEK IMMEDIATE CARE OR CALL 911 IF YOU SUDDENLY HAVE TROUBLE BREATHING, OR IF YOUR LIPS TURN BLUE, OR IF YOU NOTICE BLOOD IN YOUR SPUTUM. Follow-Up Instructions: Please see your ordering doctor as he/she has requested. To Reach Us:   If you have any questions about your procedure, please call the Interventional Radiology department at [phone]. After business hours (5pm) and weekends, call the answering service at [phone] and ask for the Radiologist on call to be paged. Interventional Radiology General Nurse Discharge    After general anesthesia or intravenous sedation, for 24 hours or while taking prescription Narcotics:  · Limit your activities  · Do not drive and operate hazardous machinery  · Do not make important personal or business decisions  · Do  not drink alcoholic beverages  · If you have not urinated within 8 hours after discharge, please contact your surgeon on call. * Please give a list of your current medications to your Primary Care Provider. * Please update this list whenever your medications are discontinued, doses are     changed, or new medications (including over-the-counter products) are added. * Please carry medication information at all times in case of emergency situations. These are general instructions for a healthy lifestyle:    No smoking/ No tobacco products/ Avoid exposure to second hand smoke  Surgeon General's Warning:  Quitting smoking now greatly reduces serious risk to your health. Obesity, smoking, and sedentary lifestyle greatly increases your risk for illness  A healthy diet, regular physical exercise & weight monitoring are important for maintaining a healthy lifestyle    You may be retaining fluid if you have a history of heart failure or if you experience any of the following symptoms:  Weight gain of 3 pounds or more overnight or 5 pounds in a week, increased swelling in our hands or feet or shortness of breath while lying flat in bed. Please call your doctor as soon as you notice any of these symptoms; do not wait until your next office visit.     Recognize signs and symptoms of STROKE:  F-face looks uneven    A-arms unable to move or move unevenly    S-speech slurred or non-existent    T-time-call 911 as soon as signs and symptoms begin-DO NOT go       Back to bed or wait to see if you get better-TIME IS BRAIN.             Date: 6/30/2017  Discharging Nurse: Junior Sneed RN

## 2017-06-30 NOTE — TELEPHONE ENCOUNTER
Pt case discussed with the team at committee. Due to documentation of PTLD they are asking to rev the imaging of the PET done in 2/2017 and the most recent CT as well as the workup done at [facility].  They are curious to see EBV labs and urine for protein. I Lvm with referring office and then spoke with the  and informed him I will asked for the CD's as well as medical records from [facility].

## 2017-07-07 NOTE — DISCHARGE INSTRUCTIONS
Si[address] 34 [address]  Department of Interventional Radiology  [facility]  [phone] Office  [phone] Fax    PARACENTESIS DISCHARGE INSTRUCTIONS    General Information:  During this procedure, the doctor will insert a needle into the abdomen to drain fluid. After the procedure, you will be able to take a deep breath much easier. The site of the puncture may ooze the first day. This will decrease and eventually stop. Paracentesis (draining fluid from the abdomen) sometimes makes patients hypotensive (low blood pressure). Your doctor may order for you to receive fluids or albumin (a volume booster) during the procedure through an IV site. Home Care Instructions:  Keep the puncture site clean and dry. No tub baths or swimming until puncture site heals. Showering is acceptable. Resume your normal diet, and resume your normal activity slowly and as you tolerate. If you are short of breath, rest. If shortness of breath does not ease, please call your ordering doctor. Fluid can re-accumulate in the chest and/or in the abdomen. If this should occur, your doctor needs to know as you may need to have the procedure done again. Call If:     You should call your Physician and/or the Radiology Nurse if you notice any signs of infection, like pus draining, or if it is swollen or reddened. Also call if you have a fever, or if you are bleeding from the puncture site more than a small amount on the dressing. Call if the puncture site keeps draining fluid. Some oozing is to be expected, but should slow and then stop. Call if you feel like you have pressure in your abdomen. SEEK IMMEDIATE CARE OR CALL 911 IF YOU SUDDENLY HAVE TROUBLE BREATHING, OR IF YOUR LIPS TURN BLUE, OR IF YOU NOTICE BLOOD IN YOUR SPUTUM. Follow-Up Instructions: Please see your ordering doctor as he/she has requested. To Reach Us: If you have any questions about your procedure, please call the Interventional Radiology department at [phone]. After business hours (5pm) and weekends, call the answering service at [phone] and ask for the Radiologist on call to be paged. Interventional Radiology General Nurse Discharge    After general anesthesia or intravenous sedation, for 24 hours or while taking prescription Narcotics:  · Limit your activities  · Do not drive and operate hazardous machinery  · Do not make important personal or business decisions  · Do  not drink alcoholic beverages  · If you have not urinated within 8 hours after discharge, please contact your surgeon on call. * Please give a list of your current medications to your Primary Care Provider. * Please update this list whenever your medications are discontinued, doses are     changed, or new medications (including over-the-counter products) are added. * Please carry medication information at all times in case of emergency situations. These are general instructions for a healthy lifestyle:    No smoking/ No tobacco products/ Avoid exposure to second hand smoke  Surgeon General's Warning:  Quitting smoking now greatly reduces serious risk to your health. Obesity, smoking, and sedentary lifestyle greatly increases your risk for illness  A healthy diet, regular physical exercise & weight monitoring are important for maintaining a healthy lifestyle    You may be retaining fluid if you have a history of heart failure or if you experience any of the following symptoms:  Weight gain of 3 pounds or more overnight or 5 pounds in a week, increased swelling in our hands or feet or shortness of breath while lying flat in bed. Please call your doctor as soon as you notice any of these symptoms; do not wait until your next office visit.     Recognize signs and symptoms of STROKE:  F-face looks uneven    A-arms unable to move or move unevenly    S-speech slurred or non-existent    T-time-call 911 as soon as signs and symptoms begin-DO NOT go       Back to bed or wait to see if you get better-TIME IS BRAIN.             Date: 7/7/2017  Discharging Nurse: Monika Jama

## 2017-07-07 NOTE — PROGRESS NOTES
l chest picc line dressing, end caps, stat lock, and bio patch changed using sterile procedure Pam Roldan rn. Blood drawn and sent to lab. Ports flush well with good blood return. No s/s of infection.

## 2017-07-07 NOTE — PROGRESS NOTES
Interventional Radiology Post Paracentesis/Thoracentesis Note    7/7/2017    Procedure(s): Ultrasound guided Therapeutic Paracentesis Performed with 8 Wolof catheter total volume 4100 ml. Samples sent to lab. Preliminary Findings: moderate yellow. Complications: None    Plan:  Observation, check labs if drawn.           Chest X-Ray:  no    Full dictated report to follow    Signed By: Marisa Huitron

## 2017-07-07 NOTE — PROGRESS NOTES
Recovery period without difficulty. Pt alert and oriented and denies pain. Dressing is clean, dry, and intact. Reviewed discharge instructions with patient and she verbalized understanding. Pt escorted to lobby discharge area via wheelchair. Vital signs completed.

## 2017-07-07 NOTE — PROCEDURES
Date of Procedure: 7/7/2017    Pre-Procedure Diagnosis: Ascites    Post-Procedure Diagnosis: SAME    Procedure(s): Ultrasound guided paracentesis    Brief Description of Procedure: Same    Performed By: Sharron Melton MD     Assistants: None    Anesthesia: Local    Estimated Blood Loss: None    Specimens: Ascites    Implants: None    Findings: Ascites    Complications: None    Recommendations: None    Follow Up: As needed

## 2017-07-14 NOTE — PROGRESS NOTES
I received some labs. Were they ordered by another provider? They were done at the main lab/hospital.  I want to make sure they don't fall through the cracks.     Cristofer Child MD

## 2017-07-14 NOTE — PROGRESS NOTES
Interventional Radiology Post Paracentesis/Thoracentesis Note    7/14/2017    Procedure(s): Ultrasound guided Therapeutic Paracentesis Performed  total volume 3950 ml. Samples sent to lab. Preliminary Findings: large yellow. Complications: None    Plan:  Observation, check labs if drawn.           Chest X-Ray:  no    Full dictated report to follow    Signed By: Porsha Avitia RN

## 2017-07-14 NOTE — PROGRESS NOTES
Discharge complete. Discharge instructions reviewed with pt. Time for questions allowed. Pt denies any questions at this time. Dressing to left abdomen noted to be clean, dry, and intact. Pt assisted to front of hospital where her friend awaits.      Visit Vitals    /59 (BP 1 Location: Left arm, BP Patient Position: At rest)    Pulse 84    Resp 16    Ht 5' 4\" (1.626 m)    Wt 44.9 kg (99 lb)    SpO2 98%    Breastfeeding No    BMI 16.99 kg/m2

## 2017-07-14 NOTE — PROGRESS NOTES
Malu Toledo RN @ 8:45 [facility] - its listed in the lab, it was prob sent to you because you're her PCP

## 2017-07-14 NOTE — DISCHARGE INSTRUCTIONS
Siobhani [address]  Department of Interventional Radiology  [facility]  [phone] Office  [phone] Fax    PARACENTESIS DISCHARGE INSTRUCTIONS    General Information:  During this procedure, the doctor will insert a needle into the abdomen to drain fluid. After the procedure, you will be able to take a deep breath much easier. The site of the puncture may ooze the first day. This will decrease and eventually stop. Paracentesis (draining fluid from the abdomen) sometimes makes patients hypotensive (low blood pressure). Your doctor may order for you to receive fluids or albumin (a volume booster) during the procedure through an IV site. Home Care Instructions:  Keep the puncture site clean and dry. No tub baths or swimming until puncture site heals. Showering is acceptable. Resume your normal diet, and resume your normal activity slowly and as you tolerate. If you are short of breath, rest. If shortness of breath does not ease, please call your ordering doctor. Fluid can re-accumulate in the chest and/or in the abdomen. If this should occur, your doctor needs to know as you may need to have the procedure done again. Call If:     You should call your Physician and/or the Radiology Nurse if you notice any signs of infection, like pus draining, or if it is swollen or reddened. Also call if you have a fever, or if you are bleeding from the puncture site more than a small amount on the dressing. Call if the puncture site keeps draining fluid. Some oozing is to be expected, but should slow and then stop. Call if you feel like you have pressure in your abdomen. SEEK IMMEDIATE CARE OR CALL 911 IF YOU SUDDENLY HAVE TROUBLE BREATHING, OR IF YOUR LIPS TURN BLUE, OR IF YOU NOTICE BLOOD IN YOUR SPUTUM. Follow-Up Instructions: Please see your ordering doctor as he/she has requested. To Reach Us:   If you have any questions about your procedure, please call the Interventional Radiology department at [phone]. After business hours (5pm) and weekends, call the answering service at [phone] and ask for the Radiologist on call to be paged. Interventional Radiology General Nurse Discharge    After general anesthesia or intravenous sedation, for 24 hours or while taking prescription Narcotics:  · Limit your activities  · Do not drive and operate hazardous machinery  · Do not make important personal or business decisions  · Do  not drink alcoholic beverages  · If you have not urinated within 8 hours after discharge, please contact your surgeon on call. * Please give a list of your current medications to your Primary Care Provider. * Please update this list whenever your medications are discontinued, doses are     changed, or new medications (including over-the-counter products) are added. * Please carry medication information at all times in case of emergency situations. These are general instructions for a healthy lifestyle:    No smoking/ No tobacco products/ Avoid exposure to second hand smoke  Surgeon General's Warning:  Quitting smoking now greatly reduces serious risk to your health. Obesity, smoking, and sedentary lifestyle greatly increases your risk for illness  A healthy diet, regular physical exercise & weight monitoring are important for maintaining a healthy lifestyle    You may be retaining fluid if you have a history of heart failure or if you experience any of the following symptoms:  Weight gain of 3 pounds or more overnight or 5 pounds in a week, increased swelling in our hands or feet or shortness of breath while lying flat in bed. Please call your doctor as soon as you notice any of these symptoms; do not wait until your next office visit.     Recognize signs and symptoms of STROKE:  F-face looks uneven    A-arms unable to move or move unevenly    S-speech slurred or non-existent    T-time-call 911 as soon as signs and symptoms begin-DO NOT go       Back to bed or wait to see if you get better-TIME IS BRAIN.             Date: 7/14/2017  Discharging Nurse: Lacy Romero RN

## 2017-07-14 NOTE — PROGRESS NOTES
cvc in left upper chest dressing changed with labs, using aseptic technique. Ports with good blood return and flushed well with normal saline. Ports packed with heparin flush.

## 2017-07-21 ENCOUNTER — TELEPHONE (OUTPATIENT)
Dept: TRANSPLANT | Facility: CLINIC | Age: 47
End: 2017-07-21

## 2017-07-21 NOTE — PROCEDURES
Interventional Radiology Brief Procedure Note    Patient: Samir Lobo MRN: [id number]  SSN: [id number]    YOB: 1970  Age: 55 y.o. Sex: female      Date of Procedure: 7/21/2017    Pre-Procedure Diagnosis: Ascites. Liver failure.       Post-Procedure Diagnosis: SAME    Procedure(s): Paracentesis    Brief Description of Procedure: as above    Performed By: Dyan Terrell MD     Assistants: None    Anesthesia: None    Estimated Blood Loss: None    Specimens: ascites    Implants: None    Findings: Clear yellow    Complications: None    Recommendations: None     Follow Up: None    Signed By: Dyan Terrell MD     July 21, 2017

## 2017-07-21 NOTE — TELEPHONE ENCOUNTER
----- Message from Karen Medrano sent at 7/21/2017  1:03 PM CDT -----  Contact: Pt  Heidy    Pt would like to know the address to where she should sent records and imaging?    Contact number [phone]  Thanks

## 2017-07-21 NOTE — DISCHARGE INSTRUCTIONS
Julio Césarigi [address]  Department of Interventional Radiology  [facility]  [phone] Office  [phone] Fax    PARACENTESIS DISCHARGE INSTRUCTIONS    General Information:  During this procedure, the doctor will insert a needle into the abdomen to drain fluid. After the procedure, you will be able to take a deep breath much easier. The site of the puncture may ooze the first day. This will decrease and eventually stop. Paracentesis (draining fluid from the abdomen) sometimes makes patients hypotensive (low blood pressure). Your doctor may order for you to receive fluids or albumin (a volume booster) during the procedure through an IV site. Home Care Instructions:  Keep the puncture site clean and dry. No tub baths or swimming until puncture site heals. Showering is acceptable. Resume your normal diet, and resume your normal activity slowly and as you tolerate. If you are short of breath, rest. If shortness of breath does not ease, please call your ordering doctor. Fluid can re-accumulate in the chest and/or in the abdomen. If this should occur, your doctor needs to know as you may need to have the procedure done again. Call If:     You should call your Physician and/or the Radiology Nurse if you notice any signs of infection, like pus draining, or if it is swollen or reddened. Also call if you have a fever, or if you are bleeding from the puncture site more than a small amount on the dressing. Call if the puncture site keeps draining fluid. Some oozing is to be expected, but should slow and then stop. Call if you feel like you have pressure in your abdomen. SEEK IMMEDIATE CARE OR CALL 911 IF YOU SUDDENLY HAVE TROUBLE BREATHING, OR IF YOUR LIPS TURN BLUE, OR IF YOU NOTICE BLOOD IN YOUR SPUTUM. Follow-Up Instructions: Please see your ordering doctor as he/she has requested. To Reach Us:   If you have any questions about your procedure, please call the Interventional Radiology department at [phone]. After business hours (5pm) and weekends, call the answering service at [phone] and ask for the Radiologist on call to be paged. Date: 7/21/2017  Discharging Nurse: Jaylon Randle RN      Interventional Radiology General Nurse Discharge    After general anesthesia or intravenous sedation, for 24 hours or while taking prescription Narcotics:  · Limit your activities  · Do not drive and operate hazardous machinery  · Do not make important personal or business decisions  · Do  not drink alcoholic beverages  · If you have not urinated within 8 hours after discharge, please contact your surgeon on call. * Please give a list of your current medications to your Primary Care Provider. * Please update this list whenever your medications are discontinued, doses are     changed, or new medications (including over-the-counter products) are added. * Please carry medication information at all times in case of emergency situations. These are general instructions for a healthy lifestyle:    No smoking/ No tobacco products/ Avoid exposure to second hand smoke  Surgeon General's Warning:  Quitting smoking now greatly reduces serious risk to your health. Obesity, smoking, and sedentary lifestyle greatly increases your risk for illness  A healthy diet, regular physical exercise & weight monitoring are important for maintaining a healthy lifestyle    You may be retaining fluid if you have a history of heart failure or if you experience any of the following symptoms:  Weight gain of 3 pounds or more overnight or 5 pounds in a week, increased swelling in our hands or feet or shortness of breath while lying flat in bed. Please call your doctor as soon as you notice any of these symptoms; do not wait until your next office visit.     Recognize signs and symptoms of STROKE:  F-face looks uneven    A-arms unable to move or move unevenly    S-speech slurred or non-existent    T-time-call 911 as soon as signs and symptoms begin-DO NOT go       Back to bed or wait to see if you get better-TIME IS BRAIN.

## 2017-07-21 NOTE — TELEPHONE ENCOUNTER
----- Message from Lilliana Vo sent at 7/21/2017 12:12 PM CDT -----  Contact: patient   Patient calling to get an update. Please call  [phone]

## 2017-07-28 NOTE — PROGRESS NOTES
Patient to [facility] room 3 room for procedure. Patient awake and alert, verbalized name, , and procedure to be performed. Patient moved to procedure table independently.

## 2017-07-28 NOTE — PROGRESS NOTES
4300 mls removed during paracentesis procedure today patient tolerating well, 25 G albumin infused per MD orders.

## 2017-07-28 NOTE — PROGRESS NOTES
cvc in left upper chest dressing changed using aseptic technique, ports with good blood return. No sign and symptoms of infection noted. Labs drawn.

## 2017-08-04 NOTE — PROCEDURES
Interventional Radiology Brief Procedure Note    Patient: Dave Peralta MRN: [id number]  SSN: [id number]    YOB: 1970  Age: 55 y.o. Sex: female      Date of Procedure: 8/4/2017    Pre-Procedure Diagnosis: Ascites. Post-Procedure Diagnosis: SAME    Procedure(s): Paracentesis    Brief Description of Procedure: as above    Performed By: Amparo Blanco MD     Assistants: None    Anesthesia: None    Estimated Blood Loss: None    Specimens: ascites to lab    Implants: None    Findings: massive ascites.      Complications: None    Recommendations: None     Follow Up: PRN    Signed By: Amparo Blanco MD     August 4, 2017

## 2017-08-04 NOTE — PROGRESS NOTES
Procedure complete. Pt tolerated well. Site covered with dressing and tegaderm. No bleeding noted. BP remains low, pt asymptomatic and states BP runs low. No complaints of dizziness upon standing. Pt denies need for discharge instructions. Pt discharged to home with daughter.

## 2017-08-04 NOTE — PROGRESS NOTES
Left chest picc line dressing, end caps, stat lock, and bio patch changed using sterile procedure by Andry Navarro RN. Ports flush well with good blood return. No s/s of infection.

## 2017-08-04 NOTE — PROGRESS NOTES
Interventional Radiology Post Paracentesis Note    8/4/2017    Procedure(s): Ultrasound guided Diagnostic Paracentesis Performed with 8 Italian catheter total volume 4000 ml. Samples sent to lab. Preliminary Findings: medium clear and yellow. Complications: None    Plan:  Observation, discharge home.           Chest X-Ray:  no    Full dictated report to follow    Signed By: Duran Randle RN

## 2017-08-11 NOTE — PROGRESS NOTES
Patient here for routine CVC dressing change by Dawson Bhat RN, VAT. Site pink and healthy. Both ports patent. Blood drawn for routine labs. Both ports flushed and packed with 300 units of Heparin each. Appt made for next week. Discharged home.   Anneliese Harden RN, VAT

## 2017-08-11 NOTE — PROGRESS NOTES
Patient declined printed discharge instructions. Feels fine. Declined offer of wheelchair and transport up to 7th floor.

## 2017-08-11 NOTE — DISCHARGE INSTRUCTIONS
Tiigi [address]  Department of Interventional Radiology  [facility]  [phone] Office  [phone] Fax    PARACENTESIS DISCHARGE INSTRUCTIONS    General Information:  During this procedure, the doctor will insert a needle into the abdomen to drain fluid. After the procedure, you will be able to take a deep breath much easier. The site of the puncture may ooze the first day. This will decrease and eventually stop. Paracentesis (draining fluid from the abdomen) sometimes makes patients hypotensive (low blood pressure). Your doctor may order for you to receive fluids or albumin (a volume booster) during the procedure through an IV site. Home Care Instructions:  Keep the puncture site clean and dry. No tub baths or swimming until puncture site heals. Showering is acceptable. Resume your normal diet, and resume your normal activity slowly and as you tolerate. If you are short of breath, rest. If shortness of breath does not ease, please call your ordering doctor. Fluid can re-accumulate in the chest and/or in the abdomen. If this should occur, your doctor needs to know as you may need to have the procedure done again. Call If:     You should call your Physician and/or the Radiology Nurse if you notice any signs of infection, like pus draining, or if it is swollen or reddened. Also call if you have a fever, or if you are bleeding from the puncture site more than a small amount on the dressing. Call if the puncture site keeps draining fluid. Some oozing is to be expected, but should slow and then stop. Call if you feel like you have pressure in your abdomen. SEEK IMMEDIATE CARE OR CALL 911 IF YOU SUDDENLY HAVE TROUBLE BREATHING, OR IF YOUR LIPS TURN BLUE, OR IF YOU NOTICE BLOOD IN YOUR SPUTUM. Follow-Up Instructions: Please see your ordering doctor as he/she has requested. To Reach Us:   If you have any questions about your procedure, please call the Interventional Radiology department at [phone]. After business hours (5pm) and weekends, call the answering service at [phone] and ask for the Radiologist on call to be paged. Interventional Radiology General Nurse Discharge      * Please give a list of your current medications to your Primary Care Provider. * Please update this list whenever your medications are discontinued, doses are     changed, or new medications (including over-the-counter products) are added. * Please carry medication information at all times in case of emergency situations. These are general instructions for a healthy lifestyle:    No smoking/ No tobacco products/ Avoid exposure to second hand smoke  Surgeon General's Warning:  Quitting smoking now greatly reduces serious risk to your health. Obesity, smoking, and sedentary lifestyle greatly increases your risk for illness  A healthy diet, regular physical exercise & weight monitoring are important for maintaining a healthy lifestyle    You may be retaining fluid if you have a history of heart failure or if you experience any of the following symptoms:  Weight gain of 3 pounds or more overnight or 5 pounds in a week, increased swelling in our hands or feet or shortness of breath while lying flat in bed. Please call your doctor as soon as you notice any of these symptoms; do not wait until your next office visit. Recognize signs and symptoms of STROKE:  F-face looks uneven    A-arms unable to move or move unevenly    S-speech slurred or non-existent    T-time-call 911 as soon as signs and symptoms begin-DO NOT go       Back to bed or wait to see if you get better-TIME IS BRAIN.             Date: 8/11/2017  Discharging Nurse: Hussein Odonnell RN

## 2017-08-11 NOTE — PROGRESS NOTES
Interventional Radiology Post Paracentesis/Thoracentesis Note    8/11/2017    Procedure(s): Ultrasound guided Therapeutic Paracentesis Performed with 8 Macedonian catheter total volume 4500  ml. Samples sent to lab. Preliminary Findings: medium clear brigitte fluid     Complications: None    Plan:  Observation, check labs if drawn.           Chest X-Ray:  no    Full dictated report to follow    Signed By: Sylvia Goncalves RN

## 2017-08-18 NOTE — PROGRESS NOTES
Discharge complete. Discharge instructions reviewed with pt. Time for questions allowed. Pt denies any questions at this time. Dressing to abdomen noted to be clean, dry, and intact. Pt assisted to front of hospital via wheelchair.      Visit Vitals    BP 91/56 (BP 1 Location: Left arm, BP Patient Position: At rest)    Pulse 75    Resp 16    Ht 5' 1\" (1.549 m)    Wt 40.8 kg (90 lb)    SpO2 97%    Breastfeeding No    BMI 17.01 kg/m2

## 2017-08-18 NOTE — PROGRESS NOTES
picc line dressing, end caps, stat lock, and bio patch changed using sterile procedure. Ports flush well with good blood return. No s/s of infection.

## 2017-08-18 NOTE — PROGRESS NOTES
William Lopez PA-C made aware of pt's platelet count of 43. No new orders received. Per ELLEN GARCIA, will proceed with paracentesis.

## 2017-08-18 NOTE — IP AVS SNAPSHOT
Kia Billingsley 
 
 
 [facility] [address] [facility] 
[phone] Patient: Samir Lobo MRN: [id number] You are allergic to the following Allergen Reactions Aspirin Nausea Only Codeine Nausea Only Compazine (Prochlorperazine Edisylate) Unknown (comments) Causes Lock Jaw Recent Documentation OB Status Smoking Status Postmenopausal Never Smoker Emergency Contacts Name Discharge Info Relation Home Work Mobile Rodemetriusstwewilla [address] CAREGIVER [3] Spouse [3] [phone] About your hospitalization You were admitted on:  August 18, 2017 You last received care in the:  D RADIOLOGY ULTRASOUND You were discharged on:  August 18, 2017 Unit phone number:  [phone] Why you were hospitalized Your primary diagnosis was:  Not on File Providers Seen During Your Hospitalizations Provider Role Specialty Primary office phone Leslee Meza MD Attending Provider Gastroenterology [phone] Your Primary Care Physician (PCP) Primary Care Physician Office Phone Office Fax [address] [phone] Follow-up Information None Your Appointments Friday August 25, 2017 11:00 AM EDT  
PICC MULTIPLE with PICC ROOM  
[facility] VASCULAR ACCESS ([address] [place] [address]) [address] [facility] 
7th Floor [facility] [address]  
[phone] Friday August 25, 2017 12:00 PM EDT  
[address] [place] with [facility] US LOGIC 7 UNIT 2, [facility] NURSING, [facility] IR PA RESOURCE 2  
[facility] RADIOLOGY ULTRASOUND ([address] [place] [address]) [facility] [address] [facility]  
[phone] Interventional Radiology Procedure completed with ultrasound guidance.  Referring Physicians: 1) Initial paracentesis patients required: H&P/recent office notes and lab work, no older than 30 days (CBC, BMP, PT/PTT) to Interventional Radiology to facilitate prompt scheduling. 2) Obtain clearance to hold blood thinners from prescribing Physician and give Patient instructions prior to arrival. 3) Patient may continue to eat or drink as normal prior to arrival. 4) Pt to arrive 15 minutes early. 5) Responsible adult  required to drive Patient home after recovery period. Recovery period can vary depending on sedation and patient condition. 6) Interventional Radiology Scheduling can be contacted at [phone] Thursday August 31, 2017  5:00 PM EDT  
[facility] MAMMO SCREENING with [facility] BI ROOM 2 [address] [facility] ([facility] [facility] [address]) [address] [place] [address]  
[phone] ***** NOTE: Appointments for the Mobile Mammography UNIT CANNOT be made on My Chart *****  PATIENT ARRIVAL - Please report 30 minutes early to check in. GENERAL INSTRUCTIONS -  On the day of your exam do not use any bath powder, deodorant or lotions on the chest or armpit area. Wear two-piece outfit for ease of changing. Allow at least 1 hour for test.  
  
    
  
  
 
  
  
  
Current Discharge Medication List  
  
ASK your doctor about these medications Dose & Instructions Dispensing Information Comments Morning Noon Evening Bedtime GENGRAF 25 mg capsule Generic drug:  cycloSPORINE modified Your last dose was: Your next dose is:    
   
   
 Dose:  2.5 mg/kg/day Take 2.5 mg/kg/day by mouth every morning. Indications: Takes in the AM  
 Refills:  0  
     
   
   
   
  
 insulin aspart 100 unit/mL injection Commonly known as:  Susana Espinoza Your last dose was: Your next dose is:    
   
   
 Sliding scale maximum 15 units each meal  
 Quantity:  10 mL Refills:  5  
     
   
   
   
  
 insulin glargine 100 unit/mL (3 mL) Inpn Commonly known as:  LANTUS SOLOSTAR Your last dose was: Your next dose is:    
   
   
 Dose:  25 Units 25 Units by SubCUTAneous route nightly. Quantity:  15 Each Refills:  3  
     
   
   
   
  
 insulin lispro 100 unit/mL kwikpen Commonly known as:  HUMALOG Your last dose was: Your next dose is:    
   
   
 Up to 10 units sq q ac Quantity:  15 Pen Refills:  3  
     
   
   
   
  
 lactulose 10 gram/15 mL solution Commonly known as:  Zeeshan Arlette Your last dose was: Your next dose is:    
   
   
 Dose:  20 g Take 30 mL by mouth three (3) times daily. Quantity:  480 mL Refills:  0  
     
   
   
   
  
 midodrine 5 mg tablet Commonly known as:  Luba [place] Your last dose was: Your next dose is: Take  by mouth every eight (8) hours. Refills:  0  
     
   
   
   
  
 morphine CR 15 mg CR tablet Commonly known as:  MS CONTIN Your last dose was: Your next dose is:    
   
   
 Dose:  15 mg Take 1 Tab by mouth every twelve (12) hours. Max Daily Amount: 30 mg.  
 Quantity:  20 Tab Refills:  0  
     
   
   
   
  
 mupirocin 2 % ointment Commonly known as:  [facility] Your last dose was: Your next dose is:    
   
   
 Apply  to affected area daily. Quantity:  22 g Refills:  1  
     
   
   
   
  
 pantoprazole 40 mg tablet Commonly known as:  PROTONIX Your last dose was: Your next dose is:    
   
   
 Dose:  40 mg Take 40 mg by mouth. Refills:  0  
     
   
   
   
  
 promethazine 25 mg tablet Commonly known as:  PHENERGAN Your last dose was: Your next dose is:    
   
   
 Dose:  25 mg Take 25 mg by mouth every six (6) hours as needed for Nausea. Refills:  0 REGLAN 10 mg tablet Generic drug:  metoclopramide HCl Your last dose was:     
   
Your next dose is:    
   
   
 Dose:  10 mg  
 Take 10 mg by mouth Before breakfast, lunch, dinner and at bedtime. Refills:  0  
     
   
   
   
  
 rifAXIMin 550 mg tablet Commonly known as:  [place] Your last dose was: Your next dose is:    
   
   
 Reported on 5/25/2017 - BID Refills:  0 Discharge Instructions [address] [facility] [address] Department of Interventional Radiology [facility] 
[phone] Office 
[phone] Fax PARACENTESIS DISCHARGE INSTRUCTIONS General Information: 
During this procedure, the doctor will insert a needle into the abdomen to drain fluid. After the procedure, you will be able to take a deep breath much easier. The site of the puncture may ooze the first day. This will decrease and eventually stop. Paracentesis (draining fluid from the abdomen) sometimes makes patients hypotensive (low blood pressure). Your doctor may order for you to receive fluids or albumin (a volume booster) during the procedure through an IV site. Home Care Instructions: 
Keep the puncture site clean and dry. No tub baths or swimming until puncture site heals. Showering is acceptable. Resume your normal diet, and resume your normal activity slowly and as you tolerate. If you are short of breath, rest. If shortness of breath does not ease, please call your ordering doctor. Fluid can re-accumulate in the chest and/or in the abdomen. If this should occur, your doctor needs to know as you may need to have the procedure done again. Call If: 
   You should call your Physician and/or the Radiology Nurse if you notice any signs of infection, like pus draining, or if it is swollen or reddened. Also call if you have a fever, or if you are bleeding from the puncture site more than a small amount on the dressing. Call if the puncture site keeps draining fluid.  Some oozing is to be expected, but should slow and then stop. Call if you feel like you have pressure in your abdomen. SEEK IMMEDIATE CARE OR CALL 911 IF YOU SUDDENLY HAVE TROUBLE BREATHING, OR IF YOUR LIPS TURN BLUE, OR IF YOU NOTICE BLOOD IN YOUR SPUTUM. Follow-Up Instructions: Please see your ordering doctor as he/she has requested. To Reach Us: If you have any questions about your procedure, please call the Interventional Radiology department at [phone]. After business hours (5pm) and weekends, call the answering service at [phone] and ask for the Radiologist on call to be paged. Interventional Radiology General Nurse Discharge After general anesthesia or intravenous sedation, for 24 hours or while taking prescription Narcotics: · Limit your activities · Do not drive and operate hazardous machinery · Do not make important personal or business decisions · Do  not drink alcoholic beverages · If you have not urinated within 8 hours after discharge, please contact your surgeon on call. * Please give a list of your current medications to your Primary Care Provider. * Please update this list whenever your medications are discontinued, doses are 
   changed, or new medications (including over-the-counter products) are added. * Please carry medication information at all times in case of emergency situations. These are general instructions for a healthy lifestyle: No smoking/ No tobacco products/ Avoid exposure to second hand smoke Surgeon General's Warning:  Quitting smoking now greatly reduces serious risk to your health. Obesity, smoking, and sedentary lifestyle greatly increases your risk for illness A healthy diet, regular physical exercise & weight monitoring are important for maintaining a healthy lifestyle You may be retaining fluid if you have a history of heart failure or if you experience any of the following symptoms:  Weight gain of 3 pounds or more overnight or 5 pounds in a week, increased swelling in our hands or feet or shortness of breath while lying flat in bed. Please call your doctor as soon as you notice any of these symptoms; do not wait until your next office visit. Recognize signs and symptoms of STROKE: 
F-face looks uneven A-arms unable to move or move unevenly S-speech slurred or non-existent T-time-call 911 as soon as signs and symptoms begin-DO NOT go Back to bed or wait to see if you get better-TIME IS BRAIN. Date: 8/18/2017 Discharging Nurse: Brianna Cespedes RN Discharge Orders None ACO Transitions of Care Introducing [employer] Anjelica Banks offers a voluntary care coordination program to provide high quality service and care to [facility] fee-for-service beneficiaries. Merry Bumpers was designed to help you enhance your health and well-being through the following services: ? Transitions of Care  support for individuals who are transitioning from one care setting to another (example: Hospital to home). ? Chronic and Complex Care Coordination  support for individuals and caregivers of those with serious or chronic illnesses or with more than one chronic (ongoing) condition and those who take a number of different medications. If you meet specific medical criteria, a [facility] [address] may call you directly to coordinate your care with your primary care physician and your other care providers. For questions about the [facility] programs, please, contact your physicians office. For general questions or additional information about Accountable Care Organizations: 
Please visit www.medicare.gov/acos. html or call 1-800-MEDICARE (9-151.389.4560) TTY users should call 6-644.851.8104. Introducing [facility]! Dear Fito Mcfadden: 
Thank you for requesting a [employer] account.   Our records indicate that you already have an active [employer] account. You can access your account anytime at [email or web address] Did you know that you can access your hospital and ER discharge instructions at any time in [employer]? You can also review all of your test results from your hospital stay or ER visit. Additional Information If you have questions, please visit the Frequently Asked Questions section of the [employer] website at [email or web address] Remember, [employer] is NOT to be used for urgent needs. For medical emergencies, dial 911. Now available from your iPhone and Android! General Information Please provide this summary of care documentation to your next provider. Patient Signature:  ____________________________________________________________ Date:  ____________________________________________________________  
  
[facility] Provider Signature:  ____________________________________________________________ Date:  ____________________________________________________________

## 2017-08-18 NOTE — PROGRESS NOTES
Interventional Radiology Post Paracentesis/Thoracentesis Note    8/18/2017    Procedure(s): Ultrasound guided Therapeutic Paracentesis Performed  total volume 5000 ml. Samples sent to lab. Preliminary Findings: large yellow. Complications: None    Plan:  Observation, check labs if drawn.           Chest X-Ray:  no    Full dictated report to follow    Signed By: Julio Schneider RN

## 2017-08-18 NOTE — DISCHARGE INSTRUCTIONS
[address]  Department of Interventional Radiology  [facility]  [phone] Office  [phone] Fax    PARACENTESIS DISCHARGE INSTRUCTIONS    General Information:  During this procedure, the doctor will insert a needle into the abdomen to drain fluid. After the procedure, you will be able to take a deep breath much easier. The site of the puncture may ooze the first day. This will decrease and eventually stop. Paracentesis (draining fluid from the abdomen) sometimes makes patients hypotensive (low blood pressure). Your doctor may order for you to receive fluids or albumin (a volume booster) during the procedure through an IV site. Home Care Instructions:  Keep the puncture site clean and dry. No tub baths or swimming until puncture site heals. Showering is acceptable. Resume your normal diet, and resume your normal activity slowly and as you tolerate. If you are short of breath, rest. If shortness of breath does not ease, please call your ordering doctor. Fluid can re-accumulate in the chest and/or in the abdomen. If this should occur, your doctor needs to know as you may need to have the procedure done again. Call If:     You should call your Physician and/or the Radiology Nurse if you notice any signs of infection, like pus draining, or if it is swollen or reddened. Also call if you have a fever, or if you are bleeding from the puncture site more than a small amount on the dressing. Call if the puncture site keeps draining fluid. Some oozing is to be expected, but should slow and then stop. Call if you feel like you have pressure in your abdomen. SEEK IMMEDIATE CARE OR CALL 911 IF YOU SUDDENLY HAVE TROUBLE BREATHING, OR IF YOUR LIPS TURN BLUE, OR IF YOU NOTICE BLOOD IN YOUR SPUTUM. Follow-Up Instructions: Please see your ordering doctor as he/she has requested. To Reach Us:   If you have any questions about your procedure, please call the Interventional Radiology department at [phone]. After business hours (5pm) and weekends, call the answering service at [phone] and ask for the Radiologist on call to be paged. Interventional Radiology General Nurse Discharge    After general anesthesia or intravenous sedation, for 24 hours or while taking prescription Narcotics:  · Limit your activities  · Do not drive and operate hazardous machinery  · Do not make important personal or business decisions  · Do  not drink alcoholic beverages  · If you have not urinated within 8 hours after discharge, please contact your surgeon on call. * Please give a list of your current medications to your Primary Care Provider. * Please update this list whenever your medications are discontinued, doses are     changed, or new medications (including over-the-counter products) are added. * Please carry medication information at all times in case of emergency situations. These are general instructions for a healthy lifestyle:    No smoking/ No tobacco products/ Avoid exposure to second hand smoke  Surgeon General's Warning:  Quitting smoking now greatly reduces serious risk to your health. Obesity, smoking, and sedentary lifestyle greatly increases your risk for illness  A healthy diet, regular physical exercise & weight monitoring are important for maintaining a healthy lifestyle    You may be retaining fluid if you have a history of heart failure or if you experience any of the following symptoms:  Weight gain of 3 pounds or more overnight or 5 pounds in a week, increased swelling in our hands or feet or shortness of breath while lying flat in bed. Please call your doctor as soon as you notice any of these symptoms; do not wait until your next office visit.     Recognize signs and symptoms of STROKE:  F-face looks uneven    A-arms unable to move or move unevenly    S-speech slurred or non-existent    T-time-call 911 as soon as signs and symptoms begin-DO NOT go       Back to bed or wait to see if you get better-TIME IS BRAIN.       Date: 8/18/2017  Discharging Nurse: Richard Crain RN

## 2017-08-23 ENCOUNTER — TELEPHONE (OUTPATIENT)
Dept: TRANSPLANT | Facility: CLINIC | Age: 47
End: 2017-08-23

## 2017-08-23 NOTE — TELEPHONE ENCOUNTER
----- Message from Karen Medrano sent at 8/23/2017 12:04 PM CDT -----  Contact: Kody Dover    Pt states she is returning a call to you    Pt contact number [phone] or [phone] (cell)  Thanks

## 2017-08-23 NOTE — TELEPHONE ENCOUNTER
Pt having eval at [facility] next week. She is agreeable to waiting till that eval.is completed and we will review.

## 2017-08-25 NOTE — PROGRESS NOTES
Interventional Radiology Post Paracentesis Note    8/25/2017    Procedure(s): Ultrasound guided Diagnostic Paracentesis Performed with 8 Eritrean catheter total volume 4500 ml. Samples sent to lab. Preliminary Findings: moderate clear and yellow.     Complications: None    Plan:  Observation, discharge home          Chest X-Ray:  no    Full dictated report to follow by Nicole Lawson PA-C    Signed By: Bryant Doss RN

## 2017-08-25 NOTE — PROGRESS NOTES
Procedure complete. Pt denies need for discharge instructions. Pt dressed and discharged to home. No complaints at time of discharge.

## 2017-08-25 NOTE — PROGRESS NOTES
Picc line dressing, end caps, stat lock, and bio patch changed using sterile procedure by Nic Yee RN. Labs(CBC with diff, CMP, AFP, PT w/ INR and Ammonia) drawn from ports. Ports flush well with good blood return. No s/s of infection.

## 2017-09-01 NOTE — PROGRESS NOTES
Interventional Radiology Post Paracentesis/Thoracentesis Note    9/1/2017    Procedure(s): Ultrasound guided Diagnostic Paracentesis Performed with 8 Beninese catheter total volume 4800 ml. Samples sent to lab. Preliminary Findings: moderate yellow. Complications: None    Plan:  Observation, check labs if drawn.           Chest X-Ray:  no    Full dictated report to follow    Signed By: Remy Saeed

## 2017-09-01 NOTE — IP AVS SNAPSHOT
[address] 
[phone] Patient: Chinyere Yang MRN: [id number] Current Discharge Medication List  
  
ASK your doctor about these medications Dose & Instructions Dispensing Information Comments Morning Noon Evening Bedtime GENGRAF 25 mg capsule Generic drug:  cycloSPORINE modified Your last dose was: Your next dose is:    
   
   
 Dose:  2.5 mg/kg/day Take 2.5 mg/kg/day by mouth every morning. Indications: Takes in the AM  
 Refills:  0  
     
   
   
   
  
 insulin aspart 100 unit/mL injection Commonly known as:  Pastor Veras Your last dose was: Your next dose is:    
   
   
 Sliding scale maximum 15 units each meal  
 Quantity:  10 mL Refills:  5  
     
   
   
   
  
 insulin glargine 100 unit/mL (3 mL) Inpn Commonly known as:  LANTUS SOLOSTAR Your last dose was: Your next dose is:    
   
   
 Dose:  25 Units 25 Units by SubCUTAneous route nightly. Quantity:  15 Each Refills:  3  
     
   
   
   
  
 insulin lispro 100 unit/mL kwikpen Commonly known as:  HUMALOG Your last dose was: Your next dose is:    
   
   
 Up to 10 units sq q ac Quantity:  15 Pen Refills:  3  
     
   
   
   
  
 lactulose 10 gram/15 mL solution Commonly known as:  Renell Elli Your last dose was: Your next dose is:    
   
   
 Dose:  20 g Take 30 mL by mouth three (3) times daily. Quantity:  480 mL Refills:  0  
     
   
   
   
  
 midodrine 5 mg tablet Commonly known as:  Lurdes Rasmussen Your last dose was: Your next dose is: Take  by mouth every eight (8) hours. Refills:  0  
     
   
   
   
  
 morphine CR 15 mg CR tablet Commonly known as:  MS CONTIN Your last dose was:     
   
Your next dose is:    
   
   
 Dose:  15 mg  
 Take 1 Tab by mouth every twelve (12) hours. Max Daily Amount: 30 mg.  
 Quantity:  20 Tab Refills:  0  
     
   
   
   
  
 mupirocin 2 % ointment Commonly known as:  [employer] Your last dose was: Your next dose is:    
   
   
 Apply  to affected area daily. Quantity:  22 g Refills:  1  
     
   
   
   
  
 pantoprazole 40 mg tablet Commonly known as:  PROTONIX Your last dose was: Your next dose is:    
   
   
 Dose:  40 mg Take 40 mg by mouth. Refills:  0  
     
   
   
   
  
 promethazine 25 mg tablet Commonly known as:  PHENERGAN Your last dose was: Your next dose is:    
   
   
 Dose:  25 mg Take 25 mg by mouth every six (6) hours as needed for Nausea. Refills:  0 REGLAN 10 mg tablet Generic drug:  metoclopramide HCl Your last dose was: Your next dose is:    
   
   
 Dose:  10 mg Take 10 mg by mouth Before breakfast, lunch, dinner and at bedtime. Refills:  0  
     
   
   
   
  
 rifAXIMin 550 mg tablet Commonly known as:  Godfrey Skinners Your last dose was: Your next dose is:    
   
   
 Reported on 5/25/2017 - BID Refills:  0

## 2017-09-01 NOTE — IP AVS SNAPSHOT
Deisy Robert 
 
 
 [address] 
[phone] Patient: Oscar Lopez MRN: [id number] VVC:[id number] You are allergic to the following Allergen Reactions Aspirin Nausea Only Codeine Nausea Only Compazine (Prochlorperazine Edisylate) Unknown (comments) Causes Lock Jaw Recent Documentation OB Status Smoking Status Postmenopausal Never Smoker Emergency Contacts Name Discharge Info Relation Home [facility] [3] Spouse [3] [phone] About your hospitalization You were admitted on:  September 1, 2017 You last received care in the:  [facility] You were discharged on:  September 1, 2017 Unit phone number:  [phone] Why you were hospitalized Your primary diagnosis was:  Not on File Providers Seen During Your Hospitalizations Provider Role Specialty Primary office phone Obi Medley MD Attending Provider Gastroenterology [phone] Your Primary Care Physician (PCP) Primary Care Physician Office Phone Office Fax [address] [phone] Follow-up Information None Your Appointments Tuesday September 05, 2017  2:30 PM EDT  
[facility] with [facility] 1  
[facility] OP [facility]OUND [facility] ([facility]) Jim Marti Quiroga Yaz [address] [facility] [address] [phone] Friday September 08, 2017  9:00 AM EDT  
CVC DRESSING/FLUSH with PICC ROOM  
[facility] ([address] [place] [address]) [facility] 
7th Floor [facility] [address]  
[phone] Friday September 08, 2017 10:00 AM EDT  
US GUIDED PARACENTISIS INIT with [facility] US LOGIC 7 UNIT 2, [facility] NURSING, [facility] IR RADIOLOGIST RESOURCE, [facility] IR PA RESOURCE 2  
[facility] ([address] [place] [address]) [address]  
[phone] Interventional Radiology Procedure completed with ultrasound guidance. Referring Physicians: 1) Initial paracentesis patients required: H&P/recent office notes and lab work, no older than 30 days (CBC, BMP, PT/PTT) to Interventional Radiology to facilitate prompt scheduling. 2) Obtain clearance to hold blood thinners from prescribing Physician and give Patient instructions prior to arrival. 3) Patient may continue to eat or drink as normal prior to arrival. 4) Pt to arrive 15 minutes early. 5) Responsible adult  required to drive Patient home after recovery period. Recovery period can vary depending on sedation and patient condition. 6) Interventional Radiology Scheduling can be contacted at [phone] Current Discharge Medication List  
  
ASK your doctor about these medications Dose & Instructions Dispensing Information Comments Morning Noon Evening Bedtime GENGRAF 25 mg capsule Generic drug:  cycloSPORINE modified Your last dose was: Your next dose is:    
   
   
 Dose:  2.5 mg/kg/day Take 2.5 mg/kg/day by mouth every morning. Indications: Takes in the AM  
 Refills:  0  
     
   
   
   
  
 insulin aspart 100 unit/mL injection Commonly known as:  Steph Hernandez Your last dose was: Your next dose is:    
   
   
 Sliding scale maximum 15 units each meal  
 Quantity:  10 mL Refills:  5  
     
   
   
   
  
 insulin glargine 100 unit/mL (3 mL) Inpn Commonly known as:  LANTUS SOLOSTAR Your last dose was: Your next dose is:    
   
   
 Dose:  25 Units 25 Units by SubCUTAneous route nightly. Quantity:  15 Each Refills:  3  
     
   
   
   
  
 insulin lispro 100 unit/mL kwikpen Commonly known as:  HUMALOG Your last dose was: Your next dose is:    
   
   
 Up to 10 units sq q ac Quantity:  15 Pen Refills:  3 lactulose 10 gram/15 mL solution Commonly known as:  Deborah Borders Your last dose was: Your next dose is:    
   
   
 Dose:  20 g Take 30 mL by mouth three (3) times daily. Quantity:  480 mL Refills:  0  
     
   
   
   
  
 midodrine 5 mg tablet Commonly known as:  Tre Espinoza Your last dose was: Your next dose is: Take  by mouth every eight (8) hours. Refills:  0  
     
   
   
   
  
 morphine CR 15 mg CR tablet Commonly known as:  MS CONTIN Your last dose was: Your next dose is:    
   
   
 Dose:  15 mg Take 1 Tab by mouth every twelve (12) hours. Max Daily Amount: 30 mg.  
 Quantity:  20 Tab Refills:  0  
     
   
   
   
  
 mupirocin 2 % ointment Commonly known as:  [employer] Your last dose was: Your next dose is:    
   
   
 Apply  to affected area daily. Quantity:  22 g Refills:  1  
     
   
   
   
  
 pantoprazole 40 mg tablet Commonly known as:  PROTONIX Your last dose was: Your next dose is:    
   
   
 Dose:  40 mg Take 40 mg by mouth. Refills:  0  
     
   
   
   
  
 promethazine 25 mg tablet Commonly known as:  PHENERGAN Your last dose was: Your next dose is:    
   
   
 Dose:  25 mg Take 25 mg by mouth every six (6) hours as needed for Nausea. Refills:  0 REGLAN 10 mg tablet Generic drug:  metoclopramide HCl Your last dose was: Your next dose is:    
   
   
 Dose:  10 mg Take 10 mg by mouth Before breakfast, lunch, dinner and at bedtime. Refills:  0  
     
   
   
   
  
 rifAXIMin 550 mg tablet Commonly known as:  [place] Your last dose was: Your next dose is:    
   
   
 Reported on 5/25/2017 - BID Refills:  0 Discharge Instructions [place] [address] Department of Interventional Radiology [facility] 
[phone] Office 
[phone] Fax PARACENTESIS DISCHARGE INSTRUCTIONS General Information: 
During this procedure, the doctor will insert a needle into the abdomen to drain fluid. After the procedure, you will be able to take a deep breath much easier. The site of the puncture may ooze the first day. This will decrease and eventually stop. Paracentesis (draining fluid from the abdomen) sometimes makes patients hypotensive (low blood pressure). Your doctor may order for you to receive fluids or albumin (a volume booster) during the procedure through an IV site. Home Care Instructions: 
Keep the puncture site clean and dry. No tub baths or swimming until puncture site heals. Showering is acceptable. Resume your normal diet, and resume your normal activity slowly and as you tolerate. If you are short of breath, rest. If shortness of breath does not ease, please call your ordering doctor. Fluid can re-accumulate in the chest and/or in the abdomen. If this should occur, your doctor needs to know as you may need to have the procedure done again. Call If: 
   You should call your Physician and/or the Radiology Nurse if you notice any signs of infection, like pus draining, or if it is swollen or reddened. Also call if you have a fever, or if you are bleeding from the puncture site more than a small amount on the dressing. Call if the puncture site keeps draining fluid. Some oozing is to be expected, but should slow and then stop. Call if you feel like you have pressure in your abdomen. SEEK IMMEDIATE CARE OR CALL [phone] IF YOU SUDDENLY HAVE TROUBLE BREATHING, OR IF YOUR LIPS TURN BLUE, OR IF YOU NOTICE BLOOD IN YOUR SPUTUM. Follow-Up Instructions: Please see your ordering doctor as he/she has requested. To Reach Us: If you have any questions about your procedure, please call the Interventional Radiology department at [phone].  After [employer] hours (5pm) and weekends, call the answering service at [phone] and ask for the Radiologist on call to be paged. Interventional Radiology General Nurse Discharge After general anesthesia or intravenous sedation, for 24 hours or while taking prescription Narcotics: · Limit your activities · Do not drive and operate hazardous machinery · Do not make important personal or business decisions · Do  not drink alcoholic beverages · If you have not urinated within 8 hours after discharge, please contact your surgeon on call. * Please give a list of your current medications to your Primary Care Provider. * Please update this list whenever your medications are discontinued, doses are 
   changed, or new medications (including over-the-counter products) are added. * Please carry medication information at all times in case of emergency situations. These are general instructions for a healthy lifestyle: No smoking/ No tobacco products/ Avoid exposure to second hand smoke Surgeon General's Warning:  Quitting smoking now greatly reduces serious risk to your health. Obesity, smoking, and sedentary lifestyle greatly increases your risk for illness A healthy diet, regular physical exercise & weight monitoring are important for maintaining a healthy lifestyle You may be retaining fluid if you have a history of heart failure or if you experience any of the following symptoms:  Weight gain of 3 pounds or more overnight or 5 pounds in a week, increased swelling in our hands or feet or shortness of breath while lying flat in bed. Please call your doctor as soon as you notice any of these symptoms; do not wait until your next office visit. Recognize signs and symptoms of STROKE: 
F-face looks uneven A-arms unable to move or move unevenly S-speech slurred or non-existent T-time-call 911 as soon as signs and symptoms begin-DO NOT go  
 Back to bed or wait to see if you get better-TIME IS BRAIN. Date: 9/1/2017 Discharging Nurse: Mulugeta Esposito Discharge Orders None ACO Transitions of Care Introducing [employer] offers a voluntary care coordination program to provide high quality service and care to [facility] fee-for-service beneficiaries. Robbin Dawkins was designed to help you enhance your health and well-being through the following services: ? Transitions of Care  support for individuals who are transitioning from one care setting to another (example: Hospital to home). ? Chronic and Complex Care Coordination  support for individuals and caregivers of those with serious or chronic illnesses or with more than one chronic (ongoing) condition and those who take a number of different medications. If you meet specific medical criteria, a [address] [place] [address] Rd may call you directly to coordinate your care with your primary care physician and your other care providers. For questions about the [facility] programs, please, contact your physicians office. For general questions or additional information about Accountable Care Organizations: 
Please visit www.medicare.gov/acos. html or call 1-800-MEDICARE (8-990.501.9247) TTY users should call 5-276.624.2510. Introducing [facility]! Dear Maegan Mcguire: 
Thank you for requesting a [employer] account. Our records indicate that you already have an active [employer] account. You can access your account anytime at [email or web address] Did you know that you can access your hospital and ER discharge instructions at any time in [employer]? You can also review all of your test results from your hospital stay or ER visit. Additional Information If you have questions, please visit the Frequently Asked Questions section of the [employer] website at [email or web address] [place] [employer]. [employer]mycElement IDt/. Remember, MyChart is NOT to be used for urgent needs. For medical emergencies, dial 911. Now available from your iPhone and Android! General Information Please provide this summary of care documentation to your next provider. Patient Signature:  ____________________________________________________________ Date:  ____________________________________________________________  
  
Almaz Piety Provider Signature:  ____________________________________________________________ Date:  ____________________________________________________________

## 2017-09-01 NOTE — PROGRESS NOTES
Patient her for routine dual tunnelled CVC flushing, dressing change. Herb accessed each line with sterile saline. Each aspirated well and flushed without difficulty. Packed with 300u heparin each port. Deaccessed and site covered with triple antibiotic ointment, gauze and tegaderm. Tolerated well. Appointment made for next week. Discharged home with mother.   Evangelina Gross RN, VAT

## 2017-09-01 NOTE — DISCHARGE INSTRUCTIONS
[address]  Department of Interventional Radiology  [address] [place] [address] [address] [facility]  [phone] Office  [phone] Fax    PARACENTESIS DISCHARGE INSTRUCTIONS    General Information:  During this procedure, the doctor will insert a needle into the abdomen to drain fluid. After the procedure, you will be able to take a deep breath much easier. The site of the puncture may ooze the first day. This will decrease and eventually stop. Paracentesis (draining fluid from the abdomen) sometimes makes patients hypotensive (low blood pressure). Your doctor may order for you to receive fluids or albumin (a volume booster) during the procedure through an IV site. Home Care Instructions:  Keep the puncture site clean and dry. No tub baths or swimming until puncture site heals. Showering is acceptable. Resume your normal diet, and resume your normal activity slowly and as you tolerate. If you are short of breath, rest. If shortness of breath does not ease, please call your ordering doctor. Fluid can re-accumulate in the chest and/or in the abdomen. If this should occur, your doctor needs to know as you may need to have the procedure done again. Call If:     You should call your Physician and/or the Radiology Nurse if you notice any signs of infection, like pus draining, or if it is swollen or reddened. Also call if you have a fever, or if you are bleeding from the puncture site more than a small amount on the dressing. Call if the puncture site keeps draining fluid. Some oozing is to be expected, but should slow and then stop. Call if you feel like you have pressure in your abdomen. SEEK IMMEDIATE CARE OR CALL 911 IF YOU SUDDENLY HAVE TROUBLE BREATHING, OR IF YOUR LIPS TURN BLUE, OR IF YOU NOTICE BLOOD IN YOUR SPUTUM. Follow-Up Instructions: Please see your ordering doctor as he/she has requested. To Reach Us:  If you have any questions about your procedure, please call the Interventional Radiology department at [phone]. After business hours (5pm) and weekends, call the answering service at [phone] and ask for the Radiologist on call to be paged. Interventional Radiology General Nurse Discharge    After general anesthesia or intravenous sedation, for 24 hours or while taking prescription Narcotics:  · Limit your activities  · Do not drive and operate hazardous machinery  · Do not make important personal or business decisions  · Do  not drink alcoholic beverages  · If you have not urinated within 8 hours after discharge, please contact your surgeon on call. * Please give a list of your current medications to your Primary Care Provider. * Please update this list whenever your medications are discontinued, doses are     changed, or new medications (including over-the-counter products) are added. * Please carry medication information at all times in case of emergency situations. These are general instructions for a healthy lifestyle:    No smoking/ No tobacco products/ Avoid exposure to second hand smoke  Surgeon General's Warning:  Quitting smoking now greatly reduces serious risk to your health. Obesity, smoking, and sedentary lifestyle greatly increases your risk for illness  A healthy diet, regular physical exercise & weight monitoring are important for maintaining a healthy lifestyle    You may be retaining fluid if you have a history of heart failure or if you experience any of the following symptoms:  Weight gain of 3 pounds or more overnight or 5 pounds in a week, increased swelling in our hands or feet or shortness of breath while lying flat in bed. Please call your doctor as soon as you notice any of these symptoms; do not wait until your next office visit.     Recognize signs and symptoms of STROKE:  F-face looks uneven    A-arms unable to move or move unevenly    S-speech slurred or non-existent    T-time-call 911 as soon as signs and symptoms begin-DO NOT go       Back to bed or wait to see if you get better-TIME IS BRAIN.                                  Date: 9/1/2017  Discharging Nurse: Zuri Donato

## 2017-09-06 ENCOUNTER — TELEPHONE (OUTPATIENT)
Dept: TRANSPLANT | Facility: CLINIC | Age: 47
End: 2017-09-06

## 2017-09-06 NOTE — TELEPHONE ENCOUNTER
----- Message from Karen Medrano sent at 9/6/2017  4:25 PM CDT -----  Contact: Stanley ()  Rony Dover states this is a social call he wanted to check to see how you were doing? Because he appreciates everything you have done      Contact number [phone]    Thanks

## 2017-09-08 NOTE — PROGRESS NOTES
4500 ml's of fluid removed from LLQ during paracentesis ; site dressed with bulky gauze and Tegaderm ,tolerating well

## 2017-09-08 NOTE — IP AVS SNAPSHOT
[address] 
[phone] Patient: Naun Lamas MRN: [id number] You are allergic to the following Allergen Reactions Aspirin Nausea Only Codeine Nausea Only Compazine (Prochlorperazine Edisylate) Unknown (comments) Causes Lock Jaw Recent Documentation OB Status Smoking Status Postmenopausal Never Smoker Emergency Contacts Name Discharge Info Relation Home Work Mobile Jean Mraiestwe 89 CAREGIVER [3] Spouse [3] [phone] About your hospitalization You were admitted on:  September 8, 2017 You last received care in the:  [facility] You were discharged on:  September 8, 2017 Unit phone number:  [phone] Why you were hospitalized Your primary diagnosis was:  Not on File Providers Seen During Your Hospitalizations Provider Role Specialty Primary office phone Jessica Hinds MD Attending Provider Gastroenterology [phone] Your Primary Care Physician (PCP) Primary Care Physician Office Phone Office Fax [address]326 [address] [phone] Follow-up Information None Current Discharge Medication List  
  
ASK your doctor about these medications Dose & Instructions Dispensing Information Comments Morning Noon Evening Bedtime GENGRAF 25 mg capsule Generic drug:  cycloSPORINE modified Your last dose was: Your next dose is:    
   
   
 Dose:  2.5 mg/kg/day Take 2.5 mg/kg/day by mouth every morning. Indications: Takes in the AM  
 Refills:  0  
     
   
   
   
  
 insulin aspart 100 unit/mL injection Commonly known as:  Rubin Mosher Your last dose was: Your next dose is:    
   
   
 Sliding scale maximum 15 units each meal  
 Quantity:  10 mL Refills:  5 insulin glargine 100 unit/mL (3 mL) Inpn Commonly known as:  LANBRITANY SOLOSTAR Your last dose was: Your next dose is:    
   
   
 Dose:  25 Units 25 Units by SubCUTAneous route nightly. Quantity:  15 Each Refills:  3  
     
   
   
   
  
 insulin lispro 100 unit/mL kwikpen Commonly known as:  HUMALOG Your last dose was: Your next dose is:    
   
   
 Up to 10 units sq q ac Quantity:  15 Pen Refills:  3  
     
   
   
   
  
 lactulose 10 gram/15 mL solution Commonly known as:  Charo Bogaert Your last dose was: Your next dose is:    
   
   
 Dose:  20 g Take 30 mL by mouth three (3) times daily. Quantity:  480 mL Refills:  0  
     
   
   
   
  
 midodrine 5 mg tablet Commonly known as:  Amee Andres Your last dose was: Your next dose is: Take  by mouth every eight (8) hours. Refills:  0  
     
   
   
   
  
 morphine CR 15 mg CR tablet Commonly known as:  MS CONTIN Your last dose was: Your next dose is:    
   
   
 Dose:  15 mg Take 1 Tab by mouth every twelve (12) hours. Max Daily Amount: 30 mg.  
 Quantity:  20 Tab Refills:  0  
     
   
   
   
  
 mupirocin 2 % ointment Commonly known as:  [employer] Your last dose was: Your next dose is:    
   
   
 Apply  to affected area daily. Quantity:  22 g Refills:  1  
     
   
   
   
  
 pantoprazole 40 mg tablet Commonly known as:  PROTONIX Your last dose was: Your next dose is:    
   
   
 Dose:  40 mg Take 40 mg by mouth. Refills:  0  
     
   
   
   
  
 promethazine 25 mg tablet Commonly known as:  PHENERGAN Your last dose was: Your next dose is:    
   
   
 Dose:  25 mg Take 25 mg by mouth every six (6) hours as needed for Nausea. Refills:  0 REGLAN 10 mg tablet Generic drug:  metoclopramide HCl Your last dose was: Your next dose is:    
   
   
 Dose:  10 mg Take 10 mg by mouth Before breakfast, lunch, dinner and at bedtime. Refills:  0  
     
   
   
   
  
 rifAXIMin 550 mg tablet Commonly known as:  Stella Ramirez Your last dose was: Your next dose is:    
   
   
 Reported on 5/25/2017 - BID Refills:  0 Discharge Instructions [address] Department of Interventional Radiology [address] [place] [address] [facility] 
[phone] Office 
[phone] Fax PARACENTESIS DISCHARGE INSTRUCTIONS General Information: 
During this procedure, the doctor will insert a needle into the abdomen to drain fluid. After the procedure, you will be able to take a deep breath much easier. The site of the puncture may ooze the first day. This will decrease and eventually stop. Paracentesis (draining fluid from the abdomen) sometimes makes patients hypotensive (low blood pressure). Your doctor may order for you to receive fluids or albumin (a volume booster) during the procedure through an IV site. Home Care Instructions: 
Keep the puncture site clean and dry. No tub baths or swimming until puncture site heals. Showering is acceptable. Resume your normal diet, and resume your normal activity slowly and as you tolerate. If you are short of breath, rest. If shortness of breath does not ease, please call your ordering doctor. Fluid can re-accumulate in the chest and/or in the abdomen. If this should occur, your doctor needs to know as you may need to have the procedure done again. Call If: 
   You should call your Physician and/or the Radiology Nurse if you notice any signs of infection, like pus draining, or if it is swollen or reddened. Also call if you have a fever, or if you are bleeding from the puncture site more than a small amount on the dressing. Call if the puncture site keeps draining fluid.  Some oozing is to be expected, but should slow and then stop. Call if you feel like you have pressure in your abdomen. SEEK IMMEDIATE CARE OR CALL 911 IF YOU SUDDENLY HAVE TROUBLE BREATHING, OR IF YOUR LIPS TURN BLUE, OR IF YOU NOTICE BLOOD IN YOUR SPUTUM. Follow-Up Instructions: Please see your ordering doctor as he/she has requested. To Reach Us: If you have any questions about your procedure, please call the Interventional Radiology department at [phone]. After business hours (5pm) and weekends, call the answering service at [phone] and ask for the Radiologist on call to be paged. Si tiene Preguntas acerca del procedimiento, por favor llame al departamento de Radiología Intervencional al [phone]. Después de horas de oficina (5 pm) y los fines de [place], llamar al Lanney Blade de llamadas al [phone] y pregunte por el Radiologo de [place] [facility]. Date: 9/8/2017 Discharging Nurse: Asael Olivera RN Discharge Orders None ACO Transitions of Care Introducing [employer] [address] Anjelica Banks offers a voluntary care coordination program to provide high quality service and care to [facility] fee-for-service beneficiaries. Keith Ordonez was designed to help you enhance your health and well-being through the following services: ? Transitions of Care  support for individuals who are transitioning from one care setting to another (example: Hospital to home). ? Chronic and Complex Care Coordination  support for individuals and caregivers of those with serious or chronic illnesses or with more than one chronic (ongoing) condition and those who take a number of different medications. If you meet specific medical criteria, a [facility] [address] may call you directly to coordinate your care with your primary care physician and your other care providers.  
 
For questions about the [facility] programs, please, contact your physicians office. For general questions or additional information about Accountable Care Organizations: 
Please visit www.medicare.gov/acos. html or call 1-800-MEDICARE (0-465.291.2510) TTY users should call 5-729.652.7520. Introducing [facility] & HEALTH SERVICES! Dear Maegan Mcguire: 
Thank you for requesting a [employer] account. Our records indicate that you already have an active [employer] account. You can access your account anytime at [email or web address] Did you know that you can access your hospital and ER discharge instructions at any time in [employer]? You can also review all of your test results from your hospital stay or ER visit. Additional Information If you have questions, please visit the Frequently Asked Questions section of the [employer] website at [email or web address] Remember, [employer] is NOT to be used for urgent needs. For medical emergencies, dial 911. Now available from your iPhone and Android! General Information Please provide this summary of care documentation to your next provider. Patient Signature:  ____________________________________________________________ Date:  ____________________________________________________________  
  
Marsha Morrison Provider Signature:  ____________________________________________________________ Date:  ____________________________________________________________

## 2017-09-08 NOTE — DISCHARGE INSTRUCTIONS
[address]  Department of Interventional Radiology  [facility]  [phone] Office  [phone] Fax    PARACENTESIS DISCHARGE INSTRUCTIONS    General Information:  During this procedure, the doctor will insert a needle into the abdomen to drain fluid. After the procedure, you will be able to take a deep breath much easier. The site of the puncture may ooze the first day. This will decrease and eventually stop. Paracentesis (draining fluid from the abdomen) sometimes makes patients hypotensive (low blood pressure). Your doctor may order for you to receive fluids or albumin (a volume booster) during the procedure through an IV site. Home Care Instructions:  Keep the puncture site clean and dry. No tub baths or swimming until puncture site heals. Showering is acceptable. Resume your normal diet, and resume your normal activity slowly and as you tolerate. If you are short of breath, rest. If shortness of breath does not ease, please call your ordering doctor. Fluid can re-accumulate in the chest and/or in the abdomen. If this should occur, your doctor needs to know as you may need to have the procedure done again. Call If:     You should call your Physician and/or the Radiology Nurse if you notice any signs of infection, like pus draining, or if it is swollen or reddened. Also call if you have a fever, or if you are bleeding from the puncture site more than a small amount on the dressing. Call if the puncture site keeps draining fluid. Some oozing is to be expected, but should slow and then stop. Call if you feel like you have pressure in your abdomen. SEEK IMMEDIATE CARE OR CALL 911 IF YOU SUDDENLY HAVE TROUBLE BREATHING, OR IF YOUR LIPS TURN BLUE, OR IF YOU NOTICE BLOOD IN YOUR SPUTUM. Follow-Up Instructions: Please see your ordering doctor as he/she has requested. To Reach Us:     If you have any questions about your procedure, please call the Interventional Radiology department at [phone]. After business hours (5pm) and weekends, call the answering service at [phone] and ask for the Radiologist on call to be paged. Si tiene Preguntas acerca del procedimiento, por favor llame al departamento de Radiología Intervencional al [phone]. Después de horas de oficina (5 pm) y los fines de [place], llamar al Ole Blackburn de llamadas al [phone] y pregunte por el Radiologo de [facility].          Date: 9/8/2017  Discharging Nurse: Nate Renee RN

## 2017-09-08 NOTE — PROGRESS NOTES
Recovery period without difficulty. Pt alert and oriented and denies pain. Dressing is clean, dry, and intact. Reviewed discharge instructions with patient verbalized understanding. Pt escorted to [facility] discharge area ambulating without difficulty. Vital signs and Shakira score completed.

## 2017-09-08 NOTE — PROGRESS NOTES
Patient to [address] [facility] room 3 for procedure. Patient awake and alert, verbalized name, , and procedure to be performed. Patient moved to procedure table independently.

## 2017-09-08 NOTE — PROGRESS NOTES
picc line dressing, end caps,  and bio patch changed using sterile procedure by bettie oliveira rn. Ports flush well with good blood return. No s/s of infection.

## 2017-09-11 PROBLEM — K76.82 HEPATIC ENCEPHALOPATHY: Status: ACTIVE | Noted: 2017-01-01

## 2017-09-11 NOTE — PROGRESS NOTES
Patient resting in room. No signs or symptoms of distress. Patient has been more alert during time on floor. Patient ate most of dinner. Mother remains at bedside, and pleased with progress and becoming more alert. Patient denies any further needs or pain at this time.

## 2017-09-11 NOTE — H&P
History and Physical    Subjective:     Senia Thomas is a 55 y.o. 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 Has been doing well recently, walking on most days for exercise and trying to maintain nourishment. History obtained from family as patient is encephalopathy and they state she started to become confused on Saturday.  attempted to increase her lactulose dose (normally takes at home BID) to no avail and thus she was brought to the ED. Last paracentesis was 9/8- 4.5 liters removed. In the ED she is found to have acute on chronic renal failure- creatinine is 5.9 and baseline is around 3.5. Unable to follow commands or answer any questions.       Full code  Next of kin: , Isaias Abad  Emergency contact: mother, Marcy Alicea [phone]    Past Medical History:   Diagnosis Date    SEAN (acute kidney injury) ([address] 6/26/2016    Arthritis     kath feet    Ascites     Benign neoplasm of skin of trunk, except scrotum     pt denies    Cellulitis and abscess of unspecified digit     hx of    Chronic kidney disease     [facility] on Mon/Wed/Fri    Chronic pain     abd area    Congenital hepatic fibrosis     Liver transplant X2    Esophageal varices ([address]     GERD (gastroesophageal reflux disease)     Hemodialysis access, AV graft ([address] 11/22/2016    Hepatic encephalopathy ([address] 10/2016    recently hospitalized for hepatic encephalopathy    Hepatitis C     hx of hepatitis C-- dx after first liver transplant from a transfusion   (first transplant age 13)   Andry Nj History of gastric ulcer     Insomnia 07/13/2015    no current meds    Liver transplant status ([address] 1986 and 1999    X2- congential hepatic fibrosis    Pain in joint, ankle and foot     PICC (peripherally inserted central catheter) in place     PONV (postoperative nausea and vomiting)     some N/V, pt states she does well with Phenergan    Port-a-cath in place     R chest for HD    Raynaud disease     Spleen enlarged     Tachycardia     Thrombocytopenia (HCC)     Type 2 diabetes mellitus (HCC)     insulin only/AVG /s.s of hypoglycemia 30's/Last A1c 5.6    Vasculitis ([facility]     resolved      Past Surgical History:   Procedure Laterality Date    HX CHOLECYSTECTOMY  1984    HX COLONOSCOPY      HX OTHER SURGICAL      biliary reconstructive surgery    HX OTHER SURGICAL  2013    EGD    HX OTHER SURGICAL  03/2016    tips procedure    HX OTHER SURGICAL      paracentesis    HX TRANSPLANT  9802,4089    2 liver - first one for congenital hepatic fibrosis, 2nd for Hep C cirrhosis    HX TUBAL LIGATION      LAP,TUBAL CAUTERY      VASCULAR SURGERY PROCEDURE UNLIST Left 11/02/2016    thigh AVG insertion in thigh     Family History   Problem Relation Age of Onset    Diabetes Mother     Heart Disease Mother     Hypertension Mother     Heart Disease Father     Stroke Father     Cancer Maternal Grandfather      liver cancer, alcoholism    No Known Problems Sister     Cancer Maternal Aunt      cervical cancer    Breast Cancer Paternal Grandmother      early 45s    Colon Cancer Paternal Grandmother      late 76s    Cancer Other      maternal niece- cervical cancer    Bipolar Disorder Brother     Heart Disease Brother       Social History   Substance Use Topics    Smoking status: Never Smoker    Smokeless tobacco: Never Used    Alcohol use No       Prior to Admission medications    Medication Sig Start Date End Date Taking? Authorizing Provider   rifAXIMin Luzmaria Marquis) 550 mg tablet Reported on 5/25/2017 - BID 3/22/16   Historical Provider   pantoprazole (PROTONIX) 40 mg tablet Take 40 mg by mouth. Historical Provider   midodrine (PROAMITINE) 5 mg tablet Take  by mouth every eight (8) hours.     Historical Provider   mupirocin (BACTROBAN) 2 % ointment Apply  to affected area daily. 8/9/17   Devora Lopez MD   metoclopramide HCl (REGLAN) 10 mg tablet Take 10 mg by mouth Before breakfast, lunch, dinner and at bedtime. Historical Provider   promethazine (PHENERGAN) 25 mg tablet Take 25 mg by mouth every six (6) hours as needed for Nausea. Historical Provider   insulin aspart (NOVOLOG) 100 unit/mL injection Sliding scale maximum 15 units each meal 3/24/17   Devora Lopez MD   insulin glargine (LANTUS SOLOSTAR) 100 unit/mL (3 mL) pen 25 Units by SubCUTAneous route nightly. 3/24/17   Devora Lopez MD   insulin lispro (HUMALOG) 100 unit/mL kwikpen Up to 10 units sq q ac 3/2/17   Devora Lopez MD   lactulose (CHRONULAC) 10 gram/15 mL solution Take 30 mL by mouth three (3) times daily. Patient taking differently: Take  by mouth two (2) times a day. 20 ML BID 2/22/17   Belinda Gutierrez DO   morphine CR (MS CONTIN) 15 mg CR tablet Take 1 Tab by mouth every twelve (12) hours. Max Daily Amount: 30 mg. 2/22/17   Belinda Gutierrez DO   cycloSPORINE modified (GENGRAF) 25 mg capsule Take 2.5 mg/kg/day by mouth every morning. Indications: Takes in the AM    Historical Provider     Allergies   Allergen Reactions    Aspirin Nausea Only    Codeine Nausea Only    Compazine [Prochlorperazine Edisylate] Unknown (comments)     Causes Lock Jaw        Review of Systems:  Review of systems not obtained due to patient factors. Per parents, patient does not use tobacco, alcohol nor illicit drugs    Objective:      Intake and Output:            Physical Exam:   General: lethargic, does not follow commands, encephalopathic, frail, ill appearing, temporal wasting present  Eyes; slightly icteric, JOHNNIE  ENT: dry oral mucous membranes, no thrush  Neck; supple  CV: RRR  Pulm; CTAB  Abd; soft, non distended, large protruding umbilical hernia  Ext: thenar atrophy present, thin and malnourished appearing, poor skin turgor of fingers    Data Review:   Recent Results (from the past 24 hour(s))   EKG, 12 LEAD, INITIAL    Collection Time: 09/11/17 11:55 AM   Result Value Ref Range    Ventricular Rate 85 BPM    Atrial Rate 85 BPM    P-R Interval 136 ms    QRS Duration 72 ms    Q-T Interval 374 ms    QTC Calculation (Bezet) 445 ms    Calculated P Axis 83 degrees    Calculated R Axis 37 degrees    Calculated T Axis 47 degrees    Diagnosis       !! AGE AND GENDER SPECIFIC ECG ANALYSIS !! Normal sinus rhythm  Cannot rule out Anterior infarct (cited on or before 08-MAY-2017)  Abnormal ECG  When compared with ECG of 08-MAY-2017 19:50,  No significant change was found     POC LACTIC ACID    Collection Time: 09/11/17 11:58 AM   Result Value Ref Range    Lactic Acid (POC) 2.8 (H) 0.5 - 1.9 mmol/L   PROCALCITONIN    Collection Time: 09/11/17 11:59 AM   Result Value Ref Range    Procalcitonin 1.7 ng/mL   METABOLIC PANEL, COMPREHENSIVE    Collection Time: 09/11/17 11:59 AM   Result Value Ref Range    Sodium 146 (H) 136 - 145 mmol/L    Potassium 3.6 3.5 - 5.1 mmol/L    Chloride 109 (H) 98 - 107 mmol/L    CO2 20 (L) 21 - 32 mmol/L    Anion gap 17 (H) 7 - 16 mmol/L    Glucose 202 (H) 65 - 100 mg/dL    BUN 68 (H) 6 - 23 MG/DL    Creatinine 5.91 (H) 0.6 - 1.0 MG/DL    GFR est AA 10 (L) >60 ml/min/1.73m2    GFR est non-AA 8 (L) >60 ml/min/1.73m2    Calcium 8.1 (L) 8.3 - 10.4 MG/DL    Bilirubin, total 0.8 0.2 - 1.1 MG/DL    ALT (SGPT) 26 12 - 65 U/L    AST (SGOT) 18 15 - 37 U/L    Alk.  phosphatase 336 (H) 50 - 136 U/L    Protein, total 6.4 6.3 - 8.2 g/dL    Albumin 2.7 (L) 3.5 - 5.0 g/dL    Globulin 3.7 (H) 2.3 - 3.5 g/dL    A-G Ratio 0.7 (L) 1.2 - 3.5     CBC WITH AUTOMATED DIFF    Collection Time: 09/11/17 11:59 AM   Result Value Ref Range    WBC 5.5 4.3 - 11.1 K/uL    RBC 2.83 (L) 4.05 - 5.25 M/uL    HGB 9.3 (L) 11.7 - 15.4 g/dL    HCT 27.1 (L) 35.8 - 46.3 %    MCV 95.8 79.6 - 97.8 FL    MCH 32.9 26.1 - 32.9 PG    MCHC 34.3 31.4 - 35.0 g/dL    RDW 16.5 (H) 11.9 - 14.6 %    PLATELET 56 (L) 687 - 450 K/uL    MPV 9.4 (L) 10.8 - 14.1 FL    DF AUTOMATED      NEUTROPHILS 67 43 - 78 %    LYMPHOCYTES 13 13 - 44 %    MONOCYTES 10 4.0 - 12.0 %    EOSINOPHILS 10 (H) 0.5 - 7.8 %    BASOPHILS 0 0.0 - 2.0 %    IMMATURE GRANULOCYTES 0.2 0.0 - 5.0 %    ABS. NEUTROPHILS 3.7 1.7 - 8.2 K/UL    ABS. LYMPHOCYTES 0.7 0.5 - 4.6 K/UL    ABS. MONOCYTES 0.6 0.1 - 1.3 K/UL    ABS. EOSINOPHILS 0.6 0.0 - 0.8 K/UL    ABS. BASOPHILS 0.0 0.0 - 0.2 K/UL    ABS. IMM. GRANS. 0.0 0.0 - 0.5 K/UL   AMMONIA    Collection Time: 09/11/17 11:59 AM   Result Value Ref Range    Ammonia 130 (H) 11 - 32 UMOL/L   PROTHROMBIN TIME + INR    Collection Time: 09/11/17 11:59 AM   Result Value Ref Range    Prothrombin time 14.6 (H) 9.6 - 12.0 sec    INR 1.3 (H) 0.9 - 1.2     URINALYSIS W/ RFLX MICROSCOPIC    Collection Time: 09/11/17 12:28 PM   Result Value Ref Range    Color KRIS      Appearance CLOUDY      Specific gravity 1.018 1.001 - 1.023      pH (UA) 5.0 5.0 - 9.0      Protein 100 (A) NEG mg/dL    Glucose NEGATIVE  mg/dL    Ketone TRACE (A) NEG mg/dL    Bilirubin SMALL (A) NEG      Blood NEGATIVE  NEG      Urobilinogen 0.2 0.2 - 1.0 EU/dL    Nitrites NEGATIVE  NEG      Leukocyte Esterase TRACE (A) NEG     CULTURE, BLOOD    Collection Time: 09/11/17 12:34 PM   Result Value Ref Range    Special Requests: PORT      Culture result: PENDING          Assessment:     Active Problems:  Hepatic encephalopathy ([facility] (9/11/2017)  Insulin dependent diabetes  Cirrhosis due to congenital hepatic fibrosis  Acute on chronic renal failure      Plan:     Admit to medical bed. Increase lactulose from BID to TID and continue rifaximin. Patient quite lethargic so I am concerned that she won't be able to swallow lactulose right now- will order lactulose enema q 6 hours x two. GI consulted by ED provider. Decrease lantus by half as she is quite sleepy and confused so I am worried about PO intake. Hold humalog, start SSI. NS 75/hr. Follow creatinine.      Full code  Ppx: subq heparin  Anticipated date of DC: 3 days    Signed By: Gomez Gifford MD     September 11, 2017

## 2017-09-11 NOTE — PROGRESS NOTES
Visit with patient in ER prior to transfer to floor. Patient is calm. [place] family at bedside. Assured family of our support during their time with us.     Almaz Mcdaniels,  Staff   C: [phone]  /  [email or web address]

## 2017-09-11 NOTE — ED PROVIDER NOTES
[address] Emergency Department    Chief Complaint:  Altered mental status  HPI:  Adi Butler is a 55 y.o. female presents for altered mental status. History is per EMS. Patient with declining mental status at home. Receiving lactulose. History of liver failure.     Historian:   EMS    Review of Systems: unable to be obtained    Past Medical History:  Past Medical History:   Diagnosis Date    SEAN (acute kidney injury) ([address] 6/26/2016    Arthritis     kath feet    Ascites     Benign neoplasm of skin of trunk, except scrotum     pt denies    Cellulitis and abscess of unspecified digit     hx of    Chronic kidney disease     [facility] on Mon/Wed/Fri    Chronic pain     abd area    Congenital hepatic fibrosis     Liver transplant X2    Esophageal varices (HCC)     GERD (gastroesophageal reflux disease)     Hemodialysis access, AV graft ([address] 11/22/2016    Hepatic encephalopathy ([address] 10/2016    recently hospitalized for hepatic encephalopathy    Hepatitis C     hx of hepatitis C-- dx after first liver transplant from a transfusion   (first transplant age 13)    History of gastric ulcer     Insomnia 07/13/2015    no current meds    Liver transplant status ([address] 1986 and 1999    X2- congential hepatic fibrosis    Pain in joint, ankle and foot     PICC (peripherally inserted central catheter) in place     PONV (postoperative nausea and vomiting)     some N/V, pt states she does well with Phenergan    Port-a-cath in place     R chest for HD    Raynaud disease     Spleen enlarged     Tachycardia     Thrombocytopenia (HCC)     Type 2 diabetes mellitus (HCC)     insulin only/AVG /s.s of hypoglycemia 30's/Last A1c 5.6    Vasculitis ([address]     resolved       Past Surgical History:   Procedure Laterality Date    HX CHOLECYSTECTOMY  1984    HX COLONOSCOPY      HX OTHER SURGICAL      biliary reconstructive surgery    HX OTHER SURGICAL  2013    EGD    HX OTHER SURGICAL  03/2016    tips procedure    HX OTHER SURGICAL      paracentesis    HX TRANSPLANT  7533,8564    2 liver - first one for congenital hepatic fibrosis, 2nd for Hep C cirrhosis    HX TUBAL LIGATION      LAP,TUBAL CAUTERY      VASCULAR SURGERY PROCEDURE UNLIST Left 11/02/2016    thigh AVG insertion in thigh       Social History     Social History    Marital status:      Spouse name: N/A    Number of children: N/A    Years of education: N/A     Social History Main Topics    Smoking status: Never Smoker    Smokeless tobacco: Never Used    Alcohol use No    Drug use: No    Sexual activity: Yes     Partners: Male     Birth control/ protection: Surgical      Comment: Tubal Ligation     Other Topics Concern    Not on file     Social History Narrative       Previous Medications    CYCLOSPORINE MODIFIED (GENGRAF) 25 MG CAPSULE    Take 2.5 mg/kg/day by mouth every morning. Indications: Takes in the AM    INSULIN ASPART (NOVOLOG) 100 UNIT/ML INJECTION    Sliding scale maximum 15 units each meal    INSULIN GLARGINE (LANTUS SOLOSTAR) 100 UNIT/ML (3 ML) PEN    25 Units by SubCUTAneous route nightly. INSULIN LISPRO (HUMALOG) 100 UNIT/ML KWIKPEN    Up to 10 units sq q ac    LACTULOSE (CHRONULAC) 10 GRAM/15 ML SOLUTION    Take 30 mL by mouth three (3) times daily. METOCLOPRAMIDE HCL (REGLAN) 10 MG TABLET    Take 10 mg by mouth Before breakfast, lunch, dinner and at bedtime. MIDODRINE (PROAMITINE) 5 MG TABLET    Take  by mouth every eight (8) hours. MORPHINE CR (MS CONTIN) 15 MG CR TABLET    Take 1 Tab by mouth every twelve (12) hours. Max Daily Amount: 30 mg. MUPIROCIN (BACTROBAN) 2 % OINTMENT    Apply  to affected area daily. PANTOPRAZOLE (PROTONIX) 40 MG TABLET    Take 40 mg by mouth. PROMETHAZINE (PHENERGAN) 25 MG TABLET    Take 25 mg by mouth every six (6) hours as needed for Nausea.     RIFAXIMIN (XIFAXAN) 550 MG TABLET    Reported on 5/25/2017 - BID       Allergies   Allergen Reactions    Aspirin Nausea Only    Codeine Nausea Only    Compazine [Prochlorperazine Edisylate] Unknown (comments)     Causes Lock Jaw       Physical Exam:        Constitutional: Minimally responsive  female,   Head:   Atraumatic, normo-cephalic  Ears/Nose/Throat: throat clear, mucous membranes moist  Eyes:   PERRL, EOMI, anicteric  Neck:   supple, FROM, no lymphadenopathy, no meningismus  Cardiovascular: regular rate and rhythm, no murmur, 2+ radial pulses  Respiratory:  clear to auscultation with no wheezes, rales, ronchi  Back:    FROM, no CVA tenderness  Abdomen:  Large periumbilical hernia   Musculoskeletal: no deformities, edema  Skin:   dry, no rashes  Neurologic:  unresponsive  Psychiatric:  Unable To assess  ______________________________________________________________________  Progress:    ED Course         Labs:     Results for orders placed or performed during the hospital encounter of 84/95/06   METABOLIC PANEL, COMPREHENSIVE   Result Value Ref Range    Sodium 146 (H) 136 - 145 mmol/L    Potassium 3.6 3.5 - 5.1 mmol/L    Chloride 109 (H) 98 - 107 mmol/L    CO2 20 (L) 21 - 32 mmol/L    Anion gap 17 (H) 7 - 16 mmol/L    Glucose 202 (H) 65 - 100 mg/dL    BUN 68 (H) 6 - 23 MG/DL    Creatinine 5.91 (H) 0.6 - 1.0 MG/DL    GFR est AA 10 (L) >60 ml/min/1.73m2    GFR est non-AA 8 (L) >60 ml/min/1.73m2    Calcium 8.1 (L) 8.3 - 10.4 MG/DL    Bilirubin, total 0.8 0.2 - 1.1 MG/DL    ALT (SGPT) 26 12 - 65 U/L    AST (SGOT) 18 15 - 37 U/L    Alk.  phosphatase 336 (H) 50 - 136 U/L    Protein, total 6.4 6.3 - 8.2 g/dL    Albumin 2.7 (L) 3.5 - 5.0 g/dL    Globulin 3.7 (H) 2.3 - 3.5 g/dL    A-G Ratio 0.7 (L) 1.2 - 3.5     CBC WITH AUTOMATED DIFF   Result Value Ref Range    WBC 5.5 4.3 - 11.1 K/uL    RBC 2.83 (L) 4.05 - 5.25 M/uL    HGB 9.3 (L) 11.7 - 15.4 g/dL    HCT 27.1 (L) 35.8 - 46.3 %    MCV 95.8 79.6 - 97.8 FL    MCH 32.9 26.1 - 32.9 PG    MCHC 34.3 31.4 - 35.0 g/dL    RDW 16.5 (H) 11.9 - 14.6 % PLATELET 56 (L) 583 - 450 K/uL    MPV 9.4 (L) 10.8 - 14.1 FL    DF AUTOMATED      NEUTROPHILS 67 43 - 78 %    LYMPHOCYTES 13 13 - 44 %    MONOCYTES 10 4.0 - 12.0 %    EOSINOPHILS 10 (H) 0.5 - 7.8 %    BASOPHILS 0 0.0 - 2.0 %    IMMATURE GRANULOCYTES 0.2 0.0 - 5.0 %    ABS. NEUTROPHILS 3.7 1.7 - 8.2 K/UL    ABS. LYMPHOCYTES 0.7 0.5 - 4.6 K/UL    ABS. MONOCYTES 0.6 0.1 - 1.3 K/UL    ABS. EOSINOPHILS 0.6 0.0 - 0.8 K/UL    ABS. BASOPHILS 0.0 0.0 - 0.2 K/UL    ABS. IMM. GRANS. 0.0 0.0 - 0.5 K/UL   AMMONIA   Result Value Ref Range    Ammonia 130 (H) 11 - 32 UMOL/L   PROTHROMBIN TIME + INR   Result Value Ref Range    Prothrombin time 14.6 (H) 9.6 - 12.0 sec    INR 1.3 (H) 0.9 - 1.2     POC LACTIC ACID   Result Value Ref Range    Lactic Acid (POC) 2.8 (H) 0.5 - 1.9 mmol/L   EKG, 12 LEAD, INITIAL   Result Value Ref Range    Ventricular Rate 85 BPM    Atrial Rate 85 BPM    P-R Interval 136 ms    QRS Duration 72 ms    Q-T Interval 374 ms    QTC Calculation (Bezet) 445 ms    Calculated P Axis 83 degrees    Calculated R Axis 37 degrees    Calculated T Axis 47 degrees    Diagnosis       !! AGE AND GENDER SPECIFIC ECG ANALYSIS !! Normal sinus rhythm  Cannot rule out Anterior infarct (cited on or before 08-MAY-2017)  Abnormal ECG  When compared with ECG of 08-MAY-2017 19:50,  No significant change was found         Labs were reviewed and interpreted by me.       Medications - No data to display    ==================================================================  Medical Decision Making:  Differential Diagnosis for this patient includes: acute liver failure, hyperammonemia, electrode abnormality    This is a new problem that does need additional workup  Labs/Radiographs/ECG were ordered: yes  Old records were reviewed: yes  CT/US/XRay/MRI were visualized: yes    The patient's problem is: acute mental status changes  The Diagnostic Options are: low risk  The Management Options are: high risk  _____________________________________________________________________  Assessment/Plan:    Patient with altered mental status. Essentially unresponsive. Asterixis is not appreciated    Labs are obtained. Patient's ammonia is markedly elevated at 130. Consult GI. Consult the hospitalist.    Patient is hypertensive and tachycardic. Her lactate is elevated. I do not believe this is sepsis. I do not believe her current presentation is secondary to an infection. We will continue to monitor for changes. But at this time I don't believe she has severe sepsis septic shock  ______________________________________________________________________      ===================================================================  This patient is critically ill and there is a high probability of of imminent or life threatening deterioration in the patient's condition without immediate management. The nature of the patient's clinical problem is: ams, hyptension, tachycardic    I have spent 30 minutes in direct patient care, documentation, review of labs/xrays/old records, discussion with Staff, Colleague . The time involved in the performance of separately reportable procedures was not counted toward critical care time.      Debbie Hanna MD; 9/11/2017 @3:23 PM  ===================================================================    Condition:  guarded  Disposition:  admit  Diagnosis:  Acute liver failure      Vicente Lee M.D.

## 2017-09-11 NOTE — PROGRESS NOTES
pts mother states pt is on transplant list at [facility], she asks that we keep transplant coordinator up to date on hospital course, copy of card with phone number placed on chart. Will ask pt's  to bring a list of medication.  will not be in until tomorrow.

## 2017-09-11 NOTE — CONSULTS
[facility] Consult Note       Primary GI Physician: Dr. Edison Ibarra    Referring Physician:  Dr. Antelmo Antonio. Maken[place]    Consult Date:  9/11/2017    Admit Date:  9/11/2017    Chief Complaint:  Cirrhosis of the liver; encephalopathy    Subjective:     History of Present Illness:  Patient is a 55 y.o. female with PMH of (but not limited to) congenital fibrosis of the liver and hepatitis C (tx at [facility] 7/2014) s/p 2 liver transplants (most recently orthotopic liver transplant in 1999), CKD on diaylsis, DM type II, Esophageal varices, hepatic encephalopathy, thrombocytopenia, umbilical hernia, GERD, ascites with failed TIPS , weight loss, hx of leukocytoclastic vasculitis, cyroglobulinemia, Raynaud's disease, portal vein thrombosis, and retroperitoneal mass who is seen in consultation at the request of Dr. Tonio Verdin for cirrhosis and encephalopathy. Tobin Nuenz is a well known patient to Dr. Edison Ibarra who last saw him in the office on 7/11/17 for her chronic liver disease. She has since been evaluated at [facility] transplant [place] on 8/30/17 and is being considered for a 3rd liver transplant. She underwent a paracentesis on 9/8/17 with 4500cc of fluid removed. She had a previous paracentesis on 9/1/17 with 4800cc removed. She has been on Xifaxan and lactulose at home. She developed sudden onset increasing encephalopathy on Saturday 9/9/17. Her spouse increased her lactulose, but was unable to get the encephalopathy controlled prompting a visit to the ED today. The patient is unable to provide any history and the history is obtained by the mother and the chart. The mother saw the patient on Friday and took her for her paracentesis. She was apparently lucid at that time, did not complain of any abdominal pain, N&V or overt bleeding. She is unsure if she has been taking her lactulose and Xifaxan. She has been taking protein shakes and has been gaining weight.  She was apparently told by [facility] that she needs to gain weight prior to transplant. She is being admitted, started on lactulose enemas, blood cultures are pending. 9/11/17 Labs WBC 5.5 (3.2 8/29/17) Hgb 9.3 (8.6), hct 27.1 (26.6), plt 56 (61). MCV 95.8 (101), INR 1.3 (1.3) BUN 68 (29) creat 5.91 (2.9) T bili 0.8 (0.8) AST 18 (33) ALT 26 (32)  (281). AFP 9/8/17 normal 1.6 Blood culture pending. 9/1/17 peritoneal fluid negative for malignancy    EGD on 7/27/16 by Dr. Gena Alvarenga with esophageal varices and bands x 2. Repeat EGD on 5/16/17 by Dr. Dunia Washington revealed portal Hypertensive gastropathy and retained gastric contents. Colonoscopy 3/11/99 by Dr. Jose M Gilbert revealed minute punctate sporadic ulcers without pseudomembranous     PET/CT 2/9/17     INDICATION: Abnormal tissue seen in the retroperitoneum on CT scan, possible  malignancy. History of prior liver transplant     TECHNIQUE: After oral administration of gastroview and intravenous  administration of 16.2 mCi of F18 FDG, noncontrast CT images were obtained for  attenuation correction and for fusion with emission PET images. A series of  overlapping emission PET images were then obtained beginning 60 minutes after  injection of FDG. The area imaged spanned the region from the skull base to the  mid thighs.     COMPARISON: CT dated 12/27/2016.     FINDINGS:     Head/Neck:  There is motion artifact in the head and neck. There is no  significant mass. There is no abnormal uptake.     Chest:  There are no masses in the lungs or mediastinum. There is no abnormal  uptake in the chest.     Abdomen:  Soft tissue noted in the retroperitoneum on the prior CT is still  present, unchanged in size and appearance. There is no uptake within this  tissue on the PET images. There are no hypermetabolic lesions in the liver. TIPS shunt is present. There is increased, massive ascites. There is mild  diffuse small bowel distention.     Pelvis: There is no significant abnormal uptake in the pelvis.   There is no  significant adenopathy. There are no bony lesions. There is a large amount of  free fluid in the pelvis.     IMPRESSION  IMPRESSION:   1. No uptake within the retroperitoneal soft tissue, consistent with benign  fibrosis.   2.  Increased, massive ascites.     PMH:  Past Medical History:   Diagnosis Date    SEAN (acute kidney injury) ([address] 6/26/2016    Arthritis     kath feet    Ascites     Benign neoplasm of skin of trunk, except scrotum     pt denies    Cellulitis and abscess of unspecified digit     hx of    Chronic kidney disease     [facility] on Mon/Wed/Fri    Chronic pain     abd area    Congenital hepatic fibrosis     Liver transplant X2    Esophageal varices (HCC)     GERD (gastroesophageal reflux disease)     Hemodialysis access, AV graft ([address] 11/22/2016    Hepatic encephalopathy ([address] 10/2016    recently hospitalized for hepatic encephalopathy    Hepatitis C     hx of hepatitis C-- dx after first liver transplant from a transfusion   (first transplant age 13)    History of gastric ulcer     Insomnia 07/13/2015    no current meds    Liver transplant status ([address] 1986 and 1999    X2- congential hepatic fibrosis    Pain in joint, ankle and foot     PICC (peripherally inserted central catheter) in place     PONV (postoperative nausea and vomiting)     some N/V, pt states she does well with Phenergan    Port-a-cath in place     R chest for HD    Raynaud disease     Spleen enlarged     Tachycardia     Thrombocytopenia (HCC)     Type 2 diabetes mellitus (HCC)     insulin only/AVG /s.s of hypoglycemia 30's/Last A1c 5.6    Vasculitis ([address]     resolved       PSH:  Past Surgical History:   Procedure Laterality Date    HX CHOLECYSTECTOMY  1984    HX COLONOSCOPY      HX OTHER SURGICAL      biliary reconstructive surgery    HX OTHER SURGICAL  2013    EGD    HX OTHER SURGICAL  03/2016    tips procedure    HX OTHER SURGICAL      paracentesis    HX TRANSPLANT  5895,2748    2 liver - first one for congenital hepatic fibrosis, 2nd for Hep C cirrhosis    HX TUBAL LIGATION      LAP,TUBAL CAUTERY      VASCULAR SURGERY PROCEDURE UNLIST Left 11/02/2016    thigh AVG insertion in thigh       Allergies: Allergies   Allergen Reactions    Aspirin Nausea Only    Codeine Nausea Only    Compazine [Prochlorperazine Edisylate] Unknown (comments)     Causes Lock Jaw       Home Medications:  Prior to Admission medications    Medication Sig Start Date End Date Taking? Authorizing Provider   rifAXIMin Marchia Peeling) 550 mg tablet Reported on 5/25/2017 - BID 3/22/16   Historical Provider   pantoprazole (PROTONIX) 40 mg tablet Take 40 mg by mouth. Historical Provider   midodrine (PROAMITINE) 5 mg tablet Take  by mouth every eight (8) hours. Historical Provider   mupirocin (BACTROBAN) 2 % ointment Apply  to affected area daily. 8/9/17   Merari Lemos MD   metoclopramide HCl (REGLAN) 10 mg tablet Take 10 mg by mouth Before breakfast, lunch, dinner and at bedtime. Historical Provider   promethazine (PHENERGAN) 25 mg tablet Take 25 mg by mouth every six (6) hours as needed for Nausea. Historical Provider   insulin aspart (NOVOLOG) 100 unit/mL injection Sliding scale maximum 15 units each meal 3/24/17   Merari Lemos MD   insulin glargine (LANTUS SOLOSTAR) 100 unit/mL (3 mL) pen 25 Units by SubCUTAneous route nightly. 3/24/17   Merari Lemos MD   insulin lispro (HUMALOG) 100 unit/mL kwikpen Up to 10 units sq q ac 3/2/17   Merari Lemos MD   lactulose (CHRONULAC) 10 gram/15 mL solution Take 30 mL by mouth three (3) times daily. Patient taking differently: Take  by mouth two (2) times a day. 20 ML BID 2/22/17   Alvino Zapien DO   morphine CR (MS CONTIN) 15 mg CR tablet Take 1 Tab by mouth every twelve (12) hours. Max Daily Amount: 30 mg. 2/22/17   Alvino Zapien DO   cycloSPORINE modified (GENGRAF) 25 mg capsule Take 2.5 mg/kg/day by mouth every morning. Indications: Takes in the AM    Historical Provider       Hospital Medications:  Current Facility-Administered Medications   Medication Dose Route Frequency    sodium chloride (NS) flush 5-10 mL  5-10 mL IntraVENous PRN    insulin lispro (HUMALOG) injection   SubCUTAneous AC&HS    lactulose (CHRONULAC) 10 gram/15 mL solution 200 g  200 g Rectal Q6H     Current Outpatient Prescriptions   Medication Sig    rifAXIMin (XIFAXAN) 550 mg tablet Reported on 5/25/2017 - BID    pantoprazole (PROTONIX) 40 mg tablet Take 40 mg by mouth.  midodrine (PROAMITINE) 5 mg tablet Take  by mouth every eight (8) hours.  mupirocin (BACTROBAN) 2 % ointment Apply  to affected area daily.  metoclopramide HCl (REGLAN) 10 mg tablet Take 10 mg by mouth Before breakfast, lunch, dinner and at bedtime.  promethazine (PHENERGAN) 25 mg tablet Take 25 mg by mouth every six (6) hours as needed for Nausea.  insulin aspart (NOVOLOG) 100 unit/mL injection Sliding scale maximum 15 units each meal    insulin glargine (LANTUS SOLOSTAR) 100 unit/mL (3 mL) pen 25 Units by SubCUTAneous route nightly.  insulin lispro (HUMALOG) 100 unit/mL kwikpen Up to 10 units sq q ac    lactulose (CHRONULAC) 10 gram/15 mL solution Take 30 mL by mouth three (3) times daily. (Patient taking differently: Take  by mouth two (2) times a day. 20 ML BID)    morphine CR (MS CONTIN) 15 mg CR tablet Take 1 Tab by mouth every twelve (12) hours. Max Daily Amount: 30 mg.    cycloSPORINE modified (GENGRAF) 25 mg capsule Take 2.5 mg/kg/day by mouth every morning.  Indications: Takes in the AM     Facility-Administered Medications Ordered in Other Encounters   Medication Dose Route Frequency    sodium chloride (NS) flush 20 mL  20 mL InterCATHeter PRN    heparin (porcine) pf 600 Units  600 Units InterCATHeter PRN       Social History:  Social History   Substance Use Topics    Smoking status: Never Smoker    Smokeless tobacco: Never Used    Alcohol use No Family History:  Family History   Problem Relation Age of Onset    Diabetes Mother     Heart Disease Mother     Hypertension Mother     Heart Disease Father     Stroke Father     Cancer Maternal Grandfather      liver cancer, alcoholism    No Known Problems Sister     Cancer Maternal Aunt      cervical cancer    Breast Cancer Paternal Grandmother      early 45s    Colon Cancer Paternal Grandmother      late 76s    Cancer Other      maternal niece- cervical cancer    Bipolar Disorder Brother     Heart Disease Brother        Review of Systems:  A detailed 10 system ROS is obtained, with pertinent positives as listed above. All others are negative. Diet:  NPO    Objective:     Physical Exam:  Vitals:  Visit Vitals    /64 (BP 1 Location: Left arm, BP Patient Position: At rest)    Pulse 85    Resp 14    Ht 5' 1\" (1.549 m)    Wt 40.8 kg (90 lb)    LMP 01/02/2016    BMI 17.01 kg/m2     Gen:  Pt is alert, cooperative, no acute distress MOTHER AND MOTHER'S SPOUSE AT BEDSIDE; ILL APPEARING  Skin:  Extremities and face reveal no rashes. SPIDER ANGIOMAS TO ANTERIOR CHEST; GANDARA ERYTHEMA  HEENT: Sclerae anicteric. Extra-occular muscles are intact. No oral ulcers. No abnormal pigmentation of the lips. The neck is supple. NG IN PLACE  Cardiovascular: Regular rate and rhythm. No murmurs, gallops, or rubs. Respiratory:  Comfortable breathing with no accessory muscle use. Clear breath sounds anteriorly with no wheezes, rales, or rhonchi. GI:  Abdomen  MODERATE DISTENDED WITH LARGE REDUCIBLE UMBILICAL HERNIAand nontender. Normal active bowel sounds. No masses palpable. Rectal:  Deferred  Musculoskeletal:  No pitting edema of the lower legs. Neurological: CONFUSED TO PLACE AND TIME. ASTERIXIS PRESENT  Psychiatric:  Mood appears appropriate with judgement intact. Lymphatic:  No cervical or supraclavicular adenopathy.     Laboratory:    Recent Labs      09/11/17   1159   WBC  5.5   HGB 9.3*   HCT  27.1*   PLT  56*   MCV  95.8   NA  146*   K  3.6   CL  109*   CO2  20*   BUN  68*   CREA  5.91*   CA  8.1*   GLU  202*   AP  336*   SGOT  18   ALT  26   TBILI  0.8   ALB  2.7*   TP  6.4   PTP  14.6*   INR  1.3*          Assessment:     Active Problems:    Hepatic encephalopathy (HCC) (9/11/2017)    55 y.o. female with PMH of (but not limited to) congenital fibrosis of the liver and hepatitis C (tx at [facility] 7/2014) s/p 2 liver transplants (most recently orthotopic liver transplant in 1999), CKD on diaylsis, DM type II, Esophageal varices, hepatic encephalopathy, thrombocytopenia, umbilical hernia, GERD, ascites with failed TIPS , weight loss, hx of leukocytoclastic vasculitis, cyroglobulinemia, Raynaud's disease, portal vein thrombosis, and retroperitoneal mass who is seen in consultation at the request of Dr. Tianna Buchanan for cirrhosis and encephalopathy. Apryl Shine is a well known patient to Dr. Angeles Serrano who last saw him in the office on 7/11/17 for her chronic liver disease. She has since been evaluated at [facility] [place] on 8/30/17 and is being considered for a 3rd liver transplant. She underwent a paracentesis on 9/8/17 with 4500cc of fluid removed. She has developed increasing encephalopathy since Saturday 9/9/17 not responsive to increased lactulose. Agree with cultures to assess for possible infection. Monitor for any signs of bleeding. Plan: 1. Agree with admission  2. Agree with lactulose enemas, but will increase to TID  3. Add Xifaxan 550mg bid  4. Await blood culture  5. Ciprofloxacin for prophylaxis  6. Continue HD  7. Will continue to follow with you    Kallie Hyman. [address], [place] [id number]   [facility]    Patient is seen and examined in collaboration with Dr. Dillon Cons. Assessment and plan as per Dr. Dillon Cons  I have seen and examined this patient, and agree with above assessment and plan. Pt w/ increased HE, will start lactulose, xifaxan and prophy abx. Follow up renal func tion.    WIll see what MELD looks like in am, and d/w Moustapha- not currently active on list, but following v closely there  Pan-culture    Gabriela Massey MD

## 2017-09-11 NOTE — ED TRIAGE NOTES
Pt. Arrives via ems. Altered mental status worsening this am. Pt. With hx of liver failure, receiving treatment of lactulose at home due to ammonia levels.

## 2017-09-11 NOTE — PROGRESS NOTES
TRANSFER - IN REPORT:    Verbal report received from Angelinaωφόροrobert Ποσειabrahamώνοrobert Hairston, RN(name) on Gabbie Olvera  being received from ED(unit) for routine progression of care      Report consisted of patients Situation, Background, Assessment and   Recommendations(SBAR). Information from the following report(s) ED Summary and Intake/Output was reviewed with the receiving nurse. Opportunity for questions and clarification was provided. Assessment completed upon patients arrival to unit and care assumed.

## 2017-09-12 NOTE — PROGRESS NOTES
Teleneuro cancelled per MD. Pt last seen appropriate at 2300 last night. Pt withdraws from pain on upper extremeties and still says \"ow\" in a very clear voice.

## 2017-09-12 NOTE — CONSULTS
[address] Nephrology consultation    We were asked to see the patient at the request of Dr. Simona Barkley  for evaluation of ESRD    Admission Date:  9/11/2017    Admission Diagnosis:  Hepatic encephalopathy ([facility]    History of Present Illness:    Patient is a 54 y/o female with hx of ESLD s/p 2 OLTx, ESRD that presented with worsening AMS. Her last OLTx was in 1999 and she is currently being considered for a 3rd transplant at [facility]. She recently underwent paracetesis on 9/8/2017 with 4.5 liters removed. Despite Xifaxan and lactulose - her encephalopathy worsened this past Saturday, 9/9. Patient is seen this morning in CT imaging. She became completely unresponsive and code stroke was initiated. Noncontrasted CT was unremarkable. Her dialysis is at [facility].     Past Medical History:   Diagnosis Date    SEAN (acute kidney injury) ([facility] 6/26/2016    Arthritis     kath feet    Ascites     Benign neoplasm of skin of trunk, except scrotum     pt denies    Cellulitis and abscess of unspecified digit     hx of    Chronic kidney disease     [facility] in [facility] on Mon/Wed/Fri    Chronic pain     abd area    Congenital hepatic fibrosis     Liver transplant X2    Esophageal varices (HCC)     GERD (gastroesophageal reflux disease)     Hemodialysis access, AV graft ([facility] 11/22/2016    Hepatic encephalopathy ([facility] 10/2016    recently hospitalized for hepatic encephalopathy    Hepatitis C     hx of hepatitis C-- dx after first liver transplant from a transfusion   (first transplant age 13)   [employer] History of gastric ulcer     Insomnia 07/13/2015    no current meds    Liver transplant status ([facility] 1986 and 1999    X2- congential hepatic fibrosis    Pain in joint, ankle and foot     PICC (peripherally inserted central catheter) in place     PONV (postoperative nausea and vomiting)     some N/V, pt states she does well with Phenergan    Port-a-cath in place     R chest for HD    Raynaud disease     Spleen enlarged     Tachycardia     Thrombocytopenia (HCC)     Type 2 diabetes mellitus (HCC)     insulin only/AVG /s.s of hypoglycemia 30's/Last A1c 5.6    Vasculitis ([address]     resolved      Past Surgical History:   Procedure Laterality Date    HX CHOLECYSTECTOMY  1984    HX COLONOSCOPY      HX OTHER SURGICAL      biliary reconstructive surgery    HX OTHER SURGICAL  2013    EGD    HX OTHER SURGICAL  03/2016    tips procedure    HX OTHER SURGICAL      paracentesis    HX TRANSPLANT  [id number]    2 liver - first one for congenital hepatic fibrosis, 2nd for Hep C cirrhosis    HX TUBAL LIGATION      LAP,TUBAL CAUTERY      VASCULAR SURGERY PROCEDURE UNLIST Left 11/02/2016    thigh AVG insertion in thigh      Current Facility-Administered Medications   Medication Dose Route Frequency    sodium chloride (NS) flush 5-10 mL  5-10 mL IntraVENous PRN    cycloSPORINE modified (GENGRAF, NEORAL) capsule 25 mg  25 mg Oral BID    insulin glargine (LANTUS) injection 12 Units  12 Units SubCUTAneous QHS    metoclopramide HCl (REGLAN) tablet 10 mg  10 mg Oral AC&HS    midodrine (PROAMITINE) tablet 5 mg  5 mg Oral TID WITH MEALS    pantoprazole (PROTONIX) tablet 40 mg  40 mg Oral ACB    morphine CR (MS CONTIN) tablet 15 mg  15 mg Oral Q12H    sodium chloride (NS) flush 5-10 mL  5-10 mL IntraVENous Q8H    sodium chloride (NS) flush 5-10 mL  5-10 mL IntraVENous PRN    0.9% sodium chloride infusion  75 mL/hr IntraVENous CONTINUOUS    naloxone (NARCAN) injection 0.4 mg  0.4 mg IntraVENous PRN    ondansetron (ZOFRAN) injection 4 mg  4 mg IntraVENous Q4H PRN    heparin (porcine) injection 5,000 Units  5,000 Units SubCUTAneous Q8H    insulin lispro (HUMALOG) injection   SubCUTAneous AC&HS    lactulose (CHRONULAC) 10 gram/15 mL solution 200 g  200 g Rectal TID    rifAXIMin (XIFAXAN) tablet 550 mg  550 mg Oral BID    ciprofloxacin (CIPRO) 400 mg IVPB (premix)  400 mg IntraVENous Q24H     Allergies   Allergen Reactions    Aspirin Nausea Only    Codeine Nausea Only    Compazine [Prochlorperazine Edisylate] Unknown (comments)     Causes Lock Jaw      Social History   Substance Use Topics    Smoking status: Never Smoker    Smokeless tobacco: Never Used    Alcohol use No      Family History   Problem Relation Age of Onset    Diabetes Mother     Heart Disease Mother     Hypertension Mother     Heart Disease Father     Stroke Father     Cancer Maternal Grandfather      liver cancer, alcoholism    No Known Problems Sister     Cancer Maternal Aunt      cervical cancer    Breast Cancer Paternal Grandmother      early 45s    Colon Cancer Paternal Grandmother      late 76s    Cancer Other      maternal niece- cervical cancer    Bipolar Disorder Brother     Heart Disease Brother         Review of Systems:  Unable due to AMS    Objective:  Vitals:    09/11/17 1924 09/11/17 2310 09/12/17 0320 09/12/17 0615   BP: 91/67 101/61 90/60 96/64   Pulse: 87 92 92    Resp: 16 16 16    Temp: 97.4 °F (36.3 °C) 97.9 °F (36.6 °C) 98 °F (36.7 °C)    SpO2: 100% 100% 100%    Weight:       Height:           Intake/Output Summary (Last 24 hours) at 09/12/17 0641  Last data filed at 09/12/17 0325   Gross per 24 hour   Intake              821 ml   Output                0 ml   Net              821 ml     Wt Readings from Last 3 Encounters:   09/11/17 40.8 kg (90 lb)   08/18/17 40.8 kg (90 lb)   08/09/17 46 kg (101 lb 6.4 oz)       GEN - obtunded  Neck - no JVD  CV - S1, S2, no rub  Lung - clear bilaterally, lungs expand symmetrically  Chest wall - normal appearance  Abd - soft, nontender  Ext - no edema        Data Review:     Recent Labs      09/11/17   1159   WBC  5.5   HGB  9.3*   HCT  27.1*   PLT  56*   INR  1.3*        Recent Labs      09/11/17   1159   NA  146*   K  3.6   CL  109*   CO2  20*   BUN  68*   CREA  5.91*   CA  8.1*   GLU  202*         Assessment/Plan:     1. ESRD - T, T, S     2.  Hepatic Encephalopathy    3.   Anemia likely CKD - outpatient SHERRY dose is Epo 8000 units tiw    4. Cirrhotic Liver disease    Again, female with presumed worsening hepatic encephalopathy and ESRD. Will plan HD today. Thanks.

## 2017-09-12 NOTE — PROGRESS NOTES
Hemodialysis consent obtained with Quintin Childs RN over the phone. Pt  Harinder Lopez gave verbal consent. Consent placed on chart and dialysis called and notified.

## 2017-09-12 NOTE — PROGRESS NOTES
Pt alert but unable to answer any orientation questions this shift; follows commands- swallows pills, assesses with turning, focuses on RN/tracks with eyes, states \"yes\" clearly to fluids/ \"no\" to pain, gripping SR while turning for incontinence care. Ammonia 139; all VSS; Will continue to monitor.

## 2017-09-12 NOTE — PROGRESS NOTES
Dr. Graham Stands paged at this time r/t change in neuro status; pt difficult to arouse for morning meds; when aroused both eyes deviated to the right followed by a blank stare when asked to focus on RN/ no tracking at this time; no verbal response; no command following; will not squeeze hands. Pt then sat up and stared blankly forward, pupils appeared large and fixed, right arm twitching for approximately one minute. Charge RN, Amor Vang saw pt at bedside, VS and BS taken at this time. ; BP 90/60 but pt with hx of hypotension and has been running 90's-100's/60's during this admission; all other VSS at this time. Charge RN attempted to arouse and reorient pt; PEERLA with some tracking/decreased command following; pt alert but lethargic and unresponsive to verbal commands or pain. MD updated to situation and asked if RN was calling code S, MD states he is Katrin with a couple other things at this time and possibly sending another pt to ICU\"; requested MD see pt at bedside; Dr. Graham Stands states to send pt down for head CT then call with results; states \" I will see pt as soon as I am able to. \"     Code S called at this time r/t RN concern for pt safety. Dr. Graham Stands, charge RN, lyssa RN, , and primary RN at bedside at this time. Lyssa LARSON able to obtain verbal response from pinching pt toe at this time; pt audibly says \"ow\" x2. Orders to continue with head CT, labs, and teleneuro consult. Dr. Graham Stands states pt is not a candidate for TPN due to low platelets 56. Head Ct w/o contrast completed; labs collected and sent. Teleneuro cancelled per MD orders. P.O. meds held. Shift report given to NEO Castrejon and updated to pt situation. Cash will continue to monitor pt at this time. Head CT resulted; appears negative.

## 2017-09-12 NOTE — PROGRESS NOTES
Admit Date: 9/11/2017      Subjective:          Review of Systems  Poor response  Objective:     Patient Vitals for the past 8 hrs:   BP Temp Pulse Resp SpO2   09/12/17 1117 (!) 82/54 97.3 °F (36.3 °C) 80 15 99 %   09/12/17 0714 (!) 85/54 97.4 °F (36.3 °C) 92 15 96 %   09/12/17 0615 96/64 - - - -            Physical Exam:   Poor response  Lungs: poor inspiration  CV: RR, no JVD  Abdomen: soft, distended  Ext: no edema          Data Review   Recent Results (from the past 8 hour(s))   GLUCOSE, POC    Collection Time: 09/12/17  6:10 AM   Result Value Ref Range    Glucose (POC) 128 (H) 65 - 100 mg/dL   GLUCOSE, POC    Collection Time: 09/12/17  7:22 AM   Result Value Ref Range    Glucose (POC) 131 (H) 65 - 100 mg/dL   CBC WITH AUTOMATED DIFF    Collection Time: 09/12/17  7:27 AM   Result Value Ref Range    WBC 5.8 4.3 - 11.1 K/uL    RBC 2.44 (L) 4.05 - 5.25 M/uL    HGB 8.1 (L) 11.7 - 15.4 g/dL    HCT 23.4 (L) 35.8 - 46.3 %    MCV 95.9 79.6 - 97.8 FL    MCH 33.2 (H) 26.1 - 32.9 PG    MCHC 34.6 31.4 - 35.0 g/dL    RDW 16.6 (H) 11.9 - 14.6 %    PLATELET 57 (L) 392 - 450 K/uL    MPV 9.8 (L) 10.8 - 14.1 FL    DF AUTOMATED      NEUTROPHILS 68 43 - 78 %    LYMPHOCYTES 13 13 - 44 %    MONOCYTES 7 4.0 - 12.0 %    EOSINOPHILS 12 (H) 0.5 - 7.8 %    BASOPHILS 0 0.0 - 2.0 %    IMMATURE GRANULOCYTES 0.0 0.0 - 5.0 %    ABS. NEUTROPHILS 3.9 1.7 - 8.2 K/UL    ABS. LYMPHOCYTES 0.8 0.5 - 4.6 K/UL    ABS. MONOCYTES 0.4 0.1 - 1.3 K/UL    ABS. EOSINOPHILS 0.7 0.0 - 0.8 K/UL    ABS. BASOPHILS 0.0 0.0 - 0.2 K/UL    ABS. IMM.  GRANS. 0.0 0.0 - 0.5 K/UL   METABOLIC PANEL, COMPREHENSIVE    Collection Time: 09/12/17  7:27 AM   Result Value Ref Range    Sodium 144 136 - 145 mmol/L    Potassium 3.2 (L) 3.5 - 5.1 mmol/L    Chloride 108 (H) 98 - 107 mmol/L    CO2 19 (L) 21 - 32 mmol/L    Anion gap 17 (H) 7 - 16 mmol/L    Glucose 121 (H) 65 - 100 mg/dL    BUN 73 (H) 6 - 23 MG/DL    Creatinine 5.99 (H) 0.6 - 1.0 MG/DL    GFR est AA 10 (L) >60 ml/min/1.73m2    GFR est non-AA 8 (L) >60 ml/min/1.73m2    Calcium 6.9 (L) 8.3 - 10.4 MG/DL    Bilirubin, total 0.9 0.2 - 1.1 MG/DL    ALT (SGPT) 23 12 - 65 U/L    AST (SGOT) 15 15 - 37 U/L    Alk.  phosphatase 300 (H) 50 - 136 U/L    Protein, total 5.6 (L) 6.3 - 8.2 g/dL    Albumin 2.1 (L) 3.5 - 5.0 g/dL    Globulin 3.5 2.3 - 3.5 g/dL    A-G Ratio 0.6 (L) 1.2 - 3.5     MAGNESIUM    Collection Time: 09/12/17  7:27 AM   Result Value Ref Range    Magnesium 1.7 (L) 1.8 - 2.4 mg/dL           Assessment:     Active Problems:    Hepatic encephalopathy ([facility] (9/11/2017)    ESRD  Hypotension    Plan:     Hold on dialysis today    Roseanne Carcamo MD

## 2017-09-12 NOTE — PROGRESS NOTES
Called by nursing that upon trying to wake patient was difficult to rouse and thought she had a gaze to right. I ordered a stat head CT but in the interim a code stroke was called. Patient is lethargic, cachectic. Not really following commands well and does have a gaze to right. Did respond Ow when toes squeezed. Last reported awake prior to midnight. Glucose 128. Reportedly did not receive any sedating meds. This all could be from her hepatic encephalopathy as ammonia 130 yesterday and do not have morning labs yet. She also has ESLD which is another source of encephalopathy. Mainly need to make sure no evidence of ICH. Given all her co-morbidities would not be a TPA candidate specifically because of her thrombocytopenia of 56.

## 2017-09-12 NOTE — PROGRESS NOTES
Critical Care Outreach Nurse Progress Report:    Subjective: In to assess pt secondary to recent nurse concern and Code S called at shift change. Pt is minimally responsive; Moves all extremities and withdraws to pain. Pupils are equal, round and responsive at 5mm. Per RN, pt is a liver transplant patient, possibly awaiting transfer to [facility] for 3rd transplant. MEWS Score: 5 (09/12/17 2382)    Vitals:    09/11/17 2310 09/12/17 0320 09/12/17 0615 09/12/17 0714   BP: 101/61 90/60 96/64 (!) 85/54   Pulse: 92 92  92   Resp: 16 16  15   Temp: 97.9 °F (36.6 °C) 98 °F (36.7 °C)  97.4 °F (36.3 °C)   SpO2: 100% 100%  96%   Weight:       Height:            Objective: Pt usually takes midodrine for BP, but due to lack of responsiveness, pt unable to take. Per GI NP, pt does have a history of esophageal varices, so NGT placement decisions deferred to GI team.  Last BP 85/54, currently IVF off. Pt with Ammonia level yesterday 9/11 at 130. Getting Lactulose enemas TID. Pt also an ESRD patient on dialysis and had paracentesis on 9/8 with 4.5L removed. Currently, abd without distension, but significant umbilical hernia, approximately 6-8 inches protruding. Pain Intensity 1: 0 (09/12/17 0222)        Patient Stated Pain Goal: 0    Assessment: minimally responsive, may need pressors if we can't get midodrine administered. Plan: Will monitor closely per Outreach protocol and RN to call with any updates if possible.

## 2017-09-12 NOTE — PROGRESS NOTES
Hospitalist Progress Note    2017  Admit Date: 2017 11:39 AM   NAME: Paulo Pettit   :  1970   MRN:  [id number]   Attending: Nagi Rubio MD  PCP:  Cyndie Hodge MD    SUBJECTIVE:   Sandra Weaver is a 55 y.o. white female, with a pmh of cirrhosis due to congenital hepatic fibrosis (status post two failed liver transplants) who presented to the ED yesterday with her parents for acute hepatic encephalopathy. Has been doing well recently, walking on most days for exercise and trying to maintain nourishment. History obtained from family as patient is encephalopathy and they state she started to become confused on Saturday.  attempted to increase her lactulose dose (normally takes at home BID) to no avail and thus she was brought to the ED. Last paracentesis was - 4.5 liters removed. In the ED she is found to have acute on chronic renal failure- creatinine is 5.9 and baseline is around 3.5. Unable to follow commands or answer any questions\"    : Rapid Response called at approximately 0600 today as nursing staff thought that she was harder to wake up than usual and when she did open her eyes they deviated to the right. Head CT scan performed and revealed unremarkable CT of brain. Lactulose increased on admission. Ammonia level elevated at 130 which could be contributing to unresponsiveness. Pt. Is sleeping in bed and she opened her eyes after I said her name several times but would not follow any commands. She is due for dialysis later today for Acute Renal Failure.       Review of Systems negative with exception of pertinent positives noted above  PHYSICAL EXAM     Visit Vitals    BP (!) 85/54 (BP 1 Location: Left arm, BP Patient Position: Head of bed elevated (Comment degrees))    Pulse 92    Temp 97.4 °F (36.3 °C)    Resp 15    Ht 5' 1\" (1.549 m)    Wt 40.8 kg (90 lb)    LMP 2016    SpO2 96%    BMI 17.01 kg/m2      Temp (24hrs), Av.5 °F (36.4 °C), Min:97 °F (36.1 °C), Max:98 °F (36.7 °C)    Oxygen Therapy  O2 Sat (%): 96 % (09/12/17 0714)  Pulse via Oximetry: 89 beats per minute (09/11/17 1432)  O2 Device: Room air (09/11/17 1554)    Intake/Output Summary (Last 24 hours) at 09/12/17 1103  Last data filed at 09/12/17 0848   Gross per 24 hour   Intake              821 ml   Output                0 ml   Net              821 ml      General: Thin. Cachectic. Chronically ill appearing female who appears older than her stated age.    Lungs:  Poor respiratory effort. Lungs clear. No audible wheezes, rhonchi or crackles. Heart:  Regular rate and rhythm,  No murmur, rub, or gallop  Abdomen: Soft, Non distended, Large, protruding umbilical hernia with band-aid on end. Extremities: thenar atrophy present, thin and malnourished appearing, poor skin turgor of fingers  Neurologic:  Pt. Does not follow commands. Unable to accurately assess neuro exam.    ASSESSMENT /PLAN     1. Hepatic Encephalopathy: Continue with supportive measures, Lactulose, Rifaximin. Appreciate GI input. 2. IDDM: Stable. Continue with halved Lantus dose and SSI. 3. Hypotension: Increase IV NS from 75 ml/hr to 100 ml/hr. 4. Acute on Chronic Renal Failure: HD today and follow creatinine.         DVT Prophylaxis: SQ Heparin    Fiona Degroot [place], [place]

## 2017-09-12 NOTE — PROGRESS NOTES
Code S called. Pt unresponsive except to pain. Said \"ow\" when toe was squeezed. Taking to CT now. Will initiate Teleneuro.

## 2017-09-13 PROBLEM — K72.10 END STAGE LIVER DISEASE (HCC): Status: ACTIVE | Noted: 2017-01-01

## 2017-09-13 NOTE — PROGRESS NOTES
GI DAILY PROGRESS NOTE    Admit Date:  9/11/2017    Today's Date:  9/13/2017    CC:  HE, cirrhosis    Subjective:     Patient remains encephalopathic with ammonia >200. Not holding enemas. Apparently missed 2 enemas last night as they are difficult to get from pharmacy. There is a question about pt possibly being accepted at [facility] per hospitalist. Not getting midodrine or HD due to AMS. Nursing states that she awoke briefly around 2pm yesterday, sat up and asked for [employer]. hgb stable. Creat 5.81. INR 1.3. PLT 50K. LFTs stable. Low  K/Cl.       EGD on 7/27/16 by Dr. Zaida Franks with esophageal varices and bands x 2. Repeat EGD on 5/16/17 by Dr. Oleg Crocker revealed portal Hypertensive gastropathy and retained gastric contents.    Colonoscopy 3/11/99 by Dr. Rahel Merida revealed minute punctate sporadic ulcers without pseudomembranous          Medications:   Current Facility-Administered Medications   Medication Dose Route Frequency    epoetin ping (EPOGEN;PROCRIT) injection 10,000 Units  10,000 Units SubCUTAneous Q7D    lactulose (CHRONULAC) 10 gram/15 mL solution 200 g  200 g Rectal QID    sodium chloride (NS) flush 5-10 mL  5-10 mL IntraVENous PRN    cycloSPORINE modified (GENGRAF, NEORAL) capsule 25 mg  25 mg Oral BID    insulin glargine (LANTUS) injection 12 Units  12 Units SubCUTAneous QHS    metoclopramide HCl (REGLAN) tablet 10 mg  10 mg Oral AC&HS    midodrine (PROAMITINE) tablet 5 mg  5 mg Oral TID WITH MEALS    pantoprazole (PROTONIX) tablet 40 mg  40 mg Oral ACB    morphine CR (MS CONTIN) tablet 15 mg  15 mg Oral Q12H    sodium chloride (NS) flush 5-10 mL  5-10 mL IntraVENous Q8H    sodium chloride (NS) flush 5-10 mL  5-10 mL IntraVENous PRN    0.9% sodium chloride infusion  100 mL/hr IntraVENous CONTINUOUS    naloxone (NARCAN) injection 0.4 mg  0.4 mg IntraVENous PRN    ondansetron (ZOFRAN) injection 4 mg  4 mg IntraVENous Q4H PRN    heparin (porcine) injection 5,000 Units  5,000 Units SubCUTAneous Q8H    insulin lispro (HUMALOG) injection   SubCUTAneous AC&HS    rifAXIMin (XIFAXAN) tablet 550 mg  550 mg Oral BID    ciprofloxacin (CIPRO) 400 mg IVPB (premix)  400 mg IntraVENous Q24H       Review of Systems:  ROS was obtained, with pertinent positives as listed above. No chest pain or SOB. Diet:  NPO    Objective:   Vitals:  Visit Vitals    /65    Pulse 89    Temp 99.1 °F (37.3 °C)    Resp 18    Ht 5' 1\" (1.549 m)    Wt 40.8 kg (90 lb)    LMP 01/02/2016    SpO2 95%    BMI 17.01 kg/m2     Intake/Output:     09/11 1901 - 09/13 0700  In: 2655 [P.O.:25; I.V.:1604]  Out: -   Exam:  General appearance: AMS, HE  Lungs: clear to auscultation bilaterally anteriorly  Heart: regular rate and rhythm  Abdomen: soft, non-tender. large abd hernia Bowel sounds normal. No masses, no organomegaly  Extremities: extremities normal, atraumatic, no cyanosis or edema  Neuro:  AMS, HE    Data Review (Labs):    Recent Labs      09/13/17   0559  09/12/17   1515  09/12/17   0727  09/11/17   1159   WBC  3.8*   --   5.8  5.5   HGB  8.5*   --   8.1*  9.3*   HCT  24.6*   --   23.4*  27.1*   PLT  50*   --   57*  56*   MCV  92.8   --   95.9  95.8   NA  142   --   144  146*   K  3.3*   --   3.2*  3.6   CL  109*   --   108*  109*   CO2  12*   --   19*  20*   BUN  73*   --   73*  68*   CREA  5.81*   --   5.99*  5.91*   CA  6.7*   --   6.9*  8.1*   MG   --    --   1.7*   --    GLU  116*   --   121*  202*   AP  325*   --   300*  336*   SGOT  16   --   15  18   ALT  23   --   23  26   TBILI  0.9   --   0.9  0.8   ALB  2.1*   --   2.1*  2.7*   TP  5.7*   --   5.6*  6.4   PTP   --   14.6*   --   14.6*   INR   --   1.3*   --   1.3*   APTT   --   37.5*   --    --        Assessment:     Active Problems:    Hepatic encephalopathy (HCC) (9/11/2017)        Plan:     55 y.o. female with PMH of (but not limited to) congenital fibrosis of the liver and hepatitis C (tx at [facility] 7/2014) s/p 2 liver transplants (most recently orthotopic liver transplant in 1999), CKD on diaylsis, DM type II, Esophageal varices, hepatic encephalopathy, thrombocytopenia, umbilical hernia, GERD, ascites with failed TIPS , weight loss, hx of leukocytoclastic vasculitis, cyroglobulinemia, Raynaud's disease, portal vein thrombosis, and retroperitoneal mass who is seen in consultation at the request of Dr. Wilma Bates for cirrhosis and encephalopathy. Rohan Bonds is a well known patient to Dr. Ash Patel who last saw him in the office on 7/11/17 for her chronic liver disease. She has since been evaluated at [facility] transplant [place] on 8/30/17 and is being considered for a 3rd liver transplant. She underwent a paracentesis on 9/8/17 with 4500cc of fluid removed. She has developed increasing encephalopathy since Saturday 9/9/17 not responsive to increased lactulose. Agree with cultures to assess for possible infection. Monitor for any signs of bleeding. Still with severe HE and now hypotensive because of missed midodrine. She is not able to get oral meds and is not responding to enemas therefore her best option at this time is to pass a dobhoff tube in order to receive medications.       - insert dobhoff and give meds via NG route including lactulose and midodrine  - monitor labs and replace electrolytes prn  - monitor vitals- hypotensive but with CKD but missing HD due to status     Elver OWEN [place]          Patient is seen and examined in collaboration with Dr. El Luciano. Assessment and plan as per Dr. El Luciano. I have seen and examined this patient, and agree with above assessment and plan.     Pt much more somnolent this am, barely resonds to voice  Apparently not taking oral meds , although she was wide awake yesterday  WIll place small caliber Dobhoff tube for medication access, then resume xifaxan and lactulose  COnt midodrine for chronic hypotension  Labs currently stable, Hgb inc and no signs of bleeding  Prophylactic ABx given on admission- cont for now - immunosuppressed and inc risk of infection      Arlington Cogan, MD

## 2017-09-13 NOTE — CDMP QUERY
Please clarify if this patient is UNDERWEIGHT with Height 5' 1\", Weight 90 lbs, and BMI 17.     Thank you,  Brijesh Rincon RN, CDS  Compliant Documentation Management Program  [phone]

## 2017-09-13 NOTE — PROGRESS NOTES
[facility] Progress Note    Follow-Up on: ESRD    ROS:  unresponsive    Exam:  Vitals:    09/12/17 2058 09/13/17 0058 09/13/17 0355 09/13/17 0731   BP: 93/60 100/62 107/63 101/65   Pulse: 80 83 90 89   Resp: 18 18 17 18   Temp: 97.5 °F (36.4 °C) 97.5 °F (36.4 °C) 99.7 °F (37.6 °C) 99.1 °F (37.3 °C)   SpO2: 95% 98% 92% 95%   Weight:       Height:             Intake/Output Summary (Last 24 hours) at 09/13/17 1052  Last data filed at 09/13/17 0912   Gross per 24 hour   Intake              808 ml   Output                0 ml   Net              808 ml       Wt Readings from Last 3 Encounters:   09/11/17 40.8 kg (90 lb)   08/18/17 40.8 kg (90 lb)   08/09/17 46 kg (101 lb 6.4 oz)       GEN - not arousable  CV - regular, no murmur, no rub  Lung - clear bilaterally  Abd - soft, nontender  Ext - no edema    Recent Labs      09/13/17   0559  09/12/17   1515  09/12/17   0727  09/11/17   1159   WBC  3.8*   --   5.8  5.5   HGB  8.5*   --   8.1*  9.3*   HCT  24.6*   --   23.4*  27.1*   PLT  50*   --   57*  56*   INR   --   1.3*   --   1.3*        Recent Labs      09/13/17   0559  09/12/17   0727  09/11/17   1159   NA  142  144  146*   K  3.3*  3.2*  3.6   CL  109*  108*  109*   CO2  12*  19*  20*   BUN  73*  73*  68*   CREA  5.81*  5.99*  5.91*   CA  6.7*  6.9*  8.1*   GLU  116*  121*  202*   MG   --   1.7*   --        Assessment / Plan: Active Problems:    Hepatic encephalopathy ([facility] [address] (9/11/2017)      1. ESRD - unable to arouse today, BP remains lowish although I think this is chronic  - Hold HD today  - Getting IVF, will change to bicarb as serum CO2 lower  - Will plan on HD tomorrow assuming patient stable  2. Hepatic encephalopathy - ammonia level higher  3.  ESLD - liver transplant

## 2017-09-13 NOTE — PROGRESS NOTES
[facility] CRITICAL CARE OUTREACH NURSE PROGRESS REPORT      SUBJECTIVE: Called to assess patient secondary to nurse concern. MEWS Score: 5 (09/12/17 5678)  Vitals:    09/13/17 0355 09/13/17 0731 09/13/17 1112 09/13/17 1602   BP: 107/63 101/65 120/71 101/64   Pulse: 90 89 72 86   Resp: 17 18 19 18   Temp: 99.7 °F (37.6 °C) 99.1 °F (37.3 °C) 98.9 °F (37.2 °C) 96.7 °F (35.9 °C)   SpO2: 92% 95% 96% 99%   Weight:       Height:           LAB DATA:    Recent Labs      09/13/17   0559  09/12/17 0727 09/11/17   1159   NA  142  144  146*   K  3.3*  3.2*  3.6   CL  109*  108*  109*   CO2  12*  19*  20*   AGAP  21*  17*  17*   GLU  116*  121*  202*   BUN  73*  73*  68*   CREA  5.81*  5.99*  5.91*   GFRAA  10*  10*  10*   GFRNA  8*  8*  8*   CA  6.7*  6.9*  8.1*   MG   --   1.7*   --    ALB  2.1*  2.1*  2.7*   TP  5.7*  5.6*  6.4   GLOB  3.6*  3.5  3.7*   AGRAT  0.6*  0.6*  0.7*   ALT  23  23  26        Recent Labs      09/13/17   0559  09/12/17 0727 09/11/17   1159   WBC  3.8*  5.8  5.5   HGB  8.5*  8.1*  9.3*   HCT  24.6*  23.4*  27.1*   PLT  50*  57*  56*          OBJECTIVE: On arrival to room, I found patient to be lying in bed, minimally responsive. Pain Assessment  Pain Intensity 1: 0 (09/13/17 0746)        Patient Stated Pain Goal: 0                                 ASSESSMENT:  Patient is minimally responsive. Abdomen distended. Respirations shallow. Last VS reviewed. PLAN: ANANT quigley placed with primary RN for administration of Lactulose. Discussed with Dr. Kiran Reyes. Will continue to monitor closely per outreach protocol.

## 2017-09-13 NOTE — PROGRESS NOTES
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 Pt has been minimally responsive at times. Has been unable to receive last few doses of lactulose enemas and ammonia level this am is 257. Review of Systems unable to obtain due to mentation  PHYSICAL EXAM     Visit Vitals    /65    Pulse 89    Temp 99.1 °F (37.3 °C)    Resp 18    Ht 5' 1\" (1.549 m)    Wt 40.8 kg (90 lb)    LMP 2016    SpO2 95%    BMI 17.01 kg/m2      Temp (24hrs), Av.2 °F (36.8 °C), Min:97.3 °F (36.3 °C), Max:99.7 °F (37.6 °C)    Oxygen Therapy  O2 Sat (%): 95 % (17 0731)  Pulse via Oximetry: 95 beats per minute (17 2058)  O2 Device: Room air (17 1554)    Intake/Output Summary (Last 24 hours) at 17 1010  Last data filed at 17 0912   Gross per 24 hour   Intake              808 ml   Output                0 ml   Net              808 ml      General: lethargic  Lungs:  CTA Bilaterally. Heart:  Regular rate and rhythm,  No murmur, rub, or gallop  Abdomen: Soft, Non distended, large abdominal hernia, Positive bowel sounds  Extremities: No cyanosis, clubbing or edema  Neurologic:  Opens eyes to sternal rub    ASSESSMENT      Active Hospital Problems    Diagnosis Date Noted    Hepatic encephalopathy ([facility] [address] 2017     Plan:  · Discussed case with GI. Plans for dobhoff placement and start lactulose via dobhoff  · Monitor for bleeding  · 2 failed liver transplants. ? Of [facility] accepting pt as she is on transplant list again.   Will follow up with Dennis  · Stop heparin with thrombocytopenia.  monitor  · Appreciate specialist input    DVT Prophylaxis: scds    Signed By: Albin Ornelas MD     September 13, 2017

## 2017-09-13 NOTE — PROGRESS NOTES
Dopphoff as placed this morning around 10am. Tube clogged with first attempt at med pass. Multiple attempts to unclog were unsuccessful. Will attempt to place a new tube.

## 2017-09-14 ENCOUNTER — TELEPHONE (OUTPATIENT)
Dept: TRANSPLANT | Facility: CLINIC | Age: 47
End: 2017-09-14

## 2017-09-14 PROBLEM — D68.9 COAGULOPATHY (HCC): Chronic | Status: ACTIVE | Noted: 2017-01-01

## 2017-09-14 PROBLEM — D69.6 THROMBOCYTOPENIA (HCC): Chronic | Status: ACTIVE | Noted: 2017-01-01

## 2017-09-14 NOTE — CDMP QUERY
Please clarify if this patient is being treated/managed for:    PANCYTOPENIA in the setting of end stage liver disease with RBC 2.27, WBC 2.9, and Platelets 34 being treated with    =>Other Explanation of clinical findings  =>Unable to Determine (no explanation of clinical findings)    The medical record reflects the following:    Risk Factors: end stage liver disease, Hepatic encephalopathy    Clinical Indicators: RBC 2.27, WBC 2.9, and Platelets 34    Treatment: Daily CBC, treatment for the end stage liver disease    Please clarify and document your clinical opinion in the progress notes and discharge summary including the definitive and/or presumptive diagnosis, (suspected or probable), related to the above clinical findings. Please include clinical findings supporting your diagnosis.     Thanks,  LYRIC VanceN, RN, CDS  Compliant Documentation Management Program  [phone]

## 2017-09-14 NOTE — PROGRESS NOTES
Pharmacokinetic Consult to Pharmacist    Deirdre Rachel is a 55 y.o. female being treated for HAP with vancomycin and zosyn. Height: 5' 1\" (154.9 cm)  Weight: 47.9 kg (105 lb 9.6 oz)  Lab Results   Component Value Date/Time    BUN 81 09/14/2017 04:13 AM    Creatinine 6.54 09/14/2017 04:13 AM    WBC 6.7 09/14/2017 04:13 AM    Procalcitonin 1.7 09/11/2017 11:59 AM    Lactic acid 3.1 05/12/2017 09:28 AM    Lactic Acid (POC) 2.8 09/11/2017 11:58 AM      Estimated Creatinine Clearance: 8.1 mL/min (based on Cr of 6.54). CULTURES:  pending    Day 1 of vancomycin. Goal trough is 15-20. Vancomycin dose initiated at 1000 mg x 1, then will intermittently dose based on random levels as clinically indicated. Will continue to follow patient.       Thank you,  Torri Astudillo, PharmD  Clinical Pharmacist  [phone]

## 2017-09-14 NOTE — PROGRESS NOTES
Patient was intubated with a number 7.5 ET Tube. Tube placement verified by auscultation. ET Tube is secured at the 24 cm robert at the lip and on the bilateral side. Patient was intubated by Dr Nathaniel Minor on the 1 attempt. Breath sounds are diminished. Patient is Negative for subcutaneous air and chest excursion is symmetric. Trachea is midline. Patient is also Negative for cyanosis and is Negative for pitting edema. Patient placed on ventilator on documented settings. All alarms are set and audible. Resuscitation bag is at the head of the bed.       Ventilator Settings  Mode FIO2 Rate Tidal Volume Pressure PEEP I:E Ratio   PRVC  35 %    400 ml     8 cm H20  1:3.2      Peak airway pressure: 28 cm H2O   Minute ventilation: 5.6 l/min     ABG:   Recent Labs      09/13/17 2355  09/13/17 1959   PH  7.51*  7.55*   PCO2  22*  21*   PO2  220*  116*   HCO3  17*  18*

## 2017-09-14 NOTE — TELEPHONE ENCOUNTER
.Patient: Griselda Azar       MRN: [id number]      : 1970     Age: 46 y.o.  [address]  [facility] [address]    Provider: entire team requested this pt be reviewed in IR TO LOOK AT retroperitoneal mass.  Patient Transplant Status: Other pt is post liver txp x 2  seeking eval after denied at multiple centers    UPDATE: 2017  Pt admitted: presented with acute hepatic encephalopathy. Patient was ordered lactulose enemas but not responding. She became increasingly sedate on  and unable to take her usual oral medications to include midodrine. Dobhoff ordered for lactulose and med pass. Was transferred to ICU for respiratory depression and intubated evening .    - mechanically ventilated, lightly sedated.       Reason for presentation: Other pt is post liver txp x 2  seeking eval after denied at multiple centers     Clinical Summary: Griselda Azar is a 46 y.o. year old  female, Is seen today upon the request of Dr. Mk Mckeon. She presents to the surgical clinic today with her  for surgical consultation for potential third liver transplantation. PMH significant for Congenital Hepatic Fibrosis requiring liver transplant , age 15 years old at [facility] in [place]. She was re-transplant  at [facility] due to acquired Hepatitis C. She has been treated for Hepatitis C at [place] in 2014 with SVR achieved noted last review in Care Everywhere 2014. Immunosuppression was Cyclosporine subsequent transition to Gengraf 25mg bid; no cellcept or prednisone in the last few years.   She developed elevated liver function , underwent liver biopsy [place] with finding of cirrhosis. She developed recurrence of ascites requiring LVP every 2-3 weeks underwent TIPS placed 3/2016. After TIPS Encephalopathy was difficult to control therefore TIPS was 2016. Encephalopathy improved and more controlled since with Lactulose and Xifaxan. She reports she has multiple soft stools a day,  no diarrhea and no encephalopathy.   She has had variceal bleeding in 2016 and more recently 2/2017. She reports this was a significant bleed, resulting in long hospitalization 3-4 weeks and loss of approximately 30 pounds. She reports she is slowly regaining weight. Last banding of varices was with this admission.   She now requires paracentesis every week with 4-5 liters drained. She has had SBP. She has had Renal failure thought to be related to CNI toxicity progressed to HD 3/2016 presently via left thigh graft Tues/Thurs/Saturday due to inability to place access in upper extremities. She has hypotension noted and presently has Midodrine 10mg TID on her medication list. She reports she has been off previously with improvement of her blood pressure. With further discussion she admits she has not been taking it for the last 3 weeks due to running out of the medication and no refills. She reports her blood pressure has been as low as 70/40 in the last 6 months, prior to that was over 100, usually 110 consistently but notes she has never had high blood pressure.   She has a central line for blood draws left upper chest.  She has diagnosis of Diabetes states this was diagnosed just after her TIPS was placed. She monitors her glucoses 2-3 times a day but rarely takes insulin. She states she has been told not to take insulin if glucose is less than 150 and most of the time it is less than 150.   She has had an umbilical hernia since occurrence of ascites, this ruptured early 2017 and is somewhat a source of abdominal pain for her. She has MS contin on her medication list but states she rarely takes it now, maybe once a week.   She was listed for SLK at [place] but a retroperitoneal mass was seen on imaging therefore she was delisted with referral to other centers for evaluation. She presented to [facility] for second opinon as well and was declined.   She has Raynaud's diagnosed about 1 year since first transplant affects  finger tips but no toes.   One pregnancy 42 week full term healthy 8 lbs   Past Medical History:   Diagnosis Date    Congenital hepatic fibrosis    Diabetes mellitus type 2, uncomplicated (CMS-HCC) 2016    ESRD (end stage renal disease) (CMS-HCC)    Hepatitis C   Treated 2014 with SVR     Patient Active Problem List   Diagnosis    Organ transplant candidate    Hepatic cirrhosis (CMS-HCC)    Congenital hepatic fibrosis    Hepatitis C    ESRD (end stage renal disease) (CMS-HCC)     Past Surgical History:   Procedure Laterality Date    AV graft Left 11/2016   Thigh    CHOLECYSTECTOMY 1984   Open - performed prior to first transplant    LAPAROSCOPIC TUBAL LIGATION    TIPS placement 03/2016    TR[facility]PLANT LIVER 1986   [facility] [place]    TRANSPLANT LIVER 1999   [facility]     Path report of bx. retroperitoneal mass unsatisfactory   NON-GYN CYTOLOGY REPORT     Accession #: [id number]    Date Collected: 5/26/2017 13:30  Diagnosis  Diagnosis  SPECIMEN IS UNSATISFACTORY FOR EVALUATION    Additional Findings  Predominantly blood and gastrointestinal epithelial cells.    Specimen Adequacy  Unsatisfactory for evaluation.        Gross Description  Received 10 direct smears labeled with patient name and pass number. Six  Diff-Quik stained slides evaluated on-site were used to report the immediate  evaluation.  Six alcohol fixed slides were prepared for subsequent  Papanicolaou staining.  Also received 1 container labeled with patient's name  and MRN [id number] containing 30 ml of pink fluid without small particles of  tissue and blood clot. The fluid is processed for: ThinPrep.  Cytopreparation Method(s): 10 Direct Smear, 1 Thinprep      ON-SITE IMPRESSION:  Not adequate for evaluation, blood only    Immediate adequacy was reported verbally to Dr. Winters by attending  pathologist Dr. Denson and Cytology Fellow Dr. Bland on 5-26-17 at 1:50 pm.  Attending Pathologist, Dr. Dneson was the teaching  physician present: via  telepathology  and personally performed the immediate adequacy determination on evaluation  episodes 1:  Evaluation Episode # 1:  Pass #1: blood only  Pass #2: blood only  Pass #3: blood only  Pass #4: blood only  Pass #5: blood only                  3 centers that denied pt  UMA: She was listed for SLK at [place] but a retroperitoneal mass was seen on imaging therefore she was delisted with referral to other centers for evaluation. She presented to [facility] for second opinon as well and was declined        [facility] Assessment/Plan:  Overall this patient is a extremely high risk candidate candidate for consideration for liver transplantation due to her extensive history of abdominal surgeries with two prior liver transplants, biliary reconstruction, and open cholecystectomy who has ESRD in need of a SLK. She has severe muscle wasting as well as an undiagnosed retroperitoneal mass that is concerning for PTLD in the setting of her significant weight loss as well as the character of the mass that appears to encase her blood vessels. In addition, I feel that she is not a candidate for transplantation in my opinion      VIVI: patient has goals to meet prior to being listed.  The patient's evaluation studies and consults were reviewed and the following recommendations were made:    - Will have fundraising goal $12,000  - With next visit needs RD and Med Psych   - Concern for nutritional and functional status, reassess in 6-8 weeks  - Need to complete review of outside records and update screenings  Patient does not meet selection criteria at this time and will not be added to the waitlist for Liver transplantation at this time            Imaging to be reviewed: retroperitoneal mass     HCC Treatment History: N/A  ABO: A  Platelets: 45  Creatinine:6.5 now previous 2.9   in ICU as of this week presented with HE, then aspirated on vent.   Bilirubin: 0.9   Np AFP  MELD:  23  Plan:     Follow-up Provider: wei to notify family

## 2017-09-14 NOTE — PROGRESS NOTES
Admit Date: 9/11/2017      Subjective:          Review of Systems  On the vent  Poor response    Objective:     Patient Vitals for the past 8 hrs:   BP Temp Pulse Resp SpO2   09/14/17 0847 - - (!) 103 20 100 %   09/14/17 0829 100/69 - (!) 101 16 99 %   09/14/17 0759 107/69 (P) 99.1 °F (37.3 °C) (!) 105 18 100 %   09/14/17 0659 96/60 - (!) 101 16 100 %   09/14/17 0629 91/60 - 100 15 100 %   09/14/17 0558 91/56 - 97 13 100 %   09/14/17 0529 111/66 - 99 12 100 %   09/14/17 0500 - - 95 10 100 %   09/14/17 0459 113/65 - - - -   09/14/17 0429 116/68 - 92 9 100 %   09/14/17 0359 121/65 98 °F (36.7 °C) 93 11 100 %   09/14/17 0329 126/66 - 91 11 100 %   09/14/17 0259 117/73 - 90 11 100 %   09/14/17 0239 - - 88 10 100 %   09/14/17 0229 121/77 - 91 13 100 %   09/14/17 0200 - - 88 18 100 %   09/14/17 0159 98/73 - - - -   09/14/17 0129 95/52 - 89 17 100 %            Physical Exam:   Lungs: clear  CV: RR, no JVD  Abdomen: distended  Ext: no edema          Data Review   Recent Results (from the past 8 hour(s))   METABOLIC PANEL, COMPREHENSIVE    Collection Time: 09/14/17  1:24 AM   Result Value Ref Range    Sodium 142 136 - 145 mmol/L    Potassium 3.5 3.5 - 5.1 mmol/L    Chloride 107 98 - 107 mmol/L    CO2 13 (L) 21 - 32 mmol/L    Anion gap 22 (H) 7 - 16 mmol/L    Glucose 111 (H) 65 - 100 mg/dL    BUN 75 (H) 6 - 23 MG/DL    Creatinine 6.39 (H) 0.6 - 1.0 MG/DL    GFR est AA 9 (L) >60 ml/min/1.73m2    GFR est non-AA 7 (L) >60 ml/min/1.73m2    Calcium 6.7 (L) 8.3 - 10.4 MG/DL    Bilirubin, total 0.9 0.2 - 1.1 MG/DL    ALT (SGPT) 32 12 - 65 U/L    AST (SGOT) 31 15 - 37 U/L    Alk.  phosphatase 441 (H) 50 - 136 U/L    Protein, total 5.8 (L) 6.3 - 8.2 g/dL    Albumin 1.7 (L) 3.5 - 5.0 g/dL    Globulin 4.1 (H) 2.3 - 3.5 g/dL    A-G Ratio 0.4 (L) 1.2 - 3.5     MRSA SCREEN - PCR (NASAL)    Collection Time: 09/14/17  2:08 AM   Result Value Ref Range    Special Requests: NO SPECIAL REQUESTS      Culture result:        MRSA target DNA is not detected (presumptive not colonized with MRSA)   BLOOD GAS, ARTERIAL    Collection Time: 09/14/17  2:30 AM   Result Value Ref Range    pH 7.44 7.35 - 7.45      PCO2 25 (L) 35 - 45 mmHg    PO2 190 (H) 80 - 105 mmHg    BICARBONATE 17 (L) 22 - 26 mmol/L    BASE DEFICIT 6.4 (H) 0 - 2 mmol/L    TOTAL HEMOGLOBIN 10.1 (L) 11.7 - 15.0 GM/DL    O2 SAT 99 (H) 92 - 98.5 %    Arterial O2 Hgb 97.5 (H) 94 - 97 %    CARBOXYHEMOGLOBIN 0.3 (L) 0.5 - 1.5 %    METHEMOGLOBIN 0.8 0.0 - 1.5 %    DEOXYHEMOGLOBIN 1 0.0 - 5.0 %    SITE RB     ALLENS TEST NA     MODE PRVC     Tidal volume 400.0      RATE 12.0      PEEP/CPAP 8.0      Respiratory comment: Dr Angeli Yuongblood at 9 14 2017 2 35 43 AM. Read back.     PHOSPHORUS    Collection Time: 09/14/17  4:13 AM   Result Value Ref Range    Phosphorus 7.2 (H) 2.5 - 4.5 MG/DL   PROTHROMBIN TIME + INR    Collection Time: 09/14/17  4:13 AM   Result Value Ref Range    Prothrombin time 15.2 (H) 9.6 - 12.0 sec    INR 1.4 (H) 0.9 - 1.2     CBC W/O DIFF    Collection Time: 09/14/17  4:13 AM   Result Value Ref Range    WBC 6.7 4.3 - 11.1 K/uL    RBC 2.93 (L) 4.05 - 5.25 M/uL    HGB 9.4 (L) 11.7 - 15.4 g/dL    HCT 27.5 (L) 35.8 - 46.3 %    MCV 93.9 79.6 - 97.8 FL    MCH 32.1 26.1 - 32.9 PG    MCHC 34.2 31.4 - 35.0 g/dL    RDW 16.5 (H) 11.9 - 14.6 %    PLATELET 45 (L) 197 - 450 K/uL    MPV 9.5 (L) 10.8 - 45.4 FL   METABOLIC PANEL, BASIC    Collection Time: 09/14/17  4:13 AM   Result Value Ref Range    Sodium 143 136 - 145 mmol/L    Potassium 3.3 (L) 3.5 - 5.1 mmol/L    Chloride 108 (H) 98 - 107 mmol/L    CO2 16 (L) 21 - 32 mmol/L    Anion gap 19 (H) 7 - 16 mmol/L    Glucose 98 65 - 100 mg/dL    BUN 81 (H) 6 - 23 MG/DL    Creatinine 6.54 (H) 0.6 - 1.0 MG/DL    GFR est AA 9 (L) >60 ml/min/1.73m2    GFR est non-AA 7 (L) >60 ml/min/1.73m2    Calcium 7.4 (L) 8.3 - 10.4 MG/DL   GLUCOSE, POC    Collection Time: 09/14/17  7:16 AM   Result Value Ref Range    Glucose (POC) 76 65 - 100 mg/dL           Assessment:     Principal Problem:    End stage liver disease ([facility] [address] (9/13/2017)    Active Problems:    Acute respiratory failure with hypercapnia ([facility] [address] (7/5/2016)      CKD (chronic kidney disease) stage V requiring chronic dialysis ([facility] [address] (11/2/2016)      Hepatic encephalopathy ([facility] [address] (9/11/2017)        Plan:     Still unstable  Hold on dialysis today    Brandon Delgadillo MD

## 2017-09-14 NOTE — PROGRESS NOTES
Dr. Theron Garrett notified that pt will not be transferring to [facility], per GI. He says they will evaluate in AM when to re-start dialysis. Pts  updated.

## 2017-09-14 NOTE — PROGRESS NOTES
Pt immediately placed on monitors and intubated upon arrival. Chlorhexidene bath given and dual skin assessment performed with Dean Price RN. Scars to abd and BLE. Purple fingertips noted. Cap refill < 3 seconds. Red/blanchable sacrum. Large umbilical hernia protruding ~6 inches. Allevyn placed.

## 2017-09-14 NOTE — PROGRESS NOTES
Critical Care Note:  Notified by house supervisor of patient's need to transfer to ICU. Arrived in room to find patient unresponsive and being assisted with ventilations by RT. Family is appropriately grieving patient's progression of illness. Immediate transfer to ICU via bed with ventilations supported by RT during transport.

## 2017-09-14 NOTE — PROCEDURES
Procedure: Endotracheal intubation    Indication: Acute respiratory failure 518.81    Anesthesia:  Rapid sequence:    Fentanyl 50mcg,  Etomidate 20mg,     After assessing the airway, the patient underwent preoxygenation with 100% FiO2 for 5 min. Fentanyl was then given and after 3 min, etomidate and succinylcholine were given sequentially in rapid IV push. The Sellick maneuver was performed throughout the entire sequence. After adequate sedation and paralysis intubation was performed. A Di 3.0 laryngoscope was used to visualize the epiglottis and vocal cords. After positive identification of epiglottis and the vocal cords, a size 7.5 ET tube was placed into the trachea with direct visualization. Confirmation of endotracheal positionin. The ET tube was directly visualized passing through the vocal cords. 2.  CO2 colorimetry was employed immediately to verify tube in airway with appropriate color change indicating detection/lack of CO2.   3.  Water vapor was seen within the ET tube, and auscultation of the abdomen revealed no bubbling sounds. 4.  Auscultation  And inspection of the chest after intubation showed symmetric chest excursion and symmetric air entry bilaterally. 5.  Chest X-ray has been ordered and is pending. The tube was secured at 24cm at the teeth with a tube bernabe. The patient has been placed on a mechanical ventilator. There were no complications.     Grisel Nayak MD

## 2017-09-14 NOTE — PROGRESS NOTES
Ventilator check complete; patient has a #7.5 ET tube secured at the 20 at the lip. Patient is not sedated. Patient is not able to follow commands. Breath sounds are diminished. Trachea is midline, Negative for subcutaneous air, and chest excursion is symmetric. Patient is also Negative for cyanosis and is Negative for pitting edema. All alarms are set and audible. Resuscitation bag is at the head of the bed.       Ventilator Settings  Mode FIO2 Rate Tidal Volume Pressure PEEP I:E Ratio   Pressure support  35 %    400 ml  10 cm H2O  8 cm H20  1:4      Peak airway pressure: 19 cm H2O   Minute ventilation: 6.7 l/min     ABG:   Recent Labs      09/14/17   0230  09/13/17   2355  09/13/17   1959   PH  7.44  7.51*  7.55*   PCO2  25*  22*  21*   PO2  190*  220*  116*   HCO3  17*  17*  18*         Markie Gu, RT

## 2017-09-14 NOTE — PROGRESS NOTES
GI Update:  Spoke w/ [facility] coordinator  She has been seen there, in consideration for combined liver/kidney transplant  She is not active on list, and has not been approved at this time.   She has several goals to meet prior   Has not been presented for discussion for renal transplant  Liver function is actually stable, no acute liver failure    Cont treatment for HE, PNA, resp failure, chronic renal failure    Venkata REAL

## 2017-09-14 NOTE — PROGRESS NOTES
Pt opened eyes and seemed much more alert. Nodding appropriately to questions. Followed commands and able to wiggle her toes and squeeze both hands with equal strength. Pt denied pain. Pts sister at bedside.

## 2017-09-14 NOTE — PROGRESS NOTES
Bedside shift change report received from Hector Galicia, [address] [place] [address]. Report included the following information SBAR, Kardex, Intake/Output, MAR and Cardiac Rhythm NSR.

## 2017-09-14 NOTE — PROGRESS NOTES
Patient was increasingly encephalopathic. Although BP stable sats 100 she is beginning to have pauses and agonal respirations  Patient's  and daughter at bedside. I explained that if we did not transfer her to ICU and place her on respirator her breathing would stop. He is agreeable and wishes full code  Transfer to ICU for intubation per intensivist. Then hopefully we can work on bringing her ammonia down and be able to dialyze vi. New labs obtained. Need to give some K and Mg. New ammonia 162. She would probably be best served if she could be transferred to a tertiary transplant center. I discussed at length with  who expressed gratitude for our concern and efforts.

## 2017-09-14 NOTE — PROGRESS NOTES
GI DAILY PROGRESS NOTE    Admit Date:  9/11/2017    Today's Date:  9/14/2017    CC:  HE, cirrhosis    Subjective:     Events noted. Became progressively unresponsive, shallow breathing, bilious emesis, transferred to ICU, intubated. She is receiving lactulose via Dobhoff and rectally, with liquid yellow stools in flexiseal.  Has not had dialysis because of hypotension since arrival.     Has been on prphylactic ABx since admit.   No appears to have PNA on CXR.        Medications:   Current Facility-Administered Medications   Medication Dose Route Frequency    chlorhexidine (PERIDEX) 0.12 % mouthwash 15 mL  15 mL Oral BID    sodium chloride (NS) flush 10 mL  10 mL InterCATHeter Q8H    heparin (porcine) pf 600 Units  600 Units InterCATHeter Q8H    sodium chloride (NS) flush 10 mL  10 mL InterCATHeter PRN    heparin (porcine) pf 600 Units  600 Units InterCATHeter PRN    sodium bicarbonate (8.4%) 150 mEq in dextrose 5% 1,000 mL infusion   IntraVENous CONTINUOUS    fentaNYL citrate (PF) injection 50 mcg  50 mcg IntraVENous Q1H PRN    lactulose (CHRONULAC) solution 30 g  30 g Per NG tube Q6H    lactulose (CHRONULAC) 10 gram/15 mL solution 200 g  200 g Rectal Q6H    epoetin ping (EPOGEN;PROCRIT) injection 10,000 Units  10,000 Units SubCUTAneous Q7D    cycloSPORINE modified (GENGRAF, NEORAL) capsule 25 mg  25 mg Oral BID    insulin glargine (LANTUS) injection 12 Units  12 Units SubCUTAneous QHS    metoclopramide HCl (REGLAN) tablet 10 mg  10 mg Oral AC&HS    midodrine (PROAMITINE) tablet 5 mg  5 mg Oral TID WITH MEALS    pantoprazole (PROTONIX) tablet 40 mg  40 mg Oral ACB    morphine CR (MS CONTIN) tablet 15 mg  15 mg Oral Q12H    naloxone (NARCAN) injection 0.4 mg  0.4 mg IntraVENous PRN    ondansetron (ZOFRAN) injection 4 mg  4 mg IntraVENous Q4H PRN    insulin lispro (HUMALOG) injection   SubCUTAneous AC&HS    rifAXIMin (XIFAXAN) tablet 550 mg  550 mg Oral BID    ciprofloxacin (CIPRO) 400 mg IVPB (premix)  400 mg IntraVENous Q24H       Review of Systems:  ROS was obtained, with pertinent positives as listed above. No chest pain or SOB. Diet:  NPO    Objective:   Vitals:  Visit Vitals    /69    Pulse (!) 103    Temp (P) 99.1 °F (37.3 °C)    Resp 20    Ht 5' 1\" (1.549 m)    Wt 47.9 kg (105 lb 9.6 oz)    LMP 01/02/2016    SpO2 100%    BMI 19.95 kg/m2     Intake/Output:     09/12 1901 - 09/14 0700  In: 150 [I.V.:150]  Out: 395 [Urine:75; Drains:320]  Exam:  General appearance: intubated, unresponsive  Lungs: fairly clear bilaterally anteriorly  Heart: tachy, regular  Abdomen: soft, mild/mod distension, non-tender. large umbilical hernia Bowel sounds hypoactive.  No masses, no organomegaly  Extremities:  no cyanosis or edema  Neuro:  Does not open eyes or respond    Data Review (Labs):    Recent Labs      09/14/17   0413  09/14/17   0124  09/13/17 2011 09/13/17   0559  09/12/17   1515  09/12/17   0727  09/11/17   1159   WBC  6.7   --   2.9*  3.8*   --   5.8  5.5   HGB  9.4*   --   7.3*  8.5*   --   8.1*  9.3*   HCT  27.5*   --   20.8*  24.6*   --   23.4*  27.1*   PLT  45*   --   34*  50*   --   57*  56*   MCV  93.9   --   91.6  92.8   --   95.9  95.8   NA  143  142  140  142   --   144  146*   K  3.3*  3.5  2.9*  3.3*   --   3.2*  3.6   CL  108*  107  106  109*   --   108*  109*   CO2  16*  13*  18*  12*   --   19*  20*   BUN  81*  75*  74*  73*   --   73*  68*   CREA  6.54*  6.39*  6.37*  5.81*   --   5.99*  5.91*   CA  7.4*  6.7*  6.8*  6.7*   --   6.9*  8.1*   MG   --    --   1.5*   --    --   1.7*   --    GLU  98  111*  141*  116*   --   121*  202*   AP   --   441*  406*  325*   --   300*  336*   SGOT   --   31  25  16   --   15  18   ALT   --   32  27  23   --   23  26   TBILI   --   0.9  0.8  0.9   --   0.9  0.8   ALB   --   1.7*  2.0*  2.1*   --   2.1*  2.7*   TP   --   5.8*  5.5*  5.7*   --   5.6*  6.4   PTP  15.2*   --    --    --   14.6*   --   14.6*   INR  1.4*   --    --    -- 1.3*   --   1.3*   APTT   --    --    --    --   37.5*   --    --        Assessment:     Principal Problem:    End stage liver disease ([facility] (9/13/2017)    Active Problems:    Acute respiratory failure with hypercapnia (HCC) (7/5/2016)      CKD (chronic kidney disease) stage V requiring chronic dialysis ([facility] (11/2/2016)      Hep[facility] (9/11/2017)        Plan:     55 y.o. female with PMH of (but not limited to) congenital fibrosis of the liver and hepatitis C (tx at [facility] 7/2014) s/p 2 liver transplants (most recently orthotopic liver transplant in 1999), CKD on diaylsis, DM type II, Esophageal varices, hepatic encephalopathy, thrombocytopenia, umbilical hernia, GERD, ascites with failed TIPS , weight loss, hx of leukocytoclastic vasculitis, cyroglobulinemia, Raynaud's disease, portal vein thrombosis, and retroperitoneal mass who is seen in consultation at the request of Dr. Filemon Silva for cirrhosis and encephalopathy. Monica Montanez is a well known patient to Dr. Reginaldo Villafuerte who last saw him in the office on 7/11/17 for her chronic liver disease. She has since been evaluated at [facility] transplant center on 8/30/17 and is being considered for a 3rd liver transplant. She underwent a paracentesis on 9/8/17 with 4500cc of fluid removed. She has developed increasing encephalopathy 9/9/17 not responsive to increased lactulose.      Cont frequent lactulose and xifaxan for HE  Possible PNA on CXR, now intubated, on broad-spec ABx  Can resume midodrine for hypotension  Labs remain fairly stable, INR slightly higher, MELD 23 today  Dialysis on hold for hypotension, lytes and Cr stable  Hgb stable and no signs of bleeding  Prophylactic ABx given on admission- - immunosuppressed  Will D/W critical care optimal coverage for possible PNA- ?  Tracyn  Waiting on call from Lauren Maurice, [facility] liver transplant coordinator, to clarify her current status, not listed yet per family  Critically ill    Sidra Odom MD

## 2017-09-14 NOTE — PROGRESS NOTES
Interdisciplinary team rounds were held 9/14/2017 with the following team members:Nursing, Nurse Practitioner, Palliative Care, Pastoral Care, Pharmacy, Physical Therapy, Physician and Clinical Coordinator and the patient. Plan of care discussed. See clinical pathway and/or care plan for interventions and desired outcomes.

## 2017-09-14 NOTE — PROGRESS NOTES
Care Daily Progress Note: 9/14/2017  Admission Date: 9/11/2017     The patient's chart is reviewed and the patient is discussed with the staff. Unfortunate 54 yo WF with congenital hepatic fibrosis s/p liver tx x 2 now with severe hepatic encephalopathy requiring intubation last night. Subjective:     Sedated on minimal meds. Gets occasional fentanyl for pain. No distress. Family at bedside.      Current Facility-Administered Medications   Medication Dose Route Frequency    chlorhexidine (PERIDEX) 0.12 % mouthwash 15 mL  15 mL Oral BID    piperacillin-tazobactam (ZOSYN) 4.5 g in 0.9% sodium chloride (MBP/ADV) 100 mL  4.5 g IntraVENous Q12H    vancomycin (VANCOCIN) 1,000 mg in 0.9% sodium chloride (MBP/ADV) 250 mL  1,000 mg IntraVENous ONCE    cycloSPORINE (SandIMMUNE) solution 25 mg  25 mg Oral BID    vancomycin (VANCOCIN) 750 mg in  ml infusion  750 mg IntraVENous See Admin Instructions    sodium chloride (NS) flush 10 mL  10 mL InterCATHeter Q8H    heparin (porcine) pf 600 Units  600 Units InterCATHeter Q8H    sodium chloride (NS) flush 10 mL  10 mL InterCATHeter PRN    heparin (porcine) pf 600 Units  600 Units InterCATHeter PRN    fentaNYL citrate (PF) injection 50 mcg  50 mcg IntraVENous Q1H PRN    lactulose (CHRONULAC) solution 30 g  30 g Per NG tube Q6H    lactulose (CHRONULAC) 10 gram/15 mL solution 200 g  200 g Rectal Q6H    epoetin ping (EPOGEN;PROCRIT) injection 10,000 Units  10,000 Units SubCUTAneous Q7D    insulin glargine (LANTUS) injection 12 Units  12 Units SubCUTAneous QHS    metoclopramide HCl (REGLAN) tablet 10 mg  10 mg Oral AC&HS    midodrine (PROAMITINE) tablet 5 mg  5 mg Oral TID WITH MEALS    pantoprazole (PROTONIX) tablet 40 mg  40 mg Oral ACB    morphine CR (MS CONTIN) tablet 15 mg  15 mg Oral Q12H    naloxone (NARCAN) injection 0.4 mg  0.4 mg IntraVENous PRN    ondansetron (ZOFRAN) injection 4 mg  4 mg IntraVENous Q4H PRN    insulin lispro (HUMALOG) injection   SubCUTAneous AC&HS    rifAXIMin (XIFAXAN) tablet 550 mg  550 mg Oral BID       Review of Systems    Unobtainable due to patient status. Objective:     Vitals:    09/14/17 0932 09/14/17 0959 09/14/17 1029 09/14/17 1059   BP: 100/61 92/59 95/60 96/55   Pulse: 98 100 96 95   Resp: 11 (!) 6 16 (!) 7   Temp:       SpO2: 100% 100% 100% 100%   Weight:       Height:           Intake and Output:   09/12 1901 - 09/14 0700  In: 150 [I.V.:150]  Out: 395 [Urine:75; Drains:320]  09/14 0701 - 09/14 1900  In: 60   Out: 850 [Drains:850]    Physical Exam:          Constitutional:  intubated and mechanically ventilated. EENMT:  Sclera clear, pupils equal, oral mucosa moist  Respiratory: clear  Cardiovascular:  RRR with no M,G,R;  Gastrointestinal:  soft with no tenderness; large umbilical hernia  Musculoskeletal:  warm with no cyanosis, no lower leg edema  Skin:  no jaundice or ecchymosis  Neurologic: no gross neuro deficits     Psychiatric:  unresponsive    LINES:  ETT, Weber    DRIPS:  IVF    CXR:          RUL infiltrate, small LV, ETT low       Ventilator Settings  Mode FIO2 Rate Tidal Volume Pressure PEEP   Pressure support  35 %    400 ml  10 cm H2O  8 cm H20      Peak airway pressure: 19 cm H2O   Minute ventilation: 6.3 l/min     ABG:   Recent Labs      09/14/17   0230  09/13/17   2355  09/13/17   1959   PH  7.44  7.51*  7.55*   PCO2  25*  22*  21*   PO2  190*  220*  116*   HCO3  17*  17*  18*        LAB  Recent Labs      09/14/17   1029  09/14/17   0716  09/13/17 2008 09/13/17   1624  09/13/17   1113   GLUCPOC  77  76  158*  158*  119*     Recent Labs      09/14/17   0413  09/13/17 2011 09/13/17   0559  09/12/17   1515   09/11/17   1159   WBC  6.7  2.9*  3.8*   --    < >  5.5   HGB  9.4*  7.3*  8.5*   --    < >  9.3*   HCT  27.5*  20.8*  24.6*   --    < >  27.1*   PLT  45*  34*  50*   --    < >  56*   INR  1.4*   --    --   1.3*   --   1.3*    < > = values in this interval not displayed. Recent Labs      09/14/17   0413  09/14/17   0124  09/13/17 2011 09/13/17   0559  09/12/17   0727   NA  143  142  140  142  144   K  3.3*  3.5  2.9*  3.3*  3.2*   CL  108*  107  106  109*  108*   CO2  16*  13*  18*  12*  19*   GLU  98  111*  141*  116*  121*   BUN  81*  75*  74*  73*  73*   CREA  6.54*  6.39*  6.37*  5.81*  5.99*   MG   --    --   1.5*   --   1.7*   PHOS  7.2*   --   6.1*   --    --    CA  7.4*  6.7*  6.8*  6.7*  6.9*   ALB   --   1.7*  2.0*  2.1*  2.1*   SGOT   --   31  25  16  15     No results for input(s): LCAD, LAC in the last 72 hours. Assessment:  (Medical Decision Making)     Hospital Problems  Date Reviewed: 8/9/2017          Codes Class Noted POA    Thrombocytopenia ([facility] (Chronic) ICD-10-CM: D69.6  ICD-9-CM: 287.5  9/14/2017 Yes    Severe due to liver disease    Coagulopathy (HCC) (Chronic) ICD-10-CM: D68.9  ICD-9-CM: 286.9  9/14/2017 Yes    Overview Signed 5/9/2017 10:57 AM by Sharron Crews MD     Liver disease         INR 1.4; no gross bleeding    * (Principal)End stage liver disease ([facility] ICD-10-CM: K72.90  ICD-9-CM: 572.8  9/13/2017 Unknown    Per GI. Hepatic encephalopathy ([facility] ICD-10-CM: K72.90  ICD-9-CM: 572.2  9/11/2017 Unknown    Ammonia coming down on lactulose from above and below    CKD (chronic kidney disease) stage V requiring chronic dialysis (HCC) (Chronic) ICD-10-CM: N18.6, Z99.2  ICD-9-CM: 585.6, V45.11  11/2/2016 Yes        Acute respiratory failure with hypercapnia ([address] ICD-10-CM: J96.02  ICD-9-CM: 518.81  7/5/2016 Yes    Obtunded due to liver disease. No weaning possible at present. Pulmonary infiltrate- likely pneumonia    Plan:  (Medical Decision Making)     Rest on vent. Attempt to correct high ammonia. Vanc and Zosyn started. Check PCT. --    More than 50% of the time documented was spent in face-to-face contact with the patient and in the care of the patient on the floor/unit where the patient is located.     Vilma Gibson Dorian Castaneda MD

## 2017-09-14 NOTE — PROGRESS NOTES
TRANSFER - OUT REPORT:    Verbal report given to Elvira Barrios (name) on Marc Gonzalez  being transferred to ICU(unit) for urgent transfer       Report consisted of patients Situation, Background, Assessment and   Recommendations(SBAR). Information from the following report(s) SBAR was reviewed with the receiving nurse. Lines:   Venous Access Device duel lumen cath 05/15/17 Upper chest (subclavicular area), left (Active)   Central Line Being Utilized Yes 9/13/2017  7:47 PM   Criteria for Appropriate Use Limited/no vessel suitable for conventional peripheral access 9/13/2017  7:47 PM   Site Assessment Clean, dry, & intact 9/13/2017  7:47 PM   Date of Last Dressing Change 09/08/17 9/13/2017  7:47 PM   Dressing Status Clean, dry, & intact 9/13/2017  7:47 PM   Dressing Type Disk with Chlorhexadine gluconate (CHG) 9/13/2017  7:47 PM   Positive Blood Return (Medial Site) Yes 9/13/2017  7:47 PM   Positive Blood Return (Lateral Site) Yes 9/13/2017  7:47 PM   Date Needle Changed (Lateral Site) 09/12/17 9/13/2017  7:46 AM       Peripheral IV  Left Forearm (Active)   Site Assessment Clean, dry, & intact 9/13/2017  7:47 PM   Phlebitis Assessment 0 9/13/2017  7:47 PM   Infiltration Assessment 0 9/13/2017  7:47 PM   Dressing Status Clean, dry, & intact 9/13/2017  7:47 PM   Dressing Type Transparent;Tape 9/13/2017  7:47 PM   Hub Color/Line Status Infusing 9/13/2017  7:47 PM   Alcohol Cap Used No 9/13/2017  7:47 PM        Opportunity for questions and clarification was provided.       Patient transported with:   Registered Nurse

## 2017-09-14 NOTE — PROGRESS NOTES
Hospitalist Progress Note    2017  Admit Date: 2017 11:39 AM   NAME: Naseem Head   :  1970   MRN:  [id number]   Attending: Moni Vela MD  PCP:  Nael Nair MD    SUBJECTIVE:   Patient 21F with pmhx of hepatic fibrosis/ Hep C,s/p two failed liver transplants  esophageal varices, portal hypertensive gastropathy,DM, ESRD on HD presented with acute hepatic encephalopathy. Patient was ordered lactulose enemas but not responding. She became increasingly sedate on  and unable to take her usual oral medications to include midodrine. Dobhoff ordered for lactulose and med pass. Was transferred to ICU for respiratory depression and intubated evening .    - mechanically ventilated, lightly sedated. Family at bedside. Review of Systems negative with exception of pertinent positives noted above  PHYSICAL EXAM     Visit Vitals    BP 98/61    Pulse 91    Temp 98.3 °F (36.8 °C)    Resp (!) 7    Ht 5' 1\" (1.549 m)    Wt 47.9 kg (105 lb 9.6 oz)    LMP 2016    SpO2 100%    BMI 19.95 kg/m2      Temp (24hrs), Av °F (36.7 °C), Min:96.7 °F (35.9 °C), Max:99.1 °F (37.3 °C)    Oxygen Therapy  O2 Sat (%): 100 % (17 1259)  Pulse via Oximetry: 92 beats per minute (17 1259)  O2 Device: Endotracheal tube;Ventilator (17 1059)  FIO2 (%): 35 % (17 1141)    Intake/Output Summary (Last 24 hours) at 17 1309  Last data filed at 17 1211   Gross per 24 hour   Intake              460 ml   Output             1245 ml   Net             -785 ml      General: Jaundiced, mechanically ventilated  Lungs:  CTA Bilaterally. Heart:  Regular rate and rhythm,  No murmur, rub, or gallop  Abdomen: Soft, distended Positive bowel sounds  Extremities: No cyanosis, clubbing or edema  Neurologic:  spont moving ext.     ASSESSMENT      Active Hospital Problems    Diagnosis Date Noted    Coagulopathy ([address] 2017     Liver disease      Thrombocytopenia ([facility] 09/14/2017    End stage liver disease ([facility] 09/13/2017    Hepatic encephalopathy ([facility] 09/11/2017    CKD (chronic kidney disease) stage V requiring chronic dialysis ([facility] 11/02/2016    Acute respiratory failure with hypercapnia (HCC) 07/05/2016     A/P  - Hepatic Encephalopathy - continue lactulose enemas as well as per NGT. Ammonia level responding 257--->162 today. Clinically not much improved yet. - ESLD - hepatic fibrosis s/p liver tx x 2. Anti-rejection meds resumed but unknown goals for cyclosporine. GI to call transplant team at [facility] to consider transfer. - Thrombocytopenia - secondary to above. stable  - ESRD - HD held yesterday d/t hypotension. Midodrine resumed and BP improved  - Pulmonary infiltrate -right hilar - continue broad spectrum antibiotics empirically for now. - Dm - BG borderline, not eating now.  Will hold lantus coverage and continue SSI    DVT Prophylaxis: scd    Signed By: David Arce DO     September 14, 2017

## 2017-09-14 NOTE — PROGRESS NOTES
TRANSFER - IN REPORT:    [facility] Mariia Cazares  being received from 6th floor room  (unit) for change in patient condition(resp distress. need for intubation)      Report consisted of patients Situation, Background, Assessment and   Recommendations(SBAR). Information from the following report(s) SBAR, Kardex, MAR, Recent Results and Cardiac Rhythm NSR was reviewed with the receiving nurse. Opportunity for questions and clarification was provided. Assessment completed upon patients arrival to unit and care assumed.

## 2017-09-14 NOTE — CONSULTS
CONSULT NOTE    Mariia Cazares    9/13/2017    Date of Admission:  9/11/2017    The patient's chart is reviewed and the patient is discussed with the staff. Critical care consultation was requested by Dr. Peng Chavez for acute respiratory failure and severe hepatic encephalopathy. HPI:   Mariia Cazares is a 47yoF with a PMh of congenital fibrosis of the liver, Hep C, s/p OLT x 2 (last 1999, under consideration at [facility] for #3), CKD currently on dialysis, DM, EV s/p banding 7/16, PH gastropathy, thrombocytopenia, large protuberant umbilical hernia, GERD, ascites requiring large volume paracenteses w failed TIPS, leukocytoclastic vasculitis, cryoglobulinemia, PVT, who was admitted to [facility] on 9/11 with worsened hepatic encephalopathy. She was alert prior to paracentesis on 9/1. During her hospitalization [facility] has been following closely and she was promptly started on cipro, lactulose enemas, rifaximin, and was cultured and studied for reason for abrupt encephalopathy. Her ammonia was 162 today, down slightly from 257 on admission. She has not been receiving HD due to hypotension. Her BPs today have ranged from 100-120/62-71. She has been nearly obtunded all day. Upon arrival of hospitalist at about 9:30pm, her breathing rate was about 8/min. She had an episode of bilious vomiting and had a high risk of aspiration given her obtunded state. Upon her arrival in ICU, she does not respond to questions, has eyes open and GCS of 8 (4,1,3). There is yellow emesis on her shirt. She has a Dobhoff and a L-sided port. Not a transplant candidate at [address] per care everywhere, and I do not see records from [facility] for review.     Review of Systems  Unable to obtain due to patient's obtunded status    Patient Active Problem List   Diagnosis Code    Type 2 diabetes mellitus with hyperglycemia ([facility] [address] E11.65    Insomnia G47.00    Iron deficiency anemia D50.9  Thrombocytopenia (HCC) D69.6    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    Raynaud's disease I73.00    Hepatitis C B19.20    Acute respiratory failure with hypercapnia (HCC) J96.02    GERD (gastroesophageal reflux disease) K21.9    Congenital hepatic fibrosis P78.81    Hypotension I95.9    Esophageal varices (HCC) I85.00    CKD (chronic kidney disease) stage V requiring chronic dialysis (HCC) N18.6, Z99.2    Hemodialysis access, AV graft (HCC) Z99.2    Liver transplant recipient [facility]) Z94.4    Abdominal pain R10.9    Abdominal pain, acute R10.9    Intractable nausea and vomiting R11.2    Coagulopathy (HCC) D68.9    Cirrhosis of liver (HCC) K74.60    Pancytopenia (HCC) D61.818    Hepatic encephalopathy (HCC) K72.90    Chronic kidney disease N18.9    End stage liver disease (HCC) K72.90       Prior to Admission Medications   Prescriptions Last Dose Informant Patient Reported? Taking? cycloSPORINE modified (GENGRAF) 25 mg capsule   Yes No   Sig: Take 2.5 mg/kg/day by mouth every morning. Indications: Takes in the AM   insulin aspart (NOVOLOG) 100 unit/mL injection   No No   Sig: Sliding scale maximum 15 units each meal   insulin glargine (LANTUS SOLOSTAR) 100 unit/mL (3 mL) pen   No No   Si Units by SubCUTAneous route nightly. insulin lispro (HUMALOG) 100 unit/mL kwikpen   No No   Sig: Up to 10 units sq q ac   lactulose (CHRONULAC) 10 gram/15 mL solution   No No   Sig: Take 30 mL by mouth three (3) times daily. Patient taking differently: Take  by mouth two (2) times a day. 20 ML BID   metoclopramide HCl (REGLAN) 10 mg tablet   Yes No   Sig: Take 10 mg by mouth Before breakfast, lunch, dinner and at bedtime. midodrine (PROAMITINE) 5 mg tablet   Yes No   Sig: Take  by mouth every eight (8) hours. morphine CR (MS CONTIN) 15 mg CR tablet   No No   Sig: Take 1 Tab by mouth every twelve (12) hours.  Max Daily Amount: 30 mg.   mupirocin (BACTROBAN) 2 % ointment   No No   Sig: Apply  to affected area daily.   pantoprazole (PROTONIX) 40 mg tablet   Yes No   Sig: Take 40 mg by mouth.   promethazine (PHENERGAN) 25 mg tablet   Yes No   Sig: Take 25 mg by mouth every six (6) hours as needed for Nausea.    rifAXIMin (XIFAXAN) 550 mg tablet   Yes No   Sig: Reported on 5/25/2017 - BID      Facility-Administered Medications: None       Past Medical History:   Diagnosis Date    SEAN (acute kidney injury) ([facility] [address] 6/26/2016    Arthritis     kath feet    Ascites     Benign neoplasm of skin of trunk, except scrotum     pt denies    Cellulitis and abscess of unspecified digit     hx of    Chronic kidney disease     [facility] Pacer on Mon/Wed/Fri    Chronic pain     abd area    Congenital hepatic fibrosis     Liver transplant X2    Esophageal varices (HCC)     GERD (gastroesophageal reflux disease)     Hemodialysis access, AV graft ([facility] [address] 11/22/2016    Hepatic encephalopathy ([facility] [address] 10/2016    recently hospitalized for hepatic encephalopathy    Hepatitis C     hx of hepatitis C-- dx after first liver transplant from a transfusion   (first transplant age 13)    History of gastric ulcer     Insomnia 07/13/2015    no current meds    Liver transplant status ([facility] [address] 1986 and 1999    X2- congential hepatic fibrosis    Pain in joint, ankle and foot     PICC (peripherally inserted central catheter) in place     PONV (postoperative nausea and vomiting)     some N/V, pt states she does well with Phenergan    Port-a-cath in place     R chest for HD    Raynaud disease     Spleen enlarged     Tachycardia     Thrombocytopenia (HCC)     Type 2 diabetes mellitus (HCC)     insulin only/AVG /s.s of hypoglycemia 30's/Last A1c 5.6    Vasculitis ([facility] [address]     resolved     Past Surgical History:   Procedure Laterality Date    HX CHOLECYSTECTOMY  1984    HX COLONOSCOPY      HX OTHER SURGICAL      biliary reconstructive surgery    HX OTHER SURGICAL  2013    EGD    HX OTHER SURGICAL  03/2016    tips procedure    HX OTHER SURGICAL      paracentesis    HX TRANSPLANT  8213,0042    2 liver - first one for congenital hepatic fibrosis, 2nd for Hep C cirrhosis    HX TUBAL LIGATION      LAP,TUBAL CAUTERY      VASCULAR SURGERY PROCEDURE UNLIST Left 11/02/2016    thigh AVG insertion in thigh     Social History     Social History    Marital status:      Spouse name: N/A    Number of children: N/A    Years of education: N/A     Occupational History    Not on file.      Social History Main Topics    Smoking status: Never Smoker    Smokeless tobacco: Never Used    Alcohol use No    Drug use: No    Sexual activity: Yes     Partners: Male     Birth control/ protection: Surgical      Comment: Tubal Ligation     Other Topics Concern    Not on file     Social History Narrative     Family History   Problem Relation Age of Onset    Diabetes Mother     Heart Disease Mother     Hypertension Mother     Heart Disease Father     Stroke Father     Cancer Maternal Grandfather      liver cancer, alcoholism    No Known Problems Sister     Cancer Maternal Aunt      cervical cancer    Breast Cancer Paternal Grandmother      early 45s    Colon Cancer Paternal Grandmother      late 76s    Cancer Other      maternal niece- cervical cancer    Bipolar Disorder Brother     Heart Disease Brother      Allergies   Allergen Reactions    Aspirin Nausea Only    Codeine Nausea Only    Compazine [Prochlorperazine Edisylate] Unknown (comments)     Causes Lock Jaw       Current Facility-Administered Medications   Medication Dose Route Frequency    sodium chloride (NS) flush 10 mL  10 mL InterCATHeter Q8H    heparin (porcine) pf 600 Units  600 Units InterCATHeter Q8H    sodium chloride (NS) flush 10 mL  10 mL InterCATHeter PRN    heparin (porcine) pf 600 Units  600 Units InterCATHeter PRN    sodium bicarbonate (8.4%) 150 mEq in dextrose 5% 1,000 mL infusion   IntraVENous CONTINUOUS    potassium chloride 20 mEq in 100 ml IVPB  20 mEq IntraVENous Q2H    magnesium sulfate 2 g/50 ml IVPB (premix or compounded)  2 g IntraVENous ONCE    etomidate (AMIDATE) 2 mg/mL injection 20 mg  20 mg IntraVENous ONCE    fentaNYL citrate (PF) injection 50 mcg  50 mcg IntraVENous Q1H PRN    [START ON 9/14/2017] lactulose (CHRONULAC) solution 30 g  30 g Per NG tube Q6H    [START ON 9/14/2017] lactulose (CHRONULAC) 10 gram/15 mL solution 200 g  200 g Rectal Q6H    epoetin ping (EPOGEN;PROCRIT) injection 10,000 Units  10,000 Units SubCUTAneous Q7D    cycloSPORINE modified (GENGRAF, NEORAL) capsule 25 mg  25 mg Oral BID    insulin glargine (LANTUS) injection 12 Units  12 Units SubCUTAneous QHS    metoclopramide HCl (REGLAN) tablet 10 mg  10 mg Oral AC&HS    midodrine (PROAMITINE) tablet 5 mg  5 mg Oral TID WITH MEALS    pantoprazole (PROTONIX) tablet 40 mg  40 mg Oral ACB    morphine CR (MS CONTIN) tablet 15 mg  15 mg Oral Q12H    naloxone (NARCAN) injection 0.4 mg  0.4 mg IntraVENous PRN    ondansetron (ZOFRAN) injection 4 mg  4 mg IntraVENous Q4H PRN    insulin lispro (HUMALOG) injection   SubCUTAneous AC&HS    rifAXIMin (XIFAXAN) tablet 550 mg  550 mg Oral BID    ciprofloxacin (CIPRO) 400 mg IVPB (premix)  400 mg IntraVENous Q24H         Objective:     Vitals:    09/13/17 1112 09/13/17 1602 09/13/17 1947 09/13/17 2130   BP: 120/71 101/64 108/68 101/66   Pulse: 72 86 88 86   Resp: 19 18  (P) 9   Temp: 98.9 °F (37.2 °C) 96.7 °F (35.9 °C) 98 °F (36.7 °C)    SpO2: 96% 99% 100% 100%   Weight:       Height:           PHYSICAL EXAM     Constitutional:  the patient is thin, with obvious stigmata of liver disease. EENMT:  Sclera clear, pupils equal, oral mucosa moist  Respiratory: Clear to auscultation  Cardiovascular:  RRR without M,G,R  Gastrointestinal: soft and non-tender; with protuberant umbilical hernia, ascites  Musculoskeletal: cool without fingertip erythema and severe muscle wasting. There is no lower leg edema.   Skin:  no jaundice or rashes, huge scar over RUQ  Neurologic: obtunded, GCS 8, no clonus currently    CXR:  ETT in place      Recent Labs      09/13/17 2011 09/13/17   0559  09/12/17   1515  09/12/17   0727  09/11/17   1159   WBC  2.9*  3.8*   --   5.8  5.5   HGB  7.3*  8.5*   --   8.1*  9.3*   HCT  20.8*  24.6*   --   23.4*  27.1*   PLT  34*  50*   --   57*  56*   INR   --    --   1.3*   --   1.3*     Recent Labs      09/13/17 2011 09/13/17   0559  09/12/17   0727   NA  140  142  144   K  2.9*  3.3*  3.2*   CL  106  109*  108*   GLU  141*  116*  121*   CO2  18*  12*  19*   BUN  74*  73*  73*   CREA  6.37*  5.81*  5.99*   MG  1.5*   --   1.7*   PHOS  6.1*   --    --    CA  6.8*  6.7*  6.9*   ALB  2.0*  2.1*  2.1*   TBILI  0.8  0.9  0.9   ALT  27  23  23   SGOT  25  16  15     Recent Labs      09/13/17   1959   PH  7.55*   PCO2  21*   PO2  116*   HCO3  18*     No results for input(s): LCAD, LAC in the last 72 hours. Assessment:  (Medical Decision Making)     Hospital Problems  Date Reviewed: 8/9/2017          Codes Class Noted POA    * (Principal)End stage liver disease ([facility] [address] ICD-10-CM: K72.90  ICD-9-CM: 572.8  9/13/2017 Unknown    Per GI, may need transfer to outside facility    Hepatic encephalopathy [facility]) ICD-10-CM: K72.90  ICD-9-CM: 572.2  9/11/2017 Unknown    No better and compromising airway protection and respiratory status. CKD (chronic kidney disease) stage V requiring chronic dialysis (HCC) (Chronic) ICD-10-CM: N18.6, Z99.2  ICD-9-CM: 585.6, V45.11  11/2/2016 Yes    With hypotension limiting HD this week. Acute respiratory failure with hypercapnia ([facility] [address] ICD-10-CM: J96.02  ICD-9-CM: 518.81  7/5/2016 Yes    Intubated on 7/23 without complication. Plan:  (Medical Decision Making)     --Intubate for airway protection  --will have sedatives prn and limit continuous infusions for proper assessment of mental status. --Will continue aggressive lactulose treatment.   Given vomiting and difficulty retaining, I'm unsure which route will be most successful so will try both and assess. Continue rifaximin and empiric SBP treatment. --Need to coordinate with transplant center hastily as patients encephalopathy is not improving, and her MELD Na is 26.  --f/u CXR  --d/w hospitalist, who continues to coordinate her care and notified GI of her decline overnight. More than 50% of the time documented was spent in face-to-face contact with the patient and in the care of the patient on the floor/unit where the patient is located. Thank you very much for this referral.  We appreciate the opportunity to participate in this patient's care. Will follow along with above stated plan.     Vladislav Isaacs MD

## 2017-09-14 NOTE — PROGRESS NOTES
GI NOTE ADDENDUM:    Patient still encephalopathic. Will increase lactulose to 45g via NGT q4h. D/c rectal lactulose in order to better visualize stool response to \"oral\" dose. Pt would likely benefit from some form of dialysis if this is possible at this time.      Mirtha Rodarte

## 2017-09-14 NOTE — PROGRESS NOTES
Called  and update given on pts status.  states, Torsten Chandra is young and we want everything done. \"    Lab work ordered.

## 2017-09-14 NOTE — PROGRESS NOTES
Problem: Falls - Risk of  Goal: *Absence of Falls  Document Dinorah Fall Risk and appropriate interventions in the flowsheet.    Outcome: Progressing Towards Goal  Fall Risk Interventions:        Mentation Interventions: Bed/chair exit alarm, Door open when patient unattended, Familiar objects from home, Family/sitter at bedside, Room close to nurse's station, Update white board     Medication Interventions: Bed/chair exit alarm, Evaluate medications/consider consulting pharmacy     Elimination Interventions: Bed/chair exit alarm     History of Falls Interventions: Bed/chair exit alarm, Door open when patient unattended, Evaluate medications/consider consulting pharmacy, Room close to nurse's station

## 2017-09-15 PROBLEM — R63.6 UNDERWEIGHT: Status: ACTIVE | Noted: 2017-01-01

## 2017-09-15 PROBLEM — R91.8 PULMONARY INFILTRATE: Status: ACTIVE | Noted: 2017-01-01

## 2017-09-15 NOTE — DIALYSIS
HD completed without complication. No fluid removed. Needles X 2 removed and pressure dressing applied. Pt drowsy and resting quietly in CCU bed. Report given to primary RN.

## 2017-09-15 NOTE — PROGRESS NOTES
Pt's BP low to mid 17'I systolic during HD. Midodrine afternoon dose given PO. Paged Nephrology, per MD, pt to stop dialysis at this time. Has ran about 1.5 hours total today.

## 2017-09-15 NOTE — PROGRESS NOTES
Dressing to left chest, dual lumen tunneled central line changed per hospital policy. Site pink and healthy. Redressed using sterile technique. Both ports flushed easily and have brisk blood return.     Thelma Scott RN, VAT

## 2017-09-15 NOTE — PROGRESS NOTES
NGT removed at this time, pt eating clear liquid dinner with no difficulty, in NAD at this time, family at bedside.

## 2017-09-15 NOTE — PROGRESS NOTES
GI DAILY PROGRESS NOTE    Admit Date:  9/11/2017    Today's Date:  9/15/2017    CC:  HE, cirrhosis    Subjective:     Much better today. Wide awake, sitting up, wants tube out. Stable overnight. Good stool output on oral lactulose, brown/green.     + emesis of lactulose this AM.  Denies abdom pain.       Medications:   Current Facility-Administered Medications   Medication Dose Route Frequency    potassium chloride 20 mEq in 100 ml IVPB  20 mEq IntraVENous Q2H    potassium chloride (KAON 10%) 20 mEq/15 mL oral liquid 40 mEq  40 mEq Per NG tube DAILY    chlorhexidine (PERIDEX) 0.12 % mouthwash 15 mL  15 mL Oral BID    piperacillin-tazobactam (ZOSYN) 4.5 g in 0.9% sodium chloride (MBP/ADV) 100 mL  4.5 g IntraVENous Q12H    cycloSPORINE (SandIMMUNE) solution 25 mg  25 mg Oral BID    vancomycin (VANCOCIN) 750 mg in  ml infusion  750 mg IntraVENous See Admin Instructions    0.9% sodium chloride infusion  50 mL/hr IntraVENous CONTINUOUS    insulin lispro (HUMALOG) injection   SubCUTAneous Q6H    lactulose (CHRONULAC) solution 30 g  30 g Per NG tube Q4H    sodium chloride (NS) flush 10 mL  10 mL InterCATHeter Q8H    heparin (porcine) pf 600 Units  600 Units InterCATHeter Q8H    sodium chloride (NS) flush 10 mL  10 mL InterCATHeter PRN    heparin (porcine) pf 600 Units  600 Units InterCATHeter PRN    fentaNYL citrate (PF) injection 50 mcg  50 mcg IntraVENous Q1H PRN    epoetin ping (EPOGEN;PROCRIT) injection 10,000 Units  10,000 Units SubCUTAneous Q7D    metoclopramide HCl (REGLAN) tablet 10 mg  10 mg Oral AC&HS    midodrine (PROAMITINE) tablet 5 mg  5 mg Oral TID WITH MEALS    pantoprazole (PROTONIX) tablet 40 mg  40 mg Oral ACB    morphine CR (MS CONTIN) tablet 15 mg  15 mg Oral Q12H    naloxone (NARCAN) injection 0.4 mg  0.4 mg IntraVENous PRN    ondansetron (ZOFRAN) injection 4 mg  4 mg IntraVENous Q4H PRN    rifAXIMin (XIFAXAN) tablet 550 mg  550 mg Oral BID       Review of Systems:  ROS otherwise neg x 10 sys No chest pain or SOB. Diet:  NPO    Objective:   Vitals:  Visit Vitals    BP 94/58    Pulse 87    Temp 97.9 °F (36.6 °C)    Resp 8    Ht 5' 1\" (1.549 m)    Wt 46.5 kg (102 lb 8.2 oz)    LMP 01/02/2016    SpO2 100%    BMI 19.37 kg/m2     Intake/Output:     09/13 1901 - 09/15 0700  In: 2001.8 [I.V.:1531.8]  Out: 3364 [Urine:101; Drains:2420]  Exam:  General appearance: wide awake, comfortable, on vent  Lungs: fairly clear bilaterally anteriorly  Heart: regular rate and rhythm  Abdomen: soft, non-distended, non-tender. large umbilical hernia Bowel sounds oactive. No masses, no organomegaly  Extremities:  + Raynaud's, no  edema  Neuro:  Awake, alert, no asterixis    Data Review (Labs):    Recent Labs      09/15/17   0426  09/14/17   0413  09/14/17   0124  09/13/17 2011 09/13/17   0559  09/12/17   1515   WBC  7.6  6.7   --   2.9*  3.8*   --    HGB  9.2*  9.4*   --   7.3*  8.5*   --    HCT  26.8*  27.5*   --   20.8*  24.6*   --    PLT  37*  45*   --   34*  50*   --    MCV  93.7  93.9   --   91.6  92.8   --    NA  149*  143  142  140  142   --    K  3.2*  3.3*  3.5  2.9*  3.3*   --    CL  113*  108*  107  106  109*   --    CO2  15*  16*  13*  18*  12*   --    BUN  81*  81*  75*  74*  73*   --    CREA  7.44*  6.54*  6.39*  6.37*  5.81*   --    CA  7.9*  7.4*  6.7*  6.8*  6.7*   --    MG   --    --    --   1.5*   --    --    GLU  152*  98  111*  141*  116*   --    AP  391*   --   441*  406*  325*   --    SGOT  25   --   31  25  16   --    ALT  28   --   32  27  23   --    TBILI  1.5*   --   0.9  0.8  0.9   --    ALB  2.1*   --   1.7*  2.0*  2.1*   --    TP  5.9*   --   5.8*  5.5*  5.7*   --    PTP   --   15.2*   --    --    --   14.6*   INR   --   1.4*   --    --    --   1.3*   APTT   --    --    --    --    --   37.5*       Assessment:     Principal Problem:    End stage liver disease (HCC) (9/13/2017)    Active Problems:     Thrombocytopenia ([facility] [address] (9/14/2017)      Acute respiratory failure with hypercapnia ([facility] [address] (7/5/2016)      CKD (chronic kidney disease) stage V requiring chronic dialysis ([facility] [address] (11/2/2016)      Coagulopathy ([facility] [address] (9/14/2017)      Overview: Liver disease      Hepatic encephalopathy ([facility] [address] (9/11/2017)        Plan:     55 y.o. female with PMH of (but not limited to) congenital fibrosis of the liver and hepatitis C (tx at [facility] 7/2014) s/p 2 liver transplants (most recently orthotopic liver transplant in 1999), CKD on diaylsis, DM type II, Esophageal varices, hepatic encephalopathy, thrombocytopenia, umbilical hernia, GERD, ascites with failed TIPS , weight loss, hx of leukocytoclastic vasculitis, cyroglobulinemia, Raynaud's disease, portal vein thrombosis, and retroperitoneal mass who is seen in consultation at the request of Dr. Mirza Alamo for cirrhosis and encephalopathy. [facility] is a well known patient to Dr. Chente Roberts who last saw him in the office on 7/11/17 for her chronic liver disease. She has since been evaluated at [facility] transplant [place] on 8/30/17 and is being considered for a 3rd liver transplant. She underwent a paracentesis on 9/8/17 with 4500cc of fluid removed.       Cont lactulose and xifaxan for HE. Will decrease amount now that she is awake  Possible PNA on CXR, now intubated, on broad-spec ABx  resume midodrine for hypotension- stable  Hgb stable and no signs of bleeding  Check abdom film to r/o obstruction given N/V and large umb hernia   immunosuppressed- on cyclosporin  Spoke w/ [facility] , planning to re-evaluate in several weeks. Not on list currently. Current MELD 25- largely b/c Cr high and HD has been on hold.    Hopefully extubate today- will advance diet if KUB is ok    Harshad Ortega MD

## 2017-09-15 NOTE — PROGRESS NOTES
Hospitalist Progress Note    9/15/2017  Admit Date: 2017 11:39 AM   NAME: Gwyn Lee   :  1970   MRN:  [id number]   Attending: Gilda Huang MD  PCP:  Jose Maria Marcus MD    SUBJECTIVE:   Patient 69U with pmhx of hepatic fibrosis/ Hep C,s/p two failed liver transplants  esophageal varices, portal hypertensive gastropathy,DM, ESRD on HD presented with acute hepatic encephalopathy. Patient was ordered lactulose enemas but not responding. She became increasingly sedate on  and unable to take her usual oral medications to include midodrine. Dobhoff ordered for lactulose and med pass. Was transferred to ICU for respiratory depression and intubated evening . Patient had been denied by [address] for repeat transplant but had just seen [facility] for first time consult to consider kidney and liver tx.      - mechanically ventilated, lightly sedated. Family at bedside. 9/15 - pt on MV but alert. Writing on wipe board today. Family at bedside    Review of Systems negative with exception of pertinent positives noted above  PHYSICAL EXAM     Visit Vitals    BP 94/58    Pulse 85    Temp 98 °F (36.7 °C)    Resp 12    Ht 5' 1\" (1.549 m)    Wt 46.5 kg (102 lb 8.2 oz)    LMP 2016    SpO2 100%    BMI 19.37 kg/m2      Temp (24hrs), Av.1 °F (36.7 °C), Min:97.6 °F (36.4 °C), Max:98.5 °F (36.9 °C)    Oxygen Therapy  O2 Sat (%): 100 % (09/15/17 1529)  Pulse via Oximetry: 84 beats per minute (09/15/17 1529)  O2 Device: Endotracheal tube;Ventilator (09/15/17 0712)  FIO2 (%): 30 % (09/15/17 1005)    Intake/Output Summary (Last 24 hours) at 09/15/17 1634  Last data filed at 09/15/17 1154   Gross per 24 hour   Intake          1054.17 ml   Output             1366 ml   Net          -311.83 ml      General: Jaundiced, mechanically ventilated  Lungs:  CTA Bilaterally.    Heart:  Regular rate and rhythm,  No murmur, rub, or gallop  Abdomen: Soft, distended Positive bowel sounds  Extremities: No cyanosis, clubbing or edema  Neurologic:  spont moving ext. ASSESSMENT      Active Hospital Problems    Diagnosis Date Noted    Pulmonary infiltrate 09/15/2017    Coagulopathy ([facility] 09/14/2017     Liver disease      Thrombocytopenia (HCC) 09/14/2017    End stage liver disease ([facility] 09/13/2017    [facility] 09/11/2017    CKD (chronic kidney disease) stage V requiring chronic dialysis ([facility] 11/02/2016    Acute respiratory failure with hypercapnia (HCC) 07/05/2016     A/P    - Hepatic Encephalopathy - much improved. Ammonia level responding 257--->162 -->50 today. Plan to continue lactulose per NGT, Xifaximin.   - Resp failure - intubated d/t HE. Improved w/ plans noted to extubate  - ESLD - hepatic fibrosis s/p liver tx x 2. On cyclosporin,. GI in contact with [facility] - no plans to transfer per GI.   - Thrombocytopenia - secondary to above. Stable  - ESRD - hypotensive during HD run today - dc'd after 1.5 hr. Continue midodrine  - Pulmonary infiltrate -right hilar - continue broad spectrum antibiotics empirically for now. - Dm - BG fair control on SSI. Monitor after diet resumed - may need to resume basal coverage.      DVT Prophylaxis: scd    Signed By: David Arce DO     September 15, 2017

## 2017-09-15 NOTE — PROGRESS NOTES
Bedside report received from [facility]. Pt is awake on vent, PS 30%, nods appropriately, follows commands, writes on bedside clipboard. Ammonia level decreased to 50 this AM. Pt currently in NAD. No family or visitors currently at bedside. Will continue to monitor.

## 2017-09-15 NOTE — PROGRESS NOTES
Care Daily Progress Note: 9/15/2017  Admission Date: 9/11/2017     The patient's chart is reviewed and the patient is discussed with the staff. Unfortunate 56 yo WF with congenital hepatic fibrosis s/p liver tx x 2 now with severe hepatic encephalopathy requiring intubation. Subjective:     Eyes open, following commands. Wants the ETT tube out. She is writing on a clipboard. Family at bedside.  On dialysis    Ammonia down to 50 today    Current Facility-Administered Medications   Medication Dose Route Frequency    potassium chloride (KAON 10%) 20 mEq/15 mL oral liquid 40 mEq  40 mEq Per NG tube DAILY    lactulose (CHRONULAC) solution 20 g  20 g Per NG tube Q6H    chlorhexidine (PERIDEX) 0.12 % mouthwash 15 mL  15 mL Oral BID    piperacillin-tazobactam (ZOSYN) 4.5 g in 0.9% sodium chloride (MBP/ADV) 100 mL  4.5 g IntraVENous Q12H    cycloSPORINE (SandIMMUNE) solution 25 mg  25 mg Oral BID    vancomycin (VANCOCIN) 750 mg in  ml infusion  750 mg IntraVENous See Admin Instructions    0.9% sodium chloride infusion  50 mL/hr IntraVENous CONTINUOUS    insulin lispro (HUMALOG) injection   SubCUTAneous Q6H    sodium chloride (NS) flush 10 mL  10 mL InterCATHeter Q8H    heparin (porcine) pf 600 Units  600 Units InterCATHeter Q8H    sodium chloride (NS) flush 10 mL  10 mL InterCATHeter PRN    heparin (porcine) pf 600 Units  600 Units InterCATHeter PRN    fentaNYL citrate (PF) injection 50 mcg  50 mcg IntraVENous Q1H PRN    epoetin ping (EPOGEN;PROCRIT) injection 10,000 Units  10,000 Units SubCUTAneous Q7D    metoclopramide HCl (REGLAN) tablet 10 mg  10 mg Oral AC&HS    midodrine (PROAMITINE) tablet 5 mg  5 mg Oral TID WITH MEALS    pantoprazole (PROTONIX) tablet 40 mg  40 mg Oral ACB    morphine CR (MS CONTIN) tablet 15 mg  15 mg Oral Q12H    naloxone (NARCAN) injection 0.4 mg  0.4 mg IntraVENous PRN    ondansetron (ZOFRAN) injection 4 mg  4 mg IntraVENous Q4H PRN    rifAXIMin Hallie Findsuzan) tablet 550 mg  550 mg Oral BID       Review of Systems  Constitutional:  negative for fever, chills, sweats  Cardiovascular:  negative for chest pain, palpitations, syncope, edema  Gastrointestinal:  negative for  abdominal pain, or melena  Neurologic:  negative for focal weakness, numbness, headache        Objective:     Vitals:    09/15/17 0903 09/15/17 0929 09/15/17 1003 09/15/17 1005   BP: (!) 89/54 (!) 85/54     Pulse: 90 87 86 85   Resp: 11 8 14 9   Temp:       SpO2: 100% 100% 100% 100%   Weight:       Height:           Intake and Output:   09/13 1901 - 09/15 0700  In: 2001.8 [I.V.:1531.8]  Out: 2521 [Urine:101; Drains:2420]  09/15 0701 - 09/15 1900  In: -   Out: 100 [Drains:100]    Physical Exam:          Constitutional:  intubated and mechanically ventilated. EENMT:  Sclera clear, pupils equal, oral mucosa moist  Respiratory: clear anterior   Cardiovascular:  RRR with no M,G,R;  Gastrointestinal: jaundice, soft with no tenderness; positive bowel sounds present  Musculoskeletal:  warm with no cyanosis, no lower leg edema  Skin:  no jaundice or ecchymosis  Neurologic: no gross neuro deficits     Psychiatric:  Alert, eyes open, following commands     LINES:  ETT, VAD, guan, peripheral, HD catheter, NG tube, rectal tube     DRIPS:  NS @ 50 ml/hr     CXR:  9/13/17    IMPRESSION: Right superior hilar airspace disease most consistent with  pneumonia.       Ventilator Settings  Mode FIO2 Rate Tidal Volume Pressure PEEP   Pressure support  30 %    400 ml  5 cm H2O  5 cm H20      Peak airway pressure: 11 cm H2O   Minute ventilation: 4.4 l/min (Pt is 5' 1\" and 46.5 Kg)     ABG:   Recent Labs      09/15/17   0340  09/14/17   0230  09/13/17   2355   PH  7.38  7.44  7.51*   PCO2  23*  25*  22*   PO2  204*  190*  220*   HCO3  13*  17*  17*        LAB  Recent Labs      09/15/17   0551  09/15/17   0021  09/15/17   0016  09/14/17   1711  09/14/17   1029   GLUCPOC  156*  136*  52*  89  77     Recent Labs 09/15/17   0426  09/14/17   0413  09/13/17   2011   09/12/17   1515   WBC  7.6  6.7  2.9*   < >   --    HGB  9.2*  9.4*  7.3*   < >   --    HCT  26.8*  27.5*  20.8*   < >   --    PLT  37*  45*  34*   < >   --    INR   --   1.4*   --    --   1.3*    < > = values in this interval not displayed. Recent Labs      09/15/17   0426  09/14/17   0413  09/14/17   0124  09/13/17 2011   NA  149*  143  142  140   K  3.2*  3.3*  3.5  2.9*   CL  113*  108*  107  106   CO2  15*  16*  13*  18*   GLU  152*  98  111*  141*   BUN  81*  81*  75*  74*   CREA  7.44*  6.54*  6.39*  6.37*   MG   --    --    --   1.5*   PHOS   --   7.2*   --   6.1*   CA  7.9*  7.4*  6.7*  6.8*   ALB  2.1*   --   1.7*  2.0*   SGOT  25   --   31  25     No results for input(s): LCAD, LAC in the last 72 hours.       Assessment:  (Medical Decision Making)     Hospital Problems  Date Reviewed: 9/15/2017          Codes Class Noted POA    Pulmonary infiltrate ICD-10-CM: R91.8  ICD-9-CM: 793.19  9/15/2017 Unknown    On antibiotics     Thrombocytopenia (HCC) (Chronic) ICD-10-CM: D69.6  ICD-9-CM: 287.5  9/14/2017 Yes    Severe, platelets 37 today    Coagulopathy (HCC) (Chronic) ICD-10-CM: D68.9  ICD-9-CM: 286.9  9/14/2017 Yes     Liver disease             * (Principal)End stage liver disease ([facility] ICD-10-CM: K72.90  ICD-9-CM: 572.8  9/13/2017 Unknown    Per GI, decreasing lactulose and Xifaxin     Hepatic encephalopathy ([facility] ICD-10-CM: K72.90  ICD-9-CM: 572.2  9/11/2017 Unknown    Better today     CKD (chronic kidney disease) stage V requiring chronic dialysis (HCC) (Chronic) ICD-10-CM: N18.6, Z99.2  ICD-9-CM: 585.6, V45.11  11/2/2016 Yes    Creatinine 7.44     Acute respiratory failure with hypercapnia (HCC) ICD-10-CM: J96.02  ICD-9-CM: 518.81  7/5/2016 Yes    On pressure support       Micro:  9/11 2/2 blood culture, no growth 4 days, pending    Plan:  (Medical Decision Making)     --Zosyn, Vanc day 2   --repeat CXR now  --Ammonia 50 down from 162, continue lactulose   --Improved mentation today   --likely extubate soon     More than 50% of the time documented was spent in face-to-face contact with the patient and in the care of the patient on the floor/unit where the patient is located. Giuseppe Mcdonald, NP     Lungs:  Few trace rhonchi  Heart:  RRR with no Murmur/Rubs/Gallops    Additional Comments:  Ok for extubation. On HD which will improve high Na. Follow pneumonia clinically and radiographically. I have spoken with and examined the patient. I agree with the above assessment and plan as documented.     Robert Adkins MD

## 2017-09-15 NOTE — PROGRESS NOTES
Respiratory Mechanics completed and are as follows:  Weaning Parameters  Spontaneous Breathing Trial Complete: Yes  Resp Rate Observed: 9  Ve: 4.4  VT: 809  RSBI: 11  Catalan Agitation Sedation Scale (RASS): Alert and calm  Patient extubated to a 2L NC. Patient is able to communicate and is negative for stridor. Breath sounds are diminished. No complications with extubation.      Landry George, RT

## 2017-09-15 NOTE — DIALYSIS
Hemodialysis treatment initiated using Left thigh graft and 15 g needles by ZANDRA Tam. Pt alert and VS stable. Machine settings per MD order. Will monitor during treatment.

## 2017-09-15 NOTE — PROGRESS NOTES
[facility] Nephrology Progress Note    Follow-Up on: ESRD    ROS:  On vent    Exam:  Vitals:    09/15/17 0659 09/15/17 0712 09/15/17 0729 09/15/17 0759   BP: 93/57 (!) 87/55 (!) 87/55 94/58   Pulse: 89 92 86 87   Resp: (!) 7 12 8 8   Temp:  97.9 °F (36.6 °C)     SpO2: 100% 100% 100% 100%   Weight:       Height:             Intake/Output Summary (Last 24 hours) at 09/15/17 0932  Last data filed at 09/15/17 0919   Gross per 24 hour   Intake          1791.77 ml   Output             2226 ml   Net          -434.23 ml       Wt Readings from Last 3 Encounters:   09/15/17 46.5 kg (102 lb 8.2 oz)   08/18/17 40.8 kg (90 lb)   08/09/17 46 kg (101 lb 6.4 oz)       GEN - intubated  CV - regular, no murmur, no rub  Lung - clear bilaterally  Abd - soft, nontender  Ext - no edema    Recent Labs      09/15/17   0426  09/14/17   0413  09/13/17   2011   09/12/17   1515   WBC  7.6  6.7  2.9*   < >   --    HGB  9.2*  9.4*  7.3*   < >   --    HCT  26.8*  27.5*  20.8*   < >   --    PLT  37*  45*  34*   < >   --    INR   --   1.4*   --    --   1.3*    < > = values in this interval not displayed. Recent Labs      09/15/17   0426  09/14/17   0413  09/14/17   0124 09/13/17 2011   NA  149*  143  142  140   K  3.2*  3.3*  3.5  2.9*   CL  113*  108*  107  106   CO2  15*  16*  13*  18*   BUN  81*  81*  75*  74*   CREA  7.44*  6.54*  6.39*  6.37*   CA  7.9*  7.4*  6.7*  6.8*   GLU  152*  98  111*  141*   MG   --    --    --   1.5*   PHOS   --   7.2*   --   6.1*       Assessment / Plan:  Principal Problem:    End stage liver disease (HCC) (9/13/2017)    Active Problems: Thrombocytopenia ([address] (9/14/2017)      Acute respiratory failure with hypercapnia ([address] (7/5/2016)      CKD (chronic kidney disease) stage V requiring chronic dialysis ([address] (11/2/2016)      Coagulopathy ([address] (9/14/2017)      Overview: Liver disease      Hepatic encephalopathy ([address] (9/11/2017)      1.  ESRD - unable to arouse today, BP remains lowish although I think this is chronic  - Has not dialyzed for several day, will attempt today now that patient more alert  2. Hepatic encephalopathy - appears improved  3. ESLD - liver transplant  4. Respiratory failure - infiltrate/possible PNA on CXR  5.  Ascites - family reports that she get weekly paracentesis on Fridays, will defer to primary team      Addendum:  Seen on HD at around 11:05 AM  - SBP stable in the 80's

## 2017-09-15 NOTE — PROGRESS NOTES
Ventilator check complete; patient has a #7.5 ET tube secured at the 21 at the lip. Breath sounds are coarse. Trachea is midline, Negative for subcutaneous air, and chest excursion is symmetric. Patient is also Negative for cyanosis and is Negative for pitting edema. All alarms are set and audible. Resuscitation bag is at the head of the bed. Ventilator Settings  Mode FIO2 Rate Tidal Volume Pressure PEEP I:E Ratio   Pressure support  30 %    400 ml  8 cm H2O (Weaned.   VT-455 on PS-8)  8 cm H20  1:4      Peak airway pressure: 16 cm H2O   Minute ventilation: 5.5 l/min     ABG:   Recent Labs      09/15/17   0340  09/14/17   0230  09/13/17   2355   PH  7.38  7.44  7.51*   PCO2  23*  25*  22*   PO2  204*  190*  220*   HCO3  13*  17*  17*         Asim Woodall, RT

## 2017-09-16 NOTE — PROGRESS NOTES
TRANSFER - OUT REPORT:    Verbal report given to Елена Stewart RN(name) on James Zapien  being transferred to [facility](unit) for routine progression of care       Report consisted of patients Situation, Background, Assessment and   Recommendations(SBAR). Information from the following report(s) SBAR, Kardex, Recent Results, Med Rec Status and Cardiac Rhythm NSR was reviewed with the receiving nurse. Lines:   Venous Access Device duel lumen cath 05/15/17 Upper chest (subclavicular area), left (Active)   Central Line Being Utilized Yes 9/16/2017  3:42 PM   Criteria for Appropriate Use Limited/no vessel suitable for conventional peripheral access 9/16/2017  3:42 PM   Site Assessment Clean, dry, & intact 9/16/2017  3:42 PM   Date of Last Dressing Change 09/15/17 9/16/2017 11:59 AM   Dressing Status Clean, dry, & intact 9/16/2017  3:42 PM   Dressing Type Disk with Chlorhexadine gluconate (CHG); Transparent 9/16/2017  3:42 PM   Action Taken Dressing changed 9/15/2017  6:30 PM   Access Medial Site Assessment Other (Comment) 9/15/2017  6:30 PM   Date Accessed (Medial Site) 09/15/17 9/15/2017  6:30 PM   Access Time (Medial Site) 1830 9/15/2017  6:30 PM   Access Needle Size (Site #1) Other (comments) 9/15/2017  6:30 PM   Access Needle Length (Medial Site) Other (comment) 9/15/2017  6:30 PM   Positive Blood Return (Medial Site) Yes 9/16/2017  3:42 PM   Action Taken (Medial Site) Flushed 9/16/2017  3:42 PM   Date Needle Changed (Medial Site) 09/15/17 9/15/2017  6:30 PM   Access Lateral Site Assessment Other (Comment) 9/15/2017  6:30 PM   Date Accessed (Lateral Site) 09/15/17 9/15/2017  6:30 PM   Access Time (Lateral Site) 1830 9/15/2017  6:30 PM   Positive Blood Return (Lateral Site) Yes 9/16/2017  3:42 PM   Action Taken (Lateral Site) Flushed 9/16/2017  3:42 PM   Date Needle Changed (Lateral Site) 09/15/17 9/15/2017  6:30 PM   Alcohol Cap Used No 9/16/2017  7:40 AM       Peripheral IV  Left Forearm (Active)   Site Assessment Clean, dry, & intact 9/16/2017  3:42 PM   Phlebitis Assessment 0 9/16/2017  3:42 PM   Infiltration Assessment 0 9/16/2017  3:42 PM   Dressing Status Clean, dry, & intact 9/16/2017  3:42 PM   Dressing Type Tape;Transparent 9/16/2017  3:42 PM   Hub Color/Line Status Blue;Flushed;Patent 9/16/2017  3:42 PM   Alcohol Cap Used No 9/16/2017  3:42 PM        Opportunity for questions and clarification was provided.       Patient transported with:   SBAR, Kardex, Recent Results, Med Rec Status and Cardiac Rhythm NSR

## 2017-09-16 NOTE — PROGRESS NOTES
Problem: Nutrition Deficit  Goal: *Optimize nutritional status  Nutrition:  Nutrition Consult for General Nutrition Management & Supplements. Kevin Martinez MD)        Assessment:  Anthropometrics:  Ht - 5'1\", wgt - 52.6 kg (CCU bed 9/16/17), edema - 1+ pitting to thighs, + ascites. Macronutrient Needs:  Estimated calorie needs - 8670-2538 meghann/day (30-35 meghann/kg/day)   Estimated protein needs - 58-67 gm pro/day (1.2-1.4 gm pro/kgIBW/day) (GFR 9 ml/min)  Intake/Comparative Standards: This patient's average intake of full liquid diet for the past 1 days/2 meals: 63%. This potentially meets 100% of calorie and 66% of protein goals. Pertinent Labs:              BUN 52, creatinine 5.25. Pertinent Medications:              Lactulose, Reglan, SSI coverage. Diet:              Full liquids. Food/Nutrition History:              55year old frail lady with a h/o diabetes, ESLD due to congenital hepatic fibrosis and ESRD admitted with encephalopathy. The patient's  provided the majority of the information obtained about her most recent history. He indicates that she has been drinking a supplement (Nepro) at home which they ordered online. He reports that she would typically drink one but when she increased to 2 per day, she became encephalopathic. He reports it is difficult finding that right balance of enough protein and not overdoing it. Doubt that any change in her weight is directly related to her edema or her ascites and not muscle mass. Encouraged the  to ensure that the patient includes exercise with increased oral intake to promote muscle growth and not just laying down fat stores. Diagnosis (Nutrition): Inadequate oral intake related to decreased ability to consume sufficient oral intake as evidenced by ascites and encephalopathy. Intervention:  Meals and Snacks: Full liquids.    Medical Food Supplement Therapy: Commercial Beverage: Cold Suplena (425 calories and 10-11 gm protein per can) with lunch and cold Nepro (425 calories and 19 gm protein per can) with dinner trays. Continue to encourage adequate intake of other components of her trays. If oral intake diminishes to the point that a TF becomes necessary, her needs should be met with Nepro @ 35 ml/hr with a 20 ml/hr water flush - 1512 calories/day (100% calorie goal), 68 grams protein/day (100% protein goal), 140 grams CHO/day (does not exceed max CHO limit) and 1089 ml water/day (100% fluid goal). Nutrition Discharge Plan: Too soon to determine. Oscar Barkley.  Silver Nunez  [phone]

## 2017-09-16 NOTE — PROGRESS NOTES
GI DAILY PROGRESS NOTE    Admit Date:  9/11/2017    Today's Date:  9/16/2017    CC:  HE, cirrhosis    Subjective:     Much better today. Wide awake, sitting up, Talking clearly. Stable overnight. Good stool output on oral lactulose, brown/green. Denies abdom pain. Medications:   Current Facility-Administered Medications   Medication Dose Route Frequency    lactulose (CHRONULAC) solution 20 g  20 g Per NG tube Q6H    cycloSPORINE (SandIMMUNE) capsule 25 mg  25 mg Oral BID    piperacillin-tazobactam (ZOSYN) 4.5 g in 0.9% sodium chloride (MBP/ADV) 100 mL  4.5 g IntraVENous Q12H    vancomycin (VANCOCIN) 750 mg in  ml infusion  750 mg IntraVENous See Admin Instructions    0.9% sodium chloride infusion  50 mL/hr IntraVENous CONTINUOUS    insulin lispro (HUMALOG) injection   SubCUTAneous Q6H    sodium chloride (NS) flush 10 mL  10 mL InterCATHeter Q8H    heparin (porcine) pf 600 Units  600 Units InterCATHeter Q8H    sodium chloride (NS) flush 10 mL  10 mL InterCATHeter PRN    heparin (porcine) pf 600 Units  600 Units InterCATHeter PRN    fentaNYL citrate (PF) injection 50 mcg  50 mcg IntraVENous Q1H PRN    epoetin ping (EPOGEN;PROCRIT) injection 10,000 Units  10,000 Units SubCUTAneous Q7D    metoclopramide HCl (REGLAN) tablet 10 mg  10 mg Oral AC&HS    midodrine (PROAMITINE) tablet 5 mg  5 mg Oral TID WITH MEALS    pantoprazole (PROTONIX) tablet 40 mg  40 mg Oral ACB    morphine CR (MS CONTIN) tablet 15 mg  15 mg Oral Q12H    naloxone (NARCAN) injection 0.4 mg  0.4 mg IntraVENous PRN    ondansetron (ZOFRAN) injection 4 mg  4 mg IntraVENous Q4H PRN    rifAXIMin (XIFAXAN) tablet 550 mg  550 mg Oral BID       Review of Systems:  ROS otherwise neg x 10 sys No chest pain or SOB.     Diet:  NPO    Objective:   Vitals:  Visit Vitals    /70    Pulse 73    Temp 96.4 °F (35.8 °C)    Resp 20    Ht 5' 1\" (1.549 m)    Wt 52.6 kg (115 lb 15.4 oz)    LMP 01/02/2016    SpO2 100%    BMI 21.91 kg/m2     Intake/Output:  09/16 0701 - 09/16 1900  In: 520 [P.O.:520]  Out: -   09/14 1901 - 09/16 0700  In: 2524.7 [I.V.:2464.7]  Out: 2281 [Urine:26; Drains:1750]  Exam:  General appearance: wide awake, comfortable  Lungs: fairly clear bilaterally anteriorly  Heart: regular rate and rhythm  Abdomen: soft, non-distended, non-tender. large umbilical hernia Bowel sounds oactive. No masses, no organomegaly  Extremities:  + Raynaud's, no  edema  Neuro:  Awake, alert, no asterixis    Data Review (Labs):    Recent Labs      09/16/17   0414  09/15/17   0426  09/14/17   0413  09/14/17   0124  09/13/17 2011   WBC  6.7  7.6  6.7   --   2.9*   HGB  8.7*  9.2*  9.4*   --   7.3*   HCT  25.5*  26.8*  27.5*   --   20.8*   PLT  46*  37*  45*   --   34*   MCV  95.9  93.7  93.9   --   91.6   NA  145  149*  143  142  140   K  3.8  3.2*  3.3*  3.5  2.9*   CL  109*  113*  108*  107  106   CO2  21  15*  16*  13*  18*   BUN  52*  81*  81*  75*  74*   CREA  5.25*  7.44*  6.54*  6.39*  6.37*   CA  7.6*  7.9*  7.4*  6.7*  6.8*   MG   --    --    --    --   1.5*   GLU  159*  152*  98  111*  141*   AP  315*  391*   --   441*  406*   SGOT  20  25   --   31  25   ALT  23  28   --   32  27   TBILI  0.9  1.5*   --   0.9  0.8   ALB  2.0*  2.1*   --   1.7*  2.0*   TP  5.4*  5.9*   --   5.8*  5.5*   PTP   --    --   15.2*   --    --    INR   --    --   1.4*   --    --        Assessment:     Principal Problem:    End stage liver disease (HCC) (9/13/2017)    Active Problems: Thrombocytopenia ([address] (9/14/2017)      Acute respiratory failure with hypercapnia ([address] (7/5/2016)      CKD (chronic kidney disease) stage V requiring chronic dialysis ([address] (11/2/2016)      Coagulopathy ([address] (9/14/2017)      Overview: Liver disease      Pancytopenia ([address] (5/9/2017)      Hepatic encephalopathy ([address] (9/11/2017)      Pulmonary infiltrate (9/15/2017)      Underweight (9/15/2017)        Plan:          Cont lactulose and xifaxan for HE.    Possible PNA on CXR, now extubated, on broad-spec ABx, Clinically much improved  Hgb stable and no signs of bleeding  No Further nausea or vomiting   immunosuppressed- on cyclosporin  Spoke w/ [facility] transplant , planning to re-evaluate in several weeks. Not on list currently. Tolerated short dialysis yesterday, creatinine improved  Recommend DC rectal tube given her immunosuppressed status.   DC Weber if okay with nephrology  Consider transfer to floor today or tomorrow- stable from GI standpoint    Brenton Aguilar MD

## 2017-09-16 NOTE — PROGRESS NOTES
Critical Care Outreach Nurse Progress Report:    Subjective: In to assess pt secondary to transfer from CCU. MEWS Score: 1 (09/16/17 1542)    Vitals:    09/16/17 1559 09/16/17 1704 09/16/17 1706 09/16/17 1811   BP: 104/68  102/66 110/72   Pulse: 72 76  (!) 114   Resp: 10   16   Temp:    96.8 °F (36 °C)   SpO2: 98% 99%  96%   Weight:       Height:            Objective: Pt sitting up in bed, trying to eat dinner. Drowsy and dozing off and on. Pain Intensity 1: 0 (09/16/17 1542)     Pain Intervention(s) 1: Medication (see MAR)  Patient Stated Pain Goal: 0    Assessment: Drowsy but oriented x3. Lung sounds clear. O2 Sat 98% on RA, RR even and unlabored. Abd semi-soft, distended, protruding umbilical hernia present. Bowel sounds present. No complaints voiced at this time. Plan: Will follow per outreach protocol.

## 2017-09-16 NOTE — PROGRESS NOTES
Care Daily Progress Note: 9/16/2017  Admission Date: 9/11/2017     The patient's chart is reviewed and the patient is discussed with the staff. Unfortunate 56 yo WF with congenital hepatic fibrosis s/p liver tx x 2 now with severe hepatic encephalopathy requiring intubation. Subjective:     Extubated yesterday. Awake and comfortable on RA. Says sputum is clear. Still with diarrhea from lactulose- now only being given po.     Current Facility-Administered Medications   Medication Dose Route Frequency    lactulose (CHRONULAC) solution 20 g  20 g Per NG tube Q6H    cycloSPORINE (SandIMMUNE) capsule 25 mg  25 mg Oral BID    piperacillin-tazobactam (ZOSYN) 4.5 g in 0.9% sodium chloride (MBP/ADV) 100 mL  4.5 g IntraVENous Q12H    vancomycin (VANCOCIN) 750 mg in  ml infusion  750 mg IntraVENous See Admin Instructions    0.9% sodium chloride infusion  50 mL/hr IntraVENous CONTINUOUS    insulin lispro (HUMALOG) injection   SubCUTAneous Q6H    sodium chloride (NS) flush 10 mL  10 mL InterCATHeter Q8H    heparin (porcine) pf 600 Units  600 Units InterCATHeter Q8H    sodium chloride (NS) flush 10 mL  10 mL InterCATHeter PRN    heparin (porcine) pf 600 Units  600 Units InterCATHeter PRN    fentaNYL citrate (PF) injection 50 mcg  50 mcg IntraVENous Q1H PRN    epoetin ping (EPOGEN;PROCRIT) injection 10,000 Units  10,000 Units SubCUTAneous Q7D    metoclopramide HCl (REGLAN) tablet 10 mg  10 mg Oral AC&HS    midodrine (PROAMITINE) tablet 5 mg  5 mg Oral TID WITH MEALS    pantoprazole (PROTONIX) tablet 40 mg  40 mg Oral ACB    morphine CR (MS CONTIN) tablet 15 mg  15 mg Oral Q12H    naloxone (NARCAN) injection 0.4 mg  0.4 mg IntraVENous PRN    ondansetron (ZOFRAN) injection 4 mg  4 mg IntraVENous Q4H PRN    rifAXIMin (XIFAXAN) tablet 550 mg  550 mg Oral BID       Review of Systems  Constitutional:  negative for fever, chills, sweats  Cardiovascular:  negative for chest pain, palpitations, syncope, edema  Gastrointestinal:  negative for  abdominal pain, or melena  Neurologic:  negative for focal weakness, numbness, headache        Objective:     Vitals:    09/16/17 0659 09/16/17 0729 09/16/17 0802 09/16/17 0829   BP: 91/63 90/59 99/64 100/66   Pulse: 72 70 75 69   Resp: (!) 7 (!) 7 11 16   Temp:   96.4 °F (35.8 °C)    SpO2: 99% 99% 99% 99%   Weight:       Height:           Intake and Output:   09/14 1901 - 09/16 0700  In: 2524.7 [I.V.:2464.7]  Out: 8204 [Urine:26; Drains:1750]  09/16 0701 - 09/16 1900  In: 520 [P.O.:520]  Out: -     Physical Exam:          Constitutional: comfortable on RA  EENMT:  Sclera clear, pupils equal, oral mucosa moist  Respiratory: clear anterior   Cardiovascular:  RRR with no M,G,R;  Gastrointestinal: jaundice, soft with no tenderness; positive bowel sounds present  Musculoskeletal:  warm with no cyanosis, no lower leg edema  Skin:  no jaundice or ecchymosis  Neurologic: no gross neuro deficits     Psychiatric:  Alert and oriented    LINES:  VAD, guan, peripheral, HD catheter, rectal tube     DRIPS:  NS @ 50 ml/hr     CXR:      9/13/17    IMPRESSION: Right superior hilar airspace disease most consistent with  pneumonia.       Ventilator Settings  Mode FIO2 Rate Tidal Volume Pressure PEEP   Pressure support  30 %    400 ml  5 cm H2O  5 cm H20      Peak airway pressure: 11 cm H2O   Minute ventilation: 4.4 l/min (Pt is 5' 1\" and 46.5 Kg)     ABG:   Recent Labs      09/15/17   0340  09/14/17   0230  09/13/17   2355   PH  7.38  7.44  7.51*   PCO2  23*  25*  22*   PO2  204*  190*  220*   HCO3  13*  17*  17*        LAB  Recent Labs      09/16/17   0607  09/15/17   2355  09/15/17   1726  09/15/17   1214  09/15/17   0551   GLUCPOC  129*  215*  111*  101*  156*     Recent Labs      09/16/17   0414  09/15/17   0426  09/14/17   0413   WBC  6.7  7.6  6.7   HGB  8.7*  9.2*  9.4*   HCT  25.5*  26.8*  27.5*   PLT  46*  37*  45*   INR   --    --   1.4*     Recent Labs      09/16/17   0414 09/15/17   0426  09/14/17   0413  09/14/17   0124  09/13/17 2011   NA  145  149*  143  142  140   K  3.8  3.2*  3.3*  3.5  2.9*   CL  109*  113*  108*  107  106   CO2  21  15*  16*  13*  18*   GLU  159*  152*  98  111*  141*   BUN  52*  81*  81*  75*  74*   CREA  5.25*  7.44*  6.54*  6.39*  6.37*   MG   --    --    --    --   1.5*   PHOS   --    --   7.2*   --   6.1*   CA  7.6*  7.9*  7.4*  6.7*  6.8*   ALB  2.0*  2.1*   --   1.7*  2.0*   SGOT  20  25   --   31  25     No results for input(s): LCAD, LAC in the last 72 hours. Ammonia 38    Micro:  9/11 2/2 blood culture, no growth 4 days, pending     Assessment:  (Medical Decision Making)     Hospital Problems  Date Reviewed: 9/15/2017          Codes Class Noted POA    Pulmonary infiltrate ICD-10-CM: R91.8  ICD-9-CM: 793.19  9/15/2017 Unknown    On antibiotics for PNA    Thrombocytopenia (HCC) (Chronic) ICD-10-CM: D69.6  ICD-9-CM: 287.5  9/14/2017 Yes    Severe, platelets 37 today    Coagulopathy (HCC) (Chronic) ICD-10-CM: D68.9  ICD-9-CM: 286.9  9/14/2017 Yes     Liver disease             * (Principal)End stage liver disease ([facility] ICD-10-CM: K72.90  ICD-9-CM: 572.8  9/13/2017 Unknown    Per GI, decreasing lactulose and Xifaxin     Hepatic encephalopathy ([facility] ICD-10-CM: K72.90  ICD-9-CM: 572.2  9/11/2017 Unknown    Better today     CKD (chronic kidney disease) stage V requiring chronic dialysis (HCC) (Chronic) ICD-10-CM: N18.6, Z99.2  ICD-9-CM: 585.6, V45.11  11/2/2016 Yes    Creatinine 5.25. For HD on Monday    Acute respiratory failure with hypercapnia [facility]) ICD-10-CM: J96.02  ICD-9-CM: 518.81  7/5/2016 Yes    Resolved          Plan:  (Medical Decision Making)     --Milly Briscoe day 3   --repeat CXR now  --if no progression on CXR, would stop vanc. More than 50% of the time documented was spent in face-to-face contact with the patient and in the care of the patient on the floor/unit where the patient is located.     Nirav Montero MD

## 2017-09-16 NOTE — PROGRESS NOTES
Bedside shift change report given to Melanie Rios, RN and Kennedy RN (oncoming nurse) by Armen Kyle RN (offgoing nurse). Report included the following information SBAR, Kardex, ED Summary, OR Summary, Procedure Summary, Intake/Output, MAR, Recent Results and Cardiac Rhythm NSR.

## 2017-09-16 NOTE — PROGRESS NOTES
[facility] Progress Note    Follow-Up on: ESRD    ROS:  Gen - no fever, no chills, appetite okay  CV - no chest pain, no palpitation, no orthopnea  Lung - no shortness of breath, no cough, no hemoptysis  Abd - no tenderness, no nausea/vomiting, no bloody stool  Ext - no edema      Exam:  Vitals:    09/16/17 0558 09/16/17 0629 09/16/17 0659 09/16/17 0729   BP: 94/59 102/62 91/63 90/59   Pulse: 72 71 72 70   Resp: (!) 7 8 (!) 7 (!) 7   Temp:       SpO2: 99% 99% 99% 99%   Weight:       Height:             Intake/Output Summary (Last 24 hours) at 09/16/17 0821  Last data filed at 09/16/17 0612   Gross per 24 hour   Intake          1644.67 ml   Output              515 ml   Net          1129.67 ml       Wt Readings from Last 3 Encounters:   09/16/17 52.6 kg (115 lb 15.4 oz)   08/18/17 40.8 kg (90 lb)   08/09/17 46 kg (101 lb 6.4 oz)       GEN - in no distress  CV - regular, no murmur, no rub  Lung - clear bilaterally  Abd - soft, nontender  Ext - no edema    Recent Labs      09/16/17 0414  09/15/17   0426  09/14/17   0413   WBC  6.7  7.6  6.7   HGB  8.7*  9.2*  9.4*   HCT  25.5*  26.8*  27.5*   PLT  46*  37*  45*   INR   --    --   1.4*        Recent Labs      09/16/17   0414  09/15/17   0426  09/14/17   0413   09/13/17   2011   NA  145  149*  143   < >  140   K  3.8  3.2*  3.3*   < >  2.9*   CL  109*  113*  108*   < >  106   CO2  21  15*  16*   < >  18*   BUN  52*  81*  81*   < >  74*   CREA  5.25*  7.44*  6.54*   < >  6.37*   CA  7.6*  7.9*  7.4*   < >  6.8*   GLU  159*  152*  98   < >  141*   MG   --    --    --    --   1.5*   PHOS   --    --   7.2*   --   6.1*    < > = values in this interval not displayed. Assessment / Plan:  Principal Problem:    End stage liver disease ([facility] (9/13/2017)    Active Problems:     Thrombocytopenia ([facility] (9/14/2017)      Acute respiratory failure with hypercapnia ([facility] (7/5/2016)      CKD (chronic kidney disease) stage V requiring chronic dialysis ([facility] (11/2/2016) Coagulopathy ([facility] (9/14/2017)      Overview: Liver disease      Pancytopenia ([facility] (5/9/2017)      Hepatic encephalopathy ([facility] (9/11/2017)      Pulmonary infiltrate (9/15/2017)      Underweight (9/15/2017)      1. ESRD -   - Short HD yesterday due to hypotension  - Will attempt next HD Monday  2. Hepatic encephalopathy - much better  3. ESLD - liver transplant  4. S/p respiratory failure - infiltrate/possible PNA on CXR. Extubated  5.  Ascites

## 2017-09-16 NOTE — PROGRESS NOTES
Hospitalist Progress Note    Subjective:   Daily Progress Note: 9/16/2017 12:38 PM    Ms. Milton Bermeo is a 55 white F with pmhx of hepatic fibrosis/ Hep C,s/p two failed liver transplants  esophageal varices, portal hypertensive gastropathy,DM, ESRD on HD who presented 9/11/17 with acute hepatic encephalopathy. Patient was ordered lactulose enemas but initially did not response and in fact worsened. She became increasingly sedate on 9/13 and unable to take her usual oral medications to include midodrine. Dobhoff ordered for lactulose and med pass. Was transferred to ICU for respiratory depression and intubated evening 9/13. Patient had been denied by [address] for repeat transplant but had just seen Moustapha for first time consult to consider kidney and liver tx. Patient then began to improve and was extubated 9/15. Tolerating room air and appropriately oriented. Denies chest pain, sob, nausea. With diarrhea but on lactulose.      Current Facility-Administered Medications   Medication Dose Route Frequency    lactulose (CHRONULAC) solution 20 g  20 g Per NG tube QID    cycloSPORINE (SandIMMUNE) capsule 25 mg  25 mg Oral BID    piperacillin-tazobactam (ZOSYN) 4.5 g in 0.9% sodium chloride (MBP/ADV) 100 mL  4.5 g IntraVENous Q12H    vancomycin (VANCOCIN) 750 mg in  ml infusion  750 mg IntraVENous See Admin Instructions    0.9% sodium chloride infusion  50 mL/hr IntraVENous CONTINUOUS    insulin lispro (HUMALOG) injection   SubCUTAneous Q6H    sodium chloride (NS) flush 10 mL  10 mL InterCATHeter Q8H    heparin (porcine) pf 600 Units  600 Units InterCATHeter Q8H    sodium chloride (NS) flush 10 mL  10 mL InterCATHeter PRN    heparin (porcine) pf 600 Units  600 Units InterCATHeter PRN    fentaNYL citrate (PF) injection 50 mcg  50 mcg IntraVENous Q1H PRN    epoetin ping (EPOGEN;PROCRIT) injection 10,000 Units  10,000 Units SubCUTAneous Q7D    metoclopramide HCl (REGLAN) tablet 10 mg  10 mg Oral AC&HS    midodrine (PROAMITINE) tablet 5 mg  5 mg Oral TID WITH MEALS    pantoprazole (PROTONIX) tablet 40 mg  40 mg Oral ACB    morphine CR (MS CONTIN) tablet 15 mg  15 mg Oral Q12H    naloxone (NARCAN) injection 0.4 mg  0.4 mg IntraVENous PRN    ondansetron (ZOFRAN) injection 4 mg  4 mg IntraVENous Q4H PRN    rifAXIMin (XIFAXAN) tablet 550 mg  550 mg Oral BID        Review of Systems  A comprehensive review of systems was negative except for that written in the HPI.     Objective:     Visit Vitals    /69    Pulse 75    Temp 96 °F (35.6 °C)    Resp 11    Ht 5' 1\" (1.549 m)    Wt 52.6 kg (115 lb 15.4 oz)    LMP 2016    SpO2 99%    BMI 21.91 kg/m2    O2 Flow Rate (L/min): 1 l/min O2 Device: Room air    Temp (24hrs), Av.2 °F (36.2 °C), Min:96 °F (35.6 °C), Max:98 °F (36.7 °C)       07 - 1900  In: 26 [P.O.:520]  Out: 100 [Drains:100]  1901 -  0700  In: 2524.7 [I.V.:2464.7]  Out: 3578 [Urine:26; Drains:1750]    General: awake, alert, oriented, appears chronically ill but not acutely distressed, appears older than stated age  Eyes; non icteric, EOMI  Neck; supple  CV: RRR  Pulm; diminished in bases, course  Abd; firm, distended, protruding umbilical hernia, active BS  Skin/ext: jaundiced, thin, underweight, frail    Additional comments:I reviewed the patient's new clinical lab test results. j    Data Review    Recent Results (from the past 24 hour(s))   GLUCOSE, POC    Collection Time: 09/15/17  5:26 PM   Result Value Ref Range    Glucose (POC) 111 (H) 65 - 100 mg/dL   VANCOMYCIN, RANDOM    Collection Time: 09/15/17  5:27 PM   Result Value Ref Range    Vancomycin, random 16.6 UG/ML   GLUCOSE, POC    Collection Time: 09/15/17 11:55 PM   Result Value Ref Range    Glucose (POC) 215 (H) 65 - 813 mg/dL   METABOLIC PANEL, COMPREHENSIVE    Collection Time: 17  4:14 AM   Result Value Ref Range    Sodium 145 136 - 145 mmol/L    Potassium 3.8 3.5 - 5.1 mmol/L    Chloride 109 (H) 98 - 107 mmol/L    CO2 21 21 - 32 mmol/L    Anion gap 15 7 - 16 mmol/L    Glucose 159 (H) 65 - 100 mg/dL    BUN 52 (H) 6 - 23 MG/DL    Creatinine 5.25 (H) 0.6 - 1.0 MG/DL    GFR est AA 11 (L) >60 ml/min/1.73m2    GFR est non-AA 9 (L) >60 ml/min/1.73m2    Calcium 7.6 (L) 8.3 - 10.4 MG/DL    Bilirubin, total 0.9 0.2 - 1.1 MG/DL    ALT (SGPT) 23 12 - 65 U/L    AST (SGOT) 20 15 - 37 U/L    Alk. phosphatase 315 (H) 50 - 136 U/L    Protein, total 5.4 (L) 6.3 - 8.2 g/dL    Albumin 2.0 (L) 3.5 - 5.0 g/dL    Globulin 3.4 2.3 - 3.5 g/dL    A-G Ratio 0.6 (L) 1.2 - 3.5     CBC W/O DIFF    Collection Time: 09/16/17  4:14 AM   Result Value Ref Range    WBC 6.7 4.3 - 11.1 K/uL    RBC 2.66 (L) 4.05 - 5.25 M/uL    HGB 8.7 (L) 11.7 - 15.4 g/dL    HCT 25.5 (L) 35.8 - 46.3 %    MCV 95.9 79.6 - 97.8 FL    MCH 32.7 26.1 - 32.9 PG    MCHC 34.1 31.4 - 35.0 g/dL    RDW 16.9 (H) 11.9 - 14.6 %    PLATELET 46 (L) 397 - 450 K/uL    MPV 9.5 (L) 10.8 - 14.1 FL   AMMONIA    Collection Time: 09/16/17  4:14 AM   Result Value Ref Range    Ammonia 38 (H) 11 - 32 UMOL/L   GLUCOSE, POC    Collection Time: 09/16/17  6:07 AM   Result Value Ref Range    Glucose (POC) 129 (H) 65 - 100 mg/dL   GLUCOSE, POC    Collection Time: 09/16/17 12:14 PM   Result Value Ref Range    Glucose (POC) 96 65 - 100 mg/dL         Assessment/Plan:     Principal Problem:    End stage liver disease ([facility] (9/13/2017)    Active Problems: Thrombocytopenia ([address] (9/14/2017)      Acute respiratory failure with hypercapnia ([address] (7/5/2016)      CKD (chronic kidney disease) stage V requiring chronic dialysis ([address] (11/2/2016)      Coagulopathy ([address] (9/14/2017)      Overview: Liver disease      Pancytopenia ([address] (5/9/2017)      Hepatic encephalopathy ([address] (9/11/2017)      Pulmonary infiltrate (9/15/2017)      Underweight (9/15/2017)      Has greatly improved over past 24-36 hours. Tolerating room air and oral intake. Continue lactulose and rifaximin.    On broad spectrum abx but hopefully may wean soon.   Stable for transfer to the floor    Care Plan discussed with: Patient/Family      Signed By: Elaine Piedra MD     September 16, 2017

## 2017-09-17 PROBLEM — K72.10 END STAGE LIVER DISEASE (HCC): Chronic | Status: ACTIVE | Noted: 2017-01-01

## 2017-09-17 NOTE — PROGRESS NOTES
[facility] Progress Note    Follow-Up on: ESRD    ROS:  Gen - no fever, no chills, appetite okay  CV - no chest pain, no palpitation, no orthopnea  Lung - no shortness of breath, no cough, no hemoptysis  Abd - no tenderness, no nausea/vomiting, no bloody stool  Ext - no edema      Exam:  Vitals:    09/16/17 2000 09/16/17 2304 09/17/17 0302 09/17/17 0700   BP: 104/65 106/72 110/72 106/71   Pulse: 77 79 83 88   Resp: 16 18 18 16   Temp:    97.4 °F (36.3 °C)   SpO2: 100% 94% 92% 95%   Weight:       Height:             Intake/Output Summary (Last 24 hours) at 09/17/17 0934  Last data filed at 09/17/17 0302   Gross per 24 hour   Intake              889 ml   Output              480 ml   Net              409 ml       Wt Readings from Last 3 Encounters:   09/16/17 52.6 kg (115 lb 15.4 oz)   08/18/17 40.8 kg (90 lb)   08/09/17 46 kg (101 lb 6.4 oz)       GEN - in no distress  CV - regular, no murmur, no rub  Lung - clear bilaterally  Abd - soft, nontender  Ext - no edema    Recent Labs      09/16/17   0414  09/15/17   0426   WBC  6.7  7.6   HGB  8.7*  9.2*   HCT  25.5*  26.8*   PLT  46*  37*        Recent Labs      09/16/17   0414  09/15/17   0426   NA  145  149*   K  3.8  3.2*   CL  109*  113*   CO2  21  15*   BUN  52*  81*   CREA  5.25*  7.44*   CA  7.6*  7.9*   GLU  159*  152*       Assessment / Plan:  Principal Problem:    End stage liver disease ([facility] (9/13/2017)    Active Problems: Thrombocytopenia ([facility] (9/14/2017)      Acute respiratory failure with hypercapnia ([facility] (7/5/2016)      CKD (chronic kidney disease) stage V requiring chronic dialysis ([facility] (11/2/2016)      Coagulopathy ([facility] (9/14/2017)      Overview: Liver disease      Pancytopenia ([facility] (5/9/2017)      Hepatic encephalopathy ([facility] (9/11/2017)      Pulmonary infiltrate (9/15/2017)      Underweight (9/15/2017)      1. ESRD -   - Attempt HD tomorrow, last HD limited by hypotension, chronically on midodrine  2.  Hepatic encephalopathy - much better  3. ESLD - liver transplant  4. S/p respiratory failure - infiltrate/possible PNA on CXR  5.  Ascites - defer to primary team whether paracentesis needs to be arrange while hospitalized

## 2017-09-17 NOTE — PROGRESS NOTES
Sitting up in bed with eyes open, quiet voice. Pleasant, responds to questions appropriately. No needs at this time.

## 2017-09-17 NOTE — PROGRESS NOTES
GI DAILY PROGRESS NOTE    Admit Date:  9/11/2017    Today's Date:  9/17/2017    CC:  HE, cirrhosis    Subjective:     Much better today. Wide awake, sitting up, Talking clearly. Stable overnight. Good stool output on oral lactulose, brown/green. Denies abdom pain. Out of ICU. Medications:   Current Facility-Administered Medications   Medication Dose Route Frequency    lactulose (CHRONULAC) solution 20 g  20 g Per NG tube QID    cycloSPORINE (SandIMMUNE) capsule 25 mg  25 mg Oral BID    piperacillin-tazobactam (ZOSYN) 4.5 g in 0.9% sodium chloride (MBP/ADV) 100 mL  4.5 g IntraVENous Q12H    vancomycin (VANCOCIN) 750 mg in  ml infusion  750 mg IntraVENous See Admin Instructions    0.9% sodium chloride infusion  50 mL/hr IntraVENous CONTINUOUS    insulin lispro (HUMALOG) injection   SubCUTAneous Q6H    sodium chloride (NS) flush 10 mL  10 mL InterCATHeter Q8H    heparin (porcine) pf 600 Units  600 Units InterCATHeter Q8H    sodium chloride (NS) flush 10 mL  10 mL InterCATHeter PRN    heparin (porcine) pf 600 Units  600 Units InterCATHeter PRN    fentaNYL citrate (PF) injection 50 mcg  50 mcg IntraVENous Q1H PRN    epoetin ping (EPOGEN;PROCRIT) injection 10,000 Units  10,000 Units SubCUTAneous Q7D    metoclopramide HCl (REGLAN) tablet 10 mg  10 mg Oral AC&HS    midodrine (PROAMITINE) tablet 5 mg  5 mg Oral TID WITH MEALS    pantoprazole (PROTONIX) tablet 40 mg  40 mg Oral ACB    morphine CR (MS CONTIN) tablet 15 mg  15 mg Oral Q12H    naloxone (NARCAN) injection 0.4 mg  0.4 mg IntraVENous PRN    ondansetron (ZOFRAN) injection 4 mg  4 mg IntraVENous Q4H PRN    rifAXIMin (XIFAXAN) tablet 550 mg  550 mg Oral BID       Review of Systems:  ROS otherwise neg x 10 sys No chest pain or SOB.     Diet:  NPO    Objective:   Vitals:  Visit Vitals    /71    Pulse 88    Temp 97.4 °F (36.3 °C)    Resp 16    Ht 5' 1\" (1.549 m)    Wt 52.6 kg (115 lb 15.4 oz)    LMP 01/02/2016    SpO2 95%  BMI 21.91 kg/m2     Intake/Output:     09/15 1901 - 09/17 0700  In: 2375.7 [P.O.:1000; I.V.:1375.7]  Out: 815 [Urine:15; Drains:800]  Exam:  General appearance: wide awake, comfortable  Lungs: fairly clear bilaterally anteriorly  Heart: regular rate and rhythm  Abdomen: soft, mildly distended, non-tender. large umbilical hernia Bowel sounds oactive. No masses, no organomegaly  Extremities:  + Raynaud's, no  edema  Neuro:  Awake, alert, no asterixis     Data Review (Labs):    Recent Labs      09/16/17   0414  09/15/17   0426   WBC  6.7  7.6   HGB  8.7*  9.2*   HCT  25.5*  26.8*   PLT  46*  37*   MCV  95.9  93.7   NA  145  149*   K  3.8  3.2*   CL  109*  113*   CO2  21  15*   BUN  52*  81*   CREA  5.25*  7.44*   CA  7.6*  7.9*   GLU  159*  152*   AP  315*  391*   SGOT  20  25   ALT  23  28   TBILI  0.9  1.5*   ALB  2.0*  2.1*   TP  5.4*  5.9*       Assessment:     Principal Problem:    End stage liver disease (HCC) (9/13/2017)    Active Problems:    Type 2 diabetes mellitus with hyperglycemia (HCC) (7/4/2016)      Thrombocytopenia (HCC) (9/14/2017)      Acute respiratory failure with hypercapnia (HCC) (7/5/2016)      Hypotension (7/6/2016)      CKD (chronic kidney disease) stage V requiring chronic dialysis ([facility] (11/2/2016)      Coagulopathy ([facility] (9/14/2017)      Overview: Liver disease      Pancytopenia ([facility] (5/9/2017)      Hepatic encephalopathy (HCC) (9/11/2017)      Pulmonary infiltrate (9/15/2017)      Underweight (9/15/2017)        Plan:          Cont lactulose and xifaxan for HE. Possible PNA on CXR, now extubated, on broad-spec ABx, Clinically much improved  Hgb slowly trend down, but no signs of bleeding  No Further nausea or vomiting   immunosuppressed- on cyclosporin  Spoke w/ [facility] , planning to re-evaluate in several weeks. Not on list currently. Rec DC ABx today and monitor, if OK w/ IM/ pulm.    Abdom slightly more distended, recurrent ascites, minimize IV fluids , may need repeat tap soon. HD tomorrow may help w/ fluid balance.      Eder Gunderson MD

## 2017-09-17 NOTE — PROGRESS NOTES
Tio Hernandez  Admission Date: 9/11/2017             Daily Progress Note: 9/17/2017    The patient's chart is reviewed and the patient is discussed with the staff. Unfortunate 54 yo WF with DM, EV s/p banding 7/16, PH gastropathy, thrombocytopenia, umbilical hernia, GERD, ascites requiring large volume paracentesis (most recently 9/8 with removal of 4.5 liters) with failed TIPS, leukocytoclastic vasculitis, cryoglobulinemia, PVT, congenital hepatic fibrosis s/p liver tx x 2  (last 1999, under consideration at [facility] for #3), and ESRD on HD. She was admitted with severe hepatic encephalopathy. GI and Nephrology consulted. Mentation / respiratory status continued to decline despite treatment requiring intubation on 9/13/17. She was successfully extubated 9/15/17. CXR consistent with RUL PNA. GI contacted [facility] - in consideration for combined liver/kidney transplant. She is not active on list, and has not been approved at this time. She has several goals to meet prior. Has not been presented for discussion for renal transplant. Plan for re-evaluation in several weeks    Subjective:     Stable hypotension. Currently on RA with o2 sat 95%. Occasional cough with minimal mucus production. Occasional nausea. Ongoing diarrhea.         Current Facility-Administered Medications   Medication Dose Route Frequency    lactulose (CHRONULAC) solution 20 g  20 g Per NG tube QID    cycloSPORINE (SandIMMUNE) capsule 25 mg  25 mg Oral BID    piperacillin-tazobactam (ZOSYN) 4.5 g in 0.9% sodium chloride (MBP/ADV) 100 mL  4.5 g IntraVENous Q12H    vancomycin (VANCOCIN) 750 mg in  ml infusion  750 mg IntraVENous See Admin Instructions    0.9% sodium chloride infusion  50 mL/hr IntraVENous CONTINUOUS    insulin lispro (HUMALOG) injection   SubCUTAneous Q6H    sodium chloride (NS) flush 10 mL  10 mL InterCATHeter Q8H    heparin (porcine) pf 600 Units  600 Units InterCATHeter Q8H    sodium chloride (NS) flush 10 mL  10 mL InterCATHeter PRN    heparin (porcine) pf 600 Units  600 Units InterCATHeter PRN    fentaNYL citrate (PF) injection 50 mcg  50 mcg IntraVENous Q1H PRN    epoetin ping (EPOGEN;PROCRIT) injection 10,000 Units  10,000 Units SubCUTAneous Q7D    metoclopramide HCl (REGLAN) tablet 10 mg  10 mg Oral AC&HS    midodrine (PROAMITINE) tablet 5 mg  5 mg Oral TID WITH MEALS    pantoprazole (PROTONIX) tablet 40 mg  40 mg Oral ACB    morphine CR (MS CONTIN) tablet 15 mg  15 mg Oral Q12H    naloxone (NARCAN) injection 0.4 mg  0.4 mg IntraVENous PRN    ondansetron (ZOFRAN) injection 4 mg  4 mg IntraVENous Q4H PRN    rifAXIMin (XIFAXAN) tablet 550 mg  550 mg Oral BID       Review of Systems  Constitutional: negative for fever, chills, sweats  Cardiovascular: negative for chest pain, palpitations, syncope, edema  Gastrointestinal:  negative for dysphagia, reflux, vomiting, +diarrhea, abdominal pain  Neurologic:  negative for focal weakness, numbness, headache    Objective:     Vitals:    09/16/17 2000 09/16/17 2304 09/17/17 0302 09/17/17 0700   BP: 104/65 106/72 110/72 106/71   Pulse: 77 79 83 88   Resp: 16 18 18 16   Temp:    97.4 °F (36.3 °C)   SpO2: 100% 94% 92% 95%   Weight:       Height:         Intake and Output:   09/15 1901 - 09/17 0700  In: 2375.7 [P.O.:1000; I.V.:1375.7]  Out: 815 [Urine:15; Drains:800]       Physical Exam:   Constitution:  the patient is well developed and in no acute distress  EENMT:  Sclera clear, pupils equal, oral mucosa moist  Respiratory: clear to auscultation bilaterally, RA  Cardiovascular:  RRR without M,G,+R  Gastrointestinal: firm/distended; with positive bowel sounds. Musculoskeletal: warm without cyanosis. There is no lower leg edema.   Skin:  no jaundice or rashes, no open wounds   Neurologic: no gross neuro deficits     Psychiatric:  alert and oriented x 3    CXR:   9/16      LAB  Recent Labs      09/17/17   0631  09/16/17   2305  09/16/17   1832 09/16/17   1830  09/16/17   1214   GLUCPOC  148*  117*  65  34*  96      Recent Labs      09/16/17   0414  09/15/17   0426   WBC  6.7  7.6   HGB  8.7*  9.2*   HCT  25.5*  26.8*   PLT  46*  37*     Recent Labs      09/16/17   0414  09/15/17   0426   NA  145  149*   K  3.8  3.2*   CL  109*  113*   CO2  21  15*   GLU  159*  152*   BUN  52*  81*   CREA  5.25*  7.44*   CA  7.6*  7.9*   ALB  2.0*  2.1*   TBILI  0.9  1.5*   ALT  23  28   SGOT  20  25     Recent Labs      09/15/17   0340   PH  7.38   PCO2  23*   PO2  204*   HCO3  13*     No results for input(s): LCAD, LAC in the last 72 hours.       Assessment:  (Medical Decision Making)     Hospital Problems  Date Reviewed: 9/15/2017          Codes Class Noted POA    Pulmonary infiltrate ICD-10-CM: R91.8  ICD-9-CM: 793.19  9/15/2017 Unknown    Possible PNA  On abx   Improved on yesterday's CXR      Underweight ICD-10-CM: R63.6  ICD-9-CM: 783.22  9/15/2017 Unknown        Thrombocytopenia (HCC) (Chronic) ICD-10-CM: D69.6  ICD-9-CM: 287.5  9/14/2017 Yes    Severe   plt 46      Coagulopathy (HCC) (Chronic) ICD-10-CM: D68.9  ICD-9-CM: 286.9  9/14/2017 Yes     Liver disease         * (Principal)End stage liver disease (HCC) (Chronic) ICD-10-CM: K72.90  ICD-9-CM: 572.8  9/13/2017 Unknown    S/P failed transplant x 2  Followed by [facility] - follow up scheduled in a couple of weeks  Not currently on transplant list  NH3 38 today      Hepatic encephalopathy ([address] ICD-10-CM: K72.90  ICD-9-CM: 572.2  9/11/2017 Unknown    Improved      Pancytopenia ([address] (Chronic) ICD-10-CM: F53.635  ICD-9-CM: 284.19  5/9/2017 Yes        CKD (chronic kidney disease) stage V requiring chronic dialysis ([address] (Chronic) ICD-10-CM: N18.6, Z99.2  ICD-9-CM: 585.6, V45.11  11/2/2016 Yes    Nephrology following  Planning to attempt HD tomorrow - has been limited by hypotension      Hypotension (Chronic) ICD-10-CM: I95.9  ICD-9-CM: 458.9  7/6/2016 Yes    On midodrine      Acute respiratory failure with hypercapnia [facility]) ICD-10-CM: J96.02  ICD-9-CM: 518.81  7/5/2016 Yes    Resolved  Currently on RA      Type 2 diabetes mellitus with hyperglycemia (HCC) (Chronic) ICD-10-CM: E11.65  ICD-9-CM: 250.00  7/4/2016 Yes    BS range   SSI       Micro:  9/14 nasal:  Negative  9/11 BC: negative x 2  9/8 ascitic fluid: negative    Plan:  (Medical Decision Making)     --Vanc/Zosyn 4 >>> CXR improved. Consider weaning Vanc   9/14 PCT 1.6  --GI following - cyclosporine/lactulose/rifaximin  --midodrine  --Nephrology following - plan to attempt HD tomorrow  --PT following for mobility - ambulated 40' today    More than 50% of the time documented was spent in face-to-face contact with the patient and in the care of the patient on the floor/unit where the patient is located. Amber Jansen, NP        Lungs: CTA b/l. No wheezing  Heart S1 and S2 audible, no murmers or rubs appreciated  Other     Stop vanco and continue abx for 3 more days. Will sign off for now since on RA as well. Call if needed. I have spoken with and examined the patient. I have reviewed the history, examination, assessment, and plan and agree with the above. Tova Bonner MD      This note was signed electronically.

## 2017-09-17 NOTE — PROGRESS NOTES
Hospitalist Progress Note    2017  Admit Date: 2017 11:39 AM   NAME: Best Smith   :  1970   MRN:  [id number]   Attending: Edie Gerard MD  PCP:  Skylar Odom MD    SUBJECTIVE:   Ms. Usha Miller is a 55 white F with pmhx of hepatic fibrosis/ Hep C,s/p two failed liver transplants  esophageal varices, portal hypertensive gastropathy, DM, ESRD on HD who presented 17 with acute hepatic encephalopathy. Patient was ordered lactulose enemas but initially did not response and in fact worsened. She became increasingly sedate on  and unable to take her usual oral medications to include midodrine.  Dobhoff ordered for lactulose and med pass. Was transferred to ICU for respiratory depression and intubated evening . Patient had been denied by [address] for repeat transplant but had just seen [facility] for first time consult to consider kidney and liver tx. Patient then began to improve and was extubated 9/15. Tolerating room air and appropriately oriented. Denies chest pain, sob, nausea. Review of Systems negative with exception of pertinent positives noted above  PHYSICAL EXAM     Visit Vitals    /71    Pulse 88    Temp 97.4 °F (36.3 °C)    Resp 16    Ht 5' 1\" (1.549 m)    Wt 52.6 kg (115 lb 15.4 oz)    LMP 2016    SpO2 95%    BMI 21.91 kg/m2      Temp (24hrs), Av.7 °F (35.9 °C), Min:96 °F (35.6 °C), Max:97.4 °F (36.3 °C)    Oxygen Therapy  O2 Sat (%): 95 % (17 0700)  Pulse via Oximetry: 77 beats per minute (17 1704)  O2 Device: Room air (17 1542)  O2 Flow Rate (L/min): 1 l/min (09/15/17 2000)  FIO2 (%): 30 % (09/15/17 1005)    Intake/Output Summary (Last 24 hours) at 17 1105  Last data filed at 17 0302   Gross per 24 hour   Intake              889 ml   Output              480 ml   Net              409 ml      General: Cachectic. Chronically ill appearing. Sitting up in chair. Has hiccups.   Lungs:  CTA Bilaterally.  No rales/rhonchi/wheezes/stridor or rubs  Heart:  Regular rate and rhythm,  No murmur, rub, or gallop  Abdomen: Soft, Non distended, Non tender, Diminished bowel sounds  Extremities: Atrophic. No edema. Neurologic:  Alert but intermittently somnolent. ASSESSMENT /PLAN     1. End Stage Liver Disease: Continue Lactulose and Rifaximin. Refused by [address] for liver transplant. Appt. With [facility] to consider kidney and liver transplant. 2. Pulmonary Infiltrate: On broad spectrum antibiotic which was started while she was in ICU. 3. ESRD on HD: Continue with scheduled HD. Management per Nephrology. 4. Thrombocytopenia: Stable and chronic. Pt. Still appears gravely ill.         DVT Prophylaxis: Gio Stearns [place], [place]

## 2017-09-17 NOTE — PROGRESS NOTES
END OF SHIFT NOTE:    INTAKE/OUTPUT  09/15 0701 - 09/16 0700  In: 1644.7 [I.V.:1644.7]  Out: 515 [Urine:15; Drains:500]  Voiding: NO  Catheter: NO  Drain:              Flatus: Patient does have flatus present. Stool:  1 occurrences. Characteristics:  Stool Assessment  Stool Color: Alan Shove  Stool Appearance: Watery  Stool Amount: Small  Stool Source/Status: Rectum    Emesis: 0 occurrences. Characteristics:        VITAL SIGNS  Patient Vitals for the past 12 hrs:   Temp Pulse Resp BP SpO2   09/16/17 2000 - 77 16 104/65 100 %   09/16/17 1811 96.8 °F (36 °C) (!) 114 16 110/72 96 %   09/16/17 1706 - - - 102/66 -   09/16/17 1704 - 76 - - 99 %   09/16/17 1559 - 72 10 104/68 98 %   09/16/17 1542 96.4 °F (35.8 °C) 73 19 106/69 100 %   09/16/17 1408 - 78 20 101/65 98 %   09/16/17 1329 - 76 18 97/60 100 %   09/16/17 1259 - 70 10 109/68 99 %   09/16/17 1159 96 °F (35.6 °C) 75 11 104/69 99 %   09/16/17 1128 - 73 9 104/61 99 %   09/16/17 1059 - 69 12 109/67 99 %   09/16/17 1029 - 68 9 104/68 99 %   09/16/17 1009 - 73 20 110/70 100 %   09/16/17 0928 - 71 16 102/64 99 %   09/16/17 0859 - 66 11 108/64 100 %   09/16/17 0829 - 69 16 100/66 99 %       Pain Assessment  Pain Intensity 1: 0 (09/16/17 1542)     Pain Intervention(s) 1: Medication (see MAR)  Patient Stated Pain Goal: 0    Ambulating  No    Shift report given to oncoming nurse at the bedside.     Shagufta Lam RN

## 2017-09-17 NOTE — PROGRESS NOTES
Problem: Mobility Impaired (Adult and Pediatric)  Goal: *Acute Goals and Plan of Care (Insert Text)  LTG:  (1.)Ms. Zenaiad Gates will move from supine to sit and sit to supine , scoot up and down and roll side to side in bed with MODIFIED INDEPENDENCE within 7 day(s). (2.)Ms. Zenaida Gates will transfer from bed to chair and chair to bed with SUPERVISION using the least restrictive device within 7 day(s). (3.)Ms. Zenaida Gates will ambulate with SUPERVISION for >fp=635 feet with the least restrictive device, appropriate gait pattern and good dynamic standing balance within 7 day(s). (4.)Ms. Zenaida Gates will perform B LE therapeutic exercises x 20 reps with INDEPENDENCE within 7 days to increase strength for improved safety and independence in transfers and gait. (5.)Ms. Zenaida Gates will ascend/descend 4 steps with 1 handrail(s) and CGA within 7 day(s) for safe entry/exit of home.  ________________________________________________________________________________________________      PHYSICAL THERAPY: INITIAL ASSESSMENT 9/17/2017  INPATIENT: Hospital Day: 7  [place] / [place] / Product Type: Medicare /      NAME/AGE/GENDER: Gordon Garcia is a 55 y.o. female         PRIMARY DIAGNOSIS: Hepatic encephalopathy ([facility] End stage liver disease ([facility] End stage liver disease ([facility]        ICD-10: Treatment Diagnosis:       · Generalized Muscle Weakness (M62.81)  · Difficulty in walking, Not elsewhere classified (R26.2)  · Repeated Falls (R29.6)  · History of falling (Z91.81)   Precaution/Allergies:  Aspirin; Codeine; and Compazine [prochlorperazine edisylate]       ASSESSMENT:      Ms. Zenaida Gates presents supine in bed, requesting help to get up to chair for breakfast. RN at bedside and ok's treatment. Pt pleasant and very frail appearing. Lives with spouse who is home with her during the day. Pt admits to furniture walking and several falls iwthin the past year.  Otherwise pt states she is independent with bathing/dressing and often exercises by walking one mile on \"good days\". Currently, pt is functioning well below her baseline as she required minimal assist for all mobility. Pt was able to ambulate 40 feet with HHA x 1 and was severely limited by fatigue and generalized weakness. Assisted pt to bedside commode per her request. Worked on transfers and standing balance activities while RN assisted with self care. Pt was unable maintain static balance and perform self care on her own. Pt then returned to chair with call bell, val and other needs in reach. Pt fatigued quickly from this activity. All movements and speech appear very slow and intentional, requiring great effort on the part of the pt. Pt has had multiple falls in the past year and remains a fall risk secondary to overall weakness and decreased balance. Very low tolerance for physical activity at this time. Certainly needs continuation of PT services to return to baseline. This section established at most recent assessment   PROBLEM LIST (Impairments causing functional limitations):  1. Decreased Strength  2. Decreased ADL/Functional Activities  3. Decreased Transfer Abilities  4. Decreased Ambulation Ability/Technique  5. Decreased Balance  6. Decreased Activity Tolerance  7. Increased Fatigue  8. Decreased [place] with Home Exercise Program    INTERVENTIONS PLANNED: (Benefits and precautions of physical therapy have been discussed with the patient.)  1. Balance Exercise  2. Bed Mobility  3. Gait Training  4. Home Exercise Program (HEP)  5. Therapeutic Activites  6. Therapeutic Exercise/Strengthening  7. Transfer Training  8.  Group Therapy      TREATMENT PLAN: Frequency/Duration: 3 times a week for duration of hospital stay  Rehabilitation Potential For Stated Goals: GOOD      RECOMMENDED REHABILITATION/EQUIPMENT: (at time of discharge pending progress): Due to the probability of continued deficits (see above) this patient will likely need continued skilled physical therapy after discharge. Equipment:   · to be determined                   HISTORY:   History of Present Injury/Illness (Reason for Referral):  Per chart: Pau Tyler is a 55 y.o. 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 Has been doing well recently, walking on most days for exercise and trying to maintain nourishment. History obtained from family as patient is encephalopathy and they state she started to become confused on Saturday.  attempted to increase her lactulose dose (normally takes at home BID) to no avail and thus she was brought to the ED. Last paracentesis was 9/8- 4.5 liters removed. In the ED she is found to have acute on chronic renal failure- creatinine is 5.9 and baseline is around 3.5. Unable to follow commands or answer any questions. Past Medical History/Comorbidities:   Ms. Ana Yates  has a past medical history of SEAN (acute kidney injury) ([address] (6/26/2016); Arthritis; Ascites; Benign neoplasm of skin of trunk, except scrotum; Cellulitis and abscess of unspecified digit; Chronic kidney disease; Chronic pain; Congenital hepatic fibrosis; Esophageal varices (HCC); GERD (gastroesophageal reflux disease); Hemodialysis access, AV graft ([address] (11/22/2016); Hepatic encephalopathy ([address] (10/2016); Hepatitis C; History of gastric ulcer; Insomnia (07/13/2015); Liver transplant status ([address] (1986 and 1999); Pain in joint, ankle and foot; PICC (peripherally inserted central catheter) in place; PONV (postoperative nausea and vomiting); Port-a-cath in place; Raynaud disease; Spleen enlarged; Tachycardia; Thrombocytopenia ([address] Type 2 diabetes mellitus ([address] and Vasculitis ([address] She also has no past medical history of Adverse effect of anesthesia;  Difficult intubation; Malignant hyperthermia due to anesthesia; Pseudocholinesterase deficiency; Stroke [facility]); or Unspecified adverse effect of anesthesia. Ms. Khushbu Griffin  has a past surgical history that includes lap,tubal cautery; colonoscopy; cholecystectomy (1984); tubal ligation; other surgical; other surgical (2013); other surgical (03/2016); other surgical; vascular surgery procedure unlist (Left, 11/02/2016); and transplant (8935,1567). Social History/Living Environment:   Home Environment: Patient room  # Steps to Enter: 5  Rails to Enter: Yes  [employer] : Right  Wheelchair Ramp: No  One/Two Story Residence: One story  Living Alone: No  Support Systems: Spouse/Significant Other/Partner  Patient Expects to be Discharged to[de-identified] Private residence  Current DME Used/Available at Home: [address], [address] chair  Tub or Shower Type: Tub/Shower combination  Prior Level of Function/Work/Activity:  Pt lives with spouse, 1 level home with 5 steps to enter. Minimal driving. Does not work. Admits to multiple falls and furniture walking. Independent with bathing/dressing. Spouse at home during the day. Personal Factors:          Sex:  female        Age:  55 y.o.    Number of Personal Factors/Comorbidities that affect the Plan of Care: 3+: HIGH COMPLEXITY   EXAMINATION:   Most Recent Physical Functioning:   Gross Assessment:  AROM: Generally decreased, functional  Strength: Generally decreased, functional  Coordination: Generally decreased, functional  Sensation: Intact               Posture:  Posture (WDL): Exceptions to WDL  Posture Assessment: Rounded shoulders  Balance:  Sitting: Impaired  Sitting - Static: Good (unsupported)  Sitting - Dynamic: Fair (occasional)  Standing: Impaired  Standing - Static: Fair  Standing - Dynamic : Fair Bed Mobility:  Rolling: Minimum assistance  Supine to Sit: Minimum assistance  Scooting: Minimum assistance  Wheelchair Mobility:     Transfers:  Sit to Stand: Minimum assistance  Stand to Sit: Minimum assistance  Bed to Chair: Minimum assistance  Interventions: Safety awareness training;Verbal cues  Gait:     Base of Support: Narrowed  Speed/Lilliam: Slow  Step Length: Right shortened;Left shortened  Gait Abnormalities: Decreased step clearance;Trunk sway increased  Distance (ft): 40 Feet (ft)  Assistive Device:  (hand held assist x 1)  Ambulation - Level of Assistance: Minimal assistance       Body Structures Involved:  1. Metabolic  2. Muscles Body Functions Affected:  1. Movement Related  2. Metobolic/Endocrine Activities and Participation Affected:  1. General Tasks and Demands  2. Mobility  3. Self Care  4. Domestic Life   Number of elements that affect the Plan of Care: 4+: HIGH COMPLEXITY   CLINICAL PRESENTATION:   Presentation: Evolving clinical presentation with changing clinical characteristics: MODERATE COMPLEXITY   CLINICAL DECISION MAKIN [facility] Inpatient Short Form  How much difficulty does the patient currently have. .. Unable A Lot A Little None   1. Turning over in bed (including adjusting bedclothes, sheets and blankets)? [ ] 1   [ ] 2   [X] 3   [ ] 4   2. Sitting down on and standing up from a chair with arms ( e.g., wheelchair, bedside commode, etc.)   [ ] 1   [ ] 2   [X] 3   [ ] 4   3. Moving from lying on back to sitting on the side of the bed? [ ] 1   [ ] 2   [X] 3   [ ] 4   How much help from another person does the patient currently need. .. Total A Lot A Little None   4. Moving to and from a bed to a chair (including a wheelchair)? [ ] 1   [ ] 2   [X] 3   [ ] 4   5. Need to walk in hospital room? [ ] 1   [ ] 2   [X] 3   [ ] 4   6. Climbing 3-5 steps with a railing? [ ] 1   [ ] 2   [X] 3   [ ] 4   © , Trustees of [address] [place] [address] [address] [id number], under license to [employer]  All rights reserved    Score:  Initial: 18 Most Recent: X (Date: -- )     Interpretation of Tool:  Represents activities that are increasingly more difficult (i.e. Bed mobility, Transfers, Gait).       Score 24 23 22-20 19-15 14-10 9-7 6       Modifier CH CI CJ CK CL CM CN         · Mobility - Walking and Moving Around:               - CURRENT STATUS:    CK - 40%-59% impaired, limited or restricted               - GOAL STATUS:           CK - 40%-59% impaired, limited or restricted               - D/C STATUS:                       ---------------To be determined---------------  [place] / Product Type: Medicare /       Medical Necessity:     · Patient demonstrates good rehab potential due to higher previous functional level. Reason for Services/Other Comments:  · Patient continues to require present interventions due to patient's inability to function at baseline. Use of outcome tool(s) and clinical judgement create a POC that gives a: Questionable prediction of patient's progress: MODERATE COMPLEXITY                 TREATMENT:   (In addition to Assessment/Re-Assessment sessions the following treatments were rendered)   Pre-treatment Symptoms/Complaints:  \" I feel nauseous\" (RN provided medication for pt)  Pain: Initial:   Pain Intensity 1: 0  Post Session:  Unchanged, 0/10      Therapeutic Activity: (    8 minutes): Therapeutic activities including Bed transfers, Chair transfers, Toilet transfers, Ambulation on level ground and standing balance activities during self care to improve mobility, strength, balance and coordination. Required minimal   to promote dynamic balance in standing and overall safety. Braces/Orthotics/Lines/Etc:   · IV  · O2 Device: Room air  Treatment/Session Assessment:    · Response to Treatment:  Quick to fatigue. · Interdisciplinary Collaboration:  · Physical Therapist  · Registered Nurse  · After treatment position/precautions:  · Up in chair  · Bed/Chair-wheels locked  · Bed in low position  · Call light within reach  · RN notified  · Compliance with Program/Exercises:  Will assess as treatment progresses. · Recommendations/Intent for next treatment session: \"Next visit will focus on advancements to more challenging activities and reduction in assistance provided\".   Total Treatment Duration:  PT Patient Time In/Time Out  Time In: 0832  Time Out: [address]ena PT

## 2017-09-18 NOTE — PROGRESS NOTES
END OF SHIFT NOTE:    INTAKE/OUTPUT  09/17 0701 - 09/18 0700  In: 18 [P.O.:340; I.V.:230]  Out: -   Voiding: YES  Catheter: NO  Drain:        Flatus: Patient does have flatus present. Stool:  3 occurrences. Characteristics:  Stool Assessment  Stool Color: Brown  Stool Appearance: Loose  Stool Amount: Small  Stool Source/Status: Incontinence    Emesis: 0 occurrences. Characteristics:        VITAL SIGNS  Patient Vitals for the past 12 hrs:   Temp Pulse Resp BP SpO2   09/18/17 1505 97.7 °F (36.5 °C) 90 16 95/64 96 %   09/18/17 1207 - 95 - 92/55 -   09/18/17 1201 - 95 - - -   09/18/17 1152 - 95 - (!) 83/53 -   09/18/17 1147 - 91 - (!) 80/54 -   09/18/17 1133 - 90 - 91/60 -   09/18/17 1100 - 94 - (!) 89/60 -   09/18/17 1026 - 81 - (!) 84/58 -   09/18/17 1024 - - - (!) 89/58 -   09/18/17 1000 - 88 - 97/65 -   09/18/17 0937 - 84 - 105/71 -   09/18/17 0650 98 °F (36.7 °C) 80 18 96/64 96 %       Pain Assessment  Pain Intensity 1: 0 (09/18/17 1505)     Pain Intervention(s) 1: Medication (see MAR)  Patient Stated Pain Goal: 0    Ambulating  Yes    Shift report given to oncoming nurse at the bedside.     Fidencio Bates RN

## 2017-09-18 NOTE — PROGRESS NOTES
END OF SHIFT NOTE:    INTAKE/OUTPUT  09/17 0701 - 09/18 0700  In: 18 [P.O.:340; I.V.:230]  Out: -   Voiding: YES  Catheter: NO  Drain:              Flatus: Patient does have flatus present. Stool:  2 occurrences. Characteristics:  Stool Assessment  Stool Color: Brown  Stool Appearance: Loose  Stool Amount: Small  Stool Source/Status: Incontinence    Emesis: 0 occurrences. Characteristics:        VITAL SIGNS  Patient Vitals for the past 12 hrs:   Temp Pulse Resp BP SpO2   09/18/17 0419 98 °F (36.7 °C) 82 18 94/55 -   09/17/17 2318 97.8 °F (36.6 °C) 87 18 105/69 98 %   09/17/17 2029 97.8 °F (36.6 °C) 63 16 109/75 97 %       Pain Assessment  Pain Intensity 1: 0 (09/17/17 0900)     Pain Intervention(s) 1: Medication (see MAR)  Patient Stated Pain Goal: 0    Ambulating  Yes taking a few steps in room. Shift report given to oncoming nurse at the bedside.     Lindsey Dickerson, RN

## 2017-09-18 NOTE — PROGRESS NOTES
GI DAILY PROGRESS NOTE    Admit Date:  9/11/2017    Today's Date:  9/18/2017    CC:  HE, Cirrhosis    Subjective:     ALANA overnight. Multiple nonbloody stools this morning. No abdominal pain or N/V. Medications:   Current Facility-Administered Medications   Medication Dose Route Frequency    lactulose (CHRONULAC) solution 20 g  20 g Per NG tube QID    cycloSPORINE (SandIMMUNE) capsule 25 mg  25 mg Oral BID    piperacillin-tazobactam (ZOSYN) 4.5 g in 0.9% sodium chloride (MBP/ADV) 100 mL  4.5 g IntraVENous Q12H    insulin lispro (HUMALOG) injection   SubCUTAneous Q6H    sodium chloride (NS) flush 10 mL  10 mL InterCATHeter Q8H    heparin (porcine) pf 600 Units  600 Units InterCATHeter Q8H    sodium chloride (NS) flush 10 mL  10 mL InterCATHeter PRN    heparin (porcine) pf 600 Units  600 Units InterCATHeter PRN    fentaNYL citrate (PF) injection 50 mcg  50 mcg IntraVENous Q1H PRN    epoetin ping (EPOGEN;PROCRIT) injection 10,000 Units  10,000 Units SubCUTAneous Q7D    metoclopramide HCl (REGLAN) tablet 10 mg  10 mg Oral AC&HS    midodrine (PROAMITINE) tablet 5 mg  5 mg Oral TID WITH MEALS    pantoprazole (PROTONIX) tablet 40 mg  40 mg Oral ACB    morphine CR (MS CONTIN) tablet 15 mg  15 mg Oral Q12H    naloxone (NARCAN) injection 0.4 mg  0.4 mg IntraVENous PRN    ondansetron (ZOFRAN) injection 4 mg  4 mg IntraVENous Q4H PRN    rifAXIMin (XIFAXAN) tablet 550 mg  550 mg Oral BID       Review of Systems:  ROS otherwise neg x 10 sys No chest pain or SOB. Diet:  NPO    Objective:   Vitals:  Visit Vitals    BP 96/64    Pulse 80    Temp 98 °F (36.7 °C)    Resp 18    Ht 5' 1\" (1.549 m)    Wt 50.1 kg (110 lb 6.4 oz)    LMP 01/02/2016    SpO2 96%    BMI 20.86 kg/m2     Intake/Output:     09/16 1901 - 09/18 0700  In: 993 [P.O.:340; I.V.:539]  Out: 0   Exam:  General appearance: Alert, NAD  Lungs: CTAB  Heart: RRR  Abdomen: SOFT, DISTENDED, NONTENDER. NOTE large umbilical hernia.  No masses, no organomegaly  Extremities:  + Raynaud's, no  edema  Neuro:  Awake, alert, NEG asterixis     Data Review (Labs):    Recent Labs      09/18/17   0533  09/16/17   0414   WBC  7.5  6.7   HGB  8.9*  8.7*   HCT  26.4*  25.5*   PLT  39*  46*   MCV  96.0  95.9   NA  145  145   K  3.9  3.8   CL  110*  109*   CO2  17*  21   BUN  55*  52*   CREA  6.04*  5.25*   CA  7.6*  7.6*   GLU  112*  159*   AP   --   315*   SGOT   --   20   ALT   --   23   TBILI   --   0.9   ALB   --   2.0*   TP   --   5.4*       Assessment:     Principal Problem:  End stage liver disease (HCC) (9/13/2017)    Active Problems:  Type 2 diabetes mellitus with hyperglycemia (HCC) (7/4/2016)  Thrombocytopenia (HCC) (9/14/2017)  Acute respiratory failure with hypercapnia (HCC) (7/5/2016)  Hypotension (7/6/2016)  CKD (chronic kidney disease) stage V requiring chronic dialysis ([facility] [address] (11/2/2016)    Coagulopathy ([facility] [address] (9/14/2017)      Overview: Liver disease    Pancytopenia ([facility] [address] (5/9/2017)  Hepatic encephalopathy (HCC) (9/11/2017)  Pulmonary infiltrate (9/15/2017)  Underweight (9/15/2017)        Plan:      Cont lactulose and xifaxan for HE which has clinically improved. Pulmonary infiltrate, on broad-spec ABx, clinically much improved  Hgb stable, but no signs of bleeding  Immunosuppressed- on cyclosporin  [place] [facility]. Dr. Vivienne Yin spoke w/ [place] transplant, planning to re-evaluate in several weeks. Not on list currently. Daily weights. Home soon? Naun Jordan PA-C    ATTENDING NOTE:  I agree with the above assessment and plan. Anticipate discharge home soon. We will sign off, but do not hesitate to contact us for any additional assistance. We will arrange for a GI follow-up office visit in 3-4 weeks. Patient will be contacted by our office.     Valentino Magic, MD

## 2017-09-18 NOTE — PROGRESS NOTES
Hemodialysis treatment initiated using left upper thigh AVG and 15g needles by [employer] RN. Pt alert and VS stable. Machine settings per MD order. Will monitor during treatment.

## 2017-09-18 NOTE — PROGRESS NOTES
Admit Date: 9/11/2017      Subjective:          Review of Systems  Cardio-vascular: no chest pain, no SOB  GI: no N/V/D  : no dysuria, no hematuria    Objective:     Patient Vitals for the past 8 hrs:   BP Temp Pulse Resp SpO2 Weight   09/18/17 1000 97/65 - 88 - - -   09/18/17 0937 105/71 - 84 - - -   09/18/17 0650 96/64 98 °F (36.7 °C) 80 18 96 % -   09/18/17 0419 94/55 98 °F (36.7 °C) 82 18 - -   09/18/17 0245 - - - - - 50.1 kg (110 lb 6.4 oz)            Physical Exam:   Awake  Lungs: clear  CV: RR, no JVD  Abdomen: soft, distende  Ext: + edema          Data Review   Recent Results (from the past 8 hour(s))   METABOLIC PANEL, BASIC    Collection Time: 09/18/17  5:33 AM   Result Value Ref Range    Sodium 145 136 - 145 mmol/L    Potassium 3.9 3.5 - 5.1 mmol/L    Chloride 110 (H) 98 - 107 mmol/L    CO2 17 (L) 21 - 32 mmol/L    Anion gap 18 (H) 7 - 16 mmol/L    Glucose 112 (H) 65 - 100 mg/dL    BUN 55 (H) 6 - 23 MG/DL    Creatinine 6.04 (H) 0.6 - 1.0 MG/DL    GFR est AA 10 (L) >60 ml/min/1.73m2    GFR est non-AA 8 (L) >60 ml/min/1.73m2    Calcium 7.6 (L) 8.3 - 10.4 MG/DL   CBC W/O DIFF    Collection Time: 09/18/17  5:33 AM   Result Value Ref Range    WBC 7.5 4.3 - 11.1 K/uL    RBC 2.75 (L) 4.05 - 5.25 M/uL    HGB 8.9 (L) 11.7 - 15.4 g/dL    HCT 26.4 (L) 35.8 - 46.3 %    MCV 96.0 79.6 - 97.8 FL    MCH 32.4 26.1 - 32.9 PG    MCHC 33.7 31.4 - 35.0 g/dL    RDW 17.1 (H) 11.9 - 14.6 %    PLATELET 39 (L) 845 - 450 K/uL    MPV 9.3 (L) 10.8 - 14.1 FL           Assessment:     Principal Problem:    End stage liver disease (HCC) (9/13/2017)    Active Problems:    Type 2 diabetes mellitus with hyperglycemia (HCC) (7/4/2016)      Thrombocytopenia (HCC) (9/14/2017)      Acute respiratory failure with hypercapnia (HCC) (7/5/2016)      Hypotension (7/6/2016)      CKD (chronic kidney disease) stage V requiring chronic dialysis ([facility] [address] (11/2/2016)      Coagulopathy ([facility] [address] (9/14/2017)      Overview: Liver disease      Pancytopenia ([facility] [address] (5/9/2017)      Hepatic encephalopathy ([facility] [address] (9/11/2017)      Pulmonary infiltrate (9/15/2017)      Underweight (9/15/2017)        Plan:     Seen on HD  No complication      Christiano Antonio MD

## 2017-09-18 NOTE — PROGRESS NOTES
Hospitalist Progress Note    2017  Admit Date: 2017 11:39 AM   NAME: Naseem Head   :  1970   MRN:  [id number]   Attending: Moni Vela MD  PCP:  Nael Nair MD    SUBJECTIVE:   Ms. Khushbu Griffin is a 55 white F with pmhx of hepatic fibrosis/ Hep C,s/p two failed liver transplants  esophageal varices, portal hypertensive gastropathy, DM, ESRD on HD who presented 17 with acute hepatic encephalopathy. Patient was ordered lactulose enemas but initially did not response and in fact worsened. She became increasingly sedate on  and unable to take her usual oral medications to include midodrine.  Dobhoff ordered for lactulose and med pass. Was transferred to ICU for respiratory depression and intubated evening . Patient had been denied by [address] for repeat transplant but had just seen [facility] for first time consult to consider kidney and liver tx. Patient then began to improve and was extubated 9/15. Tolerating room air and appropriately oriented. Denies chest pain, sob, nausea. : pt. Seen in dialysis today. She states she is feeling better. She is much more alert than usual and talking. Denies chest pain, SOB, nausea, vomiting, diarrhea. Denies chills or fever. Pt. To follow up with [facility] to discuss transplant at discharge. Pt. Observed to be hypotensive.       Review of Systems negative with exception of pertinent positives noted above  PHYSICAL EXAM     Visit Vitals    BP (!) 83/53    Pulse 95    Temp 98 °F (36.7 °C)    Resp 18    Ht 5' 1\" (1.549 m)    Wt 50.1 kg (110 lb 6.4 oz)    LMP 2016    SpO2 96%    BMI 20.86 kg/m2      Temp (24hrs), Av.8 °F (36.6 °C), Min:97.3 °F (36.3 °C), Max:98 °F (36.7 °C)    Oxygen Therapy  O2 Sat (%): 96 % (17 0650)  Pulse via Oximetry: 77 beats per minute (17 1704)  O2 Device: Room air (17 0650)  O2 Flow Rate (L/min): 0 l/min (17 1136)  FIO2 (%): 21 % (17 113)    Intake/Output Summary (Last 24 hours) at 09/18/17 1154  Last data filed at 09/18/17 0419   Gross per 24 hour   Intake              450 ml   Output                0 ml   Net              450 ml      General: Cachectic. Chronically ill appearing. Sitting up in bed in dialysis. Has hiccups still.   Lungs:  CTA Bilaterally. No rales/rhonchi/wheezes/stridor or rubs  Heart:  Regular rate and rhythm,  No murmur, rub, or gallop  Abdomen: Soft, Non distended, Non tender, Diminished bowel sounds. Large, protruding umbilical hernia. Extremities: Atrophic. No edema. Neurologic:  A&O x 3.     ASSESSMENT /PLAN     1. End Stage Liver Disease: Continue Lactulose and Rifaximin. Refused by [address] for liver transplant. Appt. With [facility] to consider kidney and liver transplant. 2. Pulmonary Infiltrate: On broad spectrum antibiotic which was started while she was in ICU. 3. ESRD on HD: Continue with scheduled HD. Management per Nephrology. 4. Thrombocytopenia: Stable and chronic. Pt. Still appears gravely ill. She does look much better and more awake today. She is hypotensive. Needs to continue Midodrine.       DVT Prophylaxis: SCDs    Teagan Barnes [place], [place]

## 2017-09-18 NOTE — PROGRESS NOTES
HD completed without complication. Pt requested to come off early. 0 kg removed. Needles X 2 removed and pressure dressing applied. Pt alert and VS stable. Pt awaiting transport to return to room.

## 2017-09-18 NOTE — PROGRESS NOTES
END OF SHIFT NOTE:    INTAKE/OUTPUT  09/16 0701 - 09/17 0700  In: 2510 [P.O.:1000; I.V.:409]  Out: 480 [Urine:5; Drains:475]  Voiding: NO  Catheter: NO  Drain:              Flatus: Patient does have flatus present. Stool:  4 occurrences. Characteristics:  Stool Assessment  Stool Color: Brown  Stool Appearance: Soft  Stool Amount: Small  Stool Source/Status: Incontinence    Emesis: 0 occurrences. Characteristics:        VITAL SIGNS  Patient Vitals for the past 12 hrs:   Temp Pulse Resp BP SpO2   09/17/17 1500 97.3 °F (36.3 °C) 71 17 110/73 99 %   09/17/17 1100 97.4 °F (36.3 °C) 79 16 110/74 97 %       Pain Assessment  Pain Intensity 1: 0 (09/17/17 0900)     Pain Intervention(s) 1: Medication (see MAR)  Patient Stated Pain Goal: 0    Ambulating  Yes, with assist x1, unsteady gait. Shift report given to oncoming nurse at the bedside.     Tabatha Garcia RN

## 2017-09-18 NOTE — PROGRESS NOTES
OT Evaluation Order received; pt off floor in dialysis. Will attempt again as time permits.   Faheem Son, OT

## 2017-09-19 ENCOUNTER — DOCUMENTATION ONLY (OUTPATIENT)
Dept: TRANSPLANT | Facility: CLINIC | Age: 47
End: 2017-09-19

## 2017-09-19 NOTE — PROGRESS NOTES
[facility] Nephrology progress note    Follow-Up on: 9/19/2017     Patient seen and examined. Up in chair. Had para earlier today with 5.3 liters removed. She is concerned she is not strong enough to go home. ROS:  Gen - no fever, no chills  CV - no chest pain, no orthopnea  Lung - no shortness of breath, no cough  Abd - no tenderness, no nausea, no vomiting  Ext - no edema    Exam:  Vitals:    09/19/17 0856 09/19/17 1002 09/19/17 1047 09/19/17 1135   BP: 90/60 (!) 89/59 (!) 88/60 98/65   Pulse: 86 86 75 95   Resp: 18 17 16 16   Temp: 98.1 °F (36.7 °C)  98 °F (36.7 °C) 97.7 °F (36.5 °C)   SpO2: 96% 96% 96% 97%   Weight: 50 kg (110 lb 3.7 oz)   46.2 kg (101 lb 14.4 oz)   Height: 5' 1\" (1.549 m)            Intake/Output Summary (Last 24 hours) at 09/19/17 1307  Last data filed at 09/18/17 1505   Gross per 24 hour   Intake              165 ml   Output                0 ml   Net              165 ml       GEN - in no distress  CV - S1, S2, no rub  Lung - clear bilaterally  Abd - softly distended, no G/R/R  Ext - no edema    Recent Labs      09/18/17   0533   WBC  7.5   HGB  8.9*   HCT  26.4*   PLT  39*        Recent Labs      09/18/17   0533   NA  145   K  3.9   CL  110*   CO2  17*   BUN  55*   CREA  6.04*   CA  7.6*   GLU  112*       Assessment / Plan:    1. ESRD - T, T, S - via LLE AVG - off schedule as inpatient     2. Cirrhotic Liver Disease     3. Anemia likely CKD - outpatient SHERRY dose is Epo 8000 units tiw. On SHERRY.     4. Thrombocytopenia    5. Ascites s/p para (9/19 with 5.3 liters removed)    HD tomorrow.

## 2017-09-19 NOTE — PROGRESS NOTES
called for update and to inform there was a problem with the CD's being loaded. There were loaded today and she will be presented next week. Pt is now out of ICU and on a regular floor post HE admit.  Pt still pending [facility] acceptance. Per the  he spoke [facility], they want pt to get stronger as they are concerned with pts recovery post transplant.

## 2017-09-19 NOTE — PROGRESS NOTES
Pt. Finished paracentesis and I went to room to speak to her in preparation for discharge and patient states she spoke with , Gemma Ochoa and informed him she wishes to go to inpatient rehab at [facility] as she does not have any help at home.  states he will work on placement for her tomorrow. She is not being discharged home tonight.

## 2017-09-19 NOTE — PROGRESS NOTES
Spoke to Ms. Vickie Rickss in room 204 about discharge planning. She lives with her  (CVA 2010), and she is primary CG. Her step mother drives her to HD TTS ([facility]). Wants STR at SNF. In order of preference:  [facility], [facility], and [facility]. [facility]- ref via [employer] link.

## 2017-09-19 NOTE — PROGRESS NOTES
PT note: Patient off floor for procedure. Will attempt PT treatment later as time and schedule permit. Thank you.    Tess Jama, PT  9/19/2017

## 2017-09-19 NOTE — PROGRESS NOTES
Hospitalist Progress Note    2017  Admit Date: 2017 11:39 AM   NAME: Titus Dyer   :  1970   MRN:  [id number]   Attending: Cecy Peña MD  PCP:  Bebe Ford MD    SUBJECTIVE:   Ms. Aravind Yee is a 55 white F with pmhx of hepatic fibrosis/ Hep C,s/p two failed liver transplants  esophageal varices, portal hypertensive gastropathy, DM, ESRD on HD who presented 17 with acute hepatic encephalopathy. Patient was ordered lactulose enemas but initially did not response and in fact worsened. She became increasingly sedate on  and unable to take her usual oral medications to include midodrine.  Dobhoff ordered for lactulose and med pass. Was transferred to ICU for respiratory depression and intubated evening . Patient had been denied by [address] for repeat transplant but had just seen [facility] for first time consult to consider kidney and liver tx. Patient then began to improve and was extubated 9/15. Tolerating room air and appropriately oriented. Denies chest pain, sob, nausea. : pt. Seen in dialysis today. She states she is feeling better. She is much more alert than usual and talking. Denies chest pain, SOB, nausea, vomiting, diarrhea. Denies chills or fever. Pt. To follow up with [facility] to discuss transplant at discharge. Pt. Observed to be hypotensive. : Pt. Feeling well today. Ready for discharge after abdominal paracentesis. Midodrine increased to 10 mg. TID with improvement in BP today. Antibiotics transitioned to PO meds.       Review of Systems negative with exception of pertinent positives noted above  PHYSICAL EXAM     Visit Vitals    BP 99/65 (BP 1 Location: Left arm, BP Patient Position: At rest)    Pulse 85    Temp 98.7 °F (37.1 °C)    Resp 18    Ht 5' 1\" (1.549 m)    Wt 50.1 kg (110 lb 6.4 oz)    LMP 2016    SpO2 94%    BMI 20.86 kg/m2      Temp (24hrs), Av.2 °F (36.8 °C), Min:97.7 °F (36.5 °C), Max:98.7 °F (37.1 °C)    Oxygen Therapy  O2 Sat (%): 94 % (09/19/17 0335)  Pulse via Oximetry: 77 beats per minute (09/16/17 1704)  O2 Device: Room air (09/18/17 0650)  O2 Flow Rate (L/min): 0 l/min (09/18/17 1136)  FIO2 (%): 21 % (09/18/17 1136)    Intake/Output Summary (Last 24 hours) at 09/19/17 0743  Last data filed at 09/18/17 1505   Gross per 24 hour   Intake              165 ml   Output                0 ml   Net              165 ml      General: Cachectic. Chronically ill appearing. Sitting up in bed in room. Hiccups resolved.   Lungs:  CTA Bilaterally. No rales/rhonchi/wheezes/stridor or rubs  Heart:  Regular rate and rhythm,  No murmur, rub, or gallop  Abdomen: Soft, distended with ascites. Non tender. Diminished bowel sounds. Large, protruding umbilical hernia. Extremities: Atrophic. No edema. Neurologic:  A&O x 3.     ASSESSMENT /PLAN     1. End Stage Liver Disease: Continue Lactulose and Rifaximin. Refused by [address] for liver transplant. Appt. With [facility] to consider kidney and liver transplant after discharge. 2. Pulmonary Infiltrate: On broad spectrum antibiotic which was started while she was in ICU. Transitioned to PO for planned discharge today. 3. ESRD on HD: Continue with scheduled HD. Management per Nephrology. 4. Thrombocytopenia: Stable and chronic. Pt. Still appears gravely ill. She does look much better and more awake today. She was hypotensive. Midodrine increased to 10 mg. TID and blood pressure improved today. She will be ready for discharge after paracentesis with IR today.     DVT Prophylaxis: JULIETHs    Corazon  [place], [place]

## 2017-09-19 NOTE — PROGRESS NOTES
Problem: Self Care Deficits Care Plan (Adult)  Goal: *Acute Goals and Plan of Care (Insert Text)  1. Patient will complete lower body bathing and dressing with supervision and adaptive equipment as needed. 2. Patient will complete toileting with supervision. 3. Patient will tolerate 30 minutes of OT treatment with minimal rest breaks to increase activity tolerance for ADLs. 4. Patient will complete functional transfers with supervision and adaptive equipment as needed. Timeframe: 7 visits       OCCUPATIONAL THERAPY: Initial Assessment and AM 9/19/2017  INPATIENT: Hospital Day: 9  [place] / [place] / Product Type: Medicare /      NAME/AGE/GENDER: Lamin Lemon is a 55 y.o. female         PRIMARY DIAGNOSIS:  Hepatic encephalopathy ([facility] End stage liver disease ([facility] End stage liver disease ([facility]        ICD-10: Treatment Diagnosis:        · Generalized Muscle Weakness (M62.81)  · Other lack of cordination (R27.8)  · History of falling (Z91.81)   Precautions/Allergies:         Aspirin; Codeine; and Compazine [prochlorperazine edisylate]       ASSESSMENT:      Ms. Robin Ford presents to the hospital with hepatic encephalopathy with hx of liver transplant x 2 and end-stage liver disease. Pt was doing well up until this event and was hoping to build strength for possible liver transplant at [facility]. Pt reports feeling extremely weak after being in the bed for 1-2 weeks. Pt has been hypotensive but BP was 98/65 today. Pt stood with additional time and minimal assistance. Pt able to complete functional mobility around the bed to the chair with minimal assistance as well and use of rolling walker. Pt reports fatigue with activity. Pt answers questions appropriately but seems a bit flat with mother at bedside reporting that she has been having some issues with memory.  Mother pulled therapist to the side and is concerned about pt going home and having the ability to manage her medications on her own. Pt's spouse is disabled with CVA and \"in bed all day\" and unable to assist patient. No other family is able to be at home with the patient at this time. Pt and mother were asking about rehab with social work notified. Pt reports she feels so weak, its difficult to feed herself at times. Pt overall only requiring minimal assistance with functional transfers today. Pt is currently below baseline at this time and will benefit from OT services to address stated goals and plan of care. This section established at most recent assessment   PROBLEM LIST (Impairments causing functional limitations):  1. Decreased Strength  2. Decreased ADL/Functional Activities  3. Decreased Transfer Abilities  4. Decreased Ambulation Ability/Technique  5. Decreased Balance  6. Increased Pain  7. Decreased Activity Tolerance  8. Increased Fatigue  9. Decreased Skin Integrity/Hygeine  10. Decreased [place] with Home Exercise Program  11. Decreased Cognition    INTERVENTIONS PLANNED: (Benefits and precautions of occupational therapy have been discussed with the patient.)  1. Activities of daily living training  2. Adaptive equipment training  3. Balance training  4. Clothing management  5. Cognitive training  6. Donning&doffing training  7. Group therapy  8. Neuromuscular re-eduation  9. Therapeutic activity  10. Therapeutic exercise      TREATMENT PLAN: Frequency/Duration: Follow patient 3 times per week to address above goals. Rehabilitation Potential For Stated Goals: EXCELLENT      RECOMMENDED REHABILITATION/EQUIPMENT: (at time of discharge pending progress): Due to the probability of continued deficits (see above) this patient will likely need continued skilled occupational therapy after discharge.   Equipment:   · TBD                      OCCUPATIONAL PROFILE AND HISTORY:   History of Present Injury/Illness (Reason for Referral):  See H&P  Past Medical History/Comorbidities:   Ms. Karly Hughes  has a past medical history of SEAN (acute kidney injury) ([facility] (6/26/2016); Arthritis; Ascites; Benign neoplasm of skin of trunk, except scrotum; Cellulitis and abscess of unspecified digit; Chronic kidney disease; Chronic pain; Congenital hepatic fibrosis; Esophageal varices (HCC); GERD (gastroesophageal reflux disease); Hemodialysis access, AV graft ([facility] (11/22/2016); Hepatic encephalopathy ([facility] (10/2016); Hepatitis C; History of gastric ulcer; Insomnia (07/13/2015); Liver transplant status ([facility] (1986 and 1999); Pain in joint, ankle and foot; PICC (peripherally inserted central catheter) in place; PONV (postoperative nausea and vomiting); Port-a-cath in place; Raynaud disease; Spleen enlarged; Tachycardia; Thrombocytopenia ([facility] Type 2 diabetes mellitus ([facility] and Vasculitis ([facility] She also has no past medical history of Adverse effect of anesthesia; Difficult intubation; Malignant hyperthermia due to anesthesia; Pseudocholinesterase deficiency; Stroke [facility]); or Unspecified adverse effect of anesthesia. Ms. Cy Nolen  has a past surgical history that includes lap,tubal cautery; colonoscopy; cholecystectomy (1984); tubal ligation; other surgical; other surgical (2013); other surgical (03/2016); other surgical; vascular surgery procedure unlist (Left, 11/02/2016); and transplant (2512,3221). Social History/Living Environment:   Home Environment: Patient room  # Steps to Enter: 5  Rails to Enter: Yes  [employer] : Right  Wheelchair Ramp: No  One/Two Story Residence: One story  Living Alone: No  Support Systems: Spouse/Significant Other/Partner  Patient Expects to be Discharged to[de-identified] Private residence  Current DME Used/Available at Home: Mikel Qiu, [address] chair  Tub or Shower Type: Tub/Shower combination  Prior Level of Function/Work/Activity:  Pt lives at home with her spouse. Pt's spouse is disabled with hx of CVA and pt is his \"caregiver\". Pt was completing functional mobility and ADL without assistance.  Pt was sometime able to walk up to a mile. Personal Factors:          Social Background:  Lives with disabled spouse who is not able to assist patient. Past/Current Experience:  Hx of multiple liver transplants        Other factors that influence how disability is experienced by the patient:  Multiple co-morbidities-see above. Number of Personal Factors/Comorbidities that affect the Plan of Care: Extensive review of physical, cognitive, and psychosocial performance (3+):  HIGH COMPLEXITY   ASSESSMENT OF OCCUPATIONAL PERFORMANCE[de-identified]   Activities of Daily Living:           Basic ADLs (From Assessment) Complex ADLs (From Assessment)   Basic ADL  Feeding: Stand-by assistance  Oral Facial Hygiene/Grooming: Minimum assistance  Bathing: Moderate assistance  Upper Body Dressing: Minimum assistance  Lower Body Dressing: Moderate assistance  Toileting: Moderate assistance Instrumental ADL  Meal Preparation: Maximum assistance  Homemaking: Total assistance   Grooming/Bathing/Dressing Activities of Daily Living     Cognitive Retraining  Safety/Judgement: Awareness of environment; Fall prevention;Home safety                 Functional Transfers  Toilet Transfer : Minimum assistance  Tub Transfer:  Moderate assistance  Shower Transfer: Minimum assistance     Bed/Mat Mobility  Rolling: Minimum assistance  Supine to Sit: Minimum assistance  Sit to Stand: Minimum assistance  Bed to Chair: Minimum assistance  Scooting: Supervision          Most Recent Physical Functioning:   Gross Assessment:  AROM: Generally decreased, functional  Strength: Generally decreased, functional  Coordination: Generally decreased, functional  Sensation: Intact               Posture:  Posture (WDL): Exceptions to WDL  Posture Assessment: Rounded shoulders  Balance:  Sitting: Impaired  Sitting - Static: Good (unsupported)  Sitting - Dynamic: Fair (occasional)  Standing: Impaired  Standing - Static: Fair  Standing - Dynamic : Fair Bed Mobility:  Rolling: Minimum assistance  Supine to Sit: Minimum assistance  Scooting: Supervision  Wheelchair Mobility:     Transfers:  Sit to Stand: Minimum assistance  Stand to Sit: Minimum assistance  Bed to Chair: Minimum assistance                 Patient Vitals for the past 6 hrs:       BP BP Patient Position SpO2 Pulse   17 0815 91/58 At rest;Supine 96 % 82   17 0856 90/60 At rest;Supine 96 % 86   17 1002 (!) 89/59 - 96 % 86   17 1047 (!) 88/60 Sitting 96 % 75   17 1135 98/65 Supine 97 % 95        Mental Status  Neurologic State: Alert  Orientation Level: Oriented to person, Oriented to time  Cognition: Decreased attention/concentration, Follows commands  Perception: Appears intact  Perseveration: No perseveration noted  Safety/Judgement: Awareness of environment, Fall prevention, Home safety                               Physical Skills Involved:  1. Balance  2. Strength  3. Activity Tolerance  4. Pain (acute)  5. Pain (Chronic)  6. Skin Integrity Cognitive Skills Affected (resulting in the inability to perform in a timely and safe manner):  1. Executive Function  2. Short Term Recall Psychosocial Skills Affected:  1. Habits/Routines  2. Environmental Adaptation  3. Self-Awareness   Number of elements that affect the Plan of Care: 5+:  HIGH COMPLEXITY   CLINICAL DECISION MAKIN [facility] [address] [id number] AM-PAC 6 Clicks   Daily Activity Inpatient Short Form  How much help from another person does the patient currently need. .. Total A Lot A Little None   1. Putting on and taking off regular lower body clothing?   [ ] 1   [X] 2   [ ] 3   [ ] 4   2. Bathing (including washing, rinsing, drying)? [ ] 1   [X] 2   [ ] 3   [ ] 4   3. Toileting, which includes using toilet, bedpan or urinal?   [ ] 1   [X] 2   [ ] 3   [ ] 4   4. Putting on and taking off regular upper body clothing?   [ ] 1   [ ] 2   [X] 3   [ ] 4   5. Taking care of personal grooming such as brushing teeth? [ ] 1   [ ] 2   [X] 3   [ ] 4   6. Eating meals? [ ] 1   [ ] 2   [X] 3   [ ] 4   © 2007, Trustees of [address] [place] [address] [address] [id number], under license to [employer]. All rights reserved    Score:  Initial: 15 Most Recent: X (Date: -- )     Interpretation of Tool:  Represents activities that are increasingly more difficult (i.e. Bed mobility, Transfers, Gait). Score 24 23 22-20 19-15 14-10 9-7 6       Modifier CH CI CJ CK CL CM CN         · Self Care:               - CURRENT STATUS:    CK - 40%-59% impaired, limited or restricted               - GOAL STATUS:           CJ - 20%-39% impaired, limited or restricted               - D/C STATUS:                       ---------------To be determined---------------  [place] / [place] / Product Type: Medicare /       Medical Necessity:     · Patient demonstrates good rehab potential due to higher previous functional level. Reason for Services/Other Comments:  · Patient continues to require skilled intervention due to decreased independence with ADL/functional transfers. Use of outcome tool(s) and clinical judgement create a POC that gives a: MODERATE COMPLEXITY             TREATMENT:   (In addition to Assessment/Re-Assessment sessions the following treatments were rendered)      Pre-treatment Symptoms/Complaints:    Pain: Initial:   Pain Intensity 1: 0  Pain Location 1: Abdomen  Pain Intervention(s) 1: Repositioned  Post Session:  same      Assessment/Reassessment only, no treatment provided today     Braces/Orthotics/Lines/Etc:   · O2 Device: Room air  Treatment/Session Assessment:    · Response to Treatment:  Evaluation only. · Interdisciplinary Collaboration:  · Occupational Therapist  · Registered Nurse  · Nursing students  · After treatment position/precautions:  · Up in chair  · Bed/Chair-wheels locked  · Call light within reach  · RN notified  · Family at bedside  · Compliance with Program/Exercises:  Will assess as treatment progresses. · Recommendations/Intent for next treatment session: \"Next visit will focus on advancements to more challenging activities and reduction in assistance provided\".   Total Treatment Duration:  OT Patient Time In/Time Out  Time In: 1135  Time Out: 1921 \[facility]\"", OT

## 2017-09-19 NOTE — PROGRESS NOTES
TRANSFER - IN REPORT:    Verbal report received from [facility] on Suezanne Ip  being received from IR for ordered procedure      Report consisted of patients Situation, Background, Assessment and   Recommendations(SBAR). Information from the following report(s) Kardex was reviewed with the receiving nurse. Opportunity for questions and clarification was provided. Assessment completed upon patients arrival to unit and care assumed.

## 2017-09-19 NOTE — PROGRESS NOTES
TRANSFER - OUT REPORT:    Verbal report given to James Knowles on Viola Matute  being transferred to 2nd floor room 204 for routine progression of care       Report consisted of patients Situation, Background, Assessment and   Recommendations(SBAR). Information from the following report(s) SBAR, Procedure Summary and MAR was reviewed with the receiving nurse. Lines:   Venous Access Device duel lumen cath 05/15/17 Upper chest (subclavicular area), left (Active)   Central Line Being Utilized Yes 9/19/2017  3:05 AM   Criteria for Appropriate Use Limited/no vessel suitable for conventional peripheral access 9/19/2017  3:05 AM   Site Assessment Clean, dry, & intact 9/19/2017  3:05 AM   Date of Last Dressing Change 09/15/17 9/16/2017 11:59 AM   Dressing Status Clean, dry, & intact 9/19/2017  3:05 AM   Dressing Type Disk with Chlorhexadine gluconate (CHG); Transparent 9/19/2017  3:05 AM   Action Taken Dressing changed 9/15/2017  6:30 PM   Access Medial Site Assessment Other (Comment) 9/15/2017  6:30 PM   Date Accessed (Medial Site) 09/15/17 9/15/2017  6:30 PM   Access Time (Medial Site) 1830 9/15/2017  6:30 PM   Access Needle Size (Site #1) Other (comments) 9/15/2017  6:30 PM   Access Needle Length (Medial Site) Other (comment) 9/15/2017  6:30 PM   Positive Blood Return (Medial Site) Yes 9/18/2017  1:28 PM   Action Taken (Medial Site) Infusing 9/18/2017  1:28 PM   Date Needle Changed (Medial Site) 09/15/17 9/15/2017  6:30 PM   Access Lateral Site Assessment Other (Comment) 9/15/2017  6:30 PM   Date Accessed (Lateral Site) 09/15/17 9/15/2017  6:30 PM   Access Time (Lateral Site) 1830 9/15/2017  6:30 PM   Positive Blood Return (Lateral Site) Yes 9/16/2017  3:42 PM   Action Taken (Lateral Site) Flushed 9/16/2017  3:42 PM   Date Needle Changed (Lateral Site) 09/15/17 9/15/2017  6:30 PM   Alcohol Cap Used No 9/16/2017  7:40 AM       Peripheral IV  Left Forearm (Active)   Site Assessment Clean, dry, & intact 9/19/2017 3:05 AM   Phlebitis Assessment 0 9/19/2017  3:05 AM   Infiltration Assessment 0 9/19/2017  3:05 AM   Dressing Status Clean, dry, & intact 9/19/2017  3:05 AM   Dressing Type Transparent 9/19/2017  3:05 AM   Hub Color/Line Status Blue;Capped 9/19/2017  3:05 AM   Alcohol Cap Used No 9/16/2017  3:42 PM        Opportunity for questions and clarification was provided.       Patient transported with:

## 2017-09-19 NOTE — PROGRESS NOTES
Hospitalist Progress Note    2017  Admit Date: 2017 11:39 AM   NAME: Karolyn Paz   :  1970   MRN:  [id number]   Attending: Ruddy Gomes MD  PCP:  Merari Lemos MD    SUBJECTIVE:   Ms. Stella Valadez is a 55 white F with pmhx of hepatic fibrosis/ Hep C,s/p two failed liver transplants  esophageal varices, portal hypertensive gastropathy, DM, ESRD on HD who presented 17 with acute hepatic encephalopathy. Patient was ordered lactulose enemas but initially did not response and in fact worsened. She became increasingly sedate on  and unable to take her usual oral medications to include midodrine.  Dobhoff ordered for lactulose and med pass. Was transferred to ICU for respiratory depression and intubated evening . Patient had been denied by [address] for repeat transplant but had just seen [place] for first time consult to consider kidney and liver tx. Patient then began to improve and was extubated 9/15. Tolerating room air and appropriately oriented. Denies chest pain, sob, nausea. : pt. Seen in dialysis today. She states she is feeling better. She is much more alert than usual and talking. Denies chest pain, SOB, nausea, vomiting, diarrhea. Denies chills or fever. Pt. To follow up with [facility] to discuss transplant at discharge. Pt. Observed to be hypotensive. : Pt. Feeling well today. Ready for discharge after abdominal paracentesis. Midodrine increased to 10 mg. TID with improvement in BP today. Antibiotics transitioned to PO meds.       Review of Systems negative with exception of pertinent positives noted above  PHYSICAL EXAM     Visit Vitals    BP 98/65 (BP 1 Location: Left arm, BP Patient Position: Supine)    Pulse 95    Temp 97.7 °F (36.5 °C)    Resp 16    Ht 5' 1\" (1.549 m)    Wt 46.2 kg (101 lb 14.4 oz)  Comment: Dry weight after paracentesis    LMP 2016    SpO2 97%    BMI 19.25 kg/m2      Temp (24hrs), Av.1 °F (36.7 °C), Min:97.7 °F (36.5 °C), Max:98.7 °F (37.1 °C)    Oxygen Therapy  O2 Sat (%): 97 % (09/19/17 1135)  Pulse via Oximetry: 85 beats per minute (09/19/17 1002)  O2 Device: Room air (09/19/17 0856)  O2 Flow Rate (L/min): 0 l/min (09/18/17 1136)  FIO2 (%): 21 % (09/18/17 1136)    Intake/Output Summary (Last 24 hours) at 09/19/17 1458  Last data filed at 09/18/17 1505   Gross per 24 hour   Intake              165 ml   Output                0 ml   Net              165 ml      General: Cachectic. Chronically ill appearing. Sitting up in bed in room. Hiccups resolved.   Lungs:  CTA Bilaterally. No rales/rhonchi/wheezes/stridor or rubs  Heart:  Regular rate and rhythm,  No murmur, rub, or gallop  Abdomen: Soft, distended with ascites. Non tender. Diminished bowel sounds. Large, protruding umbilical hernia. Extremities: Atrophic. No edema. Neurologic:  A&O x 3.     ASSESSMENT /PLAN     1. End Stage Liver Disease: Continue Lactulose and Rifaximin. Refused by [address] for liver transplant. Appt. With [place] to consider kidney and liver transplant after discharge. 2. Pulmonary Infiltrate: On broad spectrum antibiotic which was started while she was in ICU. Transitioned to PO for planned discharge today. 3. ESRD on HD: Continue with scheduled HD. Management per Nephrology. 4. Thrombocytopenia: Stable and chronic. Pt. Still appears gravely ill. She does look much better and more awake today. She was hypotensive. Midodrine increased to 10 mg. TID and blood pressure improved today. She will be ready for discharge after paracentesis with IR today.     DVT Prophylaxis: SCDs    [facility] [place], [place]

## 2017-09-19 NOTE — PROGRESS NOTES
END OF SHIFT NOTE:    INTAKE/OUTPUT  09/18 0701 - 09/19 0700  In: 165 [I.V.:165]  Out: 0   Voiding: YES, oliguria  Catheter: NO  Drain:              Flatus: Patient does have flatus present. Stool:  3 occurrences. Characteristics:  Stool Assessment  Stool Color: Brown  Stool Appearance: Watery  Stool Amount: Large  Stool Source/Status: Rectum    Emesis: 0 occurrences. Characteristics:        VITAL SIGNS  Patient Vitals for the past 12 hrs:   Temp Pulse Resp BP SpO2   09/19/17 1500 97.8 °F (36.6 °C) 99 18 100/65 95 %   09/19/17 1135 97.7 °F (36.5 °C) 95 16 98/65 97 %   09/19/17 1047 98 °F (36.7 °C) 75 16 (!) 88/60 96 %   09/19/17 1002 - 86 17 (!) 89/59 96 %   09/19/17 0856 98.1 °F (36.7 °C) 86 18 90/60 96 %   09/19/17 0815 98.1 °F (36.7 °C) 82 16 91/58 96 %       Pain Assessment  Pain Intensity 1: 0 (09/19/17 1159)  Pain Location 1: Abdomen  Pain Intervention(s) 1: Repositioned  Patient Stated Pain Goal: 5    Ambulating  Yes with assistance    Shift report given to oncoming nurse at the bedside.     Jostin Soto RN

## 2017-09-19 NOTE — PROGRESS NOTES
Problem: Falls - Risk of  Goal: *Absence of Falls  Document Dinorah Fall Risk and appropriate interventions in the flowsheet.    Outcome: Progressing Towards Goal  Fall Risk Interventions:  Mobility Interventions: Communicate number of staff needed for ambulation/transfer, OT consult for ADLs, Patient to call before getting OOB, PT Consult for mobility concerns, Strengthening exercises (ROM-active/passive)     Mentation Interventions: Door open when patient unattended, Evaluate medications/consider consulting pharmacy     Medication Interventions: Evaluate medications/consider consulting pharmacy, Patient to call before getting OOB, Teach patient to arise slowly     Elimination Interventions: Call light in reach, Patient to call for help with toileting needs, Toileting schedule/hourly rounds     History of Falls Interventions: Consult care management for discharge planning, Door open when patient unattended, Evaluate medications/consider consulting pharmacy, Investigate reason for fall

## 2017-09-19 NOTE — PROGRESS NOTES
Pt tolerated thoracentesis well. 5,300 mL removed. Site dressed with tegaderm. No bleeding noted. Discharge instructions reviewed, copy given to pt. Time for questions allowed. No complaints at this time. Specimen sent to lab.

## 2017-09-20 PROBLEM — J69.0 ASPIRATION PNEUMONIA (HCC): Status: ACTIVE | Noted: 2017-01-01

## 2017-09-20 NOTE — PROGRESS NOTES
Admit Date: 9/11/2017      Subjective:          Review of Systems  Cardio-vascular: no chest pain, no SOB  GI: no N/V/ +D  : no dysuria, no hematuria    Objective:     Patient Vitals for the past 8 hrs:   BP Temp Pulse Resp SpO2 Weight   09/20/17 0700 96/64 98 °F (36.7 °C) 78 18 95 % -   09/20/17 0541 - - - - - 46.6 kg (102 lb 12.8 oz)   09/20/17 0400 94/60 98 °F (36.7 °C) 83 18 97 % -            Physical Exam:   Lungs: clear  CV: RR,   Abdomen: soft, not tender, distended  Ext: + edema          Data Review   Recent Results (from the past 8 hour(s))   GLUCOSE, POC    Collection Time: 09/20/17  5:14 AM   Result Value Ref Range    Glucose (POC) 98 65 - 100 mg/dL           Assessment:     Principal Problem:    End stage liver disease ([address] (9/13/2017)    Active Problems:    Type 2 diabetes mellitus with hyperglycemia (HCC) (7/4/2016)      Thrombocytopenia ([address] (9/14/2017)      Acute respiratory failure with hypercapnia (HCC) (7/5/2016)      Hypotension (7/6/2016)      CKD (chronic kidney disease) stage V requiring chronic dialysis ([address] (11/2/2016)      Coagulopathy ([address] (9/14/2017)      Overview: Liver disease      Pancytopenia ([address] (5/9/2017)      Hepatic encephalopathy ([address] (9/11/2017)      Pulmonary infiltrate (9/15/2017)      Underweight (9/15/2017)      Aspiration pneumonia ([address] (9/20/2017)        Plan:     HD today    Mariann Castaneda MD

## 2017-09-20 NOTE — PROGRESS NOTES
END OF SHIFT NOTE:    INTAKE/OUTPUT  09/19 0701 - 09/20 0700  In: 371 [I.V.:371]  Out: -   Voiding: YES  Catheter: NO  Drain:              Flatus: Patient does have flatus present. Stool:  2 occurrences. Characteristics:  Stool Assessment  Stool Color: Brown  Stool Appearance: Watery  Stool Amount: Large  Stool Source/Status: Rectum    Emesis: 0 occurrences. Characteristics:        VITAL SIGNS  Patient Vitals for the past 12 hrs:   Temp Pulse Resp BP SpO2   09/20/17 0400 98 °F (36.7 °C) 83 18 94/60 97 %   09/19/17 2216 98.2 °F (36.8 °C) 81 18 98/64 100 %   09/19/17 2030 98.2 °F (36.8 °C) 86 18 99/64 99 %       Pain Assessment  Pain Intensity 1: 0 (09/20/17 0320)  Pain Location 1: Abdomen  Pain Intervention(s) 1: Repositioned  Patient Stated Pain Goal: 0    Ambulating  Yes    Shift report given to oncoming nurse at the bedside.     Abisai Sheppard RN

## 2017-09-20 NOTE — PROGRESS NOTES
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 Has had complicated course having acute respiratory failure requiring endotracheal intubation from -9/15. Her mentation improved over those dates and she was subsequently extubated. Prior to the intubation, she had an episode of bilious vomiting and likely aspirated. Has been on abx since . She had a large volume paracentesis on  with 5.3 liters removed. She was supposed to be discharged yesterday; however, she felt too weak and wanted to pursue STR. Today, she reports she is feeling much better overall. Still feels very weak. Denies abdominal pain. Review of Systems negative with exception of pertinent positives noted above  PHYSICAL EXAM     Visit Vitals    BP 94/60    Pulse 83    Temp 98 °F (36.7 °C)    Resp 18    Ht 5' 1\" (1.549 m)    Wt 46.6 kg (102 lb 12.8 oz)    LMP 2016    SpO2 97%    BMI 19.42 kg/m2      Temp (24hrs), Av °F (36.7 °C), Min:97.7 °F (36.5 °C), Max:98.2 °F (36.8 °C)    Oxygen Therapy  O2 Sat (%): 97 % (17 0400)  Pulse via Oximetry: 85 beats per minute (17 1002)  O2 Device: Room air (17 0856)  O2 Flow Rate (L/min): 0 l/min (17 1136)  FIO2 (%): 21 % (17 1136)    Intake/Output Summary (Last 24 hours) at 17 0736  Last data filed at 17 0400   Gross per 24 hour   Intake              371 ml   Output                0 ml   Net              371 ml      General: No acute distress, cachectic   Lungs:  CTA Bilaterally.    Heart:  Regular rate and rhythm,  No murmur, rub, or gallop  Abdomen: Soft, Non distended, Non tender, Positive bowel sounds  Extremities: No cyanosis, clubbing or edema  Neurologic:  No focal deficits    Recent Results (from the past 24 hour(s))   PROTEIN TOTAL, FLUID    Collection Time: 09/19/17  9:30 AM   Result Value Ref Range    Fluid Type: PARACENTESIS FLUID      Protein total, body fld. 1.0 g/dL   CELL COUNT, BODY FLUID    Collection Time: 09/19/17  9:30 AM   Result Value Ref Range    BODY FLUID TYPE PARACENTESIS FLUID      FLUID COLOR YELLOW      FLUID APPEARANCE CLEAR      FLUID RBC COUNT <71215 /cu mm    FLUID WBC COUNT 11 /cu mm   AMYLASE, FLUID    Collection Time: 09/19/17  9:30 AM   Result Value Ref Range    Fluid Type: PARACENTESIS FLUID      Amylase, body fld. 14 U/L   ALBUMIN, FLUID    Collection Time: 09/19/17  9:30 AM   Result Value Ref Range    Fluid Type: PARACENTESIS FLUID      Albumin, body fld. 0.5 g/dL   GLUCOSE, POC    Collection Time: 09/19/17 11:06 AM   Result Value Ref Range    Glucose (POC) 116 (H) 65 - 100 mg/dL   GLUCOSE, POC    Collection Time: 09/19/17  4:22 PM   Result Value Ref Range    Glucose (POC) 153 (H) 65 - 100 mg/dL   GLUCOSE, POC    Collection Time: 09/19/17  9:30 PM   Result Value Ref Range    Glucose (POC) 164 (H) 65 - 100 mg/dL   GLUCOSE, POC    Collection Time: 09/19/17 11:33 PM   Result Value Ref Range    Glucose (POC) 112 (H) 65 - 100 mg/dL   GLUCOSE, POC    Collection Time: 09/20/17  5:14 AM   Result Value Ref Range    Glucose (POC) 98 65 - 100 mg/dL     Imaging:    US GUIDE PARACENTESIS   Final Result   Impression: Uncomplicated ultrasound guided paracentesis. XR CHEST SNGL V   Final Result   IMPRESSION:        1. INTERVAL RESOLUTION OF RIGHT SUPRAHILAR INFILTRATE STATUS POST EXTUBATION   SINCE SEPTEMBER 13, 2017. 2. MINIMAL LEFT BASILAR ATELECTASIS/INFILTRATE.       XR ABD (KUB)   Final Result   IMPRESSION: Doppler catheter appears unchanged over the distal esophagus or   cardiac region of the stomach, what appears to be some contrast in an unknown   loop of bowel in the lower abdomen or upper pelvis. XR CHEST SNGL V   Final Result   IMPRESSION: Right superior hilar airspace disease most consistent with   pneumonia. XR ABD (KUB)   Final Result   IMPRESSION: Feeding tube tip at proximal stomach. XR ABD (KUB)   Final Result   IMPRESSION: Feeding tube at the proximal stomach. CT HEAD WO CONT   Final Result   IMPRESSION:      Unremarkable brain CT without intravenous contrast.      Date of Dictation: 9/12/2017 6:36 AM   .      XR CHEST PORT   Final Result   IMPRESSION: No acute disease. ASSESSMENT      Active Hospital Problems    Diagnosis Date Noted    Pulmonary infiltrate 09/15/2017    Underweight 09/15/2017    Coagulopathy ([address] 09/14/2017     Liver disease      Thrombocytopenia (HCC) 09/14/2017    End stage liver disease ([address] 09/13/2017    Hepatic encephalopathy ([address] 09/11/2017    Pancytopenia ([address] 05/09/2017    CKD (chronic kidney disease) stage V requiring chronic dialysis ([address] 11/02/2016    Hypotension 07/06/2016    Acute respiratory failure with hypercapnia (HCC) 07/05/2016    Type 2 diabetes mellitus with hyperglycemia ([address] 07/04/2016     Plan:  · Aspiration pneumonia- Continue amoxicillin through today. · Cirrhosis/hepatic encephalopathy- mentation much improved and at baseline. Continue lactulose and rifaximin. · ESRD- HD per nephro. · Thrombocytopenia- secondary to liver disease. Stable. · Debility- continue PT. DVT Prophylaxis: SCDs    Dispo- STR bed pending. Signed By: Zaynab Suazo MD     September 20, 2017

## 2017-09-20 NOTE — PROGRESS NOTES
Hemodialysis treatment initiated using left upper leg AVFG and 15g needles by Kylah Sanders RN. Pt alert and VS stable. Machine settings per MD order. Will monitor during treatment.

## 2017-09-20 NOTE — DIALYSIS
HD treatment completed at 1 hour due to hypotension. No fluid removed, patient does not tolerate UF, see flowsheet. Saline bolus post tx for hypotension see flowsheet. NAD. Needles removed and pressure dressing applied to bilateral site. Transport contacted for return to room.

## 2017-09-20 NOTE — PROGRESS NOTES
Problem: Mobility Impaired (Adult and Pediatric)  Goal: *Acute Goals and Plan of Care (Insert Text)  LTG:  (1.)Ms. Karly Hughes will move from supine to sit and sit to supine , scoot up and down and roll side to side in bed with MODIFIED INDEPENDENCE within 7 day(s). (2.)Ms. Karly Hughes will transfer from bed to chair and chair to bed with SUPERVISION using the least restrictive device within 7 day(s). (3.)Ms. Karly Hughes will ambulate with SUPERVISION for >lh=757 feet with the least restrictive device, appropriate gait pattern and good dynamic standing balance within 7 day(s). (4.)Ms. Karly Hguhes will perform B LE therapeutic exercises x 20 reps with INDEPENDENCE within 7 days to increase strength for improved safety and independence in transfers and gait. (5.)Ms. Karly Hughes will ascend/descend 4 steps with 1 handrail(s) and CGA within 7 day(s) for safe entry/exit of home.  ________________________________________________________________________________________________      PHYSICAL THERAPY: INITIAL ASSESSMENT 9/20/2017  INPATIENT: Hospital Day: 10  [place] / [place] / Product Type: Medicare /      NAME/AGE/GENDER: Bozena Alfredo is a 55 y.o. female         PRIMARY DIAGNOSIS: Hepatic encephalopathy ([facility] End stage liver disease ([facility] End stage liver disease ([facility]        ICD-10: Treatment Diagnosis:       · Generalized Muscle Weakness (M62.81)  · Difficulty in walking, Not elsewhere classified (R26.2)  · Repeated Falls (R29.6)  · History of falling (Z91.81)   Precaution/Allergies:  Aspirin; Codeine; and Compazine [prochlorperazine edisylate]       ASSESSMENT:      Ms. Karly Hughes presents sitting EOB following return from dialysis with family at bedside. Dialysis was discontinued due to low BP, but treatment approved by RN. Pt denies any pain at this time and is experiencing fatigue, but wanted to get up and walk. She stated she was experiencing some dizziness, but it was normal for her.  She was able to move from sit to stand and ambulate with a RW and COG. She walked very slowly down the mai, had to take a rest break after 125', and was very tired following ambulation. Pt was able to walk 2x 125', but was limited by fatigue and general weakness. She was left sitting EOB with call light in reach and family at bedside. All movements and speech continue to appear very slow and intentional, requiring great effort on the part of the pt. Dawn Cogan is currently functioning below her baseline and would benefit from skilled PT during acute care stay to maximize safety and independence with functional mobility. This section established at most recent assessment   PROBLEM LIST (Impairments causing functional limitations):  1. Decreased Strength  2. Decreased ADL/Functional Activities  3. Decreased Transfer Abilities  4. Decreased Ambulation Ability/Technique  5. Decreased Balance  6. Decreased Activity Tolerance  7. Increased Fatigue  8. Decreased Calvin with Home Exercise Program    INTERVENTIONS PLANNED: (Benefits and precautions of physical therapy have been discussed with the patient.)  1. Balance Exercise  2. Bed Mobility  3. Gait Training  4. Home Exercise Program (HEP)  5. Therapeutic Activites  6. Therapeutic Exercise/Strengthening  7. Transfer Training  8. Group Therapy      TREATMENT PLAN: Frequency/Duration: 3 times a week for duration of hospital stay  Rehabilitation Potential For Stated Goals: GOOD      RECOMMENDED REHABILITATION/EQUIPMENT: (at time of discharge pending progress): Due to the probability of continued deficits (see above) this patient will likely need continued skilled physical therapy after discharge.   Equipment:   · to be determined                   HISTORY:   History of Present Injury/Illness (Reason for Referral):  Per chart: Dawn Cogan is a 55 y.o. 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 Has been doing well recently, walking on most days for exercise and trying to maintain nourishment. History obtained from family as patient is encephalopathy and they state she started to become confused on Saturday.  attempted to increase her lactulose dose (normally takes at home BID) to no avail and thus she was brought to the ED. Last paracentesis was 9/8- 4.5 liters removed. In the ED she is found to have acute on chronic renal failure- creatinine is 5.9 and baseline is around 3.5. Unable to follow commands or answer any questions. Past Medical History/Comorbidities:   Ms. Khushbu Griffin  has a past medical history of SEAN (acute kidney injury) ([address] (6/26/2016); Arthritis; Ascites; Benign neoplasm of skin of trunk, except scrotum; Cellulitis and abscess of unspecified digit; Chronic kidney disease; Chronic pain; Congenital hepatic fibrosis; Esophageal varices (HCC); GERD (gastroesophageal reflux disease); Hemodialysis access, AV graft ([address] (11/22/2016); Hepatic encephalopathy ([address] (10/2016); Hepatitis C; History of gastric ulcer; Insomnia (07/13/2015); Liver transplant status ([address] (1986 and 1999); Pain in joint, ankle and foot; PICC (peripherally inserted central catheter) in place; PONV (postoperative nausea and vomiting); Port-a-cath in place; Raynaud disease; Spleen enlarged; Tachycardia; Thrombocytopenia ([address] Type 2 diabetes mellitus ([address] and Vasculitis ([address] She also has no past medical history of Adverse effect of anesthesia; Difficult intubation; Malignant hyperthermia due to anesthesia; Pseudocholinesterase deficiency; Stroke [facility]); or Unspecified adverse effect of anesthesia.   Ms. Khushbu Griffin  has a past surgical history that includes lap,tubal cautery; colonoscopy; cholecystectomy (1984); tubal ligation; other surgical; other surgical (2013); other surgical (03/2016); other surgical; vascular surgery procedure unlist (Left, 11/02/2016); and transplant (1843,1042). Social History/Living Environment:   Home Environment: Patient room  # Steps to Enter: 5  Rails to Enter: Yes  [employer] : Right  Wheelchair Ramp: No  One/Two Story Residence: One story  Living Alone: No  Support Systems: Spouse/Significant Other/Partner  Patient Expects to be Discharged to[de-identified] Private residence  Current DME Used/Available at Home: [address], [address] chair  Tub or Shower Type: Tub/Shower combination  Prior Level of Function/Work/Activity:  Pt lives with spouse, 1 level home with 5 steps to enter. Minimal driving. Does not work. Admits to multiple falls and furniture walking. Independent with bathing/dressing. Spouse at home during the day. Personal Factors:          Sex:  female        Age:  55 y.o. Number of Personal Factors/Comorbidities that affect the Plan of Care: 3+: HIGH COMPLEXITY   EXAMINATION:   Most Recent Physical Functioning:   Gross Assessment:  AROM: Generally decreased, functional  Strength: Generally decreased, functional               Posture:  Posture Assessment: Forward head, Rounded shoulders  Balance:  Sitting - Static: Good (unsupported)  Sitting - Dynamic: Good (unsupported)  Standing - Static: Good  Standing - Dynamic : Fair Bed Mobility:     Wheelchair Mobility:     Transfers:     Gait:     Base of Support: Narrowed  Speed/Lilliam: Slow  Step Length: Left shortened;Right shortened  Gait Abnormalities: Decreased step clearance;Trunk sway increased  Assistive Device: Walker, rolling  Ambulation - Level of Assistance: Contact guard assistance       Body Structures Involved:  1. Metabolic  2. Muscles Body Functions Affected:  1. Movement Related  2. Metobolic/Endocrine Activities and Participation Affected:  1. General Tasks and Demands  2. Mobility  3. Self Care  4.  Domestic Life   Number of elements that affect the Plan of Care: 4+: HIGH COMPLEXITY   CLINICAL PRESENTATION:   Presentation: Evolving clinical presentation with changing clinical characteristics: MODERATE COMPLEXITY   CLINICAL DECISION MAKIN [facility] Mobility Inpatient Short Form  How much difficulty does the patient currently have. .. Unable A Lot A Little None   1. Turning over in bed (including adjusting bedclothes, sheets and blankets)? [ ] 1   [ ] 2   [X] 3   [ ] 4   2. Sitting down on and standing up from a chair with arms ( e.g., wheelchair, bedside commode, etc.)   [ ] 1   [ ] 2   [X] 3   [ ] 4   3. Moving from lying on back to sitting on the side of the bed? [ ] 1   [ ] 2   [X] 3   [ ] 4   How much help from another person does the patient currently need. .. Total A Lot A Little None   4. Moving to and from a bed to a chair (including a wheelchair)? [ ] 1   [ ] 2   [X] 3   [ ] 4   5. Need to walk in hospital room? [ ] 1   [ ] 2   [X] 3   [ ] 4   6. Climbing 3-5 steps with a railing? [ ] 1   [ ] 2   [X] 3   [ ] 4   © , Trustees of [address] [place] [address], under license to [employer]. All rights reserved    Score:  Initial: 18 Most Recent: X (Date: -- )     Interpretation of Tool:  Represents activities that are increasingly more difficult (i.e. Bed mobility, Transfers, Gait). Score 24 23 22-20 19-15 14-10 9-7 6       Modifier CH CI CJ CK CL CM CN         · Mobility - Walking and Moving Around:               - CURRENT STATUS:    CK - 40%-59% impaired, limited or restricted               - GOAL STATUS:           CK - 40%-59% impaired, limited or restricted               - D/C STATUS:                       ---------------To be determined---------------  [place] / [place] / Product Type: Medicare /       Medical Necessity:     · Patient demonstrates good rehab potential due to higher previous functional level.   Reason for Services/Other Comments:  · Patient continues to require present interventions due to patient's inability to function at baseline. Use of outcome tool(s) and clinical judgement create a POC that gives a: Questionable prediction of patient's progress: MODERATE COMPLEXITY                 TREATMENT:   (In addition to Assessment/Re-Assessment sessions the following treatments were rendered)   Pre-treatment Symptoms/Complaints:  \"ready to walk around\" \"bottom is tired from sitting\"  Pain: Initial:   Pain Intensity 1: 0  Post Session:  Unchanged, 0/10      Therapeutic Activity: (    25 minutes): Therapeutic activities including Bed transfers, Chair transfers, Toilet transfers, Ambulation on level ground and standing balance activities during self care to improve mobility, strength, balance and coordination. Required minimal   to promote dynamic balance in standing and overall safety. Braces/Orthotics/Lines/Etc:   · IV  · O2 Device: Room air  Treatment/Session Assessment:    · Response to Treatment: Wanted to walk because \"walking is how you get better and get out of here\". Tolerant and eager to participate in treatment. · Interdisciplinary Collaboration:  · Physical Therapist, Registered Nurse and SPT  · After treatment position/precautions:  · Bed/Chair-wheels locked, Bed in low position, Caregiver at bedside, Call light within reach, RN notified and Family at bedside  · Compliance with Program/Exercises: Will assess as treatment progresses. · Recommendations/Intent for next treatment session: \"Next visit will focus on advancements to more challenging activities and reduction in assistance provided\".   Total Treatment Duration:  PT Patient Time In/Time Out  Time In: 1540  Time Out: [address]

## 2017-09-20 NOTE — PROGRESS NOTES
Accepted short-term rehab bed offer for Thursday September 21, 2017 at [facility]. HD today here at [address], and then small run tomorrow prior to transfer? She was on a TTS schedule at [facility] HD (confirmed at [phone]), with 10 am chair time. Called them and said that they should anticipate her on Saturday September 23, 2017 for 10 am chair time (needs to be there at 9:45 am). Updated Ms. Cy Nolen, and she is in agreement with this plan.

## 2017-09-21 ENCOUNTER — CONFERENCE (OUTPATIENT)
Dept: TRANSPLANT | Facility: CLINIC | Age: 47
End: 2017-09-21

## 2017-09-21 NOTE — PROGRESS NOTES
Renal Progress Note    Admission Date: 9/11/2017   Subjective: The patient was seen on dialysis at 9:18 AM .  BP is stable. Access is working well.       Objective:     Physical Exam:    Patient Vitals for the past 8 hrs:   BP Temp Pulse Resp SpO2 Weight   09/21/17 0855 (!) 84/65 - 82 - - -   09/21/17 0825 93/68 - 81 - - -   09/21/17 0727 97/68 97.4 °F (36.3 °C) 82 16 98 % -   09/21/17 0542 - - - - - 42.1 kg (92 lb 12.8 oz)   09/21/17 0400 101/66 97.5 °F (36.4 °C) 86 18 98 % -      Gen: comfortable , NAD  HEENT: moist membranes  CV: S1, S2  Lungs: Clear bilaterally  Extem: no edema      Current Facility-Administered Medications   Medication Dose Route Frequency    midodrine (PROAMITINE) tablet 10 mg  10 mg Oral TID WITH MEALS    lactulose (CHRONULAC) solution 20 g  20 g Per NG tube QID    cycloSPORINE (SandIMMUNE) capsule 25 mg  25 mg Oral BID    insulin lispro (HUMALOG) injection   SubCUTAneous Q6H    sodium chloride (NS) flush 10 mL  10 mL InterCATHeter Q8H    heparin (porcine) pf 600 Units  600 Units InterCATHeter Q8H    sodium chloride (NS) flush 10 mL  10 mL InterCATHeter PRN    heparin (porcine) pf 600 Units  600 Units InterCATHeter PRN    fentaNYL citrate (PF) injection 50 mcg  50 mcg IntraVENous Q1H PRN    epoetin ping (EPOGEN;PROCRIT) injection 10,000 Units  10,000 Units SubCUTAneous Q7D    metoclopramide HCl (REGLAN) tablet 10 mg  10 mg Oral AC&HS    pantoprazole (PROTONIX) tablet 40 mg  40 mg Oral ACB    morphine CR (MS CONTIN) tablet 15 mg  15 mg Oral Q12H    naloxone (NARCAN) injection 0.4 mg  0.4 mg IntraVENous PRN    ondansetron (ZOFRAN) injection 4 mg  4 mg IntraVENous Q4H PRN    rifAXIMin (XIFAXAN) tablet 550 mg  550 mg Oral BID            Data Review:     LABS:   Recent Results (from the past 12 hour(s))   GLUCOSE, POC    Collection Time: 09/21/17 12:26 AM   Result Value Ref Range    Glucose (POC) 117 (H) 65 - 100 mg/dL   GLUCOSE, POC    Collection Time: 09/21/17  5:22 AM Result Value Ref Range    Glucose (POC) 118 (H) 65 - 100 mg/dL         Plan:     Principal Problem:    End stage liver disease ([address] (9/13/2017)    Active Problems:    Type 2 diabetes mellitus with hyperglycemia ([address] (7/4/2016)      Thrombocytopenia ([address] (9/14/2017)      Acute respiratory failure with hypercapnia (HCC) (7/5/2016)      Hypotension (7/6/2016)      CKD (chronic kidney disease) stage V requiring chronic dialysis ([address] (11/2/2016)      Coagulopathy ([address] (9/14/2017)      Overview: Liver disease      Pancytopenia ([address] (5/9/2017)      Hepatic encephalopathy ([address] (9/11/2017)      Pulmonary infiltrate (9/15/2017)      Underweight (9/15/2017)      Aspiration pneumonia ([address] (9/20/2017)      ESRD on dialysis TTS  Hepatic encephalopathy- much improved  HD today. access working ok  Plan discharge to rehab

## 2017-09-21 NOTE — PROGRESS NOTES
Ms. Tylor Ellington accepted to [facility], room 319a, report [phone]. Ms. Tylor Ellington wants to ride with her  and friend, does not want ambulance transport. Transfer packet sent with her. This discharge plan is ok with Ms. Tylor Ellington, her , and RN Tona Fernandez. Next HD is Saturday September 23, 2017 for 10 am chair time (needs to be there at 9:45 am) at [facility] HD (confirmed at [phone]). If she needs prn paracentesis, call [facility] IR at [phone].

## 2017-09-21 NOTE — PROGRESS NOTES
HD completed without complication. 0 kg removed. Needles X 2 removed and pressure dressing applied. Pt alert and VS stable. Pt awaiting transport to return to room.

## 2017-09-21 NOTE — DISCHARGE SUMMARY
Hospitalist Discharge Summary     Patient ID:  Naseem Head  [id number]  79 y.o.  1970  Admit date: 9/11/2017 11:39 AM  Discharge date and time: 9/21/2017  Attending: Moni Vela MD  PCP:  Nael Nair MD  Treatment Team: Attending Provider: Moni Vela MD; Consulting Provider: Evie Dawson DO; Utilization Review: Meg Mejía; Care Manager: Xavier Weldon RN    Principal Diagnosis End stage liver disease [facility])   Principal Problem:    End stage liver disease ([address] (9/13/2017)    Active Problems:    Type 2 diabetes mellitus with hyperglycemia ([address] (7/4/2016)      Thrombocytopenia ([address] (9/14/2017)      Acute respiratory failure with hypercapnia ([address] (7/5/2016)      Hypotension (7/6/2016)      CKD (chronic kidney disease) stage V requiring chronic dialysis ([address] (11/2/2016)      Coagulopathy ([address] (9/14/2017)      Overview: Liver disease      Pancytopenia ([address] (5/9/2017)      Hepatic encephalopathy ([address] (9/11/2017)      Pulmonary infiltrate (9/15/2017)      Underweight (9/15/2017)      Aspiration pneumonia ([address] (9/20/2017)     HPI: Naseem Head is a 55 y.o. 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 Has been doing well recently, walking on most days for exercise and trying to maintain nourishment. History obtained from family as patient is encephalopathy and they state she started to become confused on Saturday.  attempted to increase her lactulose dose (normally takes at home BID) to no avail and thus she was brought to the ED. Last paracentesis was 9/8- 4.5 liters removed. In the ED she is found to have acute on chronic renal failure- creatinine is 5.9 and baseline is around 3.5. Unable to follow commands or answer any questions.       Hospital Course:  Please refer to the admission H&P for details of presentation. In summary, the patient  with history of cirrhosis due to congenital heptic fibrosis (s/p 2 failed liver transplants) and ESRD on HD, who was admitted on 9/11 for AMS secondary to hepatic encephalopathy. Has had complicated course having acute respiratory failure requiring endotracheal intubation from 9/13-9/15. Her mentation improved over those dates and she was subsequently extubated. Prior to the intubation, she had an episode of bilious vomiting and likely aspirated. Has been on abx since 9/13. She had a large volume paracentesis on 9/19 with 5.3 liters removed. Pt has remained stable since extubation. abx have been stopped and pt afebrile. Pt is currently stable for dc to rehab. Will follow up with pcp        Labs: Results:       Chemistry No results for input(s): GLU, NA, K, CL, CO2, BUN, CREA, CA, AGAP, BUCR, TBIL, GPT, AP, TP, ALB, GLOB, AGRAT in the last 72 hours. CBC w/Diff No results for input(s): WBC, RBC, HGB, HCT, PLT, GRANS, LYMPH, EOS, HGBEXT, HCTEXT, PLTEXT in the last 72 hours. Cardiac Enzymes No results for input(s): CPK, CKND1, RADHA in the last 72 hours. No lab exists for component: CKRMB, TROIP   Coagulation No results for input(s): PTP, INR, APTT in the last 72 hours. No lab exists for component: INREXT    Lipid Panel Lab Results   Component Value Date/Time    Cholesterol, total 126 01/24/2017 11:00 AM    HDL Cholesterol 43 01/24/2017 11:00 AM    LDL, calculated 61 01/24/2017 11:00 AM    VLDL, calculated 22 01/24/2017 11:00 AM    Triglyceride 110 01/24/2017 11:00 AM    CHOL/HDL Ratio 2.9 01/24/2017 11:00 AM      BNP No results for input(s): BNPP in the last 72 hours. Liver Enzymes No results for input(s): TP, ALB, TBIL, AP, SGOT, GPT in the last 72 hours.     No lab exists for component: DBIL   Thyroid Studies Lab Results   Component Value Date/Time    TSH 0.768 09/12/2017 03:15 PM            Discharge Exam:  Visit Vitals    BP 97/68    Pulse 82    Temp 97.4 °F (36.3 °C)    Resp 16    Ht 5' 1\" (1.549 m)    Wt 42.1 kg (92 lb 12.8 oz)    LMP 01/02/2016    SpO2 98%    BMI 17.53 kg/m2     General appearance: alert, cooperative, no distress, appears stated age  Lungs: clear to auscultation bilaterally  Heart: regular rate and rhythm, S1, S2 normal, no murmur, click, rub or gallop  Abdomen: soft, non-tender. Bowel sounds normal. No masses,  no organomegaly  Extremities: no cyanosis or edema  Neurologic: Grossly normal    Disposition: SNF  Discharge Condition: stable  Patient Instructions:   Current Discharge Medication List      CONTINUE these medications which have CHANGED    Details   morphine CR (MS CONTIN) 15 mg CR tablet Take 1 Tab by mouth every twelve (12) hours. Max Daily Amount: 30 mg.  Qty: 10 Tab, Refills: 0         CONTINUE these medications which have NOT CHANGED    Details   rifAXIMin (XIFAXAN) 550 mg tablet Reported on 5/25/2017 - BID      midodrine (PROAMITINE) 5 mg tablet Take  by mouth every eight (8) hours. lactulose (CHRONULAC) 10 gram/15 mL solution Take 30 mL by mouth three (3) times daily. Qty: 480 mL, Refills: 0      cycloSPORINE modified (GENGRAF) 25 mg capsule Take 2.5 mg/kg/day by mouth every morning. Indications: Takes in the AM      pantoprazole (PROTONIX) 40 mg tablet Take 40 mg by mouth.      mupirocin (BACTROBAN) 2 % ointment Apply  to affected area daily. Qty: 22 g, Refills: 1    Associated Diagnoses: Open wound anterior abdominal wall, initial encounter      metoclopramide HCl (REGLAN) 10 mg tablet Take 10 mg by mouth Before breakfast, lunch, dinner and at bedtime. promethazine (PHENERGAN) 25 mg tablet Take 25 mg by mouth every six (6) hours as needed for Nausea.       insulin aspart (NOVOLOG) 100 unit/mL injection Sliding scale maximum 15 units each meal  Qty: 10 mL, Refills: 5         STOP taking these medications       insulin glargine (LANTUS SOLOSTAR) 100 unit/mL (3 mL) pen Comments:   Reason for Stopping: insulin lispro (HUMALOG) 100 unit/mL kwikpen Comments:   Reason for Stopping:               Activity: Activity as tolerated  Diet: Renal Diet  Wound Care: None needed    Follow-up  ·   With pcp  Time spent to discharge patient 35 minutes  Signed:  Jonny Alcantara MD  9/21/2017  8:24 AM

## 2017-09-21 NOTE — TELEPHONE ENCOUNTER
.Patient: Griselda Azar       MRN: [id number]      : 1970     Age: 46 y.o.  [address]  [facility] [address]    Provider: entire team requested this pt be reviewed in IR TO LOOK AT retroperitoneal mass.  Patient Transplant Status: Other pt is post liver txp x 2  seeking eval after denied at multiple centers    UPDATE: 2017  Pt admitted: presented with acute hepatic encephalopathy. Patient was ordered lactulose enemas but not responding. She became increasingly sedate on  and unable to take her usual oral medications to include midodrine. Dobhoff ordered for lactulose and med pass. Was transferred to ICU for respiratory depression and intubated evening .    - mechanically ventilated, lightly sedated.       Reason for presentation: Other pt is post liver txp x 2  seeking eval after denied at multiple centers     Clinical Summary: Griselda Azar is a 46 y.o. year old  female, Is seen today upon the request of Dr. Mk Mckeon. She presents to the surgical clinic today with her  for surgical consultation for potential third liver transplantation. PMH significant for Congenital Hepatic Fibrosis requiring liver transplant , age 15 years old at [facility] in [place]. She was re-transplant  at [facility] due to acquired Hepatitis C. She has been treated for Hepatitis C at [place] in 2014 with SVR achieved noted last review in Care Everywhere 2014. Immunosuppression was Cyclosporine subsequent transition to Gengraf 25mg bid; no cellcept or prednisone in the last few years.   She developed elevated liver function , underwent liver biopsy [place] with finding of cirrhosis. She developed recurrence of ascites requiring LVP every 2-3 weeks underwent TIPS placed 3/2016. After TIPS Encephalopathy was difficult to control therefore TIPS was 2016. Encephalopathy improved and more controlled since with Lactulose and Xifaxan. She reports she has multiple soft stools a day,  no diarrhea and no encephalopathy.   She has had variceal bleeding in 2016 and more recently 2/2017. She reports this was a significant bleed, resulting in long hospitalization 3-4 weeks and loss of approximately 30 pounds. She reports she is slowly regaining weight. Last banding of varices was with this admission.   She now requires paracentesis every week with 4-5 liters drained. She has had SBP. She has had Renal failure thought to be related to CNI toxicity progressed to HD 3/2016 presently via left thigh graft Tues/Thurs/Saturday due to inability to place access in upper extremities. She has hypotension noted and presently has Midodrine 10mg TID on her medication list. She reports she has been off previously with improvement of her blood pressure. With further discussion she admits she has not been taking it for the last 3 weeks due to running out of the medication and no refills. She reports her blood pressure has been as low as 70/40 in the last 6 months, prior to that was over 100, usually 110 consistently but notes she has never had high blood pressure.   She has a central line for blood draws left upper chest.  She has diagnosis of Diabetes states this was diagnosed just after her TIPS was placed. She monitors her glucoses 2-3 times a day but rarely takes insulin. She states she has been told not to take insulin if glucose is less than 150 and most of the time it is less than 150.   She has had an umbilical hernia since occurrence of ascites, this ruptured early 2017 and is somewhat a source of abdominal pain for her. She has MS contin on her medication list but states she rarely takes it now, maybe once a week.   She was listed for SLK at [place] but a retroperitoneal mass was seen on imaging therefore she was delisted with referral to other centers for evaluation. She presented to [facility] for second opinon as well and was declined.   She has Raynaud's diagnosed about 1 year since first transplant affects  finger tips but no toes.   One pregnancy 42 week full term healthy 8 lbs   Past Medical History:   Diagnosis Date    Congenital hepatic fibrosis    Diabetes mellitus type 2, uncomplicated (CMS-HCC) 2016    ESRD (end stage renal disease) (CMS-HCC)    Hepatitis C   Treated 2014 with SVR     Patient Active Problem List   Diagnosis    Organ transplant candidate    Hepatic cirrhosis (CMS-HCC)    Congenital hepatic fibrosis    Hepatitis C    ESRD (end stage renal disease) (CMS-HCC)     Past Surgical History:   Procedure Laterality Date    AV graft Left 11/2016   Thigh    CHOLECYSTECTOMY 1984   Open - performed prior to first transplant    LAPAROSCOPIC TUBAL LIGATION    TIPS placement 03/2016    [facility] [place]    [place]   [facility] [place] [facility]     Path report of bx. retroperitoneal mass unsatisfactory   NON-GYN CYTOLOGY REPORT     Accession #: [id number]    Date Collected: 5/26/2017 13:30  Diagnosis  Diagnosis  SPECIMEN IS UNSATISFACTORY FOR EVALUATION    Additional Findings  Predominantly blood and gastrointestinal epithelial cells.    Specimen Adequacy  Unsatisfactory for evaluation.        Gross Description  Received 10 direct smears labeled with patient name and pass number. Six  Diff-Quik stained slides evaluated on-site were used to report the immediate  evaluation.  Six alcohol fixed slides were prepared for subsequent  Papanicolaou staining.  Also received 1 container labeled with patient's name  and MRN [id number] containing 30 ml of pink fluid without small particles of  tissue and blood clot. The fluid is processed for: ThinPrep.  Cytopreparation Method(s): 10 Direct Smear, 1 Thinprep      ON-SITE IMPRESSION:  Not adequate for evaluation, blood only    Immediate adequacy was reported verbally to Dr. Winters by attending  pathologist Dr. Denson and Cytology Fellow Dr. Bland on 5-26-17 at 1:50 pm.  Attending Pathologist, Dr. Denson was the teaching  physician present: via  telepathology  and personally performed the immediate adequacy determination on evaluation  episodes 1:  Evaluation Episode # 1:  Pass #1: blood only  Pass #2: blood only  Pass #3: blood only  Pass #4: blood only  Pass #5: blood only              3 centers that denied pt  UMA: She was listed for SLK at [place] but a retroperitoneal mass was seen on imaging therefore she was delisted with referral to other centers for evaluation. [place] [facility] [place] as well and was declined    [facility]- Assessment/Plan:  Overall this patient is a extremely high risk candidate candidate for consideration for liver transplantation due to her extensive history of abdominal surgeries with two prior liver transplants, biliary reconstruction, and open cholecystectomy who has ESRD in need of a SLK. She has severe muscle wasting as well as an undiagnosed retroperitoneal mass that is concerning for PTLD in the setting of her significant weight loss as well as the character of the mass that appears to encase her blood vessels. In addition, I feel that she is not a candidate for transplantation in my opinion      VIVI: patient has goals to meet prior to being listed.  The patient's evaluation studies and consults were reviewed and the following recommendations were made:    - Will have fundraising goal $12,000  - With next visit needs RD and Med Psych   - Concern for nutritional and functional status, reassess in 6-8 weeks  - Need to complete review of outside records and update screenings  Patient does not meet selection criteria at this time and will not be added to the waitlist for Liver transplantation at this time    Imaging to be reviewed: retroperitoneal mass     HCC Treatment History: N/A  ABO: A  Platelets: 45  Creatinine:6.5 now previous 2.9   in ICU as of this week presented with HE, then aspirated on vent.   Bilirubin: 0.9   Np AFP  MELD: 23    Plan: 3.5 cm soft  tissue mass.  Negative on PET; not hypermetabolic.  Difficult location but biopsy attempted;  results were equivalent with blood.  Needs updated CT now.      Wei Zavaleta RN notified.     Follow-up Provider: wei to notify family

## 2017-09-21 NOTE — TELEPHONE ENCOUNTER
Images were not available for review at Liver Conference 9/19/17.  Will resubmit for review on 9/26/17

## 2017-09-21 NOTE — PROGRESS NOTES
END OF SHIFT NOTE:    INTAKE/OUTPUT     Voiding: YES  Catheter: NO  Drain:              Flatus: Patient does have flatus present. Stool:  1 occurrences. Characteristics:  Stool Assessment  Stool Color: Brown  Stool Appearance: Watery  Stool Amount: Large  Stool Source/Status: Rectum    Emesis: 0 occurrences. Characteristics:        VITAL SIGNS  Patient Vitals for the past 12 hrs:   Temp Pulse Resp BP SpO2   09/21/17 0400 97.5 °F (36.4 °C) 86 18 101/66 98 %   09/20/17 2300 98.2 °F (36.8 °C) 86 18 100/69 95 %   09/20/17 2000 97.2 °F (36.2 °C) 83 18 94/60 93 %       Pain Assessment  Pain Intensity 1: 0 (09/20/17 1945)  Pain Location 1: Abdomen  Pain Intervention(s) 1: Repositioned  Patient Stated Pain Goal: 0    Ambulating  Yes    Shift report given to oncoming nurse at the bedside.     Nick Holt RN

## 2017-09-21 NOTE — PROGRESS NOTES
TRANSFER - OUT REPORT:    Verbal report given to Gela Murray on Marichuy Turner  being transferred to [facility] for routine progression of care       Report consisted of patients Situation, Background, Assessment and   Recommendations(SBAR). Information from the following report(s) Kardex was reviewed with the receiving nurse. Lines:   Venous Access Device duel lumen cath 05/15/17 Upper chest (subclavicular area), left (Active)   Central Line Being Utilized Yes 9/21/2017  7:40 AM   Criteria for Appropriate Use Long term IV/antibiotic administration 9/21/2017  7:40 AM   Site Assessment Clean, dry, & intact 9/21/2017  7:40 AM   Date of Last Dressing Change 09/15/17 9/21/2017  7:40 AM   Dressing Status Clean, dry, & intact 9/21/2017  7:40 AM   Dressing Type Disk with Chlorhexadine gluconate (CHG); Transparent 9/21/2017  7:40 AM   Action Taken Dressing changed 9/15/2017  6:30 PM   Access Medial Site Assessment Other (Comment) 9/15/2017  6:30 PM   Date Accessed (Medial Site) 09/15/17 9/15/2017  6:30 PM   Access Time (Medial Site) 1830 9/15/2017  6:30 PM   Access Needle Size (Site #1) Other (comments) 9/15/2017  6:30 PM   Access Needle Length (Medial Site) Other (comment) 9/15/2017  6:30 PM   Positive Blood Return (Medial Site) Yes 9/18/2017  1:28 PM   Action Taken (Medial Site) Other (comment) 9/21/2017  7:40 AM   Date Needle Changed (Medial Site) 09/15/17 9/15/2017  6:30 PM   Access Lateral Site Assessment Other (Comment) 9/15/2017  6:30 PM   Date Accessed (Lateral Site) 09/15/17 9/15/2017  6:30 PM   Access Time (Lateral Site) 1830 9/15/2017  6:30 PM   Positive Blood Return (Lateral Site) Yes 9/16/2017  3:42 PM   Action Taken (Lateral Site) Other (comment) 9/21/2017  7:40 AM   Date Needle Changed (Lateral Site) 09/15/17 9/15/2017  6:30 PM   Alcohol Cap Used No 9/16/2017  7:40 AM        Opportunity for questions and clarification was provided.       Patient transported with:   Tech    and friend transported to facility.

## 2017-09-21 NOTE — DIALYSIS
HD treatment initiated via left upper leg AVG without difficulty. Cannulated by NEO Holder using 16 gauge needles. VS stable, will continue to monitor during tx. No Heparin. Machine tests passed and alarms intact.

## 2017-09-25 NOTE — PROGRESS NOTES
Patient to [place] room for procedure. Patient awake and alert, verbalized name, , and procedure to be performed. Patient moved to procedure table independently.

## 2017-09-25 NOTE — PROGRESS NOTES
I have reviewed discharge instructions with the patient. The patient verbalized understanding. Patient assisted to wheelchair and taken to waiting room. NAD upon discharge. Patient refused to take dc papers.

## 2017-09-26 ENCOUNTER — CONFERENCE (OUTPATIENT)
Dept: TRANSPLANT | Facility: CLINIC | Age: 47
End: 2017-09-26

## 2017-09-26 NOTE — PROGRESS NOTES
Pt d/c to [facility] and will be followed by Ulysses Kale RN until d/c. Román Nayak LPN/ Care Coordinator  [address] [place], [address] [facility]  [email or web address] note will not be viewable in [address]

## 2017-09-28 ENCOUNTER — TELEPHONE (OUTPATIENT)
Dept: TRANSPLANT | Facility: CLINIC | Age: 47
End: 2017-09-28

## 2017-09-28 NOTE — TELEPHONE ENCOUNTER
Spoke to pts , informed our team would like to see another updated CT or MRI.  PT has MRI from  March and CT in May.  I will request those and see if team would rev.those or do they actually want a recent CT. Pt is on Dialysis at this time. Additional imaging will need to be approved by nephrologist as  has concerns about more damage to the kidneys.

## 2017-10-02 NOTE — PROGRESS NOTES
Patient to [address] [facility] room 3 for procedure. Patient awake and alert, verbalized name, , and procedure to be performed. Patient moved to procedure table independently.

## 2017-10-02 NOTE — PROGRESS NOTES
IR Nurse Pre-Procedure Checklist Part 2          Consent signed: Yes    H&P complete:  Not applicable    Antibiotics: No    Airway/Mallampati Done: Not applicable    Shaved:  No    Pregnancy Form:Not applicable    Patient Position: Yes    MD Side: Yes     Biopsy Worksheet: Not applicable    Specimen Medium: Yes

## 2017-10-02 NOTE — DISCHARGE INSTRUCTIONS
[address]  Department of Interventional Radiology  [address] [place] [address] [address] [facility]  [phone] Office  [phone] Fax    PARACENTESIS DISCHARGE INSTRUCTIONS    General Information:  During this procedure, the doctor will insert a needle into the abdomen to drain fluid. After the procedure, you will be able to take a deep breath much easier. The site of the puncture may ooze the first day. This will decrease and eventually stop. Paracentesis (draining fluid from the abdomen) sometimes makes patients hypotensive (low blood pressure). Your doctor may order for you to receive fluids or albumin (a volume booster) during the procedure through an IV site. Home Care Instructions:  Keep the puncture site clean and dry. No tub baths or swimming until puncture site heals. Showering is acceptable. Resume your normal diet, and resume your normal activity slowly and as you tolerate. If you are short of breath, rest. If shortness of breath does not ease, please call your ordering doctor. Fluid can re-accumulate in the chest and/or in the abdomen. If this should occur, your doctor needs to know as you may need to have the procedure done again. Call If:     You should call your Physician and/or the Radiology Nurse if you notice any signs of infection, like pus draining, or if it is swollen or reddened. Also call if you have a fever, or if you are bleeding from the puncture site more than a small amount on the dressing. Call if the puncture site keeps draining fluid. Some oozing is to be expected, but should slow and then stop. Call if you feel like you have pressure in your abdomen. SEEK IMMEDIATE CARE OR CALL 911 IF YOU SUDDENLY HAVE TROUBLE BREATHING, OR IF YOUR LIPS TURN BLUE, OR IF YOU NOTICE BLOOD IN YOUR SPUTUM. Follow-Up Instructions: Please see your ordering doctor as he/she has requested. To Reach Us:       If you have any questions about your procedure, please call the Interventional Radiology department at [phone]. After business hours (5pm) and weekends, call the answering service at [phone] and ask for the Radiologist on call to be paged.      Date: 10/2/2017  Discharging Nurse: Mejia Moralez RN

## 2017-10-02 NOTE — IP AVS SNAPSHOT
[address] 
[phone] Patient: Naseem Head MRN: [id number] VALERIO:75/83/[id number] You are allergic to the following Allergen Reactions Aspirin Nausea Only Codeine Nausea Only Compazine (Prochlorperazine Edisylate) Unknown (comments) Causes Lock Jaw Recent Documentation OB Status Smoking Status Postmenopausal Never Smoker Emergency Contacts Name Discharge Info Relation Home Work Mobile Lily Walsh  Mother [14] [phone] Reed Gauthier CAREGIVER [3] Spouse [3] [phone] About your hospitalization You were admitted on:  October 2, 2017 You last received care in the:  [facility] RADIOLOGY ULTRASOUND You were discharged on:  October 2, 2017 Unit phone number:  [phone] Why you were hospitalized Your primary diagnosis was:  Not on File Providers Seen During Your Hospitalizations Provider Role Specialty Primary office phone Bari Mcneil MD Attending Provider Gastroenterology [phone] Your Primary Care Physician (PCP) Primary Care Physician Office Phone Office Fax [address] [phone] Follow-up Information None Current Discharge Medication List  
  
ASK your doctor about these medications Dose & Instructions Dispensing Information Comments Morning Noon Evening Bedtime GENGRAF 25 mg capsule Generic drug:  cycloSPORINE modified Your last dose was: Your next dose is:    
   
   
 Dose:  2.5 mg/kg/day Take 2.5 mg/kg/day by mouth every morning. Indications: Takes in the AM  
 Refills:  0  
     
   
   
   
  
 insulin aspart 100 unit/mL injection Commonly known as:  Casimiro Bailon Your last dose was:     
   
Your next dose is:    
   
   
 Sliding scale maximum 15 units each meal  
 Quantity:  10 mL Refills:  5  
     
   
   
   
  
 lactulose 10 gram/15 mL solution Commonly known as:  Augusto Trujillo Your last dose was: Your next dose is:    
   
   
 Dose:  20 g Take 30 mL by mouth three (3) times daily. Quantity:  480 mL Refills:  0  
     
   
   
   
  
 midodrine 5 mg tablet Commonly known as:  Fatoumata Schafer Your last dose was: Your next dose is: Take  by mouth every eight (8) hours. Refills:  0  
     
   
   
   
  
 morphine CR 15 mg CR tablet Commonly known as:  MS CONTIN Your last dose was: Your next dose is:    
   
   
 Dose:  15 mg Take 1 Tab by mouth every twelve (12) hours. Max Daily Amount: 30 mg.  
 Quantity:  10 Tab Refills:  0  
     
   
   
   
  
 mupirocin 2 % ointment Commonly known as:  [facility] Your last dose was: Your next dose is:    
   
   
 Apply  to affected area daily. Quantity:  22 g Refills:  1  
     
   
   
   
  
 pantoprazole 40 mg tablet Commonly known as:  PROTONIX Your last dose was: Your next dose is:    
   
   
 Dose:  40 mg Take 40 mg by mouth. Refills:  0  
     
   
   
   
  
 promethazine 25 mg tablet Commonly known as:  PHENERGAN Your last dose was: Your next dose is:    
   
   
 Dose:  25 mg Take 25 mg by mouth every six (6) hours as needed for Nausea. Refills:  0 REGLAN 10 mg tablet Generic drug:  metoclopramide HCl Your last dose was: Your next dose is:    
   
   
 Dose:  10 mg Take 10 mg by mouth Before breakfast, lunch, dinner and at bedtime. Refills:  0  
     
   
   
   
  
 rifAXIMin 550 mg tablet Commonly known as:  Gaby Fish Your last dose was: Your next dose is:    
   
   
 Reported on 5/25/2017 - BID Refills:  0 Discharge Instructions [place] 05 [address]62 [address] Department of Interventional Radiology [facility] 
[phone] Office 
[phone] Fax PARACENTESIS DISCHARGE INSTRUCTIONS General Information: 
During this procedure, the doctor will insert a needle into the abdomen to drain fluid. After the procedure, you will be able to take a deep breath much easier. The site of the puncture may ooze the first day. This will decrease and eventually stop. Paracentesis (draining fluid from the abdomen) sometimes makes patients hypotensive (low blood pressure). Your doctor may order for you to receive fluids or albumin (a volume booster) during the procedure through an IV site. Home Care Instructions: 
Keep the puncture site clean and dry. No tub baths or swimming until puncture site heals. Showering is acceptable. Resume your normal diet, and resume your normal activity slowly and as you tolerate. If you are short of breath, rest. If shortness of breath does not ease, please call your ordering doctor. Fluid can re-accumulate in the chest and/or in the abdomen. If this should occur, your doctor needs to know as you may need to have the procedure done again. Call If: 
   You should call your Physician and/or the Radiology Nurse if you notice any signs of infection, like pus draining, or if it is swollen or reddened. Also call if you have a fever, or if you are bleeding from the puncture site more than a small amount on the dressing. Call if the puncture site keeps draining fluid. Some oozing is to be expected, but should slow and then stop. Call if you feel like you have pressure in your abdomen. SEEK IMMEDIATE CARE OR CALL 911 IF YOU SUDDENLY HAVE TROUBLE BREATHING, OR IF YOUR LIPS TURN BLUE, OR IF YOU NOTICE BLOOD IN YOUR SPUTUM. Follow-Up Instructions: Please see your ordering doctor as he/she has requested. To Reach Us: If you have any questions about your procedure, please call the Interventional Radiology department at [phone]. After business hours (5pm) and weekends, call the answering service at [phone] and ask for the Radiologist on call to be paged. Date: 10/2/2017 Discharging Nurse: Jose James RN Discharge Orders None ACO Transitions of Care Introducing [facility] [address] Anjelica Banks offers a voluntary care coordination program to provide high quality service and care to [facility] fee-for-service beneficiaries. Kilo Burns was designed to help you enhance your health and well-being through the following services: ? Transitions of Care  support for individuals who are transitioning from one care setting to another (example: Hospital to home). ? Chronic and Complex Care Coordination  support for individuals and caregivers of those with serious or chronic illnesses or with more than one chronic (ongoing) condition and those who take a number of different medications. If you meet specific medical criteria, a [facility] [address] may call you directly to coordinate your care with your primary care physician and your other care providers. For questions about the [facility] programs, please, contact your physicians office. For general questions or additional information about Accountable Care Organizations: 
Please visit www.medicare.gov/acos. html or call 1-800-MEDICARE (2-152.953.6181) TTY users should call 9-790.655.1230. Introducing [facility] & HEALTH SERVICES! Dear Torsten Garcia: 
Thank you for requesting a kinkon account. Our records indicate that you already have an active kinkon account. You can access your account anytime at [email or web address] Did you know that you can access your hospital and ER discharge instructions at any time in kinkon? You can also review all of your test results from your hospital stay or ER visit. Additional Information If you have questions, please visit the Frequently Asked Questions section of the [employer] website at [email or web address] Remember, MyChart is NOT to be used for urgent needs. For medical emergencies, dial 911. Now available from your iPhone and Android! General Information Please provide this summary of care documentation to your next provider. Patient Signature:  ____________________________________________________________ Date:  ____________________________________________________________  
  
Jeffrey Police Provider Signature:  ____________________________________________________________ Date:  ____________________________________________________________

## 2017-10-02 NOTE — PROGRESS NOTES
Discharged patient home with family following 4.5 liter removal during paracentesis today; all parties verbalizing understanding of instruct at this time.  Tolerating well

## 2017-10-11 NOTE — IP AVS SNAPSHOT
Sinan Ido 
 
 
 [address] [facility] [address] [facility] 
[phone] Patient: Ava Saavedra MRN: [id number] NW Current Discharge Medication List  
  
ASK your doctor about these medications Dose & Instructions Dispensing Information Comments Morning Noon Evening Bedtime GENGRAF 25 mg capsule Generic drug:  cycloSPORINE modified Your last dose was: Your next dose is:    
   
   
 Dose:  2.5 mg/kg/day Take 2.5 mg/kg/day by mouth every morning. Indications: Takes in the AM  
 Refills:  0  
     
   
   
   
  
 insulin aspart 100 unit/mL injection Commonly known as:  Savannah Margot Your last dose was: Your next dose is:    
   
   
 Sliding scale maximum 15 units each meal  
 Quantity:  10 mL Refills:  5  
     
   
   
   
  
 lactulose 10 gram/15 mL solution Commonly known as:  Charmian Goltz Your last dose was: Your next dose is:    
   
   
 Dose:  20 g Take 30 mL by mouth three (3) times daily. Quantity:  480 mL Refills:  0  
     
   
   
   
  
 midodrine 5 mg tablet Commonly known as:  Katie Carter Your last dose was: Your next dose is: Take  by mouth every eight (8) hours. Refills:  0  
     
   
   
   
  
 morphine CR 15 mg CR tablet Commonly known as:  MS CONTIN Your last dose was: Your next dose is:    
   
   
 Dose:  15 mg Take 1 Tab by mouth every twelve (12) hours. Max Daily Amount: 30 mg.  
 Quantity:  10 Tab Refills:  0  
     
   
   
   
  
 mupirocin 2 % ointment Commonly known as:  [facility] Your last dose was: Your next dose is:    
   
   
 Apply  to affected area daily. Quantity:  22 g Refills:  1  
     
   
   
   
  
 pantoprazole 40 mg tablet Commonly known as:  PROTONIX Your last dose was: Your next dose is:    
   
   
 Dose:  40 mg Take 40 mg by mouth. Refills:  0 promethazine 25 mg tablet Commonly known as:  PHENERGAN Your last dose was: Your next dose is:    
   
   
 Dose:  25 mg Take 25 mg by mouth every six (6) hours as needed for Nausea. Refills:  0 REGLAN 10 mg tablet Generic drug:  metoclopramide HCl Your last dose was: Your next dose is:    
   
   
 Dose:  10 mg Take 10 mg by mouth Before breakfast, lunch, dinner and at bedtime. Refills:  0  
     
   
   
   
  
 rifAXIMin 550 mg tablet Commonly known as:  Alvin Moura Your last dose was: Your next dose is:    
   
   
 Reported on 5/25/2017 - BID Refills:  0

## 2017-10-11 NOTE — IP AVS SNAPSHOT
[address] 
[phone] Patient: Tremayne Cat MRN: [id number] OQB:22/93/4665 You are allergic to the following Allergen Reactions Aspirin Nausea Only Codeine Nausea Only Compazine (Prochlorperazine Edisylate) Unknown (comments) Causes Lock Jaw Recent Documentation OB Status Smoking Status Postmenopausal Never Smoker Emergency Contacts Name Discharge Info Relation Home Work Mobile Lily Walsh  Mother [14] [phone] Reed Gauthier CAREGIVER [3] Spouse [3] [phone] About your hospitalization You were admitted on:  October 11, 2017 You last received care in the:  [facility] RADIOLOGY ULTRASOUND You were discharged on:  October 11, 2017 Unit phone number:  [phone] Why you were hospitalized Your primary diagnosis was:  Not on File Providers Seen During Your Hospitalizations Provider Role Specialty Primary office phone Ritu Li MD Attending Provider Gastroenterology [phone] Your Primary Care Physician (PCP) Primary Care Physician Office Phone Office Fax [address]36 [address] [phone] Follow-up Information None Your Appointments Friday October 20, 2017 10:00 AM EDT  
US GUIDED PARACENTISIS INIT with [facility] US LOGIC 7 UNIT 2, D NURSING, D IR PA RESOURCE 2  
[facility] RADIOLOGY ULTRASOUND ([address] [place] [address]) [address] [place] [address]  
[phone] Interventional Radiology Procedure completed with ultrasound guidance. Referring Physicians: 1) Initial paracentesis patients required: H&P/recent office notes and lab work, no older than 30 days (CBC, BMP, PT/PTT) to Interventional Radiology to facilitate prompt scheduling.  2) Obtain clearance to hold blood thinners from prescribing Physician and give Patient instructions prior to arrival. 3) Patient may continue to eat or drink as normal prior to arrival. 4) Pt to arrive 15 minutes early. 5) Responsible adult  required to drive Patient home after recovery period. Recovery period can vary depending on sedation and patient condition. 6) Interventional Radiology Scheduling can be contacted at [phone] Current Discharge Medication List  
  
ASK your doctor about these medications Dose & Instructions Dispensing Information Comments Morning Noon Evening Bedtime GENGRAF 25 mg capsule Generic drug:  cycloSPORINE modified Your last dose was: Your next dose is:    
   
   
 Dose:  2.5 mg/kg/day Take 2.5 mg/kg/day by mouth every morning. Indications: Takes in the AM  
 Refills:  0  
     
   
   
   
  
 insulin aspart 100 unit/mL injection Commonly known as:  Daniel Emanuel Your last dose was: Your next dose is:    
   
   
 Sliding scale maximum 15 units each meal  
 Quantity:  10 mL Refills:  5  
     
   
   
   
  
 lactulose 10 gram/15 mL solution Commonly known as:  Mio Goo Your last dose was: Your next dose is:    
   
   
 Dose:  20 g Take 30 mL by mouth three (3) times daily. Quantity:  480 mL Refills:  0  
     
   
   
   
  
 midodrine 5 mg tablet Commonly known as:  Dayami Hammer Your last dose was: Your next dose is: Take  by mouth every eight (8) hours. Refills:  0  
     
   
   
   
  
 morphine CR 15 mg CR tablet Commonly known as:  MS CONTIN Your last dose was: Your next dose is:    
   
   
 Dose:  15 mg Take 1 Tab by mouth every twelve (12) hours. Max Daily Amount: 30 mg.  
 Quantity:  10 Tab Refills:  0  
     
   
   
   
  
 mupirocin 2 % ointment Commonly known as:  [facility] Your last dose was: Your next dose is:    
   
   
 Apply  to affected area daily. Quantity:  22 g Refills:  1  
     
   
   
   
  
 pantoprazole 40 mg tablet Commonly known as:  PROTONIX Your last dose was: Your next dose is:    
   
   
 Dose:  40 mg Take 40 mg by mouth. Refills:  0  
     
   
   
   
  
 promethazine 25 mg tablet Commonly known as:  PHENERGAN Your last dose was: Your next dose is:    
   
   
 Dose:  25 mg Take 25 mg by mouth every six (6) hours as needed for Nausea. Refills:  0 REGLAN 10 mg tablet Generic drug:  metoclopramide HCl Your last dose was: Your next dose is:    
   
   
 Dose:  10 mg Take 10 mg by mouth Before breakfast, lunch, dinner and at bedtime. Refills:  0  
     
   
   
   
  
 rifAXIMin 550 mg tablet Commonly known as:  Conchetta Ruffini Your last dose was: Your next dose is:    
   
   
 Reported on 5/25/2017 - BID Refills:  0 Discharge Instructions [place] 43 263 [address] Department of Interventional Radiology 
[phone] Office 
[phone] Fax [phone] Department of Interventional Radiology [facility] 
[phone] Office 
[phone] Fax PARACENTESIS DISCHARGE INSTRUCTIONS General Information: 
During this procedure, the doctor will insert a needle into the abdomen to drain fluid. After the procedure, you will be able to take a deep breath much easier. The site of the puncture may ooze the first day. This will decrease and eventually stop. Paracentesis (draining fluid from the abdomen) sometimes makes patients hypotensive (low blood pressure). Your doctor may order for you to receive fluids or albumin (a volume booster) during the procedure through an IV site. Home Care Instructions: 
Keep the puncture site clean and dry.  No tub baths or swimming until puncture site heals. Showering is acceptable. Resume your normal diet, and resume your normal activity slowly and as you tolerate. If you are short of breath, rest. If shortness of breath does not ease, please call your ordering doctor. Fluid can re-accumulate in the chest and/or in the abdomen. If this should occur, your doctor needs to know as you may need to have the procedure done again. Call If: 
   You should call your Physician and/or the Radiology Nurse if you notice any signs of infection, like pus draining, or if it is swollen or reddened. Also call if you have a fever, or if you are bleeding from the puncture site more than a small amount on the dressing. Call if the puncture site keeps draining fluid. Some oozing is to be expected, but should slow and then stop. Call if you feel like you have pressure in your abdomen. SEEK IMMEDIATE CARE OR CALL 911 IF YOU SUDDENLY HAVE TROUBLE BREATHING, OR IF YOUR LIPS TURN BLUE, OR IF YOU NOTICE BLOOD IN YOUR SPUTUM. Follow-Up Instructions: Please see your ordering doctor as he/she has requested. To Reach Us:   
 
 
Date: 10/11/2017 Discharging Nurse: Mackenzie Uribe RN Interventional Radiology General Nurse Discharge After general anesthesia or intravenous sedation, for 24 hours or while taking prescription Narcotics: · Limit your activities · Do not drive and operate hazardous machinery · Do not make important personal or business decisions · Do  not drink alcoholic beverages · If you have not urinated within 8 hours after discharge, please contact your surgeon on call. * Please give a list of your current medications to your Primary Care Provider. * Please update this list whenever your medications are discontinued, doses are 
   changed, or new medications (including over-the-counter products) are added. * Please carry medication information at all times in case of emergency situations. These are general instructions for a healthy lifestyle: No smoking/ No tobacco products/ Avoid exposure to second hand smoke Surgeon General's Warning:  Quitting smoking now greatly reduces serious risk to your health. Obesity, smoking, and sedentary lifestyle greatly increases your risk for illness A healthy diet, regular physical exercise & weight monitoring are important for maintaining a healthy lifestyle You may be retaining fluid if you have a history of heart failure or if you experience any of the following symptoms:  Weight gain of 3 pounds or more overnight or 5 pounds in a week, increased swelling in our hands or feet or shortness of breath while lying flat in bed. Please call your doctor as soon as you notice any of these symptoms; do not wait until your next office visit. Recognize signs and symptoms of STROKE: 
F-face looks uneven A-arms unable to move or move unevenly S-speech slurred or non-existent T-time-call 911 as soon as signs and symptoms begin-DO NOT go Back to bed or wait to see if you get better-TIME IS BRAIN. Discharge Orders None ACO Transitions of Care Introducing [employer] [address] Anjelica Banks offers a voluntary care coordination program to provide high quality service and care to [facility] fee-for-service beneficiaries. Homero Miller was designed to help you enhance your health and well-being through the following services: ? Transitions of Care  support for individuals who are transitioning from one care setting to another (example: Hospital to home). ? Chronic and Complex Care Coordination  support for individuals and caregivers of those with serious or chronic illnesses or with more than one chronic (ongoing) condition and those who take a number of different medications.   
 
 
If you meet specific medical criteria, a [facility] Coordinator may call you directly to coordinate your care with your primary care physician and your other care providers. For questions about the [facility] programs, please, contact your physicians office. For general questions or additional information about Accountable Care Organizations: 
Please visit www.medicare.gov/acos. html or call 1-800-MEDICARE (6-957.639.6762) TTY users should call 7-675.488.5395. Introducing [facility] & HEALTH SERVICES! Dear Audra Irvin: 
Thank you for requesting a Wireless Environment account. Our records indicate that you already have an active Wireless Environment account. You can access your account anytime at [email or web address] Did you know that you can access your hospital and ER discharge instructions at any time in Wireless Environment? You can also review all of your test results from your hospital stay or ER visit. Additional Information If you have questions, please visit the Frequently Asked Questions section of the Wireless Environment website at [email or web address] Remember, Wireless Environment is NOT to be used for urgent needs. For medical emergencies, dial 911. Now available from your iPhone and Android! General Information Please provide this summary of care documentation to your next provider. Patient Signature:  ____________________________________________________________ Date:  ____________________________________________________________  
  
Johny Olvera Provider Signature:  ____________________________________________________________ Date:  ____________________________________________________________

## 2017-10-11 NOTE — DISCHARGE INSTRUCTIONS
[facility] [address]  Department of Interventional Radiology  [phone] Office  [phone] Fax              [facility]  Department of Interventional Radiology  [address] [facility]  [phone] Office  [phone] Fax    PARACENTESIS DISCHARGE INSTRUCTIONS    General Information:  During this procedure, the doctor will insert a needle into the abdomen to drain fluid. After the procedure, you will be able to take a deep breath much easier. The site of the puncture may ooze the first day. This will decrease and eventually stop. Paracentesis (draining fluid from the abdomen) sometimes makes patients hypotensive (low blood pressure). Your doctor may order for you to receive fluids or albumin (a volume booster) during the procedure through an IV site. Home Care Instructions:  Keep the puncture site clean and dry. No tub baths or swimming until puncture site heals. Showering is acceptable. Resume your normal diet, and resume your normal activity slowly and as you tolerate. If you are short of breath, rest. If shortness of breath does not ease, please call your ordering doctor. Fluid can re-accumulate in the chest and/or in the abdomen. If this should occur, your doctor needs to know as you may need to have the procedure done again. Call If:     You should call your Physician and/or the Radiology Nurse if you notice any signs of infection, like pus draining, or if it is swollen or reddened. Also call if you have a fever, or if you are bleeding from the puncture site more than a small amount on the dressing. Call if the puncture site keeps draining fluid. Some oozing is to be expected, but should slow and then stop. Call if you feel like you have pressure in your abdomen.  SEEK IMMEDIATE CARE OR CALL 911 IF YOU SUDDENLY HAVE TROUBLE BREATHING, OR IF YOUR LIPS TURN BLUE, OR IF YOU NOTICE BLOOD IN [place] SPUTUM. Follow-Up Instructions: Please see your ordering doctor as he/she has requested. To Reach Us:        Date: 10/11/2017  Discharging Nurse: Brenda Lyles RN          Interventional Radiology General Nurse Discharge    After general anesthesia or intravenous sedation, for 24 hours or while taking prescription Narcotics:  · Limit your activities  · Do not drive and operate hazardous machinery  · Do not make important personal or business decisions  · Do  not drink alcoholic beverages  · If you have not urinated within 8 hours after discharge, please contact your surgeon on call. * Please give a list of your current medications to your Primary Care Provider. * Please update this list whenever your medications are discontinued, doses are     changed, or new medications (including over-the-counter products) are added. * Please carry medication information at all times in case of emergency situations. These are general instructions for a healthy lifestyle:    No smoking/ No tobacco products/ Avoid exposure to second hand smoke  Surgeon General's Warning:  Quitting smoking now greatly reduces serious risk to your health. Obesity, smoking, and sedentary lifestyle greatly increases your risk for illness  A healthy diet, regular physical exercise & weight monitoring are important for maintaining a healthy lifestyle    You may be retaining fluid if you have a history of heart failure or if you experience any of the following symptoms:  Weight gain of 3 pounds or more overnight or 5 pounds in a week, increased swelling in our hands or feet or shortness of breath while lying flat in bed. Please call your doctor as soon as you notice any of these symptoms; do not wait until your next office visit.     Recognize signs and symptoms of STROKE:  F-face looks uneven    A-arms unable to move or move unevenly    S-speech slurred or non-existent    T-time-call 911 as soon as signs and symptoms begin-DO NOT go       Back to bed or wait to see if you get better-TIME IS BRAIN.

## 2017-10-12 NOTE — PROGRESS NOTES
Initial assessment begun with /PCG - Earnstine Book  Reviewed patient's history - congential hepatic fibrosis, transplants x2, would like to be wait listed for a third. Patient suffered some bouts of vomiting last week while still at the SNF/STR - large amounts. Today patient had small emesis, and has rested much of the day - stayed out from dialysis. Very concerned about nutrition - trying to gain weight (in order to eligible for transplant)  Discussed small frequent meals/grazing  Discussed requesting nephrology to write order for twice weekly ammonia level in order to better plan nutrition.     Plan -  Email patient contact information (provided phone number to )  Follow up Monday 10/16/2017 - possible HV if patient is up to it  Coordinate with [facility] as much as possible

## 2017-10-14 PROBLEM — R11.10 VOMITING: Status: ACTIVE | Noted: 2017-01-01

## 2017-10-14 NOTE — ED PROVIDER NOTES
HPI Comments: 51-year-old female presents to the ED for weakness shortness of breath chest pain. Patient is end-stage renal disease and liver failure patient. Patient has a history of congenital hepatic fibrosis and has felt 2 liver transplants. Patient was recently in the hospital   Her problem list included:  liver failure,   Pancytopenia  hepatic encephalopathy  Respiratory failure she had to be intubated and had aspiration pneumonia     She was discharged on September 21. She went to rehabilitation for several weeks. She was discharged from rehabilitation 2 days ago. Patient did not go to her dialysis on Thursday (patient is Tuesday Thursday Saturday HD at [facility] in [facility] nephrologist Dr. Peggy Rothman). Patient is a 55 y.o. female presenting with chest pain. The history is provided by the patient. Chest Pain (Angina)    This is a recurrent problem. The current episode started more than 2 days ago. The problem has been gradually worsening. The problem occurs constantly. The pain is associated with normal activity. The pain is at a severity of 8/10. The pain is moderate. The quality of the pain is described as sharp. The pain does not radiate. Pertinent negatives include no abdominal pain, no cough, no diaphoresis, no irregular heartbeat, no malaise/fatigue, no nausea, no shortness of breath and no vomiting. She has tried nothing for the symptoms. Her past medical history does not include DM, DVT, HTN or PE.         Past Medical History:   Diagnosis Date    SEAN (acute kidney injury) ([facility] [address] 6/26/2016    Arthritis     kath feet    Ascites     Benign neoplasm of skin of trunk, except scrotum     pt denies    Cellulitis and abscess of unspecified digit     hx of    Chronic kidney disease     [facility] in [facility] on Mon/Wed/Fri    Chronic pain     abd area    Congenital hepatic fibrosis     Liver transplant X2    Esophageal varices (HCC)     GERD (gastroesophageal reflux disease)     Hemodialysis access, AV graft ([facility] [address] 11/22/2016    Hepatic encephalopathy ([facility] [address] 10/2016    recently hospitalized for hepatic encephalopathy    Hepatitis C     hx of hepatitis C-- dx after first liver transplant from a transfusion   (first transplant age 13)   Gilmer Nelson History of gastric ulcer     Insomnia 07/13/2015    no current meds    Liver transplant status ([facility] [address] 1986 and 1999    X2- congential hepatic fibrosis    Pain in joint, ankle and foot     PICC (peripherally inserted central catheter) in place     PONV (postoperative nausea and vomiting)     some N/V, pt states she does well with Phenergan    Port-a-cath in place     R chest for HD    Raynaud disease     Spleen enlarged     Tachycardia     Thrombocytopenia (HCC)     Type 2 diabetes mellitus (HCC)     insulin only/AVG /s.s of hypoglycemia 30's/Last A1c 5.6    Vasculitis (HCC)     resolved       Past Surgical History:   Procedure Laterality Date    HX CHOLECYSTECTOMY  1984    HX COLONOSCOPY      HX OTHER SURGICAL      biliary reconstructive surgery    HX OTHER SURGICAL  2013    EGD    HX OTHER SURGICAL  03/2016    tips procedure    HX OTHER SURGICAL      paracentesis    HX TRANSPLANT  8847,5290    2 liver - first one for congenital hepatic fibrosis, 2nd for Hep C cirrhosis    HX TUBAL LIGATION      LAP,TUBAL CAUTERY      VASCULAR SURGERY PROCEDURE UNLIST Left 11/02/2016    thigh AVG insertion in thigh         Family History:   Problem Relation Age of Onset    Diabetes Mother     Heart Disease Mother     Hypertension Mother     Heart Disease Father     Stroke Father     Cancer Maternal Grandfather      liver cancer, alcoholism    No Known Problems Sister     Cancer Maternal Aunt      cervical cancer    Breast Cancer Paternal Grandmother      early 45s    Colon Cancer Paternal Grandmother      late 76s    Cancer Other      maternal niece- cervical cancer    Bipolar Disorder Brother     Heart Disease Brother        Social History Social History    Marital status:      Spouse name: N/A    Number of children: N/A    Years of education: N/A     Occupational History    Not on file. Social History Main Topics    Smoking status: Never Smoker    Smokeless tobacco: Never Used    Alcohol use No    Drug use: No    Sexual activity: Yes     Partners: Male     Birth control/ protection: Surgical      Comment: Tubal Ligation     Other Topics Concern    Not on file     Social History Narrative         ALLERGIES: Aspirin; Codeine; and Compazine [prochlorperazine edisylate]    Review of Systems   Constitutional: Negative. Negative for activity change, diaphoresis and malaise/fatigue. HENT: Negative. Eyes: Negative. Respiratory: Negative. Negative for cough and shortness of breath. Cardiovascular: Positive for chest pain. Gastrointestinal: Negative. Negative for abdominal pain, nausea and vomiting. Genitourinary: Negative. Musculoskeletal: Negative. Skin: Negative. Neurological: Negative. Psychiatric/Behavioral: Negative. All other systems reviewed and are negative. Vitals:    10/14/17 0004   BP: 103/71   Pulse: 100   Resp: 22   Temp: 97.4 °F (36.3 °C)   Weight: 45.4 kg (100 lb)   Height: 5' 1\" (1.549 m)            Physical Exam   Constitutional: She is oriented to person, place, and time. She appears well-developed and well-nourished. She appears listless. She appears cachectic. Non-toxic appearance. She has a sickly appearance. She appears ill. No distress. HENT:   Head: Normocephalic and atraumatic. Right Ear: External ear normal.   Left Ear: External ear normal.   Nose: Nose normal.   Mouth/Throat: Oropharynx is clear and moist. No oropharyngeal exudate. Eyes: Conjunctivae and EOM are normal. Pupils are equal, round, and reactive to light. Right eye exhibits no discharge. Left eye exhibits no discharge. No scleral icterus. Neck: Normal range of motion. Neck supple. No JVD present.  No tracheal deviation present. Cardiovascular: Normal rate, regular rhythm and intact distal pulses. Pulmonary/Chest: Effort normal and breath sounds normal. No stridor. No respiratory distress. She has no wheezes. She exhibits no tenderness. Abdominal: Soft. Bowel sounds are normal. She exhibits no distension and no mass. There is no tenderness. Musculoskeletal: Normal range of motion. She exhibits no edema or tenderness. Neurological: She is oriented to person, place, and time. She appears listless. No cranial nerve deficit. Skin: Skin is warm and dry. No rash noted. She is not diaphoretic. No erythema. There is pallor. Psychiatric: She has a normal mood and affect. Her behavior is normal. Thought content normal.   Nursing note and vitals reviewed. MDM  Number of Diagnoses or Management Options  Diagnosis management comments: Discussed with nephrology they will dialyze her tomorrow to request hospitalist to admit further observation.        Amount and/or Complexity of Data Reviewed  Clinical lab tests: ordered and reviewed  Tests in the radiology section of CPT®: ordered and reviewed  Tests in the medicine section of CPT®: ordered and reviewed  Independent visualization of images, tracings, or specimens: yes    Risk of Complications, Morbidity, and/or Mortality  Presenting problems: high  Diagnostic procedures: high  Management options: high      ED Course       Procedures

## 2017-10-14 NOTE — DIALYSIS
HD treatment completed without complication. No fluid removed, patient SBP 80s and low 90s during tx. 2 units of PRBCs given. VS stable, NAD. Needles removed and pressure dressing applied to bilateral site. Transport contacted for return to room.

## 2017-10-14 NOTE — DIALYSIS
HD treatment initiated via left upper thigh AVG without difficulty. Cannulated by Unruly Nayak RN using 15 gauge needles. VS stable, will continue to monitor during tx. No Heparin. Machine tests passed and alarms intact.

## 2017-10-14 NOTE — PROGRESS NOTES
Reviewed notes of patient for spiritual concerns.  has visited with patient on previous admissions.       Kaushik Almonte,  Staff   C: [phone] /  [email or web address]

## 2017-10-14 NOTE — H&P
HOSPITALIST H&P/CONSULT  NAME:  Robyn Ho   Age:  55 y.o.  :   1970   MRN:   [id number]  PCP: Tadeo Childs MD  Consulting MD:  Treatment Team: Attending Provider: Gian Benjamin MD; Primary Nurse: Clyde Benjamin, RN; Primary Nurse: Amina Peterson RN  HPI:   Patient is a 55 yof with a hx of end stage liver disease s/p 2 failed liver transplants, dm, esophageal varices, esrd who presents with persistent nausea and vomiting over several weeks. Mother at bedside states pt has been unable to keep anything down. Denies fevers, chills, sob. Pt states she has pain all over her body. Pt also missed last HD session. Family states pt has not been able to participate in rehab and was discharged.       Complete ROS done and is as stated in HPI or otherwise negative  Past Medical History:   Diagnosis Date    SEAN (acute kidney injury) ([facility] [address] 2016    Arthritis     kath feet    Ascites     Benign neoplasm of skin of trunk, except scrotum     pt denies    Cellulitis and abscess of unspecified digit     hx of    Chronic kidney disease     [facility] on Mon/Wed/Fri    Chronic pain     abd area    Congenital hepatic fibrosis     Liver transplant X2    Esophageal varices (HCC)     GERD (gastroesophageal reflux disease)     Hemodialysis access, AV graft ([address] 2016    Hepatic encephalopathy ([address] 10/2016    recently hospitalized for hepatic encephalopathy    Hepatitis C     hx of hepatitis C-- dx after first liver transplant from a transfusion   (first transplant age 13)    History of gastric ulcer     Insomnia 2015    no current meds    Liver transplant status ([address]  and 1999    X2- congential hepatic fibrosis    Pain in joint, ankle and foot     PICC (peripherally inserted central catheter) in place     PONV (postoperative nausea and vomiting)     some N/V, pt states she does well with Phenergan    Port-a-cath in place     R chest for HD    Raynaud disease     Spleen enlarged     Tachycardia     Thrombocytopenia (HCC)     Type 2 diabetes mellitus (HCC)     insulin only/AVG /s.s of hypoglycemia 30's/Last A1c 5.6    Vasculitis ([facility] [address]     resolved      Past Surgical History:   Procedure Laterality Date    HX CHOLECYSTECTOMY  1984    HX COLONOSCOPY      HX OTHER SURGICAL      biliary reconstructive surgery    HX OTHER SURGICAL  2013    EGD    HX OTHER SURGICAL  03/2016    tips procedure    HX OTHER SURGICAL      paracentesis    HX TRANSPLANT  [id number]    2 liver - first one for congenital hepatic fibrosis, 2nd for Hep C cirrhosis    HX TUBAL LIGATION      LAP,TUBAL CAUTERY      VASCULAR SURGERY PROCEDURE UNLIST Left 11/02/2016    thigh AVG insertion in thigh      Prior to Admission Medications   Prescriptions Last Dose Informant Patient Reported? Taking? cycloSPORINE modified (GENGRAF) 25 mg capsule   Yes No   Sig: Take 2.5 mg/kg/day by mouth every morning. Indications: Takes in the AM   insulin aspart (NOVOLOG) 100 unit/mL injection   No No   Sig: Sliding scale maximum 15 units each meal   lactulose (CHRONULAC) 10 gram/15 mL solution   No No   Sig: Take 30 mL by mouth three (3) times daily. Patient taking differently: Take  by mouth two (2) times a day. 20 ML BID   metoclopramide HCl (REGLAN) 10 mg tablet   Yes No   Sig: Take 10 mg by mouth Before breakfast, lunch, dinner and at bedtime. midodrine (PROAMITINE) 5 mg tablet   Yes No   Sig: Take  by mouth every eight (8) hours. morphine CR (MS CONTIN) 15 mg CR tablet   No No   Sig: Take 1 Tab by mouth every twelve (12) hours. Max Daily Amount: 30 mg.   mupirocin (BACTROBAN) 2 % ointment   No No   Sig: Apply  to affected area daily. pantoprazole (PROTONIX) 40 mg tablet   Yes No   Sig: Take 40 mg by mouth.   promethazine (PHENERGAN) 25 mg tablet   Yes No   Sig: Take 25 mg by mouth every six (6) hours as needed for Nausea.    rifAXIMin (XIFAXAN) 550 mg tablet   Yes No   Sig: Reported on 2017 - BID      Facility-Administered Medications: None     Allergies   Allergen Reactions    Aspirin Nausea Only    Codeine Nausea Only    Compazine [Prochlorperazine Edisylate] Unknown (comments)     Causes Lock Jaw      Social History   Substance Use Topics    Smoking status: Never Smoker    Smokeless tobacco: Never Used    Alcohol use No      Family History   Problem Relation Age of Onset    Diabetes Mother     Heart Disease Mother     Hypertension Mother     Heart Disease Father     Stroke Father     Cancer Maternal Grandfather      liver cancer, alcoholism    No Known Problems Sister     Cancer Maternal Aunt      cervical cancer    Breast Cancer Paternal Grandmother      early 45s    Colon Cancer Paternal Grandmother      late 76s    Cancer Other      maternal niece- cervical cancer    Bipolar Disorder Brother     Heart Disease Brother       Objective:     Visit Vitals    BP 95/58    Pulse 98    Temp 97.4 °F (36.3 °C)    Resp 17    Ht 5' 1\" (1.549 m)    Wt 45.4 kg (100 lb)    LMP 2016    SpO2 100%    BMI 18.89 kg/m2      Temp (24hrs), Av.4 °F (36.3 °C), Min:97.4 °F (36.3 °C), Max:97.4 °F (36.3 °C)    Oxygen Therapy  O2 Sat (%): 100 % (10/14/17 0228)  Pulse via Oximetry: 98 beats per minute (10/14/17 0228)  O2 Device: Room air (10/14/17 0004)  Physical Exam:  General:    Lethargic. Frail, cachetic   Head:   Normocephalic, without obvious abnormality, atraumatic. Nose:  Nares normal. No drainage or sinus tenderness. Lungs:   Clear to auscultation bilaterally. No Wheezing or Rhonchi. No rales. Heart:   Regular rate and rhythm,  no murmur, rub or gallop. Abdomen:   Soft, non-tender. Not distended. Bowel sounds normal.   Extremities: anasarca  Skin:     Texture, turgor dry. No rashes or lesions.   Not Jaundiced  Neurologic: lethargic  Data Review:   Recent Results (from the past 24 hour(s))   METABOLIC PANEL, COMPREHENSIVE    Collection Time: 10/14/17 12:07 AM   Result Value Ref Range    Sodium 145 136 - 145 mmol/L    Potassium 3.7 3.5 - 5.1 mmol/L    Chloride 109 (H) 98 - 107 mmol/L    CO2 17 (L) 21 - 32 mmol/L    Anion gap 19 (H) 7 - 16 mmol/L    Glucose 132 (H) 65 - 100 mg/dL    BUN 26 (H) 6 - 23 MG/DL    Creatinine 4.28 (H) 0.6 - 1.0 MG/DL    GFR est AA 14 (L) >60 ml/min/1.73m2    GFR est non-AA 12 (L) >60 ml/min/1.73m2    Calcium 6.8 (L) 8.3 - 10.4 MG/DL    Bilirubin, total 0.9 0.2 - 1.1 MG/DL    ALT (SGPT) 28 12 - 65 U/L    AST (SGOT) 21 15 - 37 U/L    Alk.  phosphatase 292 (H) 50 - 136 U/L    Protein, total 5.9 (L) 6.3 - 8.2 g/dL    Albumin 2.0 (L) 3.5 - 5.0 g/dL    Globulin 3.9 (H) 2.3 - 3.5 g/dL    A-G Ratio 0.5 (L) 1.2 - 3.5     AMMONIA    Collection Time: 10/14/17 12:07 AM   Result Value Ref Range    Ammonia 51 (H) 11 - 32 UMOL/L   MAGNESIUM    Collection Time: 10/14/17 12:49 AM   Result Value Ref Range    Magnesium 1.6 (L) 1.8 - 2.4 mg/dL   PTT    Collection Time: 10/14/17 12:49 AM   Result Value Ref Range    aPTT 42.9 (H) 23.5 - 31.7 SEC   PROTHROMBIN TIME + INR    Collection Time: 10/14/17 12:49 AM   Result Value Ref Range    Prothrombin time 16.1 (H) 9.6 - 12.0 sec    INR 1.5 (H) 0.9 - 1.2     BNP    Collection Time: 10/14/17 12:49 AM   Result Value Ref Range     pg/mL   D DIMER    Collection Time: 10/14/17 12:49 AM   Result Value Ref Range    D DIMER 4.09 (HH) <0.55 ug/ml(FEU)   POC TROPONIN-I    Collection Time: 10/14/17 12:54 AM   Result Value Ref Range    Troponin-I (POC) 0 (L) 0.02 - 0.05 ng/ml   CBC WITH AUTOMATED DIFF    Collection Time: 10/14/17  1:49 AM   Result Value Ref Range    WBC 6.5 4.3 - 11.1 K/uL    RBC 2.08 (L) 4.05 - 5.25 M/uL    HGB 7.3 (L) 11.7 - 15.4 g/dL    HCT 22.6 (L) 35.8 - 46.3 %    .7 (H) 79.6 - 97.8 FL    MCH 35.1 (H) 26.1 - 32.9 PG    MCHC 32.3 31.4 - 35.0 g/dL    RDW 21.3 (H) 11.9 - 14.6 %    PLATELET 58 (L) 722 - 450 K/uL    MPV 9.0 (L) 10.8 - 14.1 FL    DF AUTOMATED      NEUTROPHILS 77 43 - 78 % LYMPHOCYTES 13 13 - 44 %    MONOCYTES 7 4.0 - 12.0 %    EOSINOPHILS 2 0.5 - 7.8 %    BASOPHILS 1 0.0 - 2.0 %    IMMATURE GRANULOCYTES 0.2 0.0 - 5.0 %    ABS. NEUTROPHILS 5.0 1.7 - 8.2 K/UL    ABS. LYMPHOCYTES 0.9 0.5 - 4.6 K/UL    ABS. MONOCYTES 0.5 0.1 - 1.3 K/UL    ABS. EOSINOPHILS 0.1 0.0 - 0.8 K/UL    ABS. BASOPHILS 0.0 0.0 - 0.2 K/UL    ABS. IMM. GRANS. 0.0 0.0 - 0.5 K/UL     Imaging /Procedures /Studies     Assessment and Plan: Active Hospital Problems    Diagnosis Date Noted    Vomiting 10/14/2017    Underweight 09/15/2017    Thrombocytopenia ([facility] [address] 09/14/2017    End stage liver disease ([facility] [address] 09/13/2017    ESRD (end stage renal disease) ([facility] [address] 11/02/2016    Type 2 diabetes mellitus with hyperglycemia ([facility] [address] 07/04/2016       PLAN  ·  admit to inpatient  · NS @ 50 cc/hr  · GI to eval for persistent nausea and vomiting  · Nephrology for esrd  · Pain management   · Phenergan prn nausea  · Discussed palliative care and hospice options. Family states pt was not ready to give up. Moustapha with no plans for transplant at this time.   Poor overall prognosis however currently full code    Code Status: full      Signed By: Vicky Zhao MD     October 14, 2017

## 2017-10-14 NOTE — CONSULTS
[facility] Consult Note       Primary GI Physician: Dr. Lou Santillan    Referring Physician:  Dr. Torres Show Date:  10/14/2017    Admit Date:  10/14/2017    Chief Complaint:  Nausea, inability to keep PO intake down    Subjective:     History of Present Illness:      Patient is a 55 y.o. female with PMH of (but not limited to) congenital fibrosis of the liver and hepatitis C (tx at [facility] 7/2014) s/p 2 liver transplants (most recently orthotopic liver transplant in 1999), CKD on diaylsis, DM type II, Esophageal varices, hepatic encephalopathy, thrombocytopenia, umbilical hernia, GERD, ascites with failed TIPS , weight loss, hx of leukocytoclastic vasculitis, cyroglobulinemia, Raynaud's disease, portal vein thrombosis, and retroperitoneal mass who is seen in consultation at the request of Dr. Perlita Calero for nausea. Mrs. Khushbu Griffin presented on 10/14/17 with complaints of nausea and vomiting for several weeks with inability to keep anything down. Labs on admission with findings of HGB 7.3 which is down from 9.4 on 10/3/17. MCV is 108.7, PLT 58, creatinine 4.28, TB 0.9, AST 21, ALT 28, . Mrs. Khushbu Griffin is a well known patient to Dr. Ash Patel who last saw him in the office on 7/11/17 for her chronic liver disease. She has since been evaluated at [facility] [place] on 8/30/17 and is being considered for a 3rd liver transplant. She undergoes frequent paracentesis every week. Her last paracentesis was 10/11/2017 with 4600ml of fluid removed. She has been on Xifaxan and lactulose at home. Mrs. Khushbu Griffin had a CXR on admission with findings of an air-filled dilated esophagus, stricture/obstruction at the GE junction possible. Her last EGD was 5/16/2017 with Dr. Carson Avina findings of retained gastric contents, portal HTN gastropathy. EGD on 7/27/16 by Dr. Princess Lehman with esophageal varices and bands x 2. Mrs. Khushbu Griffin is seen in dialysis.  She reports that she has had worsening nausea and vomiting over the past several weeks. She reports having a good appetite but after eating starts feeling nauseated and either \"spits the food up\" or vomits. Denies any hematemesis/coffee ground emesis. Has not been on Reglan at home. She has been on PPI at home. She reports feelings of food getting lodged in the substernal region of the esophagus. She reports having BM 2-3 times daily which are loose. Denies any melena/hematochezia. Colonoscopy 3/11/99 by Dr. Angi Bravo revealed minute punctate sporadic ulcers without pseudomembranous     She was admitted in September with hepatic encephalopathy. Denies any recent episodes of confusion since that hospitalization. CXR 10/14/2017  IMPRESSION  IMPRESSION:     1. Air-filled dilated esophagus. Stricture or obstruction at the GE junction  possible. Follow-up is suggested.   2. Mild left basilar density, likely atelectasis or developing consolidation.     PMH:  Past Medical History:   Diagnosis Date    SEAN (acute kidney injury) ([facility] [address] 6/26/2016    Arthritis     kath feet    Ascites     Benign neoplasm of skin of trunk, except scrotum     pt denies    Cellulitis and abscess of unspecified digit     hx of    Chronic kidney disease     [facility] on Mon/Wed/Fri    Chronic pain     abd area    Congenital hepatic fibrosis     Liver transplant X2    Esophageal varices (HCC)     GERD (gastroesophageal reflux disease)     Hemodialysis access, AV graft ([facility] [address] 11/22/2016    Hepatic encephalopathy ([facility] [address] 10/2016    recently hospitalized for hepatic encephalopathy    Hepatitis C     hx of hepatitis C-- dx after first liver transplant from a transfusion   (first transplant age 13)   [place] History of gastric ulcer     Insomnia 07/13/2015    no current meds    Liver transplant status ([address] 1986 and 1999    X2- congential hepatic fibrosis    Pain in joint, ankle and foot     PICC (peripherally inserted central catheter) in place     PONV (postoperative nausea and vomiting)     some N/V, pt states she does well with Phenergan    Port-a-cath in place     R chest for HD    Raynaud disease     Spleen enlarged     Tachycardia     Thrombocytopenia (HCC)     Type 2 diabetes mellitus (HCC)     insulin only/AVG /s.s of hypoglycemia 30's/Last A1c 5.6    Vasculitis ([facility] [address]     resolved       PSH:  Past Surgical History:   Procedure Laterality Date    HX CHOLECYSTECTOMY  1984    HX COLONOSCOPY      HX OTHER SURGICAL      biliary reconstructive surgery    HX OTHER SURGICAL  2013    EGD    HX OTHER SURGICAL  03/2016    tips procedure    HX OTHER SURGICAL      paracentesis    HX TRANSPLANT  [id number]    2 liver - first one for congenital hepatic fibrosis, 2nd for Hep C cirrhosis    HX TUBAL LIGATION      LAP,TUBAL CAUTERY      VASCULAR SURGERY PROCEDURE UNLIST Left 11/02/2016    thigh AVG insertion in thigh       Allergies: Allergies   Allergen Reactions    Aspirin Nausea Only    Codeine Nausea Only    Compazine [Prochlorperazine Edisylate] Unknown (comments)     Causes Lock Jaw       Home Medications:  Prior to Admission medications    Medication Sig Start Date End Date Taking? Authorizing Provider   morphine CR (MS CONTIN) 15 mg CR tablet Take 1 Tab by mouth every twelve (12) hours. Max Daily Amount: 30 mg. 9/21/17   Cindy Moreira MD   rifAXIMin Lorraine Chacon) 550 mg tablet Reported on 5/25/2017 - BID 3/22/16   Historical Provider   pantoprazole (PROTONIX) 40 mg tablet Take 40 mg by mouth. Historical Provider   midodrine (PROAMITINE) 5 mg tablet Take  by mouth every eight (8) hours. Historical Provider   mupirocin (BACTROBAN) 2 % ointment Apply  to affected area daily. 8/9/17   Bebe Ford MD   metoclopramide HCl (REGLAN) 10 mg tablet Take 10 mg by mouth Before breakfast, lunch, dinner and at bedtime. Historical Provider   promethazine (PHENERGAN) 25 mg tablet Take 25 mg by mouth every six (6) hours as needed for Nausea.     Historical Provider   insulin aspart (NOVOLOG) 100 unit/mL injection Sliding scale maximum 15 units each meal 3/24/17   Loretta Miranda MD   lactulose (CHRONULAC) 10 gram/15 mL solution Take 30 mL by mouth three (3) times daily. Patient taking differently: Take  by mouth two (2) times a day. 20 ML BID 2/22/17   Smion Greenberg DO   cycloSPORINE modified (GENGRAF) 25 mg capsule Take 2.5 mg/kg/day by mouth every morning.  Indications: Takes in the AM    Historical Provider       Hospital Medications:  Current Facility-Administered Medications   Medication Dose Route Frequency    cycloSPORINE modified (GENGRAF, NEORAL) capsule 125 mg  2.5 mg/kg/day Oral 7am    lactulose (CHRONULAC) solution 10 g  10 g Oral BID    metoclopramide HCl (REGLAN) tablet 10 mg  10 mg Oral AC&HS    midodrine (PROAMITINE) tablet 5 mg  5 mg Oral Q8H    morphine CR (MS CONTIN) tablet 15 mg  15 mg Oral Q12H    pantoprazole (PROTONIX) tablet 40 mg  40 mg Oral ACB    rifAXIMin (XIFAXAN) tablet 550 mg  550 mg Oral BID    sodium chloride (NS) flush 5-10 mL  5-10 mL IntraVENous Q8H    sodium chloride (NS) flush 5-10 mL  5-10 mL IntraVENous PRN    acetaminophen (TYLENOL) tablet 650 mg  650 mg Oral Q4H PRN    promethazine (PHENERGAN) with saline injection 12.5 mg  12.5 mg IntraVENous Q6H PRN    heparin (porcine) injection 5,000 Units  5,000 Units SubCUTAneous Q12H    insulin lispro (HUMALOG) injection   SubCUTAneous AC&HS    benzocaine-menthol (CEPACOL) lozenge  1 Lozenge Oral Q2H PRN    phenol throat spray (CHLORASEPTIC) 1 Spray  1 Spray Oral PRN    0.9% sodium chloride infusion 250 mL  250 mL IntraVENous PRN    epoetin ping (EPOGEN;PROCRIT) injection 20,000 Units  20,000 Units SubCUTAneous Q7D     Facility-Administered Medications Ordered in Other Encounters   Medication Dose Route Frequency    heparin (porcine) pf 300 Units  300 Units InterCATHeter PRN       Social History:  Social History   Substance Use Topics    Smoking status: Never Smoker    Smokeless tobacco: Never Used    Alcohol use No         Family History:  Family History   Problem Relation Age of Onset    Diabetes Mother     Heart Disease Mother     Hypertension Mother     Heart Disease Father     Stroke Father     Cancer Maternal Grandfather      liver cancer, alcoholism    No Known Problems Sister     Cancer Maternal Aunt      cervical cancer    Breast Cancer Paternal Grandmother      early 45s    Colon Cancer Paternal Grandmother      late 76s    Cancer Other      maternal niece- cervical cancer    Bipolar Disorder Brother     Heart Disease Brother        Review of Systems:  A detailed 10 system ROS is obtained, with pertinent positives as listed above. All others are negative. Diet:  Diabetic regular diet    Objective:     Physical Exam:  Vitals:  Visit Vitals    BP (!) 86/58    Pulse 96    Temp 97.7 °F (36.5 °C)    Resp 18    Ht 5' 1\" (1.549 m)    Wt 46.4 kg (102 lb 3.2 oz)    LMP 01/02/2016    SpO2 96%    BMI 19.31 kg/m2     Gen:  Pt is alert, in dialysis, frail, cachectic. Skin:  Extremities and face reveal no rashes. HEENT: Sclerae anicteric. Extra-occular muscles are intact. No oral ulcers. No abnormal pigmentation of the lips. The neck is supple. Cardiovascular: Regular rate and rhythm. No murmurs, gallops, or rubs. Respiratory:  Comfortable breathing with no accessory muscle use. Clear breath sounds anteriorly with no wheezes, rales, or rhonchi. GI:  Abdomen nondistended, soft, and nontender. Large abdominal hernia, Normal active bowel sounds. No enlargement of the liver or spleen. No masses palpable. Rectal:  Deferred  Musculoskeletal:  No pitting edema of the lower legs. Neurological:  Gross memory appears intact. Patient is alert and oriented. No asterixis. Psychiatric:  Mood appears appropriate with judgement intact. Lymphatic:  No cervical or supraclavicular adenopathy.     Laboratory:    Recent Labs      10/14/17   0149 10/14/17   0049  10/14/17   0007   WBC  6.5   --    --    HGB  7.3*   --    --    HCT  22.6*   --    --    PLT  58*   --    --    MCV  108.7*   --    --    NA   --    --   145   K   --    --   3.7   CL   --    --   109*   CO2   --    --   17*   BUN   --    --   26*   CREA   --    --   4.28*   CA   --    --   6.8*   MG   --   1.6*   --    GLU   --    --   132*   AP   --    --   292*   SGOT   --    --   21   ALT   --    --   28   TBILI   --    --   0.9   ALB   --    --   2.0*   TP   --    --   5.9*   PTP   --   16.1*   --    INR   --   1.5*   --    APTT   --   42.9*   --     EGD 5/16/2017  Findings:      Esophagus appeared normal, with refluxing of retained gastric contents seen in the distal esophagus. A moderate hiatus hernia was present. Retained gastric contents in the fundus and body, could not be suctioned. Mild portal hypertensive gastropathy present. The duodenum appeared normal.         Specimens: none     Estimated Blood Loss: None      Complications:   None; patient tolerated the procedure well.      Attending Attestation:  I performed the procedure.      Impression:    1. Retained gastric contents, likely delayed gastric emptying in the setting of infection. No gastric outlet obstruction or large varices      Recommendations:  1. Follow up with inpatient team   2.  Continue PPI and antiemetics     Assessment:     Active Problems:    Type 2 diabetes mellitus with hyperglycemia ([facility] [address] (7/4/2016)      Thrombocytopenia ([facility] [address] (9/14/2017)      ESRD (end stage renal disease) ([facility] [address] (11/2/2016)      End stage liver disease ([facility] [address] (9/13/2017)      Underweight (9/15/2017)      Vomiting (10/14/2017)        Patient is a 55 y.o. female with PMH of (but not limited to) congenital fibrosis of the liver and hepatitis C (tx at [facility] 7/2014) s/p 2 liver transplants (most recently orthotopic liver transplant in 1999), CKD on diaylsis, DM type II, Esophageal varices, hepatic encephalopathy, thrombocytopenia, umbilical hernia, GERD, ascites with failed TIPS, weight loss, hx of leukocytoclastic vasculitis, cyroglobulinemia, Raynaud's disease, portal vein thrombosis, and retroperitoneal mass who is seen in consultation at the request of Dr. Michael Werner for nausea, inability to keep PO down. MELD= 24  Mrs. Currie Adjutant had a CXR on admission with findings of an air-filled dilated esophagus, stricture/obstruction at the GE junction possible. Does have complaints of new onset dysphagia in the past month. HGB is 7.3 compared to 9.4 in Oct without any melena/hematochezia. Plan:     1. Change diet to full liquid today given findings on CXR  2. EGD to further evaluate findings on CXR. Will plan for this on either Sunday or Monday. Dr. Pat Alicea will follow with further recommendations. Thank you for this kind consultation. We will follow along with you. Price De NP    Patient is seen and examined in collaboration with Dr. Pat Alicea. Assessment and plan as per Dr. Pat Alicea.

## 2017-10-14 NOTE — ED NOTES
TRANSFER - OUT REPORT:    Verbal report given to Karli Bingham (name) on Augustine Parent  being transferred to [facility](unit) for routine progression of care       Report consisted of patients Situation, Background, Assessment and   Recommendations(SBAR). Information from the following report(s) SBAR, ED Summary and Recent Results was reviewed with the receiving nurse. Lines:   Venous Access Device duel lumen cath 05/15/17 Upper chest (subclavicular area), left (Active)        Opportunity for questions and clarification was provided.

## 2017-10-14 NOTE — ED TRIAGE NOTES
Patient arrives via EMS for chest pain, vomiting. States patient went home from rehab 2 days ago. States increasing weakness, unable to walk at home. Chest pain started today, states heaviness in chest. Patient did not go to dialysis yesterday.

## 2017-10-14 NOTE — PROGRESS NOTES
Primary Nurse Tegan Jaffe RN and Kennedy RN performed a dual skin assessment on this patient Impairment noted pt has small sacral wound with blanchable redness around wound. Pt has small healing wound on left heel. Pt has large scars on chest and abdomen and a large abdominal hernia. Pt skin is otherwise clean dry and intact.    Trerance score is 16

## 2017-10-14 NOTE — PROGRESS NOTES
[facility] Consult    Subjective:     Samia Crews is a 55 y.o.  female who is being seen for ESRD. This is  An unfortunate lady s/p failed liver transplant admitted with N/V, cachexia possibly secondary to esophageal stricture. She has been on dialysis for just over a year. Her dialysis has been complicated by chronic hypotension and UF has been limited particularly recently. She has continued to lose weight. Dialysis is via thigh graft. Denies fevers or diarrhea. Vomiting has been near continuous.       Past Medical History:   Diagnosis Date    SEAN (acute kidney injury) ([address] 6/26/2016    Arthritis     kath feet    Ascites     Benign neoplasm of skin of trunk, except scrotum     pt denies    Cellulitis and abscess of unspecified digit     hx of    Chronic kidney disease     [facility] on Mon/Wed/Fri    Chronic pain     abd area    Congenital hepatic fibrosis     Liver transplant X2    Esophageal varices (HCC)     GERD (gastroesophageal reflux disease)     Hemodialysis access, AV graft ([address] 11/22/2016    Hepatic encephalopathy ([address] 10/2016    recently hospitalized for hepatic encephalopathy    Hepatitis C     hx of hepatitis C-- dx after first liver transplant from a transfusion   (first transplant age 13)   Melyssa Dickens History of gastric ulcer     Insomnia 07/13/2015    no current meds    Liver transplant status ([address] 1986 and 1999    X2- congential hepatic fibrosis    Pain in joint, ankle and foot     PICC (peripherally inserted central catheter) in place     PONV (postoperative nausea and vomiting)     some N/V, pt states she does well with Phenergan    Port-a-cath in place     R chest for HD    Raynaud disease     Spleen enlarged     Tachycardia     Thrombocytopenia (HCC)     Type 2 diabetes mellitus (HCC)     insulin only/AVG /s.s of hypoglycemia 30's/Last A1c 5.6    Vasculitis ([address]     resolved      Past Surgical History:   Procedure Laterality Date  HX CHOLECYSTECTOMY  1984    HX COLONOSCOPY      HX OTHER SURGICAL      biliary reconstructive surgery    HX OTHER SURGICAL  2013    EGD    HX OTHER SURGICAL  03/2016    tips procedure    HX OTHER SURGICAL      paracentesis    HX TRANSPLANT  [id number]    2 liver - first one for congenital hepatic fibrosis, 2nd for Hep C cirrhosis    HX TUBAL LIGATION      LAP,TUBAL CAUTERY      VASCULAR SURGERY PROCEDURE UNLIST Left 11/02/2016    thigh AVG insertion in thigh     Family History   Problem Relation Age of Onset    Diabetes Mother     Heart Disease Mother     Hypertension Mother     Heart Disease Father     Stroke Father     Cancer Maternal Grandfather      liver cancer, alcoholism    No Known Problems Sister     Cancer Maternal Aunt      cervical cancer    Breast Cancer Paternal Grandmother      early 45s    Colon Cancer Paternal Grandmother      late 76s    Cancer Other      maternal niece- cervical cancer    Bipolar Disorder Brother     Heart Disease Brother       Social History   Substance Use Topics    Smoking status: Never Smoker    Smokeless tobacco: Never Used    Alcohol use No       Current Facility-Administered Medications   Medication Dose Route Frequency Provider Last Rate Last Dose    cycloSPORINE modified (GENGRAF, NEORAL) capsule 125 mg  2.5 mg/kg/day Oral 7am Doyle Chen MD   125 mg at 10/14/17 0602    lactulose (CHRONULAC) solution 10 g  10 g Oral BID Doyle Chen MD   10 g at 10/14/17 0852    metoclopramide HCl (REGLAN) tablet 10 mg  10 mg Oral AC&HS Doyle Chen MD   10 mg at 10/14/17 0602    midodrine (PROAMITINE) tablet 5 mg  5 mg Oral Q8H Doyle Chen MD   5 mg at 10/14/17 0602    morphine CR (MS CONTIN) tablet 15 mg  15 mg Oral Q12H Doyle Chen MD        pantoprazole (PROTONIX) tablet 40 mg  40 mg Oral ACB Doyle Chen MD   40 mg at 10/14/17 0602    rifAXIMin (XIFAXAN) tablet 550 mg  550 mg Oral BID Aubrey Mosley Edie Jenkins MD   550 mg at 10/14/17 0851    sodium chloride (NS) flush 5-10 mL  5-10 mL IntraVENous Q8H Luisa Ellison MD   10 mL at 10/14/17 0603    sodium chloride (NS) flush 5-10 mL  5-10 mL IntraVENous PRN Luisa Ellison MD        acetaminophen (TYLENOL) tablet 650 mg  650 mg Oral Q4H PRN Luisa Ellison MD        [facility]) with saline injection 12.5 mg  12.5 mg IntraVENous Q6H PRN Luisa Ellison MD        heparin (porcine) injection 5,000 Units  5,000 Units SubCUTAneous Q12H Luisa Ellison MD        insulin lispro (HUMALOG) injection   SubCUTAneous AC&HS Luisa Ellison MD   Stopped at 10/14/17 0600    benzocaine-menthol (CEPACOL) lozenge  1 Lozenge Oral Q2H PRN Dagmar Olson MD        phenol throat spray (CHLORASEPTIC) 1 Spray  1 [place] Oral PRN Dagmar Olson MD        0.9% sodium chloride infusion 250 mL  250 mL IntraVENous PRN Ash Kumari MD        epoetin ping (EPOGEN;PROCRIT) injection 20,000 Units  20,000 Units SubCUTAneous Q7D Ash Kumari MD         Facility-Administered Medications Ordered in Other Encounters   Medication Dose Route Frequency Provider Last Rate Last Dose    heparin (porcine) pf 300 Units  300 Units InterCATHeter PRN Shahbaz Baer MD   300 Units at 10/11/17 1644        Allergies   Allergen Reactions    Aspirin Nausea Only    Codeine Nausea Only    Compazine [Prochlorperazine Edisylate] Unknown (comments)     Causes Lock Jaw        Review of Systems:  Pertinent items are noted in the History of Present Illness. Objective:      Intake and Output:            Physical Exam:   General appearance: alert, cooperative, no distress, appears older than stated age, cachectic  Neck: supple, symmetrical, trachea midline, no adenopathy, thyroid: not enlarged, symmetric, no tenderness/mass/nodules, no carotid bruit and no JVD  Lungs: clear to auscultation bilaterally  Heart: regular rate and rhythm, S1, S2 normal, no murmur, click, rub or gallop  Abdomen: soft, non-tender. Extremities: extremities 1+ edema          Data Review:   Recent Results (from the past 24 hour(s))   METABOLIC PANEL, COMPREHENSIVE    Collection Time: 10/14/17 12:07 AM   Result Value Ref Range    Sodium 145 136 - 145 mmol/L    Potassium 3.7 3.5 - 5.1 mmol/L    Chloride 109 (H) 98 - 107 mmol/L    CO2 17 (L) 21 - 32 mmol/L    Anion gap 19 (H) 7 - 16 mmol/L    Glucose 132 (H) 65 - 100 mg/dL    BUN 26 (H) 6 - 23 MG/DL    Creatinine 4.28 (H) 0.6 - 1.0 MG/DL    GFR est AA 14 (L) >60 ml/min/1.73m2    GFR est non-AA 12 (L) >60 ml/min/1.73m2    Calcium 6.8 (L) 8.3 - 10.4 MG/DL    Bilirubin, total 0.9 0.2 - 1.1 MG/DL    ALT (SGPT) 28 12 - 65 U/L    AST (SGOT) 21 15 - 37 U/L    Alk. phosphatase 292 (H) 50 - 136 U/L    Protein, total 5.9 (L) 6.3 - 8.2 g/dL    Albumin 2.0 (L) 3.5 - 5.0 g/dL    Globulin 3.9 (H) 2.3 - 3.5 g/dL    A-G Ratio 0.5 (L) 1.2 - 3.5     AMMONIA    Collection Time: 10/14/17 12:07 AM   Result Value Ref Range    Ammonia 51 (H) 11 - 32 UMOL/L   EKG, 12 LEAD, INITIAL    Collection Time: 10/14/17 12:11 AM   Result Value Ref Range    Ventricular Rate 94 BPM    Atrial Rate 94 BPM    P-R Interval 140 ms    QRS Duration 78 ms    Q-T Interval 346 ms    QTC Calculation (Bezet) 432 ms    Calculated P Axis 80 degrees    Calculated R Axis 47 degrees    Calculated T Axis 72 degrees    Diagnosis       !! AGE AND GENDER SPECIFIC ECG ANALYSIS !!   Normal sinus rhythm  Low voltage QRS  Nonspecific T wave abnormality  Abnormal ECG  When compared with ECG of 11-SEP-2017 11:55,  No significant change was found     MAGNESIUM    Collection Time: 10/14/17 12:49 AM   Result Value Ref Range    Magnesium 1.6 (L) 1.8 - 2.4 mg/dL   PTT    Collection Time: 10/14/17 12:49 AM   Result Value Ref Range    aPTT 42.9 (H) 23.5 - 31.7 SEC   PROTHROMBIN TIME + INR    Collection Time: 10/14/17 12:49 AM   Result Value Ref Range    Prothrombin time 16.1 (H) 9.6 - 12.0 sec    INR 1.5 (H) 0.9 - 1.2     BNP    Collection Time: 10/14/17 12:49 AM   Result Value Ref Range     pg/mL   D DIMER    Collection Time: 10/14/17 12:49 AM   Result Value Ref Range    D DIMER 4.09 (HH) <0.55 ug/ml(FEU)   POC TROPONIN-I    Collection Time: 10/14/17 12:54 AM   Result Value Ref Range    Troponin-I (POC) 0 (L) 0.02 - 0.05 ng/ml   CBC WITH AUTOMATED DIFF    Collection Time: 10/14/17  1:49 AM   Result Value Ref Range    WBC 6.5 4.3 - 11.1 K/uL    RBC 2.08 (L) 4.05 - 5.25 M/uL    HGB 7.3 (L) 11.7 - 15.4 g/dL    HCT 22.6 (L) 35.8 - 46.3 %    .7 (H) 79.6 - 97.8 FL    MCH 35.1 (H) 26.1 - 32.9 PG    MCHC 32.3 31.4 - 35.0 g/dL    RDW 21.3 (H) 11.9 - 14.6 %    PLATELET 58 (L) 978 - 450 K/uL    MPV 9.0 (L) 10.8 - 14.1 FL    DF AUTOMATED      NEUTROPHILS 77 43 - 78 %    LYMPHOCYTES 13 13 - 44 %    MONOCYTES 7 4.0 - 12.0 %    EOSINOPHILS 2 0.5 - 7.8 %    BASOPHILS 1 0.0 - 2.0 %    IMMATURE GRANULOCYTES 0.2 0.0 - 5.0 %    ABS. NEUTROPHILS 5.0 1.7 - 8.2 K/UL    ABS. LYMPHOCYTES 0.9 0.5 - 4.6 K/UL    ABS. MONOCYTES 0.5 0.1 - 1.3 K/UL    ABS. EOSINOPHILS 0.1 0.0 - 0.8 K/UL    ABS. BASOPHILS 0.0 0.0 - 0.2 K/UL    ABS. IMM. GRANS. 0.0 0.0 - 0.5 K/UL   GLUCOSE, POC    Collection Time: 10/14/17  5:58 AM   Result Value Ref Range    Glucose (POC) 135 (H) 65 - 100 mg/dL           Assessment:     Active Problems:    Type 2 diabetes mellitus with hyperglycemia ([facility] (7/4/2016)      Thrombocytopenia ([facility] (9/14/2017)      ESRD (end stage renal disease) ([facility] (11/2/2016)      End stage liver disease ([facility] (9/13/2017)      Underweight (9/15/2017)      Vomiting (10/14/2017)        Plan: This is an unfortunate lady with a long and complex medical history admitted with N/V possibly secondary to esophageal stricture. She is due for dialysis today and will arrange. Plan to transfuse 2 units of blood on dialysis and continue Procrit. Thank you for the kind courtesy of this consultation.  Will make further adjustments to the medical regimen as the clinical course dictates and follow very closely with you.       Signed By: Jarett Romero MD     October 14, 2017

## 2017-10-14 NOTE — PROGRESS NOTES
TRANSFER - IN REPORT:    Verbal report received from ENO Calderón(name) on Robyn Plain  being received from ER (unit) for routine progression of care      Report consisted of patients Situation, Background, Assessment and   Recommendations(SBAR). Information from the following report(s) SBAR, Kardex, ED Summary and MAR was reviewed with the receiving nurse. Opportunity for questions and clarification was provided. Assessment completed upon patients arrival to unit and care assumed.

## 2017-10-14 NOTE — PROGRESS NOTES
Patient admitted this am after midnight  Pt seen this morning somnolent Complaining of  Sore throat  And feeling tired       NCAT PERRLA Anicteric  LUNGS : CTA  B/L  CVS: S1 S2 RRR    Continue current plan per nocturnist   will add Lozenge  And  Chloraseptic spray  Check  cyclosporine level

## 2017-10-14 NOTE — PROGRESS NOTES
Bedside report  received from NEO Rios. Assessment completed. Bed is in low and locked position with floor free of objects. Patient Alert and oriented but lethargic, and resting quietly in bed. No needs noted at present. Respirations even and non labored. Verbalized understanding to call for needs. Call light within reach. Will continue to monitor pt.

## 2017-10-15 PROBLEM — R13.19 ESOPHAGEAL DYSPHAGIA: Status: ACTIVE | Noted: 2017-01-01

## 2017-10-15 NOTE — PROGRESS NOTES
Pt resting quietly in bed no needs noted at present respirations are even and non labored. Will continue to monitor pt.

## 2017-10-15 NOTE — PROGRESS NOTES
Hospitalist Progress Note    Subjective:   Daily Progress Note: 10/15/2017 10:17 AM    Ms. Brigette Rivera is a 56 yo white female, with a past medical history of ESLD and ESRD, who presented 10/14 for persistent nausea and vomiting and inability to keep po intake down found to be due to esophageal dysphagia due to probable stricture. CXR shows an air filled and dilated esophagus. GI and nephrology following. Is on full liquid diet right now for EGD tomorrow. Denies current sob, chest pain. Still with nausea and vomiting. Current Facility-Administered Medications   Medication Dose Route Frequency    cycloSPORINE modified (GENGRAF, NEORAL) capsule 125 mg  2.5 mg/kg/day Oral 7am    lactulose (CHRONULAC) solution 10 g  10 g Oral BID    metoclopramide HCl (REGLAN) tablet 10 mg  10 mg Oral AC&HS    midodrine (PROAMITINE) tablet 5 mg  5 mg Oral Q8H    morphine CR (MS CONTIN) tablet 15 mg  15 mg Oral Q12H    pantoprazole (PROTONIX) tablet 40 mg  40 mg Oral ACB    rifAXIMin (XIFAXAN) tablet 550 mg  550 mg Oral BID    sodium chloride (NS) flush 5-10 mL  5-10 mL IntraVENous Q8H    sodium chloride (NS) flush 5-10 mL  5-10 mL IntraVENous PRN    acetaminophen (TYLENOL) tablet 650 mg  650 mg Oral Q4H PRN    promethazine (PHENERGAN) with saline injection 12.5 mg  12.5 mg IntraVENous Q6H PRN    heparin (porcine) injection 5,000 Units  5,000 Units SubCUTAneous Q12H    insulin lispro (HUMALOG) injection   SubCUTAneous AC&HS    benzocaine-menthol (CEPACOL) lozenge  1 Lozenge Oral Q2H PRN    phenol throat spray (CHLORASEPTIC) 1 Spray  1 Spray Oral PRN    0.9% sodium chloride infusion 250 mL  250 mL IntraVENous PRN    epoetin ping (EPOGEN;PROCRIT) injection 20,000 Units  20,000 Units SubCUTAneous Q7D        Review of Systems  A comprehensive review of systems was negative except for that written in the HPI.     Objective:     Visit Vitals    BP 97/68 (BP 1 Location: Left arm, BP Patient Position: At rest)    Pulse 97  Temp 98.2 °F (36.8 °C)    Resp 16    Ht 5' 1\" (1.549 m)    Wt 46.3 kg (102 lb)    LMP 2016    SpO2 94%    BMI 19.27 kg/m2      O2 Device: Room air    Temp (24hrs), Av °F (36.7 °C), Min:97.3 °F (36.3 °C), Max:99 °F (37.2 °C)              General; awake, alert, oriented, cachectic and ill appearing, appears older than stated age, temporal wasting evident  Eyes; non icteric, EOMI  Neck; supple  CV: RRR  Pulm; CTAB  Abd; soft, non tender, active bowel sounds    Additional comments:I reviewed the patient's new clinical lab test results. j    Data Review    Recent Results (from the past 24 hour(s))   GLUCOSE, POC    Collection Time: 10/14/17  4:29 PM   Result Value Ref Range    Glucose (POC) 116 (H) 65 - 100 mg/dL   GLUCOSE, POC    Collection Time: 10/14/17  8:35 PM   Result Value Ref Range    Glucose (POC) 193 (H) 65 - 100 mg/dL   GLUCOSE, POC    Collection Time: 10/15/17  5:32 AM   Result Value Ref Range    Glucose (POC) 82 65 - 119 mg/dL   METABOLIC PANEL, BASIC    Collection Time: 10/15/17  6:13 AM   Result Value Ref Range    Sodium 142 136 - 145 mmol/L    Potassium 3.8 3.5 - 5.1 mmol/L    Chloride 104 98 - 107 mmol/L    CO2 30 21 - 32 mmol/L    Anion gap 8 7 - 16 mmol/L    Glucose 73 65 - 100 mg/dL    BUN 18 6 - 23 MG/DL    Creatinine 2.63 (H) 0.6 - 1.0 MG/DL    GFR est AA 25 (L) >60 ml/min/1.73m2    GFR est non-AA 21 (L) >60 ml/min/1.73m2    Calcium 7.1 (L) 8.3 - 10.4 MG/DL   CBC W/O DIFF    Collection Time: 10/15/17  6:13 AM   Result Value Ref Range    WBC 6.1 4.3 - 11.1 K/uL    RBC 3.42 (L) 4.05 - 5.25 M/uL    HGB 11.1 (L) 11.7 - 15.4 g/dL    HCT 33.1 (L) 35.8 - 46.3 %    MCV 96.8 79.6 - 97.8 FL    MCH 32.5 26.1 - 32.9 PG    MCHC 33.5 31.4 - 35.0 g/dL    RDW 22.7 (H) 11.9 - 14.6 %    PLATELET 54 (L) 936 - 450 K/uL    MPV 9.3 (L) 10.8 - 14.1 FL         Assessment/Plan:     Active Problems:    Type 2 diabetes mellitus with hyperglycemia (HCC) (2016)      Thrombocytopenia ([facility] (2017) ESRD (end stage renal disease) ([facility] (11/2/2016)      End stage liver disease ([facility] (9/13/2017)      Underweight (9/15/2017)      Vomiting (10/14/2017)      Esophageal dysphagia (10/15/2017)    full liquid diet until midnight, then NPO for EGD tomorrow for probably esophageal stricture. HD per routine. Hg responded appropriately to transfusion yesterday to 11.1  Patient with overall poor prognosis and is a high risk patient due to severity of her comorbidities.   Hospice recommended but patient desires to remain full code at this time    Care Plan discussed with: Patient/Family      Signed By: Edie Gerard MD     October 15, 2017

## 2017-10-15 NOTE — PROGRESS NOTES
GI DAILY PROGRESS NOTE    Admit Date:  10/14/2017    Today's Date:  10/15/2017    CC:  Nausea, inability to keep PO intake down    Subjective:     10/15: Patient tolerating grits this am. Still with early satiety and heaviness in stomach. History of Present Illness:       Patient is a 55 y.o. female with PMH of (but not limited to) congenital fibrosis of the liver and hepatitis C (tx at [facility] 7/2014) s/p 2 liver transplants (most recently orthotopic liver transplant in 1999), CKD on diaylsis, DM type II, Esophageal varices, hepatic encephalopathy, thrombocytopenia, umbilical hernia, GERD, ascites with failed TIPS , weight loss, hx of leukocytoclastic vasculitis, cyroglobulinemia, Raynaud's disease, portal vein thrombosis, and retroperitoneal mass who is seen in consultation at the request of Dr. Luis Eduardo Giang for nausea.      Mrs. Karly Hughes presented on 10/14/17 with complaints of nausea and vomiting for several weeks with inability to keep anything down. Labs on admission with findings of HGB 7.3 which is down from 9.4 on 10/3/17. MCV is 108.7, PLT 58, creatinine 4.28, TB 0.9, AST 21, ALT 28, .      Mrs. Karly Hughes is a well known patient to Dr. Samm Shirley who last saw him in the office on 7/11/17 for her chronic liver disease. She has since been evaluated at [facility] transplant center on 8/30/17 and is being considered for a 3rd liver transplant. She undergoes frequent paracentesis every week. Her last paracentesis was 10/11/2017 with 4600ml of fluid removed. She has been on Xifaxan and lactulose at home. Mrs. Karly Hughse had a CXR on admission with findings of an air-filled dilated esophagus, stricture/obstruction at the GE junction possible. Her last EGD was 5/16/2017 with Dr. Braden Ventura findings of retained gastric contents, portal HTN gastropathy. EGD on 7/27/16 by Dr. Sherman Villagomez with esophageal varices and bands x 2.      Mrs. Karly Hughes is seen in dialysis.  She reports that she has had worsening nausea and vomiting over the past several weeks. She reports having a good appetite but after eating starts feeling nauseated and either \"spits the food up\" or vomits. Denies any hematemesis/coffee ground emesis. Has not been on Reglan at home. She has been on PPI at home. She reports feelings of food getting lodged in the substernal region of the esophagus.      She reports having BM 2-3 times daily which are loose. Denies any melena/hematochezia. Colonoscopy 3/11/99 by Dr. Jose M Gilbert revealed minute punctate sporadic ulcers without pseudomembranous      She was admitted in September with hepatic encephalopathy. Denies any recent episodes of confusion since that hospitalization.     CXR 10/14/2017  IMPRESSION  IMPRESSION:      1. Air-filled dilated esophagus.  Stricture or obstruction at the GE junction  possible.  Follow-up is suggested.   2. Mild left basilar density, likely atelectasis or developing consolidation.       Medications:   Current Facility-Administered Medications   Medication Dose Route Frequency    cycloSPORINE modified (GENGRAF, NEORAL) capsule 125 mg  2.5 mg/kg/day Oral 7am    lactulose (CHRONULAC) solution 10 g  10 g Oral BID    metoclopramide HCl (REGLAN) tablet 10 mg  10 mg Oral AC&HS    midodrine (PROAMITINE) tablet 5 mg  5 mg Oral Q8H    morphine CR (MS CONTIN) tablet 15 mg  15 mg Oral Q12H    pantoprazole (PROTONIX) tablet 40 mg  40 mg Oral ACB    rifAXIMin (XIFAXAN) tablet 550 mg  550 mg Oral BID    sodium chloride (NS) flush 5-10 mL  5-10 mL IntraVENous Q8H    sodium chloride (NS) flush 5-10 mL  5-10 mL IntraVENous PRN    acetaminophen (TYLENOL) tablet 650 mg  650 mg Oral Q4H PRN    promethazine (PHENERGAN) with saline injection 12.5 mg  12.5 mg IntraVENous Q6H PRN    heparin (porcine) injection 5,000 Units  5,000 Units SubCUTAneous Q12H    insulin lispro (HUMALOG) injection   SubCUTAneous AC&HS    benzocaine-menthol (CEPACOL) lozenge  1 Lozenge Oral Q2H PRN    phenol throat spray (CHLORASEPTIC) 1 Spray  1 Spray Oral PRN    0.9% sodium chloride infusion 250 mL  250 mL IntraVENous PRN    epoetin ping (EPOGEN;PROCRIT) injection 20,000 Units  20,000 Units SubCUTAneous Q7D       Review of Systems:  ROS was obtained, with pertinent positives as listed above. No chest pain or SOB. Diet:  Full liquis    Objective:   Vitals:  Visit Vitals    BP 97/68 (BP 1 Location: Left arm, BP Patient Position: At rest)    Pulse 97    Temp 98.2 °F (36.8 °C)    Resp 16    Ht 5' 1\" (1.549 m)    Wt 46.3 kg (102 lb)    LMP 01/02/2016    SpO2 94%    BMI 19.27 kg/m2     Intake/Output:        Exam:  Gen:  Pt is alert, in dialysis, frail, cachectic. Skin:  Extremities and face reveal no rashes. HEENT: Sclerae anicteric. Extra-occular muscles are intact. No oral ulcers. No abnormal pigmentation of the lips. The neck is supple. Cardiovascular: Regular rate and rhythm. No murmurs, gallops, or rubs. Respiratory:  Comfortable breathing with no accessory muscle use. Clear breath sounds anteriorly with no wheezes, rales, or rhonchi. GI:  multiple healed scars, Abdomen nondistended, soft, and nontender. Large non-reducible abdominal hernia, Normal active bowel sounds. No enlargement of the liver or spleen. No masses palpable. Rectal:  Deferred  Musculoskeletal:  No pitting edema of the lower legs. Neurological:  Gross memory appears intact. Patient is alert and oriented. No asterixis. Psychiatric:  Mood appears appropriate with judgement intact. Lymphatic:  No cervical or supraclavicular adenopathy.     Data Review (Labs):    Recent Labs      10/15/17   0613  10/14/17   0149  10/14/17   0049  10/14/17   0007   WBC  6.1  6.5   --    --    HGB  11.1*  7.3*   --    --    HCT  33.1*  22.6*   --    --    PLT  54*  58*   --    --    MCV  96.8  108.7*   --    --    NA  142   --    --   145   K  3.8   --    --   3.7   CL  104   --    --   109*   CO2  30   --    --   17*   BUN  18   --    --   26*   CREA  2.63*   --    --   4.28* CA  7.1*   --    --   6.8*   MG   --    --   1.6*   --    GLU  73   --    --   132*   AP   --    --    --   292*   SGOT   --    --    --   21   ALB   --    --    --   2.0*   TP   --    --    --   5.9*   PTP   --    --   16.1*   --    INR   --    --   1.5*   --    APTT   --    --   42.9*   --      EGD 5/16/2017  Findings:       Esophagus appeared normal, with refluxing of retained gastric contents seen in the distal esophagus. A moderate hiatus hernia was present. Retained gastric contents in the fundus and body, could not be suctioned. Mild portal hypertensive gastropathy present. The duodenum appeared normal.           Specimens: none      Estimated Blood Loss: None      Complications:   None; patient tolerated the procedure well.      Attending Attestation:  I performed the procedure.       Impression:    1. Retained gastric contents, likely delayed gastric emptying in the setting of infection. No gastric outlet obstruction or large varices         Assessment:     Active Problems:    Type 2 diabetes mellitus with hyperglycemia ([facility] (7/4/2016)      Thrombocytopenia ([facility] (9/14/2017)      ESRD (end stage renal disease) ([facility] (11/2/2016)      End stage liver disease ([facility] (9/13/2017)      Underweight (9/15/2017)      Vomiting (10/14/2017)      Patient is a 55 y.o. female with PMH of (but not limited to) congenital fibrosis of the liver and hepatitis C (tx at [facility] 7/2014) s/p 2 liver transplants (most recently orthotopic liver transplant in 1999), CKD on diaylsis, DM type II, Esophageal varices, hepatic encephalopathy, thrombocytopenia, umbilical hernia, GERD, ascites with failed TIPS, weight loss, hx of leukocytoclastic vasculitis, cyroglobulinemia, Raynaud's disease, portal vein thrombosis, and retroperitoneal mass who is seen in consultation at the request of Dr. Ruth Archibald for nausea, inability to keep PO down. MELD= 24  Mrs. Aravind Yee had a CXR on admission with findings of an air-filled dilated esophagus, stricture/obstruction at the GE junction possible. Does have complaints of new onset dysphagia in the past month. HGB is 7.3 compared to 9.4 in Oct without any melena/hematochezia which improved with blood transfusions and remains stable    Plan:     Patient reports new dysphagia to solids. Imaging suggestive of possible esophageal stricture. Recommend EGD in am for further evaluation. No reports of GI bleeding. She is also at risk for opportunistic infections given chronic immunosuppressive therapy    Regarding cirrhosis management - continue rifaximin, cyclosporine, lactulose. No evidence of fluid overload - undergoing HD and not on any diuretics. Patient has been on reglan for ? Gastroparesis. Based on EGD findings a GES maybe necessary going forward to make definitive diagnosis of gastroparesis. Given multiple meds and guidelines for reglan use this should only be continued for total 6 wks and changed to domperidone. Patient also had chronic pain and is on morphine at home.     Dr Luis Jj and his team to take over care in am.    Gordon Reyez MD  [facility], [address] [facility] [place]

## 2017-10-15 NOTE — PROGRESS NOTES
Patient blood pressure was 85/69. Dr Estrella Awad  was notified. Patient is alert and oriented . Patient is asymptomatic and safety measures in place.

## 2017-10-15 NOTE — PROGRESS NOTES
AM assessment completed. Lung sound are clear and respiration is even and unlabored. Bowel sounds are active.  Patient is stable and safety measures in place

## 2017-10-15 NOTE — PROGRESS NOTES
Subjective:   Daily Progress Note: 10/15/2017 10:58 AM    No complaints. Still vomiting.     Current Facility-Administered Medications   Medication Dose Route Frequency    cycloSPORINE modified (GENGRAF, NEORAL) capsule 125 mg  2.5 mg/kg/day Oral 7am    lactulose (CHRONULAC) solution 10 g  10 g Oral BID    metoclopramide HCl (REGLAN) tablet 10 mg  10 mg Oral AC&HS    midodrine (PROAMITINE) tablet 5 mg  5 mg Oral Q8H    morphine CR (MS CONTIN) tablet 15 mg  15 mg Oral Q12H    pantoprazole (PROTONIX) tablet 40 mg  40 mg Oral ACB    rifAXIMin (XIFAXAN) tablet 550 mg  550 mg Oral BID    sodium chloride (NS) flush 5-10 mL  5-10 mL IntraVENous Q8H    sodium chloride (NS) flush 5-10 mL  5-10 mL IntraVENous PRN    acetaminophen (TYLENOL) tablet 650 mg  650 mg Oral Q4H PRN    promethazine (PHENERGAN) with saline injection 12.5 mg  12.5 mg IntraVENous Q6H PRN    heparin (porcine) injection 5,000 Units  5,000 Units SubCUTAneous Q12H    insulin lispro (HUMALOG) injection   SubCUTAneous AC&HS    benzocaine-menthol (CEPACOL) lozenge  1 Lozenge Oral Q2H PRN    phenol throat spray (CHLORASEPTIC) 1 Spray  1 Spray Oral PRN    0.9% sodium chloride infusion 250 mL  250 mL IntraVENous PRN    epoetin ping (EPOGEN;PROCRIT) injection 20,000 Units  20,000 Units SubCUTAneous Q7D               Objective:     Visit Vitals    BP 97/68 (BP 1 Location: Left arm, BP Patient Position: At rest)    Pulse 97    Temp 98.2 °F (36.8 °C)    Resp 16    Ht 5' 1\" (1.549 m)    Wt 46.3 kg (102 lb)    LMP 2016    SpO2 94%    BMI 19.27 kg/m2      O2 Device: Room air    Temp (24hrs), Av °F (36.7 °C), Min:97.3 °F (36.3 °C), Max:99 °F (37.2 °C)                Visit Vitals    BP 97/68 (BP 1 Location: Left arm, BP Patient Position: At rest)    Pulse 97    Temp 98.2 °F (36.8 °C)    Resp 16    Ht 5' 1\" (1.549 m)    Wt 46.3 kg (102 lb)    LMP 2016    SpO2 94%    BMI 19.27 kg/m2       Lungs: clear to auscultation bilaterally  Heart: regular rate and rhythm  Abdomen: soft, non-tender. Extremities:tr edema          Data Review    Recent Results (from the past 48 hour(s))   METABOLIC PANEL, COMPREHENSIVE    Collection Time: 10/14/17 12:07 AM   Result Value Ref Range    Sodium 145 136 - 145 mmol/L    Potassium 3.7 3.5 - 5.1 mmol/L    Chloride 109 (H) 98 - 107 mmol/L    CO2 17 (L) 21 - 32 mmol/L    Anion gap 19 (H) 7 - 16 mmol/L    Glucose 132 (H) 65 - 100 mg/dL    BUN 26 (H) 6 - 23 MG/DL    Creatinine 4.28 (H) 0.6 - 1.0 MG/DL    GFR est AA 14 (L) >60 ml/min/1.73m2    GFR est non-AA 12 (L) >60 ml/min/1.73m2    Calcium 6.8 (L) 8.3 - 10.4 MG/DL    Bilirubin, total 0.9 0.2 - 1.1 MG/DL    ALT (SGPT) 28 12 - 65 U/L    AST (SGOT) 21 15 - 37 U/L    Alk. phosphatase 292 (H) 50 - 136 U/L    Protein, total 5.9 (L) 6.3 - 8.2 g/dL    Albumin 2.0 (L) 3.5 - 5.0 g/dL    Globulin 3.9 (H) 2.3 - 3.5 g/dL    A-G Ratio 0.5 (L) 1.2 - 3.5     AMMONIA    Collection Time: 10/14/17 12:07 AM   Result Value Ref Range    Ammonia 51 (H) 11 - 32 UMOL/L   EKG, 12 LEAD, INITIAL    Collection Time: 10/14/17 12:11 AM   Result Value Ref Range    Ventricular Rate 94 BPM    Atrial Rate 94 BPM    P-R Interval 140 ms    QRS Duration 78 ms    Q-T Interval 346 ms    QTC Calculation (Bezet) 432 ms    Calculated P Axis 80 degrees    Calculated R Axis 47 degrees    Calculated T Axis 72 degrees    Diagnosis       !! AGE AND GENDER SPECIFIC ECG ANALYSIS !!   Normal sinus rhythm  Low voltage QRS  Nonspecific T wave abnormality  Abnormal ECG  When compared with ECG of 11-SEP-2017 11:55,  No significant change was found  Confirmed by ST ERAN CAM MD (), MISTY COLÓN ([id number]) on 10/14/2017 12:16:06 PM     MAGNESIUM    Collection Time: 10/14/17 12:49 AM   Result Value Ref Range    Magnesium 1.6 (L) 1.8 - 2.4 mg/dL   PTT    Collection Time: 10/14/17 12:49 AM   Result Value Ref Range    aPTT 42.9 (H) 23.5 - 31.7 SEC   PROTHROMBIN TIME + INR    Collection Time: 10/14/17 12:49 AM   Result Value Ref Range    Prothrombin time 16.1 (H) 9.6 - 12.0 sec    INR 1.5 (H) 0.9 - 1.2     BNP    Collection Time: 10/14/17 12:49 AM   Result Value Ref Range     pg/mL   D DIMER    Collection Time: 10/14/17 12:49 AM   Result Value Ref Range    D DIMER 4.09 (HH) <0.55 ug/ml(FEU)   POC TROPONIN-I    Collection Time: 10/14/17 12:54 AM   Result Value Ref Range    Troponin-I (POC) 0 (L) 0.02 - 0.05 ng/ml   CBC WITH AUTOMATED DIFF    Collection Time: 10/14/17  1:49 AM   Result Value Ref Range    WBC 6.5 4.3 - 11.1 K/uL    RBC 2.08 (L) 4.05 - 5.25 M/uL    HGB 7.3 (L) 11.7 - 15.4 g/dL    HCT 22.6 (L) 35.8 - 46.3 %    .7 (H) 79.6 - 97.8 FL    MCH 35.1 (H) 26.1 - 32.9 PG    MCHC 32.3 31.4 - 35.0 g/dL    RDW 21.3 (H) 11.9 - 14.6 %    PLATELET 58 (L) 834 - 450 K/uL    MPV 9.0 (L) 10.8 - 14.1 FL    DF AUTOMATED      NEUTROPHILS 77 43 - 78 %    LYMPHOCYTES 13 13 - 44 %    MONOCYTES 7 4.0 - 12.0 %    EOSINOPHILS 2 0.5 - 7.8 %    BASOPHILS 1 0.0 - 2.0 %    IMMATURE GRANULOCYTES 0.2 0.0 - 5.0 %    ABS. NEUTROPHILS 5.0 1.7 - 8.2 K/UL    ABS. LYMPHOCYTES 0.9 0.5 - 4.6 K/UL    ABS. MONOCYTES 0.5 0.1 - 1.3 K/UL    ABS. EOSINOPHILS 0.1 0.0 - 0.8 K/UL    ABS. BASOPHILS 0.0 0.0 - 0.2 K/UL    ABS. IMM.  GRANS. 0.0 0.0 - 0.5 K/UL   CYCLOSPORINE    Collection Time: 10/14/17  1:49 AM   Result Value Ref Range    CYCLOSPORINE <25 ng/mL   GLUCOSE, POC    Collection Time: 10/14/17  5:58 AM   Result Value Ref Range    Glucose (POC) 135 (H) 65 - 100 mg/dL   TYPE + CROSSMATCH    Collection Time: 10/14/17  9:54 AM   Result Value Ref Range    Crossmatch Expiration 10/17/2017     ABO/Rh(D) A POSITIVE     Antibody screen NEG     Unit number [id number]     Blood component type  LR AS5     Unit division 00     Status of unit TRANSFUSED     Crossmatch result Compatible     Unit number [id number]     Blood component type  LR AS5     Unit division 00     Status of unit TRANSFUSED     Crossmatch result Compatible    GLUCOSE, POC Collection Time: 10/14/17  4:29 PM   Result Value Ref Range    Glucose (POC) 116 (H) 65 - 100 mg/dL   GLUCOSE, POC    Collection Time: 10/14/17  8:35 PM   Result Value Ref Range    Glucose (POC) 193 (H) 65 - 100 mg/dL   GLUCOSE, POC    Collection Time: 10/15/17  5:32 AM   Result Value Ref Range    Glucose (POC) 82 65 - 622 mg/dL   METABOLIC PANEL, BASIC    Collection Time: 10/15/17  6:13 AM   Result Value Ref Range    Sodium 142 136 - 145 mmol/L    Potassium 3.8 3.5 - 5.1 mmol/L    Chloride 104 98 - 107 mmol/L    CO2 30 21 - 32 mmol/L    Anion gap 8 7 - 16 mmol/L    Glucose 73 65 - 100 mg/dL    BUN 18 6 - 23 MG/DL    Creatinine 2.63 (H) 0.6 - 1.0 MG/DL    GFR est AA 25 (L) >60 ml/min/1.73m2    GFR est non-AA 21 (L) >60 ml/min/1.73m2    Calcium 7.1 (L) 8.3 - 10.4 MG/DL   CBC W/O DIFF    Collection Time: 10/15/17  6:13 AM   Result Value Ref Range    WBC 6.1 4.3 - 11.1 K/uL    RBC 3.42 (L) 4.05 - 5.25 M/uL    HGB 11.1 (L) 11.7 - 15.4 g/dL    HCT 33.1 (L) 35.8 - 46.3 %    MCV 96.8 79.6 - 97.8 FL    MCH 32.5 26.1 - 32.9 PG    MCHC 33.5 31.4 - 35.0 g/dL    RDW 22.7 (H) 11.9 - 14.6 %    PLATELET 54 (L) 164 - 450 K/uL    MPV 9.3 (L) 10.8 - 14.1 FL       Assessment     Patient Active Problem List    Diagnosis Date Noted    Esophageal dysphagia 10/15/2017    Vomiting 10/14/2017    Aspiration pneumonia (HCC) 09/20/2017    Pulmonary infiltrate 09/15/2017    Underweight 09/15/2017    Thrombocytopenia (HCC) 09/14/2017    Coagulopathy (HCC) 09/14/2017    End stage liver disease (HCC) 09/13/2017    Hepatic encephalopathy (HCC) 09/11/2017    Intractable nausea and vomiting 05/09/2017    Cirrhosis of liver ([facility] 05/09/2017    [facility]enia [facility] 05/09/2017    Abdominal pain 05/08/2017    Abdominal pain, acute 05/08/2017    Liver transplant recipient [facility]) 02/13/2017    Hemodialysis access, AV graft ([facility] 11/22/2016    ESRD (end stage renal disease) ([facility] 11/02/2016    Chronic kidney disease 10/19/2016    Esophageal varices ([facility] 07/27/2016    Hypotension 07/06/2016    GERD (gastroesophageal reflux disease)     Congenital hepatic fibrosis     Acute respiratory failure with hypercapnia (HCC) 07/05/2016    Type 2 diabetes mellitus with hyperglycemia (HCC) 07/04/2016    Iron deficiency anemia 07/04/2016    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 Dialysis Tuesday.

## 2017-10-15 NOTE — PROGRESS NOTES
Sbar report given to oncoming nurse. Patient is resting in the bed.  Pt is alert and oriented and safety measures in place

## 2017-10-16 NOTE — PROGRESS NOTES
Incoming call from patient's   Patient admitted inpatient on Saturday 10/14/2017 - intractable vomiting. EGD scheduled for today. Plan - coordinate with inpatient CM  Supportive call(s) with family.

## 2017-10-16 NOTE — PROGRESS NOTES
Discussed patients PO medications with Dr. Karen Tipton. Per Dr. Karen Tipton all PO meds are to be held today and if any meds can be changed to IV he requests that be done. He placed an order for this request. See orders. NEO Benavides aware. Iesha in pharmacy aware.

## 2017-10-16 NOTE — PROGRESS NOTES
Pt. resting in bed on left side. NG tube remains in place. Drainage is brown/green. Pt. oriented x4 when woken up. Call light in reach. Instructed to call for assistance. [place] pad in place and it's turned on.

## 2017-10-16 NOTE — PROGRESS NOTES
Called about pt hypotension. Pt ESRD on HD/ESLD s/p EGD today with dilated esophagus/gastroparesis noted. Per GI notes, if pt develops Resp distress, intubation may be needed. PT still full code. Will maintain NPO status overnight and give 250cc NS over 60min. If pt unresponsive to bolus IVF, will consider midodrine, mIVF, or vasopressors.

## 2017-10-16 NOTE — ANESTHESIA POSTPROCEDURE EVALUATION
Post-Anesthesia Evaluation and Assessment    Patient: Gordon Garcia MRN: [id number]  SSN: [id number]    YOB: 1970  Age: 55 y.o. Sex: female       Cardiovascular Function/Vital Signs  Visit Vitals    BP (!) 86/59    Pulse 100    Temp 37 °C (98.6 °F)    Resp 18    Ht 5' 1\" (1.549 m)    Wt 46.7 kg (102 lb 14.4 oz)    SpO2 100%    BMI 19.44 kg/m2       Patient is status post total IV anesthesia anesthesia for Procedure(s):  ESOPHAGOGASTRODUODENOSCOPY (EGD). Nausea/Vomiting: None    Postoperative hydration reviewed and adequate. Pain:  Pain Scale 1: Numeric (0 - 10) (10/16/17 1040)  Pain Intensity 1: 0 (10/16/17 1040)   Managed    Neurological Status:   Neuro  Neurologic State: Drowsy (10/16/17 0715)  Orientation Level: Oriented X4 (10/16/17 0715)  Cognition: Follows commands (10/16/17 0715)  Speech: Clear (10/15/17 0730)   At baseline    Mental Status and Level of Consciousness: Arousable    Pulmonary Status:   O2 Device: Room air (10/16/17 1044)   Adequate oxygenation and airway patent    Complications related to anesthesia: None    Post-anesthesia assessment completed. No concerns  Late entry, not called for signout.   Signed By: Dalila Bailey MD     October 16, 2017

## 2017-10-16 NOTE — PROGRESS NOTES
Second conversation today with patient's . Reviewed hospitalist note re: hospice. Explained role of hospice in terms of the necessary support for patient to \"feel\" better.  will consider. Plan - coordinate with inpatient.   Left message with inpatient CM - Naz Perez

## 2017-10-16 NOTE — PROGRESS NOTES
Patient resting in bed with eyes closed, on RA, RR even and unlabored, no c/o nausea, no emesis @ this time, no needs voiced, call light within reach.

## 2017-10-16 NOTE — PROCEDURES
DATE OF PROCEDURE: 10/16/17    REQUESTING PHYSICIAN: Dr Vipul Morgan: Esophagogastroduodenoscopy. ENDOSCOPIST: Michlele Pablo M.D. PREOPERATIVE DIAGNOSIS: N/V, Dysphagia     POSTOPERATIVE DIAGNOSIS: Gastroparesis     INSTRUMENTS: GIF H 190    SEDATION: MAC    DESCRIPTION: After informed consent was obtained, the patient was taken to the endoscopy suite and placed in the left lateral decubitus position. Intravenous sedation was administered as above and posterior pharynx was anesthetized with local anesthetic spray. After adequate sedation had been achieved the endoscope was inserted over the tongue and through the posterior pharynx under direct visualization down the esophagus to the stomach and into the second portion of the duodenum. The endoscopic was withdrawn from that point, performing a careful survey of the mucosa. Retroflexion was performed in the gastric fundus. FINDINGS:  Esophagus: Large amount of retained liquid and solid debris (could not be fully suctioned). The esophagus appeared to be dilated though there was not adequate visualization. Stomach: Large amount of retained liquid and sold debris. I could not visualized much mucosa. The pylorus was patent. Duodenum: Deep intubation showed no obstruction but again, limited views due to retained food/liquid.      Estimated blood loss:  0 cc-minimal    IMPRESSION:   Gastroparesis  Dilated esophagus  Prior to even putting the scope in the oropharynx, the pt was regurgitating fluid into the oropharynx (aggressive suctioning)    PLAN:  - Keep NPO today  - Plans for repeat EGD tomorrow (may need to intubate pt for airway protection)    P Manuella Fabry, MD

## 2017-10-16 NOTE — PROGRESS NOTES
Pt.'s bp is 80/51. Pt. complaining of nausea. Dr. Simona Barkley made aware. To monitor. No new orders received.

## 2017-10-16 NOTE — PROGRESS NOTES
Patient found leaning on side of bed on her knees, states she lost her balance and fell to her knees when attempting to transfer from bedside commode to bed, patient states she did not call for help because she was in a rush, patient encouraged to call for help at all times and that we are here to help, patient assisted back into bed @ this time and skin assessment complete, redness to knees, c/o discomfort to knees denies other pain sites, no other skin problems noted. Patient vital signs taken and stable, patient began to watch football and was comfortable. [place] pad in place and on, call light within reach, door open.

## 2017-10-16 NOTE — PROGRESS NOTES
Pt. Resting quietly in bed. Pt.s  at bedside with numerous questions. NG tube remains in place. Set to LIS. C/d/i. Drainage is green. 350ml noted in cannister. Door open. [place] pad in place and on. Will monitor.

## 2017-10-16 NOTE — INTERVAL H&P NOTE
H&P Update:  Marichuy Turner was seen and examined. History and physical has been reviewed. The patient has been examined.  There have been no significant clinical changes since the completion of the originally dated History and Physical.    Signed By: Fan Adan MD     October 16, 2017 9:45 AM

## 2017-10-16 NOTE — PROGRESS NOTES
Patient sitting up in bed in room, alert and oriented X3, on RA, no c/o SOB, admits to nausea and vomiting dinner, recently given phenergan, patient abd distention, on RA, NPO after midnight, no pain or discomfort, no needs voiced, call light within reach.

## 2017-10-16 NOTE — PROGRESS NOTES
Patient sitting up in bed in room, 2 times of emesis throughout shift, prn phenergan given IV slow push, patient remains NPO for EGD, no c/o pain or discomfort, no needs voiced @ this time, call light within reach.

## 2017-10-16 NOTE — H&P (VIEW-ONLY)
[facility] Consult Note       Primary GI Physician: Dr. Aleisha Yarbrough    Referring Physician:  Dr. Jose Alberto Simmons Date:  10/14/2017    Admit Date:  10/14/2017    Chief Complaint:  Nausea, inability to keep PO intake down    Subjective:     History of Present Illness:      Patient is a 55 y.o. female with PMH of (but not limited to) congenital fibrosis of the liver and hepatitis C (tx at [facility] 7/2014) s/p 2 liver transplants (most recently orthotopic liver transplant in 1999), CKD on diaylsis, DM type II, Esophageal varices, hepatic encephalopathy, thrombocytopenia, umbilical hernia, GERD, ascites with failed TIPS , weight loss, hx of leukocytoclastic vasculitis, cyroglobulinemia, Raynaud's disease, portal vein thrombosis, and retroperitoneal mass who is seen in consultation at the request of Dr. Muna Chaudhari for nausea. Mrs. Tanya Frederick presented on 10/14/17 with complaints of nausea and vomiting for several weeks with inability to keep anything down. Labs on admission with findings of HGB 7.3 which is down from 9.4 on 10/3/17. MCV is 108.7, PLT 58, creatinine 4.28, TB 0.9, AST 21, ALT 28, . Mrs. Tanya Frederick is a well known patient to Dr. Nila Ga who last saw him in the office on 7/11/17 for her chronic liver disease. She has since been evaluated at [facility] transplant center on 8/30/17 and is being considered for a 3rd liver transplant. She undergoes frequent paracentesis every week. Her last paracentesis was 10/11/2017 with 4600ml of fluid removed. She has been on Xifaxan and lactulose at home. Mrs. Tanya Frederick had a CXR on admission with findings of an air-filled dilated esophagus, stricture/obstruction at the GE junction possible. Her last EGD was 5/16/2017 with Dr. Juan Pablo Delacruz findings of retained gastric contents, portal HTN gastropathy. EGD on 7/27/16 by Dr. Max Liriano with esophageal varices and bands x 2. Mrs. Tanya Frederick is seen in dialysis.  She reports that she has had worsening nausea and vomiting over the past several weeks. She reports having a good appetite but after eating starts feeling nauseated and either \"spits the food up\" or vomits. Denies any hematemesis/coffee ground emesis. Has not been on Reglan at home. She has been on PPI at home. She reports feelings of food getting lodged in the substernal region of the esophagus. She reports having BM 2-3 times daily which are loose. Denies any melena/hematochezia. Colonoscopy 3/11/99 by Dr. David Dash revealed minute punctate sporadic ulcers without pseudomembranous     She was admitted in September with hepatic encephalopathy. Denies any recent episodes of confusion since that hospitalization. CXR 10/14/2017  IMPRESSION  IMPRESSION:     1. Air-filled dilated esophagus. Stricture or obstruction at the GE junction  possible. Follow-up is suggested.   2. Mild left basilar density, likely atelectasis or developing consolidation.     PMH:  Past Medical History:   Diagnosis Date    SEAN (acute kidney injury) ([facility] [address] 6/26/2016    Arthritis     kath feet    Ascites     Benign neoplasm of skin of trunk, except scrotum     pt denies    Cellulitis and abscess of unspecified digit     hx of    Chronic kidney disease     [facility] on Mon/Wed/Fri    Chronic pain     abd area    Congenital hepatic fibrosis     Liver transplant X2    Esophageal varices (HCC)     GERD (gastroesophageal reflux disease)     Hemodialysis access, AV graft ([address] 11/22/2016    Hepatic encephalopathy ([facility] [address] 10/2016    recently hospitalized for hepatic encephalopathy    Hepatitis C     hx of hepatitis C-- dx after first liver transplant from a transfusion   (first transplant age 13)   [facility] History of gastric ulcer     Insomnia 07/13/2015    no current meds    Liver transplant status ([address] 1986 and 1999    X2- congential hepatic fibrosis    Pain in joint, ankle and foot     PICC (peripherally inserted central catheter) in place     PONV (postoperative nausea and vomiting)     some N/V, pt states she does well with Phenergan    Port-a-cath in place     R chest for HD    Raynaud disease     Spleen enlarged     Tachycardia     Thrombocytopenia (HCC)     Type 2 diabetes mellitus (HCC)     insulin only/AVG /s.s of hypoglycemia 30's/Last A1c 5.6    Vasculitis ([facility] [address]     resolved       PSH:  Past Surgical History:   Procedure Laterality Date    HX CHOLECYSTECTOMY  1984    HX COLONOSCOPY      HX OTHER SURGICAL      biliary reconstructive surgery    HX OTHER SURGICAL  2013    EGD    HX OTHER SURGICAL  03/2016    tips procedure    HX OTHER SURGICAL      paracentesis    HX TRANSPLANT  0652,4819    2 liver - first one for congenital hepatic fibrosis, 2nd for Hep C cirrhosis    HX TUBAL LIGATION      LAP,TUBAL CAUTERY      VASCULAR SURGERY PROCEDURE UNLIST Left 11/02/2016    thigh AVG insertion in thigh       Allergies: Allergies   Allergen Reactions    Aspirin Nausea Only    Codeine Nausea Only    Compazine [Prochlorperazine Edisylate] Unknown (comments)     Causes Lock Jaw       Home Medications:  Prior to Admission medications    Medication Sig Start Date End Date Taking? Authorizing Provider   morphine CR (MS CONTIN) 15 mg CR tablet Take 1 Tab by mouth every twelve (12) hours. Max Daily Amount: 30 mg. 9/21/17   Adriana Edward MD   rifAXIMin Myrtle Cloud) 550 mg tablet Reported on 5/25/2017 - BID 3/22/16   Historical Provider   pantoprazole (PROTONIX) 40 mg tablet Take 40 mg by mouth. Historical Provider   midodrine (PROAMITINE) 5 mg tablet Take  by mouth every eight (8) hours. Historical Provider   mupirocin (BACTROBAN) 2 % ointment Apply  to affected area daily. 8/9/17   Isabela Byers MD   metoclopramide HCl (REGLAN) 10 mg tablet Take 10 mg by mouth Before breakfast, lunch, dinner and at bedtime. Historical Provider   promethazine (PHENERGAN) 25 mg tablet Take 25 mg by mouth every six (6) hours as needed for Nausea.     Historical Provider   insulin aspart (NOVOLOG) 100 unit/mL injection Sliding scale maximum 15 units each meal 3/24/17   Massimo Arora MD   lactulose (CHRONULAC) 10 gram/15 mL solution Take 30 mL by mouth three (3) times daily. Patient taking differently: Take  by mouth two (2) times a day. 20 ML BID 2/22/17   Maegan Kelly DO   cycloSPORINE modified (GENGRAF) 25 mg capsule Take 2.5 mg/kg/day by mouth every morning.  Indications: Takes in the AM    Historical Provider       Hospital Medications:  Current Facility-Administered Medications   Medication Dose Route Frequency    cycloSPORINE modified (GENGRAF, NEORAL) capsule 125 mg  2.5 mg/kg/day Oral 7am    lactulose (CHRONULAC) solution 10 g  10 g Oral BID    metoclopramide HCl (REGLAN) tablet 10 mg  10 mg Oral AC&HS    midodrine (PROAMITINE) tablet 5 mg  5 mg Oral Q8H    morphine CR (MS CONTIN) tablet 15 mg  15 mg Oral Q12H    pantoprazole (PROTONIX) tablet 40 mg  40 mg Oral ACB    rifAXIMin (XIFAXAN) tablet 550 mg  550 mg Oral BID    sodium chloride (NS) flush 5-10 mL  5-10 mL IntraVENous Q8H    sodium chloride (NS) flush 5-10 mL  5-10 mL IntraVENous PRN    acetaminophen (TYLENOL) tablet 650 mg  650 mg Oral Q4H PRN    promethazine (PHENERGAN) with saline injection 12.5 mg  12.5 mg IntraVENous Q6H PRN    heparin (porcine) injection 5,000 Units  5,000 Units SubCUTAneous Q12H    insulin lispro (HUMALOG) injection   SubCUTAneous AC&HS    benzocaine-menthol (CEPACOL) lozenge  1 Lozenge Oral Q2H PRN    phenol throat spray (CHLORASEPTIC) 1 Spray  1 Spray Oral PRN    0.9% sodium chloride infusion 250 mL  250 mL IntraVENous PRN    epoetin ping (EPOGEN;PROCRIT) injection 20,000 Units  20,000 Units SubCUTAneous Q7D     Facility-Administered Medications Ordered in Other Encounters   Medication Dose Route Frequency    heparin (porcine) pf 300 Units  300 Units InterCATHeter PRN       Social History:  Social History   Substance Use Topics    Smoking status: Never Smoker    Smokeless tobacco: Never Used    Alcohol use No         Family History:  Family History   Problem Relation Age of Onset    Diabetes Mother     Heart Disease Mother     Hypertension Mother     Heart Disease Father     Stroke Father     Cancer Maternal Grandfather      liver cancer, alcoholism    No Known Problems Sister     Cancer Maternal Aunt      cervical cancer    Breast Cancer Paternal Grandmother      early 45s    Colon Cancer Paternal Grandmother      late 76s    Cancer Other      maternal niece- cervical cancer    Bipolar Disorder Brother     Heart Disease Brother        Review of Systems:  A detailed 10 system ROS is obtained, with pertinent positives as listed above. All others are negative. Diet:  Diabetic regular diet    Objective:     Physical Exam:  Vitals:  Visit Vitals    BP (!) 86/58    Pulse 96    Temp 97.7 °F (36.5 °C)    Resp 18    Ht 5' 1\" (1.549 m)    Wt 46.4 kg (102 lb 3.2 oz)    LMP 01/02/2016    SpO2 96%    BMI 19.31 kg/m2     Gen:  Pt is alert, in dialysis, frail, cachectic. Skin:  Extremities and face reveal no rashes. HEENT: Sclerae anicteric. Extra-occular muscles are intact. No oral ulcers. No abnormal pigmentation of the lips. The neck is supple. Cardiovascular: Regular rate and rhythm. No murmurs, gallops, or rubs. Respiratory:  Comfortable breathing with no accessory muscle use. Clear breath sounds anteriorly with no wheezes, rales, or rhonchi. GI:  Abdomen nondistended, soft, and nontender. Large abdominal hernia, Normal active bowel sounds. No enlargement of the liver or spleen. No masses palpable. Rectal:  Deferred  Musculoskeletal:  No pitting edema of the lower legs. Neurological:  Gross memory appears intact. Patient is alert and oriented. No asterixis. Psychiatric:  Mood appears appropriate with judgement intact. Lymphatic:  No cervical or supraclavicular adenopathy.     Laboratory:    Recent Labs      10/14/17   0149 10/14/17   0049  10/14/17   0007   WBC  6.5   --    --    HGB  7.3*   --    --    HCT  22.6*   --    --    PLT  58*   --    --    MCV  108.7*   --    --    NA   --    --   145   K   --    --   3.7   CL   --    --   109*   CO2   --    --   17*   BUN   --    --   26*   CREA   --    --   4.28*   CA   --    --   6.8*   MG   --   1.6*   --    GLU   --    --   132*   AP   --    --   292*   SGOT   --    --   21   ALT   --    --   28   TBILI   --    --   0.9   ALB   --    --   2.0*   TP   --    --   5.9*   PTP   --   16.1*   --    INR   --   1.5*   --    APTT   --   42.9*   --     EGD 5/16/2017  Findings:      Esophagus appeared normal, with refluxing of retained gastric contents seen in the distal esophagus. A moderate hiatus hernia was present. Retained gastric contents in the fundus and body, could not be suctioned. Mild portal hypertensive gastropathy present. The duodenum appeared normal.         Specimens: none     Estimated Blood Loss: None      Complications:   None; patient tolerated the procedure well.      Attending Attestation:  I performed the procedure.      Impression:    1. Retained gastric contents, likely delayed gastric emptying in the setting of infection. No gastric outlet obstruction or large varices      Recommendations:  1. Follow up with inpatient team   2.  Continue PPI and antiemetics     Assessment:     Active Problems:    Type 2 diabetes mellitus with hyperglycemia ([facility] [address] (7/4/2016)      Thrombocytopenia ([facility] [address] (9/14/2017)      ESRD (end stage renal disease) ([facility] [address] (11/2/2016)      End stage liver disease ([facility] [address] (9/13/2017)      Underweight (9/15/2017)      Vomiting (10/14/2017)        Patient is a 55 y.o. female with PMH of (but not limited to) congenital fibrosis of the liver and hepatitis C (tx at [facility] 7/2014) s/p 2 liver transplants (most recently orthotopic liver transplant in 1999), CKD on diaylsis, DM type II, Esophageal varices, hepatic encephalopathy, thrombocytopenia, umbilical hernia, GERD, ascites with failed TIPS, weight loss, hx of leukocytoclastic vasculitis, cyroglobulinemia, Raynaud's disease, portal vein thrombosis, and retroperitoneal mass who is seen in consultation at the request of Dr. Kimberly Flores for nausea, inability to keep PO down. MELD= 24  Mrs. Nando Collins had a CXR on admission with findings of an air-filled dilated esophagus, stricture/obstruction at the GE junction possible. Does have complaints of new onset dysphagia in the past month. HGB is 7.3 compared to 9.4 in Oct without any melena/hematochezia. Plan:     1. Change diet to full liquid today given findings on CXR  2. EGD to further evaluate findings on CXR. Will plan for this on either Sunday or Monday. Dr. Shana Pro will follow with further recommendations. Thank you for this kind consultation. We will follow along with you. Goldy Dominguez NP    Patient is seen and examined in collaboration with Dr. Shana Pro. Assessment and plan as per Dr. Shana Pro.

## 2017-10-16 NOTE — PROGRESS NOTES
Order received from Dr. Meet Power to place a NG tube. Explained the reason for the placement and how the NG would be placed to the patient. Opportunity for questions provided. NG place in patients right nare without difficulty to 55cm at the nare and secured with tape. Shortly after NG was placed 500 mL of orange liquid was suctioned into the cannister.

## 2017-10-16 NOTE — PROGRESS NOTES
TRANSFER - IN REPORT:    Verbal report received from Zev RN(name) on Gabbie Burgess Rad  being received from GI lab(unit) for routine progression of care      Report consisted of patients Situation, Background, Assessment and   Recommendations(SBAR). Information from the following report(s) Kardex was reviewed with the receiving nurse. Opportunity for questions and clarification was provided. Assessment completed upon patients arrival to unit and care assumed.

## 2017-10-16 NOTE — PROGRESS NOTES
Pt. remains drowsy. Awakens to voice and oriented x4. VSS. NG tube in place. 10cc air bolus auscultated. 150ml of green/brown drainage noted from NG tube. Set to LIS. Posey pad in place and on. Door open. Will monitor. NPO.

## 2017-10-16 NOTE — ROUTINE PROCESS
TRANSFER - OUT REPORT:    Verbal report given to NEO Benavides on Rashawn Jimenez  being transferred to [facility](unit) for routine post - op       Report consisted of patients Situation, Background, Assessment and   Recommendations(SBAR). Information from the following report(s) SBAR, Kardex, Procedure Summary, Intake/Output, MAR, Accordion, Recent Results and Med Rec Status was reviewed with the receiving nurse. Lines:   Venous Access Device duel lumen cath 05/15/17 Upper chest (subclavicular area), left (Active)   Central Line Being Utilized Yes 10/16/2017  8:22 AM   Criteria for Appropriate Use Limited/no vessel suitable for conventional peripheral access 10/16/2017  8:22 AM   Site Assessment Clean, dry, & intact 10/16/2017  8:22 AM   Date of Last Dressing Change 10/15/17 10/16/2017  1:58 AM   Dressing Status Clean, dry, & intact 10/16/2017  8:22 AM   Dressing Type Transparent 10/16/2017  8:22 AM   Positive Blood Return (Medial Site) Yes 10/16/2017  1:58 AM   Positive Blood Return (Lateral Site) Yes 10/16/2017  1:58 AM        Opportunity for questions and clarification was provided. Patient transported with:   Tech       Patient transported with NG in place in right nare and secured with tape and connected to a suction cannister for transport.

## 2017-10-16 NOTE — PROGRESS NOTES
Dr. Lucretia Mejia made aware bp of 90/57. Holding midodrine due to NPO status. No new orders received at this time.

## 2017-10-16 NOTE — PROGRESS NOTES
TRANSFER - IN REPORT:    Verbal report received from Kelly(name) on Gabbie Kerr Root  being received from 7(unit) for routine progression of care      Report consisted of patients Situation, Background, Assessment and   Recommendations(SBAR). Information from the following report(s) Kardex was reviewed with the receiving nurse. Opportunity for questions and clarification was provided. Assessment completed upon patients arrival to unit and care assumed.

## 2017-10-16 NOTE — WOUND CARE
Wound consult completed as ordered, sacral area red, allevyn/foam dressing applied, left heel healed scab noted, heels placed up on pillow, patient tolerated well.

## 2017-10-16 NOTE — ANESTHESIA PREPROCEDURE EVALUATION
Anesthetic History     PONV          Review of Systems / Medical History  Patient summary reviewed, nursing notes reviewed and pertinent labs reviewed    Pulmonary  Within defined limits                 Neuro/Psych              Cardiovascular            Dysrhythmias : sinus tachycardia      Exercise tolerance: >4 METS  Comments: Raynaud's   GI/Hepatic/Renal     GERD: well controlled  Hepatitis: type C  Renal disease: ESRD and dialysis  Liver disease (s/p Liver transplant x 2 (Congenital hepatic fibrosis and Hep C) - on transplant list again)    Comments: H/O congenital hepatic fibrosis, s/p transplant times two, esophageal varices, thrombocytopenia, ascites Endo/Other    Diabetes: well controlled, type 2, using insulin    Arthritis and anemia     Other Findings   Comments: Thrombocytopenia          Physical Exam    Airway  Mallampati: II  TM Distance: 4 - 6 cm  Neck ROM: normal range of motion   Mouth opening: Normal     Cardiovascular    Rhythm: regular  Rate: normal         Dental  No notable dental hx       Pulmonary  Breath sounds clear to auscultation               Abdominal  GI exam deferred       Other Findings            Anesthetic Plan    ASA: 4  Anesthesia type: total IV anesthesia          Induction: Intravenous  Anesthetic plan and risks discussed with: Patient      Last HD 10/14.

## 2017-10-16 NOTE — PROGRESS NOTES
MD notified of patients fall without injury, encourage patient to call for help when getting out of bed, no new orders received.  notified of patients fall, states wife contacted him prior to and informed him as well, patients  very thankful for being notified.

## 2017-10-16 NOTE — PROGRESS NOTES
Hospitalist Progress Note    Subjective:   Daily Progress Note: 10/16/2017 9:17 AM    Ms. Abundio Newman is a 56 yo white female, with a past medical history of ESLD and ESRD, who presented 10/14 for persistent nausea and vomiting and inability to keep po intake down found to be due to esophageal dysphagia due to probable stricture. CXR shows an air filled and dilated esophagus. GI and nephrology following. Is currently npo for EGD today. Still with emesis x several episodes overnight. No sob, chest pain, fever, chills. HD tomorrow per nephrology.     Current Facility-Administered Medications   Medication Dose Route Frequency    magnesium sulfate 2 g/50 ml IVPB (premix or compounded)  2 g IntraVENous ONCE    cycloSPORINE modified (GENGRAF, NEORAL) capsule 125 mg  2.5 mg/kg/day Oral 7am    lactulose (CHRONULAC) solution 10 g  10 g Oral BID    metoclopramide HCl (REGLAN) tablet 10 mg  10 mg Oral AC&HS    midodrine (PROAMITINE) tablet 5 mg  5 mg Oral Q8H    morphine CR (MS CONTIN) tablet 15 mg  15 mg Oral Q12H    pantoprazole (PROTONIX) tablet 40 mg  40 mg Oral ACB    rifAXIMin (XIFAXAN) tablet 550 mg  550 mg Oral BID    sodium chloride (NS) flush 5-10 mL  5-10 mL IntraVENous Q8H    sodium chloride (NS) flush 5-10 mL  5-10 mL IntraVENous PRN    acetaminophen (TYLENOL) tablet 650 mg  650 mg Oral Q4H PRN    promethazine (PHENERGAN) with saline injection 12.5 mg  12.5 mg IntraVENous Q6H PRN    heparin (porcine) injection 5,000 Units  5,000 Units SubCUTAneous Q12H    insulin lispro (HUMALOG) injection   SubCUTAneous AC&HS    benzocaine-menthol (CEPACOL) lozenge  1 Lozenge Oral Q2H PRN    phenol throat spray (CHLORASEPTIC) 1 Spray  1 Spray Oral PRN    0.9% sodium chloride infusion 250 mL  250 mL IntraVENous PRN    epoetin ping (EPOGEN;PROCRIT) injection 20,000 Units  20,000 Units SubCUTAneous Q7D        Review of Systems  A comprehensive review of systems was negative except for that written in the HPI.    Objective:     Visit Vitals    /74 (BP 1 Location: Left arm, BP Patient Position: At rest)    Pulse (!) 104    Temp 98.6 °F (37 °C)    Resp 16    Ht 5' 1\" (1.549 m)    Wt 46.7 kg (102 lb 14.4 oz)    LMP 2016    SpO2 100%    BMI 19.44 kg/m2      O2 Device: Room air    Temp (24hrs), Av.5 °F (36.9 °C), Min:98.1 °F (36.7 °C), Max:98.7 °F (37.1 °C)         10/14 1901 - 10/16 0700  In: -   Out: 75     General: awake, alert, oriented, cachectic and ill appearing, not acutely distressed, appears older than stated age  Eyes; non icteric, EOMI  Neck; supple  Abd; soft, non tender, active bowel sounds  pulm course breath sounds, diminished in bases, non labored    Additional comments:I reviewed the patient's new clinical lab test results. j    Data Review    Recent Results (from the past 24 hour(s))   GLUCOSE, POC    Collection Time: 10/15/17 11:13 AM   Result Value Ref Range    Glucose (POC) 105 (H) 65 - 100 mg/dL   GLUCOSE, POC    Collection Time: 10/15/17  3:54 PM   Result Value Ref Range    Glucose (POC) 134 (H) 65 - 100 mg/dL   GLUCOSE, POC    Collection Time: 10/15/17  8:46 PM   Result Value Ref Range    Glucose (POC) 107 (H) 65 - 100 mg/dL   GLUCOSE, POC    Collection Time: 10/16/17  5:17 AM   Result Value Ref Range    Glucose (POC) 106 (H) 65 - 100 mg/dL   CBC WITH AUTOMATED DIFF    Collection Time: 10/16/17  5:58 AM   Result Value Ref Range    WBC 6.0 4.3 - 11.1 K/uL    RBC 3.64 (L) 4.05 - 5.25 M/uL    HGB 11.9 11.7 - 15.4 g/dL    HCT 35.7 (L) 35.8 - 46.3 %    MCV 98.1 (H) 79.6 - 97.8 FL    MCH 32.7 26.1 - 32.9 PG    MCHC 33.3 31.4 - 35.0 g/dL    RDW 22.0 (H) 11.9 - 14.6 %    PLATELET 48 (L) 466 - 450 K/uL    MPV 9.9 (L) 10.8 - 14.1 FL    DF AUTOMATED      NEUTROPHILS 76 43 - 78 %    LYMPHOCYTES 11 (L) 13 - 44 %    MONOCYTES 11 4.0 - 12.0 %    EOSINOPHILS 2 0.5 - 7.8 %    BASOPHILS 0 0.0 - 2.0 %    IMMATURE GRANULOCYTES 0.2 0.0 - 5.0 %    ABS. NEUTROPHILS 4.5 1.7 - 8.2 K/UL    ABS. LYMPHOCYTES 0.7 0.5 - 4.6 K/UL    ABS. MONOCYTES 0.7 0.1 - 1.3 K/UL    ABS. EOSINOPHILS 0.1 0.0 - 0.8 K/UL    ABS. BASOPHILS 0.0 0.0 - 0.2 K/UL    ABS. IMM. GRANS. 0.0 0.0 - 0.5 K/UL   METABOLIC PANEL, BASIC    Collection Time: 10/16/17  5:58 AM   Result Value Ref Range    Sodium 141 136 - 145 mmol/L    Potassium 4.1 3.5 - 5.1 mmol/L    Chloride 103 98 - 107 mmol/L    CO2 26 21 - 32 mmol/L    Anion gap 12 7 - 16 mmol/L    Glucose 111 (H) 65 - 100 mg/dL    BUN 27 (H) 6 - 23 MG/DL    Creatinine 3.42 (H) 0.6 - 1.0 MG/DL    GFR est AA 19 (L) >60 ml/min/1.73m2    GFR est non-AA 15 (L) >60 ml/min/1.73m2    Calcium 7.0 (L) 8.3 - 10.4 MG/DL   MAGNESIUM    Collection Time: 10/16/17  5:58 AM   Result Value Ref Range    Magnesium 1.7 (L) 1.8 - 2.4 mg/dL   PHOSPHORUS    Collection Time: 10/16/17  5:58 AM   Result Value Ref Range    Phosphorus 3.3 2.5 - 4.5 MG/DL         Assessment/Plan:     Active Problems:    Type 2 diabetes mellitus with hyperglycemia (HCC) (7/4/2016)      Thrombocytopenia (HCC) (9/14/2017)      ESRD (end stage renal disease) ([facility] (11/2/2016)      End stage liver disease ([facility] [address] (9/13/2017)      Underweight (9/15/2017)      Vomiting (10/14/2017)      Esophageal dysphagia (10/15/2017)    NPO for EGD today then advance diet as directed per GI  HD tomorrow. Hospice recommended due to poor prognosis overall but patient not ready for that step.      Care Plan discussed with: Patient/Family      Signed By: Colonel Adán MD     October 16, 2017

## 2017-10-16 NOTE — PROGRESS NOTES
Shift assessment completed. Pt. drowsy. When woken up oriented x4. NPO. Room air. Left leg access positive for bruit and thrill. Call light in reach. Instructed to call for assistance. [place] pad in place and on. Door open.

## 2017-10-16 NOTE — PROGRESS NOTES
Dr. Darlene Fontaine notified of patient decreasing blood pressure with systolic in MD DANISH to place orders.

## 2017-10-17 PROBLEM — K31.84 GASTROPARESIS DUE TO DM (HCC): Status: ACTIVE | Noted: 2017-01-01

## 2017-10-17 PROBLEM — E11.649 HYPOGLYCEMIA ASSOCIATED WITH DIABETES (HCC): Status: ACTIVE | Noted: 2017-01-01

## 2017-10-17 PROBLEM — E11.43 GASTROPARESIS DUE TO DM (HCC): Status: ACTIVE | Noted: 2017-01-01

## 2017-10-17 NOTE — PROGRESS NOTES
Patient resting in bed, HOB elevated, RR even and unlabored, on RA, NG tube to suction, IV fluids infusing, no needs voiced, call light within reach.

## 2017-10-17 NOTE — PROGRESS NOTES
Patient back to floor with c/o pain 10/10. Patient has min. BP now. Patient in bed talking with . MD and this RN talked to patient/ about options. palliative care talking with them both now. Patient going to received pain meds shortly. Call light within reach and patient instructed to call if assistance is needed. Will continue to monitor.

## 2017-10-17 NOTE — PROGRESS NOTES
Patient left with transport to return to room 817 at this time, on 2 liters O2 via nasal cannula. No acute distress noted upon departure.

## 2017-10-17 NOTE — CONSULTS
Palliative Care    Patient: Mariia Cazares MRN: [id number]  SSN: [id number]    YOB: 1970  Age: 55 y.o. Sex: female       Date of Request: 10/17/2017  Date of Consult:  10/17/2017  Reason for Consult:  goals of care  Requesting Physician: Dr Parish Umana     Assessment/Plan:     Principal Diagnosis:    Pain, general  R52  Additional Diagnoses:   · Ascites  R18.8  · Cachexia  R64  · Debility, Unspecified  R53.81  · Dysphagia  R13.10  · Counseling, Encounter for Medical Advice  Z71.9  · Encounter for Palliative Care  Z51.5    Palliative Performance Scale (PPS):  PPS: 40    Medical Decision Making:   Reviewed and summarized notes from admission to present   Discussed case with appropriate providers  Reviewed laboratory and x-ray data from admission to present     Patient resting in bed, Dr. Parish Umana and Rufus Mejia are at the bedside, patient's  is on the phone. Per discussion, patient now DNR and hospice referral has been placed. Answered a couple of questions for patient's  about home hospice support. He is now on the way to the hospital.  Remained at patient's bedside. She states \"what if I'm not ready to die\". Explored this statement. She is not ready to leave her two children, both in their 25s. She is not afraid to die. Time spent talking about legacy work, things she could do once she gets home to help herself and her children through this process. She voiced appreciation of my visit. Patient with generalized pain, Torrie to give IV morphine. Will remain available to assist with symptoms and transition home. Will discuss findings with members of the interdisciplinary team.      Thank you for this referral.   The Palliative Care team is available from 8 am to 4:30 pm Monday-Friday. Medical management during other hours is per the primary attending service.        .    Subjective:     History obtained from:  Patient and Chart    Chief Complaint: ESLD, ESRD  History of Present Illness:  Ms Vickie Olivia is a 54 yo female with PMH of congenital hepatic fibrosis s/o 2 liver transplants, ESRD, GERD, hepatitis C, DM, and other conditions listed below, who presented to the ER from home on 10/14/2017 with c/o persistent nausea and vomiting for several weeks, as well as generalized pain. Pt denied fevers, cough, chills, dyspnea. Work up in the ER revealed Hgb of 7.3, Cr of 4.28, and albumin of 2.0. Pt was admitted for further management. GI evaluated pt, and imaging was suggestive of esophageal stricture. Pt underwent EGD on 10/16/2017, with findings of gastroparesis and a dilated esophagus. NG tube was placed, with high output since. Repeat EGD was planned for today, however, pt with hypotension and hypoxia requiring O2 via NC. Pt also to unstable for dialysis today, although it was not indicated today as her biochemistry is stable. Advance Directive: Yes       Code Status:  DNR            Health Care Power of : Yes - Copy of [address] on file.     Past Medical History:   Diagnosis Date    SEAN (acute kidney injury) ([facility] [address] 6/26/2016    Arthritis     kath feet    Ascites     Benign neoplasm of skin of trunk, except scrotum     pt denies    Cellulitis and abscess of unspecified digit     hx of    Chronic kidney disease     [facility] on Mon/Wed/Fri    Chronic pain     abd area    Congenital hepatic fibrosis     Liver transplant X2    Esophageal varices (HCC)     GERD (gastroesophageal reflux disease)     Hemodialysis access, AV graft ([facility] [address] 11/22/2016    Hepatic encephalopathy ([facility] [address] 10/2016    recently hospitalized for hepatic encephalopathy    Hepatitis C     hx of hepatitis C-- dx after first liver transplant from a transfusion   (first transplant age 13)   [facility] History of gastric ulcer     Insomnia 07/13/2015    no current meds    Liver transplant status 1986 and 1999    X2- congential hepatic fibrosis    Pain in joint, ankle and foot  PICC (peripherally inserted central catheter) in place     PONV (postoperative nausea and vomiting)     some N/V, pt states she does well with Phenergan    Port-a-cath in place     R chest for HD    Raynaud disease     Spleen enlarged     Tachycardia     Thrombocytopenia (HCC)     Type 2 diabetes mellitus (HCC)     insulin only/AVG /s.s of hypoglycemia 30's/Last A1c 5.6    Vasculitis ([address]     resolved      Past Surgical History:   Procedure Laterality Date    HX CHOLECYSTECTOMY  1984    HX COLONOSCOPY      HX OTHER SURGICAL      biliary reconstructive surgery    HX OTHER SURGICAL  2013    EGD    HX OTHER SURGICAL  03/2016    tips procedure    HX OTHER SURGICAL      paracentesis    HX TRANSPLANT  1900,4672    2 liver - first one for congenital hepatic fibrosis, 2nd for Hep C cirrhosis    HX TUBAL LIGATION      LAP,TUBAL CAUTERY      VASCULAR SURGERY PROCEDURE UNLIST Left 11/02/2016    thigh AVG insertion in thigh     Family History   Problem Relation Age of Onset    Diabetes Mother     Heart Disease Mother     Hypertension Mother     Heart Disease Father     Stroke Father     Cancer Maternal Grandfather      liver cancer, alcoholism    No Known Problems Sister     Cancer Maternal Aunt      cervical cancer    Breast Cancer Paternal Grandmother      early 45s    Colon Cancer Paternal Grandmother      late 76s    Cancer Other      maternal niece- cervical cancer    Bipolar Disorder Brother     Heart Disease Brother       Social History   Substance Use Topics    Smoking status: Never Smoker    Smokeless tobacco: Never Used    Alcohol use No     Prior to Admission medications    Medication Sig Start Date End Date Taking? Authorizing Provider   morphine CR (MS CONTIN) 15 mg CR tablet Take 1 Tab by mouth every twelve (12) hours.  Max Daily Amount: 30 mg. 9/21/17   Tam Rosales MD   rifAXIMin [facility]) 550 mg tablet Reported on 5/25/2017 - BID 3/22/16   Historical Provider pantoprazole (PROTONIX) 40 mg tablet Take 40 mg by mouth. Historical Provider   midodrine (PROAMITINE) 5 mg tablet Take  by mouth every eight (8) hours. Historical Provider   mupirocin (BACTROBAN) 2 % ointment Apply  to affected area daily. 8/9/17   Tadeo Childs MD   metoclopramide HCl (REGLAN) 10 mg tablet Take 10 mg by mouth Before breakfast, lunch, dinner and at bedtime. Historical Provider   promethazine (PHENERGAN) 25 mg tablet Take 25 mg by mouth every six (6) hours as needed for Nausea. Historical Provider   insulin aspart (NOVOLOG) 100 unit/mL injection Sliding scale maximum 15 units each meal 3/24/17   Tadeo Childs MD   lactulose (CHRONULAC) 10 gram/15 mL solution Take 30 mL by mouth three (3) times daily. Patient taking differently: Take  by mouth two (2) times a day. 20 ML BID 2/22/17   Giuliana , DO   cycloSPORINE modified (GENGRAF) 25 mg capsule Take 2.5 mg/kg/day by mouth every morning. Indications: Takes in the AM    Historical Provider       Allergies   Allergen Reactions    Aspirin Nausea Only    Codeine Nausea Only    Compazine [Prochlorperazine Edisylate] Unknown (comments)     Causes Lock Jaw    Pcn [Penicillins] Other (comments)     \"drops wbc\"        Review of Systems:  A comprehensive review of systems was negative except for:   Constitutional: Positive for pain and weakness. Objective:     Visit Vitals    BP (!) 88/58 (BP 1 Location: Left arm, BP Patient Position: At rest)    Pulse 67    Temp 97.4 °F (36.3 °C)    Resp 16    Ht 5' 1\" (1.549 m)    Wt 44.5 kg (98 lb 3.2 oz)    SpO2 96%    BMI 18.55 kg/m2        Physical Exam:    General:  Debilitated, frail, cachectic. Cooperative. No acute distress. Eyes:  Conjunctivae/corneas clear. Nose: Nares normal. Septum midline. O2 via NC. Neck: Supple, symmetrical, trachea midline. Lungs:   Clear to auscultation bilaterally, unlabored. Heart:  Regular rate and rhythm.    Abdomen: Soft, non-tender. Mildly distended. Extremities: Normal, atraumatic, no cyanosis or edema. Skin: Skin color, texture, turgor normal. No rash. Neurologic: Nonfocal.   Psych: Alert and oriented.       Assessment:     Hospital Problems  Date Reviewed: 9/15/2017          Codes Class Noted POA    Hypoglycemia associated with diabetes ([facility] ICD-10-CM: E11.649  ICD-9-CM: 250.80  10/17/2017 No        Gastroparesis due to DM [facility]) ICD-10-CM: E11.43, K31.84  ICD-9-CM: 250.60, 536.3  10/17/2017 Yes        * (Principal)Esophageal dysphagia ICD-10-CM: R13.10  ICD-9-CM: 787.20  10/15/2017 Yes        Vomiting ICD-10-CM: R11.10  ICD-9-CM: 787.03  10/14/2017 Yes        Underweight ICD-10-CM: R63.6  ICD-9-CM: 783.22  9/15/2017 Yes        Thrombocytopenia (HCC) (Chronic) ICD-10-CM: D69.6  ICD-9-CM: 287.5  9/14/2017 Yes        End stage liver disease (HCC) (Chronic) ICD-10-CM: K72.90  ICD-9-CM: 572.8  9/13/2017 Yes        Cirrhosis of liver (HCC) (Chronic) ICD-10-CM: K74.60  ICD-9-CM: 571.5  5/9/2017 Yes        ESRD (end stage renal disease) ([facility] (Chronic) ICD-10-CM: N18.6  ICD-9-CM: 585.6  11/2/2016 Yes        Type 2 diabetes mellitus with hyperglycemia (HCC) (Chronic) ICD-10-CM: E11.65  ICD-9-CM: 250.00  7/4/2016 Yes              Signed By: Jacqui Mayen NP     October 17, 2017

## 2017-10-17 NOTE — PROGRESS NOTES
Subjective:  Symptoms:  Stable. Whitney Guzman is 56 yo white female with past med hx of ESLD s/p 2 liver transplants and treatment for Hep C (7/14), EGD with esophageal banding (2016), ESRD (current GFR 12-15), and T2D who presented to ED on 10/14 with several weeks of persistent nauseu and vomiting found to be d/t gastroparesis and dilated esophagus on 10/16 EGD. In August 2017, she was evaluated by Dr. Nabor Garcia ([facility] transplant) for a 3rd possible liver transplant. Her last paracentesis was conducted 10/11/17 and she was prescribed outpatient Rifaximin and lactulose for management of associated hepatic encephalopathy. Upon admission, she was anemic (Hgb 7.3, .7) and thrombocytopenic (55) with air filled dilated esophagus noted on CXR. GI was subsequently consulted, noted gastroparesis with dilated esophagus on 10/16 EGD, recommended follow-up EGD for today, and advised potential need for intubation in order to protect the airway in the event of respiratory distress. Today she appeared cachetic and was notably fatigued following attempted repeat endoscopy which ended up having to be canceled d/t low o2 sats. She cited continued abdominal fullness and persistent nausea without emesis. She stated that she feels like she is being \"left to die\" and is concerned about her next steps of care. ROS  Gen: neg fever, chills, night sweats  Neuro: neg vision changes, confusion, or headaches  Cardio: neg palpitations  Pulm: neg SOB  GI: positive abdominal distention, positive nausea. Neg emesis  : neg dysuria  Legs: positive swelling        Temp:  [97.3 °F (36.3 °C)-99 °F (37.2 °C)]   Pulse (Heart Rate):  []   BP: ()/(37-84)   Resp Rate:  [12-18]   O2 Sat (%):  [90 %-100 %]   Weight:  [44.5 kg (98 lb 3.2 oz)-46.7 kg (102 lb 14.4 oz)]      10/15 1901 - 10/17 0700  In: 750 [I.V.:750]  Out: 1926       Objective:  General Appearance:  Ill-appearing.     Vital signs: (most recent): Blood pressure 93/55, pulse 90, temperature 97.5 °F (36.4 °C), resp. rate 16, height 5' 1\" (1.549 m), weight 44.5 kg (98 lb 3.2 oz), last menstrual period 2016, SpO2 96 %. Pt is ill-appearing and cachetic. She requires nasal cannula to maintain 02 sat. HEENT;  neg scleral icterus. EOMI, PERRLA  Neuro:   sensations intact bilateral upper and LE  Neck:   neg cervical lymphadenopathy  Cardio:  RRR, no m/r/g    Negative JVD  Pulm:   CTAB bilat a/p  GI:  abdomen distended and dull to percussion with diffuse ascites     tender to palpation x 4 quadrants    Decreased bowel sounds    Periumbilical fluid filled mass projects from abdomen  Skin:   Positive distal palmar erythema    --Negative asterixis    CBC: thrombocytopenia (48)    BMP: elevated BUN (27); elevated Cr (3.42)   GFR: 15 (ESRF)    Ammonia (10/14): 51  Albumin (10/16) 2.0  SAA.7 (calculated) -- ascites most likely d/t portal HTN  INR: 1.5  AFP: 1.6      Active Problems:    Type 2 diabetes mellitus with hyperglycemia (HCC) (2016)      Thrombocytopenia ([facility] [address] (2017)      ESRD (end stage renal disease) ([facility] [address] (2016)      End stage liver disease ([facility] [address] (2017)      Underweight (9/15/2017)      Vomiting (10/14/2017)      Esophageal dysphagia (10/15/2017)          Assessment & Plan    1. Gastroparesis and dilated esophagus: today's repeat EGD was canceled d/t low o2 sat; pt required o2 nasal canula to bring o2 sats back up. Will continue metoclopramide q 6 hrs to help alleviate gastroporesis    2. End stage liver disease with cirrhosis and ascites: abdomen distended and dull to percussion with diffuse ascites and periumbilical fluid filled mass projecting from abdomen. calculated MELD score: 24.      3. C0Vhaaolip: d/c current Lispro (3 times before meals and once nightly) d/t today's episode of hypoglycemia (B). 4. Hypotension: recalcitrant to IVF but brought back up with addition of 10 mg midodrine and now stable.     5. ESRD: hemodialysis scheduled for today. Stage 5 ESRF, current GFR: 15    6. Thrombocytopenia: remains today (48)    7.  FTT: pt is cachetic with persistent nausea

## 2017-10-17 NOTE — PROGRESS NOTES
Patient blood sugar @ 53, drowsy, remains alert and oriented, NPO at this time, prn D50W 12.5 g given @ this time, will monitor glucose level.

## 2017-10-17 NOTE — DIALYSIS
Pt arrived in HD unit with bps 75/53 and 71/49. Pt alert. Dr Jeri Alarcon at bedside. Instructed to send pt back to room. Unable to tolerate HD today.

## 2017-10-17 NOTE — PROGRESS NOTES
Hospitalist Progress Note    Subjective:   Daily Progress Note: 10/17/2017 10:39 AM    Ms. Nando Collins is a 54 yo white female, with a past medical history of ESLD and ESRD, who presented 10/14 for persistent nausea and vomiting and inability to keep po intake down found to be due to esophageal dysphagia due to probable stricture. CXR shows an air filled and dilated esophagus. GI and nephrology following. Klaudia Hamilton yesterday revealed fluid/food contents in esophagus/stomach that required suctioning. NGT placed and high volume material suctioned out since. Made completely npo by gi for repeat EGD today. sbp has been low due to having to hold her midodrine. Now requiring nasal cannula due to oxygen sats in 80s and therefore GI/anesthesia canceling today's repeat EGD. She is hungry and nauseated. Feels ill. Denies chest pain, fever, chills. Scheduled for dialysis today.     Current Facility-Administered Medications   Medication Dose Route Frequency    midodrine (PROAMITINE) tablet 10 mg  10 mg Oral Q8H    lactulose (CHRONULAC) solution 20 g  20 g Oral BID    metoclopramide HCl (REGLAN) injection 5 mg  5 mg IntraVENous Q6H    metoclopramide HCl (REGLAN) tablet 10 mg  10 mg Oral AC&HS    dextrose (D50W) injection syrg 25 g  25 g IntraVENous PRN    cycloSPORINE modified (GENGRAF, NEORAL) capsule 125 mg  2.5 mg/kg/day Oral 7am    morphine CR (MS CONTIN) tablet 15 mg  15 mg Oral Q12H    pantoprazole (PROTONIX) tablet 40 mg  40 mg Oral ACB    rifAXIMin (XIFAXAN) tablet 550 mg  550 mg Oral BID    sodium chloride (NS) flush 5-10 mL  5-10 mL IntraVENous Q8H    sodium chloride (NS) flush 5-10 mL  5-10 mL IntraVENous PRN    acetaminophen (TYLENOL) tablet 650 mg  650 mg Oral Q4H PRN    promethazine (PHENERGAN) with saline injection 12.5 mg  12.5 mg IntraVENous Q6H PRN    heparin (porcine) injection 5,000 Units  5,000 Units SubCUTAneous Q12H    insulin lispro (HUMALOG) injection   SubCUTAneous AC&HS    benzocaine-menthol (CEPACOL) lozenge  1 Lozenge Oral Q2H PRN    phenol throat spray (CHLORASEPTIC) 1 Spray  1 Spray Oral PRN    0.9% sodium chloride infusion 250 mL  250 mL IntraVENous PRN    epoetin ping (EPOGEN;PROCRIT) injection 20,000 Units  20,000 Units SubCUTAneous Q7D        Review of Systems  A comprehensive review of systems was negative except for that written in the HPI.     Objective:     Visit Vitals    BP 93/55    Pulse 90    Temp 97.5 °F (36.4 °C)    Resp 16    Ht 5' 1\" (1.549 m)    Wt 44.5 kg (98 lb 3.2 oz)    LMP 2016    SpO2 96%    BMI 18.55 kg/m2    O2 Flow Rate (L/min): 2 l/min O2 Device: Nasal cannula    Temp (24hrs), Av.3 °F (36.8 °C), Min:97.5 °F (36.4 °C), Max:99 °F (37.2 °C)         10/15 1901 - 10/17 0700  In: 750 [I.V.:750]  Out: 1926     General: awake, alert, oriented, frail, cachectic appearing  Eyes; non icteric EoMI  Neck; supple  Cv RRR  Pulm; diminished in bases  Abd; soft, non tender, active bowel sounds  Skin/ext: thin, temporal wasting and thenar atrophy present    Additional comments:I reviewed the patient's new clinical lab test results. j    Data Review    Recent Results (from the past 24 hour(s))   GLUCOSE, POC    Collection Time: 10/16/17 11:25 AM   Result Value Ref Range    Glucose (POC) 83 65 - 100 mg/dL   GLUCOSE, POC    Collection Time: 10/16/17  3:46 PM   Result Value Ref Range    Glucose (POC) 61 (L) 65 - 100 mg/dL   GLUCOSE, POC    Collection Time: 10/16/17  4:34 PM   Result Value Ref Range    Glucose (POC) 142 (H) 65 - 100 mg/dL   GLUCOSE, POC    Collection Time: 10/16/17  8:40 PM   Result Value Ref Range    Glucose (POC) 70 65 - 100 mg/dL   GLUCOSE, POC    Collection Time: 10/17/17  5:14 AM   Result Value Ref Range    Glucose (POC) 53 (L) 65 - 100 mg/dL   GLUCOSE, POC    Collection Time: 10/17/17  6:20 AM   Result Value Ref Range    Glucose (POC) 105 (H) 65 - 100 mg/dL         Assessment/Plan:     Principal Problem:    Esophageal dysphagia (10/15/2017)  EGD canceled for today due to increased oxygen requirements. HD today. Will check CXR. Unfortunately this patient will always be very high risk due to her chronic severe comorbidities. Active Problems:    Type 2 diabetes mellitus with hyperglycemia ([address] (7/4/2016)      Thrombocytopenia ([address] (9/14/2017)      ESRD (end stage renal disease) ([address] (11/2/2016)  HD today. Instructed RN to leave NC to suction off upon return to room until after HD. To give midodrine 10 mg (increase to 10 mg TID from 5 mg TID) upon return in hopes of raising bp enough to tolerate dialysis. Cirrhosis of liver ([address] (5/9/2017)      End stage liver disease ([address] (9/13/2017)  Has failed transplants x two. Poor prognosis. Underweight (9/15/2017)      Vomiting (10/14/2017)      Hypoglycemia associated with diabetes ([address] (10/17/2017)  All insulin stopped right now. Is npo. If cannot tolerate po intake will need to consider [place] tube or peg?       Gastroparesis due to DM ([address] (10/17/2017)  Increase reglan to 10 mg QID    Care Plan discussed with: Patient/Family and Nurse      Signed By: Elida Edgar MD     October 17, 2017

## 2017-10-17 NOTE — PROGRESS NOTES
Patient has became more alert since IV fluids were initiated, however c/o nausea, prn phenergan was given with relief, patient sitting up in bed, no needs voiced, call light within reach.

## 2017-10-17 NOTE — PROGRESS NOTES
NGT taken out without difficulty. Patient also requested a grape pop sickle which was also given. Will continue to monitor.

## 2017-10-17 NOTE — PROGRESS NOTES
Went into room again (my fifth visit today) to further discuss goals of care and clinical course with patient, her , and her parents. Patient has elected to go to the [facility] tomorrow morning for end of life care.  is grieving and therefore angry. At times raising his voice to his wife and me. Did discuss how we will maintain a respectful relationship at all times and that yelling at his wife in her current end of life situation will not be tolerated.      Caden Olson MD

## 2017-10-17 NOTE — PROGRESS NOTES
Patient sitting up in bed alert and oriented , IV fluids infusing, blood glucose recheck @ 105, no c/o pain or discomfort, no needs voiced @ this time, call light within reach.

## 2017-10-17 NOTE — PROGRESS NOTES
Patient resting in bed with eyes closed, drowsy but arousal to verbal stimuli, NG to suction, green/brown in color with 350cc in canister, patient HOB elevated, c/o of nausea with no emesis @ this time, no c/o pain or discomfort, call light within reach.

## 2017-10-17 NOTE — PROGRESS NOTES
Patient given D50 for BS of 62. MD aware. Patient also c/o nausea and pain. MD OK with phenergan but no pain meds r/t BP. Dialysis RN notified of BP, BS, and IV running for a 500 bolus. Patient going to dialysis now via transport.

## 2017-10-17 NOTE — PROGRESS NOTES
Long discussion with patient at bedside regarding goals of care and her poor prognosis. Discussed how her hypotension is not allowing for dialysis and that she cannot live without dialysis. Her chronic conditions are severe and are not fixable. Discussion held with  on the phone. Patient is now a DNR status and wants hospice consulted for home hospice for end of life care. Prn ativan and morphine for symptoms management.      Goyo Horton MD

## 2017-10-17 NOTE — PROGRESS NOTES
GI DAILY PROGRESS NOTE    Admit Date:  10/14/2017    Today's Date:  10/17/2017    CC:  Nausea, inability to keep po intake down    Subjective:     Patient was scheduled to have a repeat EGD today, but was unable to have the procedure due to hypotension and O2 sats running 85-89%. She has been evaluated by hospitalist also. O2 NC in place, as well as NG tube. She states she is having pain all over with soreness, and continues to have nausea, but denies any vomiting. She has not had any BMs. HD pending later today. Medications:   Current Facility-Administered Medications   Medication Dose Route Frequency    metoclopramide HCl (REGLAN) tablet 10 mg  10 mg Oral Q6H    midodrine (PROAMITINE) tablet 10 mg  10 mg Oral Q8H    lactulose (CHRONULAC) solution 20 g  20 g Oral BID    dextrose (D50W) injection syrg 25 g  25 g IntraVENous PRN    cycloSPORINE modified (GENGRAF, NEORAL) capsule 125 mg  2.5 mg/kg/day Oral 7am    morphine CR (MS CONTIN) tablet 15 mg  15 mg Oral Q12H    pantoprazole (PROTONIX) tablet 40 mg  40 mg Oral ACB    rifAXIMin (XIFAXAN) tablet 550 mg  550 mg Oral BID    sodium chloride (NS) flush 5-10 mL  5-10 mL IntraVENous Q8H    sodium chloride (NS) flush 5-10 mL  5-10 mL IntraVENous PRN    acetaminophen (TYLENOL) tablet 650 mg  650 mg Oral Q4H PRN    promethazine (PHENERGAN) with saline injection 12.5 mg  12.5 mg IntraVENous Q6H PRN    heparin (porcine) injection 5,000 Units  5,000 Units SubCUTAneous Q12H    insulin lispro (HUMALOG) injection   SubCUTAneous AC&HS    benzocaine-menthol (CEPACOL) lozenge  1 Lozenge Oral Q2H PRN    phenol throat spray (CHLORASEPTIC) 1 Spray  1 Spray Oral PRN    0.9% sodium chloride infusion 250 mL  250 mL IntraVENous PRN    epoetin ping (EPOGEN;PROCRIT) injection 20,000 Units  20,000 Units SubCUTAneous Q7D       Review of Systems:  ROS was obtained, with pertinent positives as listed above.   She has SOB, which she associates with the all over pain with trying to take a deep breath. Diet:  NPO    Objective:   Vitals:  Visit Vitals    BP 93/55    Pulse 90    Temp 97.5 °F (36.4 °C)    Resp 16    Ht 5' 1\" (1.549 m)    Wt 44.5 kg (98 lb 3.2 oz)    LMP 01/02/2016    SpO2 96%    BMI 18.55 kg/m2     Intake/Output:     10/15 1901 - 10/17 0700  In: 750 [I.V.:750]  Out: 1926   Exam:  General appearance: alert, cooperative, chronically ill appearing, O2 NC in place, NG tube in place  Lungs: coarse BS to auscultation bilaterally anteriorly  Heart: regular rate and rhythm  Abdomen: some distention, soft in areas, tender diffusely over abdomen. Bowel sounds normal. No masses, no organomegaly. Large umbilical hernia noted, soft, not reducible. Neuro:  alert and oriented x 4, no asterixis    Data Review (Labs):    Recent Labs      10/16/17   0558  10/15/17   0613   WBC  6.0  6.1   HGB  11.9  11.1*   HCT  35.7*  33.1*   PLT  48*  54*   MCV  98.1*  96.8   NA  141  142   K  4.1  3.8   CL  103  104   CO2  26  30   BUN  27*  18   CREA  3.42*  2.63*   CA  7.0*  7.1*   MG  1.7*   --    GLU  111*  73      Ref. Range 10/14/2017 00:49   INR Latest Ref Range: 0.9 - 1.2   1.5 (H)   Prothrombin time Latest Ref Range: 9.6 - 12.0 sec 16.1 (H)   aPTT Latest Ref Range: 23.5 - 31.7 SEC 42.9 (H)   D DIMER Latest Ref Range: <0.55 ug/ml(FEU) 4.09 (HH)      Ref. Range 10/14/2017 00:49   BNP Latest Units: pg/mL 500      Ref. Range 10/14/2017 01:49   CYCLOSPORINE Latest Units: ng/mL <25     Portable chest xray 14 Oct 2017   COMPARISON: September 16, 2017   INDICATION: chest pain   FINDINGS:    Lungs are underinflated. There is mild left basilar density. No consolidation in  the right lung. No pneumothorax or pulmonary edema. No large pleural effusion. Cardiac mediastinal contour is within normal limits. Left-sided central venous  catheter is stable. Esophagus is distended and air-filled.  Multiple clips are  seen overlying the right hemiabdomen.   IMPRESSION  IMPRESSION:   1. Air-filled dilated esophagus. Stricture or obstruction at the GE junction  possible. Follow-up is suggested.   2. Mild left basilar density, likely atelectasis or developing consolidation. EGD 16 Oct 2017 with Dr. Sharren Kayser with IMPRESSION: Gastroparesis, Dilated esophagus, Prior to even putting the scope in the oropharynx, the pt was regurgitating fluid into the oropharynx (aggressive suctioning). PLAN: - Keep NPO today - Plans for repeat EGD tomorrow (may need to intubate pt for airway protection). Assessment:     Principal Problem:    Esophageal dysphagia (10/15/2017)    Active Problems:    Type 2 diabetes mellitus with hyperglycemia ([address] (7/4/2016)      Thrombocytopenia ([address] (9/14/2017)      ESRD (end stage renal disease) ([address] (11/2/2016)      Cirrhosis of liver ([address] (5/9/2017)      End stage liver disease ([address] (9/13/2017)      Underweight (9/15/2017)      Vomiting (10/14/2017)      Hypoglycemia associated with diabetes ([address] (10/17/2017)      Gastroparesis due to DM ([address] (10/17/2017)    56 yo female with PMH of congenital fibrosis of the liver and hepatitis C (tx at [facility] 7/2014) s/p 2 liver transplants (most recently orthotopic liver transplant in 1999), CKD on diaylsis, DM type II, Esophageal varices, hepatic encephalopathy, thrombocytopenia, umbilical hernia, GERD, ascites with failed TIPS, weight loss, hx of leukocytoclastic vasculitis, cyroglobulinemia, Raynaud's disease, portal vein thrombosis, and retroperitoneal mass, who was seen in consultation 14 Oct 2017 at the request of Dr. Saad Penaloza, inability to keep PO down, and was noted on CXR on admission with findings of an air-filled dilated esophagus, stricture/obstruction at the GE junction possible. She has had new onset dysphagia in the past month. HGB was 7.3 compared to 9.4 on 3 Oct 2017, without any melena/hematochezia, which improved with blood transfusions and remains stable.   EGD 16 Oct 2017 was unable to be completed due to pt was regurgitating fluid into the oropharynx - prior to even putting the scope in the oropharynx (aggressive suctioning), gastroparesis, dilated esophagus. Repeat EGD was planned for today, but had to be cancelled due to hypotension and decreased O2 sats.     Plan:     -Holding EGD at this time.  -Supportive care. -Continue NG tube. -CXR pending.  -Continue Rifaximin, Cyclosporine, Lactulose.   -Continue Reglan. Given multiple meds and guidelines for reglan use this should only be continued for total 6 wks and changed to Domperidone.  -Minimize narcotic medications as much as possible (patient has had chronic pain and is on morphine at home.  -Follow. Patient is seen and examined in collaboration with Dr. Eugenia Howe. Assessment and plan as per Dr. Cristina Nolen. Yadira Elliott [facility]

## 2017-10-17 NOTE — PROGRESS NOTES
Patient in bed resting with no complaints at this time. Patient is alert and orientated with no distress noted. Central line intact and patent with no s/s of infection noted. Respirations even and unlabored with heart rate regular. Patient unable to ambulate independently without assistance; bedrest at this time r/t NGT and weakness. Patient NPO for EGD. Patient states she is starving and wants to eat. Bed in low locked position with call light within reach. Patient instructed to call if assistance is needed. Will continue to monitor.     Patient to dialysis this AM

## 2017-10-17 NOTE — PROGRESS NOTES
Patient 250cc bolus complete, bp @ 70/40 to L arm and 76/37 to R arm O2 sat @ 90% on RA, patient drowsy but arousable, MD paged.

## 2017-10-17 NOTE — PROGRESS NOTES
TRANSFER - OUT REPORT:    Procedure cancelled in pre-op area per Dr. Dominik Chavez and Dr. Dipesh Schaeffer. Verbal report given to Anyi Cardenas RN (name) on Lehr Males  being transferred back to room 817 (unit) for routine progression of care. Report consisted of patients Situation, Background, Assessment and   Recommendations(SBAR). Information from the following report(s) SBAR was reviewed with the receiving nurse. Lines:   Venous Access Device duel lumen cath 05/15/17 Upper chest (subclavicular area), left (Active)   Central Line Being Utilized Yes 10/17/2017  7:07 AM   Criteria for Appropriate Use Limited/no vessel suitable for conventional peripheral access 10/17/2017  7:07 AM   Site Assessment Clean, dry, & intact 10/17/2017  7:07 AM   Date of Last Dressing Change 10/15/17 10/16/2017  7:10 PM   Dressing Status Clean, dry, & intact 10/17/2017  7:07 AM   Dressing Type Transparent 10/17/2017  7:07 AM   Positive Blood Return (Medial Site) Yes 10/17/2017  7:07 AM   Positive Blood Return (Lateral Site) Yes 10/17/2017  7:07 AM   Alcohol Cap Used No 10/17/2017  7:07 AM        Opportunity for questions and clarification was provided.       Patient transported with:   O2 @ 2 liters

## 2017-10-17 NOTE — PROGRESS NOTES
Patient stable with family at bedside for support. Will continue to monitor and support.   Report to be given to oncoming RN 7p-7a

## 2017-10-17 NOTE — PROGRESS NOTES
Left message for patient's  - just calling to check  In. Spoke with inpatient case management - suggested Palliative Care Consult with the idea of support and keeping the idea/conversation about hospice support on the table. Family understandably reluctant but education may help them with decision making.

## 2017-10-17 NOTE — PROGRESS NOTES
[facility] progress note    Follow-Up on: 10/17/2017     Patient seen and examined. Weak. Complaining of abdominal pain. No SOB reported. SBP of 71 and DBP of 40. ROS:  Gen - no fever, no chills  CV - no chest pain, no orthopnea  Lung - no shortness of breath, no cough  Abd - noted tenderness, noted nausea  Ext - no edema    Exam:  Vitals:    10/17/17 0734 10/17/17 0941 10/17/17 0947 10/17/17 1100   BP: (!) 78/41 94/48 93/55 (!) 75/52   Pulse: 86 86 90 86   Resp: 15 16 16 16   Temp: 97.5 °F (36.4 °C)   97.3 °F (36.3 °C)   SpO2: 94% 96% 96% 96%   Weight:       Height:             Intake/Output Summary (Last 24 hours) at 10/17/17 1231  Last data filed at 10/17/17 0655   Gross per 24 hour   Intake              750 ml   Output              851 ml   Net             -101 ml       GEN - weak  CV - S1, S2, no rub  Lung - clear bilaterally  Abd - soft, nontender  Ext - no edema    Recent Labs      10/16/17   0558  10/15/17   0613   WBC  6.0  6.1   HGB  11.9  11.1*   HCT  35.7*  33.1*   PLT  48*  54*        Recent Labs      10/16/17   0558  10/15/17   0613   NA  141  142   K  4.1  3.8   CL  103  104   CO2  26  30   BUN  27*  18   CREA  3.42*  2.63*   CA  7.0*  7.1*   GLU  111*  73   MG  1.7*   --    PHOS  3.3   --        Assessment / Plan:    1. ESRD - too unstable and no need for dialysis today    2. ESLD - has been turned down at [facility] in the last year    3. Thrombocytopenia    4. Severe debility    I spoke with our dialysis team at [facility] today. She has been turned down for liver transplants earlier this year at [facility] and most recently at [facility]. She is clearly not a candidate due to her severe debility and muscle wasting. She would not survive a procedure. She was given a bolus of IVF on the floor. BP remains low and will not tolerate any ultrafiltration - there really is no indication for dialysis. Biochemistry is stable.   If ok with Dr. Vijaya Wheeler, I would like to get palliative care involved and strongly recommend hospice. She has had recent, multiple hospitalizations. Patient complains of pain - let's treat her pain and delaying the inevitable. More than 35 minutes spent.

## 2017-10-18 PROBLEM — N18.6 TYPE II DIABETES MELLITUS WITH END-STAGE RENAL DISEASE (HCC): Status: ACTIVE | Noted: 2017-01-01

## 2017-10-18 PROBLEM — I95.9 HYPOTENSION: Status: ACTIVE | Noted: 2017-01-01

## 2017-10-18 PROBLEM — R18.8 ASCITES: Status: ACTIVE | Noted: 2017-01-01

## 2017-10-18 PROBLEM — I81 PORTAL VEIN THROMBOSIS: Status: ACTIVE | Noted: 2017-01-01

## 2017-10-18 PROBLEM — R10.13 ABDOMINAL PAIN, EPIGASTRIC: Status: ACTIVE | Noted: 2017-01-01

## 2017-10-18 PROBLEM — N28.9 DISORDER OF KIDNEY AND URETER: Status: ACTIVE | Noted: 2017-01-01

## 2017-10-18 PROBLEM — I99.9 CIRCULATORY SYSTEM DISORDER: Status: ACTIVE | Noted: 2017-01-01

## 2017-10-18 PROBLEM — Z95.828 S/P TIPS (TRANSJUGULAR INTRAHEPATIC PORTOSYSTEMIC SHUNT): Status: ACTIVE | Noted: 2017-01-01

## 2017-10-18 PROBLEM — K76.9 LIVER DISEASE: Status: ACTIVE | Noted: 2017-01-01

## 2017-10-18 PROBLEM — K76.6 PORTAL HYPERTENSION (HCC): Status: ACTIVE | Noted: 2017-01-01

## 2017-10-18 PROBLEM — K42.9 UMBILICAL HERNIA WITHOUT OBSTRUCTION OR GANGRENE: Status: ACTIVE | Noted: 2017-01-01

## 2017-10-18 PROBLEM — Z01.818 PRE-TRANSPLANT EVALUATION FOR LIVER TRANSPLANT: Status: ACTIVE | Noted: 2017-01-01

## 2017-10-18 PROBLEM — E11.22 TYPE II DIABETES MELLITUS WITH END-STAGE RENAL DISEASE (HCC): Status: ACTIVE | Noted: 2017-01-01

## 2017-10-18 PROBLEM — E88.09 HYPOALBUMINEMIA: Status: ACTIVE | Noted: 2017-01-01

## 2017-10-18 PROBLEM — N18.9 CHRONIC RENAL FAILURE: Status: ACTIVE | Noted: 2017-01-01

## 2017-10-18 PROBLEM — Z99.2 DEPENDENCE ON RENAL DIALYSIS (HCC): Status: ACTIVE | Noted: 2017-01-01

## 2017-10-18 PROBLEM — K22.10 ULCER OF ESOPHAGUS WITHOUT BLEEDING: Status: ACTIVE | Noted: 2017-01-01

## 2017-10-18 PROBLEM — R19.00 RETROPERITONEAL MASS: Status: ACTIVE | Noted: 2017-01-01

## 2017-10-18 PROBLEM — G93.40 ENCEPHALOPATHY: Status: ACTIVE | Noted: 2017-01-01

## 2017-10-18 PROBLEM — G90.9 AUTONOMIC DYSFUNCTION: Status: ACTIVE | Noted: 2017-01-01

## 2017-10-18 PROBLEM — R16.1 SPLENOMEGALY: Status: ACTIVE | Noted: 2017-01-01

## 2017-10-18 PROBLEM — R52 PAIN: Status: ACTIVE | Noted: 2017-01-01

## 2017-10-18 PROBLEM — R80.9 PROTEINURIA: Status: ACTIVE | Noted: 2017-01-01

## 2017-10-18 NOTE — HOSPICE
Met with patient and spouse in room and discussed hospice care, philosophy, and symptom management. Discussed differences between home hospice and hospice house care and why at this time it was felt that hospice house would be better equipped to address her needs and symptoms. Spouse grieving and angry, raising voice at times but apologetic. Dr. Kavin Carrillo in room addressing further questions regarding why she would not be a surgical candidate at this time. Patient teary but voiced understanding and stated she does not want to get home and be in pain. Agreed t hospice house admission for symptoms management. Addressed that we were not giving up but were changing what the goals of the care are this time. Patient voiced understanding and signed consents for admission with request to transfer tomorrow (10/18/2017).  Thank you for this referral.   Laurie Rojas RN  [facility] Nurse Liaison  [facility] [phone]

## 2017-10-18 NOTE — PROGRESS NOTES
LMSW went to visit the pt in her room. She was holding the phone and her spouse was on the other line. We talked to Facundo Laurent NP on speaker phone,  He voiced how important keeping her on Lactulose is to him and her family. We discussed that they have been  since 1999 and 2 children(step) he said we don't say step children. LMSW discussed with him the possibility of her  Not being able to take the medications. We discussed the hospice team and the level of care and support to get the family through this process. He will be coming in to visit soon.       ERINSW will do a full initial assessment then

## 2017-10-18 NOTE — PROGRESS NOTES
INITIAL SPIRITUAL ASSESSMENT VISIT:    Situation:  Patient resting in bed. Patient not talking, eyes closed, but awake and aware enough to respond with nodding and raised eyebrows. Patient's mother, Tarun Sneed, and step-father, Olga Lidia Hoyt present throughout. Patient's step-mother arrived close to end of visit. Background:  Patient is a 55year old MWF who arrived at [address] [place] [address] this day. Per family members, up until previous day when patient was at hospital, family believed there was still a possibility of patient obtaining a third transplant. Patient's mother and step-dad reported having been very involved in health care of patient since the 18's. Assessment/Response:  Support provided to all present through ministry of presence, words of encouragement, and  providing info on hospice care in general and bereavement support available through [facility]. Family had many questions, so  coordinated care with staff who provided medical  info and assurance to patient's family.  learned that patient's mom and step-dad are [employer]ists, but did not get specific info on walter connections of patient. Visit was concluded with prayer for patient and family, after  asked patient if patient would like  to pray.  (patient nodded head in response). Plan of care:  Spiritual/emotional support to be provided as needed, requested, or referred for patient and family.

## 2017-10-18 NOTE — DISCHARGE SUMMARY
Physician Discharge Summary       Patient: Rashawn Jimenez MRN: [id number]  SSN: [id number]    YOB: 1970  Age: 55 y.o.   Sex: female    PCP: Cecelia Menjivar MD    Admit date: 10/14/2017  Admitting Provider: Doyle Chen MD    Discharge date: 10/18/2017  Discharging Provider: Jenifer Day MD    * Admission Diagnoses: Dysphagia, unspecified type [R13.10]  H/O esophageal varices [Z87.19]  H/O cirrhosis [Z87.19]  Nausea [R11.0]    * Discharge Diagnoses:    Hospital Problems as of 10/18/2017  Date Reviewed: 9/15/2017          Codes Class Noted - Resolved POA    Hypoglycemia associated with diabetes ([facility] ICD-10-CM: E11.649  ICD-9-CM: 250.80  10/17/2017 - Present No        Gastroparesis due to DM [facility]) ICD-10-CM: E11.43, K31.84  ICD-9-CM: 250.60, 536.3  10/17/2017 - Present Yes        * (Principal)Esophageal dysphagia ICD-10-CM: R13.10  ICD-9-CM: 787.20  10/15/2017 - Present Yes        Vomiting ICD-10-CM: R11.10  ICD-9-CM: 787.03  10/14/2017 - Present Yes        Underweight ICD-10-CM: R63.6  ICD-9-CM: 783.22  9/15/2017 - Present Yes        Thrombocytopenia (HCC) (Chronic) ICD-10-CM: D69.6  ICD-9-CM: 287.5  9/14/2017 - Present Yes        End stage liver disease ([facility] (Chronic) ICD-10-CM: K72.90  ICD-9-CM: 572.8  9/13/2017 - Present Yes        Cirrhosis of liver (HCC) (Chronic) ICD-10-CM: K74.60  ICD-9-CM: 571.5  5/9/2017 - Present Yes        ESRD (end stage renal disease) ([facility] (Chronic) ICD-10-CM: N18.6  ICD-9-CM: 585.6  11/2/2016 - Present Yes        Type 2 diabetes mellitus with hyperglycemia (HCC) (Chronic) ICD-10-CM: E11.65  ICD-9-CM: 250.00  7/4/2016 - Present Yes              * Hospital Course:     Ms. Ha Marsh is a 56 yo white female, with a past medical history of ESLD and ESRD, who presented 10/14 for persistent nausea and vomiting and inability to keep po intake down found to be due to esophageal dysphagia due to probable stricture and severe gastroparesis not improving with iv reglan. CXR shows an air filled and dilated esophagus. GI and nephrology following.  EGD 10/16 revealed fluid/food contents in esophagus/stomach that required high volume suctioning. NGT placed and high volume material suctioned out since then. Made completely npo by gi for repeat EGD yesterday as they were unable to visualize the GI tract due to retained material.  Yesterday's EGD had to be cancelled though due to hypotension and decreasing oxygen saturation. Was high risk initially to begin with and was going to be done under general anesthesia due to such a high aspiration risk but felt by providers that she was too unstable yesterday to proceed. sbp has been low during her hospitalization, lowering into low 70s even when giving her midodrine prior to being made NPO. She was taken to dialysis yesterday but HD was not started as her hypotension will not allow for dialysis. Had multiple discussions with patient yesterday about how sadly she is dying. She has failed two liver transplants and is too weak to tolerate another and has been denied transplant by [facility]. She cannot survive without dialysis and unfortunately cannot tolerate dialysis due to her hypotension. She has elected to go to [facility] for end of life care. I have discussed this numerous times with her  as well as her parents. Her  is grieving and is quite angry at the situation. All questions have been answered to the best of my ability. * Procedures:   Procedure(s) with comments:  Procedure cancelled - per Dr. Laureen Todd and Dr. Rosenda Ortega. Low BP and O2 sat.     Consults: GI, Nephrology and Palliative Care    Discharge Exam:  General: ill appearing, cachectic and emaciated, temporal wasting and thenar atrophy present  Eyes; EOMI  Neck; supple  CV: RRR  Pulm; diminished in bases, non labored  Abd; significantly protruding umbilical hernia  Skin/ext: no obvious rashes/lesions, poor skin madeleine    * Discharge Condition: serious  * Disposition: [facility]    Discharge Medications:  Current Discharge Medication List      CONTINUE these medications which have NOT CHANGED    Details   rifAXIMin (XIFAXAN) 550 mg tablet Reported on 2017 - BID      lactulose (CHRONULAC) 10 gram/15 mL solution Take 30 mL by mouth three (3) times daily. Qty: 480 mL, Refills: 0      cycloSPORINE modified (GENGRAF) 25 mg capsule Take 2.5 mg/kg/day by mouth every morning. Indications: Takes in the AM         STOP taking these medications       morphine CR (MS CONTIN) 15 mg CR tablet Comments:   Reason for Stopping:         pantoprazole (PROTONIX) 40 mg tablet Comments:   Reason for Stopping:         midodrine (PROAMITINE) 5 mg tablet Comments:   Reason for Stopping:         mupirocin (BACTROBAN) 2 % ointment Comments:   Reason for Stopping:         metoclopramide HCl (REGLAN) 10 mg tablet Comments:   Reason for Stopping:         promethazine (PHENERGAN) 25 mg tablet Comments:   Reason for Stopping:         insulin aspart (NOVOLOG) 100 unit/mL injection Comments:   Reason for Stoppin minutes spent in discharge planning and coordination of care.      Signed:  Luciana Husain MD  10/18/2017  9:12 AM

## 2017-10-18 NOTE — PROGRESS NOTES
Late Entry  Incoming call from patient's  - requesting that I meet him at [facility].  reports that he was part of a meeting via phone whereby hospitalist has had a woody conversation discussing poor prognosis and is recommending hospice.  is tearful on the phone, very upset. Meeting with patient and  in hospital room 817. Discussed goals of care and hospice - what to expect from hospice. Answered questions to the best of my ability. Care coordination call with Deisy Mccoy RN Supervisor for REHABILITATION [facility] Intake. Arranged for hospice RN Liaison to visit and initiate paperwork  Two subsequent visits from Dr. Bobbe Severe, one of which was after patient's mother, stepfather arrived.  grew angry and tearful, raising his voice with patient who also became quite upset. Reminded  that this situation is about the patient and we must respect her wishes.  also grew angry with me for not advocating for him - again reminded him that Layne Medina is my patient and that we must honor her decisions. Stepped out of the room at request of patient's mother - \"Please call someone\" - as  became increasingly angry with his words and tone. Did not return to the room, security was called, alerted patient's nurse - Hans Warner. Care coordination call with Deisy Mccoy RN - Handoff report    [facility]

## 2017-10-18 NOTE — H&P
History and Physical    Patient: Yanni Bell MRN: [id number]  SSN: [id number]    YOB: 1970  Age: 55 y.o. Sex: female      Subjective:      Yanni Bell is a 55 y.o. female who has a hospice diagnosis of chronic renal failure. Hospice associated diagnoses are gastroparesis, liver failure, congenital fibrosis, liver transplant X 2, portal hypertension s/p TIPS, autonomic dysfunction, hypotension, pancytopenia, encephalopathy, coagulopathy, esophageal ulceration, esophageal varices and ascites. Non-associated diagnosis is Type II diabetes. She was admitted to the hospital due to hypotension. 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 She is unable to tolerate dialysis due to hypotension. She is unable to tolerate po medications, food or fluids due to severe gastroparesis. She has made the decision, with support of her spouse and family, to stop aggressive measures. Without further treatment, her life expectancy is less than 2 weeks. Due to her recent decline, she and her family have elected to forgo further medical treatment and pursue comfort measures with hospice care. Patient admitted with chronic renal failure for management of pain, dyspnea and nausea.       Past Medical History:   Diagnosis Date    Abdominal pain 5/8/2017    Abdominal pain, acute 5/8/2017    Acute respiratory failure with hypercapnia ([address] 7/5/2016    SEAN (acute kidney injury) ([address] 6/26/2016    Arthritis     kath feet    Ascites     Aspiration pneumonia ([address] 9/20/2017    Benign neoplasm of skin of trunk, except scrotum     pt denies    Cellulitis and abscess of unspecified digit     hx of    Chronic kidney disease     [facility] on Mon/Wed/Fri    Chronic pain     abd area    Congenital hepatic fibrosis     Liver transplant X2    Erythematous rash 3/7/2013    Esophageal varices (HCC)     GERD (gastroesophageal reflux disease)     Hemodialysis access, AV graft ([address] 11/22/2016    Hepatic encephalopathy ([address] 10/2016    recently hospitalized for hepatic encephalopathy    Hepatitis C     hx of hepatitis C-- dx after first liver transplant from a transfusion   (first transplant age 13)   Velta Ganser History of gastric ulcer     Hypotension 7/6/2016    Insomnia 07/13/2015    no current meds    Liver transplant status 1986 and 1999    X2- congential hepatic fibrosis    Pain in joint, ankle and foot     PICC (peripherally inserted central catheter) in place     PONV (postoperative nausea and vomiting)     some N/V, pt states she does well with Phenergan    Port-a-cath in place     R chest for HD    Raynaud disease     Spleen enlarged     Tachycardia     Thrombocytopenia (HCC)     Type 2 diabetes mellitus (HCC)     insulin only/AVG /s.s of hypoglycemia 30's/Last A1c 5.6    Type 2 diabetes mellitus with hyperglycemia ([address] 7/4/2016    Vasculitis ([facility]     resolved     Past Surgical History:   Procedure Laterality Date    HX CHOLECYSTECTOMY  1984    HX COLONOSCOPY      HX OTHER SURGICAL      biliary reconstructive surgery    HX OTHER SURGICAL  2013    EGD    HX OTHER SURGICAL  03/2016    tips procedure    HX OTHER SURGICAL      paracentesis    HX TRANSPLANT  [phone]    2 liver - first one for congenital hepatic fibrosis, 2nd for Hep C cirrhosis    HX TUBAL LIGATION      LAP,TUBAL CAUTERY      VASCULAR SURGERY PROCEDURE UNLIST Left 11/02/2016    thigh AVG insertion in thigh      Family History   Problem Relation Age of Onset    Diabetes Mother     Heart Disease Mother     Hypertension Mother     Heart Disease Father     Stroke Father     Cancer Maternal Grandfather      liver cancer, alcoholism    No Known Problems Sister     Cancer Maternal Aunt      cervical cancer    Breast Cancer Paternal Grandmother      early 45s    Colon Cancer Paternal Grandmother      late 76s    Cancer Other      maternal niece- cervical cancer    Bipolar Disorder Brother     Heart Disease Brother      Social History   Substance Use Topics    Smoking status: Never Smoker    Smokeless tobacco: Never Used    Alcohol use No      Prior to Admission medications    Medication Sig Start Date End Date Taking? Authorizing Provider   metoclopramide HCl (REGLAN) 10 mg tablet Take 10 mg by mouth Before breakfast, lunch, dinner and at bedtime. Yes Historical Provider   midodrine (PROAMITINE) 5 mg tablet Take 5 mg by mouth every eight (8) hours. Yes Historical Provider   morphine CR (MS CONTIN) 15 mg CR tablet Take 15 mg by mouth every twelve (12) hours. Yes Historical Provider   mupirocin (BACTROBAN) 2 % ointment Apply 1 g to affected area daily. Apply to affected area daily. Yes Historical Provider   pantoprazole (PROTONIX) 40 mg tablet Take 40 mg by mouth daily. Yes Historical Provider   promethazine (PHENERGAN) 25 mg tablet Take 25 mg by mouth every six (6) hours as needed for Nausea. Yes Historical Provider   rifAXIMin (XIFAXAN) 550 mg tablet Reported on 5/25/2017 - BID 3/22/16  Yes Historical Provider   lactulose (CHRONULAC) 10 gram/15 mL solution Take 30 mL by mouth three (3) times daily. Patient taking differently: Take 4 tsp by mouth two (2) times a day. 20 ML BID 2/22/17  Yes Chino Reveles DO   cycloSPORINE modified (GENGRAF) 25 mg capsule Take 2.5 mg/kg/day by mouth every morning. Indications: Takes in the AM   Yes Historical Provider        Allergies   Allergen Reactions    Aspirin Nausea Only    Codeine Nausea Only    Compazine [Prochlorperazine Edisylate] Unknown (comments) and Other (comments)     Lock jaw  Causes Lock Jaw    Pcn [Penicillins] Other (comments)     \"drops wbc\"       Review of Systems:  Review of systems not obtained due to patient factors.     Objective:     Vitals:    10/18/17 1101   BP: (!) 65/45   Pulse: 100   Resp: 20   Temp: 97.2 °F (36.2 °C)        Physical Exam:  GENERAL: fatigued, cooperative, mild distress, appears older than stated age, icteric, cachectic, confused  LUNG: clear to auscultation bilaterally, diminished breath sounds   HEART: regular rate and rhythm, S1, S2 normal, no murmur, click, rub or gallop  ABDOMEN: firm, distended, non-tender. Ascites. Large umbilical hernia. : Anuric. EXTREMITIES:  extremities with no cyanosis. Moderate lower ext edema  SKIN: Jaundiced. Warm to touch. Stage 2 wound to coccyx. NEUROLOGIC: Very lethargic. Unable to answer questions. Profound weakness.    PSYCHIATRIC: non focal    Assessment:     Hospital Problems  Date Reviewed: 10/18/2017          Codes Class Noted POA    Chronic renal failure ICD-10-CM: N18.9  ICD-9-CM: 585.9  10/18/2017 Unknown        * (Principal)Type II diabetes mellitus with end-stage renal disease ([facility] ICD-10-CM: E11.22, N18.6  ICD-9-CM: 250.40, 585.6  10/18/2017 Unknown        Ascites ICD-10-CM: R18.8  ICD-9-CM: 789.59  10/18/2017 Unknown        Portal hypertension ([facility] ICD-10-CM: K76.6  ICD-9-CM: 572.3  10/18/2017 Unknown        S/P TIPS (transjugular intrahepatic portosystemic shunt) ICD-10-CM: X48.986  ICD-9-CM: V45.89  10/18/2017 Unknown        Autonomic dysfunction ICD-10-CM: G90.9  ICD-9-CM: 337.9  10/18/2017 Unknown        Hypotension ICD-10-CM: I95.9  ICD-9-CM: 458.9  10/18/2017 Unknown        Encephalopathy ICD-10-CM: G93.40  ICD-9-CM: 348.30  10/18/2017 Unknown        Ulcer of esophagus without bleeding ICD-10-CM: K22.10  ICD-9-CM: 530.20  10/18/2017 Unknown        Hypoalbuminemia ICD-10-CM: E88.09  ICD-9-CM: 273.8  10/18/2017 Unknown        Pain ICD-10-CM: R52  ICD-9-CM: 780.96  10/18/2017 Unknown        Gastroparesis due to DM [facility]) ICD-10-CM: E11.43, K31.84  ICD-9-CM: 250.60, 536.3  10/17/2017 Yes        Coagulopathy (HCC) (Chronic) ICD-10-CM: D68.9  ICD-9-CM: 286.9  9/14/2017 Yes    Overview Signed 5/9/2017 10:57 AM by Doris Wilson MD     Liver disease             Cirrhosis of liver ([facility] (Chronic) ICD-10-CM: K74.60  ICD-9-CM: 571.5  5/9/2017 Yes        Pancytopenia (HCC) (Chronic) ICD-10-CM: O22.953  ICD-9-CM: 284.19  5/9/2017 Yes        Esophageal varices (HCC) (Chronic) ICD-10-CM: I85.00  ICD-9-CM: 456.1  7/27/2016 Yes        Congenital hepatic fibrosis (Chronic) ICD-10-CM: P78.81  ICD-9-CM: 777.8  Unknown Yes        History of liver transplant [facility]) ICD-10-CM: Z94.4  ICD-9-CM: V42.7  3/7/2013 Yes              Plan:     Current Facility-Administered Medications   Medication Dose Route Frequency    haloperidol lactate (HALDOL) injection 2 mg  2 mg SubCUTAneous Q1H PRN    Or    haloperidol lactate (HALDOL) injection 2 mg  2 mg IntraVENous Q1H PRN    acetaminophen (TYLENOL) suppository 650 mg  650 mg Rectal Q3H PRN    bisacodyl (DULCOLAX) suppository 10 mg  10 mg Rectal PRN    sodium chloride (NS) flush 10 mL  10 mL InterCATHeter Q12H    heparin (porcine) pf 300 Units  300 Units InterCATHeter Q12H    sodium chloride (NS) flush 10 mL  10 mL InterCATHeter PRN    heparin (porcine) pf 300 Units  300 Units InterCATHeter PRN    diphenhydrAMINE (BENADRYL) injection 25 mg  25 mg IntraVENous Q6H PRN    LORazepam (ATIVAN) injection 1 mg  1 mg IntraVENous Q4H PRN    glycopyrrolate (ROBINUL) injection 0.2 mg  0.2 mg IntraVENous Q4H PRN    HYDROmorphone (DILAUDID) syringe 0.5 mg  0.5 mg IntraVENous Q30MIN PRN    Or    HYDROmorphone (DILAUDID) syringe 0.5 mg  0.5 mg SubCUTAneous Q30MIN PRN       Admitted with Chronic renal failure for management of pain, dyspnea and nausea. 1. Pain: Hydromorphone as ordered. 2. Dyspnea: Hydromorphone as ordered, Glycopyrrolate prn secretions. Oxygen prn.    3. Nausea: Haloperidol as ordered. Diet as tolerated. 4. Family/Pt Support: Family at bedside during exam. Medications and plan of care discussed with nursing staff and family. Will continue to monitor for symptoms and adjust medications as needed to maintain patient comfort. PPS 10%. Case discussed with Dr. Ardia Seip. Unable to take po due to gastroparesis. Hepatitis C positive with esophageal varices. Will have dark towel available in the event of catastrophic bleeding. Hypotensive. Discussed prognosis with family at bedside. Urged them to have patient's daughter visit today if possible. PO meds will be held due to gastroparesis.      Signed By: Pato Silva NP     October 18, 2017

## 2017-10-18 NOTE — PROGRESS NOTES
10/18/17 1608   Pyschosocial Assessment 1   Concerns from previous vist? No  (Initial SW assessment)   Significant changes in relationships or household members? No   Signs of abuse or neglect? No   Financial Need (not aware of any)   Pain signs needing to be reported to  No   Psychosocial Components/Teaching/Interventions Literature and/or review of nutrition/hydration in the terminal patient; Review of Safety Interventions; Reviewed common signs/symptoms of dying process in the Pt/Family orientation handbook; Instructed on how to contact the agency with concerns;Provided requested forms/applications; Emotional support/supportive listening   The patient has designated their health care surrogate Yes, DPOA copy on file  (copy in the floor chart )     LMSW visited the pt and her mother and step mother and son and spouse in the room. Spouse was sitting on the bed with her. LMSW introduced myself to him and  Reminded him of our phone call. He and I discussed her prognosis. He is upset that he was not able to take her home, because he said he did not know that was an option. LMSW explained if we can get her symptoms of nausea and vomiting under control and she did not need injectable medications to keep her comfortable that taking her home was not off of the table. He is very focused on her getting her lactulose, but everything she takes orally comes back up. We discussed feeding her if she asks for it, but her Gastro Paresis is not letting her eat. We discuss quality of life vs. quantity. He wants quantity but as comfortable as she can be. LMSW provided the spouse a blue book     Pt and Raymond Spann have been  since 1999 and have 2 children. Her children Cordelia Deal and [facility] (at [facility] in [facility])  The children are in their 20's. Pt signed her own paperwork to come on to hospice. Spouse said she told him 2 days ago she wanted everything done. Except for CPR.   The doctors have told her there is nothing more to do and the spouse is not hearing that message. The pt's mothers are understanding of the message of she is not doing well. LMSW will make a referral to bereavement for Judith Meek.

## 2017-10-18 NOTE — PROGRESS NOTES
Gastroenterology    Pt is sleeping currently. Reviewed events from yesterday. Pt has decided to have hospice and is being transferred to [facility] later today. Yadira Gregory  [facility]

## 2017-10-18 NOTE — PROGRESS NOTES
Patient is been discharge to hospice house. Patient left via stretcher/ ambulance. Patient is alert and oriented and talking on the phone. Patient is given IV  Morphine for pain control. Patient is discharge with  PICC line in place. Patient left with all belongings. Patient had no questions regarding discharge instructions.

## 2017-10-18 NOTE — PROGRESS NOTES
1310- Emesis 30 ml slimy green material.  Medicated. She is able to stand, takes steps to the chair with assist of one, sit and she sleeps immediately. Linen change and bathing, and she is returned to bed because she is not able to stay awake > 60 seconds. Unaccompanied. Pain in abd \"where it usually hurts\". 1340- Eyes closed. RR 20, easy. FLACC 0.  NP at the bedside. 1430- Parents arrive. They do not have the privacy code and shared information is very limited to generalities and the blue book. Sagar Vu is present at the bedside at this time. Pt is restful. FLACC 0.  RR 20, room air. Her mom is tearful, upset that she won't have dialysis or paracentesis. We discuss these treatments and our priority goal of her comfort in the last days or weeks of her life. 1530- Takes sips of fluids. Patient's parents continues to have conversation, particularly her mother, that it is necessary for her to have dialysis, and paracentesis, along with her medications and lactulose. NP at the bedside at this time and we have conversation regarding the pros and cons of these medications and her body's ability to utilize them. Her father is reasonable, accepting and a calming influence on her mother. Her mother is almost frantic, her voice escalates in pitch and speed as we discuss. He is able to refocus her several times. 1650- Repositioned. She c/o pain in her back, and tailbone. Medicated. Her  is present and becomes upset that she received pain medications. We have a lengthy discussion regarding her symptom management, her longevity and her body's ability to utilize medications, her dependence on morphine and our desire to avoid withdrawal symptoms. He is adamant that she be awake on Friday and \"No psychotropic drugs\". We discuss our primary goal of her comfort and our goal to control her symptoms with minimal medications required, but to control her symptoms.   He is tearful, anxious and pacing at times. NP joins the conversation. 1745- Eyes closed. RR 20, easy. Room air. FLACC 0. Skin pale, jaundice and dry with poor turgor. She has an occasional loose cough but moves secretions easily. I verify that a minimum of hourly rounds have been provided for this patient during this shift. Meds:  Medicated x 2 IV for pain, nausea. Family/Education: Considerable conversation and education with family regarding our goals, her needs and management. This requires additional conversation. Oral intake:  She actually takes a couple of bowls of soup, some fluids and tolerates fairly well. New problems/issues: Wound pain, nausea and vomiting. Summary/general care: Total care with significant needs for nausea, vomiting and pain management. Family is in need of a high degree of education and support, preparation for her passing in the next days to week.

## 2017-10-18 NOTE — PROGRESS NOTES
Presents from [facility] via EMS to Room 126, GIP admission. DX: Renal failure. S/s pain, agitation, SOB. She is very lethargic, answers one word occasionally. She denies pain on arrival after being medicated at the hospital immediately prior to departure. She is unaccompanied. She answers her first name, denies pain and states \"just leave me alone and let me sleep\". Assessment, ESAS, fallrisk, marika, etc additional admission activities completed. She does follow commands but does not open her eyes. She denies production of urine at all, stating \"last time I peed was months ago\". She has a remarkable abd hernia that is soft. There dried skin on it, and possibly some healed areas but nothing open at this time. Dialysis cath patents x 2. She presents on 2L oxygen PNC and it is continued here. She denies SOB but she has some ascites and is dyspneic with minimal activity. She has significant cyanosis to hands/feet. Peripheral pulses are very weak, but present. There is no cap refill in 5 seconds. There is Stage I to coccyx, and she is reluctant to lay on her side for thorough evaluation. I believe the skin is intact.

## 2017-10-19 NOTE — PROGRESS NOTES
Progress Note    Patient: Titus Dyer MRN: [id number]  SSN: [id number]    YOB: 1970  Age: 55 y.o. Sex: female      Admit Date: 10/18/2017    LOS: 1 day     Subjective:     Drowsy but appears comfortable. Recently medicated for pain. Poor oral intake. Family (, dtr, Mum, and Dad) at bedside. Review of Systems:  Review of systems not obtained due to patient factors. Objective:     Vitals:    10/18/17 1101 10/19/17 0536   BP: (!) 65/45 (!) 72/53   Pulse: 100 76   Resp: 20 16   Temp: 97.2 °F (36.2 °C) 96.1 °F (35.6 °C)        Intake and Output:  Current Shift:    Last three shifts: 10/17 1901 - 10/19 0700  In: -   Out: 2      Physical Exam:  GENERAL: fatigued, cooperative, no distress, appears much older than stated age, icteric, cachectic, confused  LUNG: clear to auscultation bilaterally   HEART: regular rate and rhythm, S1, S2 normal, no murmur, click, rub or gallop  ABDOMEN: firm, distended, non-tender. Ascites. Large umbilical hernia. : Anuric. EXTREMITIES:  extremities with no cyanosis. Moderate lower ext edema  SKIN: Jaundiced. Warm to touch. Stage 2 wound to coccyx. NEUROLOGIC: Lethargic. Unable to answer questions verbally but will nod head at times. Profound weakness. PSYCHIATRIC: flat affect. Lab/Data Review:  No new labs resulted in the last 24 hours.     Assessment:     Principal Problem:    Type II diabetes mellitus with end-stage renal disease ([address] (10/18/2017)    Active Problems:    Congenital hepatic fibrosis ()      Esophageal varices ([address] (7/27/2016)      Coagulopathy ([address] (9/14/2017)      Overview: Liver disease      Cirrhosis of liver ([address] (5/9/2017)      Pancytopenia ([address] (5/9/2017)      Gastroparesis due to DM ([address] (10/17/2017)      History of liver transplant ([address] (3/7/2013)      Chronic renal failure (10/18/2017)      Ascites (10/18/2017)      Portal hypertension ([facility] (10/18/2017)      S/P TIPS (transjugular intrahepatic portosystemic shunt) (10/18/2017)      Autonomic dysfunction (10/18/2017)      Hypotension (10/18/2017)      Encephalopathy (10/18/2017)      Ulcer of esophagus without bleeding (10/18/2017)      Hypoalbuminemia (10/18/2017)      Pain (10/18/2017)      Plan:     Current Facility-Administered Medications   Medication Dose Route Frequency    HYDROmorphone (DILAUDID) syringe 0.25 mg  0.25 mg IntraVENous Q30MIN PRN    Or    HYDROmorphone (DILAUDID) syringe 0.25 mg  0.25 mg SubCUTAneous Q30MIN PRN    haloperidol lactate (HALDOL) injection 2 mg  2 mg SubCUTAneous Q1H PRN    Or    haloperidol lactate (HALDOL) injection 2 mg  2 mg IntraVENous Q1H PRN    acetaminophen (TYLENOL) suppository 650 mg  650 mg Rectal Q3H PRN    bisacodyl (DULCOLAX) suppository 10 mg  10 mg Rectal PRN    sodium chloride (NS) flush 10 mL  10 mL InterCATHeter Q12H    heparin (porcine) pf 300 Units  300 Units InterCATHeter Q12H    sodium chloride (NS) flush 10 mL  10 mL InterCATHeter PRN    heparin (porcine) pf 300 Units  300 Units InterCATHeter PRN    diphenhydrAMINE (BENADRYL) injection 25 mg  25 mg IntraVENous Q6H PRN    LORazepam (ATIVAN) injection 1 mg  1 mg IntraVENous Q4H PRN    glycopyrrolate (ROBINUL) injection 0.2 mg  0.2 mg IntraVENous Q4H PRN    lidocaine (XYLOCAINE) 2 % viscous solution 15 mL  15 mL Topical Q12H    lidocaine (XYLOCAINE) 2 % viscous solution 15 mL  15 mL Topical PRN     1. Admitted with chronic renal failure for management of pain, dyspnea, nausea, and family/pt support.     2. Pain: Hydromorphone PRN. DC'd scheduled dilaudid due to pt request as she felt too drowsy after admin. However, pt ended up needing 2 doses to gain comfort. Consider adding Fent patch if pt agreeable but will think about it for now.      3. Dyspnea: Hydromorphone and oxygen PRN.      4. Nausea: Haloperidol PRN. Diet as tolerated.      5.  Family/Pt Support: Family at bedside during exam. Ongoing plan of care including medications discussed with primary RN and family. Will consider Fentanyl patch as lowest dosage and advise us of their decision. Although, given her size, hypotension, and hypothermia, med absorption is likely going to be poor and IV route is likely going to nake her very drowsy as her hepatic encephalopathy worsens. Will continue to offer diet as tolerated but utilize all injectable meds due to gastroparesis. PPS 20%.      Signed By: Nuvia Rodriguez NP     October 19, 2017

## 2017-10-19 NOTE — PROGRESS NOTES
ID, bedside report rec'd. She reports abd pain, but declines pain medication at this time.  awake at the bedside. RR 20, easy. Oxygen on. Skin warm, dry. 805- assessment, ESAS, fall risk and marika completed. She assists, follows commands and provides information regarding her comfort and needs freely. She again thinks she is at [facility], and when I reorient her states \"oh that's right\". She takes minimal PO, 2 bites of food. She denies nausea. She states her pain continues- coccyx and abd, but declines pain medication at this time. 1030- Awakens. She states again, she has abd pain but declines medication. There is some conversation with the patient and  regarding her pain, and management. She states she slept all day yesterday and doesn't want to sleep all day again. Her  can state \"I don't want to be selfish but I would like to be able to talk to her, but I want her pain controlled\". At this time we reposition her and she states she feels \"that's a little better\". She denies nausea, requests a nutty donna and milk, which are provided. 1110- she requests pain medication but requests a reduction in dose. NP consulted and orders rec'd. Pt/ updated on plan for medicating and verbalize agreement. She rates her pain at 8, \"tight\" in her abd.     1140- She states she has pain 7/8 of 10. She states the first injection did not \"help much at all\". Re-medicated.  at the bedside. Pt awake, alert and conversive with family and visitors. 1310- She declines food. Family at the bedside. She is groggy, but interactive. She denies pain at this time. RR 16,easy. 1415- c/o \"all over pain\" , medicated. She is slightly repositioned but able to move herself for comfort. She is taking milk and chocolate at this time, denies nausea.  at the bedside.      1435- She states her pain is \"a lot better\"  4/10.      1520- She ambulates to the  with [facility] staff. She voids and has a large loose brown stool. She is returned to the wheelchair and is motored around by family. Awake, interactive and smiles easily. Her mother and  report 'she does this every time they tell us she's not going to get better, she does it anyway\". 1650- c/o nausea and pain. Medicated, and again her  expresses concern over the use of haldol, is reassured and agrees to allow use in these small doses to control her nausea. Pt does not contribute to the conversation     [phone]- Eyes closed, restful. Family report she has been sleeping since medicated. RR 20, easy, room air. I verify that a minimum of hourly rounds have been provided for this patient during this shift. Meds:  Medicated x 4 for pain, x 2 for nausea. No emesis    Family/Education: Patient's  and mother continue to want all her meds, no medications that sedate her. Repeated conversation, repetitive over same material several times today. They verbalize \"she's getting better\". They state her BM, her appetite and lack of vomiting \"prove it\" and they ask \"so when can she restart all her medications\". They agree to discuss this with Dr. Jacqui Pettit, who actually presents to the room to talk with them. Oral intake: Poor    New problems/issues:  None    Summary/general care:  Able to do minimal self care with supervision and assistance. Requires fairly frequent IV medications for nausea, pain with fair to good control.

## 2017-10-20 NOTE — [facility] IDG VOLUNTEER NOTES
[address] Review     Status Codes I = Initiated C=Continued R=Revised RS = Resolved     I.  Volunteer     Goal: Hospice house volunteer (s) enhances the quality of remaining life while patient is at the hospice house. Interventions: Archie Mauricio Volunteer (s) will provide companionship to the patient and/or family by visiting at the hospice house       . Archie Mauricio Volunteer (s) will provide respite as needed when requested by patient and/or family. Archie Ramirez  Volunteer will provide activities such as music, reading, pet therapy, etc. as requested. Archie KeithlloydMichelle Ramirez  Comfort bag delivered. Any other special requests or information regarding volunteer services:    One visit for comfort bag delivery is recorded. Staff have asked for extra visits to support the family and volunteers have been notified. No further needs identified at this time. These notes have been discussed in [facility] meeting.           Signed by: Mague Espinoza

## 2017-10-20 NOTE — [facility] IDG MASTER NOTE
[facility] Collaborative  Date: 10/20/17  Time: 12:45 PM    ___________________    Patient: Whitmore Males  Insurance ID: [unfilled]  MRN: [id number]      ___________________    Diagnoses: There were no encounter diagnoses.     Current Medications:    Current Facility-Administered Medications:     fentaNYL (DURAGESIC) 12 mcg/hr patch 1 Patch, 1 Patch, TransDERmal, Q72H, Cristi Wallace NP, 1 Patch at 10/20/17 1509    HYDROmorphone (DILAUDID) syringe 0.25 mg, 0.25 mg, IntraVENous, Q30MIN PRN, 0.25 mg at 10/20/17 1115 **OR** HYDROmorphone (DILAUDID) syringe 0.25 mg, 0.25 mg, SubCUTAneous, Q30MIN PRN, Cristi Wallace NP    haloperidol lactate (HALDOL) injection 2 mg, 2 mg, SubCUTAneous, Q1H PRN, 2 mg at 10/19/17 1659 **OR** haloperidol lactate (HALDOL) injection 2 mg, 2 mg, IntraVENous, Q1H PRN, Christina Salazar NP, 2 mg at 10/20/17 1353    acetaminophen (TYLENOL) suppository 650 mg, 650 mg, Rectal, Q3H PRN, Christina Salazar, NP    bisacodyl (DULCOLAX) suppository 10 mg, 10 mg, Rectal, PRN, Christina Salazar, NP    sodium chloride (NS) flush 10 mL, 10 mL, InterCATHeter, Q12H, Christina Contrerasing, NP, 10 mL at 10/20/17 0932    heparin (porcine) pf 300 Units, 300 Units, InterCATHeter, Q12H, Christina Contrerasing, NP, 300 Units at 10/20/17 0933    sodium chloride (NS) flush 10 mL, 10 mL, InterCATHeter, PRN, Christina Contrerasing, NP    heparin (porcine) pf 300 Units, 300 Units, InterCATHeter, PRN, Christina Salazar, NP    diphenhydrAMINE (BENADRYL) injection 25 mg, 25 mg, IntraVENous, Q6H PRN, Christina Salazar, NP    LORazepam (ATIVAN) injection 1 mg, 1 mg, IntraVENous, Q4H PRN, [facility], NP    glycopyrrolate (ROBINUL) injection 0.2 mg, 0.2 mg, IntraVENous, Q4H PRN, [facility], NP    lidocaine (XYLOCAINE) 2 % viscous solution 15 mL, 15 mL, Topical, Q12H, [facility], NP, 15 mL at 10/20/17 0935    lidocaine (XYLOCAINE) 2 % viscous solution 15 mL, 15 mL, Topical, PRN, [facility], NP, 15 mL at 10/18/17 1812    Orders:  Orders Placed This Encounter    IP CONSULT TO SPIRITUAL CARE Once on week one, then PRN. For [facility] patients only. For contracted patients, primary hospice will continue to manage spiritual care needs     Once on week one, then PRN. For [facility] patients only. For contracted patients, primary hospice will continue to manage spiritual care needs     Standing Status:   Standing     Number of Occurrences:   1     Order Specific Question:   Reason for Consult: Answer:   Spiritual crisis intervention or per patient or caregiver request    DIET PLEASURE     Standing Status:   Standing     Number of Occurrences:   1    VITAL SIGNS     Standing Status:   Standing     Number of Occurrences:   1    VITAL SIGNS     Standing Status:   Standing     Number of Occurrences:   1    NURSING-MISCELLANEOUS: comfort measures Enter comfort measures above. CONTINUOUS     Enter comfort measures above. Standing Status:   Standing     Number of Occurrences:   1     Order Specific Question:   Description of Order:     Answer:   comfort measures    VALENZUELA CATHETER, CARE     1. Valenzuela Catheter care every shift and PRN  2. Notify Physician of Valenzuela Catheter leakage, occlusion, gross adherent sediment or accidental removal  3. Change Valenzuela 30 days after insertion. 4. May flush catheter bid and prn leakage or gross adherent sediment or mucus. Standing Status:   Standing     Number of Occurrences:   1    BLADDER CHECKS     May scan bladder PRN for urinary retention and or patient discomfort     Standing Status:   Standing     Number of Occurrences:   1    NURSING ASSESSMENT:  SPECIFY Assess for GIP, routine, or respite level of care. Q SHIFT Routine     Standing Status:   Standing     Number of Occurrences:   1     Order Specific Question:   Please describe the test or procedure you would like to order. Answer:   Assess for GIP, routine, or respite level of care.     PAIN ASSESSMENT Pain and Symptoms: Assess ever 4 hours and PRN, for GIP level of care. PRN Routine     Standing Status:   Standing     Number of Occurrences:   1     Order Specific Question:   Please describe the test or procedure you would like to order. Answer:   Pain and Symptoms: Assess ever 4 hours and PRN, for GIP level of care.  NURSING-MISCELLANEOUS: Admitted GIP with chronic renal failure for management of pain, dyspnea and nausea. Admitted GIP with chronic renal failure for management of pain, dyspnea and nausea. DX: chronic renal failure ASSOCIATED: esophageal gastric. .. Admitted GIP with chronic renal failure for management of pain, dyspnea and nausea. 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 Her condition is further complicated by paresis of the esophagus, stomach and small bowel unresponsive to prokinetic therapy (Reglan). The patient cannot be nourished nor medicine be administered via the GI tract. The patient has had reflux and aspiration of gastric material.  TPN cannot be utilized without effective dialysis.  Pancytopenia, coagulopathy (INR 1.5), hypoalbuminemia (1.6mg/dl), and encephalopathy (ammonia 54) are related diagnoses. It has been explained to the patient, her spouse and her family that are no further life extending interventions available to her. She does not want CPR or endotracheal intubation or ventilator support at the end of life. She accepts that the only measures available to her at this time are comfort measures. She will continue to require antiemetic medication and opioids for severe somatic pain related principally to ascites. These measures will be employed at the hospice house during her period of terminal decline which is expected in less than 2 weeks. Standing Status:   Standing     Number of Occurrences:   1     Order Specific Question:   Description of Order:     Answer:   Admitted GIP with chronic renal failure for management of pain, dyspnea and nausea.  DRESSING CHANGE - PICC, MLC, SUBCUTANEOUS AND ACCESSED PORT DRESSING CHANGE     PICC, MLC, SUBCUTANEOUS AND ACCESSED PORT DRESSING CHANGE:  Change catheter site dressing every 5 days and prn if site dressing becomes damp loosened or visibly soiled       Standing Status:   Standing     Number of Occurrences:   1    ACTIVITY AS TOLERATED W/ASSIST     Standing Status:   Standing     Number of Occurrences:   1    WOUND CARE, DRESSING CHANGE     Wound Care:  Location: coccyx  Decubitus Wound Stage II (Cavalohugo)- Cleanse wound location with wound cleanser, pat dry and apply Cavilon Durable Barrier Cream mixed with lidocaine every 12 hours and and PRN if soiled. Turn every 2 hours. Assess with each nursing assessment visit. Standing Status:   Standing     Number of Occurrences:   29    WOUND CARE, DRESSING CHANGE     Wound Care:  Location: Left heel boggy  Skin Tear: Cleanse skin with wound cleanser. Pat dry. Apply skin prep to bilateral heels q 12 hours. Float heels.      Standing Status:   Standing     Number of Occurrences:   30    DO NOT RESUSCITATE     Standing Status:   Standing     Number of Occurrences:   1    OXYGEN CANNULA Liters per minute: 2; Indications for O2 therapy: RESPIRATORY DISTRESS CONTINUOUS Routine     Standing Status:   Standing     Number of Occurrences:   1     Order Specific Question:   Liters per minute: Answer:   2     Order Specific Question:   Indications for O2 therapy     Answer:   RESPIRATORY DISTRESS    OR Linked Order Group     haloperidol lactate (HALDOL) injection 2 mg     haloperidol lactate (HALDOL) injection 2 mg    acetaminophen (TYLENOL) suppository 650 mg    bisacodyl (DULCOLAX) suppository 10 mg    sodium chloride (NS) flush 10 mL    heparin (porcine) pf 300 Units    sodium chloride (NS) flush 10 mL    heparin (porcine) pf 300 Units    diphenhydrAMINE (BENADRYL) injection 25 mg    LORazepam (ATIVAN) injection 1 mg    glycopyrrolate (ROBINUL) injection 0.2 mg    DISCONTD: HYDROmorphone (DILAUDID) syringe 0.5 mg    DISCONTD: HYDROmorphone (DILAUDID) syringe 0.5 mg    metoclopramide HCl (REGLAN) 10 mg tablet     Sig: Take 10 mg by mouth Before breakfast, lunch, dinner and at bedtime.  midodrine (PROAMITINE) 5 mg tablet     Sig: Take 5 mg by mouth every eight (8) hours.  morphine CR (MS CONTIN) 15 mg CR tablet     Sig: Take 15 mg by mouth every twelve (12) hours.  mupirocin (BACTROBAN) 2 % ointment     Sig: Apply 1 g to affected area daily. Apply to affected area daily.  pantoprazole (PROTONIX) 40 mg tablet     Sig: Take 40 mg by mouth daily.  promethazine (PHENERGAN) 25 mg tablet     Sig: Take 25 mg by mouth every six (6) hours as needed for Nausea.     DISCONTD: haloperidol lactate (HALDOL) injection 2 mg    DISCONTD: HYDROmorphone (DILAUDID) syringe 0.5 mg    DISCONTD: HYDROmorphone (DILAUDID) syringe 0.5 mg    DISCONTD: lidocaine (XYLOCAINE) 2 % viscous solution 15 mL    lidocaine (XYLOCAINE) 2 % viscous solution 15 mL    lidocaine (XYLOCAINE) 2 % viscous solution 15 mL    OR Linked Order Group     HYDROmorphone (DILAUDID) syringe 0.25 mg     HYDROmorphone (DILAUDID) syringe 0.25 mg    fentaNYL (DURAGESIC) 12 mcg/hr patch 1 Patch    INITIAL PHYSICIAN ORDER: HOSPICE Level Of Care: General; Reason for Admission: Admitted GIP with chronic renal failure for management of pain, dyspnea and nausea. Standing Status:   Standing     Number of Occurrences:   1     Order Specific Question:   Status     Answer:   Hospice     Order Specific Question:   Level Of Care     Answer:   General     Order Specific Question:   Reason for Admission     Answer:   Admitted GIP with chronic renal failure for management of pain, dyspnea and nausea. Order Specific Question:   Inpatient Hospitalization Certified Necessary for the Following Reasons     Answer:   3. Patient receiving treatment that can only be provided in an inpatient setting (further clarification in H&P documentation)     Order Specific Question:   Admitting Diagnosis     Answer:   Chronic renal failure [764539]     Order Specific Question:   Terminal Prognosis Diagnosis(es)     Answer:   Chronic renal failure [556557]     Order Specific Question:   Admitting Physician     Answer:   Fer Lyons [[id number]]     Order Specific Question:   Attending Physician     Answer:   Fer Lyons [[id number]]    IP CONSULT TO SOCIAL WORK     Once on week one, then PRN for Psychosocial crisis intervention or per patient or caregiver request.  For [facility] patients only. For contracted patients, primary hospice will continue to manage social work needs. Standing Status:   Standing     Number of Occurrences:   1     Order Specific Question:   Reason for Consult: Answer: Once on week one, then PRN for Psychosocial crisis intervention or per patient or caregiver request.       Allergies:   Allergies   Allergen Reactions    Aspirin Nausea Only    Codeine Nausea Only    Compazine [Prochlorperazine Edisylate] Unknown (comments) and Other (comments)     Lock jaw  Causes Lock Jaw    Pcn [Penicillins] Other (comments)     \"drops wbc\"       Care Plan:       Multidisciplinary Problems (Active)           Problem: Anticipatory Grief     Dates: Start: 10/18/17       Disciplines: Interdisciplinary    Goal: Grief heard and acknowledged, anxiety reduced, patient coping identified, patient/family expressed gratitude     Dates: Start: 10/18/17   Expected End: 10/31/17      Disciplines: Interdisciplinary         Problem: Community Resource Needs     Dates: Start: 10/18/17       Disciplines: Interdisciplinary    Goal: Patient is receiving increases resource supprot to enhance ability to remain at home     Dates: Start: 10/18/17   Expected End: 10/31/17      Disciplines: Interdisciplinary   Intervention: Provides assistance with identifying appropriate community resources    Dates: Start: 10/18/17       Description: Provide assistance with identifying appropriate community resources. Patient is receiving increased resource support to enhance ability to remain at home.           Problem: Dying Process     Dates: Start: 10/18/17       Disciplines: Interdisciplinary    Goal: Increased peace with dying process, patient coping identified, patient/family expressed gratitude, spiritual distress identified and decreased with visit     Dates: Start: 10/18/17   Expected End: 10/31/17      Disciplines: Interdisciplinary         Problem: Emotional Support Needs     Dates: Start: 10/18/17       Disciplines: Interdisciplinary    Goal: Patient/family is receiving emotional support     Dates: Start: 10/18/17   Expected End: 10/31/17      Disciplines: Interdisciplinary   Intervention: Assess for emotional distress    Dates: Start: 10/18/17      Intervention: Provide emotional support    Dates: Start: 10/18/17      Intervention: Provide emotional support of the family's cultural expressions of grief and loss    Dates: Start: 10/18/17       Description: Provide emotional support of the family's cultural expression of grief, loss, and response to illness. Intervention: Refer to bereavement    Dates: Start: 10/18/17            Problem: Falls - Risk of     Dates: Start: 10/18/17       Disciplines: Interdisciplinary    Goal: *Absence of Falls     Dates: Start: 10/18/17   Expected End: 10/31/17      Description: Document Bishop Ball Fall Risk and appropriate interventions in the flowsheet.     Disciplines: Interdisciplinary         Problem: Family Significant Other Needs     Dates: Start: 10/18/17       Disciplines: Interdisciplinary    Goal: Patient/family will receive support from community resources     Dates: Start: 10/18/17   Expected End: 10/31/17      Disciplines: Interdisciplinary   Intervention: Assess family/caregiver needs    Dates: Start: 10/18/17      Intervention: Instruct family/caregiver on hospice and ancillary services    Dates: Start: 10/18/17      Intervention: MSW community resource planning    Dates: Start: 10/18/17            Problem: Grieving     Dates: Start: 10/18/17       Disciplines: Interdisciplinary    Goal: *Able to identify stages of grieving process     Dates: Start: 10/18/17   Expected End: 10/31/17      Disciplines: Interdisciplinary   Intervention: Assist patient and family to decide about autopsy,  plans, completing important life tasks, coping with impending death, engaging in meaningful rituals, providing care of the body after death    Dates: Start: 10/18/17            Problem: Patient Education: Go to Patient Education Activity     Dates: Start: 10/18/17       Disciplines: Interdisciplinary    Goal: Patient/Family Education     Dates: Start: 10/18/17   Expected End: 10/31/17      Disciplines: Interdisciplinary         Problem: Patient Education: Go to Patient Education Activity     Dates: Start: 10/19/17       Disciplines: Interdisciplinary    Goal: Patient/Family Education     Dates: Start: 10/19/17      Disciplines: Interdisciplinary         Problem: Pressure Injury - Risk of     Dates: Start: 10/19/17       Disciplines: Interdisciplinary    Goal: *Prevention of pressure ulcer     Dates: Start: 10/19/17      Disciplines: Interdisciplinary   Intervention: Pressure injury risk assessment tool    Dates: Start: 10/19/17       Description: (e.g.: Terrance Scale)    Intervention: Pressure-relieving device needs assessment    Dates: Start: 10/19/17      Intervention: Pressure-relieving device implementation (eg: Specialty beds; wheel chair cushions; heel lift boots)    Dates: Start: 10/19/17      Intervention: Skin assessment (e.g. existing ulcers, color; integrity; moisture; bryn prominences; blisters; friction/shear injuries)    Dates: Start: 10/19/17      Intervention: Skin monitoring and care (e.g. skin cleansing; keep dry, moisturize, utilization of  lotion and/or skin barrier cream)    Dates: Start: 10/19/17      Intervention: Blood glucose control (eg: Monitoring; medication; nutrition/hydration)    Dates: Start: 10/19/17      Intervention: Position change (eg: Techniques to position; turn and transfer per schedule; cushion bryn prominences; float heels; encourage mobility)    Dates: Start: 10/19/17      Intervention: Nutrition promotion (eg: Micro/macro nutrient supplementation; encourage oral intake; blood glucose control; nutrition support when indicated)    Dates: Start: 10/19/17            Problem: Spiritual Distress     Dates: Start: 10/18/17       Disciplines: Interdisciplinary    Goal: Distress heard/acknowledged, spiritual distress reduced/resolved, anxiety reduced     Dates: Start: 10/18/17   Expected End: 10/31/17      Description: Patient distress heard and acknowledged. Patient/family spiritual distress reduced/resolved. Anxiety reduced. Patient coping identified. Patient family expressed gratitude.     Disciplines: Interdisciplinary         Problem: Spiritual Evaluation     Dates: Start: 10/18/17       Disciplines: Interdisciplinary    Goal: Identify beliefs/practices that support hospice experience     Dates: Start: 10/18/17   Expected End: 10/31/17      Description: Patient/family identify their beliefs/practices that impair Hospice experience. Patient/family identify their beliefs/practices that support Hospice experience. Patient coping identified. Spiritual distress identified and decreased with visit. Disciplines: Interdisciplinary           ___________________    Care Team Notes          POC/IDG Notes      [facility] ID  Notes by Priscila Parsons at 10/20/17 1255  Version 1 of 1    Author:  Priscila Parsons Service:  Licensed Clinical  Author Type:      Filed:  10/20/17 1306 Date of Service:  10/20/17 1255 Status:  Signed    :  Priscila Parsons ()           Patient: Ava Saavedra    Date: 10/20/17  Time: 12:55 PM    [facility]  Notes  LMSW will continue to try to build rapport with the family. Family is struggling with their anticipated grief. Family is struggling with the thought of the loss of her. They were until recently thinking she was going to have a transplant. The whole family are having issues with grief        Signed by: Priscila Parsons       [address] IDG Nurse Notes by Pete Ruiz at 10/20/17 1250  Version 1 of 1    Author:  Pete Ruiz Service:  Ledon Kocher Author Type:  Registered Nurse    Filed:  10/20/17 1300 Date of Service:  10/20/17 1254 Status:  Signed    :  Pete Ruiz (Registered Nurse)           Patient: Ava Saavedra    Date: 10/20/17  Time: 12:55 PM    [facility] Nurse Notes    UPDATE: Patient is a 55year old Female patient, with diagnosis of Renal Failure, GIP level of care for Pain, Dyspnea and Nausea. Haldol and Dilaudid for Symptom Management. GOALS OF CARE:   Continue to monitor for optimal patient comfort and symptom management. Ongoing family support.             Signed by: Pete Ruiz RN MSN       [address] ID Volunteer Notes by Naila Alvarenga at 10/20/17 1229  Version 1 of 1    Author:  Timothy Portillo Service:  Sandy Hatfield Author Type:  Hospice Volunteer/    Filed:  10/20/17 1234 Date of Service:  10/20/17 1229 Status:  Signed    :  Timothy Portillo (Hospice Volunteer/)               [facility] Interdisciplinary Plan of Care Review     Status Codes I = Initiated C=Continued R=Revised RS = Resolved     I.  Volunteer     Goal: Hospice house volunteer (s) enhances the quality of remaining life while patient is at the hospice house. Interventions: Meraz MedyMatchyesenia Meraz MedyMatchyesenia Estevan Yermo Volunteer (s) will provide companionship to the patient and/or family by visiting at the hospice house       . Meraz bluebird bioncer Yermo Volunteer (s) will provide respite as needed when requested by patient and/or family. [employer] Lauro Rose Mary  Volunteer will provide activities such as music, reading, pet therapy, etc. as requested. [employer]  Comfort bag delivered. Any other special requests or information regarding volunteer services:    One visit for comfort bag delivery is recorded. Staff have asked for extra visits to support the family and volunteers have been notified. No further needs identified at this time. These notes have been discussed in [facility] meeting.           Signed by: Timothy Portillo

## 2017-10-20 NOTE — PROGRESS NOTES
Shift summary- Pt has required Haldol IV x 3 for nausea and Dilaudid x 2 for pain. IV access in right chest is patent with good blood return. After discussion with  and brother who agreed to fentanyl patch, Fentanyl 12 mcg TD patch was  placed on left chest. Coccyx/sacrum area has DTI area. Barrier cream and lidocaine mixture applied. Boggy area on left heel. Skin prep applied. Pt up in wheelchair and to outside patio for lunch. Ate pb and jelly sandwich and tolerated without vomiting. Had BM x 3 today. Yellow, soft. 1 small, 1 medium and 1 large BM. Many visitors in to see pt today. Bed low and SR up with tab alerts on. Continue to monitor for symptom management and optimum comfort.

## 2017-10-20 NOTE — PROGRESS NOTES
Report received. Pt round. Pt identified by name. In bed with eyes closed. No s/s of pain, agitation or dyspnea. Bed low and SR up with tab alerts on. Family at bedside.

## 2017-10-20 NOTE — [facility] IDG CHAPLAIN NOTES
Patient: Vale Briseno    Date: 10/20/17  Time: 6:03 PM    [facility]  Notes  Patient is a 55year old MWF w/dx of end stage renal disease. Patient was admitted on 10/18 and this  completed Initial Spiritual Assessment. Family is struggling to come to terms with patient's prognosis and decline. Spiritual care/support will continue as needed, requested, or referred.         Signed by: Jeffery Vincent

## 2017-10-20 NOTE — PROGRESS NOTES
Received report from off going RN and assumed care of patient. Patient identified by name and . Patient in bed and states she is hurting and that she just vomited. Family present at bed side. Medicated patient per MAR. Patient has no other complaints at this time. Bed low and locked. Call light within reach. Door open for monitoring when family are not present.

## 2017-10-20 NOTE — [facility] IDG SOCIAL WORKER NOTES
Patient: Titus Dyer    Date: 10/20/17  Time: 12:55 PM    [facility]  Notes  LMSW will continue to try to build rapport with the family. Family is struggling with their anticipated grief. Family is struggling with the thought of the loss of her. They were until recently thinking she was going to have a transplant.   The whole family are having issues with grief        Signed by: Janessa Herrera

## 2017-10-20 NOTE — [facility] IDG NURSE NOTES
Patient: Yudith Ramos    Date: 10/20/17  Time: 12:55 PM    [facility] Nurse Notes    UPDATE: Patient is a 55year old Female patient, with diagnosis of Renal Failure, GIP level of care for Pain, Dyspnea and Nausea. Haldol and Dilaudid for Symptom Management. GOALS OF CARE:   Continue to monitor for optimal patient comfort and symptom management. Ongoing family support.             Signed by: Angel Rios RN MSN

## 2017-10-21 NOTE — PROGRESS NOTES
ID, bedside report. Pt and spouse asleep. Pt RR 18, easy. FLACC 0.     745- Bed is in high position.  and I review safety and that because she will get up, and does, the bed needs to be on low at all times to prevent a fall and injury. He verbalizes understanding, agreement. She remains restful. FLACC 0.  RR 16, easy. 810- assessment, ESAS, fall risk, marika completed. She responds minimally. Lidocaine to wounds on her coccyx.  assists in turning, repositioning. Pt offers no verbal interaction. 1040- FLACC 0. Remains asleep,  at the bedside and reports her to be restful. Room remains dark, quiet. 1110-She requests pain medication. She is medicated and we review her fentanyl and expectations with is pain relief. She sleeps, but Faylene Sails is interactive and appropriate. He again verbalizes questions regarding her anti-rejection meds and her recovery \"to get stronger and be able to do more\"  We discuss again her decline in past weeks and challenges. He is emotional, tearful and apologetic that \"I just keep asking because she always has come through so well\". 1145- Medicated for continued c/o pain \"everywhere, everything\". She cannot provide a number via pain scale. RR 20.      1230- FLACC 0.  NP at the bedside with lengthy conversation with  and multiple other family members. They are appreciative, but continue to express hope and belief that she can \"come back from this\". They are intermittently tearful, and at times angry. They are supported, reassured and encouraged to allow their thoughts and feelings to come out. Encouraged to ask staff, and talk with staff about any concerns or questions. Pt does not respond to any of this at this time. 1345- FLACC 0.  RR 14-16. Family reports her to be restful. 1430- Eyes closed, restful. FLACC 0. She does not participate in conversation, or acknowledge conversation.   Her  is very interested in knowing what her systolic b/p has to be for her to receive dialysis, and this question is deferred to provider, although we can clarify that in the 76s, is not adequate. 1540- Medicated for nausea and pain IV. She is assisted to BR and has a large brown loose stool. She is able to bear weight and has an unsteady gait, requiring one to assist.      1610- Restful. Eyes closed. Skin warm, dry. Family report her quiet, FLACC 0.  RR 16, easy. 1700- medicated for nausea, pain. Tearful, assisted to position of comfort. Family remains at the bedside. 1740- ambulatory to BR with one staff. Very large unformed brown BM, and urination very small amount. Mirian care per patient. She states her abd hurts but declines medication at this time. We discuss her pain control, the availability of medication and to let me know if she requires something. She is agreeable.  at the bedside. 1810- restful, eyes closed. RR 20, easy.  reports her to be restful. I verify that a minimum of hourly rounds have been provided for this patient during this shift. Meds:  Medicated fairly frequently IV for persistent nausea and pain. Symptoms are only fairly controlled but patient refuses additional medications. Family/Education:  Reiteration of same.  continues to press having dialysis and her medications. Lengthy repetitive conversations with provider and myself again today. Oral intake:  Poor. She had 2 ounces of coffee. Otherwise she has only random sips, and nibbles at best.  Nausea is persistent. New problems/issues:  Education continues. Minimal oral intake. INcreasing abd size and persistent s/s.      Summary/general care:

## 2017-10-21 NOTE — PROGRESS NOTES
Received report from off going RN and assumed care of patient. Patient identified by name and . Patient in bed resting with numerous family present. No s/sx of distress at this time. Bed low and locked. Call light within reach. Door open for monitoring.

## 2017-10-21 NOTE — PROGRESS NOTES
Progress Note    Patient: Paulo Pettit MRN: [id number]  SSN: [id number]    YOB: 1970  Age: 55 y.o. Sex: female      Admit Date: 10/18/2017    LOS: 3 days     Subjective:     Drowsy but appears comfortable. Recently medicated for pain. Poor oral intake. Family ( and multiple family/friends) at bedside. Review of Systems:  Review of systems not obtained due to patient factors. Objective:     Vitals:    10/19/17 0536 10/19/17 1501 10/20/17 0446 10/20/17 1728   BP: (!) 72/53 (!) 71/55 (!) 72/41 (!) 75/56   Pulse: 76 76 76 79   Resp: 16 16 14 14   Temp: 96.1 °F (35.6 °C) 97.1 °F (36.2 °C) 97.8 °F (36.6 °C) 97.3 °F (36.3 °C)        Intake and Output:  Current Shift:    Last three shifts:       Physical Exam:  GENERAL: fatigued, cooperative, mild distress, appears much older than stated age, icteric, cachectic, confused  LUNG: clear to auscultation bilaterally   HEART: regular rate and rhythm, S1, S2 normal, no murmur, click, rub or gallop  ABDOMEN: firm, more distended, non-tender. Ascites. Large umbilical hernia. Several small bowel movements throughout the night. : Anuric. EXTREMITIES:  extremities with no cyanosis. Moderate lower ext edema  SKIN: Jaundiced. Warm to touch. Stage 2 wound to coccyx. NEUROLOGIC: Lethargic. Nods head at times and speaks in soft garbled speech. Moves all extremities weakly. PSYCHIATRIC: flat affect. Lab/Data Review:  No new labs resulted in the last 24 hours.     Assessment:     Principal Problem:    Type II diabetes mellitus with end-stage renal disease ([facility] [address] (10/18/2017)    Active Problems:    Congenital hepatic fibrosis ()      Esophageal varices ([address] (7/27/2016)      Coagulopathy ([facility] [address] (9/14/2017)      Overview: Liver disease      Cirrhosis of liver ([address] (5/9/2017)      Pancytopenia ([address] (5/9/2017)      Gastroparesis due to DM ([facility] [address] (10/17/2017)      History of liver transplant ([facility] [address] (3/7/2013)      Chronic renal failure (10/18/2017)      Ascites (10/18/2017)      Portal hypertension ([address] (10/18/2017)      S/P TIPS (transjugular intrahepatic portosystemic shunt) (10/18/2017)      Autonomic dysfunction (10/18/2017)      Hypotension (10/18/2017)      Encephalopathy (10/18/2017)      Ulcer of esophagus without bleeding (10/18/2017)      Hypoalbuminemia (10/18/2017)      Pain (10/18/2017)      Plan:     Current Facility-Administered Medications   Medication Dose Route Frequency    LORazepam (ATIVAN) injection 0.5 mg  0.5 mg IntraVENous Q4H PRN    fentaNYL (DURAGESIC) 12 mcg/hr patch 1 Patch  1 Patch TransDERmal Q72H    HYDROmorphone (DILAUDID) syringe 0.25 mg  0.25 mg IntraVENous Q30MIN PRN    Or    HYDROmorphone (DILAUDID) syringe 0.25 mg  0.25 mg SubCUTAneous Q30MIN PRN    haloperidol lactate (HALDOL) injection 2 mg  2 mg SubCUTAneous Q1H PRN    Or    haloperidol lactate (HALDOL) injection 2 mg  2 mg IntraVENous Q1H PRN    acetaminophen (TYLENOL) suppository 650 mg  650 mg Rectal Q3H PRN    bisacodyl (DULCOLAX) suppository 10 mg  10 mg Rectal PRN    sodium chloride (NS) flush 10 mL  10 mL InterCATHeter Q12H    heparin (porcine) pf 300 Units  300 Units InterCATHeter Q12H    sodium chloride (NS) flush 10 mL  10 mL InterCATHeter PRN    heparin (porcine) pf 300 Units  300 Units InterCATHeter PRN    diphenhydrAMINE (BENADRYL) injection 25 mg  25 mg IntraVENous Q6H PRN    glycopyrrolate (ROBINUL) injection 0.2 mg  0.2 mg IntraVENous Q4H PRN    lidocaine (XYLOCAINE) 2 % viscous solution 15 mL  15 mL Topical Q12H    lidocaine (XYLOCAINE) 2 % viscous solution 15 mL  15 mL Topical PRN     1. Admitted with chronic renal failure for management of pain, dyspnea, nausea, and family/pt support.     2. Pain: Hydromorphone PRN and continue Fentanyl 12 mcg/hr via TD patch.      3. Dyspnea: Hydromorphone and oxygen PRN.      4. Nausea: Haloperidol PRN. Diet as tolerated.      5.  Family/Pt Support: Family at bedside during exam. Ongoing plan of care including medications discussed with primary RN and family. Will continue to offer diet as tolerated but utilize all injectable meds due to gastroparesis. Decreased ativan to 0.5 mg due to sedating effect and episode of difficulty with secretions. PPS 20%.      Signed By: Cristi Wallace NP     October 21, 2017

## 2017-10-22 NOTE — PROGRESS NOTES
ID, bedside report. Eyes closed. FLACC 0.  RR 16, easy.  asleep at the bedside. 805- Assessment, ESAS, fall risk and marika completed at this time. Pt anticipates and assists. She is slow to respond, somewhat disoriented but cooperative and able to provide feedback regarding her wants and needs. She repositions herself, denies pain, nausea or SOB at this time. 835- Medicated for c/o back pain. Repositioned. She also c/o nausea. She is taking sips of clear liquids. 910- Restful. She opens her eyes when I question her. She states her pain is \"2 or less\". Family remains at the bedside. She was able to take approximately 2 ounces of fluid in the last hour and states her nausea \"is almost gone\". 200- assisted to BR via family. They report large BM. She takes minimal PO fluids, denies nausea at this time and declines pain medication despite the presence of reported pain. 1230- requests pain medication. Repositioned to comfort, medicated. Room quiet, dark. Few family at the bedside. - Family reports her restful. RR 16, easy. Eyes closed. FLACC 0    1530- Pt requests pain medication. Medicated and assisted to position of comfort. Family report she had a second BM. She also c/o nausea and is given juice at her request.     1610- family report her to be sleeping since last medicated. rR 14, easy and even. She does not open her eyes to conversation in the room. FLACC 0    1745- Family remains quiet at the bedside. They report her restful. FLACC 0.

## 2017-10-22 NOTE — PROGRESS NOTES
Routine follow-up:    Situation:  Patient in bed, awake, in conversation with/listening to female visitor. Multiple other friends/family in room. Patient's spouse was on one side of bed, engrossed in conversation with another visitor. Patient's daughter, July Jacome, on other side of bed, in chair, with several other young people nearby. Primary interaction during this encounter was with patient's daughter. Background: This  provided spiritual/emotional support to patient and other family members during initial visit and spiritual assessment on 10/18. Patient's daughter was not present during previous encounter, so primary purpose of this encounter was to meet and support patient's daughter. Assess/Response:  Support provided to patient's daughter through ministry of presence, listening and encouragement. Patient's daughter is a student at the [employer]. Primary concern expressed by patient's daughter was grief support, and  explained that support is available through [facility], and made plans to discuss grief support with patient's daughter further at a later date. Plan of care:  Spiritual/emotional support to be provided as needed, requested, or referred.

## 2017-10-22 NOTE — PROGRESS NOTES
SW visit with patient spouse who is feeling very anxious about what need to be done after patient passes and will he be able to do what needs to be done. He had a stroke seven years prior and it left him with some deficits with daily activities, getting from one destination to the next. WENDIE validated his feelings and educated him on bereavement follow up. He was wondering about end of life issues. WENDIE informed him she would ask SW to contact him tomorrow and see if bereavement counselor could meed him before patient passes and do some pre bereavement. Jeffrey Eldridge know what she had discuss with caregiver.

## 2017-10-22 NOTE — PROGRESS NOTES
Routine visit:    Situation:  Patient's spouse was sitting at nursing station and was talking with RN, Estefanía Muller. Background:  As documented elsewhere, this  had previously provided spiritual/emotional support to patient, patient's daughter, and other family/friends of the patient. This encounter was 's first opportunity to interact directly with patient's spouse. Referrals had been made to this , who also serves a bereavement coordinator, regarding possibly elevated bereavement risk for patient's spouse. Reasons cited included hx of mental health issues and very recent switch made from family seeking aggressive care for patient to seeking comfort measures only. Assessment:  Support provided to patient through minsitry of presence, listening/life review, and words of encouragement.  reinforced NEO Calixto's suggestion to patient's spouse that self-care for patient's spouse is important. Plan of care:  Spiritual/emotional and grief support to be provided as needed, requested, or referred for patient's spouse.

## 2017-10-23 NOTE — PROGRESS NOTES
Pt. Had a restful night. Gave two PRNs Haldol and dilaudid. Pt. Ambulates to the bathroom. Pt. Had 1 BM last night. Family by the bedside.

## 2017-10-23 NOTE — [facility] IDG NURSE NOTES
Patient: Naseem Head    Date: 10/23/17  Time: 1:46 PM    [facility] [place] Nurse Notes    UPDATE: Patient is a 55year old Female patient, with diagnosis of Renal Failure, GIP level of care, for Pain, Dyspnea and Nausea. Fentanyl, Haldol and Hydromorphone for symptom management. Dictation per Samira Jackman RN. GOALS OF CARE:   Continue to monitor for optimal patient comfort and symptom management. Family at bedside per report, and having difficulty accepting that patient is dying.             Signed by: Darryle Aspen RN MSN

## 2017-10-23 NOTE — [facility] IDG SOCIAL WORKER NOTES
Patient: Lamin Lemon    Date: 10/23/17  Time: 12:25 PM    [facility]  Notes      LMSW will continue to provide emotional support to pt and family. Family needs a lot of teaching and education on the end of life process. Family vacillates with understanding of the prognosis. The family is struggling with anticipatory grief. LMSW has referred family to bereavement.         Signed by: Akshat Keita

## 2017-10-23 NOTE — PROGRESS NOTES
Progress Note    Patient: Deirdre Ramos MRN: [id number]  SSN: [id number]    YOB: 1970  Age: 55 y.o. Sex: female      Admit Date: 10/18/2017    LOS: 5 days     Subjective:     Resting quietly. Recently medicated for pain x2. Oral intake remains poor consisting of just bites and sips. Family ( and one other person) at bedside resting quietly with room completely dark. .     Review of Systems:  Review of systems not obtained due to patient factors. Objective:     Vitals:    10/21/17 1709 10/22/17 0409 10/22/17 1946 10/23/17 0611   BP: (!) 79/53 (!) 74/54 (!) 76/58 (!) 72/47   Pulse: 81 70 74 72   Resp: 18 12 13 16   Temp: 96.5 °F (35.8 °C) 96.6 °F (35.9 °C)  96.9 °F (36.1 °C)        Intake and Output:  Current Shift:    Last three shifts:       Physical Exam:  GENERAL: fatigued, cooperative, mild distress, appears much older than stated age, icteric, cachectic, confused  LUNG: clear to auscultation bilaterally   HEART: regular rate and rhythm, S1, S2 normal, no murmur, click, rub or gallop  ABDOMEN: firm, more distended, non-tender. Ascites. Large umbilical hernia. BM x 2 last night. : Anuric. EXTREMITIES:  extremities with no cyanosis. Moderate lower ext edema  SKIN: Jaundiced. Warm to touch. Stage 2 wound to coccyx reported by nursing. NEUROLOGIC: Lethargic. Nods head at times and speaks in soft garbled speech. Moves all extremities weakly. PSYCHIATRIC: flat affect. Lab/Data Review:  No new labs resulted in the last 24 hours.     Assessment:     Principal Problem:    Type II diabetes mellitus with end-stage renal disease ([address] (10/18/2017)    Active Problems:    Congenital hepatic fibrosis ()      Esophageal varices ([address] (7/27/2016)      Coagulopathy ([address] (9/14/2017)      Overview: Liver disease      Cirrhosis of liver ([address] (5/9/2017)      Pancytopenia ([address] (5/9/2017)      Gastroparesis due to DM ([facility] (10/17/2017)      History of liver transplant ([facility] (3/7/2013) Chronic renal failure (10/18/2017)      Ascites (10/18/2017)      Portal hypertension ([address] (10/18/2017)      S/P TIPS (transjugular intrahepatic portosystemic shunt) (10/18/2017)      Autonomic dysfunction (10/18/2017)      Hypotension (10/18/2017)      Encephalopathy (10/18/2017)      Ulcer of esophagus without bleeding (10/18/2017)      Hypoalbuminemia (10/18/2017)      Pain (10/18/2017)      Plan:     Current Facility-Administered Medications   Medication Dose Route Frequency    LORazepam (ATIVAN) injection 0.5 mg  0.5 mg IntraVENous Q4H PRN    fentaNYL (DURAGESIC) 12 mcg/hr patch 1 Patch  1 Patch TransDERmal Q72H    HYDROmorphone (DILAUDID) syringe 0.25 mg  0.25 mg IntraVENous Q30MIN PRN    Or    HYDROmorphone (DILAUDID) syringe 0.25 mg  0.25 mg SubCUTAneous Q30MIN PRN    haloperidol lactate (HALDOL) injection 2 mg  2 mg SubCUTAneous Q1H PRN    Or    haloperidol lactate (HALDOL) injection 2 mg  2 mg IntraVENous Q1H PRN    acetaminophen (TYLENOL) suppository 650 mg  650 mg Rectal Q3H PRN    bisacodyl (DULCOLAX) suppository 10 mg  10 mg Rectal PRN    sodium chloride (NS) flush 10 mL  10 mL InterCATHeter Q12H    heparin (porcine) pf 300 Units  300 Units InterCATHeter Q12H    sodium chloride (NS) flush 10 mL  10 mL InterCATHeter PRN    heparin (porcine) pf 300 Units  300 Units InterCATHeter PRN    diphenhydrAMINE (BENADRYL) injection 25 mg  25 mg IntraVENous Q6H PRN    glycopyrrolate (ROBINUL) injection 0.2 mg  0.2 mg IntraVENous Q4H PRN    lidocaine (XYLOCAINE) 2 % viscous solution 15 mL  15 mL Topical Q12H    lidocaine (XYLOCAINE) 2 % viscous solution 15 mL  15 mL Topical PRN     1. Admitted with chronic renal failure for management of pain, dyspnea, nausea, and family/pt support.     2. Pain: Hydromorphone PRN and continue Fentanyl 12 mcg/hr via TD patch.      3. Dyspnea: Hydromorphone and oxygen PRN.      4. Nausea: Haloperidol PRN. Diet as tolerated.      5.  Family/Pt Support: Family at bedside during exam. Ongoing plan of care including medications discussed with primary RN and family. Will continue to offer diet as tolerated but utilize all injectable meds due to gastroparesis. No medication changes made at this time. PPS 20%.      Signed By: Cristi Wallace NP     October 23, 2017

## 2017-10-23 NOTE — [facility] IDG CHAPLAIN NOTES
Patient: Rashawn Jimenez    Date: 10/23/17  Time: 12:24 PM    [facility]  Notes     Blanka Bueno  completed an initial assessment and offered spiritual support through the ministry of [facility] and prayer.      Signed by: Bella Dickson

## 2017-10-23 NOTE — PROGRESS NOTES
I saw Karis Courser in room 26 on my afternoon rounds at [address] [facility]. She was somnolent but arouses during my exam to express her severe discomfort and pain in her right flank. Her exam shows new areas of cyanoses in the upper extremities and toes. Overall she is more lethargic than on my last exam 10/20/2017. I spoke with the patient's spouse, mother, brother and son at bedside regarding the modest improvement in the patient's intestinal motility marked by 4 or 5 small bowel movements in the last 72 hours. Her abdomen is more distended. The massive left midline ventral hernia is tense. Bowel sounds are present but are of a tinkeling nature and are infrequent. I do not feel the patient should be challenged with any significant volumes of by mouth food or fluids at this time. Her blood pressure is fall to 72/40 and she is showing peripheral cyanosis and deepening obtundation. Her life expectancy is less than 1 week. She is not able to tolerate oral medication Xifaxan at this time due to gastroparesis risk vomiting I allow the patient's spouse to ventilate somewhat about his distress that a third hepatic transplant could not be arranged for his wife. We will continue the fentanyl at 12 µg per hour transdermal and hydromorphone at 0.5 mg IV every 20 minutes when necessary pain. Diet will be limited to small taste soups and clear fluids. I concur with the conclusions of YVAN Wang her note written earlier today.   Marcello Minor M.D.

## 2017-10-24 NOTE — [facility] IDG MASTER NOTE
Hospice Interdisciplinary Group Collaborative  Date: 10/23/2017  Time: 12:00 Pm    ___________________    Patient: Franklin Foss  Insurance ID: [unfilled]  MRN: [id number]  ___________________    Diagnoses:  Diagnoses of Biliary cirrhosis ([facility] Other ascites, Autonomic dysfunction, Chronic renal failure, stage 5 (HCC), Coagulopathy ([facility] Idiopathic esophageal varices with bleeding ([facility] Encephalopathy, History of liver transplant ([facility] Hypoalbuminemia, Hypotension, unspecified hypotension type, Dependence on renal dialysis ([facility] Abdominal pain, epigastric, and Ulcer of esophagus without bleeding were pertinent to this visit.     Current Medications:    Current Facility-Administered Medications:     LORazepam (ATIVAN) injection 0.5 mg, 0.5 mg, IntraVENous, Q4H PRN, Dorna Cammy, NP, 0.5 mg at 10/21/17 2253    fentaNYL (DURAGESIC) 12 mcg/hr patch 1 Patch, 1 Patch, TransDERmal, Q72H, Dorgwendolyn Chawla, NP, 1 Patch at 10/23/17 1423    HYDROmorphone (DILAUDID) syringe 0.25 mg, 0.25 mg, IntraVENous, Q30MIN PRN, 0.25 mg at 10/24/17 0532 **OR** HYDROmorphone (DILAUDID) syringe 0.25 mg, 0.25 mg, SubCUTAneous, Q30MIN PRN, Dorna Cammy, NP    haloperidol lactate (HALDOL) injection 2 mg, 2 mg, SubCUTAneous, Q1H PRN, 2 mg at 10/19/17 1659 **OR** haloperidol lactate (HALDOL) injection 2 mg, 2 mg, IntraVENous, Q1H PRN, Alveria Curet, NP, 2 mg at 10/24/17 0533    acetaminophen (TYLENOL) suppository 650 mg, 650 mg, Rectal, Q3H PRN, Alveria Curet, NP    bisacodyl (DULCOLAX) suppository 10 mg, 10 mg, Rectal, PRN, Alveria Curet, NP    sodium chloride (NS) flush 10 mL, 10 mL, InterCATHeter, Q12H, Alveria Curet, NP, 10 mL at 10/24/17 0829    heparin (porcine) pf 300 Units, 300 Units, InterCATHeter, Q12H, Alveria Curet, NP, 300 Units at 10/24/17 0829    sodium chloride (NS) flush 10 mL, 10 mL, InterCATHeter, PRN, Alveria Curet, NP, 10 mL at 10/24/17 0535    heparin (porcine) pf 300 Units, 300 Units, InterCATHeter, PRN, Christopher Maldonado NP, 300 Units at 10/24/17 0225    diphenhydrAMINE (BENADRYL) injection 25 mg, 25 mg, IntraVENous, Q6H PRN, Christopher Maldonado NP    glycopyrrolate (ROBINUL) injection 0.2 mg, 0.2 mg, IntraVENous, Q4H PRN, Christopher Maldonado NP    lidocaine (XYLOCAINE) 2 % viscous solution 15 mL, 15 mL, Topical, Q12H, Christopher Maldonado NP, 15 mL at 10/24/17 0832    lidocaine (XYLOCAINE) 2 % viscous solution 15 mL, 15 mL, Topical, PRN, Christopher Maldonado NP, 15 mL at 10/21/17 1147    Orders:  Orders Placed This Encounter    IP CONSULT TO SPIRITUAL CARE Once on week one, then PRN. For [facility] patients only. For contracted patients, primary hospice will continue to manage spiritual care needs     Once on week one, then PRN. For [facility] patients only. For contracted patients, primary hospice will continue to manage spiritual care needs     Standing Status:   Standing     Number of Occurrences:   1     Order Specific Question:   Reason for Consult: Answer:   Spiritual crisis intervention or per patient or caregiver request    DIET PLEASURE     Standing Status:   Standing     Number of Occurrences:   1     Order Specific Question:   Likes/Dislikes/Preferences     Answer: At this time she is requesting only vanilla. Pudding, ice cream.. just no chocolate at this time    VITAL SIGNS     Standing Status:   Standing     Number of Occurrences:   1    VITAL SIGNS     Standing Status:   Standing     Number of Occurrences:   1    NURSING-MISCELLANEOUS: comfort measures Enter comfort measures above. CONTINUOUS     Enter comfort measures above. Standing Status:   Standing     Number of Occurrences:   1     Order Specific Question:   Description of Order:     Answer:   comfort measures    VALENZUELA CATHETER, CARE     1. Valenzuela Catheter care every shift and PRN  2. Notify Physician of Valenzuela Catheter leakage, occlusion, gross adherent sediment or accidental removal  3.  Change Valenzuela 30 days after insertion. 4. May flush catheter bid and prn leakage or gross adherent sediment or mucus. Standing Status:   Standing     Number of Occurrences:   1    BLADDER CHECKS     May scan bladder PRN for urinary retention and or patient discomfort     Standing Status:   Standing     Number of Occurrences:   1    NURSING ASSESSMENT:  SPECIFY Assess for GIP, routine, or respite level of care. Q SHIFT Routine     Standing Status:   Standing     Number of Occurrences:   1     Order Specific Question:   Please describe the test or procedure you would like to order. Answer:   Assess for GIP, routine, or respite level of care.  PAIN ASSESSMENT Pain and Symptoms: Assess ever 4 hours and PRN, for GIP level of care. PRN Routine     Standing Status:   Standing     Number of Occurrences:   1     Order Specific Question:   Please describe the test or procedure you would like to order. Answer:   Pain and Symptoms: Assess ever 4 hours and PRN, for GIP level of care.  NURSING-MISCELLANEOUS: Admitted GIP with chronic renal failure for management of pain, dyspnea and nausea. Admitted GIP with chronic renal failure for management of pain, dyspnea and nausea. DX: chronic renal failure ASSOCIATED: esophageal gastric. .. Admitted GIP with chronic renal failure for management of pain, dyspnea and nausea. 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 Her condition is further complicated by paresis of the esophagus, stomach and small bowel unresponsive to prokinetic therapy (Reglan). The patient cannot be nourished nor medicine be administered via the GI tract. The patient has had reflux and aspiration of gastric material.  TPN cannot be utilized without effective dialysis. Pancytopenia, coagulopathy (INR 1.5), hypoalbuminemia (1.6mg/dl), and encephalopathy (ammonia 54) are related diagnoses. It has been explained to the patient, her spouse and her family that are no further life extending interventions available to her. She does not want CPR or endotracheal intubation or ventilator support at the end of life. She accepts that the only measures available to her at this time are comfort measures. She will continue to require antiemetic medication and opioids for severe somatic pain related principally to ascites. These measures will be employed at the hospice house during her period of terminal decline which is expected in less than 2 weeks. Standing Status:   Standing     Number of Occurrences:   1     Order Specific Question:   Description of Order:     Answer:   Admitted GIP with chronic renal failure for management of pain, dyspnea and nausea.     DRESSING CHANGE - PICC, MLC, SUBCUTANEOUS AND ACCESSED PORT DRESSING CHANGE     PICC, MLC, SUBCUTANEOUS AND ACCESSED PORT DRESSING CHANGE:  Change catheter site dressing every 5 days and prn if site dressing becomes damp loosened or visibly soiled       Standing Status:   Standing     Number of Occurrences:   1    ACTIVITY AS TOLERATED W/ASSIST     Standing Status:   Standing     Number of Occurrences:   1    WOUND CARE, DRESSING CHANGE     Wound Care:  Location: coccyx  Decubitus Wound Stage II (Cavadixie)- Cleanse wound location with wound cleanser, pat dry and apply Cavilon Durable Barrier Cream mixed with lidocaine every 12 hours and and PRN if soiled. Turn every 2 hours. Assess with each nursing assessment visit. Standing Status:   Standing     Number of Occurrences:   29    WOUND CARE, DRESSING CHANGE     Wound Care:  Location: Left heel boggy  Skin Tear: Cleanse skin with wound cleanser. Pat dry. Apply skin prep to bilateral heels q 12 hours. Float heels. Standing Status:   Standing     Number of Occurrences:   30    DO NOT RESUSCITATE     Standing Status:   Standing     Number of Occurrences:   1    OXYGEN CANNULA Liters per minute: 2; Indications for O2 therapy: RESPIRATORY DISTRESS CONTINUOUS Routine     Standing Status:   Standing     Number of Occurrences:   1     Order Specific Question:   Liters per minute: Answer:   2     Order Specific Question:   Indications for O2 therapy     Answer:   RESPIRATORY DISTRESS    OR Linked Order Group     haloperidol lactate (HALDOL) injection 2 mg     haloperidol lactate (HALDOL) injection 2 mg    acetaminophen (TYLENOL) suppository 650 mg    bisacodyl (DULCOLAX) suppository 10 mg    sodium chloride (NS) flush 10 mL    heparin (porcine) pf 300 Units    sodium chloride (NS) flush 10 mL    heparin (porcine) pf 300 Units    diphenhydrAMINE (BENADRYL) injection 25 mg    DISCONTD: LORazepam (ATIVAN) injection 1 mg    glycopyrrolate (ROBINUL) injection 0.2 mg    DISCONTD: HYDROmorphone (DILAUDID) syringe 0.5 mg    DISCONTD: HYDROmorphone (DILAUDID) syringe 0.5 mg    metoclopramide HCl (REGLAN) 10 mg tablet     Sig: Take 10 mg by mouth Before breakfast, lunch, dinner and at bedtime.  midodrine (PROAMITINE) 5 mg tablet     Sig: Take 5 mg by mouth every eight (8) hours.  morphine CR (MS CONTIN) 15 mg CR tablet     Sig: Take 15 mg by mouth every twelve (12) hours.     mupirocin (BACTROBAN) 2 % ointment     Sig: Apply 1 g to affected area daily. Apply to affected area daily.  pantoprazole (PROTONIX) 40 mg tablet     Sig: Take 40 mg by mouth daily.  promethazine (PHENERGAN) 25 mg tablet     Sig: Take 25 mg by mouth every six (6) hours as needed for Nausea.  DISCONTD: haloperidol lactate (HALDOL) injection 2 mg    DISCONTD: HYDROmorphone (DILAUDID) syringe 0.5 mg    DISCONTD: HYDROmorphone (DILAUDID) syringe 0.5 mg    DISCONTD: lidocaine (XYLOCAINE) 2 % viscous solution 15 mL    lidocaine (XYLOCAINE) 2 % viscous solution 15 mL    lidocaine (XYLOCAINE) 2 % viscous solution 15 mL    OR Linked Order Group     HYDROmorphone (DILAUDID) syringe 0.25 mg     HYDROmorphone (DILAUDID) syringe 0.25 mg    fentaNYL (DURAGESIC) 12 mcg/hr patch 1 Patch    LORazepam (ATIVAN) injection 0.5 mg    INITIAL PHYSICIAN ORDER: HOSPICE Level Of Care: General; Reason for Admission: Admitted GIP with chronic renal failure for management of pain, dyspnea and nausea. Standing Status:   Standing     Number of Occurrences:   1     Order Specific Question:   Status     Answer:   Hospice     Order Specific Question:   Level Of Care     Answer:   General     Order Specific Question:   Reason for Admission     Answer:   Admitted GIP with chronic renal failure for management of pain, dyspnea and nausea. Order Specific Question:   Inpatient Hospitalization Certified Necessary for the Following Reasons     Answer:   3.  Patient receiving treatment that can only be provided in an inpatient setting (further clarification in H&P documentation)     Order Specific Question:   Admitting Diagnosis     Answer:   Chronic renal failure [168424]     Order Specific Question:   Terminal Prognosis Diagnosis(es)     Answer:   Chronic renal failure [512798]     Order Specific Question:   Admitting Physician     Answer:   Mariposa Bautista [[id number]]     Order Specific Question:   Attending Physician     Answer:   Arya Reyna BILLY POOL [[id number]]    IP CONSULT TO SOCIAL WORK     Once on week one, then PRN for Psychosocial crisis intervention or per patient or caregiver request.  For [facility] patients only. For contracted patients, primary hospice will continue to manage social work needs. Standing Status:   Standing     Number of Occurrences:   1     Order Specific Question:   Reason for Consult: Answer: Once on week one, then PRN for Psychosocial crisis intervention or per patient or caregiver request.       Allergies: Allergies   Allergen Reactions    Aspirin Nausea Only    Codeine Nausea Only    Compazine [Prochlorperazine Edisylate] Unknown (comments) and Other (comments)     Lock jaw  Causes Lock Jaw    Pcn [Penicillins] Other (comments)     \"drops wbc\"       Care Plan:       Multidisciplinary Problems (Active)           Problem: Anticipatory Grief     Dates: Start: 10/18/17       Disciplines: Interdisciplinary    Goal: Grief heard and acknowledged, anxiety reduced, patient coping identified, patient/family expressed gratitude     Dates: Start: 10/18/17   Expected End: 10/31/17      Disciplines: Interdisciplinary         Problem: Community Resource Needs     Dates: Start: 10/18/17       Disciplines: Interdisciplinary    Goal: Patient is receiving increases resource supprot to enhance ability to remain at home     Dates: Start: 10/18/17   Expected End: 10/31/17      Disciplines: Interdisciplinary   Intervention: Provides assistance with identifying appropriate community resources    Dates: Start: 10/18/17       Description: Provide assistance with identifying appropriate community resources. Patient is receiving increased resource support to enhance ability to remain at home.           Problem: Dying Process     Dates: Start: 10/18/17       Disciplines: Interdisciplinary    Goal: Increased peace with dying process, patient coping identified, patient/family expressed gratitude, spiritual distress identified and decreased with visit     Dates: Start: 10/18/17   Expected End: 10/31/17      Disciplines: Interdisciplinary         Problem: Emotional Support Needs     Dates: Start: 10/18/17       Disciplines: Interdisciplinary    Goal: Patient/family is receiving emotional support     Dates: Start: 10/18/17   Expected End: 10/31/17      Disciplines: Interdisciplinary   Intervention: Assess for emotional distress    Dates: Start: 10/18/17      Intervention: Provide emotional support    Dates: Start: 10/18/17      Intervention: Provide emotional support of the family's cultural expressions of grief and loss    Dates: Start: 10/18/17       Description: Provide emotional support of the family's cultural expression of grief, loss, and response to illness. Intervention: Refer to bereavement    Dates: Start: 10/18/17            Problem: Falls - Risk of     Dates: Start: 10/18/17       Disciplines: Interdisciplinary    Goal: *Absence of Falls     Dates: Start: 10/18/17   Expected End: 10/31/17      Description: Document [facility] Fall Risk and appropriate interventions in the flowsheet.     Disciplines: Interdisciplinary         Problem: Family Significant Other Needs     Dates: Start: 10/18/17       Disciplines: Interdisciplinary    Goal: Patient/family will receive support from community resources     Dates: Start: 10/18/17   Expected End: 10/31/17      Disciplines: Interdisciplinary   Intervention: Assess family/caregiver needs    Dates: Start: 10/18/17      Intervention: Marcello Newman family/caregiver on hospice and ancillary services    Dates: Start: 10/18/17      Intervention: MSW community resource planning    Dates: Start: 10/18/17            Problem: Grieving     Dates: Start: 10/18/17       Disciplines: Interdisciplinary    Goal: *Able to identify stages of grieving process     Dates: Start: 10/18/17   Expected End: 10/31/17      Disciplines: Interdisciplinary   Intervention: Assist patient and family to decide about autopsy,  plans, completing important life tasks, coping with impending death, engaging in meaningful rituals, providing care of the body after death    Dates: Start: 10/18/17            Problem: Patient Education: Go to Patient Education Activity     Dates: Start: 10/18/17       Disciplines: Interdisciplinary    Goal: Patient/Family Education     Dates: Start: 10/18/17   Expected End: 10/31/17      Disciplines: Interdisciplinary         Problem: Patient Education: Go to Patient Education Activity     Dates: Start: 10/19/17       Disciplines: Interdisciplinary    Goal: Patient/Family Education     Dates: Start: 10/19/17   Expected End: 10/31/17      Disciplines: Interdisciplinary         Problem: Pressure Injury - Risk of     Dates: Start: 10/19/17       Disciplines: Interdisciplinary    Goal: *Prevention of pressure ulcer     Dates: Start: 10/19/17   Expected End: 10/31/17      Disciplines: Interdisciplinary   Intervention: Pressure injury risk assessment tool    Dates: Start: 10/19/17       Description: (e.g.: Terrance Scale)    Intervention: Pressure-relieving device needs assessment    Dates: Start: 10/19/17      Intervention: Pressure-relieving device implementation (eg: Specialty beds; wheel chair cushions; heel lift boots)    Dates: Start: 10/19/17      Intervention: Skin assessment (e.g. existing ulcers, color; integrity; moisture; bryn prominences; blisters; friction/shear injuries)    Dates: Start: 10/19/17      Intervention: Skin monitoring and care (e.g. skin cleansing; keep dry, moisturize, utilization of  lotion and/or skin barrier cream)    Dates: Start: 10/19/17      Intervention: Blood glucose control (eg: Monitoring; medication; nutrition/hydration)    Dates: Start: 10/19/17      Intervention: Position change (eg: Techniques to position; turn and transfer per schedule; cushion bryn prominences; float heels; encourage mobility)    Dates: Start: 10/19/17      Intervention: Nutrition promotion (eg: Micro/macro nutrient supplementation; encourage oral intake; blood glucose control; nutrition support when indicated)    Dates: Start: 10/19/17            Problem: Spiritual Distress     Dates: Start: 10/18/17       Disciplines: Interdisciplinary    Goal: Distress heard/acknowledged, spiritual distress reduced/resolved, anxiety reduced     Dates: Start: 10/18/17   Expected End: 10/31/17      Description: Patient distress heard and acknowledged. Patient/family spiritual distress reduced/resolved. Anxiety reduced. Patient coping identified. Patient family expressed gratitude. Disciplines: Interdisciplinary         Problem: Spiritual Evaluation     Dates: Start: 10/18/17       Disciplines: Interdisciplinary    Goal: Identify beliefs/practices that support hospice experience     Dates: Start: 10/18/17   Expected End: 10/31/17      Description: Patient/family identify their beliefs/practices that impair Hospice experience. Patient/family identify their beliefs/practices that support Hospice experience. Patient coping identified. Spiritual distress identified and decreased with visit. Disciplines: Interdisciplinary           ___________________    Care Team Notes          POC/IDG Notes      [facility] ID Nurse Notes by Tarik Ruvalcaba at 10/23/17 1344  Version 1 of 1    Author:  Tarik Ruvalcaba Service:  Rickie Roche Author Type:  Registered Nurse    Filed:  10/23/17 2935 Date of Service:  10/23/17 1346 Status:  Signed    :  Tarik Ruvalcaba (Registered Nurse)           Patient: James Zapien    Date: 10/23/17  Time: 1:46 PM    [facility] Nurse Notes    UPDATE: Patient is a 55year old Female patient, with diagnosis of Renal Failure, [facility] level of care, for Pain, Dyspnea and Nausea. Fentanyl, Haldol and Hydromorphone for symptom management. Dictation per Soniya Lee RN. GOALS OF CARE:   Continue to monitor for optimal patient comfort and symptom management.  Family at bedside per report, and having difficulty accepting that patient is dying. Signed by: Thelma Sommers RN MSN       [facility] IDG  Notes by Janessa Herrera at 10/23/17 1225  Version 1 of 1    Author:  Janessa Herrera Service:  Licensed Clinical  Author Type:      Filed:  10/23/17 1232 Date of Service:  10/23/17 1225 Status:  Signed    :  Janessa Herrera ()           Patient: Titus Dyer    Date: 10/23/17  Time: 12:25 PM    [facility]  Notes      SW will continue to provide emotional support to pt and family. Family needs a lot of teaching and education on the end of life process. Family vacillates with understanding of the prognosis. The family is struggling with anticipatory grief. [facility] [place] family to bereavement. Signed by: Janessa Herrera       [facility] IDG  Notes by Krishna Harkins at 10/23/17 1224  Version 1 of 1    Author:  Krishna Harkins Service:  Spiritual Care Author Type:  Pastoral Care    Filed:  10/23/17 1230 Date of Service:  10/23/17 1224 Status:  Signed    :  Krishna Harkins (Pastoral Care)           Patient: Titus Dyer    Date: 10/23/17  Time: 12:24 PM    [facility]  Notes     Jesus Cano  completed an initial assessment and offered spiritual support through the ministry of presence and prayer. Signed by: Krishna Harkins       \[facility]\""G  Notes by Bryant Quevedo at 10/20/17 1802  Version 1 of 1    Author:  Bryant Quevedo Service:  Luz Zapata Author Type:  Pastoral Care    Filed:  10/20/17 1804 Date of Service:  10/20/17 1802 Status:  Signed    :  Bryant Quevedo (Pastoral Care)           Patient: Titus Dyer    Date: 10/20/17  Time: 6:03 PM    [facility]  Notes  Patient is a 55year old MWF w/dx of end stage renal disease. Patient was admitted on 10/18 and this  completed Initial Spiritual Assessment.   Family is struggling to come to terms with patient's prognosis and decline. Spiritual care/support will continue as needed, requested, or referred. Signed by: Lis Javier       [facility] IDG  Notes by Shaina Sanchez at 10/20/17 1255  Version 1 of 1    Author:  Shaina Sanchez Service:  Licensed Clinical  Author Type:      Filed:  10/20/17 1306 Date of Service:  10/20/17 1255 Status:  Signed    :  Shaina Sanchez ()           Patient: Samia Crews    Date: 10/20/17  Time: 12:55 PM    [facility]  Notes  LMSW will continue to try to build rapport with the family. Family is struggling with their anticipated grief. Family is struggling with the thought of the loss of her. They were until recently thinking she was going to have a transplant. The whole family are having issues with grief        Signed by: Shaina Sanchez       [facility] IDG Nurse Notes by Gloria Olson at 10/20/17 1254  Version 1 of 1    Author:  Gloria Olson Service:  Sandy Hatfield Author Type:  Registered Nurse    Filed:  10/20/17 1300 Date of Service:  10/20/17 1254 Status:  Signed    :  Gloria Olson (Registered Nurse)           Patient: Samia Crews    Date: 10/20/17  Time: 12:55 PM    [facility] Nurse Notes    UPDATE: Patient is a 55year old Female patient, with diagnosis of Renal Failure, GIP level of care for Pain, Dyspnea and Nausea. Haldol and Dilaudid for Symptom Management. GOALS OF CARE:   Continue to monitor for optimal patient comfort and symptom management. Ongoing family support.             Signed by: Gloria Olson RN MSN       [facility] IDG Volunteer Notes by Timothy Portillo at 10/20/17 1229  Version 1 of 1    Author:  Timothy Portillo Service:  Sandy Hatfield Author Type:  Hospice Volunteer/    Filed:  10/20/17 1234 Date of Service:  10/20/17 1229 Status:  Signed    :  Timothy Portillo (Hospice Volunteer/)               [facility] Interdisciplinary Plan of Care Review     Status Codes I = Initiated C=Continued R=Revised RS = Resolved     I.  Volunteer     Goal: Hospice house volunteer (s) enhances the quality of remaining life while patient is at the hospice house. Interventions: Genesis Cruz Volunteer (s) will provide companionship to the patient and/or family by visiting at the hospice house       . Genesis Cruz Volunteer (s) will provide respite as needed when requested by patient and/or family. Genesis Smith  Volunteer will provide activities such as music, reading, pet therapy, etc. as requested. Genesis [place]  Comfort bag delivered. Any other special requests or information regarding volunteer services:    One visit for comfort bag delivery is recorded. Staff have asked for extra visits to support the family and volunteers have been notified. No further needs identified at this time. These notes have been discussed in [facility] meeting.           Signed by: Sylvain Bourgeois

## 2017-10-24 NOTE — PROGRESS NOTES
Progress Note    Patient: Bozena Alfredo MRN: [id number]  SSN: [id number]    YOB: 1970  Age: 55 y.o. Sex: female      Admit Date: 10/18/2017    LOS: 6 days     Subjective:     Resting quietly. Continues to receive small doses of haldol for nausea and dilaudid for pain via injection. Oral intake remains poor consisting of just bites and sips. Family at bedside resting quietly with room completely dark. Review of Systems:  Review of systems not obtained due to patient factors. Objective:     Vitals:    10/22/17 0409 10/22/17 1946 10/23/17 0611 10/24/17 0622   BP: (!) 74/54 (!) 76/58 (!) 72/47 (!) 80/54   Pulse: 70 74 72 62   Resp: 12 13 16 12   Temp: 96.6 °F (35.9 °C)  96.9 °F (36.1 °C)         Intake and Output:  Current Shift:    Last three shifts:       Physical Exam:  Deferred as patient and family resting quietly in room. Lab/Data Review:  No new labs resulted in the last 24 hours.     Assessment:     Principal Problem:    Type II diabetes mellitus with end-stage renal disease ([address] (10/18/2017)    Active Problems:    Congenital hepatic fibrosis ()      Esophageal varices ([address] (7/27/2016)      Coagulopathy ([address] (9/14/2017)      Overview: Liver disease      Cirrhosis of liver ([address] (5/9/2017)      Pancytopenia ([address] (5/9/2017)      Gastroparesis due to DM ([address] (10/17/2017)      History of liver transplant ([address] (3/7/2013)      Chronic renal failure (10/18/2017)      Ascites (10/18/2017)      Portal hypertension ([address] (10/18/2017)      S/P TIPS (transjugular intrahepatic portosystemic shunt) (10/18/2017)      Autonomic dysfunction (10/18/2017)      Hypotension (10/18/2017)      Encephalopathy (10/18/2017)      Ulcer of esophagus without bleeding (10/18/2017)      Hypoalbuminemia (10/18/2017)      Pain (10/18/2017)      Plan:     Current Facility-Administered Medications   Medication Dose Route Frequency    LORazepam (ATIVAN) injection 0.5 mg  0.5 mg IntraVENous Q4H PRN    fentaNYL (DURAGESIC) 12 mcg/hr patch 1 Patch  1 Patch TransDERmal Q72H    HYDROmorphone (DILAUDID) syringe 0.25 mg  0.25 mg IntraVENous Q30MIN PRN    Or    HYDROmorphone (DILAUDID) syringe 0.25 mg  0.25 mg SubCUTAneous Q30MIN PRN    haloperidol lactate (HALDOL) injection 2 mg  2 mg SubCUTAneous Q1H PRN    Or    haloperidol lactate (HALDOL) injection 2 mg  2 mg IntraVENous Q1H PRN    acetaminophen (TYLENOL) suppository 650 mg  650 mg Rectal Q3H PRN    bisacodyl (DULCOLAX) suppository 10 mg  10 mg Rectal PRN    sodium chloride (NS) flush 10 mL  10 mL InterCATHeter Q12H    heparin (porcine) pf 300 Units  300 Units InterCATHeter Q12H    sodium chloride (NS) flush 10 mL  10 mL InterCATHeter PRN    heparin (porcine) pf 300 Units  300 Units InterCATHeter PRN    diphenhydrAMINE (BENADRYL) injection 25 mg  25 mg IntraVENous Q6H PRN    glycopyrrolate (ROBINUL) injection 0.2 mg  0.2 mg IntraVENous Q4H PRN    lidocaine (XYLOCAINE) 2 % viscous solution 15 mL  15 mL Topical Q12H    lidocaine (XYLOCAINE) 2 % viscous solution 15 mL  15 mL Topical PRN     1. Admitted with chronic renal failure for management of pain, dyspnea, nausea, and family/pt support.     2. Pain: Hydromorphone PRN and continue Fentanyl 12 mcg/hr via TD patch. Will consider need to increase dilaudid and fentanyl due to ongoing use but since still spread out throughout the day, dose still adequate at this time.     3. Dyspnea: Hydromorphone and oxygen PRN.      4. Nausea: Haloperidol PRN. Diet as tolerated.      5. Family/Pt Support: Family at bedside during exam. Ongoing plan of care including medications discussed with primary RN. Will continue to offer diet as tolerated but utilize all injectable meds due to gastroparesis. No medication changes made at this time. PPS 20%.      Signed By: Chip Pierce NP     October 24, 2017

## 2017-10-24 NOTE — PROGRESS NOTES
TWYLA visited with the pt Deborah Romero and her spouse. They are requesting a sign be placed on the door that they have no visitors before 10:30 am.  TWYLA and pt discussed how they want private time in the am so spouse can have adequate rest to have his medications wear off. ERINSW assured the family I would have a sign placed on the door for them. .    We spent a great length of time reminiscing about their life and how much they have been through as a couple over the years. He is still trying everything in his power to think of ways to help her get better even though he is aware she is declining. He is coping better than when she was first admitted. During the life review Deborah Romero was smiling and enjoying her time with her .       TWYLA will continue to provide emotional support

## 2017-10-24 NOTE — PROGRESS NOTES
ID, bedside report rec'd. She is restful, eyes closed . FLACC 0.  RR 16, easy. Family asleep at the bedside. 805- assessment, ESAS, marika and fall assessments completed. She attempts to assist but is very groggy. She offers no c/o pain. Lidocaine to coccyx for comfort and she is assisted to lie on her left side.  asleep at the bedside. 80-  asks about \"can't we do just one more treatment to keep her alive? \". When I ask him what kind of treatment he states \"dialysis. . Just one more time\". When we revisit the conversation of previous he states \"I know this is selfish, but I really really wish we could do one more treatment\". We have a fairly lengthy conversation that involves review of her b/p and the reasons dialysis and paracentesis are not workable in this instance. He becomes somewhat agitated, apologizes and states \"I'm trying not to be selfish\". He can admit that this request is his, and that \"she doesn't really say she wants it, but she doesn't say she doesn't\". She is sleeping, quietly during this conversation. 1000- requests nausea and pain medication. Also provided ice chips and HOB remains elevated at all times. Assisted to BR, she is able to walk with one assist and has a very large gold/brown unformed stool. 1030- restful. FLACC 0. Skin cool, dry. Rr 16, easy. Room dark and  asleep at the bedside. 1210- FLACC 0.  RR 16, easy.  reports her restful. 1315- She has vomited a considerable amount, approx 300 ml undigested food, fluids. She has been eating nachos with her . assisted to BR. She is able to walk with one staff. Large brown/gold unformed stool. Returned to bed, she declines to sit in a chair. Lidocaine to coccyx region. Medicated for c/o abd pain, nausea. 1445- Awake, declines offer medication. Family remain at the bedside. 1630- Taking fluids. She is awake, sitting quiet in bed with family in the room. They report her restful. Patient's  states \"I'm sorry to keep going back to this but who would I talk to about her getting dialysis one more time? \". He is advised that this will be discussed again at [facility] Friday, but again, her blood pressure has to be in at least the 90s for several days. We review that a one time reading, will not gain her return to dialysis. He is somewhat distressed and we leave the room to discuss in the mai a repeat of conversation I have had with him and been witness to with Dr. Phoebe Salgado and Viviana FAULKNER. He is repetitive in his concerns, his questions, his observations and apologies but continues to states \"I just feel like I hear too many stories of people who 'come back'\". He states \"I just can't give up hope\". We discuss that it's a difficult balance to have hope, and help himself and Dulcie Brent deal with the present and possible future outcomes. That we realize this is difficult and are here to assist him with these tasks. 1820- Eyes closed. Restful. RR 20, easy. FLACC 0. Family remain and report her restful. I verify that a minimum of hourly rounds have been provided for this patient during this shift. Meds: Multiple IV dosing for pain and persistent nausea. Emesis x 1 today    Family/Education: Despite woody and repetitive discussions her  continues to request dialysis and/or paracentesis and ask about the guidelines and numbers for her to return for that care. Oral intake: Minimal to fair, but she vomited a considerable amount x 1 today. New problems/issues:  Persistent education/acceptance issues with [facility]. Summary/general care:  Awake at times but sleeps the majority of the shift. Poor oral intake. Persistent pain and nausea with one emesis today. Emaciated. Weak, frail and confused. Requires total care although she can walk with assistance of one.

## 2017-10-25 NOTE — PROGRESS NOTES
Progress Note    Patient: Mariia Cazares MRN: [id number]  SSN: [id number]    YOB: 1970  Age: 55 y.o. Sex: female      Admit Date: 10/18/2017    LOS: 7 days     Subjective:     Drowsy, oriented to self. Eating bites and sips. Persistent nausea, with 2 episodes of vomiting reported during visit. Has required 11 doses of Dilaudid IV for abdominal pain and 8 doses of Haldol for agitation and nausea. Family at bedside. Review of Systems:  Pertinent items are noted in the History of Present Illness. Objective:     Vitals:    10/23/17 0611 10/24/17 0622 10/24/17 1820 10/25/17 0619   BP: (!) 72/47 (!) 80/54 90/56 (!) 89/57   Pulse: 72 62 68 68   Resp: 16 12 15 14   Temp: 96.9 °F (36.1 °C)  95.4 °F (35.2 °C)         Intake and Output:  Current Shift:    Last three shifts: 10/23 1901 - 10/25 0700  In: -   Out: 1     Physical Exam:   GENERAL: fatigued, cooperative, mild distress, appears older than stated age, icteric, cachectic  LUNG: clear to auscultation bilaterally, diminished breath sounds   HEART: regular rate and rhythm, S1, S2 normal, no murmur, click, rub or gallop  ABDOMEN: firm, distended, non-tender. Ascites. Large umbilical hernia. : Anuric. EXTREMITIES:  extremities with no cyanosis. Mild lower ext edema. + pulses. SKIN: Jaundiced. Warm to touch. Stage 2 wound to coccyx and left boggy heel. NEUROLOGIC: Drowsy, oriented to self. Able to answer questions. Profound weakness. PSYCHIATRIC: non focal    Lab/Data Review:  No new labs resulted in the last 24 hours.     Assessment:     Principal Problem:    Type II diabetes mellitus with end-stage renal disease ([facility] (10/18/2017)    Active Problems:    Congenital hepatic fibrosis ()      Esophageal varices ([facility] (7/27/2016)      Coagulopathy ([address] (9/14/2017)      Overview: Liver disease      Cirrhosis of liver ([address] (5/9/2017)      Pancytopenia ([address] (5/9/2017)      Gastroparesis due to DM ([facility] (10/17/2017)      History of liver transplant ([facility] (3/7/2013)      Chronic renal failure (10/18/2017)      Ascites (10/18/2017)      Portal hypertension ([facility] (10/18/2017)      S/P TIPS (transjugular intrahepatic portosystemic shunt) (10/18/2017)      Autonomic dysfunction (10/18/2017)      Hypotension (10/18/2017)      Encephalopathy (10/18/2017)      Ulcer of esophagus without bleeding (10/18/2017)      Hypoalbuminemia (10/18/2017)      Pain (10/18/2017)        Plan:     Current Facility-Administered Medications   Medication Dose Route Frequency    fentaNYL (DURAGESIC) 25 mcg/hr patch 1 Patch  1 Patch TransDERmal Q72H    LORazepam (ATIVAN) injection 0.5 mg  0.5 mg IntraVENous Q4H PRN    HYDROmorphone (DILAUDID) syringe 0.25 mg  0.25 mg IntraVENous Q30MIN PRN    Or    HYDROmorphone (DILAUDID) syringe 0.25 mg  0.25 mg SubCUTAneous Q30MIN PRN    haloperidol lactate (HALDOL) injection 2 mg  2 mg SubCUTAneous Q1H PRN    Or    haloperidol lactate (HALDOL) injection 2 mg  2 mg IntraVENous Q1H PRN    acetaminophen (TYLENOL) suppository 650 mg  650 mg Rectal Q3H PRN    bisacodyl (DULCOLAX) suppository 10 mg  10 mg Rectal PRN    sodium chloride (NS) flush 10 mL  10 mL InterCATHeter Q12H    heparin (porcine) pf 300 Units  300 Units InterCATHeter Q12H    sodium chloride (NS) flush 10 mL  10 mL InterCATHeter PRN    heparin (porcine) pf 300 Units  300 Units InterCATHeter PRN    diphenhydrAMINE (BENADRYL) injection 25 mg  25 mg IntraVENous Q6H PRN    glycopyrrolate (ROBINUL) injection 0.2 mg  0.2 mg IntraVENous Q4H PRN    lidocaine (XYLOCAINE) 2 % viscous solution 15 mL  15 mL Topical Q12H    lidocaine (XYLOCAINE) 2 % viscous solution 15 mL  15 mL Topical PRN       Admitted with Chronic renal failure for management of pain, dyspnea and nausea.     1. Pain: Hydromorphone as ordered. Fentanyl patch as ordered.      2. Dyspnea: Hydromorphone as ordered, Glycopyrrolate prn secretions. Oxygen prn.     3. Nausea: Haloperidol as ordered.  Diet as tolerated.      4. Family/Pt Support: Family at bedside during exam. Medications and plan of care discussed with nursing staff and family. Will continue to monitor for symptoms and adjust medications as needed to maintain patient comfort. PPS 20%. Case discussed with Dr. Viola Barboza. Has been out of bed to wheelchair and to bathroom with no complaints of dizziness.     Signed By: Mandy Ortega NP     October 25, 2017

## 2017-10-25 NOTE — PROGRESS NOTES
Shift summary/ Pt alert and oriented throughout day. Patient had many visitors to day and did request to sit up in Wheelchair. Patient required several PRN pain medication for past 24 hours. NP/ MD review. New orders received. Fentanyl patch increased to 25mcg. Education provided to family. Patient has required medication for pain and nausea x3 . Injectable. Less than one meal as PO intake with 2 episodes of vomiting. Patient abdomen firm and tender to palpation left lower quadrant. Patient continues to require changes for symptom management.  Continued education related to medications and disease progression with family

## 2017-10-25 NOTE — PROGRESS NOTES
ERINSW spoke to the pt and her family. They are working on arrangements for [employer]. They will be using [facility].

## 2017-10-26 NOTE — PROGRESS NOTES
Progress Note    Patient: Marc Gonzalez MRN: [id number]  SSN: [id number]    YOB: 1970  Age: 55 y.o. Sex: female      Admit Date: 10/18/2017    LOS: 8 days     Clinical Summary:     Fiordaliza Durham has been having nausea and vomiting on and off since she's been our facility and this is likely multifactorial related to her organ dysfunction and her intestinal motility issues. .  She got a half milligram of lorazepam early this morning and she is now unresponsive with nausea and vomiting stopped. Her abdomen is non-tender with her large non incarcerated abdominal hernia present. Her lungs are fairly clear and heart is regular. She is cachectic. All this history was unchanged. I reviewed her status and recent history with the family. Vitals:    10/24/17 1820 10/25/17 0619 10/25/17 1802 10/26/17 0604   BP: 90/56 (!) 89/57 (!) 86/56 (!) 87/60   Pulse: 68 68 60 63   Resp: 15 14 16 18   Temp: 95.4 °F (35.2 °C)               Clinical Assessment:     Principal Problem:    Type II diabetes mellitus with end-stage renal disease ([address] (10/18/2017)    Active Problems:    Congenital hepatic fibrosis ()      Esophageal varices ([address] (7/27/2016)      Coagulopathy ([address] (9/14/2017)      Overview: Liver disease      Cirrhosis of liver ([address] (5/9/2017)      Pancytopenia ([address] (5/9/2017)      Gastroparesis due to DM ([address] (10/17/2017)      History of liver transplant ([address] (3/7/2013)      Chronic renal failure (10/18/2017)      Ascites (10/18/2017)      Portal hypertension ([address] (10/18/2017)      S/P TIPS (transjugular intrahepatic portosystemic shunt) (10/18/2017)      Autonomic dysfunction (10/18/2017)      Hypotension (10/18/2017)      Encephalopathy (10/18/2017)      Ulcer of esophagus without bleeding (10/18/2017)      Hypoalbuminemia (10/18/2017)      Pain (10/18/2017)        Treatment Plan:      I have reviewed the patient's Plan of Care with the nursing staff. I think she is stable at the present time, responding fairly well to low doses of LORAZEPAM  And see no indication for changing her regimen at present. Her death is not necessarily imminent will likely occur this week. I have reviewed all this with the family in attendance.           Current Facility-Administered Medications   Medication Dose Route Frequency    LORazepam (ATIVAN) injection 0.5 mg  0.5 mg IntraVENous Q4H PRN    fentaNYL (DURAGESIC) 25 mcg/hr patch 1 Patch  1 Patch TransDERmal Q72H    HYDROmorphone (DILAUDID) syringe 0.25 mg  0.25 mg IntraVENous Q30MIN PRN    Or    HYDROmorphone (DILAUDID) syringe 0.25 mg  0.25 mg SubCUTAneous Q30MIN PRN    haloperidol lactate (HALDOL) injection 2 mg  2 mg SubCUTAneous Q1H PRN    Or    haloperidol lactate (HALDOL) injection 2 mg  2 mg IntraVENous Q1H PRN    acetaminophen (TYLENOL) suppository 650 mg  650 mg Rectal Q3H PRN    bisacodyl (DULCOLAX) suppository 10 mg  10 mg Rectal PRN    sodium chloride (NS) flush 10 mL  10 mL InterCATHeter Q12H    heparin (porcine) pf 300 Units  300 Units InterCATHeter Q12H    sodium chloride (NS) flush 10 mL  10 mL InterCATHeter PRN    heparin (porcine) pf 300 Units  300 Units InterCATHeter PRN    diphenhydrAMINE (BENADRYL) injection 25 mg  25 mg IntraVENous Q6H PRN    glycopyrrolate (ROBINUL) injection 0.2 mg  0.2 mg IntraVENous Q4H PRN    lidocaine (XYLOCAINE) 2 % viscous solution 15 mL  15 mL Topical Q12H    lidocaine (XYLOCAINE) 2 % viscous solution 15 mL  15 mL Topical PRN           Signed By: Angeles Banegas MD     October 26, 2017

## 2017-10-26 NOTE — PROGRESS NOTES
Patient continues to have periods of apnea. Shallow and non labored irregular respirations. Patient non responsive to verbal and tactile stimuli. No evidence of pain or distress.  verbalized conflict in pt  Room with other family members. Encouraged to verbalize feeling. Reinforced current status of patient and support available.

## 2017-10-26 NOTE — PROGRESS NOTES
Patient requested medications then family refused them as the patient wanted to watch a show on television and did not want to be sleepy. Wasted the medications per policy with Nisha Milligan RN. Educated the family to the fact that the patient needed her rest. They voiced understanding.

## 2017-10-26 NOTE — PROGRESS NOTES
SW received a call to come in to provide patient spouse and family members supportive counseling and education and to diffuse the situation between patient spouse and her family members. Patient spouse verbalized he wanted to be their when she take her last breath and had given family their time with her. SW actively listened and validated his grief and explained everyone grieve differently and her family is grieving as well. Encourage him to focus on patient and not on what he feel her family is not doing to his liking. Patient spouse agreed. Patient arouse and set up in the bed and smiled and ask for some candy. SW provided patient Mother and sibling supportive counseling as well and allowed them the opportunity to verbalized their grief. WENDIE will have Noy follow up with patient spouse tomorrow if she lives through the night.

## 2017-10-26 NOTE — PROGRESS NOTES
Bereavement Coordinator/ visit:    Situation:   responded to referral from staff r/t family upsettedness. Patient in bed, seems to be actively dying. Patient's spouse at bedside, as was patient's step-mother. Patient's mother and step-dad sitting on window bench. Background:  Patient admitted 10/18 w/dx of renal failure. This /BC provided support to patient and family members through initial spiritual assessment visit and follow-up visits as well. Assess/Response:  Support provided through [employer] of presence, words of encouragement, and prayer.  learned that patient's daughter had returned to school (in [employer] [facility] [facility]) and that plan is for patient's mother to call pt's daughter as soon as patient dies. Care coordinated with staff after encounter with patient/family. Plan of care:  Spiritual/emotional and grief support to be provided to patient/family as needed, requested, or referred. BC/CH anticipates somewhat elevated bereavement needs for the family.

## 2017-10-26 NOTE — PROGRESS NOTES
TWYLA received a call from a Rn on the floor to come and assist the RN for the pt. Pt was actively vomiting and the Rn gave ativan for symptoms. The pt had a drop in BP and family was sure it was from the medication. Rn notified the spouse of the change in her BP and the mother was very upset at the Rn for only getting the spouse. LMSW arrived to the room family surrounding the bed tearful. Spouse is trying to move the pt's head, trying to readjust her position. Mother is sobbing. ERINSW stood in the room and helped the family engage in life review of the patient. We discussed her strengths and how dedicated her family was to be at her bedside all day everyday. Family is so used to the pt bouncing back when they heard she was declining they had a moment of panic. They were calm when I was in the room. The family was able to verbalize she was dying and they were aware they had harry time with her. TWYLA and the Rn left the family to visit and as we left the bereavement coordinator was going into the room.

## 2017-10-26 NOTE — PROGRESS NOTES
Follow up visit with patient and family offering words of hope, compassion, active listening and prayer. Family admits it is difficult to accept patient may be nearing the end.  acknowledged their feelings and attempting to normalize their concerns.  encouraged them to continue sharing stories and loving on the patient as they process each moment.  offered a prayer.

## 2017-10-26 NOTE — PROGRESS NOTES
Patient family states patient vomiting and request intervention. Family also request patient be assisted to bathroom. Patient lethargic, however shakes head yes to being nauseated. Ativan 0.05mg administered for nausea. Patient note the had B in brief. Incontinent care provided. During care patient noted patient had begun mottling. BP obtain and patient BP 66/44 with heart rate of 55. Pt  notified of changes and returns to room. Pt mother, step mother and  at bedside and having difficulty coping. Family declines  at present. SW called to provide emotional support. Pt breathing patterns irregular with periods of apnea. Education provided to family related to dying process.

## 2017-10-26 NOTE — PROGRESS NOTES
The shift change rounds finds the patient resting quietly in the bed. No signs of anxiety, Pain, nausea or respiratory distress. The bed is low and locked and the call light is within the reach of the patient, Will continue to monitor.

## 2017-10-27 NOTE — PROGRESS NOTES
Paulo Parry asked if he could talk to me. We went to the family room. He expressed some concern of behavior of her family yesterday. He did not like the fact that they were talking about her like she was dying right that minute . He said he knew she was not dying right then. He asked the family to all take turn being in the room and leaving him to be last to visit with her. Her family did not like that request very much. He was not expecting the push back her received. TWYLA explained everyone loves [employer] and each of her family members including him believes their grief is deeper. We talk about and he acknowledges his future loss is of massive proportions. He is unsure of how he is going to function. TWYLA helps him to see that everyone here loves her and each of them need to be present for their own healing. He states he has a counselor that he has been seeing for years. She is very helpful to him. He is able to express feelings of acceptance of his impending loss. TWYLA encouraged him to do some self care and make sure he eats tonight.

## 2017-10-27 NOTE — [facility] IDG CHAPLAIN NOTES
Patient: Gordon Garcia    Date: 10/27/17  Time: 11:42 AM    [facility]  Notes    Spiritual care being offered with the ministry of presence, active listening, words of comfort and prayer. Family struggles with acceptance of patient's impending death    Goal:  Assist family in working through their resistance of reality and anticipatory grief combined: This is being done by allowing them to share where they are in the process and  speaking to the reality of patient's prognosis while not taking away their hope.          Signed by: Grisel Viramontes

## 2017-10-27 NOTE — [facility] IDG VOLUNTEER NOTES
[address] Review     Status Codes I = Initiated C=Continued R=Revised RS = Resolved     C. Volunteer     Goal: Hospice house volunteer (s) enhances the quality of remaining life while patient is at the hospice house. Interventions: Mark Fenton Volunteer (s) will provide companionship to the patient and/or family by visiting at the hospice house       . Mark Fenton Volunteer (s) will provide respite as needed when requested by patient and/or family. Mark Dodson  Volunteer will provide activities such as music, reading, pet therapy, etc. as requested. Mark Dodson  Comfort bag delivered. Any other special requests or information regarding volunteer services:    Two visits are recorded for comfort bag delivery and snacks to the room. No further needs identified at this time. These notes have been discussed in [address] [facility] meeting.         Signed by: Adriana Montalvo

## 2017-10-27 NOTE — [facility] IDG SOCIAL WORKER NOTES
Patient: Senia Thomas    Date: 10/27/17  Time: 1:50 PM    [facility]  Notes    LMSW will continue to provide emotional support to pt and family. Family is having a hard time coping. Spouse is wanting quiet time for themselves only. Her family is upset with her spouse tension is high in the room. Candance Huger LMSW will continue to advocate for pt and family's need.         Signed by: Nia Christopher

## 2017-10-27 NOTE — PROGRESS NOTES
Progress Note    Patient: Senia Thomas MRN: [id number]  SSN: [id number]    YOB: 1970  Age: 55 y.o. Sex: female      Admit Date: 10/18/2017    LOS: 9 days     Subjective:     Lethargic. Eating bites and sips for breakfast. Persistent nausea, with 2 episodes of vomiting in the past 24 hours. Has required 2 doses of Dilaudid IV for abdominal pain and 2 doses each of Haldol and Ativan for agitation and nausea. Family at bedside. Review of Systems:  Review of systems not obtained due to patient factors. Objective:     Vitals:    10/25/17 1802 10/26/17 0604 10/26/17 1500 10/27/17 0643   BP: (!) 86/56 (!) 87/60 (!) 66/44 (!) 71/50   Pulse: 60 63 (!) 55 64   Resp: 16 18 (!) 7    Temp:            Intake and Output:  Current Shift:    Last three shifts: 10/25 1901 - 10/27 0700  In: -   Out: 1     Physical Exam:   GENERAL: fatigued, cooperative, mild distress, appears older than stated age, icteric, cachectic  LUNG: clear to auscultation bilaterally, diminished breath sounds. Shallow respirations. HEART: regular rate and rhythm, S1, S2 normal, no murmur, click, rub or gallop  ABDOMEN: firm, distended, non-tender. Ascites. Large umbilical hernia. EXTREMITIES:  extremities with no cyanosis. Mild lower ext edema. + pulses. SKIN: Jaundiced and dusky. Cool to touch. Stage 2 wound to coccyx and left boggy heel. NEUROLOGIC: Lethargic. Profound weakness. PSYCHIATRIC: non focal    Lab/Data Review:  No new labs resulted in the last 24 hours.     Assessment:     Principal Problem:    Type II diabetes mellitus with end-stage renal disease ([facility] (10/18/2017)    Active Problems:    Congenital hepatic fibrosis ()      Esophageal varices ([facility] (7/27/2016)      Coagulopathy ([facility] (9/14/2017)      Overview: Liver disease      Cirrhosis of liver ([address] (5/9/2017)      Pancytopenia ([address] (5/9/2017)      [facility] (10/17/2017)      History of liver transplant ([facility] (3/7/2013)      Chronic renal failure (10/18/2017)      Ascites (10/18/2017)      Portal hypertension ([address] (10/18/2017)      S/P TIPS (transjugular intrahepatic portosystemic shunt) (10/18/2017)      Autonomic dysfunction (10/18/2017)      Hypotension (10/18/2017)      Encephalopathy (10/18/2017)      Ulcer of esophagus without bleeding (10/18/2017)      Hypoalbuminemia (10/18/2017)      Pain (10/18/2017)        Plan:     Current Facility-Administered Medications   Medication Dose Route Frequency    LORazepam (ATIVAN) injection 0.5 mg  0.5 mg IntraVENous Q4H PRN    fentaNYL (DURAGESIC) 25 mcg/hr patch 1 Patch  1 Patch TransDERmal Q72H    HYDROmorphone (DILAUDID) syringe 0.25 mg  0.25 mg IntraVENous Q30MIN PRN    Or    HYDROmorphone (DILAUDID) syringe 0.25 mg  0.25 mg SubCUTAneous Q30MIN PRN    haloperidol lactate (HALDOL) injection 2 mg  2 mg SubCUTAneous Q1H PRN    Or    haloperidol lactate (HALDOL) injection 2 mg  2 mg IntraVENous Q1H PRN    acetaminophen (TYLENOL) suppository 650 mg  650 mg Rectal Q3H PRN    bisacodyl (DULCOLAX) suppository 10 mg  10 mg Rectal PRN    sodium chloride (NS) flush 10 mL  10 mL InterCATHeter Q12H    heparin (porcine) pf 300 Units  300 Units InterCATHeter Q12H    sodium chloride (NS) flush 10 mL  10 mL InterCATHeter PRN    heparin (porcine) pf 300 Units  300 Units InterCATHeter PRN    diphenhydrAMINE (BENADRYL) injection 25 mg  25 mg IntraVENous Q6H PRN    glycopyrrolate (ROBINUL) injection 0.2 mg  0.2 mg IntraVENous Q4H PRN    lidocaine (XYLOCAINE) 2 % viscous solution 15 mL  15 mL Topical Q12H    lidocaine (XYLOCAINE) 2 % viscous solution 15 mL  15 mL Topical PRN       Admitted with Chronic renal failure for management of pain, dyspnea and nausea.     1. Pain: Hydromorphone as ordered. Fentanyl patch as ordered.      2. Dyspnea: Hydromorphone as ordered, Glycopyrrolate prn secretions. Oxygen prn.     3. Nausea: Haloperidol as ordered. Diet as tolerated.      4.  Family/Pt Support: Family at bedside during exam. Medications and plan of care discussed with nursing staff and family. Will continue to monitor for symptoms and adjust medications as needed to maintain patient comfort. PPS 20%. Case discussed with Dr. Caren Canas and in [facility]. No changes in plan of care.      Signed By: Jennifer Huerta NP     October 27, 2017

## 2017-10-27 NOTE — PROGRESS NOTES
RR 10-12/min. Some agitation with moaning. She does not answer questions or acknowledge that we are speaking to her. Color gray. Resp effort poor but with this agitation, she has picked up her rate to 10-12, 16 at times with some apnea infrequently. Family remains at the bedside.

## 2017-10-28 NOTE — PROGRESS NOTES
Received report from off going RN and assumed care of patient. Patient identified by name and . Patient in bed resting with eyes closed. Spouse at bed side. No s/sx of distress noted at this time. Bed low and locked. Call light within reach. Door open for monitoring.

## 2017-10-28 NOTE — PROGRESS NOTES
Progress Note    Patient: Ava Saavedra MRN: [id number]  SSN: [id number]    YOB: 1970  Age: 55 y.o. Sex: female      Admit Date: 10/18/2017    LOS: 10 days     Subjective:     I saw the patient on afternoon rounds. Her   And mother-in-law were at bedside. The patient was only momentarily arousable and did not answer questions. Review of Systems:  Review of systems not obtained due to patient factors. Objective:     Vitals:    10/27/17 0643 10/27/17 1648 10/28/17 0610 10/28/17 1502   BP: (!) 71/50 (!) 73/50 (!) 77/52 (!) 68/46   Pulse: 64 63 67 61   Resp:  8 10 12   Temp:            Intake and Output:  Current Shift:    Last three shifts: 10/27 0701 - 10/28 1900  In: -   Out: 1     Physical Exam:     There is marked cyanosis of the fingers dorsal hands. Feet are still well perfused and warm. There is no evidence respiratory distress. Her abdomen remains distended. There is a  Gaseous component to this. She awakens only briefly during examination when repositioned. Lab/Data Review:  No new labs resulted in the last 24 hours.       Assessment:     Principal Problem:    Type II diabetes mellitus with end-stage renal disease ([address] (10/18/2017)    Active Problems:    Congenital hepatic fibrosis ()      Esophageal varices ([address] (7/27/2016)      Coagulopathy ([address] (9/14/2017)      Overview: Liver disease      Cirrhosis of liver ([address] (5/9/2017)      Pancytopenia ([address] (5/9/2017)      Gastroparesis due to DM ([address] (10/17/2017)      History of liver transplant ([address] (3/7/2013)      Chronic renal failure (10/18/2017)      Ascites (10/18/2017)      Portal hypertension ([address] (10/18/2017)      S/P TIPS (transjugular intrahepatic portosystemic shunt) (10/18/2017)      Autonomic dysfunction (10/18/2017)      Hypotension (10/18/2017)      Encephalopathy (10/18/2017)      Ulcer of esophagus without bleeding (10/18/2017)      Hypoalbuminemia (10/18/2017)      Pain (10/18/2017)        Plan:     Current Facility-Administered Medications   Medication Dose Route Frequency    LORazepam (ATIVAN) injection 0.5 mg  0.5 mg IntraVENous Q4H PRN    fentaNYL (DURAGESIC) 25 mcg/hr patch 1 Patch  1 Patch TransDERmal Q72H    HYDROmorphone (DILAUDID) syringe 0.25 mg  0.25 mg IntraVENous Q30MIN PRN    Or    HYDROmorphone (DILAUDID) syringe 0.25 mg  0.25 mg SubCUTAneous Q30MIN PRN    haloperidol lactate (HALDOL) injection 2 mg  2 mg SubCUTAneous Q1H PRN    Or    haloperidol lactate (HALDOL) injection 2 mg  2 mg IntraVENous Q1H PRN    acetaminophen (TYLENOL) suppository 650 mg  650 mg Rectal Q3H PRN    bisacodyl (DULCOLAX) suppository 10 mg  10 mg Rectal PRN    sodium chloride (NS) flush 10 mL  10 mL InterCATHeter Q12H    heparin (porcine) pf 300 Units  300 Units InterCATHeter Q12H    sodium chloride (NS) flush 10 mL  10 mL InterCATHeter PRN    heparin (porcine) pf 300 Units  300 Units InterCATHeter PRN    diphenhydrAMINE (BENADRYL) injection 25 mg  25 mg IntraVENous Q6H PRN    glycopyrrolate (ROBINUL) injection 0.2 mg  0.2 mg IntraVENous Q4H PRN    lidocaine (XYLOCAINE) 2 % viscous solution 15 mL  15 mL Topical Q12H    lidocaine (XYLOCAINE) 2 % viscous solution 15 mL  15 mL Topical PRN     The patient's requirement for low dosages of when necessary hydromorphone has diminished over the last 24 hours. She seems to be more deeply obtunded. Currently her urine output continues at  Modest volume. with blood pressure in the 92E systolic and the patient's mental state more deeply obtunded and new onset of cyanosis in the distal extremities I anticipate her demise will occur within the next 24-48 hours. I reassured the spouse that the patient appears comfortable. He is pleased with the pharmacologic management treatment and nursing care.     Signed By: Sienna Peres MD     October 28, 2017

## 2017-10-28 NOTE — PROGRESS NOTES
The shift change rounds finds the patient resting quietly in the bed. No signs of anxiety, Pain, nausea or respiratory distress. The bed is low and locked and the call light is within the reach of the patient. Patient's  is at the bedside. Will continue to monitor.

## 2017-10-29 NOTE — PROGRESS NOTES
The shift change rounds finds the patient resting quietly in the bed. No signs of anxiety, Pain, nausea or respiratory distress. The bed is low and locked and the call light is within the reach of the family, at the bedside. The patient cannot call. Will continue to monitor.

## 2017-10-29 NOTE — PROGRESS NOTES
The patient has remained lethargic and responds to only painful stimuli this shift. Breaths are very shallow. She has been medicated IV prn for pain times two this shift. The patient has had one small BM this shift and one damp brief. She has been turned and repositioned as often as the  would allow it to happen. She has taken no food or water this shift. Mouth care has been done several times. Her wound has been cleaned and creamed as ordered. Will give report to the on coming RN at shift change.

## 2017-10-29 NOTE — PROGRESS NOTES
Received report from off going RN and assumed care of patient. Patient identified by name and . Patient in bed resting at this time. Family present at bed side. No s/sx of distress noted. Bed low and locked. Call light within reach. Door open for monitoring.

## 2017-10-30 NOTE — PROGRESS NOTES
Family declined offer of  services and have asked that [facility] be contacted at this time and they are en route to facility.

## 2017-10-30 NOTE — PROGRESS NOTES
This nurse entered the room to answer the call light and patient's spouse stated he didn't think she was breathing. Upon assessment, patient had no vital signs. Family members are being contacted at this time to see if any one would like to come back to facility before [facility] is contacted.

## 2017-10-30 NOTE — [facility] IDG NURSE NOTES
Patient: Best Smith    Date: 10/27/2017  Time: 12:00 PM    [facility] Nurse Notes    UPDATE: Patient is a 55year old Female patient, with diagnosis of Renal Failure. Ongoing symptom management for Pain, Nausea and Anxiety. Medication management via IV. Fentanyl transdermal patch. Dictations per Erin Gonzales RN. GOALS OF CARE:   Continue to monitor for optimal patient comfort and symptom management. Ongoing family support.             Signed by: Shahid De Leon RN MSN

## 2017-10-30 NOTE — [facility] IDG MASTER NOTE
Hospice Interdisciplinary Group Collaborative  Date: 10/27/2017  Time: 12:30 pm    ___________________    Patient: Lamin Lemon  Insurance ID: [unfilled]  MRN: [id number]    ___________________    Diagnoses:  Diagnoses of Biliary cirrhosis ([facility] [address] [facility] Other ascites, Autonomic dysfunction, Chronic renal failure, stage 5 ([facility] Coagulopathy ([facility] Idiopathic esophageal varices with bleeding ([facility] Encephalopathy, History of liver transplant ([facility] Hypoalbuminemia, Hypotension, unspecified hypotension type, Dependence on renal dialysis ([facility] Abdominal pain, epigastric, and Ulcer of esophagus without bleeding were pertinent to this visit. Current Medications:  No current facility-administered medications for this encounter. Current Outpatient Prescriptions:     metoclopramide HCl (REGLAN) 10 mg tablet, Take 10 mg by mouth Before breakfast, lunch, dinner and at bedtime. , Disp: , Rfl:     midodrine (PROAMITINE) 5 mg tablet, Take 5 mg by mouth every eight (8) hours. , Disp: , Rfl:     morphine CR (MS CONTIN) 15 mg CR tablet, Take 15 mg by mouth every twelve (12) hours. , Disp: , Rfl:     mupirocin (BACTROBAN) 2 % ointment, Apply 1 g to affected area daily. Apply to affected area daily. , Disp: , Rfl:     pantoprazole (PROTONIX) 40 mg tablet, Take 40 mg by mouth daily. , Disp: , Rfl:     promethazine (PHENERGAN) 25 mg tablet, Take 25 mg by mouth every six (6) hours as needed for Nausea., Disp: , Rfl:     rifAXIMin (XIFAXAN) 550 mg tablet, Reported on 5/25/2017 - BID, Disp: , Rfl:     lactulose (CHRONULAC) 10 gram/15 mL solution, Take 30 mL by mouth three (3) times daily. (Patient taking differently: Take 4 tsp by mouth two (2) times a day. 20 ML BID), Disp: 480 mL, Rfl: 0    cycloSPORINE modified (GENGRAF) 25 mg capsule, Take 2.5 mg/kg/day by mouth every morning. Indications: Takes in the AM, Disp: , Rfl:     Orders:  Orders Placed This Encounter    IP CONSULT TO SPIRITUAL CARE Once on week one, then PRN.  For [facility] patients only. For contracted patients, primary hospice will continue to manage spiritual care needs     Once on week one, then PRN. For [facility] patients only. For contracted patients, primary hospice will continue to manage spiritual care needs     Standing Status:   Standing     Number of Occurrences:   1     Order Specific Question:   Reason for Consult: Answer:   Spiritual crisis intervention or per patient or caregiver request    OXYGEN CANNULA Liters per minute: 2; Indications for O2 therapy: RESPIRATORY DISTRESS CONTINUOUS Routine     Standing Status:   Standing     Number of Occurrences:   1     Order Specific Question:   Liters per minute: Answer:   2     Order Specific Question:   Indications for O2 therapy     Answer:   RESPIRATORY DISTRESS    DISCONTD: haloperidol lactate (HALDOL) injection 2 mg    DISCONTD: haloperidol lactate (HALDOL) injection 2 mg    DISCONTD: acetaminophen (TYLENOL) suppository 650 mg    DISCONTD: bisacodyl (DULCOLAX) suppository 10 mg    DISCONTD: sodium chloride (NS) flush 10 mL    DISCONTD: heparin (porcine) pf 300 Units    DISCONTD: sodium chloride (NS) flush 10 mL    DISCONTD: heparin (porcine) pf 300 Units    DISCONTD: diphenhydrAMINE (BENADRYL) injection 25 mg    DISCONTD: LORazepam (ATIVAN) injection 1 mg    DISCONTD: glycopyrrolate (ROBINUL) injection 0.2 mg    DISCONTD: HYDROmorphone (DILAUDID) syringe 0.5 mg    DISCONTD: HYDROmorphone (DILAUDID) syringe 0.5 mg    metoclopramide HCl (REGLAN) 10 mg tablet     Sig: Take 10 mg by mouth Before breakfast, lunch, dinner and at bedtime.  midodrine (PROAMITINE) 5 mg tablet     Sig: Take 5 mg by mouth every eight (8) hours.  morphine CR (MS CONTIN) 15 mg CR tablet     Sig: Take 15 mg by mouth every twelve (12) hours.  mupirocin (BACTROBAN) 2 % ointment     Sig: Apply 1 g to affected area daily. Apply to affected area daily.     pantoprazole (PROTONIX) 40 mg tablet     Sig: Take 40 mg by mouth daily.  promethazine (PHENERGAN) 25 mg tablet     Sig: Take 25 mg by mouth every six (6) hours as needed for Nausea.  DISCONTD: haloperidol lactate (HALDOL) injection 2 mg    DISCONTD: HYDROmorphone (DILAUDID) syringe 0.5 mg    DISCONTD: HYDROmorphone (DILAUDID) syringe 0.5 mg    DISCONTD: lidocaine (XYLOCAINE) 2 % viscous solution 15 mL    DISCONTD: lidocaine (XYLOCAINE) 2 % viscous solution 15 mL    DISCONTD: lidocaine (XYLOCAINE) 2 % viscous solution 15 mL    DISCONTD: HYDROmorphone (DILAUDID) syringe 0.25 mg    DISCONTD: HYDROmorphone (DILAUDID) syringe 0.25 mg    DISCONTD: fentaNYL (DURAGESIC) 12 mcg/hr patch 1 Patch    DISCONTD: LORazepam (ATIVAN) injection 0.5 mg    DISCONTD: fentaNYL (DURAGESIC) 25 mcg/hr patch 1 Patch    DISCONTD: LORazepam (ATIVAN) injection 0.5 mg    INITIAL PHYSICIAN ORDER: HOSPICE Level Of Care: General; Reason for Admission: Admitted GIP with chronic renal failure for management of pain, dyspnea and nausea. Standing Status:   Standing     Number of Occurrences:   1     Order Specific Question:   Status     Answer:   Hospice     Order Specific Question:   Level Of Care     Answer:   General     Order Specific Question:   Reason for Admission     Answer:   Admitted GIP with chronic renal failure for management of pain, dyspnea and nausea. Order Specific Question:   Inpatient Hospitalization Certified Necessary for the Following Reasons     Answer:   3.  Patient receiving treatment that can only be provided in an inpatient setting (further clarification in H&P documentation)     Order Specific Question:   Admitting Diagnosis     Answer:   Chronic renal failure [098802]     Order Specific Question:   Terminal Prognosis Diagnosis(es)     Answer:   Chronic renal failure [924753]     Order Specific Question:   Admitting Physician     Answer:   Nolvia Luque [[id number]]     Order Specific Question:   Attending Physician     Answer:   Nolvia Luque [[id number]]       Allergies: Allergies   Allergen Reactions    Aspirin Nausea Only    Codeine Nausea Only    Compazine [Prochlorperazine Edisylate] Unknown (comments) and Other (comments)     Lock jaw  Causes Lock Jaw    Pcn [Penicillins] Other (comments)     \"drops wbc\"       Care Plan:       Multidisciplinary Problems (Active)           Problem: Anticipatory Grief     Dates: Start: 10/18/17       Disciplines: Interdisciplinary    Goal: Grief heard and acknowledged, anxiety reduced, patient coping identified, patient/family expressed gratitude     Dates: Start: 10/18/17   Expected End: 10/31/17      Disciplines: Interdisciplinary         Problem: Community Resource Needs     Dates: Start: 10/18/17       Disciplines: Interdisciplinary    Goal: Patient is receiving increases resource supprot to enhance ability to remain at home     Dates: Start: 10/18/17   Expected End: 10/31/17      Disciplines: Interdisciplinary   Intervention: Provides assistance with identifying appropriate community resources    Dates: Start: 10/18/17       Description: Provide assistance with identifying appropriate community resources. Patient is receiving increased resource support to enhance ability to remain at home.           Problem: Dying Process     Dates: Start: 10/18/17       Disciplines: Interdisciplinary    Goal: Increased peace with dying process, patient coping identified, patient/family expressed gratitude, spiritual distress identified and decreased with visit     Dates: Start: 10/18/17   Expected End: 10/31/17      Disciplines: Interdisciplinary         Problem: Emotional Support Needs     Dates: Start: 10/18/17       Disciplines: Interdisciplinary    Goal: Patient/family is receiving emotional support     Dates: Start: 10/18/17   Expected End: 10/31/17      Disciplines: Interdisciplinary   Intervention: Assess for emotional distress    Dates: Start: 10/18/17      Intervention: Provide emotional support    Dates: Start: 10/18/17      Intervention: Provide emotional support of the family's cultural expressions of grief and loss    Dates: Start: 10/18/17       Description: Provide emotional support of the family's cultural expression of grief, loss, and response to illness. Intervention: Refer to bereavement    Dates: Start: 10/18/17            Problem: Falls - Risk of     Dates: Start: 10/18/17       Disciplines: Interdisciplinary    Goal: *Absence of Falls     Dates: Start: 10/18/17   Expected End: 10/31/17      Description: Document Sarbjit Cobos Fall Risk and appropriate interventions in the flowsheet.     Disciplines: Interdisciplinary         Problem: Family Significant Other Needs     Dates: Start: 10/18/17       Disciplines: Interdisciplinary    Goal: Patient/family will receive support from community resources     Dates: Start: 10/18/17   Expected End: 10/31/17      Disciplines: Interdisciplinary   Intervention: Assess family/caregiver needs    Dates: Start: 10/18/17      Intervention: Feliberto Martinez family/caregiver on hospice and ancillary services    Dates: Start: 10/18/17      Intervention: MSW community resource planning    Dates: Start: 10/18/17            Problem: Grieving     Dates: Start: 10/18/17       Disciplines: Interdisciplinary    Goal: *Able to identify stages of grieving process     Dates: Start: 10/18/17   Expected End: 10/31/17      Disciplines: Interdisciplinary   Intervention: Assist patient and family to decide about autopsy,  plans, completing important life tasks, coping with impending death, engaging in meaningful rituals, providing care of the body after death    Dates: Start: 10/18/17            Problem: Patient Education: Go to Patient Education Activity     Dates: Start: 10/18/17       Disciplines: Interdisciplinary    Goal: Patient/Family Education     Dates: Start: 10/18/17   Expected End: 10/31/17      Disciplines: Interdisciplinary         Problem: Patient Education: Go to Patient Education Activity     Dates: Start: 10/19/17       Disciplines: Interdisciplinary    Goal: Patient/Family Education     Dates: Start: 10/19/17   Expected End: 10/31/17      Disciplines: Interdisciplinary         Problem: Pressure Injury - Risk of     Dates: Start: 10/19/17       Disciplines: Interdisciplinary    Goal: *Prevention of pressure ulcer     Dates: Start: 10/19/17   Expected End: 10/31/17      Disciplines: Interdisciplinary   Intervention: Pressure injury risk assessment tool    Dates: Start: 10/19/17       Description: (e.g.: Terrance Scale)    Intervention: Pressure-relieving device needs assessment    Dates: Start: 10/19/17      Intervention: Pressure-relieving device implementation (eg: Specialty beds; wheel chair cushions; heel lift boots)    Dates: Start: 10/19/17      Intervention: Skin assessment (e.g. existing ulcers, color; integrity; moisture; bryn prominences; blisters; friction/shear injuries)    Dates: Start: 10/19/17      Intervention: Skin monitoring and care (e.g. skin cleansing; keep dry, moisturize, utilization of  lotion and/or skin barrier cream)    Dates: Start: 10/19/17      Intervention: Blood glucose control (eg: Monitoring; medication; nutrition/hydration)    Dates: Start: 10/19/17      Intervention: Position change (eg: Techniques to position; turn and transfer per schedule; cushion bryn prominences; float heels; encourage mobility)    Dates: Start: 10/19/17      Intervention: Nutrition promotion (eg: Micro/macro nutrient supplementation; encourage oral intake; blood glucose control; nutrition support when indicated)    Dates: Start: 10/19/17            Problem: Spiritual Distress     Dates: Start: 10/18/17       Disciplines: Interdisciplinary    Goal: Distress heard/acknowledged, spiritual distress reduced/resolved, anxiety reduced     Dates: Start: 10/18/17   Expected End: 10/31/17      Description: Patient distress heard and acknowledged.   Patient/family spiritual distress reduced/resolved. Anxiety reduced. Patient coping identified. Patient family expressed gratitude. Disciplines: Interdisciplinary         Problem: Spiritual Evaluation     Dates: Start: 10/18/17       Disciplines: Interdisciplinary    Goal: Identify beliefs/practices that support hospice experience     Dates: Start: 10/18/17   Expected End: 10/31/17      Description: Patient/family identify their beliefs/practices that impair Hospice experience. Patient/family identify their beliefs/practices that support Hospice experience. Patient coping identified. Spiritual distress identified and decreased with visit. Disciplines: Interdisciplinary           ___________________    Care Team Notes          POC/IDG Notes      [facility] IDG Nurse Notes by Darryle Aspen at 10/29/17 2328  Version 1 of 1    Author:  Darryle Aspen Service:  Kip Carey Author Type:  Registered Nurse    Filed:  10/30/17 0718 Date of Service:  10/29/17 2328 Status:  Signed    :  Darryle Aspen (Registered Nurse)           Patient: Naseem Head    Date: 10/27/2017  Time: 12:00 PM    [facility] Nurse Notes    UPDATE: Patient is a 55year old Female patient, with diagnosis of Renal Failure. Ongoing symptom management for Pain, Nausea and Anxiety. Medication management via IV. Fentanyl transdermal patch. Dictations per Samira Jackman RN. GOALS OF CARE:   Continue to monitor for optimal patient comfort and symptom management. Ongoing family support.             Signed by: Darryle Aspen RN [address]  Notes by Julianne Cortes at 10/27/17 1142  Version 1 of 1    Author:  Julianne Cortes Service:  Spiritual Care Author Type:  Pastoral Care    Filed:  10/27/17 1358 Date of Service:  10/27/17 1142 Status:  Signed    :  Julianne Cortes (Pastoral Care)           Patient: Naseem Head    Date: 10/27/17  Time: 11:42 AM    [facility]  Notes    Spiritual care being offered with the ministry of presence, active listening, words of comfort and prayer. Family struggles with acceptance of patient's impending death    Goal:  Assist family in working through their resistance of reality and anticipatory grief combined: This is being done by allowing them to share where they are in the process and  speaking to the reality of patient's prognosis while not taking away their hope. Signed by: Grisel PENA [facility] IDG  Notes by Gorge Gagnon at 10/27/17 1350  Version 1 of 1    Author:  Gorge Gagnon Service:  Licensed Clinical  Author Type:      Filed:  10/27/17 1356 Date of Service:  10/27/17 1350 Status:  Signed    :  Gorge Gagnon ()           Patient: Gordon Garcia    Date: 10/27/17  Time: 1:50 PM    [facility]  Notes    LMSW will continue to provide emotional support to pt and family. Family is having a hard time coping. Spouse is wanting quiet time for themselves only. Her family is upset with her spouse tension is high in the room. Rafael Villlaobos LMSW will continue to advocate for pt and family's need. Signed by: Gorge Gagnon       [facility] IDG Volunteer Notes by Abdiaziz Ponce at 10/27/17 1346  Version 1 of 1    Author:  Abdiaziz Ponce Service:  Declan Avendaño Author Type:  Hospice Volunteer/    Filed:  10/27/17 1347 Date of Service:  10/27/17 1346 Status:  Signed    :  Abdiaziz Ponce (Hospice Volunteer/)               [facility] Interdisciplinary Plan of Care Review     Status Codes I = Initiated C=Continued R=Revised RS = Resolved     C. Volunteer     Goal: Hospice house volunteer (s) enhances the quality of remaining life while patient is at the hospice house. Interventions: Rafael العلي Volunteer (s) will provide companionship to the patient and/or family by visiting at the hospice house       . Rafael العلي Volunteer (s) will provide respite as needed when requested by patient and/or family. Dulce Maria Casillas  Volunteer will provide activities such as music, reading, pet therapy, etc. as requested. Dulce Maria Casillas  Comfort bag delivered. Any other special requests or information regarding volunteer services:    Two visits are recorded for comfort bag delivery and snacks to the room. No further needs identified at this time. These notes have been discussed in [facility] meeting. Signed by: Faiza Bush Nurse Notes by Bhavana Lazo at 10/23/17 1346  Version 1 of 1    Author:  Bhavana Lazo Service:  Mary Shook Author Type:  Registered Nurse    Filed:  10/23/17 8299 Date of Service:  10/23/17 1346 Status:  Signed    :  Bhavana Lazo (Registered Nurse)           Patient: Deirdre So    Date: 10/23/17  Time: 1:46 PM    [facility] Nurse Notes    UPDATE: Patient is a 55year old Female patient, with diagnosis of Renal Failure, GIP level of care, for Pain, Dyspnea and Nausea. Fentanyl, Haldol and Hydromorphone for symptom management. Dictation per Gene Nava RN. GOALS OF CARE:   Continue to monitor for optimal patient comfort and symptom management. Family at bedside per report, and having difficulty accepting that patient is dying. Signed by: Bhavana Lazo RN [facility] ID  Notes by [employer] at 10/23/17 1225  Version 1 of 1    Author:  Caroline Tim Service:  Licensed Clinical  Author Type:      Filed:  10/23/17 1232 Date of Service:  10/23/17 1225 Status:  Signed    :  Caroline Tim ()           Patient: Deirdre So    Date: 10/23/17  Time: 12:25 PM    [facility]  Notes      LMSW will continue to provide emotional support to pt and family. Family needs a lot of teaching and education on the end of life process. Family vacillates with understanding of the prognosis. The family is struggling with anticipatory grief. LMSW has referred family to bereavement. Signed by: Zbigniew Rogers       [facility] IDG  Notes by Ar Cooper at 10/23/17 1224  Version 1 of 1    Author:  Ar Cooper Service:  Spiritual Care Author Type:  Pastoral Care    Filed:  10/23/17 1230 Date of Service:  10/23/17 1224 Status:  Signed    :  Ar Cooper (Pastoral Care)           Patient: Tio Hernandez    Date: 10/23/17  Time: 12:24 PM    [facility]  Notes     Balaji Miner  completed an initial assessment and offered spiritual support through the ministry of [employer] and prayer. Signed by: Ar Cooper       [facility] IDG  Notes by Melissa Jones at 10/20/17 1802  Version 1 of 1    Author:  Melissa Jones Service:  Licha Sol Author Type:  Pastoral Care    Filed:  10/20/17 1804 Date of Service:  10/20/17 1802 Status:  Signed    :  Melissa Jones ([facility] Care)           Patient: Tio Hernandez    Date: 10/20/17  Time: 6:03 PM    [facility]  Notes  Patient is a 55year old MWF w/dx of end stage renal disease. Patient was admitted on 10/18 and this  completed Initial Spiritual Assessment. Family is struggling to come to terms with patient's prognosis and decline. Spiritual care/support will continue as needed, requested, or referred. Signed by: Melissa Jones       [facility] IDG  Notes by Zbigniew Rogers at 10/20/17 1255  Version 1 of 1    Author:  Zbigniew Rogers Service:  Licensed Clinical  Author Type:      Filed:  10/20/17 1306 Date of Service:  10/20/17 1255 Status:  Signed    :  Zbigniew Rogers ()           Patient: Tio Hernandez    Date: 10/20/17  Time: 12:55 PM    [facility]  Notes  LMSW will continue to try to build rapport with the family. Family is struggling with their anticipated grief. Family is struggling with the thought of the loss of her.   They were until recently thinking she was going to have a transplant. The whole family are having issues with grief        Signed by: Alicia Moura       [facility] IDG Nurse Notes by Ovidio Zhang at 10/20/17 1254  Version 1 of 1    Author:  Ovidio Zhang Service:  Mackenzie Prutit Author Type:  Registered Nurse    Filed:  10/20/17 1300 Date of Service:  10/20/17 1254 Status:  Signed    :  Ovidio Zhang (Registered Nurse)           Patient: Yanni Bell    Date: 10/20/17  Time: 12:55 PM    [facility] Nurse Notes    UPDATE: Patient is a 55year old Female patient, with diagnosis of Renal Failure, GIP level of care for Pain, Dyspnea and Nausea. Haldol and Dilaudid for Symptom Management. GOALS OF CARE:   Continue to monitor for optimal patient comfort and symptom management. Ongoing family support. Signed by: Ovidio Zhang RN MSN       [facility] IDG Volunteer Notes by Kristi Avendaño at 10/20/17 1229  Version 1 of 1    Author:  Kristi Avendaño Service:  Mackenzie Pruitt Author Type:  Hospice Volunteer/    Filed:  10/20/17 1234 Date of Service:  10/20/17 1229 Status:  Signed    :  Kristi Avendaño (Hospice Volunteer/)               [facility] Interdisciplinary Plan of Care Review     Status Codes I = Initiated C=Continued R=Revised RS = Resolved     I.  Volunteer     Goal: Hospice house volunteer (s) enhances the quality of remaining life while patient is at the hospice house. Interventions: Neisha Driver Riseapolinar Ezequiel [place] Volunteer (s) will provide companionship to the patient and/or family by visiting at the hospice house       . Neisha Riser Ezequiel [place] Volunteer (s) will provide respite as needed when requested by patient and/or family. Neisha Riser Jerald Herber  Volunteer will provide activities such as music, reading, pet therapy, etc. as requested. Neisha Heaton Jerald Herber  Comfort bag delivered. Any other special requests or information regarding volunteer services:    One visit for comfort bag delivery is recorded.   Staff have asked for extra visits to support the family and volunteers have been notified. No further needs identified at this time. These notes have been discussed in [facility] meeting.           Signed by: Elda Chowdhury

## 2017-10-30 NOTE — [facility] IDG BEREAVEMENT NOTES
Patient death and family bereavement needs discussed at [facility]. Bereavement risk factors indicate a bereavement risk score of MODERATE . Bereavement follow up to be provided accordingly.

## 2017-11-05 ENCOUNTER — DOCUMENTATION ONLY (OUTPATIENT)
Dept: TRANSPLANT | Facility: CLINIC | Age: 47
End: 2017-11-05

## 2018-08-19 NOTE — PROGRESS NOTES
Discharge cancelled per RN. Patient seen an examined at bedside. Pain is well controlled. No other complaints at this time.    PE:  Vital Signs Last 24 Hrs  T(C): 36.7 (19 Aug 2018 05:30), Max: 37.6 (18 Aug 2018 21:09)  T(F): 98 (19 Aug 2018 05:30), Max: 99.7 (18 Aug 2018 21:09)  HR: 83 (19 Aug 2018 05:30) (75 - 83)  BP: 113/64 (19 Aug 2018 05:30) (104/57 - 113/64)  RR: 18 (19 Aug 2018 05:30) (17 - 18)  SpO2: 97% (19 Aug 2018 05:30) (97% - 98%)    RLE:    No gross deformity noted  Skin intact; No erythema or ecchymosis  SILT L3-S1  DP1+  EHL/FHL/GSC/TA intact  Compartments soft and compressible  No calf tenderness  Decreased sensation to light tough dorsal/volar foot, hx diabetic neuropathy

## 2018-11-06 NOTE — ED NOTES
Lactulose given as ordered. Tolerated well.
Patient is resting on stretcher.
Please refer to pharmacy note
Report to NEO Perry
No

## 2019-03-07 NOTE — PROGRESS NOTES
END OF SHIFT NOTE:    INTAKE/OUTPUT  09/18 0701 - 09/19 0700  In: 165 [I.V.:165]  Out: 0   Voiding: YES  Catheter: NO  Drain:              Flatus: Patient does have flatus present. Stool:  3 occurrences. Characteristics:  Stool Assessment  Stool Color: Brown  Stool Appearance: Loose  Stool Amount: Small  Stool Source/Status: Incontinence    Emesis: 0 occurrences. Characteristics:        VITAL SIGNS  Patient Vitals for the past 12 hrs:   Temp Pulse Resp BP SpO2   09/19/17 0335 98.7 °F (37.1 °C) 85 18 99/65 94 %   09/18/17 2320 98.2 °F (36.8 °C) 85 16 92/56 93 %   09/18/17 2023 98.3 °F (36.8 °C) 85 16 93/57 97 %       Pain Assessment  Pain Intensity 1: 0 (09/19/17 0305)     Pain Intervention(s) 1: Medication (see MAR)  Patient Stated Pain Goal: 0    Ambulating  Yes    Shift report given to oncoming nurse at the bedside.     Reuben Simetnal RN 07-Mar-2019

## 2021-07-29 NOTE — CONSULTS
[facility]   [address] [place], [address] [facility] [address]  Phone: [phone]  Fax: [phone]    Date of visit: 2/17/2017    Referring physician: No referring provider defined for this encounter. Reason for referral: Thrombosed left thigh AV graft    Rashawn Jimenez is a 55 y.o. female sent in consultation for No chief complaint on file. HPI: 78-year-old female with history of hepatic dysfunction who had a left thigh AV graft placed in November 2016. The  graft has failed today while on dialysis. This is a second thrombosis of the graft since it was placed back in November. Apparently she has been having some difficulty with hypotension. She had vein mapping done back in the fall that demonstrated reasonable brachial as well as axillary veins in both upper extremity's. Some hesitation to place an upper arm graft due to the fact that she had a history of Raynaud's for concern of steal syndrome. ROS:    Constitutional: Negative for fever and chills. HENT: Negative for congestion and sore throat. Skin: Negative for rash and itching. Eyes: Negative for blurred vision and double vision. Respiratory: Negative for cough and shortness of breath. Cardiovascular: Negative for chest pain, palpitations, claudication and leg swelling. Gastrointestinal: Negative for nausea, vomiting and abdominal pain. Genitourinary: Negative for dysuria, hematuria and flank pain. Musculoskeletal: Negative for joint pain and falls. Neurological: Negative for dizziness, sensory change, focal weakness, loss of consciousness and headaches.      Medical history:   Past Medical History   Diagnosis Date    SEAN (acute kidney injury) ([facility] [address] 6/26/2016    Arthritis      kath feet    Ascites     Benign neoplasm of skin of trunk, except scrotum      pt denies    Cellulitis and abscess of unspecified digit      hx of    Chronic kidney disease      [facility] on Mon/Wed/Fri    Chronic pain      abd area    Congenital hepatic fibrosis      Liver transplant X2    Esophageal varices (HCC)     GERD (gastroesophageal reflux disease)     Hemodialysis access, AV graft ([facility] [address] 11/22/2016    Hepatic encephalopathy ([facility] [address] 10/2016     recently hospitalized for hepatic encephalopathy    Hepatitis C      hx of hepatitis C-- dx after first liver transplant from a transfusion   (first transplant age 13)   [facility] History of gastric ulcer     Insomnia 07/13/2015     no current meds    Liver transplant status ([facility] [address] 1986 and 1999     X2- congential hepatic fibrosis    Pain in joint, ankle and foot     PICC (peripherally inserted central catheter) in place     PONV (postoperative nausea and vomiting)      some N/V, pt states she does well with Phenergan    Port-a-cath in place      R chest for HD    Raynaud disease     Spleen enlarged     Tachycardia     Thrombocytopenia (HCC)     Type 2 diabetes mellitus (HCC)      insulin only/AVG /s.s of hypoglycemia 30's/Last A1c 5.6    Vasculitis ([facility] [address]      resolved       Surgical history:   Past Surgical History   Procedure Laterality Date    Pr lap,tubal cautery      Hx colonoscopy      Hx cholecystectomy  1984    Hx tubal ligation      Hx other surgical       biliary reconstructive surgery    Hx other surgical  2013     EGD    Hx other surgical  03/2016     tips procedure    Hx other surgical       paracentesis    Vascular surgery procedure unlist Left 11/02/2016     thigh AVG insertion in thigh    Hx transplant  3260,4341     2 liver - first one for congenital hepatic fibrosis, 2nd for Hep C cirrhosis       Allergies:    Allergies   Allergen Reactions    Codeine Nausea Only    Compazine [Prochlorperazine Edisylate] Unknown (comments)     Causes Lock Jaw    Pcn [Penicillins] Other (comments)     \"drops wbc\"       Current medications:   Current Facility-Administered Medications   Medication Dose Route Frequency    lactulose (CHRONULAC) solution 20 g  30 mL Oral TID    [START ON 2/22/2017] epoetin ping (EPOGEN;PROCRIT) injection 20,000 Units  20,000 Units SubCUTAneous Q7D    morphine CR (MS CONTIN) tablet 30 mg  30 mg Oral Q12H    vancomycin (VANCOCIN) - pharmacy dosing  500 mg IntraVENous Rx Dosing/Monitoring    dextrose 40% (GLUTOSE) oral gel 1 Tube  1 Tube Oral PRN    dextrose (D50W) injection syrg 25 g  25 g IntraVENous PRN    sodium chloride (NS) flush 5-10 mL  5-10 mL IntraVENous Q8H    sodium chloride (NS) flush 5-10 mL  5-10 mL IntraVENous PRN    naloxone (NARCAN) injection 0.4 mg  0.4 mg IntraVENous PRN    diphenhydrAMINE (BENADRYL) injection 12.5 mg  12.5 mg IntraVENous Q4H PRN    promethazine (PHENERGAN) with saline injection 12.5 mg  12.5 mg IntraVENous Q6H PRN    cycloSPORINE modified (GENGRAF, NEORAL) capsule 50 mg (patient supplied)  50 mg Oral Q12H    pantoprazole (PROTONIX) tablet 40 mg  40 mg Oral ACB&D    rifAXIMin (XIFAXAN) tablet 550 mg  550 mg Oral BID    insulin glargine (LANTUS) injection 25 Units  25 Units SubCUTAneous QHS    insulin lispro (HUMALOG) injection   SubCUTAneous AC&HS       Social history:   History   Smoking Status    Never Smoker   Smokeless Tobacco    Never Used                               History   Alcohol Use No       Imaging: See vein mapping report    Vitals:   Vitals:    02/17/17 0900 02/17/17 0927 02/17/17 0956 02/17/17 1246   BP: 90/57 100/67 95/63 104/69   BP 1 Location:    Left arm   BP Patient Position:    At rest;Sitting   Pulse: 93 97 100 66   Resp:    16   Temp:    97.6 °F (36.4 °C)   SpO2:    95%   Weight:           Physical exam:  Visit Vitals    /69 (BP 1 Location: Left arm, BP Patient Position: At rest;Sitting)    Pulse 66    Temp 97.6 °F (36.4 °C)    Resp 16    Wt 123 lb 1.6 oz (55.8 kg)    LMP 01/02/2016    SpO2 95%    Breastfeeding No    BMI 23.45 kg/m2     General appearance: alert, cooperative, no distress, appears stated age  Head: Normocephalic, without obvious abnormality, atraumatic  Neck: supple, symmetrical, trachea midline, no adenopathy, thyroid: not enlarged, symmetric, no tenderness/mass/nodules, no carotid bruit and no JVD  Lungs: clear to auscultation bilaterally  Heart: regular rate and rhythm, S1, S2 normal, no murmur, click, rub or gallop  Abdomen: soft, non-tender. Bowel sounds normal. No masses,  no organomegaly  Extremities: extremities normal, atraumatic, no cyanosis or edema  Skin: Skin color, texture, turgor normal. No rashes or lesions  Neurologic: Grossly normal  Vascular Exam:    Right:    LEFT:                      Radial: 2+    Radial: 2+         Brachial: 2+   Brachial: 2+          Femoral: 2+   Femoral: 2+            Impression: End-stage renal disease with thrombosed left thigh AV graft. This is a second graft thrombosis in 3 months. Plan:    I have recommended a repeat thrombectomy of AV graft and then an attempt at maintaining a improved blood pressure to support the graft. This predisposes that we do not find a mechanical issue with the graft itself. I spoke with Dr. Anastacia Kennedy about this today and he agrees. If we are unable to support his AV graft in the thigh think it would be reasonable to try an upper arm AV graft perhaps using a tapered (4 mm to 7 mm) AV graft with a proximal arterial anastomosis. We will get her scheduled as quickly as possible and if she is to be discharged over the weekend we can schedule her to be done as an outpatient. I discussed this with the patient as well as with the nephrologist.    Approximately 30 minutes  was spent in direct patient contact during this encounter including 50% of which was spend in patient education, counseling, review of medical history and imaging, and medical decision making. Reba Lopez MD    Elements of this note have been dictated using speech recognition software. As a result, errors of speech recognition may have occurred. No

## 2021-10-08 NOTE — PROGRESS NOTES
DOES NOT APPEAR VISUALLY SIGNIFICANT. Ventilator check complete; patient has a #7.5 ET tube secured at the 20 at the lip. Patient is not sedated. Patient is not able to follow commands. Breath sounds are clear and diminished. Trachea is midline, Negative for subcutaneous air, and chest excursion is symmetric. Patient is also Negative for cyanosis and is Negative for pitting edema. All alarms are set and audible. Resuscitation bag is at the head of the bed.       Ventilator Settings  Mode FIO2 Rate Tidal Volume Pressure PEEP I:E Ratio   Pressure support  35 %    400 ml  10 cm H2O  8 cm H20  1:4      Peak airway pressure: 19 cm H2O   Minute ventilation: 6.3 l/min     ABG:   Recent Labs      09/14/17   0230  09/13/17   2355  09/13/17   1959   PH  7.44  7.51*  7.55*   PCO2  25*  22*  21*   PO2  190*  220*  116*   HCO3  17*  17*  18*         Purvi Hawthorne, RT

## 2022-11-14 NOTE — ED NOTES
From: Stephanie Gu  To: Odalys Arzate  Sent: 11/14/2022 10:37 AM CST  Subject: Question regarding Electrocardiogram (ECG)    Based on these results, is this cause for concern? My chest is still bothering me. I’m also stressed out about it, which may be adding to it. Thanks.    Transport arrived. Report and all paperwork given.

## 2023-05-29 NOTE — PROGRESS NOTES
Interventional Radiology Post Paracentesis/Thoracentesis Note    5/22/2017    Procedure(s): Ultrasound guided Therapeutic Paracentesis Performed with 8 Khmer catheter total volume 4000 ml. Samples sent to lab. Preliminary Findings: medium yellow. Complications: None    Plan:  Observation, check labs if drawn.           Chest X-Ray:  no    Full dictated report to follow    Signed By: Asim Hammonds RN Communicable/Infectious
